# Patient Record
Sex: MALE | Race: WHITE | Employment: OTHER | URBAN - METROPOLITAN AREA
[De-identification: names, ages, dates, MRNs, and addresses within clinical notes are randomized per-mention and may not be internally consistent; named-entity substitution may affect disease eponyms.]

---

## 2017-01-04 ENCOUNTER — OFFICE VISIT (OUTPATIENT)
Dept: FAMILY MEDICINE CLINIC | Age: 82
End: 2017-01-04

## 2017-01-04 VITALS
SYSTOLIC BLOOD PRESSURE: 158 MMHG | WEIGHT: 178 LBS | DIASTOLIC BLOOD PRESSURE: 72 MMHG | BODY MASS INDEX: 23.59 KG/M2 | HEIGHT: 73 IN | OXYGEN SATURATION: 97 % | TEMPERATURE: 97.5 F | RESPIRATION RATE: 16 BRPM | HEART RATE: 82 BPM

## 2017-01-04 DIAGNOSIS — K21.9 GASTROESOPHAGEAL REFLUX DISEASE, ESOPHAGITIS PRESENCE NOT SPECIFIED: ICD-10-CM

## 2017-01-04 DIAGNOSIS — R73.01 IMPAIRED FASTING GLUCOSE: ICD-10-CM

## 2017-01-04 DIAGNOSIS — I10 ESSENTIAL HYPERTENSION: Primary | ICD-10-CM

## 2017-01-04 DIAGNOSIS — R80.9 MICROALBUMINURIA: ICD-10-CM

## 2017-01-04 DIAGNOSIS — E78.1 HYPERTRIGLYCERIDEMIA: ICD-10-CM

## 2017-01-04 DIAGNOSIS — Z00.00 MEDICARE ANNUAL WELLNESS VISIT, SUBSEQUENT: ICD-10-CM

## 2017-01-04 RX ORDER — PHENOL/SODIUM PHENOLATE
20 AEROSOL, SPRAY (ML) MUCOUS MEMBRANE DAILY
Qty: 90 TAB | Refills: 3 | Status: CANCELLED | OUTPATIENT
Start: 2017-01-04

## 2017-01-04 RX ORDER — RANITIDINE 150 MG/1
150 TABLET, FILM COATED ORAL 2 TIMES DAILY
Qty: 60 TAB | Refills: 3 | Status: SHIPPED | OUTPATIENT
Start: 2017-01-04 | End: 2017-01-19 | Stop reason: ALTCHOICE

## 2017-01-04 NOTE — PROGRESS NOTES
Pt presents to office today for a follow up on HTN and fasting labs. Chief Complaint   Patient presents with    Hypertension     Follow up. 1. Have you been to the ER, urgent care clinic since your last visit? Hospitalized since your last visit? No    2. Have you seen or consulted any other health care providers outside of the [address] [place] [address] since your last visit? Include any pap smears or colon screening. Yes to Dr. Thomas Hanks 10/3/2016 , Dr. Luis Clinton 10/27/2016 and again to Dr. Thomas Hanks 11/28/2016.

## 2017-01-04 NOTE — PROGRESS NOTES
HISTORY OF PRESENT ILLNESS  Magdaleno Ochoa is a 80 y.o. male. HPI  Chief Complaint   Patient presents with    Hypertension     Follow up. Magdaleno Ochoa is a 80 y.o. male with a PHMx of Type 2 dm, HTN, chronic a fib, anemia rt b12 def, multiple myeloma, gerd, chronic diarrhea, carotid stenosis and chronic diarrhea. Pt presents to office today for a follow up on HTN and fasting labs. states he is doing well overall. Still seeing dr West Harding regularly for his multiple myeloma cancer tx. States he wsa told to put his statin and asa on hold until told to restart by md. He has a fu appt to discuss this. States this is due to elevated liver functions. He is going next week to have an abd ultrasound as well. Reviewed dr [place] Harding notes at time of visit which states he will be referred to GI for evaluation if pending these results. Also he was told to cut out alcohol. He has been on break from the revlimid x 3 months due to this as well. Still has manageable diarrhea in am. Has a large breakfast with cereal, juice, fruit and eggs. States he will have a large bm in am followed by watery diarrhea for a few hours. Diet fernandez dry helps   He still takes his probiotic daily. Takes colestid bid. After dinner at night he has gas but does not go to bm. Takes the immodium at breakfast after diarrhea. He is doing labs on Monday with dr West Harding. Current Outpatient Prescriptions   Medication Sig Dispense Refill    lisinopril-hydrochlorothiazide (PRINZIDE, ZESTORETIC) 10-12.5 mg per tablet Take 0.5 Tabs by mouth daily. Cancel this rx request, do not fill ** 30 Tab 5    multivitamin (ONE A DAY) tablet Take 1 Tab by mouth daily.  Omeprazole delayed release (PRILOSEC D/R) 20 mg tablet Take 20 mg by mouth daily. Takes 1 tab daily      gabapentin (NEURONTIN) 300 mg capsule Take 1 Cap by mouth three (3) times daily. 323 Cap 3    folic acid-vit I5-JFS W74 (FOLBIC) 2.5-25-2 mg tablet Take 1 Tab by mouth daily.  90 Tab 3    colestipol (COLESTID) 1 gram tablet Take 1 Tab by mouth two (2) times a day. 180 Tab 4    FLUZONE HIGH-DOSE 2015-16, PF, syrg injection   0    ENZYMES,DIGESTIVE, PLANT, (FIBERZYME CONCENTRATE-HP PO) Take  by mouth two (2) times a day.  L. ACIDOPH/L.RHAMN/B.BIF/B. LONG (PROBIOTIC ACIDOPHILUS BIOBEADS PO) Take  by mouth two (2) times a day.  Lancets (ACCU-CHEK FASTCLIX) mis Patient is to test BID 3 Package 11    ACCU-CHEK SMARTVIEW TEST STRIP strip Patient is to test blood sugar BID 3 Package 11    Blood-Glucose Meter (ACCU-CHEK CORA) misc 1 Package by Does Not Apply route two (2) times a day. 1 Each 11    aspirin delayed-release 81 mg tablet Take 81 mg by mouth daily.  FERROUS FUMARATE/ASCORBIC ACID (VITRON-C PO) Take 2 Tabs by mouth two (2) times a day.  simvastatin (ZOCOR) 40 mg tablet Take 1 Tab by mouth nightly. 90 Tab 3    REVLIMID 15 mg cap       miscellaneous medical supply (T.E.D. ANTI-EMBOLISM STOCKING) misc mauricio leg swelling. Please fit patient to 20mmhg compression.    M79.89. G62.9, I83.93 1 Each 11     Allergies   Allergen Reactions    Codeine Nausea and Vomiting     Blurred vision, felt faint    Contrast Agent [Iodine] Hives     Needs premedication w/ Benadryl prn (since 1/2012 reaction)    Fish Oil Nausea Only    Glipizide Other (comments)     DRASTIC DROP IN BLOOD GLUCOSE, fasting    Metformin Diarrhea    Niacin Other (comments)     Flushing    Norvasc [Amlodipine] Other (comments)     Leg edema    Optiray 350 [Ioversol] Hives     He does not know what this is     Past Medical History   Diagnosis Date    AAA (abdominal aortic aneurysm) ([facility] 1/6/2012    Abnormal serum protein electrophoresis 4/25/2012    Anemia 4/6/2010    Anemia 1/5/2012    Anemia due to GI bleed 2007 4/6/2010    Aneurysm ([facility] [facility]      AAA    Arrhythmia      ATRIAL FIB PT STATES HE DOESN'T HAVE    Atrial fibrillation 1/2012 2/22/2012    Bilateral Carotid Bruits, L>R 9/7/2010    Bone cancer ([facility]     Borderline diabetes mellitus 2010    Cancer ([facility]      SKIN MELANOMA ON BACK     Carotid stenosis 2010    Diabetes mellitus, type 2 ([facility] 2010     NO MEDS    Disturbances of sulphur-bearing amino-acid metabolism 2010    ETOH abuse 2010    GERD (gastroesophageal reflux disease) 2010    GI bleed 2010    GI bleed, duodenitis 2007 2010    H/O Heavy Alcohol Use 2010    History of skin cancer 2010    HTN (hypertension) 2010    Hypercalcemia 2012    Hyperhomocysteinemia ([facility] 2010    Hypertriglyceridemia 2010    Left inguinal hernia 2015    Lipids blood increased 2010    Microalbuminuria 12/10/2015    Mild Allergic Rhinitis 2010    Pre-diabetes 2010;  Optho Dr Oseas Horton Right inguinal hernia 2015    S/P AAA repair 2012    Sinus tachycardia 2010    Tachycardia 2010    Vitamin B12 deficiency 2010     Past Surgical History   Procedure Laterality Date    Colonoscopy  2007     Normal; Dr Madeline Mauro  2007     small hiatal hernia, mild duodenitis; Dr Jackie Matta Hx cataract removal       both eyes    Hx heent       LASIK BOTH EYES    Ethan doppler  3/26/2012     ETHAN Echo; + clot    Hx aaa repair  2012     Dr Ben Valdez Hx cholecystectomy  14     [facility]- Dr. Jazzy Archer    Hx gi       COLONOSCOPY    Hx hernia repair Left 9/24/15     left indirect inguinal by Dr. Jonny Mendiola Hx hernia repair Right 11/2/15     inguinal w/ mesh by Dr. Jonny Mendiola Hx hernia repair Bilateral 10/2015 And 2015     Inguinal     Family History   Problem Relation Age of Onset    Heart Disease Mother       age 80    Cancer Father       brain tumor age 80    Heart Disease Father     Substance Abuse Daughter       cocaine OD age 43 in 200    Other Son       PE/DVT age 43   [address] Other Daughter      alive and well    Anesth Problems Neg Hx      Social History   Substance Use Topics    Smoking status: Former Smoker     Packs/day: 1.50     Years: 55.00     Quit date: 1/1/2001    Smokeless tobacco: Never Used      Comment: used to smoke 1 1/2 ppd x ~ 40 yrs    Alcohol use 8.4 oz/week     14 Glasses of wine per week      Comment: 14       Review of Systems   Constitutional: Negative. Negative for chills, diaphoresis, fever, malaise/fatigue and weight loss. HENT: Negative. Negative for congestion, ear discharge, ear pain, hearing loss, nosebleeds, sore throat and tinnitus. Eyes: Negative. Negative for blurred vision, double vision, photophobia, pain, discharge and redness. Respiratory: Negative. Negative for cough, hemoptysis, sputum production, shortness of breath, wheezing and stridor. Cardiovascular: Negative. Negative for chest pain, palpitations, orthopnea, claudication, leg swelling and PND. Gastrointestinal: Negative. Negative for abdominal pain, blood in stool, constipation, diarrhea, heartburn, melena, nausea and vomiting. Genitourinary: Negative. Negative for dysuria, flank pain, frequency, hematuria and urgency. Musculoskeletal: Negative. Negative for back pain, falls, joint pain, myalgias and neck pain. Skin: Negative. Negative for itching and rash. Neurological: Negative. Negative for dizziness, tingling, tremors, sensory change, speech change, focal weakness, seizures, loss of consciousness, weakness and headaches. Endo/Heme/Allergies: Negative. Negative for environmental allergies and polydipsia. Does not bruise/bleed easily. Psychiatric/Behavioral: Negative. Negative for depression, hallucinations, memory loss, substance abuse and suicidal ideas. The patient is not nervous/anxious and does not have insomnia. All other systems reviewed and are negative. Physical Exam   Constitutional: He is oriented to person, place, and time.  He appears well-developed and well-nourished. No distress. HENT:   Head: Normocephalic. Head is without raccoon's eyes. Right Ear: External ear normal.   Left Ear: External ear normal.   Nose: Nose normal. No mucosal edema, rhinorrhea or sinus tenderness. Right sinus exhibits no maxillary sinus tenderness and no frontal sinus tenderness. Left sinus exhibits no maxillary sinus tenderness and no frontal sinus tenderness. Mouth/Throat: Oropharynx is clear and moist. No oropharyngeal exudate. Eyes: Conjunctivae and EOM are normal. Pupils are equal, round, and reactive to light. Right eye exhibits no discharge. Left eye exhibits no discharge. Neck: Normal range of motion. Neck supple. Cardiovascular: Normal rate, regular rhythm, normal heart sounds and intact distal pulses. Pulmonary/Chest: Effort normal and breath sounds normal. No respiratory distress. He has no wheezes. He has no rales. He exhibits no tenderness. Lymphadenopathy:     He has no cervical adenopathy. Neurological: He is alert and oriented to person, place, and time. Skin: Skin is warm and dry. Psychiatric: He has a normal mood and affect. His behavior is normal. Judgment and thought content normal.   Nursing note and vitals reviewed. Results for orders placed or performed in visit on 01/04/17   HEMOGLOBIN A1C WITH EAG   Result Value Ref Range    Hemoglobin A1c 5.5 4.8 - 5.6 %    Estimated average glucose 111 mg/dL   LIPID PANEL   Result Value Ref Range    Cholesterol, total 227 (H) 100 - 199 mg/dL    Triglyceride 329 (H) 0 - 149 mg/dL    HDL Cholesterol 60 >39 mg/dL    VLDL, calculated 66 (H) 5 - 40 mg/dL    LDL, calculated 101 (H) 0 - 99 mg/dL   CVD REPORT   Result Value Ref Range    INTERPRETATION Note        ASSESSMENT and PLAN    ICD-10-CM ICD-9-CM    1. Essential hypertension I10 401.9    2. Gastroesophageal reflux disease, esophagitis presence not specified K21.9 530.81    3. Microalbuminuria R80.9 791.0    4.  Impaired fasting glucose R73.01 790.21 HEMOGLOBIN A1C WITH EAG      LIPID PANEL   5. Hypertriglyceridemia E78.1 272.1 HEMOGLOBIN A1C WITH EAG      LIPID PANEL   6. Medicare annual wellness visit, subsequent Z00.00 V70.0      Delmy Oliver was seen today for hypertension. Diagnoses and all orders for this visit:    Essential hypertension  bp recheck was under 140/90 and is in normal range at home (see bp log in media). He has documented white coat syndrome. He plans to get back on track with diet  Will continue to monitor, recheck in 3 months. Patient verbalizes understanding of plan of care. Gastroesophageal reflux disease, esophagitis presence not specified  - we are going to try to dc the omeprazole and change to (due to risks of chronic ppi use)    raNITIdine (ZANTAC) 150 mg tablet; Take 1 Tab by mouth two (2) times a day. He will keep me poster with how he is doing on this   Microalbuminuria  Impaired fasting glucose  Hypertriglyceridemia  -     HEMOGLOBIN A1C WITH EAG  -     LIPID PANEL  Update labs today. Deviate plan per lab results   He is currently off the statin and asa until dr. Carmen Perez gives him permission to restart these medications. Medicare annual wellness visit, subsequent  Schedule of Personalized Health Plan  (Provide Copy to Patient)  The best way to stay healthy is to live a healthy lifestyle. A healthy lifestyle includes regular exercise, eating a well-balanced diet, keeping a healthy weight and not smoking. Regular physical exams and screening tests are another important way to take care of yourself. Preventive exams provided by health care providers can find health problems early when treatment works best and can keep you from getting certain diseases or illnesses. Preventive services include exams, lab tests, screenings, shots, monitoring and information to help you take care of your own health. All people over 65 should have a pneumonia shot.  Pneumonia shots are usually only needed once in a lifetime unless your doctor decides differently. All people over 65 should have a yearly flu shot. People over 65 are at medium to high risk for Hepatitis B. Three shots are needed for complete protection. In addition to your physical exam, some screening tests are recommended:    Bone mass measurement (dexa scan) is recommended every two years if you have certain risk factors, such as personal history of vertebral fracture or chronic steroid medication use    Diabetes Mellitus screening is recommended every year. Glaucoma is an eye disease caused by high pressure in the eye. An eye exam is recommended every year. Cardiovascular screening tests that check your cholesterol and other blood fat (lipid) levels are recommended every five years. Colorectal Cancer screening tests help to find pre-cancerous polyps (growths in the colon) so they can be removed before they turn into cancer. Tests ordered for screening depend on your personal and family history risk factors. Screening for Prostate Cancer is recommended yearly with a digital rectal exam and/or a PSA test    Here is a list of your current Health Maintenance items with a due date:  Health Maintenance   Topic Date Due    FOOT EXAM Q1  06/10/2017    MICROALBUMIN Q1  06/10/2017    EYE EXAM RETINAL OR DILATED Q1  06/20/2017    HEMOGLOBIN A1C Q6M  07/04/2017    LIPID PANEL Q1  01/04/2018    MEDICARE YEARLY EXAM  01/05/2018    GLAUCOMA SCREENING Q2Y  06/20/2018    DTaP/Tdap/Td series (2 - Td) 11/20/2024    ZOSTER VACCINE AGE 60>  Addressed    Pneumococcal 65+ High/Highest Risk  Completed    INFLUENZA AGE 9 TO ADULT  Addressed           Follow-up Disposition:  Return in about 6 months (around 7/4/2017) for bp recheck in 3-6 month pending numbers at home . **take the immodium before breakfast to see if this helps.

## 2017-01-05 LAB
CHOLEST SERPL-MCNC: 227 MG/DL (ref 100–199)
EST. AVERAGE GLUCOSE BLD GHB EST-MCNC: 111 MG/DL
HBA1C MFR BLD: 5.5 % (ref 4.8–5.6)
HDLC SERPL-MCNC: 60 MG/DL
INTERPRETATION, 910389: NORMAL
LDLC SERPL CALC-MCNC: 101 MG/DL (ref 0–99)
TRIGL SERPL-MCNC: 329 MG/DL (ref 0–149)
VLDLC SERPL CALC-MCNC: 66 MG/DL (ref 5–40)

## 2017-01-10 ENCOUNTER — HOSPITAL ENCOUNTER (OUTPATIENT)
Dept: ULTRASOUND IMAGING | Age: 82
Discharge: HOME | End: 2017-01-10
Attending: INTERNAL MEDICINE
Payer: MEDICARE

## 2017-01-10 DIAGNOSIS — C90.00 MULTIPLE MYELOMA, WITHOUT MENTION OF HAVING ACHIEVED REMISSION: ICD-10-CM

## 2017-01-10 PROCEDURE — 76700 US EXAM ABDOM COMPLETE: CPT

## 2017-01-10 NOTE — PROGRESS NOTES
A copy of pt's lab results have been faxed over to pt's oncologist (Dr. Monika Barriga) and confirmed. Thank you.

## 2017-01-19 RX ORDER — PHENOL/SODIUM PHENOLATE
20 AEROSOL, SPRAY (ML) MUCOUS MEMBRANE DAILY
Qty: 90 TAB | Refills: 1 | Status: SHIPPED | OUTPATIENT
Start: 2017-01-19 | End: 2017-06-28 | Stop reason: ALTCHOICE

## 2017-01-19 NOTE — TELEPHONE ENCOUNTER
Called pt back and he did confirm that he had stopped taking his Zantac last week because it was not helping with his indigestion/heart burn. He reports that he has been taking his Omeprazole 20 mg once daily now, instead of twice a day. He has been trying to wean himself off it. Pt states he has enough for 1 month but will need some refills sent to [facility] please. Medication pended to you please.

## 2017-01-19 NOTE — TELEPHONE ENCOUNTER
----- Message from Rodney Schmidt sent at 1/18/2017  3:13 PM EST -----  Regarding: Telephone  Contact: [phone]  Patient states he stopped taking Ranitidine 150mg on 1/13/17 because it gave him indigestion, so he started taking his old prescription for omeprazole 20mg.

## 2017-01-27 RX ORDER — CHOLECALCIFEROL (VITAMIN D3) 50 MCG
20 CAPSULE ORAL DAILY
Qty: 90 CAP | Refills: 1 | Status: SHIPPED | OUTPATIENT
Start: 2017-01-27 | End: 2017-02-10 | Stop reason: SDUPTHER

## 2017-05-22 RX ORDER — RANITIDINE 150 MG/1
TABLET, FILM COATED ORAL
Qty: 180 TAB | Refills: 3 | Status: SHIPPED | OUTPATIENT
Start: 2017-05-22 | End: 2018-05-14 | Stop reason: SDUPTHER

## 2017-06-01 NOTE — TELEPHONE ENCOUNTER
----- Message from Macario Miranda sent at 5/31/2017  4:54 PM EDT -----  Regarding: Dr. Lyric Cleaning  Pt is requesting a refill on his Rx \"[facility]\" he uses [facility] mail order ph [phone] which is on his file. Pt best contact number is [phone].

## 2017-06-28 ENCOUNTER — OFFICE VISIT (OUTPATIENT)
Dept: FAMILY MEDICINE CLINIC | Age: 82
End: 2017-06-28

## 2017-06-28 VITALS
SYSTOLIC BLOOD PRESSURE: 162 MMHG | RESPIRATION RATE: 16 BRPM | WEIGHT: 177 LBS | TEMPERATURE: 98 F | BODY MASS INDEX: 23.46 KG/M2 | OXYGEN SATURATION: 96 % | HEIGHT: 73 IN | DIASTOLIC BLOOD PRESSURE: 60 MMHG | HEART RATE: 82 BPM

## 2017-06-28 DIAGNOSIS — J30.1 SEASONAL ALLERGIC RHINITIS DUE TO POLLEN: ICD-10-CM

## 2017-06-28 DIAGNOSIS — E78.1 HYPERTRIGLYCERIDEMIA: ICD-10-CM

## 2017-06-28 DIAGNOSIS — Z12.11 SCREEN FOR COLON CANCER: ICD-10-CM

## 2017-06-28 DIAGNOSIS — K52.9 CHRONIC DIARRHEA: ICD-10-CM

## 2017-06-28 DIAGNOSIS — R25.2 CRAMP OF BOTH LOWER EXTREMITIES: ICD-10-CM

## 2017-06-28 DIAGNOSIS — I10 ESSENTIAL HYPERTENSION: Primary | ICD-10-CM

## 2017-06-28 RX ORDER — FLUTICASONE PROPIONATE 50 MCG
SPRAY, SUSPENSION (ML) NASAL
Qty: 1 BOTTLE | Refills: 5 | Status: SHIPPED | OUTPATIENT
Start: 2017-06-28 | End: 2017-06-28 | Stop reason: SDUPTHER

## 2017-06-28 RX ORDER — LOPERAMIDE HCL 2 MG
2 TABLET ORAL
COMMUNITY
End: 2017-08-22 | Stop reason: ALTCHOICE

## 2017-06-28 RX ORDER — AZELASTINE 1 MG/ML
2 SPRAY, METERED NASAL 2 TIMES DAILY
Qty: 1 BOTTLE | Refills: 5 | Status: SHIPPED | OUTPATIENT
Start: 2017-06-28 | End: 2018-03-01

## 2017-06-28 RX ORDER — FLUTICASONE PROPIONATE 50 MCG
SPRAY, SUSPENSION (ML) NASAL
Qty: 3 BOTTLE | Refills: 5 | Status: SHIPPED | OUTPATIENT
Start: 2017-06-28 | End: 2018-03-01

## 2017-06-28 NOTE — PROGRESS NOTES
Pt presents to office today for a BP foll  Chief Complaint   Patient presents with    Blood Pressure Check     Follow up. 1. Have you been to the ER, urgent care clinic since your last visit? Hospitalized since your last visit? No    2. Have you seen or consulted any other health care providers outside of the [address] [place] [address] since your last visit? Include any pap smears or colon screening. No  ow up.

## 2017-06-28 NOTE — PROGRESS NOTES
HISTORY OF PRESENT ILLNESS  Migdalia Ortega is a 80 y.o. male. HPI  Chief Complaint   Patient presents with    Blood Pressure Check     Follow up. Migdalia Ortega is a 80 y.o. male here for routine check up on his chronic conditions. He has pmh significant for multiple myeloma, HTN (white coat in office), chronic diarrhea, h/o carotid stenosis, hx of GI bleed, gerd, pre-diabetes, high triglycerides, hx sinus tachycardia, A fib. A/p AAA repair in 2012. Hx of allergic rhinitis. Remote hx of alcohol abuse. States the past 2 days has had a couple episodes of brief inbalance and dizziness when he is up and about. With head movements. Has had some nasal congestion as well. Using breathe right strips. Doing well with drinking a good fluid intake. Works 2-3 hours outside in the am -takes breaks    States he still has diarrhea in the am, goes 2 times regular first and then followed by diarrhea. Takes 2 immodium and this works for him for the rest of the day. No longer taking the cholestid due to not effective. Current Outpatient Prescriptions   Medication Sig Dispense Refill    loperamide (IMODIUM A-D) 2 mg tablet Take 2 mg by mouth four (4) times daily as needed for Diarrhea.  azelastine (ASTELIN) 137 mcg (0.1 %) nasal spray 2 Sprays by Both Nostrils route two (2) times a day. Use in each nostril as directed 1 Bottle 5    folic acid-vit G6-ATG L00 (FOLBIC) 2.5-25-2 mg tablet Take 1 Tab by mouth daily. 90 Tab 3    raNITIdine (ZANTAC) 150 mg tablet TAKE 1 TABLET BY MOUTH TWICE DAILY 180 Tab 3    lisinopril-hydrochlorothiazide (PRINZIDE, ZESTORETIC) 10-12.5 mg per tablet Take 0.5 Tabs by mouth daily. Cancel this rx request, do not fill ** 30 Tab 5    multivitamin (ONE A DAY) tablet Take 1 Tab by mouth daily.       Lancets (ACCU-CHEK FASTCLIX) [facility] [place] test BID 3 Package 11    ACCU-CHEK SMARTVIEW TEST STRIP strip Patient is to test blood sugar BID 3 Package 11    Blood-Glucose Meter (ACCU-CHEK CORA) misc 1 Package by Does Not Apply route two (2) times a day. 1 Each 11    FERROUS FUMARATE/ASCORBIC ACID (VITRON-C PO) Take 2 Tabs by mouth two (2) times a day.  fluticasone (FLONASE) 50 mcg/actuation nasal spray SHAKE LIQUID AND USE 2 SPRAYS IN EACH NOSTRIL DAILY 3 Bottle 5    ENZYMES,DIGESTIVE, PLANT, (FIBERZYME CONCENTRATE-HP PO) Take  by mouth two (2) times a day.  L. ACIDOPH/L.RHAMN/B.BIF/B. LONG (PROBIOTIC ACIDOPHILUS BIOBEADS PO) Take  by mouth two (2) times a day.        Allergies   Allergen Reactions    Codeine Nausea and Vomiting     Blurred vision, felt faint    Contrast Agent [Iodine] Hives     Needs premedication w/ Benadryl prn (since 1/2012 reaction)    Fish Oil Nausea Only    Glipizide Other (comments)     DRASTIC DROP IN BLOOD GLUCOSE, fasting    Metformin Diarrhea    Niacin Other (comments)     Flushing    Norvasc [Amlodipine] Other (comments)     Leg edema    Optiray 350 [Ioversol] Hives     He does not know what this is     Past Medical History:   Diagnosis Date    AAA (abdominal aortic aneurysm) ([address] 1/6/2012    Abnormal serum protein electrophoresis 4/25/2012    Anemia 4/6/2010    Anemia 1/5/2012    Anemia due to GI bleed 2007 4/6/2010    Aneurysm ([address]     AAA    Arrhythmia     ATRIAL FIB PT STATES HE DOESN'T HAVE    Atrial fibrillation 1/2012 2/22/2012    Bilateral Carotid Bruits, L>R 9/7/2010    Bone cancer ([address]     Borderline diabetes mellitus 4/6/2010    Cancer ([address]     SKIN MELANOMA ON BACK     Carotid stenosis 4/6/2010    Diabetes mellitus, type 2 ([address] 9/7/2010    NO MEDS    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    ETOH abuse 4/6/2010    GERD (gastroesophageal reflux disease) 4/6/2010    GI bleed 4/6/2010    GI bleed, duodenitis 2007 4/6/2010    H/O Heavy Alcohol Use 9/7/2010    History of skin cancer 9/7/2010    HTN (hypertension) 4/6/2010    Hypercalcemia 4/25/2012    Hyperhomocysteinemia ([address] 2010    Hypertriglyceridemia 2010    Left inguinal hernia 2015    Lipids blood increased 2010    Microalbuminuria 12/10/2015    Mild Allergic Rhinitis 2010    Pre-diabetes 2010    2005; Optho Dr Yandel Ly Right inguinal hernia 2015    S/P AAA repair 2012    Sinus tachycardia 2010    Tachycardia 2010    Vitamin B12 deficiency 2010     Past Surgical History:   Procedure Laterality Date    COLONOSCOPY  2007    Normal; Dr Robert Chapman EGD  2007    small hiatal hernia, mild duodenitis; Dr Robert Chapman HX AAA REPAIR  2012    Dr Flori Polanco    HX CATARACT REMOVAL      both eyes    HX CHOLECYSTECTOMY  14    lap sandi- [facility]- Dr. Anderson Ramirez HX HERNIA REPAIR Left 9/24/15    left indirect inguinal by Dr. Roxie Morton Right 11/2/15    inguinal w/ mesh by Dr. Roxie Morton Bilateral 10/2015 And 2015    Inguinal    BLUE DOPPLER  3/26/2012    BLUE Echo; + clot     Family History   Problem Relation Age of Onset    Heart Disease Mother       age 80    Cancer Father       brain tumor age 80    Heart Disease Father     Substance Abuse Daughter       cocaine OD age 43 in 200    Other Son       PE/DVT age 43    Heart Disease Brother    Millicent Other Daughter      alive and well    Anesth Problems Neg Hx      Social History   Substance Use Topics    Smoking status: Former Smoker     Packs/day: 1.50     Years: 55.00     Quit date: 2001    Smokeless tobacco: Never Used      Comment: used to smoke 1 1/2 ppd x ~ 40 yrs    Alcohol use 8.4 oz/week     14 Glasses of wine per week      Comment: 14       Review of Systems   Constitutional: Negative. Negative for chills, diaphoresis, fever, malaise/fatigue and weight loss. HENT: Positive for congestion.  Negative for ear discharge, ear pain, hearing loss, nosebleeds, sore throat and tinnitus. Eyes: Negative. Negative for blurred vision, photophobia, pain, discharge and redness. Respiratory: Negative. Negative for cough, hemoptysis, sputum production, shortness of breath, wheezing and stridor. Cardiovascular: Negative. Negative for chest pain, palpitations, orthopnea, claudication, leg swelling and PND. Gastrointestinal: Positive for diarrhea. Negative for abdominal pain, blood in stool, constipation, heartburn, melena, nausea and vomiting. Musculoskeletal: Negative. Skin: Negative. Neurological: Positive for dizziness. Negative for weakness and headaches. Endo/Heme/Allergies: Negative. Psychiatric/Behavioral: Negative. All other systems reviewed and are negative. Physical Exam   Constitutional: He is oriented to person, place, and time. He appears well-developed and well-nourished. No distress. HENT:   Head: Normocephalic. Head is without raccoon's eyes. Right Ear: External ear normal.   Left Ear: External ear normal.   Nose: Mucosal edema and rhinorrhea present. No sinus tenderness. Right sinus exhibits no maxillary sinus tenderness and no frontal sinus tenderness. Left sinus exhibits no maxillary sinus tenderness and no frontal sinus tenderness. Mouth/Throat: Oropharynx is clear and moist. No oropharyngeal exudate. Left tm bulging with clear effusion   Eyes: Conjunctivae and EOM are normal. Pupils are equal, round, and reactive to light. Right eye exhibits no discharge. Left eye exhibits no discharge. Neck: Normal range of motion. Neck supple. Cardiovascular: Normal rate, regular rhythm, normal heart sounds and intact distal pulses. Pulmonary/Chest: Effort normal and breath sounds normal. No respiratory distress. He has no wheezes. He has no rales. He exhibits no tenderness. Abdominal: Soft. Bowel sounds are normal. He exhibits no distension and no mass. There is no tenderness. There is no rebound and no guarding. Lymphadenopathy:     He has no cervical adenopathy. Neurological: He is alert and oriented to person, place, and time. He has normal reflexes. No cranial nerve deficit. Coordination normal.   Skin: Skin is warm and dry. Psychiatric: He has a normal mood and affect. His behavior is normal. Judgment and thought content normal.   Nursing note and vitals reviewed. Results for orders placed or performed in visit on 06/28/17   LIPID PANEL   Result Value Ref Range    Cholesterol, total 208 (H) 100 - 199 mg/dL    Triglyceride 209 (H) 0 - 149 mg/dL    HDL Cholesterol 63 >39 mg/dL    VLDL, calculated 42 (H) 5 - 40 mg/dL    LDL, calculated 103 (H) 0 - 99 mg/dL   METABOLIC PANEL, COMPREHENSIVE   Result Value Ref Range    Glucose 109 (H) 65 - 99 mg/dL    BUN 25 8 - 27 mg/dL    Creatinine 1.23 0.76 - 1.27 mg/dL    GFR est non-AA 54 (L) >59 mL/min/1.73    GFR est AA 62 >59 mL/min/1.73    BUN/Creatinine ratio 20 10 - 24    Sodium 140 134 - 144 mmol/L    Potassium 4.0 3.5 - 5.2 mmol/L    Chloride 101 96 - 106 mmol/L    CO2 21 18 - 29 mmol/L    Calcium 9.5 8.6 - 10.2 mg/dL    Protein, total 7.2 6.0 - 8.5 g/dL    Albumin 4.3 3.5 - 4.7 g/dL    GLOBULIN, TOTAL 2.9 1.5 - 4.5 g/dL    A-G Ratio 1.5 1.2 - 2.2    Bilirubin, total 0.5 0.0 - 1.2 mg/dL    Alk. phosphatase 63 39 - 117 IU/L    AST (SGOT) 27 0 - 40 IU/L    ALT (SGPT) 23 0 - 44 IU/L   MAGNESIUM   Result Value Ref Range    Magnesium 2.1 1.6 - 2.3 mg/dL   CVD REPORT   Result Value Ref Range    INTERPRETATION NTAP     PDF IMAGE Not applicable    CKD REPORT   Result Value Ref Range    Interpretation Note        ASSESSMENT and PLAN    ICD-10-CM ICD-9-CM    1. Essential hypertension I10 401.9 LIPID PANEL      METABOLIC PANEL, COMPREHENSIVE   2. Hypertriglyceridemia E78.1 272.1 LIPID PANEL      METABOLIC PANEL, COMPREHENSIVE   3. Cramp of both lower extremities R25.2 729.82 MAGNESIUM   4. Chronic diarrhea K52.9 787.91    5.  Screen for colon cancer Z12.11 V76.51 OCCULT BLOOD, IMMUNOASSAY (FIT)   6. Seasonal allergic rhinitis due to pollen J30.1 477.0      Kami Ladd was seen today for blood pressure check. Diagnoses and all orders for this visit:    Essential hypertension  -     LIPID PANEL  -     METABOLIC PANEL, COMPREHENSIVE  bp is always high in our office due to white coat HTN but he monitors bp very consistently at home and these records show good control -see log in media. Hypertriglyceridemia  -     LIPID PANEL  -     METABOLIC PANEL, COMPREHENSIVE  Deviate plan per lab results     Cramp of both lower extremities  -     MAGNESIUM    Chronic diarrhea  Will look into some of the new medication options and see if it might be worth a trial of a new med. Will get back to him on this. (currently he is refusing to fu with Gi but dwp this may be needed in the future pending his progress)  Screen for colon cancer  -     OCCULT BLOOD, IMMUNOASSAY (FIT)    Seasonal allergic rhinitis due to pollen  - add    azelastine (ASTELIN) 137 mcg (0.1 %) nasal spray; 2 Sprays by Both Nostrils route two (2) times a day. Use in each nostril as directed  -     Add fluticasone (FLONASE) 50 mcg/actuation nasal spray; 2 sprays each nostril daily  Reviewed with patient and educated regarding proper use of medication, side effects, potential adverse effects and when to follow up. Fu if not improving over the next 2-4 weeks. Will see if these help with the dizziness as well. Follow-up Disposition:  Return in about 3 months (around 9/28/2017) for recheck on bp and dizziness .

## 2017-06-28 NOTE — MR AVS SNAPSHOT
Visit Information Date & Time Provider Department Dept. Phone Encounter #  
 6/28/2017  8:40 AM Silke Carias, [address] [phone] [id number] Follow-up Instructions Return in about 3 months (around 9/28/2017) for recheck on bp and dizziness . Upcoming Health Maintenance Date Due  
 FOOT EXAM Q1 6/10/2017 MICROALBUMIN Q1 6/10/2017 EYE EXAM RETINAL OR DILATED Q1 6/20/2017 HEMOGLOBIN A1C Q6M 7/4/2017 INFLUENZA AGE 9 TO ADULT 8/1/2017 LIPID PANEL Q1 1/4/2018 MEDICARE YEARLY EXAM 1/5/2018 GLAUCOMA SCREENING Q2Y 6/20/2018 DTaP/Tdap/Td series (2 - Td) 11/20/2024 Allergies as of 6/28/2017  Review Complete On: 6/28/2017 By: Silke Carias, NP Severity Noted Reaction Type Reactions Codeine  04/06/2010    Nausea and Vomiting Blurred vision, felt faint Contrast Agent [Iodine]  01/16/2012    Hives Needs premedication w/ Benadryl prn (since 1/2012 reaction) Fish Oil  09/07/2010    Nausea Only Glipizide  02/03/2012    Other (comments) DRASTIC DROP IN BLOOD GLUCOSE, fasting Metformin  09/10/2015    Diarrhea Niacin  09/07/2010    Other (comments) Flushing Norvasc [Amlodipine]  02/22/2012    Other (comments) Leg edema Optiray 350 [Ioversol]  01/09/2012    Hives He does not know what this is Current Immunizations  Reviewed on 12/29/2014 Name Date Influenza High Dose Vaccine PF 10/11/2016 Influenza Vaccine 10/1/2013 Influenza Vaccine Split 10/9/2011 Influenza Vaccine Whole 12/1/2012 Pneumococcal Conjugate (PCV-13) 11/20/2014 Pneumococcal Vaccine (Unspecified Type) 11/1/2003 Tdap 11/20/2014 Not reviewed this visit You Were Diagnosed With   
  
 Codes Comments Essential hypertension    -  Primary ICD-10-CM: I10 
ICD-9-CM: 401.9 Hypertriglyceridemia     ICD-10-CM: E78.1 ICD-9-CM: 272.1 Cramp of both lower extremities     ICD-10-CM: R25.2 ICD-9-CM: 729.82 Chronic diarrhea     ICD-10-CM: K52.9 ICD-9-CM: 787.91 Screen for colon cancer     ICD-10-CM: Z12.11 ICD-9-CM: V76.51 Seasonal allergic rhinitis due to pollen     ICD-10-CM: J30.1 ICD-9-CM: 477.0 Vitals BP Pulse Temp Resp Height(growth percentile) Weight(growth percentile) 162/60 82 98 °F (36.7 °C) (Oral) 16 6' 1\" (1.854 m) 177 lb (80.3 kg) SpO2 BMI Smoking Status 96% 23.35 kg/m2 Former Smoker Vitals History BMI and BSA Data Body Mass Index Body Surface Area  
 23.35 kg/m 2 2.03 m 2 Preferred Pharmacy Pharmacy Name Phone [facility] [address] [place] [address] [phone] Your Updated Medication List  
  
   
This list is accurate as of: 6/28/17  9:54 AM.  Always use your most recent med list.  
  
  
  
  
 ACCU-CHEK SMARTVIEW TEST STRIP strip Generic drug:  glucose blood VI test strips Patient is to test blood sugar BID  
  
 azelastine 137 mcg (0.1 %) nasal spray Commonly known as:  ASTELIN  
2 Sprays by Both Nostrils route two (2) times a day. Use in each nostril as directed Blood-Glucose Meter Misc Commonly known as:  ACCU-CHEK CORA  
1 Package by Does Not Apply route two (2) times a day. FIBERZYME CONCENTRATE-HP PO Take  by mouth two (2) times a day. fluticasone 50 mcg/actuation nasal spray Commonly known as:  FLONASE  
2 sprays each nostril daily  
  
 folic acid-vit C8-KXV M33 2.5-25-2 mg tablet Commonly known as:  FOLBIC Take 1 Tab by mouth daily. IMODIUM A-D 2 mg tablet Generic drug:  loperamide Take 2 mg by mouth four (4) times daily as needed for Diarrhea. Lancets Misc Commonly known as:  ACCU-CHEK FASTCLIX Patient is to test BID  
  
 lisinopril-hydroCHLOROthiazide 10-12.5 mg per tablet Commonly known as:  Zane Pineda  
 Take 0.5 Tabs by mouth daily. Cancel this rx request, do not fill **  
  
 multivitamin tablet Commonly known as:  ONE A DAY Take 1 Tab by mouth daily. PROBIOTIC ACIDOPHILUS BIOBEADS PO Take  by mouth two (2) times a day. raNITIdine 150 mg tablet Commonly known as:  ZANTAC TAKE 1 TABLET BY MOUTH TWICE DAILY VITRON-C PO Take 2 Tabs by mouth two (2) times a day. Prescriptions Sent to Pharmacy Refills  
 azelastine (ASTELIN) 137 mcg (0.1 %) nasal spray 5 Si Sprays by Both Nostrils route two (2) times a day. Use in each nostril as directed Class: Normal  
 Pharmacy: [facility] [address] [place] [address],  [facility] [place] [address] Ph #: [phone] Route: Both Nostrils  
 fluticasone (FLONASE) 50 mcg/actuation nasal spray 5 Si sprays each nostril daily Class: Normal  
 Pharmacy: [facility],  [facility] Ph #: [phone] We Performed the Following LIPID PANEL [27112 CPT(R)] MAGNESIUM [id number] CPT(R)] METABOLIC PANEL, COMPREHENSIVE [81693 CPT(R)] OCCULT BLOOD, IMMUNOASSAY (FIT) [id number] CPT(R)] Follow-up Instructions Return in about 3 months (around 2017) for recheck on bp and dizziness . Introducing [address]! Dear Kami Ladd: Thank you for requesting a [employer] account. Our records indicate that you already have an active [employer] account. You can access your account anytime at [email or web address] Did you know that you can access your hospital and ER discharge instructions at any time in [employer]? You can also review all of your test results from your hospital stay or ER visit. Additional Information If you have questions, please visit the Frequently Asked Questions section of the [employer] website at [email or web address] Remember, MyChart is NOT to be used for urgent needs. For medical emergencies, dial 911. Now available from your iPhone and Android! Please provide this summary of care documentation to your next provider. Your primary care clinician is listed as Angel Landon. If you have any questions after today's visit, please call [phone].

## 2017-06-28 NOTE — LETTER
7/10/2017 4:25 PM 
 
Mr. Kwaku Trujillo [address] [facility] [address] Dear Kwaku Trujillo: 
 
Please find your most recent results below. Resulted Orders LIPID PANEL Result Value Ref Range Cholesterol, total 208 (H) 100 - 199 mg/dL Triglyceride 209 (H) 0 - 149 mg/dL HDL Cholesterol 63 >39 mg/dL VLDL, calculated 42 (H) 5 - 40 mg/dL LDL, calculated 103 (H) 0 - 99 mg/dL Narrative Performed at:  [address]  [id number] : Estephanie Roche MD, Phone:  [phone] METABOLIC PANEL, COMPREHENSIVE Result Value Ref Range Glucose 109 (H) 65 - 99 mg/dL BUN 25 8 - 27 mg/dL Creatinine 1.23 0.76 - 1.27 mg/dL GFR est non-AA 54 (L) >59 mL/min/1.73 GFR est AA 62 >59 mL/min/1.73  
 BUN/Creatinine ratio 20 10 - 24 Sodium 140 134 - 144 mmol/L Potassium 4.0 3.5 - 5.2 mmol/L Chloride 101 96 - 106 mmol/L  
 CO2 21 18 - 29 mmol/L Calcium 9.5 8.6 - 10.2 mg/dL Protein, total 7.2 6.0 - 8.5 g/dL Albumin 4.3 3.5 - 4.7 g/dL GLOBULIN, TOTAL 2.9 1.5 - 4.5 g/dL A-G Ratio 1.5 1.2 - 2.2 Bilirubin, total 0.5 0.0 - 1.2 mg/dL Alk. phosphatase 63 39 - 117 IU/L  
 AST (SGOT) 27 0 - 40 IU/L  
 ALT (SGPT) 23 0 - 44 IU/L Narrative Performed at:  [address]  [id number] : Estephanie Roche MD, Phone:  [phone] MAGNESIUM Result Value Ref Range Magnesium 2.1 1.6 - 2.3 mg/dL Narrative Performed at:  [address]  [id number] : Estephanie Roche MD, Phone:  [phone] CVD REPORT Result Value Ref Range INTERPRETATION NTAP   
 PDF IMAGE Not applicable Narrative Performed at:  [address] [place] [address]  [id number] : Annamaria Woody PhD, Phone:  [phone] CKD REPORT Result Value Ref Range Interpretation Note Comment:  
   Supplement report is available. Narrative Performed at:  [address] [place] [address]  [id number] : Ashlee Benz PhD, Phone:  [phone] RECOMMENDATIONS: 
 
Your blood work is looking stable. Electrolytes are normal. 
There is a new medication for chronic diarrhea called xifaxin -it is an antibiotic you take for 2 weeks. If it does not improve your symptoms we can refer you to GI for further evaluation. Please call me if you have any questions: [phone] Sincerely, Tara Marshall NP

## 2017-06-29 LAB
ALBUMIN SERPL-MCNC: 4.3 G/DL (ref 3.5–4.7)
ALBUMIN/GLOB SERPL: 1.5 {RATIO} (ref 1.2–2.2)
ALP SERPL-CCNC: 63 IU/L (ref 39–117)
ALT SERPL-CCNC: 23 IU/L (ref 0–44)
AST SERPL-CCNC: 27 IU/L (ref 0–40)
BILIRUB SERPL-MCNC: 0.5 MG/DL (ref 0–1.2)
BUN SERPL-MCNC: 25 MG/DL (ref 8–27)
BUN/CREAT SERPL: 20 (ref 10–24)
CALCIUM SERPL-MCNC: 9.5 MG/DL (ref 8.6–10.2)
CHLORIDE SERPL-SCNC: 101 MMOL/L (ref 96–106)
CHOLEST SERPL-MCNC: 208 MG/DL (ref 100–199)
CO2 SERPL-SCNC: 21 MMOL/L (ref 18–29)
CREAT SERPL-MCNC: 1.23 MG/DL (ref 0.76–1.27)
GLOBULIN SER CALC-MCNC: 2.9 G/DL (ref 1.5–4.5)
GLUCOSE SERPL-MCNC: 109 MG/DL (ref 65–99)
HDLC SERPL-MCNC: 63 MG/DL
INTERPRETATION, 910389: NORMAL
INTERPRETATION: NORMAL
LDLC SERPL CALC-MCNC: 103 MG/DL (ref 0–99)
MAGNESIUM SERPL-MCNC: 2.1 MG/DL (ref 1.6–2.3)
PDF IMAGE, 910387: NORMAL
POTASSIUM SERPL-SCNC: 4 MMOL/L (ref 3.5–5.2)
PROT SERPL-MCNC: 7.2 G/DL (ref 6–8.5)
SODIUM SERPL-SCNC: 140 MMOL/L (ref 134–144)
TRIGL SERPL-MCNC: 209 MG/DL (ref 0–149)
VLDLC SERPL CALC-MCNC: 42 MG/DL (ref 5–40)

## 2017-07-09 NOTE — PROGRESS NOTES
Notify pt his labs are looking stable. Electrolytes are normal -let me know if the leg cramps persist.   There is a new medication for chronic diarrhea called xifaxin -it is an antibiotic you take for 2 weeks. Does he wish to try this? If it does not improve his symptoms I would refer him back to GI for further evaluation.

## 2017-07-10 NOTE — PROGRESS NOTES
Verified -informed patient of lab results, patient states he hasno leg cramping and he would like to try xifaxin. Patient transfer to front to make appointment for 3months from now.  WIll mail results p/patient request.

## 2017-07-11 ENCOUNTER — TELEPHONE (OUTPATIENT)
Dept: FAMILY MEDICINE CLINIC | Age: 82
End: 2017-07-11

## 2017-07-11 NOTE — TELEPHONE ENCOUNTER
Montana Burks   [phone]     -     Patient was seen 6- calling for a F/UP -  Requesting to come in the end of September advised Stephen Hall would be on maternity leave-  Patient stated Stephen Hall told him to come in the end of September and would not listen-  Please advise

## 2017-07-12 NOTE — TELEPHONE ENCOUNTER
Writer returned call to patient, patient scheduled a follow up office visit with KENNETH Bennett for 08/28/2017 at 11:20 AM. Patient understood and agreed to appointment date and time.

## 2017-07-12 NOTE — PROGRESS NOTES
Notify pt the xifaxan rx gets faxed to a specialty pharmacy, [facility]. They should be contacting him to set up payment information. Their number if he needs it is [phone]. It is 3 x day for 14 days.  Thank you

## 2017-07-12 NOTE — TELEPHONE ENCOUNTER
He and I had discussed him coming in at the end of august or 1st week of September at his visit.  thanks

## 2017-07-13 NOTE — PROGRESS NOTES
Pt has been notified his Xifaxin rx has been mailed to the [facility] and confirmed. Also gave pt their phone number and asked that he calls them if he does not her back from them by Monday 7/17/2017. He agrees with plan.

## 2017-08-17 RX ORDER — RANITIDINE 150 MG/1
TABLET, FILM COATED ORAL
Qty: 180 TAB | Refills: 3 | Status: CANCELLED | OUTPATIENT
Start: 2017-08-17

## 2017-08-17 NOTE — TELEPHONE ENCOUNTER
Called pharmacist and spoke to Benito. He checked rx from May 22,17 but did not have refills listed. Added 180 tabs and 2 rf. Patient aware that rx has been taken care of.

## 2017-08-22 ENCOUNTER — OFFICE VISIT (OUTPATIENT)
Dept: FAMILY MEDICINE CLINIC | Age: 82
End: 2017-08-22

## 2017-08-22 VITALS
TEMPERATURE: 97.6 F | SYSTOLIC BLOOD PRESSURE: 194 MMHG | HEART RATE: 103 BPM | DIASTOLIC BLOOD PRESSURE: 87 MMHG | BODY MASS INDEX: 24.18 KG/M2 | RESPIRATION RATE: 18 BRPM | OXYGEN SATURATION: 96 % | WEIGHT: 182.4 LBS | HEIGHT: 73 IN

## 2017-08-22 DIAGNOSIS — K52.9 CHRONIC DIARRHEA: Primary | ICD-10-CM

## 2017-08-22 DIAGNOSIS — I10 ESSENTIAL HYPERTENSION: ICD-10-CM

## 2017-08-22 RX ORDER — DIPHENOXYLATE HYDROCHLORIDE AND ATROPINE SULFATE 2.5; .025 MG/1; MG/1
1 TABLET ORAL 3 TIMES DAILY
Qty: 90 TAB | Refills: 1 | Status: SHIPPED | OUTPATIENT
Start: 2017-08-22 | End: 2018-03-01

## 2017-08-22 RX ORDER — DIPHENOXYLATE HYDROCHLORIDE AND ATROPINE SULFATE 2.5; .025 MG/1; MG/1
1 TABLET ORAL 3 TIMES DAILY
Qty: 90 TAB | Refills: 1 | Status: SHIPPED | OUTPATIENT
Start: 2017-08-22 | End: 2017-08-22 | Stop reason: SDUPTHER

## 2017-08-22 NOTE — MR AVS SNAPSHOT
Visit Information Date & Time Provider Department Dept. Phone Encounter #  
 8/22/2017  2:00 PM Gail Villafuerte  [facility] [address] [phone] [id number] Follow-up Instructions Return in about 4 weeks (around 9/19/2017) for follow up . Upcoming Health Maintenance Date Due INFLUENZA AGE 9 TO ADULT 8/1/2017 MEDICARE YEARLY EXAM 1/5/2018 GLAUCOMA SCREENING Q2Y 6/30/2019 DTaP/Tdap/Td series (2 - Td) 11/20/2024 Allergies as of 8/22/2017  Review Complete On: 8/22/2017 By: Yomaira Davalos Severity Noted Reaction Type Reactions Codeine  04/06/2010    Nausea and Vomiting Blurred vision, felt faint Contrast Agent [Iodine]  01/16/2012    Hives Needs premedication w/ Benadryl prn (since 1/2012 reaction) Fish Oil  09/07/2010    Nausea Only Glipizide  02/03/2012    Other (comments) DRASTIC DROP IN BLOOD GLUCOSE, fasting Metformin  09/10/2015    Diarrhea Niacin  09/07/2010    Other (comments) Flushing Norvasc [Amlodipine]  02/22/2012    Other (comments) Leg edema Optiray 350 [Ioversol]  01/09/2012    Hives He does not know what this is Current Immunizations  Reviewed on 12/29/2014 Name Date Influenza High Dose Vaccine PF 10/11/2016 Influenza Vaccine 10/1/2013 Influenza Vaccine Split 10/9/2011 Influenza Vaccine Whole 12/1/2012 Pneumococcal Conjugate (PCV-13) 11/20/2014 Tdap 11/20/2014 ZZZ-RETIRED (DO NOT USE) Pneumococcal Vaccine (Unspecified Type) 11/1/2003 Not reviewed this visit You Were Diagnosed With   
  
 Codes Comments Chronic diarrhea    -  Primary ICD-10-CM: K52.9 ICD-9-CM: 787.91 Essential hypertension     ICD-10-CM: I10 
ICD-9-CM: 401.9 Vitals BP Pulse Temp Resp Height(growth percentile) Weight(growth percentile)  194/87 (BP 1 Location: Left arm, BP Patient Position: Sitting) (!) 103 97.6 °F (36.4 °C) (Oral) 18 6' 1\" (1.854 m) 182 lb 6.4 oz (82.7 kg) SpO2 BMI Smoking Status 96% 24.06 kg/m2 Former Smoker Vitals History BMI and BSA Data Body Mass Index Body Surface Area 24.06 kg/m 2 2.06 m 2 Preferred Pharmacy Pharmacy Name Phone [facility] [address] [place] [address] [id number] [facility] [address] [phone] Your Updated Medication List  
  
   
This list is accurate as of: 8/22/17  2:51 PM.  Always use your most recent med list.  
  
  
  
  
 ACCU-CHEK SMARTVIEW TEST STRIP strip Generic drug:  glucose blood VI test strips Patient is to test blood sugar BID  
  
 azelastine 137 mcg (0.1 %) nasal spray Commonly known as:  ASTELIN  
2 Sprays by Both Nostrils route two (2) times a day. Use in each nostril as directed Blood-Glucose Meter Misc Commonly known as:  ACCU-CHEK CORA  
1 Package by Does Not Apply route two (2) times a day. diphenoxylate-atropine 2.5-0.025 mg per tablet Commonly known as:  LOMOTIL Take 1 Tab by mouth three (3) times daily. Max Daily Amount: 3 Tabs. Indications: Diarrhea FIBERZYME CONCENTRATE-HP PO Take  by mouth two (2) times a day. fluticasone 50 mcg/actuation nasal spray Commonly known as:  Ervin Napier SHAKE LIQUID AND USE 2 SPRAYS IN EACH NOSTRIL DAILY  
  
 folic acid-vit Q5-HIP H64 2.5-25-2 mg tablet Commonly known as:  FOLBIC Take 1 Tab by mouth daily. Lancets Misc Commonly known as:  ACCU-CHEK FASTCLIX Patient is to test BID  
  
 multivitamin tablet Commonly known as:  ONE A DAY Take 1 Tab by mouth daily. PROBIOTIC ACIDOPHILUS BIOBEADS PO Take  by mouth two (2) times a day. raNITIdine 150 mg tablet Commonly known as:  ZANTAC TAKE 1 TABLET BY MOUTH TWICE DAILY VITRON-C PO Take 2 Tabs by mouth two (2) times a day. Prescriptions Printed Refills  diphenoxylate-atropine (LOMOTIL) 2.5-0.025 mg per tablet 1  
 Sig: Take 1 Tab by mouth three (3) times daily. Max Daily Amount: 3 Tabs. Indications: Diarrhea Class: Print Route: Oral  
  
Follow-up Instructions Return in about 4 weeks (around 9/19/2017) for follow up . Patient Instructions Diarrhea: Care Instructions Your Care Instructions Diarrhea is loose, watery stools (bowel movements). The exact cause is often hard to find. Sometimes diarrhea is your body's way of getting rid of what caused an upset stomach. Viruses, food poisoning, and many medicines can cause diarrhea. Some people get diarrhea in response to emotional stress, anxiety, or certain foods. Almost everyone has diarrhea now and then. It usually isn't serious, and your stools will return to normal soon. The important thing to do is replace the fluids you have lost, so you can prevent dehydration. The doctor has checked you carefully, but problems can develop later. If you notice any problems or new symptoms, get medical treatment right away. Follow-up care is a key part of your treatment and safety. Be sure to make and go to all appointments, and call your doctor if you are having problems. It's also a good idea to know your test results and keep a list of the medicines you take. How can you care for yourself at home? · Watch for signs of dehydration, which means your body has lost too much water. Dehydration is a serious condition and should be treated right away. Signs of dehydration are: 
¨ Increasing thirst and dry eyes and mouth. ¨ Feeling faint or lightheaded. ¨ Darker urine, and a smaller amount of urine than normal. 
· To prevent dehydration, drink plenty of fluids, enough so that your urine is light yellow or clear like water. Choose water and other caffeine-free clear liquids until you feel better. If you have kidney, heart, or liver disease and have to limit fluids, talk with your doctor before you increase the amount of fluids you drink. · Begin eating small amounts of mild foods the next day, if you feel like it. ¨ Try yogurt that has live cultures of Lactobacillus. (Check the label.) ¨ Avoid spicy foods, fruits, alcohol, and caffeine until 48 hours after all symptoms are gone. ¨ Avoid chewing gum that contains sorbitol. ¨ Avoid dairy products (except for yogurt with Lactobacillus) while you have diarrhea and for 3 days after symptoms are gone. · The doctor may recommend that you take over-the-counter medicine, such as loperamide (Imodium), if you still have diarrhea after 6 hours. Read and follow all instructions on the label. Do not use this medicine if you have bloody diarrhea, a high fever, or other signs of serious illness. Call your doctor if you think you are having a problem with your medicine. When should you call for help? Call 911 anytime you think you may need emergency care. For example, call if: 
· You passed out (lost consciousness). · Your stools are maroon or very bloody. Call your doctor now or seek immediate medical care if: 
· You are dizzy or lightheaded, or you feel like you may faint. · Your stools are black and look like tar, or they have streaks of blood. · You have new or worse belly pain. · You have symptoms of dehydration, such as: ¨ Dry eyes and a dry mouth. ¨ Passing only a little dark urine. ¨ Feeling thirstier than usual. 
· You have a new or higher fever. Watch closely for changes in your health, and be sure to contact your doctor if: 
· Your diarrhea is getting worse. · You see pus in the diarrhea. · You are not getting better after 2 days (48 hours). Where can you learn more? Go to http://ric-kevin.info/. Enter S480 in the search box to learn more about \"Diarrhea: Care Instructions. \" Current as of: March 20, 2017 Content Version: 11.3 © 8709-1770 [employer].  Care instructions adapted under license by [address] (which disclaims liability or warranty for this information). If you have questions about a medical condition or this instruction, always ask your healthcare professional. [address] any warranty or liability for your use of this information. Introducing [facility]! Dear Samuel Archuleta: Thank you for requesting a Mole[employer] account. Our records indicate that you already have an active Mole[employer] account. You can access your account anytime at [email or web address] Did you know that you can access your hospital and ER discharge instructions at any time in Moleculin? You can also review all of your test results from your hospital stay or ER visit. Additional Information If you have questions, please visit the Frequently Asked Questions section of the [employer] website at [email or web address] Remember, Mole[employer] is NOT to be used for urgent needs. For medical emergencies, dial 911. Now available from your iPhone and Android! Please provide this summary of care documentation to your next provider. Your primary care clinician is listed as Clarissa Davila. If you have any questions after today's visit, please call [phone].

## 2017-08-22 NOTE — PROGRESS NOTES
Subjective:      Dora Mccormick is a 80 y.o. male who presents for evaluation. Reports chronic diarrhea for several years. Many evaluation by different gastroenterologists without improvement. Brings a list of medications that he has attempted including Xifaxan, Cholestyramine, Immodium all essentially ineffective. Previous colonoscopies were normal.  Previous CDiff infection was cleared. 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 Reports frequent watery stools several times daily without cramping. Does not feel sensation of full evacuation. Taking probiotics without improvement. Does not want to return to GI as \"they all did the same thing and were no help\". Denies melena or tarry stool. Denies associated nausea and vomiting. Previous labs negative for celiac disease. Current Outpatient Prescriptions   Medication Sig Dispense Refill    diphenoxylate-atropine (LOMOTIL) 2.5-0.025 mg per tablet Take 1 Tab by mouth three (3) times daily. Max Daily Amount: 3 Tabs. Indications: Diarrhea 90 Tab 1    azelastine (ASTELIN) 137 mcg (0.1 %) nasal spray 2 Sprays by Both Nostrils route two (2) times a day. Use in each nostril as directed 1 Bottle 5    fluticasone (FLONASE) 50 mcg/actuation nasal spray SHAKE LIQUID AND USE 2 SPRAYS IN EACH NOSTRIL DAILY 3 Bottle 5    folic acid-vit G8-JHK N02 (FOLBIC) 2.5-25-2 mg tablet Take 1 Tab by mouth daily. 90 Tab 3    raNITIdine (ZANTAC) 150 mg tablet TAKE 1 TABLET BY MOUTH TWICE DAILY 180 Tab 3    multivitamin (ONE A DAY) tablet Take 1 Tab by mouth daily.  ENZYMES,DIGESTIVE, PLANT, (FIBERZYME CONCENTRATE-HP PO) Take  by mouth two (2) times a day.  Lancets (ACCU-CHEK FASTCLIX) [facility] Patient is to test BID 3 Package 11    ACCU-CHEK SMARTVIEW TEST STRIP strip Patient is to test blood sugar BID 3 Package 11    Blood-Glucose Meter (ACCU-CHEK CORA) misc 1 Package by Does Not Apply route two (2) times a day.  1 Each 11    FERROUS FUMARATE/ASCORBIC ACID (VITRON-C PO) Take 2 Tabs by mouth two (2) times a day.  L. ACIDOPH/L.RHAMN/B.BIF/B. LONG (PROBIOTIC ACIDOPHILUS BIOBEADS PO) Take  by mouth two (2) times a day. Allergies   Allergen Reactions    Codeine Nausea and Vomiting     Blurred vision, felt faint    Contrast Agent [Iodine] Hives     Needs premedication w/ Benadryl prn (since 1/2012 reaction)    Fish Oil Nausea Only    Glipizide Other (comments)     DRASTIC DROP IN BLOOD GLUCOSE, fasting    Metformin Diarrhea    Niacin Other (comments)     Flushing    Norvasc [Amlodipine] Other (comments)     Leg edema    Optiray 350 [Ioversol] Hives     He does not know what this is       ROS:   Complete review of systems was reviewed with pertinent information listed in HPI. Objective:     Visit Vitals    /87 (BP 1 Location: Left arm, BP Patient Position: Sitting)    Pulse (!) 103    Temp 97.6 °F (36.4 °C) (Oral)    Resp 18    Ht 6' 1\" (1.854 m)    Wt 182 lb 6.4 oz (82.7 kg)    SpO2 96%    BMI 24.06 kg/m2       Vitals and Nurse Documentation reviewed. General:  alert, cooperative, no distress   Neck: supple, symmetrical, trachea midline, no adenopathy, thyroid: not enlarged, symmetric, no tenderness/mass/nodules, no carotid bruit and no JVD. CV S1,S2. No murmur, gallop, or rub. RRR. Lungs: clear to auscultation bilaterally   GI Abdomen soft, non tender to palpation without masses, hernia, or hepatosplenomegaly. No rigidity or rebound tenderness noted. Bowel sounds present in all four quadrants. Assessment/Plan:     Diagnoses and all orders for this visit:    1. Chronic diarrhea  -     diphenoxylate-atropine (LOMOTIL) 2.5-0.025 mg per tablet; Take 1 Tab by mouth three (3) times daily. Max Daily Amount: 3 Tabs. Indications: Diarrhea  Unclear etiology despite significant evaluation. Trial of Lomotil.   Discussed potential for anti-diarrheal medications to lead towards bowel impaction and he states understanding of symptoms and this would require emergent evaluation. Discussed side effects of Lomotil and this is a controlled substance. May cause sedation. Will start with lower dosing and taper up to 2 tablets TID. Treatment is essentially to increase quality of living at this time as his symptoms severely limit his daily living at this time. Consider updating stool studies if no improvement. 2. Essential hypertension  Long history of white coat syndrome. Home pressures are within normal limits. Discussed expected course/resolution/complications of diagnosis in detail with patient.    Medication risks/benefits/costs/interactions/alternatives discussed with patient.    Pt was given an after visit summary which includes diagnoses, current medications & vitals.    Pt expressed understanding with the diagnosis and plan    Follow-up Disposition:  Return in about 4 weeks (around 9/19/2017) for follow up .

## 2017-08-22 NOTE — PROGRESS NOTES
Chief Complaint   Patient presents with    Diarrhea     medication evaluation and follow up     Nasal Congestion     medication follow up      1. Have you been to the ER, urgent care clinic since your last visit? Hospitalized since your last visit? No    2. Have you seen or consulted any other health care providers outside of the [address] [place] [address] since your last visit? Include any pap smears or colon screening.  Yes [facility]- Josefa Zimmerman- oncologist

## 2017-08-22 NOTE — PATIENT INSTRUCTIONS
Diarrhea: Care Instructions  Your Care Instructions    Diarrhea is loose, watery stools (bowel movements). The exact cause is often hard to find. Sometimes diarrhea is your body's way of getting rid of what caused an upset stomach. Viruses, food poisoning, and many medicines can cause diarrhea. Some people get diarrhea in response to emotional stress, anxiety, or certain foods. Almost everyone has diarrhea now and then. It usually isn't serious, and your stools will return to normal soon. The important thing to do is replace the fluids you have lost, so you can prevent dehydration. The doctor has checked you carefully, but problems can develop later. If you notice any problems or new symptoms, get medical treatment right away. Follow-up care is a key part of your treatment and safety. Be sure to make and go to all appointments, and call your doctor if you are having problems. It's also a good idea to know your test results and keep a list of the medicines you take. How can you care for yourself at home? · Watch for signs of dehydration, which means your body has lost too much water. Dehydration is a serious condition and should be treated right away. Signs of dehydration are:  ¨ Increasing thirst and dry eyes and mouth. ¨ Feeling faint or lightheaded. ¨ Darker urine, and a smaller amount of urine than normal.  · To prevent dehydration, drink plenty of fluids, enough so that your urine is light yellow or clear like water. Choose water and other caffeine-free clear liquids until you feel better. If you have kidney, heart, or liver disease and have to limit fluids, talk with your doctor before you increase the amount of fluids you drink. · Begin eating small amounts of mild foods the next day, if you feel like it. ¨ Try yogurt that has live cultures of Lactobacillus. (Check the label.)  ¨ Avoid spicy foods, fruits, alcohol, and caffeine until 48 hours after all symptoms are gone.   ¨ Avoid chewing gum that contains sorbitol. ¨ Avoid dairy products (except for yogurt with Lactobacillus) while you have diarrhea and for 3 days after symptoms are gone. · The doctor may recommend that you take over-the-counter medicine, such as loperamide (Imodium), if you still have diarrhea after 6 hours. Read and follow all instructions on the label. Do not use this medicine if you have bloody diarrhea, a high fever, or other signs of serious illness. Call your doctor if you think you are having a problem with your medicine. When should you call for help? Call 911 anytime you think you may need emergency care. For example, call if:  · You passed out (lost consciousness). · Your stools are maroon or very bloody. Call your doctor now or seek immediate medical care if:  · You are dizzy or lightheaded, or you feel like you may faint. · Your stools are black and look like tar, or they have streaks of blood. · You have new or worse belly pain. · You have symptoms of dehydration, such as:  ¨ Dry eyes and a dry mouth. ¨ Passing only a little dark urine. ¨ Feeling thirstier than usual.  · You have a new or higher fever. Watch closely for changes in your health, and be sure to contact your doctor if:  · Your diarrhea is getting worse. · You see pus in the diarrhea. · You are not getting better after 2 days (48 hours). Where can you learn more? Go to http://ric-kevin.info/. Enter P926 in the search box to learn more about \"Diarrhea: Care Instructions. \"  Current as of: March 20, 2017  Content Version: 11.3  © 2298-1071 [employer]. Care instructions adapted under license by [employer] (which disclaims liability or warranty for this information). If you have questions about a medical condition or this instruction, always ask your healthcare professional. [address] any warranty or liability for your use of this information.

## 2017-09-29 ENCOUNTER — OFFICE VISIT (OUTPATIENT)
Dept: FAMILY MEDICINE CLINIC | Age: 82
End: 2017-09-29

## 2017-09-29 VITALS
OXYGEN SATURATION: 99 % | HEART RATE: 76 BPM | DIASTOLIC BLOOD PRESSURE: 82 MMHG | SYSTOLIC BLOOD PRESSURE: 170 MMHG | RESPIRATION RATE: 18 BRPM | BODY MASS INDEX: 23.46 KG/M2 | WEIGHT: 177 LBS | TEMPERATURE: 97.7 F | HEIGHT: 73 IN

## 2017-09-29 DIAGNOSIS — T78.1XXS ADVERSE REACTION TO FOOD, SEQUELA: ICD-10-CM

## 2017-09-29 DIAGNOSIS — C90.00 MULTIPLE MYELOMA, REMISSION STATUS UNSPECIFIED (HCC): ICD-10-CM

## 2017-09-29 DIAGNOSIS — K52.9 CHRONIC DIARRHEA: Primary | ICD-10-CM

## 2017-09-29 RX ORDER — LOPERAMIDE HCL 2 MG
2 TABLET ORAL
COMMUNITY
End: 2019-03-28 | Stop reason: SDUPTHER

## 2017-09-29 NOTE — PROGRESS NOTES
Chief Complaint   Patient presents with    Diarrhea     follow up     1. Have you been to the ER, urgent care clinic since your last visit? Hospitalized since your last visit? No    2. Have you seen or consulted any other health care providers outside of the [address] [place] [address] since your last visit? Include any pap smears or colon screening. No    Pt states he still has severe diarrhea was taking Lomotil but has not had any relief. He has since stop taking it.

## 2017-09-29 NOTE — MR AVS SNAPSHOT
Visit Information Date & Time Provider Department Dept. Phone Encounter #  
 9/29/2017  8:45 AM Paramjit Yepez  [facility] [phone] [id number] Follow-up Instructions Return if symptoms worsen or fail to improve. Upcoming Health Maintenance Date Due INFLUENZA AGE 9 TO ADULT 8/1/2017 MEDICARE YEARLY EXAM 1/5/2018 GLAUCOMA SCREENING Q2Y 6/30/2019 DTaP/Tdap/Td series (2 - Td) 11/20/2024 Allergies as of 9/29/2017  Review Complete On: 9/29/2017 By: Lester Poon Severity Noted Reaction Type Reactions Codeine  04/06/2010    Nausea and Vomiting Blurred vision, felt faint Contrast Agent [Iodine]  01/16/2012    Hives Needs premedication w/ Benadryl prn (since 1/2012 reaction) Fish Oil  09/07/2010    Nausea Only Glipizide  02/03/2012    Other (comments) DRASTIC DROP IN BLOOD GLUCOSE, fasting Metformin  09/10/2015    Diarrhea Niacin  09/07/2010    Other (comments) Flushing Norvasc [Amlodipine]  02/22/2012    Other (comments) Leg edema Optiray 350 [Ioversol]  01/09/2012    Hives He does not know what this is Current Immunizations  Reviewed on 12/29/2014 Name Date Influenza High Dose Vaccine PF 10/11/2016 Influenza Vaccine 10/1/2013 Influenza Vaccine Split 10/9/2011 Influenza Vaccine Whole 12/1/2012 Pneumococcal Conjugate (PCV-13) 11/20/2014 Tdap 11/20/2014 ZZZ-RETIRED (DO NOT USE) Pneumococcal Vaccine (Unspecified Type) 11/1/2003 Not reviewed this visit You Were Diagnosed With   
  
 Codes Comments Chronic diarrhea    -  Primary ICD-10-CM: K52.9 ICD-9-CM: 787.91   
 Multiple myeloma, remission status unspecified (HCC)     ICD-10-CM: C90.00 ICD-9-CM: 203.00 Adverse reaction to food, sequela     ICD-10-CM: T78. 1XXS 
ICD-9-CM: 068. 9 Vitals BP Pulse Temp Resp Height(growth percentile) Weight(growth percentile) 170/82 (BP 1 Location: Left arm, BP Patient Position: Sitting) 76 97.7 °F (36.5 °C) (Oral) 18 6' 1\" (1.854 m) 177 lb (80.3 kg) SpO2 BMI Smoking Status 99% 23.35 kg/m2 Former Smoker Vitals History BMI and BSA Data Body Mass Index Body Surface Area  
 23.35 kg/m 2 2.03 m 2 Preferred Pharmacy Pharmacy Name Phone [facility] [address] [place] [address] [address] [phone] Your Updated Medication List  
  
   
This list is accurate as of: 9/29/17  9:35 AM.  Always use your most recent med list.  
  
  
  
  
 ACCU-CHEK SMARTVIEW TEST STRIP strip Generic drug:  glucose blood VI test strips Patient is to test blood sugar BID  
  
 azelastine 137 mcg (0.1 %) nasal spray Commonly known as:  ASTELIN  
2 Sprays by Both Nostrils route two (2) times a day. Use in each nostril as directed Blood-Glucose Meter Misc Commonly known as:  ACCU-CHEK CORA  
1 Package by Does Not Apply route two (2) times a day. diphenoxylate-atropine 2.5-0.025 mg per tablet Commonly known as:  LOMOTIL Take 1 Tab by mouth three (3) times daily. Max Daily Amount: 3 Tabs. Indications: Diarrhea FIBERZYME CONCENTRATE-HP PO Take  by mouth two (2) times a day. fluticasone 50 mcg/actuation nasal spray Commonly known as:  [address] SHAKE LIQUID AND USE 2 SPRAYS IN EACH NOSTRIL DAILY  
  
 folic acid-vit G8-ZQJ S94 2.5-25-2 mg tablet Commonly known as:  FOLBIC Take 1 Tab by mouth daily. IMODIUM A-D 2 mg tablet Generic drug:  loperamide Take 2 mg by mouth four (4) times daily as needed for Diarrhea. Lancets Misc Commonly known as:  ACCU-CHEK FASTCLIX Patient is to test BID  
  
 multivitamin tablet Commonly known as:  ONE A DAY Take 1 Tab by mouth daily. PROBIOTIC ACIDOPHILUS BIOBEADS PO Take  by mouth two (2) times a day. raNITIdine 150 mg tablet Commonly known as:  ZANTAC TAKE 1 TABLET BY MOUTH TWICE DAILY VITRON-C PO Take 2 Tabs by mouth two (2) times a day. We Performed the Following FOOD ALLERGY PROFILE [41575 CPT(R)] REFERRAL TO GASTROENTEROLOGY [RIA88 Custom] Comments:  
 Dr. Genie Lane. [facility]. [address] [facility] Phone: [phone]  Fax: [phone] Follow-up Instructions Return if symptoms worsen or fail to improve. Referral Information Referral ID Referred By Referred To  
  
 [id number] Kev Ballard Not Available Visits Status Start Date End Date 1 New Request 9/29/17 9/29/18 If your referral has a status of pending review or denied, additional information will be sent to support the outcome of this decision. Patient Instructions Multiple Myeloma: Care Instructions Your Care Instructions Multiple myeloma is a rare type of cancer that causes uncontrolled production of plasma cells, a type of white blood cell in the bone marrow. Plasma cells make antibodies that help your body fight infection. When there are too many plasma cells, they can crowd out normal blood cells. This causes a reduction in red blood cells (anemia) or platelets (thrombocytopenia). The plasma cells can collect in the bone to form small, painful tumors that can sometimes lead to broken bones. The plasma cells can also cause kidney problems. Multiple myeloma is usually treated with medicines called chemotherapy to reduce the number of abnormal cells, antibiotics to help fight infection, and pain medicine. Radiation therapy may be used to treat bone tumors. When you find out that you have cancer, you may feel many emotions and may need some help coping. Seek out family, friends, and counselors for support. You also can do things at home to make yourself feel better while you go through treatment.  Call the Nels[facility] Mcwilli[facility] ([phone]) or visit its website at [address] [employer] [employer] org for more information. Follow-up care is a key part of your treatment and safety. Be sure to make and go to all appointments, and call your doctor if you are having problems. It's also a good idea to know your test results and keep a list of the medicines you take. How can you care for yourself at home? · Tell your doctor if you are experiencing new pain, or pain that interferes with your daily activities. Do not try to \"tough it out. \" · Take your medicines exactly as prescribed. Call your doctor if you think you are having a problem with your medicine. You may get medicine for nausea and vomiting if you have these side effects. · Eat healthy food. If you do not feel like eating, try to eat food that has protein and extra calories to keep up your strength and prevent weight loss. Drink liquid meal replacements for extra calories and protein. Try to eat your main meal early. · Get some physical activity every day, but do not get too tired. Keep doing the hobbies you enjoy as your energy allows. · Take steps to control your stress and workload. Learn relaxation techniques. ¨ Share your feelings. Stress and tension affect our emotions. By expressing your feelings to others, you may be able to understand and cope with them. ¨ Consider joining a support group. Talking about a problem with your spouse, a good friend, or other people with similar problems is a good way to reduce tension and stress. ¨ Express yourself through art. Try writing, crafts, dance, or art to relieve stress. Some dance, writing, or art groups may be available just for people who have cancer. ¨ Be kind to your body and mind. Getting enough sleep, eating a healthy diet, and taking time to do things you enjoy can contribute to an overall feeling of balance in your life and can help reduce stress. ¨ Get help if you need it. Discuss your concerns with your doctor or counselor. · If you are vomiting or have diarrhea: ¨ Drink plenty of fluids (enough so that your urine is light yellow or clear like water) to prevent dehydration. Choose water and other caffeine-free clear liquids until you feel better. If you have kidney, heart, or liver disease and have to limit fluids, talk with your doctor before you increase the amount of fluids you drink. ¨ When you are able to eat, try clear soups, mild foods, and liquids until all symptoms are gone for 12 to 48 hours. Other good choices include dry toast, crackers, cooked cereal, and gelatin dessert, such as Jell-O. · If you have not already done so, prepare a list of advance directives. Advance directives are instructions to your doctor and family members about what kind of care you want if you become unable to speak or express yourself. When should you call for help? Call 911 anytime you think you may need emergency care. For example, call if: 
· You passed out (lost consciousness). · You have trouble breathing. · You cough up blood. · You vomit blood or what looks like coffee grounds. · You pass maroon or very bloody stools. · You have symptoms of a stroke. These may include: 
¨ Sudden numbness, tingling, weakness, or loss of movement in your face, arm, or leg, especially on only one side of your body. ¨ Sudden vision changes. ¨ Sudden trouble speaking. ¨ Sudden confusion or trouble understanding simple statements. ¨ Sudden problems with walking or balance. ¨ A sudden, severe headache that is different from past headaches. Call your doctor now or seek immediate medical care if: 
· You have new or worse pain. · You are dizzy or lightheaded, or you feel like you may faint. · You feel confused or very sleepy. · You have trouble standing or walking. · You are sick to your stomach or cannot keep fluids down. · You are easily short of breath. · You have any unusual bleeding, such as: ¨ Blood spots under the skin. ¨ A nosebleed that you cannot stop. ¨ Bleeding gums when you brush your teeth. ¨ Blood in your urine. ¨ Vaginal bleeding when you are not having your period, or heavy period bleeding. · Your stools are black and tarlike or have streaks of blood. · You have signs of an infection, such as: 
¨ Increased pain, swelling, warmth, or redness. ¨ Red streaks leading from a bruise. ¨ Pus draining from a wound. ¨ A fever or chills. ¨ Burning when you urinate. Watch closely for changes in your health, and be sure to contact your doctor if: 
· You cough up yellow or green mucus. · You have trouble controlling your pain. Where can you learn more? Go to http://ric-kevin.info/. Enter Y800 in the search box to learn more about \"Multiple Myeloma: Care Instructions. \" Current as of: July 26, 2016 Content Version: 11.3 © 5134-7208 [employer]. Care instructions adapted under license by [employer] (which disclaims liability or warranty for this information). If you have questions about a medical condition or this instruction, always ask your healthcare professional. [place] [address] any warranty or liability for your use of this information. Introducing [facility]! Dear Shala Colbert: Thank you for requesting a [employer] account. Our records indicate that you already have an active [employer] account. You can access your account anytime at [email or web address] Did you know that you can access your hospital and ER discharge instructions at any time in [employer]? You can also review all of your test results from your hospital stay or ER visit. Additional Information If you have questions, please visit the Frequently Asked Questions section of the [employer] website at [email or web address] Remember, [employer] is NOT to be used for urgent needs. For medical emergencies, dial 911. Now available from your iPhone and Android! Please provide this summary of care documentation to your next provider. Your primary care clinician is listed as Gale Childers. If you have any questions after today's visit, please call [phone].

## 2017-09-29 NOTE — PROGRESS NOTES
Subjective:      Rona Gilman is a 80 y.o. male who presents for follow up. Reports a worsening of his symptoms with addition of Lomotil. Stopped after one week and has returned to Immodium 2 in AM and 1 at lunch. Continues to have fecal incontinence at times. Interested in food allergy testing today. Reports a poor experience with several gastroenterologists in [facility] and does not want to return. Reports difficulty in his function of everyday routine secondary to the frequency of his diarrhea. Previous colonoscopy within normal limits. Followed routinely for mutiple myeloma by [facility]. Current Outpatient Prescriptions   Medication Sig Dispense Refill    loperamide (IMODIUM A-D) 2 mg tablet Take 2 mg by mouth four (4) times daily as needed for Diarrhea.  diphenoxylate-atropine (LOMOTIL) 2.5-0.025 mg per tablet Take 1 Tab by mouth three (3) times daily. Max Daily Amount: 3 Tabs. Indications: Diarrhea 90 Tab 1    folic acid-vit U5-OBN L19 (FOLBIC) 2.5-25-2 mg tablet Take 1 Tab by mouth daily. 90 Tab 3    raNITIdine (ZANTAC) 150 mg tablet TAKE 1 TABLET BY MOUTH TWICE DAILY 180 Tab 3    multivitamin (ONE A DAY) tablet Take 1 Tab by mouth daily.  Lancets (ACCU-CHEK FASTCLIX) mis Patient is to test BID 3 Package 11    ACCU-CHEK SMARTVIEW TEST STRIP strip Patient is to test blood sugar BID 3 Package 11    Blood-Glucose Meter (ACCU-CHEK CORA) misc 1 Package by Does Not Apply route two (2) times a day. 1 Each 11    FERROUS FUMARATE/ASCORBIC ACID (VITRON-C PO) Take 2 Tabs by mouth two (2) times a day.  azelastine (ASTELIN) 137 mcg (0.1 %) nasal spray 2 Sprays by Both Nostrils route two (2) times a day. Use in each nostril as directed 1 Bottle 5    fluticasone (FLONASE) 50 mcg/actuation nasal spray SHAKE LIQUID AND USE 2 SPRAYS IN EACH NOSTRIL DAILY 3 Bottle 5    ENZYMES,DIGESTIVE, PLANT, (FIBERZYME CONCENTRATE-HP PO) Take  by mouth two (2) times a day.       L. ACIDOPH/L.RHAMN/B.BIF/B. LONG (PROBIOTIC ACIDOPHILUS BIOBEADS PO) Take  by mouth two (2) times a day. Allergies   Allergen Reactions    Codeine Nausea and Vomiting     Blurred vision, felt faint    Contrast Agent [Iodine] Hives     Needs premedication w/ Benadryl prn (since 1/2012 reaction)    Fish Oil Nausea Only    Glipizide Other (comments)     DRASTIC DROP IN BLOOD GLUCOSE, fasting    Metformin Diarrhea    Niacin Other (comments)     Flushing    Norvasc [Amlodipine] Other (comments)     Leg edema    Optiray 350 [Ioversol] Hives     He does not know what this is       ROS:   Complete review of systems was reviewed with pertinent information listed in HPI. Objective:     Visit Vitals    /82 (BP 1 Location: Left arm, BP Patient Position: Sitting)    Pulse 76    Temp 97.7 °F (36.5 °C) (Oral)    Resp 18    Ht 6' 1\" (1.854 m)    Wt 177 lb (80.3 kg)    SpO2 99%    BMI 23.35 kg/m2       Vitals and Nurse Documentation reviewed. General:  alert, cooperative, no distress       Assessment/Plan:     Diagnoses and all orders for this visit:    1. Chronic diarrhea  -     REFERRAL TO GASTROENTEROLOGY  -     FOOD ALLERGY PROFILE  Discussed an evaluation at [facility] secondary to exhaustive local resources being of no improvement to his quality of life at this time. Continue Immodium for symptom management. 2. Multiple myeloma, remission status unspecified ([facility] [address]    3. Adverse reaction to food, sequela  -     FOOD ALLERGY PROFILE  Exacerbation of symptoms with certain foods, labs today. Discussed expected course/resolution/complications of diagnosis in detail with patient.    Medication risks/benefits/costs/interactions/alternatives discussed with patient.    Pt was given an after visit summary which includes diagnoses, current medications & vitals.    Pt expressed understanding with the diagnosis and plan    Follow-up Disposition:  Return if symptoms worsen or fail to improve.

## 2017-09-29 NOTE — PATIENT INSTRUCTIONS
Multiple Myeloma: Care Instructions  Your Care Instructions  Multiple myeloma is a rare type of cancer that causes uncontrolled production of plasma cells, a type of white blood cell in the bone marrow. Plasma cells make antibodies that help your body fight infection. When there are too many plasma cells, they can crowd out normal blood cells. This causes a reduction in red blood cells (anemia) or platelets (thrombocytopenia). The plasma cells can collect in the bone to form small, painful tumors that can sometimes lead to broken bones. The plasma cells can also cause kidney problems. Multiple myeloma is usually treated with medicines called chemotherapy to reduce the number of abnormal cells, antibiotics to help fight infection, and pain medicine. Radiation therapy may be used to treat bone tumors. When you find out that you have cancer, you may feel many emotions and may need some help coping. Seek out family, friends, and counselors for support. You also can do things at home to make yourself feel better while you go through treatment. Call the [employer] (8-238.851.3608) or visit its website at [employer] [employer] for more information. Follow-up care is a key part of your treatment and safety. Be sure to make and go to all appointments, and call your doctor if you are having problems. It's also a good idea to know your test results and keep a list of the medicines you take. How can you care for yourself at home? · Tell your doctor if you are experiencing new pain, or pain that interferes with your daily activities. Do not try to \"tough it out. \"  · Take your medicines exactly as prescribed. Call your doctor if you think you are having a problem with your medicine. You may get medicine for nausea and vomiting if you have these side effects. · Eat healthy food. If you do not feel like eating, try to eat food that has protein and extra calories to keep up your strength and prevent weight loss. Drink liquid meal replacements for extra calories and protein. Try to eat your main meal early. · Get some physical activity every day, but do not get too tired. Keep doing the hobbies you enjoy as your energy allows. · Take steps to control your stress and workload. Learn relaxation techniques. ¨ Share your feelings. Stress and tension affect our emotions. By expressing your feelings to others, you may be able to understand and cope with them. ¨ Consider joining a support group. Talking about a problem with your spouse, a good friend, or other people with similar problems is a good way to reduce tension and stress. ¨ Express yourself through art. Try writing, crafts, dance, or art to relieve stress. Some dance, writing, or art groups may be available just for people who have cancer. ¨ Be kind to your body and mind. Getting enough sleep, eating a healthy diet, and taking time to do things you enjoy can contribute to an overall feeling of balance in your life and can help reduce stress. ¨ Get help if you need it. Discuss your concerns with your doctor or counselor. · If you are vomiting or have diarrhea:  ¨ Drink plenty of fluids (enough so that your urine is light yellow or clear like water) to prevent dehydration. Choose water and other caffeine-free clear liquids until you feel better. If you have kidney, heart, or liver disease and have to limit fluids, talk with your doctor before you increase the amount of fluids you drink. ¨ When you are able to eat, try clear soups, mild foods, and liquids until all symptoms are gone for 12 to 48 hours. Other good choices include dry toast, crackers, cooked cereal, and gelatin dessert, such as Jell-O.  · If you have not already done so, prepare a list of advance directives. Advance directives are instructions to your doctor and family members about what kind of care you want if you become unable to speak or express yourself. When should you call for help?   Call [phone] anytime you think you may need emergency care. For example, call if:  · You passed out (lost consciousness). · You have trouble breathing. · You cough up blood. · You vomit blood or what looks like coffee grounds. · You pass maroon or very bloody stools. · You have symptoms of a stroke. These may include:  ¨ Sudden numbness, tingling, weakness, or loss of movement in your face, arm, or leg, especially on only one side of your body. ¨ Sudden vision changes. ¨ Sudden trouble speaking. ¨ Sudden confusion or trouble understanding simple statements. ¨ Sudden problems with walking or balance. ¨ A sudden, severe headache that is different from past headaches. Call your doctor now or seek immediate medical care if:  · You have new or worse pain. · You are dizzy or lightheaded, or you feel like you may faint. · You feel confused or very sleepy. · You have trouble standing or walking. · You are sick to your stomach or cannot keep fluids down. · You are easily short of breath. · You have any unusual bleeding, such as:  ¨ Blood spots under the skin. ¨ A nosebleed that you cannot stop. ¨ Bleeding gums when you brush your teeth. ¨ Blood in your urine. ¨ Vaginal bleeding when you are not having your period, or heavy period bleeding. · Your stools are black and tarlike or have streaks of blood. · You have signs of an infection, such as:  ¨ Increased pain, swelling, warmth, or redness. ¨ Red streaks leading from a bruise. ¨ Pus draining from a wound. ¨ A fever or chills. ¨ Burning when you urinate. Watch closely for changes in your health, and be sure to contact your doctor if:  · You cough up yellow or green mucus. · You have trouble controlling your pain. Where can you learn more? Go to http://ric-kevin.info/. Enter Q585 in the search box to learn more about \"Multiple Myeloma: Care Instructions. \"  Current as of: July 26, 2016  Content Version: 11.3  © 3963-3064 [employer]. Care instructions adapted under license by [employer] (which disclaims liability or warranty for this information). If you have questions about a medical condition or this instruction, always ask your healthcare professional. [address] any warranty or liability for your use of this information.

## 2017-10-04 LAB
CLAM IGE QN: <0.1 KU/L
CODFISH IGE QN: <0.1 KU/L
CORN IGE QN: <0.1 KU/L
COW MILK IGE QN: <0.1 KU/L
EGG WHITE IGE QN: <0.1 KU/L
Lab: NORMAL
PEANUT IGE QN: <0.1 KU/L
SCALLOP IGE QN: <0.1 KU/L
SESAME SEED IGE QN: <0.1 KU/L
SHRIMP IGE QN: <0.1 KU/L
SOYBEAN IGE QN: <0.1 KU/L
WALNUT IGE QN: <0.1 KU/L
WHEAT IGE QN: <0.1 KU/L

## 2017-12-19 NOTE — TELEPHONE ENCOUNTER
Pharmacy on file verified. Pharmacy sent a fax  Requested Prescriptions     Pending Prescriptions Disp Refills    ACCU-CHEK SMARTVIEW TEST STRIP strip       Sig: Patient is to test blood sugar BID    Lancets (ACCU-CHEK FASTCLIX) misc 3 Package 11     Sig: Patient is to test BID    Blood-Glucose Meter (ACCU-CHEK CORA) misc 1 Each 11     Si Package by Does Not Apply route two (2) times a day.

## 2017-12-20 RX ORDER — LANCETS
EACH MISCELLANEOUS
Qty: 3 PACKAGE | Refills: 11 | Status: SHIPPED | OUTPATIENT
Start: 2017-12-20 | End: 2022-01-01

## 2017-12-20 RX ORDER — BLOOD SUGAR DIAGNOSTIC
STRIP MISCELLANEOUS
Qty: 3 STRIP | Refills: 11 | Status: SHIPPED | OUTPATIENT
Start: 2017-12-20 | End: 2019-03-28 | Stop reason: SDUPTHER

## 2017-12-20 RX ORDER — BLOOD-GLUCOSE METER
1 EACH MISCELLANEOUS 2 TIMES DAILY
Qty: 1 EACH | Refills: 11 | Status: SHIPPED | OUTPATIENT
Start: 2017-12-20 | End: 2022-01-01

## 2018-02-21 ENCOUNTER — HOSPITAL ENCOUNTER (OUTPATIENT)
Dept: GENERAL RADIOLOGY | Age: 83
Discharge: HOME | End: 2018-02-21
Attending: INTERNAL MEDICINE
Payer: MEDICARE

## 2018-02-21 DIAGNOSIS — N08 MYELOMA KIDNEY (HCC): ICD-10-CM

## 2018-02-21 DIAGNOSIS — C90.00 MYELOMA KIDNEY (HCC): ICD-10-CM

## 2018-02-21 PROCEDURE — 77075 RADEX OSSEOUS SURVEY COMPL: CPT

## 2018-03-01 ENCOUNTER — OFFICE VISIT (OUTPATIENT)
Dept: FAMILY MEDICINE CLINIC | Age: 83
End: 2018-03-01

## 2018-03-01 VITALS
WEIGHT: 179.6 LBS | TEMPERATURE: 97.2 F | HEIGHT: 73 IN | HEART RATE: 63 BPM | BODY MASS INDEX: 23.8 KG/M2 | DIASTOLIC BLOOD PRESSURE: 88 MMHG | OXYGEN SATURATION: 96 % | RESPIRATION RATE: 16 BRPM | SYSTOLIC BLOOD PRESSURE: 186 MMHG

## 2018-03-01 DIAGNOSIS — Z23 ENCOUNTER FOR IMMUNIZATION: ICD-10-CM

## 2018-03-01 DIAGNOSIS — Z00.00 MEDICARE ANNUAL WELLNESS VISIT, SUBSEQUENT: Primary | ICD-10-CM

## 2018-03-01 DIAGNOSIS — I48.20 CHRONIC ATRIAL FIBRILLATION (HCC): ICD-10-CM

## 2018-03-01 DIAGNOSIS — H04.129 DRY EYE: ICD-10-CM

## 2018-03-01 DIAGNOSIS — K52.9 CHRONIC DIARRHEA: ICD-10-CM

## 2018-03-01 DIAGNOSIS — I10 ESSENTIAL HYPERTENSION: ICD-10-CM

## 2018-03-01 DIAGNOSIS — R73.01 IMPAIRED FASTING GLUCOSE: ICD-10-CM

## 2018-03-01 DIAGNOSIS — C90.00 MULTIPLE MYELOMA, REMISSION STATUS UNSPECIFIED (HCC): ICD-10-CM

## 2018-03-01 DIAGNOSIS — R73.03 PRE-DIABETES: ICD-10-CM

## 2018-03-01 RX ORDER — LISINOPRIL AND HYDROCHLOROTHIAZIDE 10; 12.5 MG/1; MG/1
TABLET ORAL
COMMUNITY
Start: 2018-01-16 | End: 2018-09-27

## 2018-03-01 NOTE — MR AVS SNAPSHOT
[address] 
[phone] Patient: Sera Heath MRN: [id number] Visit Information Date & Time Provider Department Dept. Phone Encounter #  
 3/1/2018  8:00 AM Graeme Goldstein, [address] [facility] [phone] [id number] Follow-up Instructions Return in about 6 months (around 9/1/2018) for Follow up. Upcoming Health Maintenance Date Due  
 MEDICARE YEARLY EXAM 1/5/2018 GLAUCOMA SCREENING Q2Y 6/30/2019 DTaP/Tdap/Td series (2 - Td) 11/20/2024 Allergies as of 3/1/2018  Review Complete On: 3/1/2018 By: Graeme Goldstein MD  
  
 Severity Noted Reaction Type Reactions Codeine  04/06/2010    Nausea and Vomiting Blurred vision, felt faint Contrast Agent [Iodine]  01/16/2012    Hives Needs premedication w/ Benadryl prn (since 1/2012 reaction) Fish Oil  09/07/2010    Nausea Only Glipizide  02/03/2012    Other (comments) DRASTIC DROP IN BLOOD GLUCOSE, fasting Metformin  09/10/2015    Diarrhea Niacin  09/07/2010    Other (comments) Flushing Norvasc [Amlodipine]  02/22/2012    Other (comments) Leg edema Optiray 350 [Ioversol]  01/09/2012    Hives He does not know what this is Current Immunizations  Reviewed on 2/27/2018 Name Date Influenza High Dose Vaccine PF 10/11/2016 Influenza Vaccine 10/1/2013 Influenza Vaccine Split 10/9/2011 Influenza Vaccine Whole 12/1/2012 Pneumococcal Conjugate (PCV-13) 11/20/2014 Tdap 11/20/2014 ZZZ-RETIRED (DO NOT USE) Pneumococcal Vaccine (Unspecified Type) 11/1/2003 Not reviewed this visit You Were Diagnosed With   
  
 Codes Comments Medicare annual wellness visit, subsequent    -  Primary ICD-10-CM: Z00.00 ICD-9-CM: V70.0 Essential hypertension     ICD-10-CM: I10 
ICD-9-CM: 401.9  Multiple myeloma, remission status unspecified ([facility]     ICD-10-CM: C90.00 ICD-9-CM: 203.00 Pre-diabetes     ICD-10-CM: R73.03 
ICD-9-CM: 790.29 Chronic diarrhea     ICD-10-CM: K52.9 ICD-9-CM: 787.91 Chronic atrial fibrillation (HCC)     ICD-10-CM: P42.6 ICD-9-CM: 427.31 Impaired fasting glucose     ICD-10-CM: R73.01 
ICD-9-CM: 790.21 Dry eye     ICD-10-CM: Z12.001 ICD-9-CM: 375.15 Vitals BP Pulse Temp Resp Height(growth percentile) Weight(growth percentile) 186/88 (BP 1 Location: Left arm, BP Patient Position: Sitting) 63 97.2 °F (36.2 °C) (Oral) 16 6' 1\" (1.854 m) 179 lb 9.6 oz (81.5 kg) SpO2 BMI Smoking Status 96% 23.7 kg/m2 Former Smoker Vitals History BMI and BSA Data Body Mass Index Body Surface Area  
 23.7 kg/m 2 2.05 m 2 Preferred Pharmacy Pharmacy Name Phone [place] [phone] [facility] [address] [place] [address] [phone] Your Updated Medication List  
  
   
This list is accurate as of 3/1/18  8:41 AM.  Always use your most recent med list.  
  
  
  
  
 ACCU-CHEK SMARTVIEW TEST STRIP strip Generic drug:  glucose blood VI test strips Patient is to test blood sugar BID. Fill 3 packages Blood-Glucose Meter Misc Commonly known as:  ACCU-CHEK CORA  
1 Package by Does Not Apply route two (2) times a day. folic acid-vit K2-UZQ A45 2.5-25-2 mg tablet Commonly known as:  FOLBIC Take 1 Tab by mouth daily. IMODIUM A-D 2 mg tablet Generic drug:  loperamide Take 2 mg by mouth four (4) times daily as needed for Diarrhea. Lancets Misc Commonly known as:  ACCU-CHEK FASTCLIX Patient is to test BID  
  
 lisinopril-hydroCHLOROthiazide 10-12.5 mg per tablet Commonly known as:  PRINZIDE, ZESTORETIC  
  
 multivitamin tablet Commonly known as:  ONE A DAY Take 1 Tab by mouth daily. PROBIOTIC ACIDOPHILUS BIOBEADS PO Take  by mouth two (2) times a day. raNITIdine 150 mg tablet Commonly known as:  ZANTAC TAKE 1 TABLET BY MOUTH TWICE DAILY  
  
 varicella-zoster recombinant (PF) 50 mcg/0.5 mL Susr injection Commonly known as:  SHINGRIX (PF)  
0.5 mL by IntraMUSCular route once for 1 dose. Prescriptions Printed Refills  
 varicella-zoster recombinant, PF, (SHINGRIX, PF,) 50 mcg/0.5 mL susr injection 1 Si.5 mL by IntraMUSCular route once for 1 dose. Class: Print Route: IntraMUSCular We Performed the Following HEMOGLOBIN A1C WITH EAG [13186 CPT(R)] LIPID PANEL [97350 CPT(R)] TSH 3RD GENERATION [66358 CPT(R)] Follow-up Instructions Return in about 6 months (around 2018) for Follow up. Patient Instructions Discussed today Done Previously Not Needed X -23 X -13  Pneumococcal vaccines  
 x  Flu vaccine  
  x Hepatitis B vaccine (if at risk) X encouraged shingrix   Shingles vaccine  
 x  TDAP vaccine  
  x Digital rectal exam  
  x PSA  
 x  Colorectal cancer screening  
  x Low-dose CT for lung cancer screening  
 x  Bone density test  
 x  Glaucoma screening  
 x  Cholesterol test  
 x  Diabetes screening test   
  x Diabetes self-management class  
  x Nutritionist referral for diabetes or renal disease Well Visit, Over 72: Care Instructions Your Care Instructions Physical exams can help you stay healthy. Your doctor has checked your overall health and may have suggested ways to take good care of yourself. He or she also may have recommended tests. At home, you can help prevent illness with healthy eating, regular exercise, and other steps. Follow-up care is a key part of your treatment and safety. Be sure to make and go to all appointments, and call your doctor if you are having problems. It's also a good idea to know your test results and keep a list of the medicines you take. How can you care for yourself at home? · Reach and stay at a healthy weight.  This will lower your risk for many problems, such as obesity, diabetes, heart disease, and high blood pressure. · Get at least 30 minutes of exercise on most days of the week. Walking is a good choice. You also may want to do other activities, such as running, swimming, cycling, or playing tennis or team sports. · Do not smoke. Smoking can make health problems worse. If you need help quitting, talk to your doctor about stop-smoking programs and medicines. These can increase your chances of quitting for good. · Protect your skin from too much sun. When you're outdoors from 10 a.m. to 4 p.m., stay in the shade or cover up with clothing and a hat with a wide brim. Wear sunglasses that block UV rays. Even when it's cloudy, put broad-spectrum sunscreen (SPF 30 or higher) on any exposed skin. · See a dentist one or two times a year for checkups and to have your teeth cleaned. · Wear a seat belt in the car. · Limit alcohol to 2 drinks a day for men and 1 drink a day for women. Too much alcohol can cause health problems. Follow your doctor's advice about when to have certain tests. These tests can spot problems early. For men and women · Cholesterol. Your doctor will tell you how often to have this done based on your overall health and other things that can increase your risk for heart attack and stroke. · Blood pressure. Have your blood pressure checked during a routine doctor visit. Your doctor will tell you how often to check your blood pressure based on your age, your blood pressure results, and other factors. · Diabetes. Ask your doctor whether you should have tests for diabetes. · Vision. Experts recommend that you have yearly exams for glaucoma and other age-related eye problems. · Hearing. Tell your doctor if you notice any change in your hearing. You can have tests to find out how well you hear. · Colon cancer tests. Keep having colon cancer tests as your doctor recommends. You can have one of several types of tests. · Heart attack and stroke risk. At least every 4 to 6 years, you should have your risk for heart attack and stroke assessed. Your doctor uses factors such as your age, blood pressure, cholesterol, and whether you smoke or have diabetes to show what your risk for a heart attack or stroke is over the next 10 years. · Osteoporosis. Talk to your doctor about whether you should have a bone density test to find out whether you have thinning bones. Also ask your doctor about whether you should take calcium and vitamin D supplements. For women · Pap test and pelvic exam. You may no longer need a Pap test. Talk with your doctor about whether to stop or continue to have Pap tests. · Breast exam and mammogram. Ask how often you should have a mammogram, which is an X-ray of your breasts. A mammogram can spot breast cancer before it can be felt and when it is easiest to treat. · Thyroid disease. Talk to your doctor about whether to have your thyroid checked as part of a regular physical exam. Women have an increased chance of a thyroid problem. For men · Prostate exam. Talk to your doctor about whether you should have a blood test (called a PSA test) for prostate cancer. Experts disagree on whether men should have this test. Some experts recommend that you discuss the benefits and risks of the test with your doctor. · Abdominal aortic aneurysm. Ask your doctor whether you should have a test to check for an aneurysm. You may need a test if you ever smoked or if your parent, brother, sister, or child has had an aneurysm. When should you call for help? Watch closely for changes in your health, and be sure to contact your doctor if you have any problems or symptoms that concern you. Where can you learn more? Go to http://ric-kevin.info/. Enter N246 in the search box to learn more about \"Well Visit, Over 65: Care Instructions. \" Current as of: May 12, 2017 Content Version: 11.4 © 9373-4936 [employer]. Care instructions adapted under license by [employer] (which disclaims liability or warranty for this information). If you have questions about a medical condition or this instruction, always ask your healthcare professional. [address] any warranty or liability for your use of this information. Introducing [facility]! Dear Zoraida Swasnon: Thank you for requesting a [employer] account. Our records indicate that you already have an active [employer] account. You can access your account anytime at [email or web address] Did you know that you can access your hospital and ER discharge instructions at any time in [employer]? You can also review all of your test results from your hospital stay or ER visit. Additional Information If you have questions, please visit the Frequently Asked Questions section of the [employer] website at [email or web address] Remember, [employer] is NOT to be used for urgent needs. For medical emergencies, dial 911. Now available from your iPhone and Android! Please provide this summary of care documentation to your next provider. Your primary care clinician is listed as Rajwinder Vasques. If you have any questions after today's visit, please call [phone].

## 2018-03-01 NOTE — PROGRESS NOTES
Tl Calvert [address] [facility] [place]. Mis, [address]  [phone]             Date of visit: 3/1/2018       This is a Subsequent Medicare Annual Wellness Visit (AWV), (Performed more than 12 months after effective date of Medicare Part B enrollment and 12 months after last preventive visit)    I have reviewed the patient's medical history in detail and updated the computerized patient record. Wilfrid Angel is a 80 y.o. male   History obtained from: the patient. Concerns today   (Patient understands that medical problems addressed today may incur additional cost as this is a preventive visit)  -new patient to me, used to see [place]. Just had bone marrow bx to follow up MM on 2/3 with Dr. Wenceslao Harvey and his plasma cells are up to 30-50%. They are going to start shots and possibly the pills again. Doesn't like taking the pills as they make him feel out of it  -for past 2 years has been in good shape, however can tell the MM is coming back because he is out of breath when he goes up stairs, a weak feeling. Denies chest pain.  -has had diarrhea for 5 years, has seen multiple gi doctors and most recently was referred to [facility]. Has been on imodium for several years, it does help but not enough. Just added probiotic for the past 4 days nad seems to be helping some. His 89yr brother is on this probiotic. He feels certain it is not c-diff (which he has had before); different kind of diarrhea. Only has it in the morning. Normally has 2 normal stools in the morning, then like a dam broke and then watery diarrhea 2-3x, then ok for the rest of the day. Has had several colonoscopies without cause found. In evening after dinner may have gsa and feels like he has to go. Sometimes also in middle of the night.  Had 3 GI doctors, had lots of tests but didn't find anything.  -has blood tests tomorrow for Dr. Wenceslao Harvey  -checks sugar daily several times running  mostly in the  range  -blood pressure at home is consistently good /57-72 and HR 70s at home (long history whitecoat htn)  -no longer on simvastatin and baby aspirin since 2016, stopped most of his meds at once when in bad shape and stopped all meds. Used to also be on gabapentin.      -cbc and cmp recently checked by oncology and results look good. Having more labs with them tomorrow but would like to do his other labs here today.     History     Patient Active Problem List   Diagnosis Code    HTN (hypertension) I10    H/O Carotid Stenosis I65.29    GERD (gastroesophageal reflux disease) K21.9    Pre-diabetes R73.03    Hypertriglyceridemia E78.1    Sinus tachycardia R00.0    H/O Heavy Alcohol Use     Vitamin B12 deficiency E53.8    Hyperhomocysteinemia (HCC) E72.11    Mild Allergic Rhinitis J30.9    History of skin cancer Z85.828    AAA (abdominal aortic aneurysm) ([address] I71.4    Atrial fibrillation 1/2012 I48.91    S/P AAA repair 2/9/2012 Z98.890, Z86.79    Hypercalcemia E83.52    Multiple myeloma ([address] C90.00    Left inguinal hernia K40.90    Right inguinal hernia K40.90    Microalbuminuria R80.9    Chronic diarrhea K52.9     Past Medical History:   Diagnosis Date    AAA (abdominal aortic aneurysm) ([address] 1/6/2012    Abnormal serum protein electrophoresis 4/25/2012    Anemia 4/6/2010    Anemia 1/5/2012    Anemia due to GI bleed 2007 4/6/2010    Aneurysm ([address]     AAA    Arrhythmia     ATRIAL FIB PT STATES HE DOESN'T HAVE    Atrial fibrillation 1/2012 2/22/2012    Bilateral Carotid Bruits, L>R 9/7/2010    Bone cancer ([address]     Borderline diabetes mellitus 4/6/2010    Cancer ([address]     SKIN MELANOMA ON BACK     Carotid stenosis 4/6/2010    Diabetes mellitus, type 2 ([address] 9/7/2010    NO MEDS    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    ETOH abuse 4/6/2010    GERD (gastroesophageal reflux disease) 4/6/2010    GI bleed 4/6/2010    GI bleed, duodenitis 2007 4/6/2010    H/O Heavy Alcohol Use 9/7/2010    History of skin cancer 9/7/2010    HTN (hypertension) 4/6/2010    Hypercalcemia 4/25/2012    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/2010    Hypertriglyceridemia 9/7/2010    Left inguinal hernia 9/14/2015    Lipids blood increased 4/6/2010    Microalbuminuria 12/10/2015    Mild Allergic Rhinitis 9/7/2010    Pre-diabetes 9/7/2010    2005; Optho Dr Nathaniel Solis Right inguinal hernia 11/2/2015    S/P AAA repair 2/9/2012 2/22/2012    Sinus tachycardia 9/7/2010    Tachycardia 4/6/2010    Vitamin B12 deficiency 9/7/2010      Past Surgical History:   Procedure Laterality Date    COLONOSCOPY  8/2007    Normal; Dr Mccray Miss EGD  8/2007    small hiatal hernia, mild duodenitis; Dr Mccray Miss HX AAA REPAIR  2/9/2012    Dr Faiza Blunt    HX CATARACT REMOVAL      both eyes    HX CHOLECYSTECTOMY  1-29-14    [facility]- Dr. Kari Gonzalez Left 9/24/15    left indirect inguinal by Dr. Mildred Wilson Right 11/2/15    inguinal w/ mesh by Dr. Mildred Wilson Bilateral 10/2015 And 11/2015    Inguinal    BLUE DOPPLER  3/26/2012    BLUE Echo; + clot     Allergies   Allergen Reactions    Codeine Nausea and Vomiting     Blurred vision, felt faint    Contrast Agent [Iodine] Hives     Needs premedication w/ Benadryl prn (since 1/2012 reaction)    Fish Oil Nausea Only    Glipizide Other (comments)     DRASTIC DROP IN BLOOD GLUCOSE, fasting    Metformin Diarrhea    Niacin Other (comments)     Flushing    Norvasc [Amlodipine] Other (comments)     Leg edema    Optiray 350 [Ioversol] Hives     He does not know what this is     Current Outpatient Prescriptions   Medication Sig Dispense Refill    lisinopril-hydroCHLOROthiazide (PRINZIDE, ZESTORETIC) 10-12.5 mg per tablet       varicella-zoster recombinant, PF, (SHINGRIX, PF,) 50 mcg/0.5 mL susr injection 0.5 mL by IntraMUSCular route once for 1 dose.  0.5 mL 1  ACCU-CHEK SMARTVIEW TEST STRIP strip Patient is to test blood sugar BID. Fill 3 packages 3 Strip 11    Lancets (ACCU-CHEK FASTCLIX) misc Patient is to test BID 3 Package 11    Blood-Glucose Meter (ACCU-CHEK CORA) misc 1 Package by Does Not Apply route two (2) times a day. 1 Each 11    loperamide (IMODIUM A-D) 2 mg tablet Take 2 mg by mouth four (4) times daily as needed for Diarrhea.  folic acid-vit G9-CSH Q85 (FOLBIC) 2.5-25-2 mg tablet Take 1 Tab by mouth daily. 90 Tab 3    raNITIdine (ZANTAC) 150 mg tablet TAKE 1 TABLET BY MOUTH TWICE DAILY 180 Tab 3    multivitamin (ONE A DAY) tablet Take 1 Tab by mouth daily.  L. ACIDOPH/L.RHAMN/B.BIF/B. LONG (PROBIOTIC ACIDOPHILUS BIOBEADS PO) Take  by mouth two (2) times a day.        Family History   Problem Relation Age of Onset    Heart Disease Mother       age 80    Cancer Father       brain tumor age 80    Heart Disease Father     Substance Abuse Daughter       cocaine OD age 43 in 200   Azar Martinis Other Son       PE/DVT age 43    Heart Disease Brother     Cancer Brother      BLADDER    Other Daughter      alive and well    Anesth Problems Neg Hx      Social History   Substance Use Topics    Smoking status: Former Smoker     Packs/day: 1.50     Years: 55.00     Quit date: 2001    Smokeless tobacco: Never Used      Comment: used to smoke 1 1/2 ppd x ~ 40 yrs    Alcohol use 8.4 oz/week     14 Glasses of wine per week      Comment: 14       Specialists/Care Team   Maycol Chung has established care with the following healthcare providers:  Patient Care Team:  Iqra Rodriguez MD as PCP - General (Family Practice)  Beatrice Ness, RN as [address] (Cardiology)  Kana Kaur MD as Physician (Cardiology)  Suhas Moore, RN as Nurse Navigator (Family Practice)  Nessa Ulloa MD (Hematology and Oncology)  Nikia Villa MD (Colon and Rectal Surgery)  Stephania Browne MD (Gastroenterology)  \[facility] [place] [facility]\"" Gil Loi, [address] (Optometry)    Health Risk Assessment     Demographics   male  80 y.o. General Health Questions   -During the past 4 weeks:   -how would you rate your health in general? Good   -how often have you been bothered by feeling dizzy when standing up? never   -how much have you been bothered by bodily pain? Mildly in hands   -Have you noticed any hearing difficulties? no   -has your physical and emotional health limited your social activities with family or friends? no    Emotional Health Questions   -Do you have a history of depression, anxiety, or emotional problems? no  -Over the past 2 weeks, have you felt down, depressed or hopeless? no  -Over the past 2 weeks, have you felt little interest or pleasure in doing things? no    Health Habits   Please describe your diet habits: tries to eat healthy, always, 1/2 bagel, 1 egg, bowl cereal with 1/2 banana, diet cranberry juice and melon or pineapple; for lunch 1/2 sandwich roast beef turkey or ham, cottage cheese, and fruit, usually milk (lactaid), 2 cookies, dinner meat, veggies, 2 glasses wine, milk, 1 ensure per day. On Sundays has a bloody Yvetta Slice you get 5 servings of fruits or vegetables daily? yes  Do you exercise regularly? Used to walk more but less now due to generalized weakness    Activities of Daily Living and Functional Status   -Do you need help with eating, walking, dressing, bathing, toileting, the phone, transportation, shopping, preparing meals, housework, laundry, medications or managing money? no  -In the past four weeks, was someone available to help you if you needed and wanted help with anything? yes  -Are you confident are you that you can control and manage most of your health problems? yes  -Have you been given information to help you keep track of your medications? yes  -How often do you have trouble taking your medications as prescribed? never    Fall Risk and Home Safety   Have you fallen 2 or more times in the past year?  no  Does your home have rugs in the hallway, lack grab bars in the bathroom, lack handrails on the stairs or have poor lighting? No grabbars, but generally safe  Do you have smoke detectors and check them regularly? Yes although having problems with one  Do you have difficulties driving a car? no  Do you always fasten your seat belt when you are in a car? yes    Review of Systems (if indicated for problems addressed today)   Card: denies chest pain  Pulm: admits to exertional shortness of breath he gets with the MM        Physical Examination     Vitals:    03/01/18 0800 03/01/18 0811   BP: 174/87 186/88   Pulse: 63    Resp: 16    Temp: 97.2 °F (36.2 °C)    TempSrc: Oral    SpO2: 96%    Weight: 179 lb 9.6 oz (81.5 kg)    Height: 6' 1\" (1.854 m)      Body mass index is 23.7 kg/(m^2). No exam data present  Was the patient's timed Up & Go test unsteady or longer than 30 seconds? no    Evaluation of Cognitive Function   Mood/affect:  happy  Orientation: Person, Place and Time  Appearance: age appropriate  Family member/caregiver input: none    Additional exam if indicated for problems addressed today:  General: stated age, well-developed, and in NAD  Eyes: PERRL, EOMI, no redness or drainage  Nose: no drainage  Mouth: no lesions  Throat: no erythema, exudate or swelling  Neck: supple, symmetrical, trachea midline, no adenopathy and thyroid: not enlarged, symmetric, no tenderness/mass/nodules  Lungs:  clear to auscultation w/o rales, rhonchi, wheezes w/normal effort and no use of accessory muscles of respiration   Heart: regular rate and rhythm, doesn't sound like he is in afib now, has a 2/6 ISAAC he says he has had a murmur since he was a child  Abdomen: soft, nontender, no masses  Ext:  No edema noted.    Lymph: no cervical adenopathy appreciated  Skin:  Normal. and no rash or abnormalities   Neuro: normal gait, CN 2-12 intact  Psych: alert and oriented to person, place, time and situation and Speech: appropriate quality, quantity and organization of sentences      Advice/Referrals/Counseling (as indicated)   Education and counseling provided for any problems identified above: diet, exercise    Preventive Services     Health Maintenance   Topic Date Due    MEDICARE YEARLY EXAM  01/05/2018    GLAUCOMA SCREENING Q2Y  06/30/2019    DTaP/Tdap/Td series (2 - Td) 11/20/2024    ZOSTER VACCINE AGE 60>  Addressed    Pneumococcal 65+ High/Highest Risk  Completed    Influenza Age 5 to Adult  Addressed     (Preventive care checklist to be included in patient instructions)  Discussed today Done Previously Not Needed    X -23 X -13  Pneumococcal vaccines    x  Flu vaccine     x Hepatitis B vaccine (if at risk)   X encouraged shingrix   Shingles vaccine    x  TDAP vaccine     x Digital rectal exam     x PSA    x  Colorectal cancer screening     x Low-dose CT for lung cancer screening    x  Bone density test    x  Glaucoma screening    x  Cholesterol test    x  Diabetes screening test      x Diabetes self-management class     x Nutritionist referral for diabetes or renal disease     Discussion of Advance Directive   Discussed with Maycol Juliet his ability to prepare and advance directive in the case that an injury or illness causes him to be unable to make health care decisions. Has one on file, he says no changes    Assessment/Plan   Z00.00    ICD-10-CM ICD-9-CM    1. Medicare annual wellness visit, subsequent Z00.00 V70.0    2. Essential hypertension I10 401.9 LIPID PANEL   3. Multiple myeloma, remission status unspecified (HCC) C90.00 203.00    4. Pre-diabetes R73.03 790.29    5. Chronic diarrhea K52.9 787.91    6. Chronic atrial fibrillation (HCC) I48.2 427.31 TSH 3RD GENERATION   7. Impaired fasting glucose R73.01 790.21 HEMOGLOBIN A1C WITH EAG   8.  Dry eye H04.129 375.15        Orders Placed This Encounter    HEMOGLOBIN A1C WITH EAG    LIPID PANEL    TSH 3RD GENERATION    lisinopril-hydroCHLOROthiazide (Edil Turcios) 10-12.5 mg per tablet    varicella-zoster recombinant, PF, (SHINGRIX, PF,) 50 mcg/0.5 mL susr injection     Preventive care as above  Medical problems stable and no med changes made (other than MM which is managed by his oncologist, starting back on treatment soon)  Routine labs today  Good idea to try probiotic for chronic diarrhea; seems to be helping  Reviewed worrisome signs or symptoms for which to call. Follow-up Disposition:  Return in about 6 months (around 9/1/2018) for Follow up.     Jim Colon MD

## 2018-03-01 NOTE — PATIENT INSTRUCTIONS
Discussed today Done Previously Not Needed    X -23 X -13  Pneumococcal vaccines    x  Flu vaccine     x Hepatitis B vaccine (if at risk)   X encouraged shingrix   Shingles vaccine    x  TDAP vaccine     x Digital rectal exam     x PSA    x  Colorectal cancer screening     x Low-dose CT for lung cancer screening    x  Bone density test    x  Glaucoma screening    x  Cholesterol test    x  Diabetes screening test      x Diabetes self-management class     x Nutritionist referral for diabetes or renal disease          Well Visit, Over 65: Care Instructions  Your Care Instructions    Physical exams can help you stay healthy. Your doctor has checked your overall health and may have suggested ways to take good care of yourself. He or she also may have recommended tests. At home, you can help prevent illness with healthy eating, regular exercise, and other steps. Follow-up care is a key part of your treatment and safety. Be sure to make and go to all appointments, and call your doctor if you are having problems. It's also a good idea to know your test results and keep a list of the medicines you take. How can you care for yourself at home? · Reach and stay at a healthy weight. This will lower your risk for many problems, such as obesity, diabetes, heart disease, and high blood pressure. · Get at least 30 minutes of exercise on most days of the week. Walking is a good choice. You also may want to do other activities, such as running, swimming, cycling, or playing tennis or team sports. · Do not smoke. Smoking can make health problems worse. If you need help quitting, talk to your doctor about stop-smoking programs and medicines. These can increase your chances of quitting for good. · Protect your skin from too much sun. When you're outdoors from 10 a.m. to 4 p.m., stay in the shade or cover up with clothing and a hat with a wide brim. Wear sunglasses that block UV rays.  Even when it's cloudy, put broad-spectrum sunscreen (SPF 30 or higher) on any exposed skin. · See a dentist one or two times a year for checkups and to have your teeth cleaned. · Wear a seat belt in the car. · Limit alcohol to 2 drinks a day for men and 1 drink a day for women. Too much alcohol can cause health problems. Follow your doctor's advice about when to have certain tests. These tests can spot problems early. For men and women  · Cholesterol. Your doctor will tell you how often to have this done based on your overall health and other things that can increase your risk for heart attack and stroke. · Blood pressure. Have your blood pressure checked during a routine doctor visit. Your doctor will tell you how often to check your blood pressure based on your age, your blood pressure results, and other factors. · Diabetes. Ask your doctor whether you should have tests for diabetes. · Vision. Experts recommend that you have yearly exams for glaucoma and other age-related eye problems. · Hearing. Tell your doctor if you notice any change in your hearing. You can have tests to find out how well you hear. · Colon cancer tests. Keep having colon cancer tests as your doctor recommends. You can have one of several types of tests. · Heart attack and stroke risk. At least every 4 to 6 years, you should have your risk for heart attack and stroke assessed. Your doctor uses factors such as your age, blood pressure, cholesterol, and whether you smoke or have diabetes to show what your risk for a heart attack or stroke is over the next 10 years. · Osteoporosis. Talk to your doctor about whether you should have a bone density test to find out whether you have thinning bones. Also ask your doctor about whether you should take calcium and vitamin D supplements. For women  · Pap test and pelvic exam. You may no longer need a Pap test. Talk with your doctor about whether to stop or continue to have Pap tests.   · Breast exam and mammogram. Ask how often you should have a mammogram, which is an X-ray of your breasts. A mammogram can spot breast cancer before it can be felt and when it is easiest to treat. · Thyroid disease. Talk to your doctor about whether to have your thyroid checked as part of a regular physical exam. Women have an increased chance of a thyroid problem. For men  · Prostate exam. Talk to your doctor about whether you should have a blood test (called a PSA test) for prostate cancer. Experts disagree on whether men should have this test. Some experts recommend that you discuss the benefits and risks of the test with your doctor. · Abdominal aortic aneurysm. Ask your doctor whether you should have a test to check for an aneurysm. You may need a test if you ever smoked or if your parent, brother, sister, or child has had an aneurysm. When should you call for help? Watch closely for changes in your health, and be sure to contact your doctor if you have any problems or symptoms that concern you. Where can you learn more? Go to http://ric-kevin.info/. Enter X402 in the search box to learn more about \"Well Visit, Over 65: Care Instructions. \"  Current as of: May 12, 2017  Content Version: 11.4  © 1550-3226 [employer], [employer]. Care instructions adapted under license by [employer] (which disclaims liability or warranty for this information). If you have questions about a medical condition or this instruction, always ask your healthcare professional. [place] [address] any warranty or liability for your use of this information.

## 2018-03-01 NOTE — PROGRESS NOTES
Chief Complaint   Patient presents with    Hypertension     to [facility] care with new provider, former Fredy Milner N.P. Reviewed Record in preparation for visit and have obtained necessary documentation. Identified pt with two pt identifiers (Name @ )    Health Maintenance Due   Topic    Influenza Age 5 to Adult     MEDICARE YEARLY EXAM          1. Have you been to the ER, urgent care clinic since your last visit? Hospitalized since your last visit? No    2. Have you seen or consulted any other health care providers outside of the [address] [place] [address] since your last visit? Include any pap smears or colon screening. Saw Dr. Paul Curry  18 for spinal cancer.

## 2018-03-01 NOTE — ACP (ADVANCE CARE PLANNING)
Discussed with Jono Rodriguez his ability to prepare and advance directive in the case that an injury or illness causes him to be unable to make health care decisions.    Has one on file, he says no changes

## 2018-03-02 LAB
CHOLEST SERPL-MCNC: 203 MG/DL (ref 100–199)
EST. AVERAGE GLUCOSE BLD GHB EST-MCNC: 120 MG/DL
HBA1C MFR BLD: 5.8 % (ref 4.8–5.6)
HDLC SERPL-MCNC: 62 MG/DL
INTERPRETATION, 910389: NORMAL
LDLC SERPL CALC-MCNC: 103 MG/DL (ref 0–99)
TRIGL SERPL-MCNC: 188 MG/DL (ref 0–149)
TSH SERPL DL<=0.005 MIU/L-ACNC: 2 UIU/ML (ref 0.45–4.5)
VLDLC SERPL CALC-MCNC: 38 MG/DL (ref 5–40)

## 2018-04-10 PROBLEM — I51.7 MILD CONCENTRIC LEFT VENTRICULAR HYPERTROPHY (LVH): Status: ACTIVE | Noted: 2018-04-10

## 2018-04-10 PROBLEM — I35.0 AORTIC STENOSIS, MODERATE: Status: ACTIVE | Noted: 2018-04-10

## 2018-05-14 NOTE — TELEPHONE ENCOUNTER
Refill request.  Pharm confirmed.     Requested Prescriptions     Pending Prescriptions Disp Refills    raNITIdine (ZANTAC) 150 mg tablet 180 Tab 3

## 2018-05-15 RX ORDER — RANITIDINE 150 MG/1
TABLET, FILM COATED ORAL
Qty: 180 TAB | Refills: 1 | Status: SHIPPED | OUTPATIENT
Start: 2018-05-15 | End: 2019-03-28

## 2018-06-05 RX ORDER — FOLIC ACID-PYRIDOXINE-CYANOCOBALAMIN TAB 2.5-25-2 MG 2.5-25-2 MG
TAB ORAL
Qty: 90 TAB | Refills: 3 | Status: SHIPPED | OUTPATIENT
Start: 2018-06-05 | End: 2019-04-23 | Stop reason: SDUPTHER

## 2018-09-27 ENCOUNTER — OFFICE VISIT (OUTPATIENT)
Dept: FAMILY MEDICINE CLINIC | Age: 83
End: 2018-09-27

## 2018-09-27 VITALS
HEART RATE: 56 BPM | WEIGHT: 177.8 LBS | HEIGHT: 73 IN | SYSTOLIC BLOOD PRESSURE: 118 MMHG | BODY MASS INDEX: 23.56 KG/M2 | DIASTOLIC BLOOD PRESSURE: 68 MMHG | RESPIRATION RATE: 18 BRPM | OXYGEN SATURATION: 96 % | TEMPERATURE: 97.5 F

## 2018-09-27 DIAGNOSIS — I10 ESSENTIAL HYPERTENSION: ICD-10-CM

## 2018-09-27 DIAGNOSIS — R73.03 PRE-DIABETES: ICD-10-CM

## 2018-09-27 DIAGNOSIS — E78.2 HYPERLIPEMIA, MIXED: ICD-10-CM

## 2018-09-27 DIAGNOSIS — K52.9 CHRONIC DIARRHEA: Primary | ICD-10-CM

## 2018-09-27 DIAGNOSIS — C90.00 MULTIPLE MYELOMA, REMISSION STATUS UNSPECIFIED (HCC): ICD-10-CM

## 2018-09-27 RX ORDER — PROCHLORPERAZINE MALEATE 10 MG
TABLET ORAL
Refills: 2 | COMMUNITY
Start: 2018-09-18 | End: 2022-01-01

## 2018-09-27 RX ORDER — ACYCLOVIR 400 MG/1
TABLET ORAL
Refills: 6 | COMMUNITY
Start: 2018-08-06 | End: 2022-01-01

## 2018-09-27 RX ORDER — LENALIDOMIDE 10 MG/1
CAPSULE ORAL
Refills: 0 | COMMUNITY
Start: 2018-09-12 | End: 2022-01-01

## 2018-09-27 NOTE — PROGRESS NOTES
Tl Calvert formerly [facility]  [address] [place], [address]  [phone]             Date of visit: 9/27/2018   Subjective:      History obtained from:  the patient. Saira Whitmore is a 80 y.o. male who presents today for f/u chronic problems    Still has the diarrhea every morning. Often 10x. More recently has been waking up at 2am to have diarrhea  Started 2012 when he had operation  Had gB out in 2014  Did have c-diff in 2014, clearing was verified later per records and he doesn't think this is cdiff  Has had 3 colonoscopies  Multiple stool tests  Has tried rifaximin, cholestyramine, imodium  Taking 2 imodium in AM, somteimtes another 1/2 later  That seems to help. Taking chemo again, now on pill, last Tuesday was his last chem. Has 2 weeks off,t hen 6 more weekly treatments  The treatments are working at least to put him in remission  Has some good days but often very fatigued marti after the chemo treatments    They took him off med for foot swelling but he thinks he is better without it, wearing support hose,    He thinks it had a steroid in it, not sure of the name  That medication was 40mg (lasix)? But made him feel sick even though helped swelling    He had starting shaking in arms and legs, that is when he was on the dexamethasone which they took him off  Since he stopped that he had the shaking 3x. He hasn't had that in the past 3 weeks. Would only last for a few seconds. Would be uncontrollable spasm, would have to hang on to walls  Hasn't happened since he got off the swelling medication    He stopped his lisinoprol over a week ago  bp still running low.  Was 80/50 yesterday  Was weak yesterday but that was to be expected day after chemo  Was only dizzy once but that was before coming off the bp pill  Hasn't drastically changed diet  Trying to eat well    Had labs Tuesday wbc 6.8, hgb 11.1 plate 81    Gluc 497  creast 1.12  Sod 140  Pot 3.9  Jae 8.8  Ast 22  Alt 12    Cholesterol a little high    Didn't get the shingrix yet    Patient Active Problem List    Diagnosis Date Noted    Hyperlipemia, mixed 09/27/2018    Aortic stenosis, moderate 04/10/2018    Mild concentric left ventricular hypertrophy (LVH) 04/10/2018    Chronic diarrhea 09/29/2017    Microalbuminuria 12/10/2015    Right inguinal hernia 11/02/2015    Left inguinal hernia 09/14/2015    Multiple myeloma ([facility] 12/27/2012    Hypercalcemia 04/25/2012    Atrial fibrillation 1/2012 02/22/2012    S/P AAA repair 2/9/2012 02/22/2012    Pre-diabetes 09/07/2010    Sinus tachycardia 09/07/2010    H/O Heavy Alcohol Use 09/07/2010    Vitamin B12 deficiency 09/07/2010    Hyperhomocysteinemia ([facility] 09/07/2010    Mild Allergic Rhinitis 09/07/2010    History of skin cancer 09/07/2010    HTN (hypertension) 04/06/2010    H/O Carotid Stenosis 04/06/2010    GERD (gastroesophageal reflux disease) 04/06/2010     Current Outpatient Prescriptions   Medication Sig Dispense Refill    REVLIMID 10 mg cap TK ONE C PO  D FOR 21 DAYS WITH 7 DAYS OFF  0    prochlorperazine (COMPAZINE) 10 mg tablet TK 1 T PO Q 4 HOURS PRN FOR NAUSEA  2    acyclovir (ZOVIRAX) 400 mg tablet TK 1 T PO QD  6    FOLBIC 2.5-25-2 mg tablet TAKE 1 TABLET EVERY DAY 90 Tab 3    raNITIdine (ZANTAC) 150 mg tablet TAKE 1 TABLET BY MOUTH TWICE DAILY 180 Tab 1    ACCU-CHEK SMARTVIEW TEST STRIP strip Patient is to test blood sugar BID. Fill 3 packages 3 Strip 11    Lancets (ACCU-CHEK FASTCLIX) misc Patient is to test BID 3 Package 11    Blood-Glucose Meter (ACCU-CHEK CORA) misc 1 Package by Does Not Apply route two (2) times a day. 1 Each 11    loperamide (IMODIUM A-D) 2 mg tablet Take 2 mg by mouth four (4) times daily as needed for Diarrhea.  multivitamin (ONE A DAY) tablet Take 1 Tab by mouth daily.  L. ACIDOPH/L.RHAMN/B.BIF/B. LONG (PROBIOTIC ACIDOPHILUS BIOBEADS PO) Take  by mouth two (2) times a day.        Allergies   Allergen Reactions    Codeine Nausea and Vomiting     Blurred vision, felt faint    Contrast Agent [Iodine] Hives     Needs premedication w/ Benadryl prn (since 1/2012 reaction)    Fish Oil Nausea Only    Glipizide Other (comments)     DRASTIC DROP IN BLOOD GLUCOSE, fasting    Metformin Diarrhea    Niacin Other (comments)     Flushing    Norvasc [Amlodipine] Other (comments)     Leg edema    Optiray 350 [Ioversol] Hives     He does not know what this is     Past Medical History:   Diagnosis Date    AAA (abdominal aortic aneurysm) ([address] 1/6/2012    Abnormal serum protein electrophoresis 4/25/2012    Anemia 4/6/2010    Anemia 1/5/2012    Anemia due to GI bleed 2007 4/6/2010    Aneurysm ([address]     AAA    Arrhythmia     ATRIAL FIB PT STATES HE DOESN'T HAVE    Atrial fibrillation 1/2012 2/22/2012    Bilateral Carotid Bruits, L>R 9/7/2010    Bone cancer ([address]     Borderline diabetes mellitus 4/6/2010    Cancer ([address]     SKIN MELANOMA ON BACK     Carotid stenosis 4/6/2010    Diabetes mellitus, type 2 ([address] 9/7/2010    NO MEDS    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    ETOH abuse 4/6/2010    GERD (gastroesophageal reflux disease) 4/6/2010    GI bleed 4/6/2010    GI bleed, duodenitis 2007 4/6/2010    H/O Heavy Alcohol Use 9/7/2010    History of skin cancer 9/7/2010    HTN (hypertension) 4/6/2010    Hypercalcemia 4/25/2012    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/2010    Hypertriglyceridemia 9/7/2010    Left inguinal hernia 9/14/2015    Lipids blood increased 4/6/2010    Microalbuminuria 12/10/2015    Mild Allergic Rhinitis 9/7/2010    Pre-diabetes 9/7/2010    2005;  Optho Dr Shon Monahan Right inguinal hernia 11/2/2015    S/P AAA repair 2/9/2012 2/22/2012    Sinus tachycardia 9/7/2010    Tachycardia 4/6/2010    Vitamin B12 deficiency 9/7/2010     Past Surgical History:   Procedure Laterality Date    COLONOSCOPY  8/2007    Normal; Dr Fatemeh Damon EGD  8/2007    small hiatal hernia, mild duodenitis; Dr Deysi Luther HX AAA REPAIR  2012    Dr Guerra Balloon    HX CATARACT REMOVAL      both eyes    HX CHOLECYSTECTOMY  14    [facility]- Dr. Karen Rosario    HX HERNIA REPAIR Left 9/24/15    left indirect inguinal by Dr. Kerrie Hoffman Right 11/2/15    inguinal w/ mesh by Dr. Kerrie Hoffman Bilateral 10/2015 And 2015    Inguinal    BLUE DOPPLER  3/26/2012    BULE Echo; + clot     Family History   Problem Relation Age of Onset    Heart Disease Mother       age 80    Cancer Father       brain tumor age 80    Heart Disease Father     Substance Abuse Daughter       cocaine OD age 43 in 200    Other Son       PE/DVT age 43    Heart Disease Brother    Millicent Other Daughter      alive and well    Anesth Problems Neg Hx      Social History   Substance Use Topics    Smoking status: Former Smoker     Packs/day: 1.50     Years: 55.00     Quit date: 2001    Smokeless tobacco: Never Used      Comment: used to smoke 1 1/2 ppd x ~ 40 yrs    Alcohol use 8.4 oz/week     14 Glasses of wine per week      Comment: 14      Social History     Social History Narrative        Review of Systems  Card: denies chest pain or palpitations  Pulm: denies shortness of breath      Objective:     Vitals:    18 0753   BP: 118/68   Pulse: (!) 56   Resp: 18   Temp: 97.5 °F (36.4 °C)   TempSrc: Oral   SpO2: 96%   Weight: 177 lb 12.8 oz (80.6 kg)   Height: 6' 1\" (1.854 m)     Body mass index is 23.46 kg/(m^2).      General: stated age, well developed, well nourished and in NAD  Neck: supple, symmetrical, trachea midline, no adenopathy and thyroid: not enlarged, symmetric, no tenderness/mass/nodules  Lungs:  clear to auscultation w/o rales, rhonchi, wheezes w/normal effort and no use of accessory muscles of respiration   Heart: regular rate and rhythm, loud systolic murmur throughout  Abdomen: soft, nontender  Ext:  No edema noted. (he often has edema worse on right but has been good past few days he says)  Lymph: no cervical adenopathy appreciated  Skin:  Normal. and no rash or abnormalities   Psych: alert and oriented to person, place, time and situation and Speech: appropriate quality, quantity and organization of sentences and normal affect    Assessment/Plan:       ICD-10-CM ICD-9-CM    1. Chronic diarrhea K52.9 787.91    2. Essential hypertension I10 401.9    3. Multiple myeloma, remission status unspecified (HCC) C90.00 203.00    4. Pre-diabetes R73.03 790.29    5. Hyperlipemia, mixed E78.2 272.2         Orders Placed This Encounter    REVLIMID 10 mg cap    prochlorperazine (COMPAZINE) 10 mg tablet    acyclovir (ZOVIRAX) 400 mg tablet       Has had such a thorough work up for diarrhea with 3 GI docs that he really doesn't want to do more  Is a little worse in that he is having some early AM BMs now  Will let me knwo if that worsens  Getting by ok with imodium  Electrolytes ok, see scanned labs from oncology    Discussed that cholesterol was a little high but not bad enough to justify starting new med marti at his age  Will hold off on checking that again    Seems to no longer have HTN  Doing well off med    Will need to check a1c next year but just mild predm last check    Encouraged him to keep active,eat well, he is doing everything he can    Discussed the diagnosis and plan and he expressed understanding. Follow-up Disposition:  Return in about 6 months (around 3/27/2019) for Annual Wellness Visit.     Alessandra Chen MD

## 2018-09-27 NOTE — PROGRESS NOTES
Chief Complaint   Patient presents with    Hypertension     follow up , fasting today       Reviewed Record in preparation for visit and have obtained necessary documentation. Identified pt with two pt identifiers (Name @ )    Health Maintenance Due   Topic    Shingrix Vaccine Age 50> (1 of 2)    Influenza Age 5 to Adult          1. Have you been to the ER, urgent care clinic since your last visit? Hospitalized since your last visit? no     2. Have you seen or consulted any other health care providers outside of the [address] [place] [address] since your last visit? Include any pap smears or colon screening. Has seen Dr. Naila Schaefer for heart 18. Dr. Nick Ferrer for eyes 18.

## 2018-09-27 NOTE — PATIENT INSTRUCTIONS
Prediabetes: Care Instructions  Your Care Instructions    Prediabetes is a warning sign that you are at risk for getting type 2 diabetes. It means that your blood sugar is higher than it should be. The food you eat turns into sugar, which your body uses for energy. Normally, an organ called the pancreas makes insulin, which allows the sugar in your blood to get into your body's cells. But when your body can't use insulin the right way, the sugar doesn't move into cells. It stays in your blood instead. This is called insulin resistance. The buildup of sugar in the blood causes prediabetes. The good news is that lifestyle changes may help you get your blood sugar back to normal and help you avoid or delay diabetes. Follow-up care is a key part of your treatment and safety. Be sure to make and go to all appointments, and call your doctor if you are having problems. It's also a good idea to know your test results and keep a list of the medicines you take. How can you care for yourself at home? · Watch your weight. A healthy weight helps your body use insulin properly. · Limit the amount of calories, sweets, and unhealthy fat you eat. Ask your doctor if you should see a dietitian. A registered dietitian can help you create meal plans that fit your lifestyle. · Get at least 30 minutes of exercise on most days of the week. Exercise helps control your blood sugar. It also helps you maintain a healthy weight. Walking is a good choice. You also may want to do other activities, such as running, swimming, cycling, or playing tennis or team sports. · Do not smoke. Smoking can make prediabetes worse. If you need help quitting, talk to your doctor about stop-smoking programs and medicines. These can increase your chances of quitting for good. · If your doctor prescribed medicines, take them exactly as prescribed. Call your doctor if you think you are having a problem with your medicine.  You will get more details on the specific medicines your doctor prescribes. When should you call for help? Watch closely for changes in your health, and be sure to contact your doctor if:    · You have any symptoms of diabetes. These may include:  ¨ Being thirsty more often. ¨ Urinating more. ¨ Being hungrier. ¨ Losing weight. ¨ Being very tired. ¨ Having blurry vision.     · You have a wound that will not heal.     · You have an infection that will not go away.     · You have problems with your blood pressure.     · You want more information about diabetes and how you can keep from getting it. Where can you learn more? Go to http://ric-kevin.info/. Enter I222 in the search box to learn more about \"Prediabetes: Care Instructions. \"  Current as of: December 7, 2017  Content Version: 11.7  © 0829-9421 Healthwise, Incorporated. Care instructions adapted under license by [employer] (which disclaims liability or warranty for this information). If you have questions about a medical condition or this instruction, always ask your healthcare professional. [address] any warranty or liability for your use of this information.     Please do get your flu shot soon    Consider the Shingrix shingles vaccine, it might work better when you are off the chemo so your immune system can respond more

## 2018-09-27 NOTE — MR AVS SNAPSHOT
[address] 
[phone] Patient: Kera Nunes MRN: [id number] [id number] Visit Information Date & Time Provider Department Dept. Phone Encounter #  
 9/27/2018  8:00 AM Radha Arias, [facility] [phone] [id number] Follow-up Instructions Return in about 6 months (around 3/27/2019) for Annual Wellness Visit. Upcoming Health Maintenance Date Due Shingrix Vaccine Age 50> (1 of 2) 6/1/1984 Influenza Age 5 to Adult 8/1/2018 MEDICARE YEARLY EXAM 3/2/2019 GLAUCOMA SCREENING Q2Y 6/30/2019 DTaP/Tdap/Td series (2 - Td) 11/20/2024 Allergies as of 9/27/2018  Review Complete On: 9/27/2018 By: Katie Buckley LPN Severity Noted Reaction Type Reactions Codeine  04/06/2010    Nausea and Vomiting Blurred vision, felt faint Contrast Agent [Iodine]  01/16/2012    Hives Needs premedication w/ Benadryl prn (since 1/2012 reaction) Fish Oil  09/07/2010    Nausea Only Glipizide  02/03/2012    Other (comments) DRASTIC DROP IN BLOOD GLUCOSE, fasting Metformin  09/10/2015    Diarrhea Niacin  09/07/2010    Other (comments) Flushing Norvasc [Amlodipine]  02/22/2012    Other (comments) Leg edema Optiray 350 [Ioversol]  01/09/2012    Hives He does not know what this is Current Immunizations  Reviewed on 2/27/2018 Name Date Influenza High Dose Vaccine PF 10/11/2016 Influenza Vaccine 10/1/2013 Influenza Vaccine Split 10/9/2011 Influenza Vaccine Whole 12/1/2012 Pneumococcal Conjugate (PCV-13) 11/20/2014 Pneumococcal Polysaccharide (PPSV-23) 3/1/2018 Tdap 11/20/2014 ZZZ-RETIRED (DO NOT USE) Pneumococcal Vaccine (Unspecified Type) 11/1/2003 Not reviewed this visit You Were Diagnosed With   
  
 Codes Comments Chronic diarrhea    -  Primary ICD-10-CM: K52.9 ICD-9-CM: 787.91   
 Essential hypertension     ICD-10-CM: I10 
ICD-9-CM: 401.9 Multiple myeloma, remission status unspecified (HCC)     ICD-10-CM: C90.00 ICD-9-CM: 203.00 Pre-diabetes     ICD-10-CM: R73.03 
ICD-9-CM: 790.29 Hyperlipemia, mixed     ICD-10-CM: E78.2 ICD-9-CM: 272.2 Vitals BP Pulse Temp Resp Height(growth percentile) Weight(growth percentile)  
 118/68 (BP 1 Location: Right arm, BP Patient Position: Sitting) (!) 56 97.5 °F (36.4 °C) (Oral) 18 6' 1\" (1.854 m) 177 lb 12.8 oz (80.6 kg) SpO2 BMI Smoking Status 96% 23.46 kg/m2 Former Smoker Vitals History BMI and BSA Data Body Mass Index Body Surface Area  
 23.46 kg/m 2 2.04 m 2 Preferred Pharmacy Pharmacy Name Phone [facility] - [address] [place] [address] - [address] [place] [address] [address] [place] [address] [phone] Your Updated Medication List  
  
   
This list is accurate as of 9/27/18  8:26 AM.  Always use your most recent med list.  
  
  
  
  
 ACCU-CHEK SMARTVIEW TEST STRIP strip Generic drug:  glucose blood VI test strips Patient is to test blood sugar BID. Fill 3 packages  
  
 acyclovir 400 mg tablet Commonly known as:  ZOVIRAX TK 1 T PO QD Blood-Glucose Meter Misc Commonly known as:  ACCU-CHEK CORA  
1 Package by Does Not Apply route two (2) times a day. FOLBIC 2.5-25-2 mg tablet Generic drug:  folic acid-vit J3-INI F30 TAKE 1 TABLET EVERY DAY IMODIUM A-D 2 mg tablet Generic drug:  loperamide Take 2 mg by mouth four (4) times daily as needed for Diarrhea. Lancets Misc Commonly known as:  ACCU-CHEK FASTCLIX LANCING DEV Patient is to test BID  
  
 multivitamin tablet Commonly known as:  ONE A DAY Take 1 Tab by mouth daily. PROBIOTIC ACIDOPHILUS BIOBEADS PO Take  by mouth two (2) times a day. prochlorperazine 10 mg tablet Commonly known as:  COMPAZINE TK 1 T PO Q 4 HOURS PRN FOR NAUSEA  
  
 raNITIdine 150 mg tablet Commonly known as:  ZANTAC TAKE 1 TABLET BY MOUTH TWICE DAILY REVLIMID 10 mg Cap Generic drug:  lenalidomide TK ONE C PO  D FOR 21 DAYS WITH 7 DAYS OFF Follow-up Instructions Return in about 6 months (around 3/27/2019) for Annual Wellness Visit. Patient Instructions Prediabetes: Care Instructions Your Care Instructions Prediabetes is a warning sign that you are at risk for getting type 2 diabetes. It means that your blood sugar is higher than it should be. The food you eat turns into sugar, which your body uses for energy. Normally, an organ called the pancreas makes insulin, which allows the sugar in your blood to get into your body's cells. But when your body can't use insulin the right way, the sugar doesn't move into cells. It stays in your blood instead. This is called insulin resistance. The buildup of sugar in the blood causes prediabetes. The good news is that lifestyle changes may help you get your blood sugar back to normal and help you avoid or delay diabetes. Follow-up care is a key part of your treatment and safety. Be sure to make and go to all appointments, and call your doctor if you are having problems. It's also a good idea to know your test results and keep a list of the medicines you take. How can you care for yourself at home? · Watch your weight. A healthy weight helps your body use insulin properly. · Limit the amount of calories, sweets, and unhealthy fat you eat. Ask your doctor if you should see a dietitian. A registered dietitian can help you create meal plans that fit your lifestyle. · Get at least 30 minutes of exercise on most days of the week. Exercise helps control your blood sugar. It also helps you maintain a healthy weight. Walking is a good choice. You also may want to do other activities, such as running, swimming, cycling, or playing tennis or team sports. · Do not smoke. Smoking can make prediabetes worse. If you need help quitting, talk to your doctor about stop-smoking programs and medicines. These can increase your chances of quitting for good. · If your doctor prescribed medicines, take them exactly as prescribed. Call your doctor if you think you are having a problem with your medicine. You will get more details on the specific medicines your doctor prescribes. When should you call for help? Watch closely for changes in your health, and be sure to contact your doctor if: 
  · You have any symptoms of diabetes. These may include: ¨ Being thirsty more often. ¨ Urinating more. ¨ Being hungrier. ¨ Losing weight. ¨ Being very tired. ¨ Having blurry vision.  
  · You have a wound that will not heal.  
  · You have an infection that will not go away.  
  · You have problems with your blood pressure.  
  · You want more information about diabetes and how you can keep from getting it. Where can you learn more? Go to http://ric-kevin.info/. Enter I222 in the search box to learn more about \"Prediabetes: Care Instructions. \" Current as of: December 7, 2017 Content Version: 11.7 © 3909-6545 [employer]. Care instructions adapted under license by [employer] (which disclaims liability or warranty for this information). If you have questions about a medical condition or this instruction, always ask your healthcare professional. [address] any warranty or liability for your use of this information. Please do get your flu shot soon Consider the Shingrix shingles vaccine, it might work better when you are off the chemo so your immune system can respond more Introducing [facility]! Ara Staley introduces [employer] patient portal. Now you can access parts of your medical record, email your doctor's office, and request medication refills online.    
 
1. In your internet browser, go to [email or web address] 2. Click on the First Time User? Click Here link in the Sign In box. You will see the New Member Sign Up page. 3. Enter your CircuLite Access Code exactly as it appears below. You will not need to use this code after youve completed the sign-up process. If you do not sign up before the expiration date, you must request a new code. · CircuLite Access Code: [email or web address] Expires: 12/26/2018  7:47 AM 
 
4. Enter the last four digits of your Social Security Number (xxxx) and Date of Birth (mm/dd/yyyy) as indicated and click Submit. You will be taken to the next sign-up page. 5. Create a TrendBentt ID. This will be your CircuLite login ID and cannot be changed, so think of one that is secure and easy to remember. 6. Create a CircuLite password. You can change your password at any time. 7. Enter your Password Reset Question and Answer. This can be used at a later time if you forget your password. 8. Enter your e-mail address. You will receive e-mail notification when new information is available in [address] 9. Click Sign Up. You can now view and download portions of your medical record. 10. Click the Download Summary menu link to download a portable copy of your medical information. If you have questions, please visit the Frequently Asked Questions section of the CircuLite website. Remember, CircuLite is NOT to be used for urgent needs. For medical emergencies, dial 911. Now available from your iPhone and Android! Please provide this summary of care documentation to your next provider. Your primary care clinician is listed as Shannan Dove. If you have any questions after today's visit, please call [phone].

## 2019-03-28 ENCOUNTER — OFFICE VISIT (OUTPATIENT)
Dept: FAMILY MEDICINE CLINIC | Age: 84
End: 2019-03-28

## 2019-03-28 VITALS
WEIGHT: 174.2 LBS | BODY MASS INDEX: 23.09 KG/M2 | SYSTOLIC BLOOD PRESSURE: 154 MMHG | HEART RATE: 67 BPM | OXYGEN SATURATION: 94 % | RESPIRATION RATE: 18 BRPM | DIASTOLIC BLOOD PRESSURE: 58 MMHG | HEIGHT: 73 IN

## 2019-03-28 DIAGNOSIS — E72.11 HYPERHOMOCYSTEINEMIA (HCC): ICD-10-CM

## 2019-03-28 DIAGNOSIS — I48.20 CHRONIC ATRIAL FIBRILLATION (HCC): ICD-10-CM

## 2019-03-28 DIAGNOSIS — K52.9 CHRONIC DIARRHEA: ICD-10-CM

## 2019-03-28 DIAGNOSIS — C90.00 MULTIPLE MYELOMA, REMISSION STATUS UNSPECIFIED (HCC): ICD-10-CM

## 2019-03-28 DIAGNOSIS — R73.01 IMPAIRED FASTING GLUCOSE: ICD-10-CM

## 2019-03-28 DIAGNOSIS — C79.51 BONE METASTASIS (HCC): ICD-10-CM

## 2019-03-28 DIAGNOSIS — I10 ESSENTIAL HYPERTENSION: ICD-10-CM

## 2019-03-28 DIAGNOSIS — Z00.00 MEDICARE ANNUAL WELLNESS VISIT, SUBSEQUENT: Primary | ICD-10-CM

## 2019-03-28 DIAGNOSIS — I35.0 AORTIC STENOSIS, MODERATE: ICD-10-CM

## 2019-03-28 DIAGNOSIS — E78.2 HYPERLIPEMIA, MIXED: ICD-10-CM

## 2019-03-28 DIAGNOSIS — D69.6 THROMBOCYTOPENIA (HCC): ICD-10-CM

## 2019-03-28 RX ORDER — LOPERAMIDE HCL 2 MG
4 TABLET ORAL
COMMUNITY
Start: 2019-03-28 | End: 2022-01-01

## 2019-03-28 RX ORDER — BLOOD SUGAR DIAGNOSTIC
STRIP MISCELLANEOUS
Qty: 3 STRIP | Refills: 11 | Status: SHIPPED | OUTPATIENT
Start: 2019-03-28 | End: 2022-01-01

## 2019-03-28 RX ORDER — FUROSEMIDE 20 MG/1
20 TABLET ORAL 2 TIMES DAILY
Status: ON HOLD | COMMUNITY
Start: 2019-03-28 | End: 2022-01-01 | Stop reason: SDUPTHER

## 2019-03-28 NOTE — PROGRESS NOTES
Chief Complaint   Patient presents with   [facility] Visit          1. Have you been to the ER, urgent care clinic since your last visit? Hospitalized since your last visit? No    2. Have you seen or consulted any other health care providers outside of the [facility] since your last visit? Include any pap smears or colon screening.  No

## 2019-03-28 NOTE — PATIENT INSTRUCTIONS
Ask your oncologist if you can get the new shingles vaccine (Shingrix)    Medicare Wellness Visit, Male    The best way to live healthy is to have a lifestyle where you eat a well-balanced diet, exercise regularly, limit alcohol use, and quit all forms of tobacco/nicotine, if applicable. Regular preventive services are another way to keep healthy. Preventive services (vaccines, screening tests, monitoring & exams) can help personalize your care plan, which helps you manage your own care. Screening tests can find health problems at the earliest stages, when they are easiest to treat. 50Pinky Rodrigez follows the current, evidence-based guidelines published by the [facility] [employer] ([facility]) when recommending preventive services for our patients. Because we follow these guidelines, sometimes recommendations change over time as research supports it. (For example, a prostate screening blood test is no longer routinely recommended for men with no symptoms.)  Of course, you and your doctor may decide to screen more often for some diseases, based on your risk and co-morbidities (chronic disease you are already diagnosed with). Preventive services for you include:  - Medicare offers their members a free annual wellness visit, which is time for you and your primary care provider to discuss and plan for your preventive service needs. Take advantage of this benefit every year!  -All adults over age 72 should receive the recommended pneumonia vaccines. Current USPSTF guidelines recommend a series of two vaccines for the best pneumonia protection.   -All adults should have a flu vaccine yearly and an ECG.  All adults age 61 and older should receive a shingles vaccine once in their lifetime.    -All adults age 38-68 who are overweight should have a diabetes screening test once every three years.   -Other screening tests & preventive services for persons with diabetes include: an eye exam to screen for diabetic retinopathy, a kidney function test, a foot exam, and stricter control over your cholesterol.   -Cardiovascular screening for adults with routine risk involves an electrocardiogram (ECG) at intervals determined by the provider.   -Colorectal cancer screening should be done for adults age 54-65 with no increased risk factors for colorectal cancer. There are a number of acceptable methods of screening for this type of cancer. Each test has its own benefits and drawbacks. Discuss with your provider what is most appropriate for you during your annual wellness visit. The different tests include: colonoscopy (considered the best screening method), a fecal occult blood test, a fecal DNA test, and sigmoidoscopy.  -All adults born between [facility] should be screened once for Hepatitis C.  -An Abdominal Aortic Aneurysm (AAA) Screening is recommended for men age 73-68 who has ever smoked in their lifetime.      Here is a list of your current Health Maintenance items (your personalized list of preventive services) with a due date:  Health Maintenance Due   Topic Date Due    Shingles Vaccine (1 of 2) 06[birth year]01/1984     Discussed today Done Previously Not Needed     X  Pneumococcal vaccines    x  Flu vaccine     x Hepatitis B vaccine (if at risk)   X shingrix   Shingles vaccine    x  TDAP vaccine     x Digital rectal exam     x PSA    x  Colorectal cancer screening     x Low-dose CT for lung cancer screening    x  Bone density test    X going soon  Glaucoma screening    x  Cholesterol test    x  Diabetes screening test      x Diabetes self-management class     x Nutritionist referral for diabetes or renal disease

## 2019-03-28 NOTE — PROGRESS NOTES
Tl Calvert [facility]  [place]. [place], [address]  [phone]             Date of visit: 3/28/2019       This is a Subsequent Medicare Annual Wellness Visit (AWV), (Performed more than 12 months after effective date of Medicare Part B enrollment and 12 months after last preventive visit)    I have reviewed the patient's medical history in detail and updated the computerized patient record. Paige Mathias is a 80 y.o. male   History obtained from: the patient. Concerns today   (Patient understands that medical problems addressed today may incur additional cost as this is a preventive visit)  -continues to see oncologist about every 6 weeks  They have just cut back on the pills and shots (chemo treatments) he will need rest of life  -the diarrhea is still a huge problem. Takes away his fluid and feels dehydrated. Wonders if fiber pills would help. Still takes the imodium  -when he has BM first thing in AM is massive but formed stool. Then like a dam opens. Watery diarrhea 3-4x in the morning. Takes 2 imodium then when he first has the diarrhea. If it continues will take 1 more. Usually ok in the afternoon. Lately is going at night after dinner, not as watery but still very loose.    -the diarrhea is what limits his life the most.  -takes lasix 1-2 pills per day,   -saw heart doc yesterday. A-fib doing well. Asymptomatic. No chest pain. The lasix helps his [place]. -to see eye doc soon  -bp at home consistently 100-130/50-60s  -sugars also consistently ok  range  -no longer on zantac.  Uses tums occasionally but not needing often  -platelets still low; oncologist told him to stop the aspirin     History     Patient Active Problem List   Diagnosis Code    HTN (hypertension) I10    H/O Carotid Stenosis I65.29    GERD (gastroesophageal reflux disease) K21.9    Pre-diabetes R73.03    Sinus tachycardia R00.0    H/O Heavy Alcohol Use     Vitamin B12 deficiency E53.8    Hyperhomocysteinemia (HCC) E72.11    Mild Allergic Rhinitis J30.9    History of skin cancer Z85.828    Atrial fibrillation 1/2012 I48.91    S/P AAA repair 2/9/2012 Z98.890, Z86.79    Hypercalcemia E83.52    Multiple myeloma ([facility] C90.00    Left inguinal hernia K40.90    Right inguinal hernia K40.90    Microalbuminuria R80.9    Chronic diarrhea K52.9    Aortic stenosis, moderate I35.0    Mild concentric left ventricular hypertrophy (LVH) I51.7    Hyperlipemia, mixed E78.2     Past Medical History:   Diagnosis Date    AAA (abdominal aortic aneurysm) ([facility] 1/6/2012    Abnormal serum protein electrophoresis 4/25/2012    Anemia 4/6/2010    Anemia 1/5/2012    Anemia due to GI bleed 2007 4/6/2010    Aneurysm ([facility]     AAA    Arrhythmia     ATRIAL FIB PT STATES HE DOESN'T HAVE    Atrial fibrillation 1/2012 2/22/2012    Bilateral Carotid Bruits, L>R 9/7/2010    Bone cancer ([facility]     Borderline diabetes mellitus 4/6/2010    Cancer ([facility]     SKIN MELANOMA ON BACK     Carotid stenosis 4/6/2010    Diabetes mellitus, type 2 ([address] 9/7/2010    NO MEDS    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    ETOH abuse 4/6/2010    GERD (gastroesophageal reflux disease) 4/6/2010    GI bleed 4/6/2010    GI bleed, duodenitis 2007 4/6/2010    H/O Heavy Alcohol Use 9/7/2010    History of skin cancer 9/7/2010    HTN (hypertension) 4/6/2010    Hypercalcemia 4/25/2012    Hyperhomocysteinemia ([facility] 9/7/2010    Hypertriglyceridemia 9/7/2010    Left inguinal hernia 9/14/2015    Lipids blood increased 4/6/2010    Microalbuminuria 12/10/2015    Mild Allergic Rhinitis 9/7/2010    Pre-diabetes 9/7/2010    2005;  Optho Dr Efrain Homans Right inguinal hernia 11/2/2015    S/P AAA repair 2/9/2012 2/22/2012    Sinus tachycardia 9/7/2010    Tachycardia 4/6/2010    Vitamin B12 deficiency 9/7/2010      Past Surgical History:   Procedure Laterality Date    COLONOSCOPY  8/2007    Normal;  Gelparamd    EGD  8/2007    small hiatal hernia, mild duodenitis; Dr Di Allen HX AAA REPAIR  2/9/2012    Dr Corinna Adrian    HX CATARACT REMOVAL      both eyes    HX CHOLECYSTECTOMY  1-29-14    [facility]- Dr. Jeremy Montague Left 9/24/15    left indirect inguinal by Dr. Beronica Nowak Right 11/2/15    inguinal w/ mesh by Dr. Beronica Nowak Bilateral 10/2015 And 11/2015    Inguinal    BLUE DOPPLER  3/26/2012    BLUE Echo; + clot     Allergies   Allergen Reactions    Codeine Nausea and Vomiting     Blurred vision, felt faint    Contrast Agent [Iodine] Hives     Needs premedication w/ Benadryl prn (since 1/2012 reaction)    Fish Oil Nausea Only    Glipizide Other (comments)     DRASTIC DROP IN BLOOD GLUCOSE, fasting    Metformin Diarrhea    Niacin Other (comments)     Flushing    Norvasc [Amlodipine] Other (comments)     Leg edema    Optiray 350 [Ioversol] Hives     He does not know what this is     Current Outpatient Medications   Medication Sig Dispense Refill    hydroxypropyl methylcellulose (GENTEAL MILD) 0.2 % ophthalmic solution Administer 2 Drops to both eyes as needed.  furosemide (LASIX) 20 mg tablet Take 2 Tabs by mouth daily. (usually just 1 pill daily for heart)      loperamide (IMODIUM A-D) 2 mg tablet 2 pills first thing in AM, 1 pill twice daily after that (total 4 per day)      ACCU-CHEK SMARTVIEW TEST STRIP strip Patient is to test blood sugar BID.  Fill 3 packages 3 Strip 11    REVLIMID 10 mg cap TK ONE C PO  D FOR 21 DAYS WITH 7 DAYS OFF  0    prochlorperazine (COMPAZINE) 10 mg tablet TK 1 T PO Q 4 HOURS PRN FOR NAUSEA  2    acyclovir (ZOVIRAX) 400 mg tablet TK 1 T PO QD  6    FOLBIC 2.5-25-2 mg tablet TAKE 1 TABLET EVERY DAY 90 Tab 3    Lancets (ACCU-CHEK FASTCLIX) mis Patient is to test BID 3 Package 11    Blood-Glucose Meter (ACCU-CHEK CORA) misc 1 Package by Does Not Apply route two (2) times a day. 1 Each 11    multivitamin (ONE A DAY) tablet Take 1 Tab by mouth daily. Family History   Problem Relation Age of Onset    Heart Disease Mother          age 80    Cancer Father          brain tumor age 80    Heart Disease Father     Substance Abuse Daughter          cocaine OD age 43 in [address] Other Son          PE/DVT age 43    Heart Disease Brother     Cancer Brother         [address] Other Daughter         alive and well    Anesth Problems Neg Hx      Social History     Tobacco Use    Smoking status: Former Smoker     Packs/day: 1.50     Years: 55.00     Pack years: 82.50     Last attempt to quit: 2001     Years since quittin.2    Smokeless tobacco: Never Used    Tobacco comment: used to smoke 1 1/2 ppd x ~ 40 yrs   Substance Use Topics    Alcohol use: Yes     Alcohol/week: 8.4 oz     Types: 14 Glasses of wine per week     Comment: 14       Specialists/Care Team   Milagros Sewell has established care with the following healthcare providers:  Patient Care Team:  Deven Gomes MD as PCP - General (Family Practice)  Gasper Ordonez, RN as [address] (Cardiology)  Mortimer Sheerer, MD as Physician (Cardiology)  Celine Knapp, MARJORIE as Nurse Navigator (Family Practice)  Eric Deleon MD (Hematology and Oncology)  Shereen Simmonds, MD (Colon and Rectal Surgery)  Araceli Beltrán MD (Gastroenterology)  Ralph Arriaza (Optometry)    Health Risk Assessment     Demographics   male  80 y.o. General Health Questions   -During the past 4 weeks:   -how would you rate your health in general? Good   -how often have you been bothered by feeling dizzy when standing up? Admits to some when he first stands to walk, lately   -how much have you been bothered by bodily pain?  Mildly in hands, comes and goes, sometimes his left thumb gets stuck for a few seconds   -Have you noticed any hearing difficulties? no   -has your physical and emotional health limited your social activities with family or friends? A little, his diarrhea does    Emotional Health Questions   -Do you have a history of depression, anxiety, or emotional problems? no    3 most recent PHQ Screens 3/28/2019   PHQ Not Done -   Little interest or pleasure in doing things Not at all   Feeling down, depressed, irritable, or hopeless Not at all   Total Score PHQ 2 0      Health Habits   Please describe your diet habits: tries to eat healthy, always, a bit more cookies. Do you get 5 servings of fruits or vegetables daily? yes  Do you exercise regularly? Used to walk more but less now due to generalized weakness    Activities of Daily Living and Functional Status   -Do you need help with eating, walking, dressing, bathing, toileting, the phone, transportation, shopping, preparing meals, housework, laundry, medications or managing money? no  -In the past four weeks, was someone available to help you if you needed and wanted help with anything? Yes (wife has dementia but daughter is in the area and is good to them)  -Are you confident are you that you can control and manage most of your health problems? yes  -Have you been given information to help you keep track of your medications? yes  -How often do you have trouble taking your medications as prescribed? never    Fall Risk and Home Safety   Have you fallen 2 or more times in the past year? no  Does your home have rugs in the hallway, lack grab bars in the bathroom, lack handrails on the stairs or have poor lighting? No grabbars, but generally safe. Uses cane to steady him  Do you have smoke detectors and check them regularly?  Yes  Do you have difficulties driving a car? no  Do you always fasten your seat belt when you are in a car? yes    Review of Systems (if indicated for problems addressed today)   Card: denies chest pain  Pulm: admits to exertional shortness of breath unchanged    Physical Examination     Vitals: 03/28/19 0757 03/28/19 0832   BP: 187/72 154/58   Pulse: 67    Resp: 18    SpO2: 94%    Weight: 174 lb 3.2 oz (79 kg)    Height: 6' 1\" (1.854 m)      Body mass index is 22.98 kg/m². No exam data present  Was the patient's timed Up & Go test unsteady or longer than 30 seconds? no    Evaluation of Cognitive Function   Mood/affect:  happy  Orientation: Person, Place and Time  Appearance: age appropriate  Family member/caregiver input: none    Additional exam if indicated for problems addressed today:  General: stated age, well developed, well nourished and in NAD  Neck: supple, symmetrical, trachea midline, no adenopathy and thyroid: not enlarged, symmetric, no tenderness/mass/nodules  Lungs:  clear to auscultation w/o rales, rhonchi, wheezes w/normal effort and no use of accessory muscles of respiration   Heart: regular rate and rhythm, S1, S2 normal, loud systolic murmur throughout  Abdomen: soft, nontender, no masses  Ext:  No edema noted.    Lymph: no cervical adenopathy appreciated  Skin:  Normal. and no rash or abnormalities   Psych: alert and oriented to person, place, time and situation and Speech: appropriate quality, quantity and organization of sentences   Advice/Referrals/Counseling (as indicated)   Education and counseling provided for any problems identified above: diet, exercise    Preventive Services     Health Maintenance   Topic Date Due    Shingrix Vaccine Age 49> (1 of 2) 06/01/1984    GLAUCOMA SCREENING Q2Y  06/30/2019    MEDICARE YEARLY EXAM  03/28/2020    DTaP/Tdap/Td series (2 - Td) 11/20/2024    Influenza Age 5 to Adult  Completed    Pneumococcal 65+ years  Completed     (Preventive care checklist to be included in patient instructions)  Discussed today Done Previously Not Needed     X  Pneumococcal vaccines    x  Flu vaccine     x Hepatitis B vaccine (if at risk)   X shingrix   Shingles vaccine    x  TDAP vaccine     x Digital rectal exam     x PSA    x  Colorectal cancer screening x Low-dose CT for lung cancer screening    x  Bone density test    X going soon  Glaucoma screening    x  Cholesterol test    x  Diabetes screening test      x Diabetes self-management class     x Nutritionist referral for diabetes or renal disease     Discussion of Advance Directive   Discussed with Pamela Song his ability to prepare and advance directive in the case that an injury or illness causes him to be unable to make health care decisions. Has one on file, he says no changes    Assessment/Plan   Z00.00    ICD-10-CM ICD-9-CM    1. Medicare annual wellness visit, subsequent Z00.00 V70.0 DISCONTINUED: varicella-zoster recombinant, PF, (SHINGRIX, PF,) 50 mcg/0.5 mL susr injection   2. Essential hypertension I10 401.9 CANCELED: CBC WITH AUTOMATED DIFF      CANCELED: METABOLIC PANEL, COMPREHENSIVE   3. Hyperlipemia, mixed E78.2 272.2 LIPID PANEL   4. Chronic atrial fibrillation ([facility]) I48.2 427.31    5. Multiple myeloma, remission status unspecified ([facility]) C90.00 203.00    6. 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 Chronic diarrhea K52.9 787.91    8. Aortic stenosis, moderate I35.0 424.1    9. Impaired fasting glucose R73.01 790.21 HEMOGLOBIN A1C WITH EAG   10. Bone metastasis ([facility]) C79.51 198.5    11.  Thrombocytopenia ([facility]) D69.6 287.5        Orders Placed This Encounter    LIPID PANEL    HEMOGLOBIN A1C WITH EAG    DISCONTD: varicella-zoster recombinant, PF, (SHINGRIX, PF,) 50 mcg/0.5 mL susr injection    hydroxypropyl methylcellulose (GENTEAL MILD) 0.2 % ophthalmic solution    furosemide (LASIX) 20 mg tablet    loperamide (IMODIUM A-D) 2 mg tablet    ACCU-CHEK SMARTVIEW TEST STRIP strip     Preventive as above  Encouraged him to get the shingrix shot; he will make sure ok with his oncologist first  Chronic problems stable  The diarrhea is his biggest problem  Advised more proactive/scheduled imodium, 4 per day  Let me know if that doesn't help  The diarrhea is nothing new or changed; has seen 3 GI docs about it in the past and not wanting to go back. No cause was found. No longer hypertensive, only on lasix. Readings from home good  See scanned cbc, cmp from oncology    Follow-up and Dispositions    · Return in about 1 year (around 3/28/2020).          Earnest Wadsworth MD

## 2019-03-29 LAB
CHOLEST SERPL-MCNC: 197 MG/DL (ref 100–199)
EST. AVERAGE GLUCOSE BLD GHB EST-MCNC: 100 MG/DL
HBA1C MFR BLD: 5.1 % (ref 4.8–5.6)
HDLC SERPL-MCNC: 71 MG/DL
INTERPRETATION, 910389: NORMAL
LDLC SERPL CALC-MCNC: 91 MG/DL (ref 0–99)
TRIGL SERPL-MCNC: 177 MG/DL (ref 0–149)
VLDLC SERPL CALC-MCNC: 35 MG/DL (ref 5–40)

## 2019-03-30 NOTE — PROGRESS NOTES
Sent in letter:  Sugar and cholesterol tests were perfect this time! Keep up the good work with healthy eating.

## 2019-04-23 RX ORDER — FOLIC ACID-PYRIDOXINE-CYANOCOBALAMIN TAB 2.5-25-2 MG 2.5-25-2 MG
TAB ORAL
Qty: 90 TAB | Refills: 3 | Status: SHIPPED | OUTPATIENT
Start: 2019-04-23 | End: 2020-05-21

## 2020-03-04 NOTE — PROGRESS NOTES
[facility]  [address] [place], [address]  [phone]             Date of visit: 3/5/2020       This is a Subsequent Medicare Annual Wellness Visit (AWV), (Performed more than 12 months after effective date of Medicare Part B enrollment and 12 months after last preventive visit)    I have reviewed the patient's medical history in detail and updated the computerized patient record. Lisa Franklin is a 80 y.o. male   History obtained from: the patient. Concerns today   (Patient understands that medical problems addressed today may incur additional cost as this is a preventive visit)  -back went out 2 days ago, little better this am  Has happened before  He does have cancer in his bones (has multiple myeloma)  Saw his oncologist on Tuesday and was told his tests showed he was clear  Hasn't been taking chemo since December 10th (sees oncologist every 6 weeks)  Still on the pills but a lower dose. On 14 days, off 14 days (used to be on 21, off 7)    This back pain is on right side as it has been in the past  Not supposed to take advil but did take one today    Monday started coughing, feels to him like a cold although didn't have a sore thoat this time  Took a mucinex and that really helped. Had a lot of mucus. Didn't need one yesterday but thinks he needs one today, feeling a lot of phlegm and congested  Admits to some sob  No fever    -the diarrhea is still a huge problem. Takes away his fluid and feels dehydrated.   Still takes the imodium     -takes lasix 1-2 pills per day, which helps his breathing  Mostly just 1 daily but did take 2 the past couple of days due to breathing  -will see heart doctor end of this this month   A-fib not bothering him    -bp at home running higher, like 140s-150s about 1/2 of the time  Was 190s when he saw oncologist more than once    -GERD used to be on zantac, now just tums and doesn't need it often    Sugars at home mostly 80-90s in AM    Neuropathy in both feet  Oncologist suggested he get a vascular test, he used to get that regularly  He has a toe on right foot (2nd) that has been purple for weeks  No sores, feels like \"walking on eggs,\" more an annoying burning or needles, not constantly and not really painful  Quit smoking over 20 years ago    Flu shot done this fall  shingrix done recently, did not make him sick    History     Patient Active Problem List   Diagnosis Code    HTN (hypertension) I10    H/O Carotid Stenosis I65.29    GERD (gastroesophageal reflux disease) K21.9    Pre-diabetes R73.03    Sinus tachycardia R00.0    H/O Heavy Alcohol Use     Vitamin B12 deficiency E53.8    Hyperhomocysteinemia (HCC) E72.11    Mild Allergic Rhinitis J30.9    History of skin cancer Z85.828    Atrial fibrillation 1/2012 I48.91    S/P AAA repair 2/9/2012 Z98.890, Z86.79    Hypercalcemia E83.52    Multiple myeloma ([address] C90.00    Left inguinal hernia K40.90    Right inguinal hernia K40.90    Microalbuminuria R80.9    Chronic diarrhea K52.9    Aortic stenosis, moderate I35.0    Mild concentric left ventricular hypertrophy (LVH) I51.7    Hyperlipemia, mixed E78.2     Past Medical History:   Diagnosis Date    AAA (abdominal aortic aneurysm) ([address] 1/6/2012    Abnormal serum protein electrophoresis 4/25/2012    Anemia 4/6/2010    Anemia 1/5/2012    Anemia due to GI bleed 2007 4/6/2010    Aneurysm ([address]     AAA    Arrhythmia     ATRIAL FIB PT STATES HE DOESN'T HAVE    Atrial fibrillation 1/2012 2/22/2012    Bilateral Carotid Bruits, L>R 9/7/2010    Bone cancer ([address]     Borderline diabetes mellitus 4/6/2010    Cancer ([address]     SKIN MELANOMA ON BACK     Carotid stenosis 4/6/2010    Diabetes mellitus, type 2 ([address] 9/7/2010    NO MEDS    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    ETOH abuse 4/6/2010    GERD (gastroesophageal reflux disease) 4/6/2010    GI bleed 4/6/2010    GI bleed, duodenitis 2007 4/6/2010    H/O Heavy Alcohol Use 9/7/2010    History of skin cancer 9/7/2010    HTN (hypertension) 4/6/2010    Hypercalcemia 4/25/2012    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/2010    Hypertriglyceridemia 9/7/2010    Left inguinal hernia 9/14/2015    Lipids blood increased 4/6/2010    Microalbuminuria 12/10/2015    Mild Allergic Rhinitis 9/7/2010    Pre-diabetes 9/7/2010    2005; Optho Dr Car Little Right inguinal hernia 11/2/2015    S/P AAA repair 2/9/2012 2/22/2012    Sinus tachycardia 9/7/2010    Tachycardia 4/6/2010    Vitamin B12 deficiency 9/7/2010      Past Surgical History:   Procedure Laterality Date    COLONOSCOPY  8/2007    Normal; Dr Som Valencia EGD  8/2007    small hiatal hernia, mild duodenitis; Dr Som Valencia HX AAA REPAIR  2/9/2012    Dr Len Ren    HX CATARACT REMOVAL      both eyes    HX CHOLECYSTECTOMY  1-29-14    [facility]- Dr. Luz Watkins Left 9/24/15    left indirect inguinal by Dr. Ronnie Choudhury Right 11/2/15    inguinal w/ mesh by Dr. Ronnie Choudhury Bilateral 10/2015 And 11/2015    Inguinal    BLUE DOPPLER  3/26/2012    BLUE Echo; + clot     Allergies   Allergen Reactions    Codeine Nausea and Vomiting     Blurred vision, felt faint    Contrast Agent [Iodine] Hives     Needs premedication w/ Benadryl prn (since 1/2012 reaction)    Fish Oil Nausea Only    Glipizide Other (comments)     DRASTIC DROP IN BLOOD GLUCOSE, fasting    Metformin Diarrhea    Niacin Other (comments)     Flushing    Norvasc [Amlodipine] Other (comments)     Leg edema    Optiray 350 [Ioversol] Hives     He does not know what this is     Current Outpatient Medications   Medication Sig Dispense Refill    FOLBIC 2.5-25-2 mg tablet TAKE 1 TABLET EVERY DAY 90 Tab 3    hydroxypropyl methylcellulose (GENTEAL MILD) 0.2 % ophthalmic solution Administer 2 Drops to both eyes as needed.       furosemide (LASIX) 20 mg tablet Take 2 Tabs by mouth daily. (usually just 1 pill daily for heart)      loperamide (IMODIUM A-D) 2 mg tablet 2 pills first thing in AM, 1 pill twice daily after that (total 4 per day)      ACCU-CHEK SMARTVIEW TEST STRIP strip Patient is to test blood sugar BID. Fill 3 packages 3 Strip 11    REVLIMID 10 mg cap TK ONE C PO  D FOR 21 DAYS WITH 7 DAYS OFF  0    acyclovir (ZOVIRAX) 400 mg tablet TK 1 T PO QD  6    Lancets (ACCU-CHEK FASTCLIX) misc Patient is to test BID 3 Package 11    Blood-Glucose Meter (ACCU-CHEK CORA) mis 1 Package by Does Not Apply route two (2) times a day. 1 Each 11    multivitamin (ONE A DAY) tablet Take 1 Tab by mouth daily.  prochlorperazine (COMPAZINE) 10 mg tablet TK 1 T PO Q 4 HOURS PRN FOR NAUSEA  2     Family History   Problem Relation Age of Onset    Heart Disease Mother          age 80    Cancer Father          brain tumor age 80    Heart Disease Father     Substance Abuse Daughter          cocaine OD age 43 in 200   Tere Raleigh Other Son          PE/DVT age 43    Heart Disease Brother     Cancer Brother         BLADDER    Other Daughter         alive and well    Anesth Problems Neg Hx      Social History     Tobacco Use    Smoking status: Former Smoker     Packs/day: 1.50     Years: 55.00     Pack years: 82.50     Last attempt to quit: 2001     Years since quittin.1    Smokeless tobacco: Never Used    Tobacco comment: used to smoke 1 1/2 ppd x ~ 40 yrs   Substance Use Topics    Alcohol use:  Yes     Alcohol/week: 14.0 standard drinks     Types: 14 Glasses of wine per week     Comment: 14       Specialists/Care Team   Tariq Tkgiovanna has established care with the following healthcare providers:  Patient Care Team:  Mallika To MD as PCP - General (Family Practice)  Mallika To MD as PCP - [facility]  Keesha Locke RN as [facility] (Cardiology)  Vanessa Weber MD as Physician (Cardiology)  Ozzie Coombs, RN as Nurse Navigator (Family Practice)  Zafar Salazar MD (Hematology and Oncology)  Marcelina Eisenmenger, MD (Colon and Rectal Surgery)  Marilee Plata MD (Gastroenterology)  Ralph Quintero (Optometry)    Health Risk Assessment     Demographics   male  80 y.o. General Health Questions   -During the past 4 weeks:   -how would you rate your health in general? Good   -how often have you been bothered by feeling dizzy when standing up? no   -how much have you been bothered by bodily pain? Mildly in hands sometimes and his neuropathy in his feet   -Have you noticed any hearing difficulties? no   -has your physical and emotional health limited your social activities with family or friends? A little, his diarrhea does     Emotional Health Questions   -Do you have a history of depression, anxiety, or emotional problems? no    3 most recent PHQ Screens 3/5/2020   PHQ Not Done -   Little interest or pleasure in doing things Not at all   Feeling down, depressed, irritable, or hopeless Not at all   Total Score PHQ 2 0      Health Habits   Please describe your diet habits: tries to eat healthy lots of chicken, lots of fruit, roast, some veggies, sometimes fish. Used to be a very heavy drinker. Stopped for years, now back to 2 drinks per day (one wine, one bourbon)  Do you get 5 servings of fruits or vegetables daily? Yes, lots of fruits  Do you exercise regularly? Used to walk more but less now due to generalized weakness however stays generally active all morning  Uses the cane    Activities of Daily Living and Functional Status   -Do you need help with eating, walking, dressing, bathing, toileting, the phone, transportation, shopping, preparing meals, housework, laundry, medications or managing money? no  -In the past four weeks, was someone available to help you if you needed and wanted help with anything?  Yes (wife has dementia but daughter is in the area and is good to them)   -Are you confident are you that you can control and manage most of your health problems? yes  -Have you been given information to help you keep track of your medications? yes  -How often do you have trouble taking your medications as prescribed? never    Fall Risk and Home Safety   Have you fallen 2 or more times in the past year? No  Does your home have rugs in the hallway, lack grab bars in the bathroom, lack handrails on the stairs or have poor lighting? No grabbars, but generally safe. Uses cane to steady himself   Do you have smoke detectors and check them regularly? Yes  Do you have difficulties driving a car? no  Do you always fasten your seat belt when you are in a car? yes    Review of Systems (if indicated for problems addressed today)   Card: denies chest pain  GI: denies hematochezia  Pulm: admits to chronic shortness of breath     Physical Examination     Vitals:    03/05/20 0808 03/05/20 0851   BP: 159/60 150/56   Pulse: 66    Resp: 16    Temp: 98.6 °F (37 °C)    TempSrc: Oral    SpO2: 98%    Weight: 173 lb (78.5 kg)    Height: 6' 1\" (1.854 m)      Body mass index is 22.82 kg/m².    No exam data present  Was the patient's timed Up & Go test unsteady or longer than 30 seconds? no    Evaluation of Cognitive Function   Mood/affect:  happy  Orientation: normal  Appearance: age appropriate  Family member/caregiver input: none    Additional exam if indicated for problems addressed today:  General: stated age, well-developed, and in NAD  Eyes: PERRL, EOMI, no redness or drainage  Nose: no drainage, left nostril more congested  Ears: TMs clear  Mouth: no lesions  Throat: no erythema, exudate or swelling  Neck: supple, symmetrical, trachea midline, no adenopathy and thyroid: not enlarged, symmetric, no tenderness/mass/nodules  Lungs:  clear to auscultation w/o rales, rhonchi, wheezes w/normal effort and no use of accessory muscles of respiration   Heart: regular rate and rhythm, S1, S2 normal, 3/6 ISAAC  Abdomen: soft, nontender, no masses  Ext:  Trace BLE edema noted. Right 2nd toe with purple hue and lots of petechiae. Other toes with few petechiae. Right DP pulse faint. Toes very cold. MSK: tender right lumbar muscles more laterally  Lymph: no cervical adenopathy appreciated  Skin:  Normal. and no rash or abnormalities   Neuro: normal gait, CN 2-12 intact  Psych: alert and oriented to person, place, time and situation and Speech: appropriate quality, quantity and organization of sentences and normal affect  Advice/Referrals/Counseling (as indicated)   Education and counseling provided for any problems identified above: diet, exercise, drinking less    Preventive Services     Health Maintenance   Topic Date Due    Shingrix Vaccine Age 49> (1 of 2) 06/01/1984    Influenza Age 5 to Adult  08/01/2019    Medicare Yearly Exam  03/28/2020    GLAUCOMA SCREENING Q2Y  06/01/2021    DTaP/Tdap/Td series (2 - Td) 11/20/2024    Pneumococcal 65+ years  Completed     (Preventive care checklist to be included in patient instructions)  Discussed today Done Previously Not Needed     X  Pneumococcal vaccines    X done  Flu vaccine     x Hepatitis B vaccine (if at risk)    x  Shingles vaccine    x  TDAP vaccine     x Digital rectal exam     x PSA    x  Colorectal cancer screening     x Low-dose CT for lung cancer screening    x  Bone density test    X last June  Glaucoma screening    x  Cholesterol test    x  Diabetes screening test      x Diabetes self-management class     x Nutritionist referral for diabetes or renal disease     Discussion of Advance Directive   Discussed with Mary Mi his ability to prepare and advance directive in the case that an injury or illness causes him to be unable to make health care decisions. Has one on file    Assessment/Plan   Z00.00    ICD-10-CM ICD-9-CM    1. Medicare annual wellness visit, subsequent Z00.00 V70.0    2. Essential hypertension I10 401.9    3.  Multiple myeloma, remission status unspecified ([facility]) C90.00 203.00    4. Impaired fasting glucose R73.01 790.21 HEMOGLOBIN A1C WITH EAG   5. Chronic diarrhea K52.9 787.91 CBC WITH AUTOMATED DIFF      METABOLIC PANEL, COMPREHENSIVE   6. Aortic stenosis, moderate I35.0 424.1    7. Hyperlipemia, mixed E78.2 272.2 LIPID PANEL   8. Chronic atrial fibrillation I48.20 427.31    9. Hyperhomocysteinemia ([facility]) E72.11 270.4    10. Vitamin D deficiency E55.9 268.9 VITAMIN D, 25 HYDROXY   11. Petechiae R23.3 782.7 REFERRAL TO VASCULAR SURGERY   12. Decreased circulation I99.9 459.9 REFERRAL TO VASCULAR SURGERY   13. Acute right-sided low back pain without sciatica M54.5 724.2    14. Polyneuropathy associated with underlying disease ([facility] G63 357.4    15. Viral URI with cough J06.9 465.9     B97.89         Orders Placed This Encounter    CBC WITH AUTOMATED DIFF    METABOLIC PANEL, COMPREHENSIVE    LIPID PANEL    HEMOGLOBIN A1C WITH EAG    VITAMIN D, 25 HYDROXY    REFERRAL TO VASCULAR SURGERY     Preventive as above  Doing well considering multiple myeloma  Acute back pain really seems to be muscular and is some better today  Routine labs chronic problems  bp running higher but not terrible  As he will see his cardiologist in 3 weeks I will let her adjust his meds  Has a cold but symptoms mild, exam very benign  Petechiae/cold feet, will refer to vascular as oncologist recommended; he used to follow with them anyway  Polyneuropathy he can live with, not terribly painful, gabapentin didn't work in the past  Diarrhea the same, has tried everything, says he just has to live with it    Follow-up and Dispositions    · Return in about 1 year (around 3/5/2021).          Julianna Runner, MD

## 2020-03-04 NOTE — PATIENT INSTRUCTIONS
Well Visit, Over 72: Care Instructions Your Care Instructions Physical exams can help you stay healthy. Your doctor has checked your overall health and may have suggested ways to take good care of yourself. He or she also may have recommended tests. At home, you can help prevent illness with healthy eating, regular exercise, and other steps. Follow-up care is a key part of your treatment and safety. Be sure to make and go to all appointments, and call your doctor if you are having problems. It's also a good idea to know your test results and keep a list of the medicines you take. How can you care for yourself at home? · Reach and stay at a healthy weight. This will lower your risk for many problems, such as obesity, diabetes, heart disease, and high blood pressure. · Get at least 30 minutes of exercise on most days of the week. Walking is a good choice. You also may want to do other activities, such as running, swimming, cycling, or playing tennis or team sports. · Do not smoke. Smoking can make health problems worse. If you need help quitting, talk to your doctor about stop-smoking programs and medicines. These can increase your chances of quitting for good. · Protect your skin from too much sun. When you're outdoors from 10 a.m. to 4 p.m., stay in the shade or cover up with clothing and a hat with a wide brim. Wear sunglasses that block UV rays. Even when it's cloudy, put broad-spectrum sunscreen (SPF 30 or higher) on any exposed skin. · See a dentist one or two times a year for checkups and to have your teeth cleaned. · Wear a seat belt in the car. Follow your doctor's advice about when to have certain tests. These tests can spot problems early. For men and women · Cholesterol. Your doctor will tell you how often to have this done based on your overall health and other things that can increase your risk for heart attack and stroke. · Blood pressure. Have your blood pressure checked during a routine doctor visit. Your doctor will tell you how often to check your blood pressure based on your age, your blood pressure results, and other factors. · Diabetes. Ask your doctor whether you should have tests for diabetes. · Vision. Experts recommend that you have yearly exams for glaucoma and other age-related eye problems. · Hearing. Tell your doctor if you notice any change in your hearing. You can have tests to find out how well you hear. · Colon cancer tests. Keep having colon cancer tests as your doctor recommends. You can have one of several types of tests. · Heart attack and stroke risk. At least every 4 to 6 years, you should have your risk for heart attack and stroke assessed. Your doctor uses factors such as your age, blood pressure, cholesterol, and whether you smoke or have diabetes to show what your risk for a heart attack or stroke is over the next 10 years. · Osteoporosis. Talk to your doctor about whether you should have a bone density test to find out whether you have thinning bones. Also ask your doctor about whether you should take calcium and vitamin D supplements. For women · Pap test and pelvic exam. You may no longer need a Pap test. Talk with your doctor about whether to stop or continue to have Pap tests. · Breast exam and mammogram. Ask how often you should have a mammogram, which is an X-ray of your breasts. A mammogram can spot breast cancer before it can be felt and when it is easiest to treat. · Thyroid disease. Talk to your doctor about whether to have your thyroid checked as part of a regular physical exam. Women have an increased chance of a thyroid problem. For men · Prostate exam. Talk to your doctor about whether you should have a blood test (called a PSA test) for prostate cancer.  Experts recommend that you discuss the benefits and risks of the test with your doctor before you decide whether to have this test. Some experts say that men ages 79 and older no longer need testing. · Abdominal aortic aneurysm. Ask your doctor whether you should have a test to check for an aneurysm. You may need a test if you ever smoked or if your parent, brother, sister, or child has had an aneurysm. When should you call for help? Watch closely for changes in your health, and be sure to contact your doctor if you have any problems or symptoms that concern you. Where can you learn more? Go to http://ric-kevin.info/. Enter G131 in the search box to learn more about \"Well Visit, Over 65: Care Instructions. \" Current as of: December 13, 2018 Content Version: 12.2 © 9568-9156 [employer]. Care instructions adapted under license by [employer] (which disclaims liability or warranty for this information). If you have questions about a medical condition or this instruction, always ask your healthcare professional. [place] [address] any warranty or liability for your use of this information.

## 2020-03-05 ENCOUNTER — OFFICE VISIT (OUTPATIENT)
Dept: FAMILY MEDICINE CLINIC | Age: 85
End: 2020-03-05

## 2020-03-05 VITALS
SYSTOLIC BLOOD PRESSURE: 150 MMHG | TEMPERATURE: 98.6 F | BODY MASS INDEX: 22.93 KG/M2 | HEART RATE: 66 BPM | WEIGHT: 173 LBS | DIASTOLIC BLOOD PRESSURE: 56 MMHG | RESPIRATION RATE: 16 BRPM | OXYGEN SATURATION: 98 % | HEIGHT: 73 IN

## 2020-03-05 DIAGNOSIS — I48.20 CHRONIC ATRIAL FIBRILLATION (HCC): ICD-10-CM

## 2020-03-05 DIAGNOSIS — M54.50 ACUTE RIGHT-SIDED LOW BACK PAIN WITHOUT SCIATICA: ICD-10-CM

## 2020-03-05 DIAGNOSIS — J06.9 VIRAL URI WITH COUGH: ICD-10-CM

## 2020-03-05 DIAGNOSIS — I99.9 DECREASED CIRCULATION: ICD-10-CM

## 2020-03-05 DIAGNOSIS — E72.11 HYPERHOMOCYSTEINEMIA (HCC): ICD-10-CM

## 2020-03-05 DIAGNOSIS — I35.0 AORTIC STENOSIS, MODERATE: ICD-10-CM

## 2020-03-05 DIAGNOSIS — I10 ESSENTIAL HYPERTENSION: ICD-10-CM

## 2020-03-05 DIAGNOSIS — R23.3 PETECHIAE: ICD-10-CM

## 2020-03-05 DIAGNOSIS — K52.9 CHRONIC DIARRHEA: ICD-10-CM

## 2020-03-05 DIAGNOSIS — R73.01 IMPAIRED FASTING GLUCOSE: ICD-10-CM

## 2020-03-05 DIAGNOSIS — G63 POLYNEUROPATHY ASSOCIATED WITH UNDERLYING DISEASE (HCC): ICD-10-CM

## 2020-03-05 DIAGNOSIS — E55.9 VITAMIN D DEFICIENCY: ICD-10-CM

## 2020-03-05 DIAGNOSIS — C90.00 MULTIPLE MYELOMA, REMISSION STATUS UNSPECIFIED (HCC): ICD-10-CM

## 2020-03-05 DIAGNOSIS — Z00.00 MEDICARE ANNUAL WELLNESS VISIT, SUBSEQUENT: Primary | ICD-10-CM

## 2020-03-05 DIAGNOSIS — E78.2 HYPERLIPEMIA, MIXED: ICD-10-CM

## 2020-03-05 NOTE — PROGRESS NOTES
Chief Complaint   Patient presents with    Annual Wellness Visit    LOW BACK PAIN     this past wed. went grocery shopping and carried groceries up stairs       Reviewed Record in preparation for visit and have obtained necessary documentation. Identified pt with two pt identifiers (Name @ )    Health Maintenance Due   Topic    Shingrix Vaccine Age 50> (1 of 2)    GLAUCOMA SCREENING Q2Y     Influenza Age 5 to Adult     Medicare Yearly Exam          1. Have you been to the ER, urgent care clinic since your last visit? Hospitalized since your last visit? no    2. Have you seen or consulted any other health care providers outside of the [address] [place] [address] since your last visit? Include any pap smears or colon screening.  no

## 2020-03-06 LAB
25(OH)D3+25(OH)D2 SERPL-MCNC: 25.6 NG/ML (ref 30–100)
ALBUMIN SERPL-MCNC: 3.7 G/DL (ref 3.6–4.6)
ALBUMIN/GLOB SERPL: 1.3 {RATIO} (ref 1.2–2.2)
ALP SERPL-CCNC: 116 IU/L (ref 39–117)
ALT SERPL-CCNC: 29 IU/L (ref 0–44)
AST SERPL-CCNC: 51 IU/L (ref 0–40)
BASOPHILS # BLD AUTO: 0 X10E3/UL (ref 0–0.2)
BASOPHILS NFR BLD AUTO: 0 %
BILIRUB SERPL-MCNC: 1.2 MG/DL (ref 0–1.2)
BUN SERPL-MCNC: 16 MG/DL (ref 8–27)
BUN/CREAT SERPL: 13 (ref 10–24)
CALCIUM SERPL-MCNC: 8.3 MG/DL (ref 8.6–10.2)
CHLORIDE SERPL-SCNC: 101 MMOL/L (ref 96–106)
CHOLEST SERPL-MCNC: 174 MG/DL (ref 100–199)
CO2 SERPL-SCNC: 20 MMOL/L (ref 20–29)
CREAT SERPL-MCNC: 1.19 MG/DL (ref 0.76–1.27)
EOSINOPHIL # BLD AUTO: 0.3 X10E3/UL (ref 0–0.4)
EOSINOPHIL NFR BLD AUTO: 5 %
ERYTHROCYTE [DISTWIDTH] IN BLOOD BY AUTOMATED COUNT: 13.5 % (ref 11.6–15.4)
EST. AVERAGE GLUCOSE BLD GHB EST-MCNC: 111 MG/DL
GLOBULIN SER CALC-MCNC: 2.9 G/DL (ref 1.5–4.5)
GLUCOSE SERPL-MCNC: 130 MG/DL (ref 65–99)
HBA1C MFR BLD: 5.5 % (ref 4.8–5.6)
HCT VFR BLD AUTO: 35.1 % (ref 37.5–51)
HDLC SERPL-MCNC: 84 MG/DL
HGB BLD-MCNC: 12.2 G/DL (ref 13–17.7)
IMM GRANULOCYTES # BLD AUTO: 0 X10E3/UL (ref 0–0.1)
IMM GRANULOCYTES NFR BLD AUTO: 0 %
INTERPRETATION, 910389: NORMAL
INTERPRETATION: NORMAL
LDLC SERPL CALC-MCNC: 64 MG/DL (ref 0–99)
LYMPHOCYTES # BLD AUTO: 0.5 X10E3/UL (ref 0.7–3.1)
LYMPHOCYTES NFR BLD AUTO: 8 %
MCH RBC QN AUTO: 35.9 PG (ref 26.6–33)
MCHC RBC AUTO-ENTMCNC: 34.8 G/DL (ref 31.5–35.7)
MCV RBC AUTO: 103 FL (ref 79–97)
MONOCYTES # BLD AUTO: 1 X10E3/UL (ref 0.1–0.9)
MONOCYTES NFR BLD AUTO: 17 %
MORPHOLOGY BLD-IMP: ABNORMAL
NEUTROPHILS # BLD AUTO: 4 X10E3/UL (ref 1.4–7)
NEUTROPHILS NFR BLD AUTO: 70 %
PDF IMAGE, 910387: NORMAL
PLATELET # BLD AUTO: 69 X10E3/UL (ref 150–450)
POTASSIUM SERPL-SCNC: 3.2 MMOL/L (ref 3.5–5.2)
PROT SERPL-MCNC: 6.6 G/DL (ref 6–8.5)
RBC # BLD AUTO: 3.4 X10E6/UL (ref 4.14–5.8)
SODIUM SERPL-SCNC: 139 MMOL/L (ref 134–144)
TRIGL SERPL-MCNC: 131 MG/DL (ref 0–149)
VLDLC SERPL CALC-MCNC: 26 MG/DL (ref 5–40)
WBC # BLD AUTO: 5.8 X10E3/UL (ref 3.4–10.8)

## 2020-03-06 RX ORDER — POTASSIUM CHLORIDE 750 MG/1
10 TABLET, EXTENDED RELEASE ORAL DAILY
Qty: 90 TAB | Refills: 3 | Status: SHIPPED | OUTPATIENT
Start: 2020-03-06 | End: 2021-03-02 | Stop reason: SDUPTHER

## 2020-03-06 NOTE — PROGRESS NOTES
Sent in letter:  Blood counts are about the same. I will forward results to your oncologist. Kidney test is the same. One liver test is so slightly elevated it is not significant. Cholesterol and sugar were perfect! Vitamin D is just slightly low. Please continue either a multivitamin daily or a vitamin D pill of 1000 units daily. Your potassium was a little low. Many people who take lasix (furosemide) need potassium pills with it. I will send some in to take with your furosemide. Overall, this is good news!

## 2020-05-15 ENCOUNTER — HOSPITAL ENCOUNTER (OUTPATIENT)
Dept: GENERAL RADIOLOGY | Age: 85
Discharge: HOME | End: 2020-05-15
Payer: MEDICARE

## 2020-05-15 ENCOUNTER — HOSPITAL ENCOUNTER (OUTPATIENT)
Dept: PREADMISSION TESTING | Age: 85
Discharge: HOME | End: 2020-05-15
Payer: MEDICARE

## 2020-05-15 VITALS
TEMPERATURE: 98.9 F | HEART RATE: 64 BPM | WEIGHT: 174 LBS | SYSTOLIC BLOOD PRESSURE: 171 MMHG | HEIGHT: 73 IN | BODY MASS INDEX: 23.06 KG/M2 | DIASTOLIC BLOOD PRESSURE: 73 MMHG

## 2020-05-15 LAB
ABO + RH BLD: NORMAL
ALBUMIN SERPL-MCNC: 3.6 G/DL (ref 3.5–5)
ALBUMIN/GLOB SERPL: 1.1 {RATIO} (ref 1.1–2.2)
ALP SERPL-CCNC: 95 U/L (ref 45–117)
ALT SERPL-CCNC: 37 U/L (ref 12–78)
ANION GAP SERPL CALC-SCNC: 4 MMOL/L (ref 5–15)
APTT PPP: 26.2 SEC (ref 22.1–32)
AST SERPL-CCNC: 42 U/L (ref 15–37)
ATRIAL RATE: 256 BPM
BASOPHILS # BLD: 0 K/UL (ref 0–0.1)
BASOPHILS NFR BLD: 1 % (ref 0–1)
BILIRUB SERPL-MCNC: 1 MG/DL (ref 0.2–1)
BLOOD GROUP ANTIBODIES SERPL: NORMAL
BUN SERPL-MCNC: 25 MG/DL (ref 6–20)
BUN/CREAT SERPL: 22 (ref 12–20)
CALCIUM SERPL-MCNC: 9 MG/DL (ref 8.5–10.1)
CALCULATED P AXIS, ECG09: 114 DEGREES
CALCULATED R AXIS, ECG10: 19 DEGREES
CALCULATED T AXIS, ECG11: 76 DEGREES
CHLORIDE SERPL-SCNC: 106 MMOL/L (ref 97–108)
CO2 SERPL-SCNC: 28 MMOL/L (ref 21–32)
CREAT SERPL-MCNC: 1.16 MG/DL (ref 0.7–1.3)
DIAGNOSIS, 93000: NORMAL
DIFFERENTIAL METHOD BLD: ABNORMAL
EOSINOPHIL # BLD: 0.4 K/UL (ref 0–0.4)
EOSINOPHIL NFR BLD: 9 % (ref 0–7)
ERYTHROCYTE [DISTWIDTH] IN BLOOD BY AUTOMATED COUNT: 15.4 % (ref 11.5–14.5)
GLOBULIN SER CALC-MCNC: 3.4 G/DL (ref 2–4)
GLUCOSE SERPL-MCNC: 121 MG/DL (ref 65–100)
HCT VFR BLD AUTO: 39.5 % (ref 36.6–50.3)
HGB BLD-MCNC: 12.7 G/DL (ref 12.1–17)
IMM GRANULOCYTES # BLD AUTO: 0 K/UL (ref 0–0.04)
IMM GRANULOCYTES NFR BLD AUTO: 1 % (ref 0–0.5)
INR PPP: 1.1 (ref 0.9–1.1)
LYMPHOCYTES # BLD: 0.7 K/UL (ref 0.8–3.5)
LYMPHOCYTES NFR BLD: 15 % (ref 12–49)
MCH RBC QN AUTO: 34.7 PG (ref 26–34)
MCHC RBC AUTO-ENTMCNC: 32.2 G/DL (ref 30–36.5)
MCV RBC AUTO: 107.9 FL (ref 80–99)
MONOCYTES # BLD: 0.5 K/UL (ref 0–1)
MONOCYTES NFR BLD: 11 % (ref 5–13)
NEUTS SEG # BLD: 2.8 K/UL (ref 1.8–8)
NEUTS SEG NFR BLD: 63 % (ref 32–75)
NRBC # BLD: 0 K/UL (ref 0–0.01)
NRBC BLD-RTO: 0 PER 100 WBC
PLATELET # BLD AUTO: 100 K/UL (ref 150–400)
PMV BLD AUTO: 10.8 FL (ref 8.9–12.9)
POTASSIUM SERPL-SCNC: 4 MMOL/L (ref 3.5–5.1)
PROT SERPL-MCNC: 7 G/DL (ref 6.4–8.2)
PROTHROMBIN TIME: 11 SEC (ref 9–11.1)
Q-T INTERVAL, ECG07: 430 MS
QRS DURATION, ECG06: 92 MS
QTC CALCULATION (BEZET), ECG08: 443 MS
RBC # BLD AUTO: 3.66 M/UL (ref 4.1–5.7)
RBC MORPH BLD: ABNORMAL
RBC MORPH BLD: ABNORMAL
SODIUM SERPL-SCNC: 138 MMOL/L (ref 136–145)
SPECIMEN EXP DATE BLD: NORMAL
THERAPEUTIC RANGE,PTTT: NORMAL SECS (ref 58–77)
VENTRICULAR RATE, ECG03: 64 BPM
WBC # BLD AUTO: 4.4 K/UL (ref 4.1–11.1)

## 2020-05-15 PROCEDURE — 86900 BLOOD TYPING SEROLOGIC ABO: CPT

## 2020-05-15 PROCEDURE — 80053 COMPREHEN METABOLIC PANEL: CPT

## 2020-05-15 PROCEDURE — 71046 X-RAY EXAM CHEST 2 VIEWS: CPT

## 2020-05-15 PROCEDURE — 85025 COMPLETE CBC W/AUTO DIFF WBC: CPT

## 2020-05-15 PROCEDURE — 93005 ELECTROCARDIOGRAM TRACING: CPT

## 2020-05-15 PROCEDURE — 85610 PROTHROMBIN TIME: CPT

## 2020-05-15 PROCEDURE — 36415 COLL VENOUS BLD VENIPUNCTURE: CPT

## 2020-05-15 PROCEDURE — 85730 THROMBOPLASTIN TIME PARTIAL: CPT

## 2020-05-15 RX ORDER — ASPIRIN 81 MG/1
81 TABLET ORAL
COMMUNITY

## 2020-05-15 NOTE — PERIOP NOTES
PREOPERATIVE INSTRUCTIONS REVIEWED WITH PATIENT. PATIENT GIVEN TWO-- BOTTLES OF CHG SOAPS  INSTRUCTIONS REVIEWED ON USE OF CHG SOAPS   PATIENT GIVEN SSI INFECTIONS SHEET. PATIENT WAS GIVEN THE OPPORTUNITY TO ASK QUESTIONS ON THE INFORMATION PROVIDED. PT  MADE AWARE OF COVID 19 TESTING NEEDED TO BE DONE WITHIN 96 HOURS OF SURGERY. PT INSTRUCTED ON SELF QUARANTINE  BETWEEN TESTING AND ARRIVAL TIME  ON DAY OF SURGERY. INSTRUCTION PROVIDED FOR CELL PHONE WAITING AREA, PT INFORMATION AND INSTRUCTIONS FOR APPOINTMENTS AT [facility] ON DAY OF SURGERY. EKG SHOWS ATRIAL FLUTTER, PT HAS NO C/O .  CALLED [phone]. SPOKE TO DR RUSS`S OFFICE  REGARDING ATRIAL FLUTTER ,SAID ITS OKAY FOR PT TO GO HOME.

## 2020-05-18 NOTE — PERIOP NOTES
FAXED LAB RESULTS AND S/W JENNY AT DR. VALLADARES'S OFFICE TO NOTIFY OF ABNORMAL PLT (100) RESULT. EKG WAS ALSO SENT TO ANESTHESIA FOR REVIEW, PER DR. CUETO [facility] WITH ANESTHESIA, EKG OK. ABNORMAL LABS FORWARDED TO PATIENT'S PCP.

## 2020-05-25 ENCOUNTER — HOSPITAL ENCOUNTER (OUTPATIENT)
Dept: PREADMISSION TESTING | Age: 85
Discharge: HOME | End: 2020-05-25
Attending: PHYSICIAN ASSISTANT
Payer: MEDICARE

## 2020-05-25 DIAGNOSIS — U07.1 COVID-19: ICD-10-CM

## 2020-05-25 PROCEDURE — 87635 SARS-COV-2 COVID-19 AMP PRB: CPT

## 2020-05-26 LAB — SARS-COV-2, COV2NT: NOT DETECTED

## 2020-05-29 ENCOUNTER — ANESTHESIA EVENT (OUTPATIENT)
Dept: SURGERY | Age: 85
DRG: 038 | End: 2020-05-29
Payer: MEDICARE

## 2020-05-29 ENCOUNTER — HOSPITAL ENCOUNTER (INPATIENT)
Age: 85
LOS: 1 days | Discharge: HOME | DRG: 038 | End: 2020-05-30
Payer: MEDICARE

## 2020-05-29 ENCOUNTER — ANESTHESIA (OUTPATIENT)
Dept: SURGERY | Age: 85
DRG: 038 | End: 2020-05-29
Payer: MEDICARE

## 2020-05-29 DIAGNOSIS — I65.21 STENOSIS OF RIGHT CAROTID ARTERY: Primary | ICD-10-CM

## 2020-05-29 LAB
GLUCOSE BLD STRIP.AUTO-MCNC: 102 MG/DL (ref 65–100)
GLUCOSE BLD STRIP.AUTO-MCNC: 105 MG/DL (ref 65–100)
GLUCOSE BLD STRIP.AUTO-MCNC: 155 MG/DL (ref 65–100)
SERVICE CMNT-IMP: ABNORMAL

## 2020-05-29 PROCEDURE — 77030002916 HC SUT ETHLN J&J -A

## 2020-05-29 PROCEDURE — 74011250636 HC RX REV CODE- 250/636

## 2020-05-29 PROCEDURE — 76010000161 HC OR TIME 1 TO 1.5 HR INTENSV-TIER 1

## 2020-05-29 PROCEDURE — 74011250637 HC RX REV CODE- 250/637: Performed by: ANESTHESIOLOGY

## 2020-05-29 PROCEDURE — 77030020256 HC SOL INJ NACL 0.9%  500ML

## 2020-05-29 PROCEDURE — 65610000003 HC RM ICU SURGICAL

## 2020-05-29 PROCEDURE — 77030013079 HC BLNKT BAIR HGGR 3M -A: Performed by: NURSE ANESTHETIST, CERTIFIED REGISTERED

## 2020-05-29 PROCEDURE — C1768 GRAFT, VASCULAR: HCPCS

## 2020-05-29 PROCEDURE — 77030013797 HC KT TRNSDUC PRSSR EDWD -A

## 2020-05-29 PROCEDURE — 82962 GLUCOSE BLOOD TEST: CPT

## 2020-05-29 PROCEDURE — 77030026438 HC STYL ET INTUB CARD -A: Performed by: NURSE ANESTHETIST, CERTIFIED REGISTERED

## 2020-05-29 PROCEDURE — 77030008684 HC TU ET CUF COVD -B: Performed by: NURSE ANESTHETIST, CERTIFIED REGISTERED

## 2020-05-29 PROCEDURE — 76060000034 HC ANESTHESIA 1.5 TO 2 HR

## 2020-05-29 PROCEDURE — 74011000250 HC RX REV CODE- 250

## 2020-05-29 PROCEDURE — 74011250636 HC RX REV CODE- 250/636: Performed by: ANESTHESIOLOGY

## 2020-05-29 PROCEDURE — 77030031139 HC SUT VCRL2 J&J -A

## 2020-05-29 PROCEDURE — 03UK0JZ SUPPLEMENT RIGHT INTERNAL CAROTID ARTERY WITH SYNTHETIC SUBSTITUTE, OPEN APPROACH: ICD-10-PCS

## 2020-05-29 PROCEDURE — 77030040506 HC DRN WND MDII -A

## 2020-05-29 PROCEDURE — 76210000017 HC OR PH I REC 1.5 TO 2 HR

## 2020-05-29 PROCEDURE — 03CK0ZZ EXTIRPATION OF MATTER FROM RIGHT INTERNAL CAROTID ARTERY, OPEN APPROACH: ICD-10-PCS

## 2020-05-29 PROCEDURE — 77030040922 HC BLNKT HYPOTHRM STRY -A

## 2020-05-29 PROCEDURE — 74011000250 HC RX REV CODE- 250: Performed by: NURSE ANESTHETIST, CERTIFIED REGISTERED

## 2020-05-29 PROCEDURE — 77030002987 HC SUT PROL J&J -B

## 2020-05-29 PROCEDURE — 77030011640 HC PAD GRND REM COVD -A

## 2020-05-29 PROCEDURE — 77030018846 HC SOL IRR STRL H20 ICUM -A

## 2020-05-29 PROCEDURE — 74011250636 HC RX REV CODE- 250/636: Performed by: NURSE ANESTHETIST, CERTIFIED REGISTERED

## 2020-05-29 PROCEDURE — 77030013967 HC SHNT CAROTID BARD -B

## 2020-05-29 PROCEDURE — 77030005402 HC CATH RAD ART LN KT TELE -B

## 2020-05-29 DEVICE — HEMASHIELD PLATINUM FINESSE ULTRA-THIN KNITTED CARDIOVASCULAR PATCH
Type: IMPLANTABLE DEVICE | Site: NECK | Status: FUNCTIONAL
Brand: HEMASHIELD

## 2020-05-29 RX ORDER — FUROSEMIDE 40 MG/1
40 TABLET ORAL DAILY
Status: DISCONTINUED | OUTPATIENT
Start: 2020-05-30 | End: 2020-05-30 | Stop reason: HOSPADM

## 2020-05-29 RX ORDER — ONDANSETRON 2 MG/ML
4 INJECTION INTRAMUSCULAR; INTRAVENOUS AS NEEDED
Status: DISCONTINUED | OUTPATIENT
Start: 2020-05-29 | End: 2020-05-29 | Stop reason: HOSPADM

## 2020-05-29 RX ORDER — LOPERAMIDE HYDROCHLORIDE 2 MG/1
1 CAPSULE ORAL
Status: DISCONTINUED | OUTPATIENT
Start: 2020-05-29 | End: 2020-05-30 | Stop reason: HOSPADM

## 2020-05-29 RX ORDER — MIDAZOLAM HYDROCHLORIDE 1 MG/ML
INJECTION, SOLUTION INTRAMUSCULAR; INTRAVENOUS AS NEEDED
Status: DISCONTINUED | OUTPATIENT
Start: 2020-05-29 | End: 2020-05-29 | Stop reason: HOSPADM

## 2020-05-29 RX ORDER — MIDAZOLAM HYDROCHLORIDE 1 MG/ML
0.5 INJECTION, SOLUTION INTRAMUSCULAR; INTRAVENOUS
Status: DISCONTINUED | OUTPATIENT
Start: 2020-05-29 | End: 2020-05-29 | Stop reason: HOSPADM

## 2020-05-29 RX ORDER — FENTANYL CITRATE 50 UG/ML
25 INJECTION, SOLUTION INTRAMUSCULAR; INTRAVENOUS
Status: DISCONTINUED | OUTPATIENT
Start: 2020-05-29 | End: 2020-05-29 | Stop reason: HOSPADM

## 2020-05-29 RX ORDER — ONDANSETRON 2 MG/ML
INJECTION INTRAMUSCULAR; INTRAVENOUS AS NEEDED
Status: DISCONTINUED | OUTPATIENT
Start: 2020-05-29 | End: 2020-05-29 | Stop reason: HOSPADM

## 2020-05-29 RX ORDER — SODIUM CHLORIDE, SODIUM LACTATE, POTASSIUM CHLORIDE, CALCIUM CHLORIDE 600; 310; 30; 20 MG/100ML; MG/100ML; MG/100ML; MG/100ML
125 INJECTION, SOLUTION INTRAVENOUS CONTINUOUS
Status: DISCONTINUED | OUTPATIENT
Start: 2020-05-29 | End: 2020-05-29 | Stop reason: HOSPADM

## 2020-05-29 RX ORDER — ACETAMINOPHEN 650 MG/1
650 SUPPOSITORY RECTAL
Status: DISCONTINUED | OUTPATIENT
Start: 2020-05-29 | End: 2020-05-30 | Stop reason: HOSPADM

## 2020-05-29 RX ORDER — EPHEDRINE SULFATE/0.9% NACL/PF 50 MG/5 ML
SYRINGE (ML) INTRAVENOUS AS NEEDED
Status: DISCONTINUED | OUTPATIENT
Start: 2020-05-29 | End: 2020-05-29 | Stop reason: HOSPADM

## 2020-05-29 RX ORDER — PROPOFOL 10 MG/ML
INJECTION, EMULSION INTRAVENOUS AS NEEDED
Status: DISCONTINUED | OUTPATIENT
Start: 2020-05-29 | End: 2020-05-29 | Stop reason: HOSPADM

## 2020-05-29 RX ORDER — SODIUM CHLORIDE 0.9 % (FLUSH) 0.9 %
5-40 SYRINGE (ML) INJECTION EVERY 8 HOURS
Status: DISCONTINUED | OUTPATIENT
Start: 2020-05-29 | End: 2020-05-30 | Stop reason: HOSPADM

## 2020-05-29 RX ORDER — MIDAZOLAM HYDROCHLORIDE 1 MG/ML
1 INJECTION, SOLUTION INTRAMUSCULAR; INTRAVENOUS AS NEEDED
Status: DISCONTINUED | OUTPATIENT
Start: 2020-05-29 | End: 2020-05-29 | Stop reason: HOSPADM

## 2020-05-29 RX ORDER — MORPHINE SULFATE 2 MG/ML
2 INJECTION, SOLUTION INTRAMUSCULAR; INTRAVENOUS
Status: DISCONTINUED | OUTPATIENT
Start: 2020-05-29 | End: 2020-05-30 | Stop reason: HOSPADM

## 2020-05-29 RX ORDER — PHENYLEPHRINE 10 MG/250 ML(40 MCG/ML)IN 0.9 % SOD.CHLORIDE INTRAVENOUS
10-100
Status: DISCONTINUED | OUTPATIENT
Start: 2020-05-29 | End: 2020-05-29 | Stop reason: SDUPTHER

## 2020-05-29 RX ORDER — HYDROMORPHONE HYDROCHLORIDE 1 MG/ML
0.2 INJECTION, SOLUTION INTRAMUSCULAR; INTRAVENOUS; SUBCUTANEOUS
Status: DISCONTINUED | OUTPATIENT
Start: 2020-05-29 | End: 2020-05-29 | Stop reason: HOSPADM

## 2020-05-29 RX ORDER — MAGNESIUM SULFATE 100 %
4 CRYSTALS MISCELLANEOUS AS NEEDED
Status: DISCONTINUED | OUTPATIENT
Start: 2020-05-29 | End: 2020-05-30 | Stop reason: HOSPADM

## 2020-05-29 RX ORDER — DIPHENHYDRAMINE HYDROCHLORIDE 50 MG/ML
12.5 INJECTION, SOLUTION INTRAMUSCULAR; INTRAVENOUS AS NEEDED
Status: DISCONTINUED | OUTPATIENT
Start: 2020-05-29 | End: 2020-05-29 | Stop reason: HOSPADM

## 2020-05-29 RX ORDER — FENTANYL CITRATE 50 UG/ML
50 INJECTION, SOLUTION INTRAMUSCULAR; INTRAVENOUS AS NEEDED
Status: DISCONTINUED | OUTPATIENT
Start: 2020-05-29 | End: 2020-05-29 | Stop reason: HOSPADM

## 2020-05-29 RX ORDER — MORPHINE SULFATE 2 MG/ML
2 INJECTION, SOLUTION INTRAMUSCULAR; INTRAVENOUS
Status: DISCONTINUED | OUTPATIENT
Start: 2020-05-29 | End: 2020-05-29 | Stop reason: HOSPADM

## 2020-05-29 RX ORDER — SODIUM CHLORIDE 9 MG/ML
75 INJECTION, SOLUTION INTRAVENOUS CONTINUOUS
Status: DISCONTINUED | OUTPATIENT
Start: 2020-05-29 | End: 2020-05-30 | Stop reason: HOSPADM

## 2020-05-29 RX ORDER — CEFAZOLIN SODIUM/WATER 2 G/20 ML
2 SYRINGE (ML) INTRAVENOUS
Status: COMPLETED | OUTPATIENT
Start: 2020-05-29 | End: 2020-05-29

## 2020-05-29 RX ORDER — HYDRALAZINE HYDROCHLORIDE 20 MG/ML
INJECTION INTRAMUSCULAR; INTRAVENOUS AS NEEDED
Status: DISCONTINUED | OUTPATIENT
Start: 2020-05-29 | End: 2020-05-29 | Stop reason: HOSPADM

## 2020-05-29 RX ORDER — DEXTROSE 50 % IN WATER (D50W) INTRAVENOUS SYRINGE
12.5-25 AS NEEDED
Status: DISCONTINUED | OUTPATIENT
Start: 2020-05-29 | End: 2020-05-29 | Stop reason: CLARIF

## 2020-05-29 RX ORDER — SODIUM CHLORIDE, SODIUM LACTATE, POTASSIUM CHLORIDE, CALCIUM CHLORIDE 600; 310; 30; 20 MG/100ML; MG/100ML; MG/100ML; MG/100ML
75 INJECTION, SOLUTION INTRAVENOUS CONTINUOUS
Status: DISCONTINUED | OUTPATIENT
Start: 2020-05-29 | End: 2020-05-29 | Stop reason: HOSPADM

## 2020-05-29 RX ORDER — ONDANSETRON 2 MG/ML
4 INJECTION INTRAMUSCULAR; INTRAVENOUS
Status: DISCONTINUED | OUTPATIENT
Start: 2020-05-29 | End: 2020-05-30 | Stop reason: HOSPADM

## 2020-05-29 RX ORDER — SODIUM CHLORIDE, SODIUM LACTATE, POTASSIUM CHLORIDE, CALCIUM CHLORIDE 600; 310; 30; 20 MG/100ML; MG/100ML; MG/100ML; MG/100ML
INJECTION, SOLUTION INTRAVENOUS
Status: DISCONTINUED | OUTPATIENT
Start: 2020-05-29 | End: 2020-05-29 | Stop reason: HOSPADM

## 2020-05-29 RX ORDER — HEPARIN SODIUM 1000 [USP'U]/ML
INJECTION, SOLUTION INTRAVENOUS; SUBCUTANEOUS AS NEEDED
Status: DISCONTINUED | OUTPATIENT
Start: 2020-05-29 | End: 2020-05-29 | Stop reason: HOSPADM

## 2020-05-29 RX ORDER — KETAMINE HYDROCHLORIDE 10 MG/ML
INJECTION, SOLUTION INTRAMUSCULAR; INTRAVENOUS AS NEEDED
Status: DISCONTINUED | OUTPATIENT
Start: 2020-05-29 | End: 2020-05-29 | Stop reason: HOSPADM

## 2020-05-29 RX ORDER — HYDROCODONE BITARTRATE AND ACETAMINOPHEN 5; 325 MG/1; MG/1
1 TABLET ORAL
Status: DISCONTINUED | OUTPATIENT
Start: 2020-05-29 | End: 2020-05-30 | Stop reason: HOSPADM

## 2020-05-29 RX ORDER — ROCURONIUM BROMIDE 10 MG/ML
INJECTION, SOLUTION INTRAVENOUS AS NEEDED
Status: DISCONTINUED | OUTPATIENT
Start: 2020-05-29 | End: 2020-05-29 | Stop reason: HOSPADM

## 2020-05-29 RX ORDER — ASPIRIN 81 MG/1
81 TABLET ORAL DAILY
Status: DISCONTINUED | OUTPATIENT
Start: 2020-05-30 | End: 2020-05-30 | Stop reason: HOSPADM

## 2020-05-29 RX ORDER — PROTAMINE SULFATE 10 MG/ML
INJECTION, SOLUTION INTRAVENOUS AS NEEDED
Status: DISCONTINUED | OUTPATIENT
Start: 2020-05-29 | End: 2020-05-29 | Stop reason: HOSPADM

## 2020-05-29 RX ORDER — ACETAMINOPHEN 325 MG/1
650 TABLET ORAL
Status: DISCONTINUED | OUTPATIENT
Start: 2020-05-29 | End: 2020-05-30 | Stop reason: HOSPADM

## 2020-05-29 RX ORDER — SODIUM CHLORIDE 0.9 % (FLUSH) 0.9 %
5-40 SYRINGE (ML) INJECTION AS NEEDED
Status: DISCONTINUED | OUTPATIENT
Start: 2020-05-29 | End: 2020-05-29 | Stop reason: HOSPADM

## 2020-05-29 RX ORDER — ENOXAPARIN SODIUM 100 MG/ML
40 INJECTION SUBCUTANEOUS EVERY 24 HOURS
Status: DISCONTINUED | OUTPATIENT
Start: 2020-05-29 | End: 2020-05-30 | Stop reason: HOSPADM

## 2020-05-29 RX ORDER — SODIUM CHLORIDE 9 MG/ML
25 INJECTION, SOLUTION INTRAVENOUS CONTINUOUS
Status: DISCONTINUED | OUTPATIENT
Start: 2020-05-29 | End: 2020-05-29 | Stop reason: HOSPADM

## 2020-05-29 RX ORDER — LIDOCAINE HYDROCHLORIDE 10 MG/ML
0.1 INJECTION, SOLUTION EPIDURAL; INFILTRATION; INTRACAUDAL; PERINEURAL AS NEEDED
Status: DISCONTINUED | OUTPATIENT
Start: 2020-05-29 | End: 2020-05-29 | Stop reason: HOSPADM

## 2020-05-29 RX ORDER — INSULIN LISPRO 100 [IU]/ML
INJECTION, SOLUTION INTRAVENOUS; SUBCUTANEOUS
Status: DISCONTINUED | OUTPATIENT
Start: 2020-05-29 | End: 2020-05-30 | Stop reason: HOSPADM

## 2020-05-29 RX ORDER — DEXAMETHASONE SODIUM PHOSPHATE 4 MG/ML
INJECTION, SOLUTION INTRA-ARTICULAR; INTRALESIONAL; INTRAMUSCULAR; INTRAVENOUS; SOFT TISSUE AS NEEDED
Status: DISCONTINUED | OUTPATIENT
Start: 2020-05-29 | End: 2020-05-29 | Stop reason: HOSPADM

## 2020-05-29 RX ORDER — SODIUM CHLORIDE 0.9 % (FLUSH) 0.9 %
5-40 SYRINGE (ML) INJECTION EVERY 8 HOURS
Status: DISCONTINUED | OUTPATIENT
Start: 2020-05-29 | End: 2020-05-29 | Stop reason: HOSPADM

## 2020-05-29 RX ORDER — ACETAMINOPHEN 325 MG/1
650 TABLET ORAL ONCE
Status: COMPLETED | OUTPATIENT
Start: 2020-05-29 | End: 2020-05-29

## 2020-05-29 RX ORDER — BUPIVACAINE HYDROCHLORIDE AND EPINEPHRINE 5; 5 MG/ML; UG/ML
INJECTION, SOLUTION EPIDURAL; INTRACAUDAL; PERINEURAL AS NEEDED
Status: DISCONTINUED | OUTPATIENT
Start: 2020-05-29 | End: 2020-05-29 | Stop reason: HOSPADM

## 2020-05-29 RX ORDER — FENTANYL CITRATE 50 UG/ML
INJECTION, SOLUTION INTRAMUSCULAR; INTRAVENOUS AS NEEDED
Status: DISCONTINUED | OUTPATIENT
Start: 2020-05-29 | End: 2020-05-29 | Stop reason: HOSPADM

## 2020-05-29 RX ORDER — POTASSIUM CHLORIDE 750 MG/1
10 TABLET, FILM COATED, EXTENDED RELEASE ORAL DAILY
Status: DISCONTINUED | OUTPATIENT
Start: 2020-05-30 | End: 2020-05-30 | Stop reason: HOSPADM

## 2020-05-29 RX ORDER — SODIUM CHLORIDE 0.9 % (FLUSH) 0.9 %
5-40 SYRINGE (ML) INJECTION AS NEEDED
Status: DISCONTINUED | OUTPATIENT
Start: 2020-05-29 | End: 2020-05-30 | Stop reason: HOSPADM

## 2020-05-29 RX ORDER — ROPIVACAINE HYDROCHLORIDE 5 MG/ML
30 INJECTION, SOLUTION EPIDURAL; INFILTRATION; PERINEURAL ONCE
Status: DISCONTINUED | OUTPATIENT
Start: 2020-05-29 | End: 2020-05-29 | Stop reason: HOSPADM

## 2020-05-29 RX ORDER — SUCCINYLCHOLINE CHLORIDE 20 MG/ML
INJECTION INTRAMUSCULAR; INTRAVENOUS AS NEEDED
Status: DISCONTINUED | OUTPATIENT
Start: 2020-05-29 | End: 2020-05-29 | Stop reason: HOSPADM

## 2020-05-29 RX ORDER — LIDOCAINE HYDROCHLORIDE 20 MG/ML
INJECTION, SOLUTION EPIDURAL; INFILTRATION; INTRACAUDAL; PERINEURAL AS NEEDED
Status: DISCONTINUED | OUTPATIENT
Start: 2020-05-29 | End: 2020-05-29 | Stop reason: HOSPADM

## 2020-05-29 RX ADMIN — HEPARIN SODIUM 5000 UNITS: 1000 INJECTION, SOLUTION INTRAVENOUS; SUBCUTANEOUS at 13:26

## 2020-05-29 RX ADMIN — SUCCINYLCHOLINE CHLORIDE 140 MG: 20 INJECTION, SOLUTION INTRAMUSCULAR; INTRAVENOUS at 13:06

## 2020-05-29 RX ADMIN — LIDOCAINE HYDROCHLORIDE 60 MG: 20 INJECTION, SOLUTION EPIDURAL; INFILTRATION; INTRACAUDAL; PERINEURAL at 13:06

## 2020-05-29 RX ADMIN — ACETAMINOPHEN 650 MG: 325 TABLET, FILM COATED ORAL at 12:13

## 2020-05-29 RX ADMIN — FENTANYL CITRATE 50 MCG: 50 INJECTION, SOLUTION INTRAMUSCULAR; INTRAVENOUS at 13:17

## 2020-05-29 RX ADMIN — PROTAMINE SULFATE 50 MG: 10 INJECTION, SOLUTION INTRAVENOUS at 13:55

## 2020-05-29 RX ADMIN — DEXAMETHASONE SODIUM PHOSPHATE 4 MG: 4 INJECTION, SOLUTION INTRAMUSCULAR; INTRAVENOUS at 13:17

## 2020-05-29 RX ADMIN — ROCURONIUM BROMIDE 5 MG: 10 SOLUTION INTRAVENOUS at 13:06

## 2020-05-29 RX ADMIN — Medication 10 MG: at 13:29

## 2020-05-29 RX ADMIN — SODIUM CHLORIDE, POTASSIUM CHLORIDE, SODIUM LACTATE AND CALCIUM CHLORIDE: 600; 310; 30; 20 INJECTION, SOLUTION INTRAVENOUS at 12:58

## 2020-05-29 RX ADMIN — MIDAZOLAM 1 MG: 1 INJECTION INTRAMUSCULAR; INTRAVENOUS at 12:53

## 2020-05-29 RX ADMIN — KETAMINE HYDROCHLORIDE 20 MG: 10 INJECTION, SOLUTION INTRAMUSCULAR; INTRAVENOUS at 13:12

## 2020-05-29 RX ADMIN — SODIUM CHLORIDE 75 ML/HR: 900 INJECTION, SOLUTION INTRAVENOUS at 15:45

## 2020-05-29 RX ADMIN — ONDANSETRON HYDROCHLORIDE 4 MG: 2 INJECTION, SOLUTION INTRAMUSCULAR; INTRAVENOUS at 13:17

## 2020-05-29 RX ADMIN — SODIUM CHLORIDE, SODIUM LACTATE, POTASSIUM CHLORIDE, AND CALCIUM CHLORIDE 125 ML/HR: 600; 310; 30; 20 INJECTION, SOLUTION INTRAVENOUS at 12:13

## 2020-05-29 RX ADMIN — Medication 2 G: at 13:13

## 2020-05-29 RX ADMIN — PROPOFOL 150 MG: 10 INJECTION, EMULSION INTRAVENOUS at 13:06

## 2020-05-29 RX ADMIN — HYDRALAZINE HYDROCHLORIDE 4 MG: 20 INJECTION INTRAMUSCULAR; INTRAVENOUS at 13:51

## 2020-05-29 RX ADMIN — ROCURONIUM BROMIDE 20 MG: 10 SOLUTION INTRAVENOUS at 13:17

## 2020-05-29 RX ADMIN — ENOXAPARIN SODIUM 40 MG: 40 INJECTION SUBCUTANEOUS at 21:36

## 2020-05-29 RX ADMIN — PHENYLEPHRINE HYDROCHLORIDE 30 MCG/MIN: 10 INJECTION INTRAVENOUS at 21:13

## 2020-05-29 RX ADMIN — PHENYLEPHRINE HYDROCHLORIDE 40 MCG/MIN: 10 INJECTION INTRAVENOUS at 13:56

## 2020-05-29 NOTE — ANESTHESIA POSTPROCEDURE EVALUATION
Procedure(s):  RIGHT CAROTID ENDARTERECTOMY. general    Anesthesia Post Evaluation        Patient location during evaluation: PACU  Patient participation: complete - patient participated  Level of consciousness: awake  Pain management: adequate  Airway patency: patent  Anesthetic complications: no  Cardiovascular status: acceptable  Respiratory status: acceptable  Hydration status: acceptable  Post anesthesia nausea and vomiting:  none  Final Post Anesthesia Temperature Assessment:  Normothermia (36.0-37.5 degrees C)      INITIAL Post-op Vital signs:   Vitals Value Taken Time   /33 5/29/2020  4:05 PM   Temp 36.6 °C (97.9 °F) 5/29/2020  3:30 PM   Pulse 50 5/29/2020  4:04 PM   Resp 22 5/29/2020  4:04 PM   SpO2 98 % 5/29/2020  4:04 PM   Vitals shown include unvalidated device data.

## 2020-05-29 NOTE — PERIOP NOTES
Family called and informed of patient's progress spoke to Frantz. 1340: Right carotid plaque removed. Surgeon opted not to send specimen for pathology.

## 2020-05-29 NOTE — PERIOP NOTES
TRANSFER - OUT REPORT:    Verbal report given to Andrea(name) on Wayne Villanueva  being transferred to CV[facility] 31(unit) for routine post - op       Report consisted of patients Situation, Background, Assessment and   Recommendations(SBAR). Time Pre op antibiotic given:1313  Anesthesia Stop time: 1430    Information from the following report(s) SBAR, OR Summary, Intake/Output, MAR, Recent Results and Cardiac Rhythm Afib/flutter was reviewed with the receiving nurse. Opportunity for questions and clarification was provided. Is the patient on 02? NO      Is the patient on a monitor? YES    Is the nurse transporting with the patient? YES    Surgical Waiting Area notified of patient's transfer from PACU? YES      The following personal items collected during your admission accompanied patient upon transfer:   Dental Appliance: Dental Appliances: Partials, Uppers, Lowers, With patient  Vision:    Hearing Aid:    Jewelry: Jewelry: None  Clothing: Clothing: With patient  Other Valuables:  Other Valuables: Eyeglasses, With patient  Valuables sent to safe:

## 2020-05-29 NOTE — PROGRESS NOTES
1620- patient arrived from PACU on monitor and with RN. Patient alert and oriented, able to transfer from stretcher to bed unassisted. 4446- paged Dr. Carol Luna regarding heart rate (38-64 A Flutter). He stated that as long as patient is \"okay\" he is fine with any heart rate. 8085- one assist to [facility], patient did well. 2000- Bedside and Verbal shift change report given to Lou Rodriguez (oncoming nurse) by Олег Roberts RN (offgoing nurse). Report included the following information SBAR, Kardex, Recent Results and Cardiac Rhythm A Flutter.

## 2020-05-29 NOTE — ANESTHESIA PROCEDURE NOTES
Arterial Line Placement    Start time: 5/29/2020 12:26 PM  End time: 5/29/2020 12:31 PM  Performed by: Arcelia Smith MD  Authorized by: Rayne Mcmanus DO     Pre-Procedure  Indications:  Arterial pressure monitoring  Timeout Time: 12:26        Procedure:   Prep:  Chlorhexidine  Seldinger Technique?: Yes    Orientation:  Left  Location:  Radial artery  Catheter size:  20 G  Number of attempts:  1  Cont Cardiac Output Sensor: No      Assessment:   Post-procedure:  Sterile dressing applied  Patient Tolerance:  Patient tolerated the procedure well with no immediate complications

## 2020-05-29 NOTE — PERIOP NOTES
VS stable. Awake and alert. Resting comfortably. Patient denies nausea and pain. No signs of excessive bleeding. Tolerating ice chips without difficulty. Ready to transfer from PACU.

## 2020-05-29 NOTE — ANESTHESIA PREPROCEDURE EVALUATION
Relevant Problems   No relevant active problems       Anesthetic History   No history of anesthetic complications            Review of Systems / Medical History  Patient summary reviewed, nursing notes reviewed and pertinent labs reviewed    Pulmonary  Within defined limits                 Neuro/Psych   Within defined limits           Cardiovascular    Hypertension: well controlled        Dysrhythmias : atrial flutter           GI/Hepatic/Renal     GERD: well controlled           Endo/Other    Diabetes: well controlled, type 2    Arthritis     Other Findings              Physical Exam    Airway  Mallampati: I  TM Distance: > 6 cm  Neck ROM: normal range of motion   Mouth opening: Normal     Cardiovascular  Regular rate and rhythm,  S1 and S2 normal,  no murmur, click, rub, or gallop             Dental  No notable dental hx       Pulmonary  Breath sounds clear to auscultation               Abdominal  GI exam deferred       Other Findings            Anesthetic Plan    ASA: 3  Anesthesia type: general    Monitoring Plan: Arterial line      Induction: Intravenous  Anesthetic plan and risks discussed with: Patient

## 2020-05-29 NOTE — BRIEF OP NOTE
Brief Postoperative Note    Patient: Yadira Males  YOB: 1934  MRN: [id number]    Date of Procedure: 5/29/2020     Pre-Op Diagnosis: RIGHT CAROTID STENOSIS    Post-Op Diagnosis: Same as preoperative diagnosis. Procedure(s):  RIGHT CAROTID ENDARTERECTOMY    Surgeon(s):  Lissy De Jesus MD    Surgical Assistant: None    Anesthesia: General     Estimated Blood Loss (mL): Minimal    Complications: None    Specimens: * No specimens in log *     Implants:   Implant Name Type Inv. Item Serial No.  Lot No. LRB No. Used Action   PATCH VASC PLAT FINESSE 8X76MM --  - [id number]  PATCH VASC PLAT FINESSE 8X76MM --  [id number] [employer]ARDIOVAS[employer]R [id number] Right 1 Implanted       Drains:   Gino-Silveira Drain 05/29/20 Right; [place]ck (Active)       Findings: none    Electronically Signed by Izabella Bennett MD on 5/29/2020 at 2:12 PM

## 2020-05-30 VITALS
HEIGHT: 73 IN | TEMPERATURE: 98.1 F | HEART RATE: 45 BPM | SYSTOLIC BLOOD PRESSURE: 123 MMHG | WEIGHT: 171.96 LBS | RESPIRATION RATE: 15 BRPM | BODY MASS INDEX: 22.79 KG/M2 | DIASTOLIC BLOOD PRESSURE: 48 MMHG | OXYGEN SATURATION: 96 %

## 2020-05-30 LAB
GLUCOSE BLD STRIP.AUTO-MCNC: 92 MG/DL (ref 65–100)
SERVICE CMNT-IMP: NORMAL

## 2020-05-30 PROCEDURE — 82962 GLUCOSE BLOOD TEST: CPT

## 2020-05-30 PROCEDURE — 74011250637 HC RX REV CODE- 250/637

## 2020-05-30 PROCEDURE — 74011250636 HC RX REV CODE- 250/636

## 2020-05-30 RX ORDER — HYDROCODONE BITARTRATE AND ACETAMINOPHEN 5; 325 MG/1; MG/1
1 TABLET ORAL
Qty: 30 TAB | Refills: 0 | Status: SHIPPED | OUTPATIENT
Start: 2020-05-30 | End: 2020-06-02

## 2020-05-30 RX ORDER — BACITRACIN 500 UNIT/G
PACKET (EA) TOPICAL
Status: DISCONTINUED
Start: 2020-05-30 | End: 2020-05-30 | Stop reason: HOSPADM

## 2020-05-30 RX ADMIN — LOPERAMIDE HYDROCHLORIDE 2 MG: 2 CAPSULE ORAL at 09:06

## 2020-05-30 RX ADMIN — LOPERAMIDE HYDROCHLORIDE 2 MG: 2 CAPSULE ORAL at 10:42

## 2020-05-30 RX ADMIN — ASPIRIN 81 MG: 81 TABLET, COATED ORAL at 09:06

## 2020-05-30 RX ADMIN — SODIUM CHLORIDE 75 ML/HR: 900 INJECTION, SOLUTION INTRAVENOUS at 04:37

## 2020-05-30 RX ADMIN — POTASSIUM CHLORIDE 10 MEQ: 750 TABLET, FILM COATED, EXTENDED RELEASE ORAL at 09:06

## 2020-05-30 NOTE — DISCHARGE INSTRUCTIONS
Patient Discharge Instructions    Zafar West / [id number] : 1934    Admitted 2020 Discharged: 2020     Take Home Medications            · It is important that you take the medication exactly as they are prescribed. · Keep your medication in the bottles provided by the pharmacist and keep a list of the medication names, dosages, and times to be taken in your wallet. · Do not take other medications without consulting your doctor. What to do at Home    Recommended diet: Regular Diet,     Recommended activity: Activity as tolerated,      Follow-up with Dr Boucher Kaylah in 2 weeks  Patient may shower        Information obtained by :  I understand that if any problems occur once I am at home I am to contact my physician. I understand and acknowledge receipt of the instructions indicated above.                                                                                                                                            Physician's or R.N.'s Signature                                                                  Date/Time                                                                                                                                              Patient or Representative Signature                                                          Date/Time

## 2020-05-30 NOTE — PROGRESS NOTES
0800- bedside report and drips verified. Patient in chair without complaints. 0802- nima stopped; /68 (85). 56- Dr. Hollie Funez at bedside for update. He removed right JPD and will discharge patient after lunch. 1220- went over discharge instructions with daughter Deisi Tinsley (over the phone) and with patient in detail. Provided education on medications, activity level, diet, and follow-up visit. No questions from Deisi Alvina or patient. She stated she will be here between [phone] to take patient home. She will call when she is five minutes away and meet at the main entrance. [phone]- patient dressed by PCT; awaiting the daughter now. 12- daughter Deisi Tinsley called and stated she is pulling in now. PCT assisted patient into wheelchair. Personal belongings including glassess, blue bag, and discharge instructions (with prescription) with patient. Patient wheeled to main entrance and assisted into car.

## 2020-05-30 NOTE — PROGRESS NOTES
Bedside and Verbal shift change report given to Jersey Nash (oncoming nurse) by Marylen Foy, RN (offgoing nurse). Report included the following information SBAR, Kardex, Recent Results and Cardiac Rhythm A Flutter. Per Marylen Foy RN BP goal is 711-216 systolic. Patient HR has been 31-64 and in atrial flutter. 2113 Yaniv 30 mcg/min started for MAP of 49.    2152 Yaniv decreased to 15 mcg/min for Systolic of 686    6073 Patient stood up to urinate with RN. Pressure dropped to  71/29 on arterial line. Patient not symptomatic. Returned to bed. Increased yaniv to 30 mcg/min. Has taken several min for patient to return to BP to recover to over 686 systolic.    7429 Patient on toilet and found to have bloody left arm. Arterial line pulled out. Held pressure for 5 min and cleaned and dressed line.

## 2020-05-30 NOTE — PROGRESS NOTES
Problem: Falls - Risk of  Goal: *Absence of Falls  Description: Document Conrad Smith Fall Risk and appropriate interventions in the flowsheet.   Outcome: Progressing Towards Goal  Note: Fall Risk Interventions:  Mobility Interventions: Communicate number of staff needed for ambulation/transfer         Medication Interventions: Evaluate medications/consider consulting pharmacy    Elimination Interventions: Call light in reach, Urinal in reach, Patient to call for help with toileting needs    History of Falls Interventions: Consult care management for discharge planning, Investigate reason for fall         Problem: Carotid Endarterectomy Pathway: Post-Op Day 1  Goal: Activity/Safety  Outcome: Progressing Towards Goal  Goal: Nutrition/Diet  Outcome: Progressing Towards Goal  Goal: Respiratory  Outcome: Progressing Towards Goal  Goal: Treatments/Interventions/Procedures  Outcome: Progressing Towards Goal  Goal: Psychosocial  Outcome: Progressing Towards Goal  Goal: *Optimal pain control at patient's stated goal  Outcome: Progressing Towards Goal  Goal: *Adequate urinary output (equal to or greater than 30 milliliters/hour)  Outcome: Progressing Towards Goal     Problem: Carotid Endarterectomy Pathway: Post-Op Day 1  Goal: Activity/Safety  Outcome: Progressing Towards Goal  Goal: Nutrition/Diet  Outcome: Progressing Towards Goal  Goal: Respiratory  Outcome: Progressing Towards Goal  Goal: Treatments/Interventions/Procedures  Outcome: Progressing Towards Goal  Goal: Psychosocial  Outcome: Progressing Towards Goal  Goal: *Optimal pain control at patient's stated goal  Outcome: Progressing Towards Goal  Goal: *Adequate urinary output (equal to or greater than 30 milliliters/hour)  Outcome: Progressing Towards Goal

## 2020-05-30 NOTE — OP NOTES
[facility]  OPERATIVE REPORT    Name:  Migdalia Grover  MR#:  [id number]  :  1934  ACCOUNT #:  [de-identified]  DATE OF SERVICE:  2020      PREOPERATIVE DIAGNOSIS:  Right carotid stenosis. POSTOPERATIVE DIAGNOSIS:  Right carotid stenosis. PROCEDURE PERFORMED:  Right carotid endarterectomy. SURGEON:  Roverto Alfaro MD    ASSISTANTRoberta Ludlow. ANESTHESIA:  General.    COMPLICATIONS:  None. SPECIMENS REMOVED:  None. IMPLANTS:  Dacron patch. ESTIMATED BLOOD LOSS:  Minimal.    INDICATIONS:  The patient is an 80-year-old male with an asymptomatic 80%-90% right carotid stenosis. He will undergo prophylactic endarterectomy. PROCEDURE:  The patient's right neck was prepped and draped. A longitudinal incision was made. The incision was carried through the platysma down to the sternocleidomastoid muscle which was freed up along its medial border and reflected laterally. Crossing medial branches of the jugular vein were ligated and divided. The jugular vein was reflected laterally exposing the carotid artery. The common carotid artery, carotid bifurcation, and proximal and mid internal carotid artery were dissected free. The patient was systemically heparinized. Each of the vessels was controlled with vessel loops. The carotid artery was clamped and opened longitudinally from the common into the internal carotid artery. The plaque was localized to the bifurcation and proximal internal carotid artery. A Jim shunt was inserted and flow was restored to the internal carotid circulation. A standard bifurcation endarterectomy was performed. Remaining fibrinous material was removed. A good endpoint was obtained in the mid internal carotid artery. The arteriotomy was then closed with a Dacron patch, which was sewn in place using running 6-0 Prolene. Just prior to completion of the closure, the shunt was removed. Flow was restored in the usual manner.   There was good flow in each of the vessels by Doppler. A 7-mm Gino-Silveira drain was inserted and brought out through a separate exit site. The incision was then closed with Vicryl subcutaneous suture and Vicryl subcuticular skin closure. Dressings were applied and the patient was returned to the recovery room in stable condition.         Chad Rangel MD      [id number]  D:  05/29/2020 14:20  T:  05/29/2020 22:14  JOB #:  [id number]

## 2020-06-01 ENCOUNTER — PATIENT OUTREACH (OUTPATIENT)
Dept: FAMILY MEDICINE CLINIC | Age: 85
End: 2020-06-01

## 2020-06-01 NOTE — Clinical Note
Patient declines JOSE at this time. Incision to right neck is red swollen, per patient. Notified Amee, at the office of Dr. Chelsea Gregory who states she will reach out to patient today.

## 2020-06-01 NOTE — PROGRESS NOTES
Patient was admitted to [facility] on 5-29-20 and discharged on 5-30-20 for:    Carotid stenosis  Right carotid endarterectomy on 5-29-20    Patient was contacted within one business day of discharge. [facility] physician discharge summary was not available at time of outreach to patient. Discharge Challenges to be reviewed by the provider:   - Patient denies pain to right side neck incision, states incision is red and swollen, complains of a productive cough with clear sputum. Denies fever/chills, nausea/vomiting, and states incision is dry and clean without drainage. This CTN spoke with Alex Puente, at the office of Dr. Bella Krishnamurthy. Kevin/vascular surgery, and reported the above. Amee states she will reach out to patient today. - Patient states he is not taking Norco for pain and did not get this medication filled at the pharmacy. Patient denies pain at this time and states he does not need pain medication.   - Patient states he takes Lasx 20mg daily and only takes 40mg per day if needed for SOB. Patient denies SOB and/or CP at this time. States when he becomes SOB, he takes 20mg of Lasix in the morning and 20mg of Lasix in the evening.      Additional needs identified to be addressed with provider:  no    Method of communication with provider : chart routing       Component      Latest Ref Rng & Units 5/30/2020 5/29/2020 5/29/2020 5/29/2020           7:35 AM 10:37 PM  3:30 PM 11:18 AM   GLUCOSE,FAST - POC      65 - 100 mg/dL 92 155 (H) 102 (H) 105 (H)     Component      Latest Ref Rng & Units 5/15/2020           9:31 AM   RBC      4.10 - 5.70 M/uL 3.66 (L)     Component      Latest Ref Rng & Units 5/15/2020           9:31 AM   MCV      80.0 - 99.0 .9 (H)   MCH      26.0 - 34.0 PG 34.7 (H)     Component      Latest Ref Rng & Units 5/15/2020           9:31 AM   RDW      11.5 - 14.5 % 15.4 (H)   PLATELET      992 - 988 K/uL 100 (L)     Component      Latest Ref Rng & Units 5/15/2020           9:31 AM   EOSINOPHILS 0 - 7 % 9 (H)     Component      Latest Ref Rng & Units 5/15/2020           9:31 AM   IMMATURE GRANULOCYTES      0.0 - 0.5 % 1 (H)     Component      Latest Ref Rng & Units 5/15/2020           9:31 AM   ABS. LYMPHOCYTES      0.8 - 3.5 K/UL 0.7 (L)     Component      Latest Ref Rng & Units 5/15/2020           9:31 AM   Anion gap      5 - 15 mmol/L 4 (L)   Glucose      65 - 100 mg/dL 121 (H)   BUN      6 - 20 MG/DL 25 (H)     Component      Latest Ref Rng & Units 5/15/2020           9:31 AM   BUN/Creatinine ratio      12 - 20   22 (H)     Component      Latest Ref Rng & Units 5/15/2020           9:31 AM   GFR est non-AA      >60 ml/min/1.73m2 60 (L)     Component      Latest Ref Rng & Units 5/15/2020           9:31 AM   AST      15 - 37 U/L 42 (H)     Advance Care Planning:   Does patient have an Advance Directive:  reviewed and current     Inpatient Readmission Risk score: 4%  Was this a readmission? no   Patient stated reason for the admission: n/a  Patients top risk factors for readmission: medical condition and patient reports incision is red and swollen. CTN reported to [facility], nurse for Dr. Boogie Nieto. [facility]/vascular surgery and [facility] states she will reach out to patient today. Interventions to address risk factors: Encouraged to keep incision area dry and clean, report any fever/chills, nausea/vomiting immediately to PCP office and Dr. Domingo Pastor. Care Transition Nurse (CTN) contacted the patient by telephone to perform post hospital discharge assessment. Verified name and  with patient as identifiers. Provided introduction to self, and explanation of the CTN role. CTN reviewed discharge instructions, medical action plan and red flags with patient who verbalized understanding. Patient given an opportunity to ask questions and does not have any further questions or concerns at this time. The patient agrees to contact the PCP office for questions related to their healthcare.      Medication reconciliation was performed with patient, who verbalizes understanding of administration of home medications. Advised obtaining a 90-day supply of all daily and as-needed medications. Referral to Pharm D needed: no     Home Health/Outpatient orders at discharge: [address]: n/a  Date of initial visit: [facility] [place] ordered at discharge: None  [facility]: n/a  Durable Medical Equipment received: n/a    Covid Risk Education    Patient has following risk factors of: immunocompromised right carotid endarterectomy on 5-29-20 with incision red and swollen today per patient, . Education provided regarding infection prevention, and signs and symptoms of COVID-19 and when to seek medical attention with patient who verbalized understanding. Discussed exposure protocols and quarantine From CDC: Are you at higher risk for severe illness?  and given an opportunity for questions and concerns. The patient agrees to contact the COVID-19 hotline 575-845-3101 or PCP office for questions related to COVID-19. For more information on steps you can take to protect yourself, see CDC's How to Protect Yourself     Patient/family/caregiver given information for [facility] and agrees to enroll:  no, patient declines. Patient's preferred e-mail: declines  Patient's preferred phone number: declines    Discussed follow-up appointments. If no appointment was previously scheduled, appointment scheduling offered: yes  [facility] follow up appointment(s): No future appointments. Patient declines JOSE appointment at this time. Non-[facility] follow up appointment(s): Dr. Andreina Loomis/cardio on 7-8-20 and Dr. Jeronimo Brown/vascular surgery on 6-12-20. Plan for follow-up call in 10-14 days based on severity of symptoms and risk factors. CTN provided contact information for future needs. Goals Addressed                 This Visit's Progress     Attends follow-up appointments as directed.         6-1-20: Patient declines JOSE appointment at this time. Patient has cardio follow-up appointment scheduled with Dr. Jeannine Kathleen on 7-8-20 and vascular surgery follow-up appointment with Dr. Diallo Velásquez on 6-12-20. Patient states he continues to drive as needed. Aníbal Sue Understands red flags post discharge. 6-1-20: Red flags of incisional pain and inflammation reviewed with patient. Patient reports incision to his right side of neck is dry and clean with no dressing in place at this time. Patient denies incisional pain; however patient states incision is red and swollen, and patient states he has a productive cough with clear sputum. Care Transitions Nurse notified [address], nurse for Dr. Nithya Salazar. [facility], and [address] states she will reach out to patient today. CTN will review red flags again on next phone conversation with patient.  German Morris

## 2020-09-14 ENCOUNTER — HOSPITAL ENCOUNTER (OUTPATIENT)
Dept: PET IMAGING | Age: 85
Discharge: HOME | End: 2020-09-14
Attending: INTERNAL MEDICINE
Payer: MEDICARE

## 2020-09-14 VITALS — HEIGHT: 73 IN | WEIGHT: 174 LBS | BODY MASS INDEX: 23.06 KG/M2

## 2020-09-14 DIAGNOSIS — C90.00 MYELOMA (HCC): ICD-10-CM

## 2020-09-14 PROCEDURE — A9552 F18 FDG: HCPCS

## 2020-09-25 ENCOUNTER — TELEPHONE (OUTPATIENT)
Dept: FAMILY MEDICINE CLINIC | Age: 85
End: 2020-09-25

## 2020-09-25 NOTE — TELEPHONE ENCOUNTER
TC to pt. ID verified. Advised pt that per Dr. Giovanni Acosta he needs an in person evaluation and since we are closed tomorrow, for him to go to a Urgent care. Pt declined that option and states that he is on a new drug from his oncologist and thinks that might be causing the incont. Scheduled pt to see Dr. Giovanni Acosta on Tuesday at 945 am but if things became worse over weekend to please go to ER/urgent care, pt stated he understood.

## 2020-09-25 NOTE — TELEPHONE ENCOUNTER
----- Message from Enoch Nath sent at 9/24/2020  1:28 PM EDT -----  Regarding: /Telephone  Caller's first and last name:self  Reason for call:message  Callback required yes/no and why:yes  Best contact number(s):[phone]  Details to clarify the request:Pt. wanted to leave a message for  stating that he needs medication. He is having problems urinating and is wearing depends. Pt. stated that  gave him some pills before to help and that he needs some more pills.

## 2020-09-25 NOTE — TELEPHONE ENCOUNTER
I don't think he has been on anything for years  I see Makenzie Lindsey in 2013  I am worried about this symptom being new or worse and think he needs in person evaluation  Since we are closed tomorrow he might do best to go to an urgent care

## 2020-09-28 NOTE — PROGRESS NOTES
[facility]  Aditya Pantoja. [facility], [address]  [phone]             Date of visit: 9/29/2020   Subjective:      History obtained from:  the patient.   Paul Ramirez is a 80 y.o. male who presents today for   Chief Complaint   Patient presents with    Urinary Frequency     x 1 month      Can't hold his urine  Started one month ago  Tends to dribble out gradually  Denies pain or urge  Took vesicare in 2013, helped, only took it temporarily    On a new med for recently worsened multiple myeloma blood tests  Recent PET scan was clear  Did a bone marrow biopsy, waiting on results  Anticipates change in chemo pill  Thankfully feels well overall    Saw cardiologist Dr. Servando Dowd for check up on July 8    Prone to back pain, frequent diarrhea (on prn imodium), neuropathy pains, all that is stable    Blood pressure good when checked    Had put him on potassium with his lasix for low potassium     History of a-fib many years ago and was on Xarelto for a while before having a GI bleed; then was back in sinus rhythm  Not feeling any flutters  Sees cardiologist regularly  Is more fatigued, out of breath with going up steps    Flu shot will get on 10/15    Patient Active Problem List    Diagnosis Date Noted    Carotid stenosis, asymptomatic, right 05/29/2020    Hyperlipemia, mixed 09/27/2018    Aortic stenosis, moderate 04/10/2018    Mild concentric left ventricular hypertrophy (LVH) 04/10/2018    Chronic diarrhea 09/29/2017    Microalbuminuria 12/10/2015    Right inguinal hernia 11/02/2015    Left inguinal hernia 09/14/2015    Multiple myeloma ([address] 12/27/2012    Hypercalcemia 04/25/2012    Atrial fibrillation 1/2012 02/22/2012    S/P AAA repair 2/9/2012 02/22/2012    Pre-diabetes 09/07/2010    Sinus tachycardia 09/07/2010    H/O Heavy Alcohol Use 09/07/2010    Vitamin B12 deficiency 09/07/2010    Hyperhomocysteinemia ([address] 09/07/2010    Mild Allergic Rhinitis 09/07/2010    History of skin cancer 09/07/2010    HTN (hypertension) 04/06/2010    H/O Carotid Stenosis 04/06/2010    GERD (gastroesophageal reflux disease) 04/06/2010     Current Outpatient Medications   Medication Sig Dispense Refill    pregabalin (LYRICA) 25 mg capsule TK 1 C PO BID      Folbic 2.5-25-2 mg tablet TAKE 1 TABLET EVERY DAY 90 Tab 3    aspirin delayed-release 81 mg tablet Take  by mouth daily.  potassium chloride (KLOR-CON) 10 mEq tablet Take 1 Tab by mouth daily. With lasix 90 Tab 3    hydroxypropyl methylcellulose (GENTEAL MILD) 0.2 % ophthalmic solution Administer 2 Drops to both eyes as needed.  furosemide (LASIX) 20 mg tablet Take 2 Tabs by mouth daily. (usually just 1 pill daily for heart)      loperamide (IMODIUM A-D) 2 mg tablet as needed. 2 pills first thing in AM, 1 pill twice daily after that (total 4 per day)      ACCU-CHEK SMARTVIEW TEST STRIP strip Patient is to test blood sugar BID. Fill 3 packages 3 Strip 11    REVLIMID 10 mg cap nightly. Indications: 14 DAYS ON AND 14 DAYS OFF  0    prochlorperazine (COMPAZINE) 10 mg tablet TK 1 T PO Q 4 HOURS PRN FOR NAUSEA  2    acyclovir (ZOVIRAX) 400 mg tablet TK 1 T PO QD  6    Lancets (ACCU-CHEK FASTCLIX) misc Patient is to test BID 3 Package 11    Blood-Glucose Meter (ACCU-CHEK CORA) misc 1 Package by Does Not Apply route two (2) times a day. 1 Each 11    multivitamin (ONE A DAY) tablet Take 1 Tab by mouth daily.        Allergies   Allergen Reactions    Codeine Nausea and Vomiting     Blurred vision, felt faint    Contrast Agent [Iodine] Hives     Needs premedication w/ Benadryl prn (since 1/2012 reaction)    Fish Oil Nausea Only    Glipizide Other (comments)     DRASTIC DROP IN BLOOD GLUCOSE, fasting    Metformin Diarrhea    Niacin Other (comments)     Flushing    Norvasc [Amlodipine] Other (comments)     Leg edema    Optiray 350 [Ioversol] Hives     He does not know what this is     Past Medical History: Diagnosis Date    AAA (abdominal aortic aneurysm) ([facility] 1/6/2012    Abnormal serum protein electrophoresis 4/25/2012    Anemia 4/6/2010    Anemia 1/5/2012    Anemia due to GI bleed 2007 4/6/2010    Aneurysm ([facility]     AAA    Arrhythmia     ATRIAL FIB PT STATES HE DOESN'T HAVE    Arthritis     Atrial fibrillation 1/2012 2/22/2012    Bilateral Carotid Bruits, L>R 9/7/2010    Bone cancer ([facility]     Borderline diabetes mellitus 4/6/2010    Cancer ([facility]     SKIN MELANOMA ON BACK     Carotid stenosis 4/6/2010    Carotid stenosis     Chronic pain     Diabetes mellitus, type 2 ([address] 9/7/2010    NO MEDS    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    ETOH abuse 4/6/2010    GERD (gastroesophageal reflux disease) 4/6/2010    GI bleed 4/6/2010    GI bleed, duodenitis 2007 4/6/2010    H/O Heavy Alcohol Use 9/7/2010    History of skin cancer 9/7/2010    HTN (hypertension) 4/6/2010    Hypercalcemia 4/25/2012    Hyperhomocysteinemia ([facility] 9/7/2010    Hypertriglyceridemia 9/7/2010    Left inguinal hernia 9/14/2015    Lipids blood increased 4/6/2010    Microalbuminuria 12/10/2015    Mild Allergic Rhinitis 9/7/2010    Pre-diabetes 9/7/2010 2005;  Optho Dr Vashti Bernard Right inguinal hernia 11/2/2015    S/P AAA repair 2/9/2012 2/22/2012    Sinus tachycardia 9/7/2010    Tachycardia 4/6/2010    Vitamin B12 deficiency 9/7/2010     Past Surgical History:   Procedure Laterality Date    COLONOSCOPY  8/2007    Normal; Dr Lilo Pennington EGD  8/2007    small hiatal hernia, mild duodenitis; Dr Lilo Pennington HX AAA REPAIR  2/9/2012    Dr Kermit Rueda    HX CATARACT REMOVAL      both eyes    HX CHOLECYSTECTOMY  1-29-14    [facility]- Dr. Khang Flores Left 9/24/15    left indirect inguinal by Dr. Ilya Bueno Right 11/2/15    inguinal w/ mesh by Dr. Ilya Bueno Bilateral 10/2015 And 2015    Inguinal    BLUE DOPPLER  3/26/2012    BLUE Echo; + clot     Family History   Problem Relation Age of Onset    Heart Disease Mother          age 80    Cancer Father          brain tumor age 80    Heart Disease Father     Substance Abuse Daughter          cocaine OD age 43 in 200    Other Son          PE/DVT age 43    Heart Disease Brother    Danis [address] Other Daughter         alive and well    Anesth Problems Neg Hx      Social History     Tobacco Use    Smoking status: Former Smoker     Packs/day: 1.50     Years: 55.00     Pack years: 82.50     Last attempt to quit: 2001     Years since quittin.7    Smokeless tobacco: Never Used    Tobacco comment: used to smoke 1 1/2 ppd x ~ 40 yrs   Substance Use Topics    Alcohol use: Yes     Alcohol/week: 14.0 standard drinks     Types: 14 Glasses of wine per week     Comment: 14      Social History     Social History Narrative    Lives with wife, who is developing dementia    Daughter is in the area        Review of Systems  Gen: denies fever    GI: denies hematochezia or constipation     Objective:     Vitals:    20 1022   BP: 130/64   Pulse: 73   Resp: 18   Temp: 97.4 °F (36.3 °C)   TempSrc: Oral   Weight: 172 lb (78 kg)   Height: 6' 1\" (1.854 m)     Body mass index is 22.69 kg/m². General: stated age, well developed, well nourished and in NAD  Neck: supple, symmetrical, trachea midline, no adenopathy and thyroid: not enlarged, symmetric, no tenderness/mass/nodules  Lungs:  clear to auscultation w/o rales, rhonchi, wheezes w/normal effort and no use of accessory muscles of respiration   Heart: regular rate and rhythm, 4/6 ISAAC throughout  Abdomen: soft, nontender, no masses  : prostate enlarged but symmetric and without hard masses, nontender  Ext:  No edema noted.    Lymph: no cervical adenopathy appreciated  Skin:  Normal. and no rash or abnormalities   Psych: alert and oriented to person, place, time and situation and Speech: appropriate quality, quantity and organization of sentences    Results for orders placed or performed in visit on 09/29/20   AMB POC URINALYSIS DIP STICK AUTO W/O MICRO     Status: None   Result Value Ref Range Status    Color (UA POC) Yellow  Final    Clarity (UA POC) Clear  Final    Glucose (UA POC) Negative Negative Final    Bilirubin (UA POC) Negative Negative Final    Ketones (UA POC) Negative Negative Final    Specific gravity (UA POC) 1.015 1.001 - 1.035 Final    Blood (UA POC) Negative Negative Final    pH (UA POC) 5.5 4.6 - 8.0 Final    Protein (UA POC) Negative Negative Final    Urobilinogen (UA POC) 0.2 mg/dL 0.2 - 1 Final    Nitrites (UA POC) Negative Negative Final    Leukocyte esterase (UA POC) Negative Negative Final       Assessment/Plan:       ICD-10-CM ICD-9-CM    1. Urinary incontinence, unspecified type  R32 788.30 AMB POC URINALYSIS DIP STICK AUTO W/O MICRO      CULTURE, URINE      US PRE/POST VOID BLADDER   2. Enlarged prostate  N40.0 600.00 US PRE/POST VOID BLADDER   3. Fatigue, unspecified type  R53.83 780.79    4. PARKINSON (dyspnea on exertion)  R06.00 786.09    5. Aortic stenosis, moderate  I35.0 424.1    6. Encounter for immunization  Z23 V03.89         Orders Placed This Encounter    CULTURE, URINE    US PRE/POST VOID BLADDER    AMB POC URINALYSIS DIP STICK AUTO W/O MICRO    pregabalin (LYRICA) 25 mg capsule       Suspect overflow incontinence related to enlarged prostate  He does recall vesicare helping 7 years ago. Before trying that, though I recommended doing ultrasound  Also will send urine for culture    Will call his cardiologist about the fatigue and PARKINSON that have been a problem for many months not particularly getting worse  He thinks he had echo when he last saw her 2 months ago but I want to be sure  Does have AS and perhaps now symptomatic? Could also be the fatigue just from the multiple myeloma that has returned.  He is waiting for bone marrow biopsy results and new plan for treatment    Discussed the diagnosis and plan and he expressed understanding. F/u 6 mo AWV  Follow-up and Dispositions    · Return in about 6 months (around 3/29/2021) for Annual Wellness Visit, Follow up.          Kyra Potter MD

## 2020-09-28 NOTE — PATIENT INSTRUCTIONS
When you talk to your cancer doctor please ask them to send me your recent results  I want to make sure your kidney function is normal    To schedule the bladder ultrasound, you can call  [phone] (STAT order scheduling)     I am not sure if your fatigue or shortness of breath may be from the myeloma or from your aortic valve problem. I will ask Dr. Servando Dowd what she thinks     Learning About Adult Protective Underwear for Men  Adult protective underwear may be helpful for a person who has incontinence. A person who has incontinence has trouble controlling urine or stool. This underwear helps absorb urine and catch stool. There are different types of adult underwear. A washable type may be useful when a loved one has trouble using the disposable type. When putting on adult underwear, make sure the tabs are in the back. Make sure the underwear is the right size so that it fits well. This is important for people who are very thin or overweight. The elastic at the legs should fit well and not be too loose. A good fit can help stop leaks. And it can keep the skin from getting sore. What are the types of adult underwear for men? Some types of adult underwear are a pull-up style (made of elastic or cloth), and some use adhesive tabs or an elastic band with buttons. Elastic   An elastic pull-up style is made of a stretchy material. With any type of adult underwear, the penis should be positioned down and centered on the body. Adhesive   Adhesive tab styles have tabs to help adjust and secure the underwear. Cloth   Cloth pull-up styles are made to look and feel like standard briefs. Elastic bands and buttons   Elastic band and button styles use stretchy bands with buttons at the end to secure the pull-up on each side. Where can you learn more?   Go to http://www.gray.com/  Enter W700 in the search box to learn more about \"Learning About Adult Protective Underwear for Men.\"  Current as of: December 9, 2019               Content Version: 12.6  © 4951-4370 [employer]. Care instructions adapted under license by [employer] (which disclaims liability or warranty for this information). If you have questions about a medical condition or this instruction, always ask your healthcare professional. [address] any warranty or liability for your use of this information.

## 2020-09-29 ENCOUNTER — OFFICE VISIT (OUTPATIENT)
Dept: FAMILY MEDICINE CLINIC | Age: 85
End: 2020-09-29
Payer: MEDICARE

## 2020-09-29 VITALS
BODY MASS INDEX: 22.8 KG/M2 | HEART RATE: 73 BPM | RESPIRATION RATE: 18 BRPM | WEIGHT: 172 LBS | TEMPERATURE: 97.4 F | SYSTOLIC BLOOD PRESSURE: 130 MMHG | DIASTOLIC BLOOD PRESSURE: 64 MMHG | HEIGHT: 73 IN

## 2020-09-29 DIAGNOSIS — I35.0 AORTIC STENOSIS, MODERATE: ICD-10-CM

## 2020-09-29 DIAGNOSIS — N40.0 ENLARGED PROSTATE: ICD-10-CM

## 2020-09-29 DIAGNOSIS — R32 URINARY INCONTINENCE, UNSPECIFIED TYPE: Primary | ICD-10-CM

## 2020-09-29 DIAGNOSIS — Z23 ENCOUNTER FOR IMMUNIZATION: ICD-10-CM

## 2020-09-29 DIAGNOSIS — R53.83 FATIGUE, UNSPECIFIED TYPE: ICD-10-CM

## 2020-09-29 DIAGNOSIS — R06.09 DOE (DYSPNEA ON EXERTION): ICD-10-CM

## 2020-09-29 LAB
BILIRUB UR QL STRIP: NEGATIVE
GLUCOSE UR-MCNC: NEGATIVE MG/DL
KETONES P FAST UR STRIP-MCNC: NEGATIVE MG/DL
PH UR STRIP: 5.5 [PH] (ref 4.6–8)
PROT UR QL STRIP: NEGATIVE
SP GR UR STRIP: 1.01 (ref 1–1.03)
UA UROBILINOGEN AMB POC: NORMAL (ref 0.2–1)
URINALYSIS CLARITY POC: CLEAR
URINALYSIS COLOR POC: YELLOW
URINE BLOOD POC: NEGATIVE
URINE LEUKOCYTES POC: NEGATIVE
URINE NITRITES POC: NEGATIVE

## 2020-09-29 PROCEDURE — 1101F PT FALLS ASSESS-DOCD LE1/YR: CPT | Performed by: FAMILY MEDICINE

## 2020-09-29 PROCEDURE — 81003 URINALYSIS AUTO W/O SCOPE: CPT | Performed by: FAMILY MEDICINE

## 2020-09-29 PROCEDURE — G8536 NO DOC ELDER MAL SCRN: HCPCS | Performed by: FAMILY MEDICINE

## 2020-09-29 PROCEDURE — 99214 OFFICE O/P EST MOD 30 MIN: CPT | Performed by: FAMILY MEDICINE

## 2020-09-29 PROCEDURE — G8432 DEP SCR NOT DOC, RNG: HCPCS | Performed by: FAMILY MEDICINE

## 2020-09-29 PROCEDURE — G8427 DOCREV CUR MEDS BY ELIG CLIN: HCPCS | Performed by: FAMILY MEDICINE

## 2020-09-29 PROCEDURE — G8420 CALC BMI NORM PARAMETERS: HCPCS | Performed by: FAMILY MEDICINE

## 2020-09-29 RX ORDER — PREGABALIN 50 MG/1
50 CAPSULE ORAL 2 TIMES DAILY
COMMUNITY
Start: 2020-09-16

## 2020-09-29 NOTE — PROGRESS NOTES
Chief Complaint   Patient presents with    Urinary Frequency     x 1 month      1. Have you been to the ER, urgent care clinic since your last visit? Hospitalized since your last visit? No    2. Have you seen or consulted any other health care providers outside of the [address] [place] [address] since your last visit? Include any pap smears or colon screening.  No

## 2020-09-30 ENCOUNTER — TELEPHONE (OUTPATIENT)
Dept: FAMILY MEDICINE CLINIC | Age: 85
End: 2020-09-30

## 2020-09-30 LAB — BACTERIA UR CULT: NORMAL

## 2020-09-30 NOTE — TELEPHONE ENCOUNTER
texted his cardiologist Dr. Grady Go using [employer] to ask if time for referral to fix his aortic stenosis as I was not sure she was aware of his fatigue/PARKINSON and whether he had recent echo as I don't have her recent records

## 2020-10-05 ENCOUNTER — TELEPHONE (OUTPATIENT)
Dept: FAMILY MEDICINE CLINIC | Age: 85
End: 2020-10-05

## 2020-10-05 DIAGNOSIS — R33.9 URINARY RETENTION: Primary | ICD-10-CM

## 2020-10-05 NOTE — TELEPHONE ENCOUNTER
TC to pt. ID verified. Advised pt I was calling to get some more information on what needs to be fixed on the referral slip that Dr. Enoch Kinsey sent out. Pt states he is not 100% sure but knows that it cannot be scheduled for [facility] due to it being sent for a location in [place].  wrote a note stating that she would send Dr. Enoch Kinsey a message regarding the matter.  The note states that Nick said: this order is wrong does not allow the  to schedule in the [employer] spoke with passion in the office

## 2020-10-05 NOTE — TELEPHONE ENCOUNTER
I did not get a message from the  but will change the order to a similar one. Sorry, it is often hard to tell which orders work in the computer system!  Put order in stat so they should contact him quickly

## 2020-10-05 NOTE — TELEPHONE ENCOUNTER
----- Message from Minnie Palumbo sent at 10/1/2020 12:48 PM EDT -----  Regarding: Dr. Catracho Ovalle first and last name:   Carlitos Franco      Reason for call:  incorrect document received for order. Callback required yes/no and why:   yes to confirm      Best contact number(s):  Contact [phone]     Details to clarify the request:  patient stated that the incorrect form for Bladder ultrasound. Form incorrect address.   Contact [phone]         Marlene Shah

## 2020-10-12 ENCOUNTER — HOSPITAL ENCOUNTER (OUTPATIENT)
Dept: ULTRASOUND IMAGING | Age: 85
Discharge: HOME | End: 2020-10-12
Attending: FAMILY MEDICINE
Payer: MEDICARE

## 2020-10-12 DIAGNOSIS — N40.0 ENLARGED PROSTATE: ICD-10-CM

## 2020-10-12 DIAGNOSIS — R33.9 URINARY RETENTION: ICD-10-CM

## 2020-10-12 DIAGNOSIS — R32 URINARY INCONTINENCE, UNSPECIFIED TYPE: ICD-10-CM

## 2020-10-12 PROCEDURE — 76856 US EXAM PELVIC COMPLETE: CPT

## 2020-10-13 ENCOUNTER — TELEPHONE (OUTPATIENT)
Dept: FAMILY MEDICINE CLINIC | Age: 85
End: 2020-10-13

## 2020-10-13 RX ORDER — TAMSULOSIN HYDROCHLORIDE 0.4 MG/1
0.4 CAPSULE ORAL DAILY
Qty: 90 CAP | Refills: 1 | Status: SHIPPED | OUTPATIENT
Start: 2020-10-13 | End: 2021-04-05 | Stop reason: SDUPTHER

## 2020-10-13 NOTE — TELEPHONE ENCOUNTER
Spoke with him about residual, try flomax again, he remembers it helping in past. If doesn't help in a week let me know I could refer him to urologist. Discussed dizziness possible side effect of flomax. He expressed understanding.

## 2020-10-29 ENCOUNTER — CLINICAL SUPPORT (OUTPATIENT)
Dept: FAMILY MEDICINE CLINIC | Age: 85
End: 2020-10-29
Payer: MEDICARE

## 2020-10-29 DIAGNOSIS — Z23 NEEDS FLU SHOT: Primary | ICD-10-CM

## 2020-10-29 PROCEDURE — G0008 ADMIN INFLUENZA VIRUS VAC: HCPCS

## 2020-10-29 PROCEDURE — 90653 IIV ADJUVANT VACCINE IM: CPT

## 2020-10-29 NOTE — PROGRESS NOTES
Chief Complaint   Patient presents with    Immunization/Injection       Updated viis was given to patient/guardian prior to administering vaccine. Opportunity was presented for questions. No concerns were verbalized prior to administration. Vaccine administered by Carissa Juarez LPN. No adverse reactions noted.

## 2020-11-09 PROBLEM — Z86.79 PERSONAL HISTORY OF ATRIAL FIBRILLATION: Status: ACTIVE | Noted: 2020-11-09

## 2021-01-01 ENCOUNTER — CLINICAL SUPPORT (OUTPATIENT)
Dept: FAMILY MEDICINE CLINIC | Age: 86
End: 2021-01-01
Payer: MEDICARE

## 2021-01-01 ENCOUNTER — TELEPHONE (OUTPATIENT)
Dept: FAMILY MEDICINE CLINIC | Age: 86
End: 2021-01-01

## 2021-01-01 ENCOUNTER — APPOINTMENT (OUTPATIENT)
Dept: GENERAL RADIOLOGY | Age: 86
DRG: 177 | End: 2021-01-01
Attending: EMERGENCY MEDICINE
Payer: MEDICARE

## 2021-01-01 ENCOUNTER — HOSPITAL ENCOUNTER (INPATIENT)
Age: 86
LOS: 1 days | Discharge: HOME | DRG: 177 | End: 2021-12-30
Attending: EMERGENCY MEDICINE | Admitting: STUDENT IN AN ORGANIZED HEALTH CARE EDUCATION/TRAINING PROGRAM
Payer: MEDICARE

## 2021-01-01 VITALS
OXYGEN SATURATION: 92 % | SYSTOLIC BLOOD PRESSURE: 143 MMHG | HEART RATE: 69 BPM | RESPIRATION RATE: 25 BRPM | TEMPERATURE: 97 F | BODY MASS INDEX: 23.05 KG/M2 | DIASTOLIC BLOOD PRESSURE: 58 MMHG | HEIGHT: 73 IN | WEIGHT: 173.94 LBS

## 2021-01-01 DIAGNOSIS — Z23 NEEDS FLU SHOT: Primary | ICD-10-CM

## 2021-01-01 DIAGNOSIS — J96.01 ACUTE RESPIRATORY FAILURE WITH HYPOXIA (HCC): ICD-10-CM

## 2021-01-01 DIAGNOSIS — Z20.822 SUSPECTED COVID-19 VIRUS INFECTION: Primary | ICD-10-CM

## 2021-01-01 LAB
ALBUMIN SERPL-MCNC: 2.7 G/DL (ref 3.5–5)
ALBUMIN/GLOB SERPL: 0.6 {RATIO} (ref 1.1–2.2)
ALP SERPL-CCNC: 121 U/L (ref 45–117)
ALT SERPL-CCNC: 30 U/L (ref 12–78)
ANION GAP SERPL CALC-SCNC: 7 MMOL/L (ref 5–15)
ANION GAP SERPL CALC-SCNC: 9 MMOL/L (ref 5–15)
AST SERPL-CCNC: 27 U/L (ref 15–37)
ATRIAL RATE: 83 BPM
BASOPHILS # BLD: 0 K/UL (ref 0–0.1)
BASOPHILS # BLD: 0 K/UL (ref 0–0.1)
BASOPHILS NFR BLD: 0 % (ref 0–1)
BASOPHILS NFR BLD: 0 % (ref 0–1)
BILIRUB SERPL-MCNC: 0.6 MG/DL (ref 0.2–1)
BNP SERPL-MCNC: 3588 PG/ML
BUN SERPL-MCNC: 23 MG/DL (ref 6–20)
BUN SERPL-MCNC: 24 MG/DL (ref 6–20)
BUN/CREAT SERPL: 19 (ref 12–20)
BUN/CREAT SERPL: 25 (ref 12–20)
CALCIUM SERPL-MCNC: 8.3 MG/DL (ref 8.5–10.1)
CALCIUM SERPL-MCNC: 9.6 MG/DL (ref 8.5–10.1)
CALCULATED P AXIS, ECG09: 97 DEGREES
CALCULATED R AXIS, ECG10: 5 DEGREES
CALCULATED T AXIS, ECG11: 66 DEGREES
CHLORIDE SERPL-SCNC: 102 MMOL/L (ref 97–108)
CHLORIDE SERPL-SCNC: 107 MMOL/L (ref 97–108)
CO2 SERPL-SCNC: 25 MMOL/L (ref 21–32)
CO2 SERPL-SCNC: 26 MMOL/L (ref 21–32)
COMMENT, HOLDF: NORMAL
COVID-19 RAPID TEST, COVR: DETECTED
CREAT SERPL-MCNC: 0.96 MG/DL (ref 0.7–1.3)
CREAT SERPL-MCNC: 1.22 MG/DL (ref 0.7–1.3)
CRP SERPL-MCNC: 5.49 MG/DL (ref 0–0.6)
DIAGNOSIS, 93000: NORMAL
DIFFERENTIAL METHOD BLD: ABNORMAL
DIFFERENTIAL METHOD BLD: ABNORMAL
EOSINOPHIL # BLD: 0 K/UL (ref 0–0.4)
EOSINOPHIL # BLD: 0 K/UL (ref 0–0.4)
EOSINOPHIL NFR BLD: 0 % (ref 0–7)
EOSINOPHIL NFR BLD: 0 % (ref 0–7)
ERYTHROCYTE [DISTWIDTH] IN BLOOD BY AUTOMATED COUNT: 13.7 % (ref 11.5–14.5)
ERYTHROCYTE [DISTWIDTH] IN BLOOD BY AUTOMATED COUNT: 14 % (ref 11.5–14.5)
GLOBULIN SER CALC-MCNC: 4.3 G/DL (ref 2–4)
GLUCOSE BLD STRIP.AUTO-MCNC: 149 MG/DL (ref 65–117)
GLUCOSE BLD STRIP.AUTO-MCNC: 194 MG/DL (ref 65–117)
GLUCOSE BLD STRIP.AUTO-MCNC: 194 MG/DL (ref 65–117)
GLUCOSE SERPL-MCNC: 155 MG/DL (ref 65–100)
GLUCOSE SERPL-MCNC: 189 MG/DL (ref 65–100)
HCT VFR BLD AUTO: 30 % (ref 36.6–50.3)
HCT VFR BLD AUTO: 32.8 % (ref 36.6–50.3)
HGB BLD-MCNC: 10.4 G/DL (ref 12.1–17)
HGB BLD-MCNC: 9.5 G/DL (ref 12.1–17)
IGA SERPL-MCNC: 121 MG/DL (ref 70–400)
IGG SERPL-MCNC: 749 MG/DL (ref 700–1600)
IGM SERPL-MCNC: 46 MG/DL (ref 40–230)
IMM GRANULOCYTES # BLD AUTO: 0 K/UL
IMM GRANULOCYTES # BLD AUTO: 0.2 K/UL (ref 0–0.04)
IMM GRANULOCYTES NFR BLD AUTO: 0 %
IMM GRANULOCYTES NFR BLD AUTO: 2 % (ref 0–0.5)
LYMPHOCYTES # BLD: 0.4 K/UL (ref 0.8–3.5)
LYMPHOCYTES # BLD: 0.5 K/UL (ref 0.8–3.5)
LYMPHOCYTES NFR BLD: 4 % (ref 12–49)
LYMPHOCYTES NFR BLD: 5 % (ref 12–49)
MCH RBC QN AUTO: 32.1 PG (ref 26–34)
MCH RBC QN AUTO: 32.1 PG (ref 26–34)
MCHC RBC AUTO-ENTMCNC: 31.7 G/DL (ref 30–36.5)
MCHC RBC AUTO-ENTMCNC: 31.7 G/DL (ref 30–36.5)
MCV RBC AUTO: 101.2 FL (ref 80–99)
MCV RBC AUTO: 101.4 FL (ref 80–99)
MONOCYTES # BLD: 0.6 K/UL (ref 0–1)
MONOCYTES # BLD: 0.7 K/UL (ref 0–1)
MONOCYTES NFR BLD: 6 % (ref 5–13)
MONOCYTES NFR BLD: 7 % (ref 5–13)
MYELOCYTES NFR BLD MANUAL: 1 %
NEUTS BAND NFR BLD MANUAL: 1 % (ref 0–6)
NEUTS SEG # BLD: 8.3 K/UL (ref 1.8–8)
NEUTS SEG # BLD: 8.6 K/UL (ref 1.8–8)
NEUTS SEG NFR BLD: 86 % (ref 32–75)
NEUTS SEG NFR BLD: 88 % (ref 32–75)
NRBC # BLD: 0 K/UL (ref 0–0.01)
NRBC # BLD: 0 K/UL (ref 0–0.01)
NRBC BLD-RTO: 0 PER 100 WBC
NRBC BLD-RTO: 0 PER 100 WBC
P-R INTERVAL, ECG05: 188 MS
PLATELET # BLD AUTO: 307 K/UL (ref 150–400)
PLATELET # BLD AUTO: 349 K/UL (ref 150–400)
PMV BLD AUTO: 10.8 FL (ref 8.9–12.9)
PMV BLD AUTO: 11.2 FL (ref 8.9–12.9)
POTASSIUM SERPL-SCNC: 3.9 MMOL/L (ref 3.5–5.1)
POTASSIUM SERPL-SCNC: 4.1 MMOL/L (ref 3.5–5.1)
PROCALCITONIN SERPL-MCNC: 0.12 NG/ML
PROT SERPL-MCNC: 7 G/DL (ref 6.4–8.2)
Q-T INTERVAL, ECG07: 392 MS
QRS DURATION, ECG06: 84 MS
QTC CALCULATION (BEZET), ECG08: 460 MS
RBC # BLD AUTO: 2.96 M/UL (ref 4.1–5.7)
RBC # BLD AUTO: 3.24 M/UL (ref 4.1–5.7)
RBC MORPH BLD: ABNORMAL
SAMPLES BEING HELD,HOLD: NORMAL
SERVICE CMNT-IMP: ABNORMAL
SODIUM SERPL-SCNC: 137 MMOL/L (ref 136–145)
SODIUM SERPL-SCNC: 139 MMOL/L (ref 136–145)
SOURCE, COVRS: ABNORMAL
TROPONIN-HIGH SENSITIVITY: 33 NG/L (ref 0–76)
VENTRICULAR RATE, ECG03: 83 BPM
WBC # BLD AUTO: 9.5 K/UL (ref 4.1–11.1)
WBC # BLD AUTO: 9.9 K/UL (ref 4.1–11.1)

## 2021-01-01 PROCEDURE — 82784 ASSAY IGA/IGD/IGG/IGM EACH: CPT

## 2021-01-01 PROCEDURE — 65660000000 HC RM CCU STEPDOWN

## 2021-01-01 PROCEDURE — 85025 COMPLETE CBC W/AUTO DIFF WBC: CPT

## 2021-01-01 PROCEDURE — 80053 COMPREHEN METABOLIC PANEL: CPT

## 2021-01-01 PROCEDURE — 97530 THERAPEUTIC ACTIVITIES: CPT

## 2021-01-01 PROCEDURE — 84484 ASSAY OF TROPONIN QUANT: CPT

## 2021-01-01 PROCEDURE — 36415 COLL VENOUS BLD VENIPUNCTURE: CPT

## 2021-01-01 PROCEDURE — 93005 ELECTROCARDIOGRAM TRACING: CPT

## 2021-01-01 PROCEDURE — 71045 X-RAY EXAM CHEST 1 VIEW: CPT

## 2021-01-01 PROCEDURE — 74011250636 HC RX REV CODE- 250/636: Performed by: STUDENT IN AN ORGANIZED HEALTH CARE EDUCATION/TRAINING PROGRAM

## 2021-01-01 PROCEDURE — 74011250637 HC RX REV CODE- 250/637: Performed by: STUDENT IN AN ORGANIZED HEALTH CARE EDUCATION/TRAINING PROGRAM

## 2021-01-01 PROCEDURE — 82962 GLUCOSE BLOOD TEST: CPT

## 2021-01-01 PROCEDURE — G0008 ADMIN INFLUENZA VIRUS VAC: HCPCS | Performed by: NURSE PRACTITIONER

## 2021-01-01 PROCEDURE — 90694 VACC AIIV4 NO PRSRV 0.5ML IM: CPT | Performed by: NURSE PRACTITIONER

## 2021-01-01 PROCEDURE — 80048 BASIC METABOLIC PNL TOTAL CA: CPT

## 2021-01-01 PROCEDURE — 99285 EMERGENCY DEPT VISIT HI MDM: CPT

## 2021-01-01 PROCEDURE — 87635 SARS-COV-2 COVID-19 AMP PRB: CPT

## 2021-01-01 PROCEDURE — 74011636637 HC RX REV CODE- 636/637: Performed by: STUDENT IN AN ORGANIZED HEALTH CARE EDUCATION/TRAINING PROGRAM

## 2021-01-01 PROCEDURE — 84145 PROCALCITONIN (PCT): CPT

## 2021-01-01 PROCEDURE — 86140 C-REACTIVE PROTEIN: CPT

## 2021-01-01 PROCEDURE — 94762 N-INVAS EAR/PLS OXIMTRY CONT: CPT

## 2021-01-01 PROCEDURE — 83880 ASSAY OF NATRIURETIC PEPTIDE: CPT

## 2021-01-01 PROCEDURE — 97161 PT EVAL LOW COMPLEX 20 MIN: CPT

## 2021-01-01 RX ORDER — ONDANSETRON 2 MG/ML
4 INJECTION INTRAMUSCULAR; INTRAVENOUS
Status: DISCONTINUED | OUTPATIENT
Start: 2021-01-01 | End: 2021-01-01 | Stop reason: HOSPADM

## 2021-01-01 RX ORDER — SODIUM CHLORIDE 0.9 % (FLUSH) 0.9 %
5-40 SYRINGE (ML) INJECTION AS NEEDED
Status: DISCONTINUED | OUTPATIENT
Start: 2021-01-01 | End: 2021-01-01 | Stop reason: HOSPADM

## 2021-01-01 RX ORDER — DEXAMETHASONE 6 MG/1
TABLET ORAL
Qty: 9 TABLET | Refills: 0 | Status: SHIPPED | OUTPATIENT
Start: 2021-01-01 | End: 2022-01-01

## 2021-01-01 RX ORDER — SODIUM CHLORIDE 0.9 % (FLUSH) 0.9 %
5-40 SYRINGE (ML) INJECTION EVERY 8 HOURS
Status: DISCONTINUED | OUTPATIENT
Start: 2021-01-01 | End: 2021-01-01 | Stop reason: HOSPADM

## 2021-01-01 RX ORDER — POTASSIUM CHLORIDE 750 MG/1
10 TABLET, EXTENDED RELEASE ORAL DAILY
Qty: 90 TABLET | Refills: 0 | Status: SHIPPED | OUTPATIENT
Start: 2021-01-01 | End: 2021-01-01

## 2021-01-01 RX ORDER — ACETAMINOPHEN 650 MG/1
650 SUPPOSITORY RECTAL
Status: DISCONTINUED | OUTPATIENT
Start: 2021-01-01 | End: 2021-01-01 | Stop reason: HOSPADM

## 2021-01-01 RX ORDER — DEXAMETHASONE 4 MG/1
6 TABLET ORAL EVERY 24 HOURS
Status: DISCONTINUED | OUTPATIENT
Start: 2021-01-01 | End: 2021-01-01 | Stop reason: HOSPADM

## 2021-01-01 RX ORDER — CALCIUM CARBONATE 200(500)MG
200 TABLET,CHEWABLE ORAL
Status: DISCONTINUED | OUTPATIENT
Start: 2021-01-01 | End: 2021-01-01 | Stop reason: HOSPADM

## 2021-01-01 RX ORDER — ASPIRIN 81 MG/1
81 TABLET ORAL
Status: DISCONTINUED | OUTPATIENT
Start: 2021-01-01 | End: 2021-01-01 | Stop reason: HOSPADM

## 2021-01-01 RX ORDER — CALCIUM CARBONATE 200(500)MG
1 TABLET,CHEWABLE ORAL AS NEEDED
COMMUNITY
End: 2022-01-01 | Stop reason: ALTCHOICE

## 2021-01-01 RX ORDER — POLYETHYLENE GLYCOL 3350 17 G/17G
17 POWDER, FOR SOLUTION ORAL DAILY PRN
Status: DISCONTINUED | OUTPATIENT
Start: 2021-01-01 | End: 2021-01-01 | Stop reason: HOSPADM

## 2021-01-01 RX ORDER — MAGNESIUM SULFATE 100 %
4 CRYSTALS MISCELLANEOUS AS NEEDED
Status: DISCONTINUED | OUTPATIENT
Start: 2021-01-01 | End: 2021-01-01 | Stop reason: HOSPADM

## 2021-01-01 RX ORDER — FUROSEMIDE 40 MG/1
40 TABLET ORAL DAILY
Status: DISCONTINUED | OUTPATIENT
Start: 2021-01-01 | End: 2021-01-01 | Stop reason: HOSPADM

## 2021-01-01 RX ORDER — POTASSIUM CHLORIDE 750 MG/1
10 TABLET, EXTENDED RELEASE ORAL DAILY
Qty: 90 TABLET | Refills: 0 | Status: SHIPPED | OUTPATIENT
Start: 2021-01-01 | End: 2022-01-01

## 2021-01-01 RX ORDER — DEXTROSE 50 % IN WATER (D50W) INTRAVENOUS SYRINGE
12.5-25 AS NEEDED
Status: DISCONTINUED | OUTPATIENT
Start: 2021-01-01 | End: 2021-01-01 | Stop reason: HOSPADM

## 2021-01-01 RX ORDER — ACYCLOVIR 800 MG/1
400 TABLET ORAL DAILY
Status: DISCONTINUED | OUTPATIENT
Start: 2021-01-01 | End: 2021-01-01 | Stop reason: HOSPADM

## 2021-01-01 RX ORDER — ACETAMINOPHEN 325 MG/1
650 TABLET ORAL
Status: DISCONTINUED | OUTPATIENT
Start: 2021-01-01 | End: 2021-01-01 | Stop reason: HOSPADM

## 2021-01-01 RX ORDER — TAMSULOSIN HYDROCHLORIDE 0.4 MG/1
CAPSULE ORAL
Qty: 90 CAPSULE | Refills: 1 | Status: SHIPPED | OUTPATIENT
Start: 2021-01-01 | End: 2022-01-01

## 2021-01-01 RX ORDER — LOPERAMIDE HYDROCHLORIDE 2 MG/1
2 CAPSULE ORAL
Status: DISCONTINUED | OUTPATIENT
Start: 2021-01-01 | End: 2021-01-01 | Stop reason: HOSPADM

## 2021-01-01 RX ORDER — ONDANSETRON 4 MG/1
4 TABLET, ORALLY DISINTEGRATING ORAL
Status: DISCONTINUED | OUTPATIENT
Start: 2021-01-01 | End: 2021-01-01 | Stop reason: HOSPADM

## 2021-01-01 RX ORDER — THERA TABS 400 MCG
1 TAB ORAL DAILY
Status: DISCONTINUED | OUTPATIENT
Start: 2021-01-01 | End: 2021-01-01 | Stop reason: HOSPADM

## 2021-01-01 RX ORDER — ENOXAPARIN SODIUM 100 MG/ML
40 INJECTION SUBCUTANEOUS DAILY
Status: DISCONTINUED | OUTPATIENT
Start: 2021-01-01 | End: 2021-01-01 | Stop reason: HOSPADM

## 2021-01-01 RX ORDER — POTASSIUM CHLORIDE 750 MG/1
10 TABLET, FILM COATED, EXTENDED RELEASE ORAL DAILY
Status: DISCONTINUED | OUTPATIENT
Start: 2021-01-01 | End: 2021-01-01 | Stop reason: HOSPADM

## 2021-01-01 RX ORDER — PREGABALIN 25 MG/1
50 CAPSULE ORAL 2 TIMES DAILY
Status: DISCONTINUED | OUTPATIENT
Start: 2021-01-01 | End: 2021-01-01 | Stop reason: HOSPADM

## 2021-01-01 RX ORDER — INSULIN LISPRO 100 [IU]/ML
INJECTION, SOLUTION INTRAVENOUS; SUBCUTANEOUS
Status: DISCONTINUED | OUTPATIENT
Start: 2021-01-01 | End: 2021-01-01 | Stop reason: HOSPADM

## 2021-01-01 RX ADMIN — Medication 2 UNITS: at 18:49

## 2021-01-01 RX ADMIN — DEXAMETHASONE 6 MG: 4 TABLET ORAL at 12:51

## 2021-01-01 RX ADMIN — DEXAMETHASONE 6 MG: 4 TABLET ORAL at 18:50

## 2021-01-01 RX ADMIN — ACYCLOVIR 400 MG: 800 TABLET ORAL at 12:51

## 2021-01-01 RX ADMIN — POTASSIUM CHLORIDE 10 MEQ: 750 TABLET, FILM COATED, EXTENDED RELEASE ORAL at 12:52

## 2021-01-01 RX ADMIN — ASPIRIN 81 MG: 81 TABLET, COATED ORAL at 23:19

## 2021-01-01 RX ADMIN — FUROSEMIDE 40 MG: 40 TABLET ORAL at 12:53

## 2021-01-01 RX ADMIN — Medication 10 ML: at 17:30

## 2021-01-01 RX ADMIN — PREGABALIN 50 MG: 25 CAPSULE ORAL at 18:50

## 2021-01-01 RX ADMIN — ENOXAPARIN SODIUM 40 MG: 100 INJECTION SUBCUTANEOUS at 12:53

## 2021-01-01 RX ADMIN — THERA TABS 1 TABLET: TAB at 12:53

## 2021-01-01 RX ADMIN — PREGABALIN 50 MG: 25 CAPSULE ORAL at 12:52

## 2021-03-02 RX ORDER — POTASSIUM CHLORIDE 750 MG/1
10 TABLET, EXTENDED RELEASE ORAL DAILY
Qty: 90 TAB | Refills: 0 | Status: SHIPPED | OUTPATIENT
Start: 2021-03-02 | End: 2021-06-02 | Stop reason: SDUPTHER

## 2021-03-02 NOTE — TELEPHONE ENCOUNTER
Last Visit: 9/29/20 MD Cooney  Next Appointment: 3/11/21 MD Menendez  Previous Refill Encounter(s): 3/6/20 90 + 3    Requested Prescriptions     Pending Prescriptions Disp Refills    potassium chloride (KLOR-CON) 10 mEq tablet 90 Tab 3     Sig: Take 1 Tab by mouth daily.  With lasix

## 2021-03-23 ENCOUNTER — OFFICE VISIT (OUTPATIENT)
Dept: FAMILY MEDICINE CLINIC | Age: 86
End: 2021-03-23
Payer: MEDICARE

## 2021-03-23 DIAGNOSIS — E53.8 B12 DEFICIENCY: Primary | ICD-10-CM

## 2021-03-23 DIAGNOSIS — R73.03 PRE-DIABETES: ICD-10-CM

## 2021-03-23 DIAGNOSIS — E78.2 HYPERLIPEMIA, MIXED: ICD-10-CM

## 2021-03-23 DIAGNOSIS — E55.9 VITAMIN D DEFICIENCY: ICD-10-CM

## 2021-03-23 DIAGNOSIS — C90.00 MULTIPLE MYELOMA, REMISSION STATUS UNSPECIFIED (HCC): ICD-10-CM

## 2021-03-23 PROCEDURE — G8420 CALC BMI NORM PARAMETERS: HCPCS | Performed by: NURSE PRACTITIONER

## 2021-03-23 PROCEDURE — 1101F PT FALLS ASSESS-DOCD LE1/YR: CPT | Performed by: NURSE PRACTITIONER

## 2021-03-23 PROCEDURE — G8536 NO DOC ELDER MAL SCRN: HCPCS | Performed by: NURSE PRACTITIONER

## 2021-03-23 PROCEDURE — G8427 DOCREV CUR MEDS BY ELIG CLIN: HCPCS | Performed by: NURSE PRACTITIONER

## 2021-03-23 PROCEDURE — G8432 DEP SCR NOT DOC, RNG: HCPCS | Performed by: NURSE PRACTITIONER

## 2021-03-23 PROCEDURE — 99213 OFFICE O/P EST LOW 20 MIN: CPT | Performed by: NURSE PRACTITIONER

## 2021-03-23 RX ORDER — DEXAMETHASONE 4 MG/1
TABLET ORAL
COMMUNITY
Start: 2021-03-09 | End: 2022-01-01

## 2021-03-23 RX ORDER — POMALIDOMIDE 2 MG/1
CAPSULE ORAL
COMMUNITY
Start: 2021-03-18 | End: 2022-01-01

## 2021-03-23 NOTE — PROGRESS NOTES
Assessment/Plan:     Diagnoses and all orders for this visit:    1. B12 deficiency  -     VITAMIN B12; Future    2. Vitamin D deficiency  -     VITAMIN D, 25 HYDROXY; Future    3. Hyperlipemia, mixed  -     LIPID PANEL; Future    4. Pre-diabetes  -     METABOLIC PANEL, COMPREHENSIVE; Future  -     HEMOGLOBIN A1C WITH EAG; Future    5. Multiple myeloma, remission status unspecified (HCC)  -     CBC WITH AUTOMATED DIFF; Future      His oncologist does manage his Lyrica and will discuss to bring up to improve his neuropathy symptoms. Discussed expected course/resolution/complications of diagnosis in detail with patient. Medication risks/benefits/costs/interactions/alternatives discussed with patient. Pt was given after visit summary which includes diagnoses, current medications & vitals. Pt expressed understanding with the diagnosis and plan          Subjective:      Bk Frey is a 80 y.o. male who presents for had concerns including Establish Care, Other (starting to have numbness in his hands due to neuropathy ), Blood Pressure Check (not getting much blood from finger sticks to check blood sugar ), and Labs (fasting labs ). Reports a history of neuropathy of the hands and feet which are worsening over time. Currently on chemotherapy by Dr. Puma George. Lives with his wife who has dementia. He is the main caretaker.     Patient Active Problem List   Diagnosis Code    HTN (hypertension) I10    H/O Carotid Stenosis I65.29    GERD (gastroesophageal reflux disease) K21.9    Pre-diabetes R73.03    Sinus tachycardia R00.0    H/O Heavy Alcohol Use     Vitamin B12 deficiency E53.8    Hyperhomocysteinemia (HCC) E72.11    Mild Allergic Rhinitis J30.9    History of skin cancer Z85.828    S/P AAA repair 2/9/2012 Z98.890, Z86.79    Hypercalcemia E83.52    Multiple myeloma ([facility] C90.00    Left inguinal hernia K40.90    Right inguinal hernia K40.90    Microalbuminuria R80.9    Chronic diarrhea K52.9    Aortic stenosis, moderate I35.0    Mild concentric left ventricular hypertrophy (LVH) I51.7    Hyperlipemia, mixed E78.2    Carotid stenosis, asymptomatic, right I65.21    Personal history of atrial fibrillation Z86.79       Current Outpatient Medications   Medication Sig Dispense Refill    Pomalyst 2 mg cap TAKE 1 CAPSULE BY MOUTH DAILY FOR 14 DAYS ON THEN 14 DAYS OFF      dexAMETHasone (DECADRON) 4 mg tablet starting 12/15/20 take two tabs (8mg) on treatment days then 5 tabs (20mg) once weekly on weeks when no treatment      ELOTUZUMAB IV by IntraVENous route.  potassium chloride (KLOR-CON) 10 mEq tablet Take 1 Tab by mouth daily. With lasix 90 Tab 0    Folbic 2.5-25-2 mg tablet TAKE 1 TABLET EVERY DAY 90 Tab 3    aspirin delayed-release 81 mg tablet Take  by mouth daily.  hydroxypropyl methylcellulose (GENTEAL MILD) 0.2 % ophthalmic solution Administer 2 Drops to both eyes as needed.  furosemide (LASIX) 20 mg tablet Take 2 Tabs by mouth daily. (usually just 1 pill daily for heart)      loperamide (IMODIUM A-D) 2 mg tablet as needed. 2 pills first thing in AM, 1 pill twice daily after that (total 4 per day)      ACCU-CHEK SMARTVIEW TEST STRIP strip Patient is to test blood sugar BID. Fill 3 packages 3 Strip 11    acyclovir (ZOVIRAX) 400 mg tablet TK 1 T PO QD  6    Lancets (ACCU-CHEK FASTCLIX) misc Patient is to test BID 3 Package 11    Blood-Glucose Meter (ACCU-CHEK CORA) misc 1 Package by Does Not Apply route two (2) times a day. 1 Each 11    multivitamin (ONE A DAY) tablet Take 1 Tab by mouth daily.  tamsulosin (Flomax) 0.4 mg capsule Take 1 Cap by mouth daily. 90 Cap 1    pregabalin (LYRICA) 25 mg capsule TK 1 C PO BID      REVLIMID 10 mg cap nightly.  Indications: 14 DAYS ON AND 14 DAYS OFF  0    prochlorperazine (COMPAZINE) 10 mg tablet TK 1 T PO Q 4 HOURS PRN FOR NAUSEA  2       Allergies   Allergen Reactions    Codeine Nausea and Vomiting     Blurred vision, felt faint    Contrast Agent [Iodine] Hives     Needs premedication w/ Benadryl prn (since 1/2012 reaction)    Fish Oil Nausea Only    Glipizide Other (comments)     DRASTIC DROP IN BLOOD GLUCOSE, fasting    Metformin Diarrhea    Niacin Other (comments)     Flushing    Norvasc [Amlodipine] Other (comments)     Leg edema    Optiray 350 [Ioversol] Hives     He does not know what this is       ROS:   Review of Systems   Constitutional: Negative for malaise/fatigue. Eyes: Negative for blurred vision. Respiratory: Negative for shortness of breath. Cardiovascular: Negative for chest pain. Neurological: Positive for sensory change. Objective:     Visit Vitals  BP (!) 182/69 (BP 1 Location: Right upper arm, BP Patient Position: Sitting, BP Cuff Size: Adult)   Pulse 62   Temp 98.7 °F (37.1 °C) (Temporal)   Resp 16   Ht 6' 1\" (1.854 m)   Wt 174 lb (78.9 kg)   SpO2 (!) 83%   BMI 22.96 kg/m²       Vitals and Nurse Documentation reviewed. Physical Exam  Constitutional:       General: He is not in acute distress. Cardiovascular:      Heart sounds: S1 normal and S2 normal. No murmur. No friction rub. No gallop. Pulmonary:      Effort: No respiratory distress. Breath sounds: Normal breath sounds. Skin:     General: Skin is warm and dry.    Psychiatric:         Mood and Affect: Mood and affect normal.

## 2021-03-23 NOTE — PROGRESS NOTES
Chief Complaint   Patient presents with   [address] Other     starting to have numbness in his hands due to neuropathy     Blood Pressure Check     not getting much blood from finger sticks to check blood sugar     Labs     fasting labs      1. Have you been to the ER, urgent care clinic since your last visit? Hospitalized since your last visit? Yes, for EKG and echo done for being SOB constantly     2. Have you seen or consulted any other health care providers outside of the [address] [place] [address] since your last visit? Include any pap smears or colon screening.  No

## 2021-03-24 VITALS
HEART RATE: 62 BPM | OXYGEN SATURATION: 93 % | DIASTOLIC BLOOD PRESSURE: 78 MMHG | TEMPERATURE: 98.7 F | HEIGHT: 73 IN | RESPIRATION RATE: 16 BRPM | WEIGHT: 174 LBS | BODY MASS INDEX: 23.06 KG/M2 | SYSTOLIC BLOOD PRESSURE: 136 MMHG

## 2021-03-25 LAB
25(OH)D3 SERPL-MCNC: 26.3 NG/ML (ref 30–100)
ALBUMIN SERPL-MCNC: 3.3 G/DL (ref 3.5–5)
ALBUMIN/GLOB SERPL: 1.1 {RATIO} (ref 1.1–2.2)
ALP SERPL-CCNC: 84 U/L (ref 45–117)
ALT SERPL-CCNC: 24 U/L (ref 12–78)
ANION GAP SERPL CALC-SCNC: 3 MMOL/L (ref 5–15)
AST SERPL-CCNC: 20 U/L (ref 15–37)
BASOPHILS # BLD: 0.2 K/UL (ref 0–0.1)
BASOPHILS NFR BLD: 2 % (ref 0–1)
BILIRUB SERPL-MCNC: 0.5 MG/DL (ref 0.2–1)
BUN SERPL-MCNC: 22 MG/DL (ref 6–20)
BUN/CREAT SERPL: 23 (ref 12–20)
CALCIUM SERPL-MCNC: 8.8 MG/DL (ref 8.5–10.1)
CHLORIDE SERPL-SCNC: 109 MMOL/L (ref 97–108)
CHOLEST SERPL-MCNC: 193 MG/DL
CO2 SERPL-SCNC: 30 MMOL/L (ref 21–32)
CREAT SERPL-MCNC: 0.94 MG/DL (ref 0.7–1.3)
DIFFERENTIAL METHOD BLD: ABNORMAL
EOSINOPHIL # BLD: 0.7 K/UL (ref 0–0.4)
EOSINOPHIL NFR BLD: 7 % (ref 0–7)
ERYTHROCYTE [DISTWIDTH] IN BLOOD BY AUTOMATED COUNT: 14.7 % (ref 11.5–14.5)
EST. AVERAGE GLUCOSE BLD GHB EST-MCNC: 126 MG/DL
GLOBULIN SER CALC-MCNC: 2.9 G/DL (ref 2–4)
GLUCOSE SERPL-MCNC: 98 MG/DL (ref 65–100)
HBA1C MFR BLD: 6 % (ref 4–5.6)
HCT VFR BLD AUTO: 35.7 % (ref 36.6–50.3)
HDLC SERPL-MCNC: 83 MG/DL
HDLC SERPL: 2.3 {RATIO} (ref 0–5)
HGB BLD-MCNC: 11.7 G/DL (ref 12.1–17)
IMM GRANULOCYTES # BLD AUTO: 0.2 K/UL (ref 0–0.04)
IMM GRANULOCYTES NFR BLD AUTO: 2 % (ref 0–0.5)
LDLC SERPL CALC-MCNC: 84.2 MG/DL (ref 0–100)
LIPID PROFILE,FLP: NORMAL
LYMPHOCYTES # BLD: 0.9 K/UL (ref 0.8–3.5)
LYMPHOCYTES NFR BLD: 9 % (ref 12–49)
MCH RBC QN AUTO: 34.4 PG (ref 26–34)
MCHC RBC AUTO-ENTMCNC: 32.8 G/DL (ref 30–36.5)
MCV RBC AUTO: 105 FL (ref 80–99)
MONOCYTES # BLD: 1 K/UL (ref 0–1)
MONOCYTES NFR BLD: 10 % (ref 5–13)
NEUTS SEG # BLD: 7.1 K/UL (ref 1.8–8)
NEUTS SEG NFR BLD: 70 % (ref 32–75)
NRBC # BLD: 0 K/UL (ref 0–0.01)
NRBC BLD-RTO: 0 PER 100 WBC
PLATELET # BLD AUTO: 138 K/UL (ref 150–400)
PMV BLD AUTO: 11.2 FL (ref 8.9–12.9)
POTASSIUM SERPL-SCNC: 3.6 MMOL/L (ref 3.5–5.1)
PROT SERPL-MCNC: 6.2 G/DL (ref 6.4–8.2)
RBC # BLD AUTO: 3.4 M/UL (ref 4.1–5.7)
SODIUM SERPL-SCNC: 142 MMOL/L (ref 136–145)
TRIGL SERPL-MCNC: 129 MG/DL (ref ?–150)
VIT B12 SERPL-MCNC: 1797 PG/ML (ref 193–986)
VLDLC SERPL CALC-MCNC: 25.8 MG/DL
WBC # BLD AUTO: 10.1 K/UL (ref 4.1–11.1)

## 2021-04-05 NOTE — TELEPHONE ENCOUNTER
Last Visit: 3/23/21 NP Grey, labs done  Next Appointment: Not scheduled  Previous Refill Encounter(s): 10/13/20 90 + 1    Requested Prescriptions     Pending Prescriptions Disp Refills    tamsulosin (Flomax) 0.4 mg capsule 90 Cap 1     Sig: Take 1 Cap by mouth daily.

## 2021-04-08 RX ORDER — TAMSULOSIN HYDROCHLORIDE 0.4 MG/1
0.4 CAPSULE ORAL DAILY
Qty: 90 CAP | Refills: 1 | Status: SHIPPED | OUTPATIENT
Start: 2021-04-08 | End: 2021-01-01

## 2021-06-01 RX ORDER — FOLIC ACID-PYRIDOXINE-CYANOCOBALAMIN TAB 2.5-25-2 MG 2.5-25-2 MG
1 TAB ORAL DAILY
Qty: 90 TABLET | Refills: 3 | Status: SHIPPED | OUTPATIENT
Start: 2021-06-01

## 2021-06-01 NOTE — TELEPHONE ENCOUNTER
Last Visit: 3/23/21 NP Grey- Labs done  Next Appointment: Not scheduled  Previous Refill Encounter(s): 5/21/20 90 + 3    Requested Prescriptions     Pending Prescriptions Disp Refills    folic acid-vit Y8-WILBER T36 (Folbic) 2.5-25-2 mg tablet 90 Tablet 3     Sig: Take 1 Tablet by mouth daily.

## 2021-06-02 NOTE — TELEPHONE ENCOUNTER
NP Grey,    Patient call requesting refill of potassium and send to [facility]. Thanks, Subha Tapia    Last Visit: 3/23/21 KENNETH Edmonds  Next Appointment: Not scheduled  Previous Refill Encounter(s): 3/2/21 90    Requested Prescriptions     Pending Prescriptions Disp Refills    potassium chloride (KLOR-CON) 10 mEq tablet 90 Tablet 0     Sig: Take 1 Tablet by mouth daily.  With lasix

## 2021-06-04 RX ORDER — POTASSIUM CHLORIDE 750 MG/1
10 TABLET, EXTENDED RELEASE ORAL DAILY
Qty: 90 TABLET | Refills: 0 | Status: SHIPPED | OUTPATIENT
Start: 2021-06-04 | End: 2021-01-01

## 2021-06-22 ENCOUNTER — TELEPHONE (OUTPATIENT)
Dept: FAMILY MEDICINE CLINIC | Age: 86
End: 2021-06-22

## 2021-06-22 NOTE — TELEPHONE ENCOUNTER
Patient called and requested referral  regarding chronic diarrhea. Appt offered and Pt refused.      BCB#  [phone]

## 2021-06-22 NOTE — TELEPHONE ENCOUNTER
Was previously referred to [facility] by Dr. Claudia Hdz. If wants to stay local, would recommend Dr. Deisy Hester and Elham Stevens.

## 2021-06-23 NOTE — TELEPHONE ENCOUNTER
Patient was called, rang a long time, finally beep sound, attempted to leave message call was disconnected. Ketan Floyd LPN  Second call, same sequence of events.   Daniela Davis LPN

## 2021-10-12 NOTE — PROGRESS NOTES
Flu vaccine administered 10/12/2021 by Dominick Maya with patient's consent. Patient tolerated procedure well. No reactions noted.

## 2021-12-07 NOTE — TELEPHONE ENCOUNTER
ELBERT Hagan    Patient called this office for a refill of his pregabalin asking for MD Mo Fowler. I contacted patient to advise to contact that office for his refill and he stated he is having trouble with getting thru. He also stated that he told MD Mo Fowler that he was going off of it as he thought it wasn't helping but now he has changed his mind. I advised that since this was controlled medication, he should try the office again. He stated he will.     Thanks, Christelle Bonds

## 2021-12-28 NOTE — TELEPHONE ENCOUNTER
----- Message from Kenisha Jurado sent at 12/28/2021 11:49 AM EST -----  Subject: Appointment Request    Reason for Call: Routine (Patient Request) No Script    QUESTIONS  Type of Appointment? Established Patient  Reason for appointment request? Available appointments did not meet   patient need  Additional Information for Provider? screen green / pt daughter Adra Eisenmenger   called to book an appointment as soon as possible, she states that he is   very sick and dehydrated. Availability is tomorrow   ---------------------------------------------------------------------------  --------------  Arelis SALAS  What is the best way for the office to contact you? OK to leave message on   voicemail  Preferred Call Back Phone Number? [phone]  ---------------------------------------------------------------------------  --------------  SCRIPT ANSWERS  Relationship to Patient? Sibling  Representative Name? fredis  Additional information verified (besides Name and Date of Birth)? Address  (Is the patient requesting to see the provider for a procedure?)? No  (Is the patient requesting to see the provider urgently  today or   tomorrow. )? No  Have you been diagnosed with, awaiting test results for, or told that you   are suspected of having COVID-19 (Coronavirus)? (If patient has tested   negative or was tested as a requirement for work, school, or travel and   not based on symptoms, answer no)? No  Within the past two weeks have you developed any of the following symptoms   (answer no if symptoms have been present longer than 2 weeks or began   more than 2 weeks ago)? Fever or Chills, Cough, Shortness of breath or   difficulty breathing, Loss of taste or smell, Sore throat, Nasal   congestion, Sneezing or runny nose, Fatigue or generalized body aches   (answer no if pain is specific to a body part e.g. back pain), Diarrhea,   Headache? No  Have you had close contact with someone with COVID-19 in the last 14 days? No  (Service Expert  click yes below to proceed with [employer] As Usual   Scheduling)?  Yes

## 2021-12-28 NOTE — TELEPHONE ENCOUNTER
Chief Complaint   Patient presents with    Cough     Cough with clear mucus production, symptoms began on 12/21/2021.  Shortness of Breath     Spoke with patient 2 identifiers confirmed. Patient informed me that symptoms began on 12/21/2021 after, chemo and infusion. Patient denies fever, headaches, has had some chills. Patient feels maybe dehydrated, has been drinking water and ginger ale sugar free ( not having a good appetite ). Patient also informed that he started taking his cancer pill on 12/26/2021, 5 steroid pills today. Patient was informed that we do not have any availability and that it is recommended that Karel Sun come to his home to evaluate. Mr. Mike Bar was agreeable , request that I call his daughter Rosi Nieves to give contact number. Martha Harrison gave Karel Sun information [phone].   Arnaud Borwn LPN

## 2021-12-29 PROBLEM — U07.1 PNEUMONIA DUE TO COVID-19 VIRUS: Status: ACTIVE | Noted: 2021-01-01

## 2021-12-29 PROBLEM — J12.82 PNEUMONIA DUE TO COVID-19 VIRUS: Status: ACTIVE | Noted: 2021-01-01

## 2021-12-29 NOTE — ED TRIAGE NOTES
Pt c/o cough and sob since last week, denies fever, weakness , fatigue , vaccinated and boosted , pt tachpneic in triage, PARKINSON

## 2021-12-29 NOTE — H&P
Hospitalist Admission Note    NAME: Aline Hill   :  1934   MRN:  [id number]     Date/Time:  2021 11:36 AM    Patient PCP: Gretta Avendano, KENNETH  ______________________________________________________________________  Given the patient's current clinical presentation, I have a high level of concern for decompensation if discharged from the emergency department. Complex decision making was performed, which includes reviewing the patient's available past medical records, laboratory results, and x-ray films. My assessment of this patient's clinical condition and my plan of care is as follows. Assessment / Plan:    COVID pneumonia  -Presents with fatigue and shortness of breath  -Tachypneic on admission but his oxygen saturation was stable. -Rapid Covid test positive. Vaccinated against Covid including booster dose. -CXR shows patchy peripheral heterogeneous opacities throughout the lungs  -Outside window for remdesivir  -Procalcitonin low  -We will start dexamethasone 6 mg PO daily  -No indications for baricitinib or Actemra  -Monitor respiratory status closely  -Check inflammatory markers  -Hematology consult re chemotherapy    Multiple myeloma  -Was on elotuzumab IV. Started on Pomalyst, first dose yesterday.  -Stable anemia. Creatinine and calcium level within normal limit  -Continue acyclovir  -Hematology consult    Chronic macrocytic anemia  -Follow H&H. Type 2 DM  -Not on any medication at home. Monitor blood glucose while on steroid.   Start sliding scale insulin    GERD  -Continue as needed Tums    Peripheral neuropathy  -Continue Lyrica    Chronic diarrhea  -Imodium as needed    AAA s/p repair    Code Status: DNR  Surrogate Decision Maker: His daughter Candace Rubio    DVT Prophylaxis: Lovenox  GI Prophylaxis: not indicated    Baseline: Lives with his wife, uses a cane and walker for ambulation      Subjective:   CHIEF COMPLAINT: Shortness of breath, fatigue    HISTORY OF PRESENT ILLNESS:     Margy Cortez is a 80 y.o.  male with past medical history significant for HTN, HLD, and multiple myeloma on chemotherapy currently who presents with fatigue and shortness of breath that has been going on for the last 1 and half week. Patient also endorses productive cough which seems to have subsided over the last couple of days. Also complains of lack of energy and being sleepy. He does have chronic diarrhea without recent worsening. He denies any chest pain or fever. Patient is vaccinated against Covid and had a booster dose. He does not smoke cigarettes and denies history of COPD or asthma. He was recently transitioned from IV chemotherapy to oral for his multiple myeloma and took a dose of Pomalyst yesterday along with steroid. We were asked to admit for work up and evaluation of the above problems.      Past Medical History:   Diagnosis Date    AAA (abdominal aortic aneurysm) ([address] 1/6/2012    Abnormal serum protein electrophoresis 4/25/2012    Anemia 4/6/2010    Anemia 1/5/2012    Anemia due to GI bleed 2007 4/6/2010    Aneurysm ([address]     AAA    Arrhythmia     ATRIAL FIB PT STATES HE DOESN'T HAVE    Arthritis     Atrial fibrillation 1/2012 2/22/2012    Bilateral Carotid Bruits, L>R 9/7/2010    Bone cancer ([address]     Borderline diabetes mellitus 4/6/2010    Cancer ([address]     SKIN MELANOMA ON BACK     Carotid stenosis 4/6/2010    Carotid stenosis     Chronic pain     Diabetes mellitus, type 2 ([address] 9/7/2010    NO MEDS    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    ETOH abuse 4/6/2010    GERD (gastroesophageal reflux disease) 4/6/2010    GI bleed 4/6/2010    GI bleed, duodenitis 2007 4/6/2010    H/O Heavy Alcohol Use 9/7/2010    History of skin cancer 9/7/2010    HTN (hypertension) 4/6/2010    Hypercalcemia 4/25/2012    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/2010    Hypertriglyceridemia 9/7/2010    Left inguinal hernia 2015    Lipids blood increased 2010    Microalbuminuria 12/10/2015    Mild Allergic Rhinitis 2010    Pre-diabetes 2010    2005; Optho Dr Tee Moon Right inguinal hernia 2015    S/P AAA repair 2012    Sinus tachycardia 2010    Tachycardia 2010    Vitamin B12 deficiency 2010        Past Surgical History:   Procedure Laterality Date    COLONOSCOPY  2007    Normal; Dr Margy Marques EGD  2007    small hiatal hernia, mild duodenitis; Dr Margy Marques HX AAA REPAIR  2012    Dr Ninoska Weaver    HX CATARACT REMOVAL      both eyes    HX CHOLECYSTECTOMY  14    [facility]- Dr. Oxana Segal Left 9/24/15    left indirect inguinal by Dr. Christen Caba Right 11/2/15    inguinal w/ mesh by Dr. Chrsiten Caba Bilateral 10/2015 And 2015    Inguinal    BLUE DOPPLER  3/26/2012    BLUE Echo; + clot       Social History     Tobacco Use    Smoking status: Former Smoker     Packs/day: 1.50     Years: 55.00     Pack years: 82.50     Quit date: 2001     Years since quittin.0    Smokeless tobacco: Never Used    Tobacco comment: used to smoke 1 1/2 ppd x ~ 40 yrs   Substance Use Topics    Alcohol use:  Yes     Alcohol/week: 14.0 standard drinks     Types: 14 Glasses of wine per week     Comment: 15        Family History   Problem Relation Age of Onset    Heart Disease Mother          age 80    Cancer Father          brain tumor age 80    Heart Disease Father     Substance Abuse Daughter          cocaine OD age 43 in 200    Other Son          PE/DVT age 43    Heart Disease Brother     Cancer Brother         BLADDER    Other Daughter         alive and well    Anesth Problems Neg Hx      Allergies   Allergen Reactions    Codeine Nausea and Vomiting     Blurred vision, felt faint    Contrast Agent [Iodine] Hives Needs premedication w/ Benadryl prn (since 1/2012 reaction)    Fish Oil Nausea Only    Glipizide Other (comments)     DRASTIC DROP IN BLOOD GLUCOSE, fasting    Metformin Diarrhea    Niacin Other (comments)     Flushing    Norvasc [Amlodipine] Other (comments)     Leg edema    Optiray 350 [Ioversol] Hives     He does not know what this is        Prior to Admission medications    Medication Sig Start Date End Date Taking? Authorizing Provider   potassium chloride (KLOR-CON M10) 10 mEq tablet TAKE 1 TABLET BY MOUTH DAILY WITH LASIX 11/29/21   Davide Edmonds NP   tamsulosin (FLOMAX) 0.4 mg capsule TAKE 1 CAPSULE BY MOUTH DAILY 9/27/21   Davide Edmonds NP   folic acid-vit Y2-CTV O85 (Folbic) 2.5-25-2 mg tablet Take 1 Tablet by mouth daily. 6/1/21   Davide Edmonds NP   Pomalyst 2 mg cap TAKE 1 CAPSULE BY MOUTH DAILY FOR 14 DAYS ON THEN 14 DAYS OFF 3/18/21   Provider, Historical   dexAMETHasone (DECADRON) 4 mg tablet starting 12/15/20 take two tabs (8mg) on treatment days then 5 tabs (20mg) once weekly on weeks when no treatment 3/9/21   Provider, Historical   ELOTUZUMAB IV by IntraVENous route. Provider, Historical   pregabalin (LYRICA) 25 mg capsule TK 1 C PO BID 9/16/20   Provider, Historical   aspirin delayed-release 81 mg tablet Take  by mouth daily. Provider, Historical   hydroxypropyl methylcellulose (GENTEAL MILD) 0.2 % ophthalmic solution Administer 2 Drops to both eyes as needed. Provider, Historical   furosemide (LASIX) 20 mg tablet Take 2 Tabs by mouth daily. (usually just 1 pill daily for heart) 3/28/19   Marilin Cooney MD   loperamide (IMODIUM A-D) 2 mg tablet as needed. 2 pills first thing in AM, 1 pill twice daily after that (total 4 per day) 3/28/19   Marilin Cooney MD   ACCU-CHEK SMARTVIEW TEST STRIP strip Patient is to test blood sugar BID. Fill 3 packages 3/28/19   Kvng Tavares MD   REVLIMID 10 mg cap nightly.  Indications: 14 DAYS ON AND 14 DAYS OFF 9/12/18 Provider, Historical   prochlorperazine (COMPAZINE) 10 mg tablet TK 1 T PO Q 4 HOURS PRN FOR NAUSEA 9/18/18   Provider, Historical   acyclovir (ZOVIRAX) 400 mg tablet TK 1 T PO QD 8/6/18   Provider, Historical   Lancets (ACCU-CHEK FASTCLIX) [facility] Patient is to test BID 12/20/17   Yanelis Rudy NP   Blood-Glucose Meter (ACCU-CHEK CORA) misc 1 Package by Does Not Apply route two (2) times a day. 12/20/17   Yanelis Dominion, NP   multivitamin (ONE A DAY) tablet Take 1 Tab by mouth daily. Provider, Historical       REVIEW OF SYSTEMS:     I am not able to complete the review of systems because:    The patient is intubated and sedated    The patient has altered mental status due to his acute medical problems    The patient has baseline aphasia from prior stroke(s)    The patient has baseline dementia and is not reliable historian    The patient is in acute medical distress and unable to provide information           Total of 12 systems reviewed as follows:       POSITIVE= underlined text  Negative = text not underlined  General:  fever, chills, sweats, generalized weakness, weight loss/gain,      loss of appetite   Eyes:    blurred vision, eye pain, loss of vision, double vision  ENT:    rhinorrhea, pharyngitis   Respiratory:   cough, sputum production, SOB, PARKINSON, wheezing, pleuritic pain   Cardiology:   chest pain, palpitations, orthopnea, PND, edema, syncope   Gastrointestinal:  abdominal pain , N/V, diarrhea, dysphagia, constipation, bleeding   Genitourinary:  frequency, urgency, dysuria, hematuria, incontinence   Muskuloskeletal :  arthralgia, myalgia, back pain  Hematology:  easy bruising, nose or gum bleeding, lymphadenopathy   Dermatological: rash, ulceration, pruritis, color change / jaundice  Endocrine:   hot flashes or polydipsia   Neurological:  headache, dizziness, confusion, focal weakness, paresthesia,     Speech difficulties, memory loss, gait difficulty  Psychological: Feelings of anxiety, depression, agitation    Objective:   VITALS:    Visit Vitals  /63   Pulse 84   Temp 97 °F (36.1 °C)   Resp (!) 32   SpO2 93%       PHYSICAL EXAM:    General:    Alert, cooperative, no distress, appears stated age. HEENT: Atraumatic, anicteric sclerae, pink conjunctivae     No oral ulcers, mucosa moist, throat clear, dentition fair  Neck:  Supple, symmetrical,  thyroid: non tender  Lungs:   Clear to auscultation bilaterally. No Wheezing or Rhonchi. No rales. Chest wall:  No tenderness  No Accessory muscle use. Heart:   Regular  rhythm,  No  murmur   No edema  Abdomen:   Soft, non-tender. Not distended. Bowel sounds normal  Extremities: No cyanosis. No clubbing,      Skin turgor normal, Capillary refill normal, Radial dial pulse 2+  Skin:     Not pale. Not Jaundiced  No rashes   Psych:  Good insight. Not depressed. Not anxious or agitated. Neurologic: EOMs intact. No facial asymmetry. No aphasia or slurred speech. Symmetrical strength, Sensation grossly intact.  Alert and oriented X 4.     _______________________________________________________________________  Care Plan discussed with:    Comments   Patient x    Family      RN x    Care Manager                    Consultant:      _______________________________________________________________________  Expected  Disposition:   Home with Family x   HH/PT/OT/RN    SNF/LTC    ORI    ________________________________________________________________________  TOTAL TIME:50Minutes    Critical Care Provided     Minutes non procedure based      Comments    x Reviewed previous records   >50% of visit spent in counseling and coordination of care x Discussion with patient and/or family and questions answered       ________________________________________________________________________  Signed: Merlin Lagos, MD    Procedures: see electronic medical records for all procedures/Xrays and details which were not copied into this note but were reviewed prior to creation of Plan. LAB DATA REVIEWED:    Recent Results (from the past 24 hour(s))   EKG, 12 LEAD, INITIAL    Collection Time: 12/29/21  8:57 AM   Result Value Ref Range    Ventricular Rate 83 BPM    Atrial Rate 83 BPM    P-R Interval 188 ms    QRS Duration 84 ms    Q-T Interval 392 ms    QTC Calculation (Bezet) 460 ms    Calculated P Axis 97 degrees    Calculated R Axis 5 degrees    Calculated T Axis 66 degrees    Diagnosis       Normal sinus rhythm  Left ventricular hypertrophy with repolarization abnormality ( Sokolow-Breaux ,   Romhilt-Miller )  Abnormal ECG  When compared with ECG of 15-MAY-2020 09:24,  Sinus rhythm has replaced Atrial flutter  Non-specific change in ST segment in Inferior leads     CBC WITH AUTOMATED DIFF    Collection Time: 12/29/21  9:03 AM   Result Value Ref Range    WBC 9.9 4.1 - 11.1 K/uL    RBC 3.24 (L) 4.10 - 5.70 M/uL    HGB 10.4 (L) 12.1 - 17.0 g/dL    HCT 32.8 (L) 36.6 - 50.3 %    .2 (H) 80.0 - 99.0 FL    MCH 32.1 26.0 - 34.0 PG    MCHC 31.7 30.0 - 36.5 g/dL    RDW 13.7 11.5 - 14.5 %    PLATELET 253 896 - 240 K/uL    MPV 10.8 8.9 - 12.9 FL    NRBC 0.0 0  WBC    ABSOLUTE NRBC 0.00 0.00 - 0.01 K/uL    NEUTROPHILS 86 (H) 32 - 75 %    BAND NEUTROPHILS 1 0 - 6 %    LYMPHOCYTES 5 (L) 12 - 49 %    MONOCYTES 7 5 - 13 %    EOSINOPHILS 0 0 - 7 %    BASOPHILS 0 0 - 1 %    MYELOCYTES 1 (H) 0 %    IMMATURE GRANULOCYTES 0 %    ABS. NEUTROPHILS 8.6 (H) 1.8 - 8.0 K/UL    ABS. LYMPHOCYTES 0.5 (L) 0.8 - 3.5 K/UL    ABS. MONOCYTES 0.7 0.0 - 1.0 K/UL    ABS. EOSINOPHILS 0.0 0.0 - 0.4 K/UL    ABS. BASOPHILS 0.0 0.0 - 0.1 K/UL    ABS. IMM.  GRANS. 0.0 K/UL    DF MANUAL      RBC COMMENTS MACROCYTOSIS  1+        RBC COMMENTS OVALOCYTES  PRESENT       METABOLIC PANEL, COMPREHENSIVE    Collection Time: 12/29/21  9:03 AM   Result Value Ref Range    Sodium 137 136 - 145 mmol/L    Potassium 3.9 3.5 - 5.1 mmol/L    Chloride 102 97 - 108 mmol/L    CO2 26 21 - 32 mmol/L    Anion gap 9 5 - 15 mmol/L Glucose 189 (H) 65 - 100 mg/dL    BUN 23 (H) 6 - 20 MG/DL    Creatinine 1.22 0.70 - 1.30 MG/DL    BUN/Creatinine ratio 19 12 - 20      GFR est AA >60 >60 ml/min/1.73m2    GFR est non-AA 56 (L) >60 ml/min/1.73m2    Calcium 9.6 8.5 - 10.1 MG/DL    Bilirubin, total 0.6 0.2 - 1.0 MG/DL    ALT (SGPT) 30 12 - 78 U/L    AST (SGOT) 27 15 - 37 U/L    Alk. phosphatase 121 (H) 45 - 117 U/L    Protein, total 7.0 6.4 - 8.2 g/dL    Albumin 2.7 (L) 3.5 - 5.0 g/dL    Globulin 4.3 (H) 2.0 - 4.0 g/dL    A-G Ratio 0.6 (L) 1.1 - 2.2     TROPONIN-HIGH SENSITIVITY    Collection Time: 12/29/21  9:03 AM   Result Value Ref Range    Troponin-High Sensitivity 33 0 - 76 ng/L   SAMPLES BEING HELD    Collection Time: 12/29/21  9:03 AM   Result Value Ref Range    SAMPLES BEING HELD 1RED     COMMENT        Add-on orders for these samples will be processed based on acceptable specimen integrity and analyte stability, which may vary by analyte.    COVID-19 RAPID TEST    Collection Time: 12/29/21 10:45 AM   Result Value Ref Range    Specimen source Nasopharyngeal      COVID-19 rapid test Detected (A) NOTD

## 2021-12-29 NOTE — CONSULTS
Patient seen, chart reviewed, note dictated. 79 y/o man with a h/o multiple myeloma followed closely by Dr. Torres Guardado in our clinic, now presenting with Covid pneumonia. His myeloma has been very well controlled with an M-spike of 0.4 g/dL last month while on elotuzumab/pomalidomide. He has questions about whether he should continue his pomalidomide while being treated for COVID. 1. MM in patient with COVID: I have asked him to hold his pomalidomide given his excellent control and current infection. Please note that our myeloma patients have reduced immunoglobulin levels and I have ordered quant Igs. If low, he will benefit from IVIG as an inpatient while infected. Appreciate excellent ER/Hospitalist care!     Justina Meckel, MD  Hem/Onc  [id number]

## 2021-12-29 NOTE — ED PROVIDER NOTES
Mr. Marilee Perez is an 79yo male who presents to the ER with complaints of fatigue and shortness of breath. He states that he has been feeling bad for the last week. He has a history of melanoma and is currently getting chemo infusions. He has been vaccinated for Covid and has received his booster recently. He reports having a cough and shortness of breath when he exerts himself. He also reports generalized weakness and fatigue. No nausea or vomiting. No chest pain. No abdominal pain. He denies any changes with his urine or bowel movements. He reports he has chronic lower extremity edema, which is unchanged from his baseline. He denies any other complaints. Past Medical History:   Diagnosis Date    AAA (abdominal aortic aneurysm) ([address] 1/6/2012    Abnormal serum protein electrophoresis 4/25/2012    Anemia 4/6/2010    Anemia 1/5/2012    Anemia due to GI bleed 2007 4/6/2010    Aneurysm ([address]     AAA    Arrhythmia     ATRIAL FIB PT STATES HE DOESN'T HAVE    Arthritis     Atrial fibrillation 1/2012 2/22/2012    Bilateral Carotid Bruits, L>R 9/7/2010    Bone cancer ([address]     Borderline diabetes mellitus 4/6/2010    Cancer ([address]     SKIN MELANOMA ON BACK     Carotid stenosis 4/6/2010    Carotid stenosis     Chronic pain     Diabetes mellitus, type 2 ([address] 9/7/2010    NO MEDS    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    ETOH abuse 4/6/2010    GERD (gastroesophageal reflux disease) 4/6/2010    GI bleed 4/6/2010    GI bleed, duodenitis 2007 4/6/2010    H/O Heavy Alcohol Use 9/7/2010    History of skin cancer 9/7/2010    HTN (hypertension) 4/6/2010    Hypercalcemia 4/25/2012    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/2010    Hypertriglyceridemia 9/7/2010    Left inguinal hernia 9/14/2015    Lipids blood increased 4/6/2010    Microalbuminuria 12/10/2015    Mild Allergic Rhinitis 9/7/2010    Pre-diabetes 9/7/2010    2005;  Optho Dr Iva Rangel Right inguinal hernia 2015    S/P AAA repair 2012    Sinus tachycardia 2010    Tachycardia 2010    Vitamin B12 deficiency 2010       Past Surgical History:   Procedure Laterality Date    COLONOSCOPY  2007    Normal; Dr Esther Cowan EGD  2007    small hiatal hernia, mild duodenitis; Dr Esther Cowan HX AAA REPAIR  2012    Dr Juan Leyden    HX CATARACT REMOVAL      both eyes    HX CHOLECYSTECTOMY  14    lap [facility]i- [facility]- Dr. Mariah Caraballo    HX HERNIA REPAIR Left 9/24/15    left indirect inguinal by Dr. Christina Schaeffer Right 11/2/15    inguinal w/ mesh by Dr. Christina Schaeffer Bilateral 10/2015 And 2015    Inguinal    BLUE DOPPLER  3/26/2012    BLUE Echo; + clot         Family History:   Problem Relation Age of Onset    Heart Disease Mother          age 80    [de-identified] Father          brain tumor age 80    Heart Disease Father     Substance Abuse Daughter          cocaine OD age 43 in 200    Other Son          PE/DVT age 43   [address] Other Daughter         alive and well    Anesth Problems Neg Hx        Social History     Socioeconomic History    Marital status:      Spouse name: Not on file    Number of children: 3    Years of education: Not on file    Highest education level: Not on file   Occupational History    Occupation: Retired  from [address] [employer]    Smoking status: Former Smoker     Packs/day: 1.50     Years: 55.00     Pack years: 82.50     Quit date: 2001     Years since quittin.0    Smokeless tobacco: Never Used    Tobacco comment: used to smoke 1 1/2 ppd x ~ 40 yrs   Vaping Use    Vaping Use: Never used   Substance and Sexual Activity    Alcohol use:  Yes     Alcohol/week: 14.0 standard drinks     Types: 14 Glasses of wine per week     Comment: 14    Drug use: No    Sexual activity: Not on file   Other Topics Concern     Service Not Asked    Blood Transfusions Not Asked    Caffeine Concern No     Comment: none    Occupational Exposure Not Asked    Hobby Hazards Not Asked    Sleep Concern Not Asked    Stress Concern Not Asked    Weight Concern No     Comment: down some at present; usually pretty stable in the 180's; his personal goal is 175    Special Diet Yes     Comment: very low sugar, low carb, tries to pay attention to sugar grams    Back Care Not Asked    Exercise Yes     Comment: stays active and walks on treadmill 15 minutes 1-2 x a week and is very active in the yard   [employer] Helmet Not Asked   [address] Not Asked    Self-Exams Not Asked   Social History Narrative    Lives with wife, who is developing dementia    Daughter is in the area     Social Determinants of Health     Financial Resource Strain:     Difficulty of Paying Living Expenses: Not on file   Food Insecurity:     Worried About [address] in the Last Year: Not on file    Jenni of Food in the Last Year: Not on file   Transportation Needs:     Lack of Transportation (Medical): Not on file    Lack of Transportation (Non-Medical):  Not on file   Physical Activity:     Days of Exercise per Week: Not on file    Minutes of Exercise per Session: Not on file   Stress:     Feeling of Stress : Not on file   Social Connections:     Frequency of Communication with Friends and Family: Not on file    Frequency of Social Gatherings with Friends and Family: Not on file    Attends Christianity Services: Not on file    Active Member of Clubs or Organizations: Not on file    Attends Club or Organization Meetings: Not on file    Marital Status: Not on file   Intimate Partner Violence:     Fear of Current or Ex-Partner: Not on file    Emotionally Abused: Not on file    Physically Abused: Not on file    Sexually Abused: Not on file   Housing Stability:     Unable to Pay for Housing in the Last Year: Not on file    Number of Places Lived in the Last Year: Not on file    Unstable Housing in the Last Year: Not on file         ALLERGIES: Codeine, Contrast agent [iodine], Fish oil, Glipizide, Metformin, Niacin, Norvasc [amlodipine], and Optiray 350 [ioversol]    Review of Systems   Constitutional: Positive for fatigue. Negative for chills and fever. HENT: Positive for congestion. Respiratory: Positive for shortness of breath. Negative for cough. Cardiovascular: Negative for chest pain. Gastrointestinal: Negative for abdominal pain, diarrhea, nausea and vomiting. Genitourinary: Negative for dysuria and hematuria. Musculoskeletal: Negative for arthralgias and myalgias. Skin: Negative for pallor and rash. Neurological: Negative for dizziness, weakness and light-headedness. All other systems reviewed and are negative. Vitals:    12/29/21 0848   BP: 137/63   Pulse: 84   Resp: (!) 32   Temp: 97 °F (36.1 °C)   SpO2: 93%            Physical Exam     Vital signs reviewed. Nursing notes reviewed.     Const:  No acute distress, well developed, well nourished  Head:  Atraumatic, normocephalic  Eyes:  PERRL, conjunctiva normal, no scleral icterus  Neck:  Supple, trachea midline  Cardiovascular: Regular rate  Resp: Moderate resp distress, increased work of breathing, significant respiratory distress with minimal exertion   abd:  Soft, non-tender, non-distended  MSK:  No pedal edema, normal ROM  Neuro:  Alert and oriented x3, no cranial nerve defect  Skin:  Warm, dry, intact  Psych: normal mood and affect, behavior is normal, judgement and thought content is normal          MDM  Number of Diagnoses or Management Options     Amount and/or Complexity of Data Reviewed  Clinical lab tests: ordered and reviewed  Tests in the radiology section of CPT®: ordered and reviewed  Review and summarize past medical records: yes    Patient Progress  Patient progress: stable          Perfect Serve Consult for Admission  10:45 AM    ED Room Number: HA64/37  Patient Name and age:  Winston Kussmaul 80 y.o.  male  Working Diagnosis:   1. Suspected COVID-19 virus infection    2. Acute respiratory failure with hypoxia ([address]        COVID-19 Suspicion:  yes  Sepsis present:  no  Reassessment needed: no  Code Status:  Do Not Resuscitate  Readmission: no  Isolation Requirements:  yes  Recommended Level of Care:  telemetry  Department:[facility] - 21           Procedures      Total critical care time spent exclusive of procedures:  35 min. Mr. Christiano Weiner is an 81yo male who presents to the ER with complaints of SOB. Pt. Found to have covid. He is hypoxic with oxygen saturations in the 70s with exertion. Xray is consistent with covid pneumonia. Pt.  To be evaluated for admission by the hospitalist.

## 2021-12-30 NOTE — PROGRESS NOTES
12:52 PM  Transition of Care Plan  1. COVID-19  2. RUR- 15%   3. DISPOSITION: Home with Home Health PT/OT, family assistance, and home O2  4. F/U with PCP/Specialist    5. Transport: BLS    Orders received for home health and home 02. Referral placed to [address] for home O2. CM awaiting receipt of referral and updates wether or not agency can accept and accommodate patient for services. Patient' insurance has preferred providers for home health. Referrals placed. Awaiting receipt of referral and updates on who may be able to assist and accommodate patient with home health needs. CM will continue to follow and assist with JOSE needs as they arise. 2:33 PM  CM received updates that [facility] is able to accept and accommodate patient for O2 services. Company currently speaking with patient's spouse arranging home delivery of equipment. [facility] is the only home health agency that is able to accept and accommodate patient for services. However, are unable to follow active Covid patient's and would not be able to see until patient is symptom free. Plans for agency to follow-up next week. F/U placed on AVS.    Patient will require BLS transport home. Referral placed to [address] [place] [address],[address] [phone]. [facility] is able to accept and accommodate patient for transport today at 5:00 pm.  PCS form completed and provided to unit for transport.     JAIMEE Tirado/BETSY  Care Management

## 2021-12-30 NOTE — PROGRESS NOTES
PHYSICAL THERAPY EMERGENCY DEPARTMENT EVALUATION WITH DISCHARGE  Patient: Bro Streeter (25 y.o. male)  Date: 12/30/2021  Primary Diagnosis: Pneumonia due to COVID-19 virus [U07.1, J12.82]       Precautions: fall, droplet+       ASSESSMENT  Based on the objective data described below, the patient presents with SOB and hypoxia limiting functional mobility s/p admission w/ COVID-19 pneumonia. Received pt in the ED sitting EOB with O2, tele leads, and pulse ox off and call light activated. Pt stating he just finished using the [facility] and requesting assistance to teresa leads & O2. Noted plan for discharge from the ED today with home O2. Therapy was consulted for oxygen assessment and home needs. Functionally patient mobilizes independently using his rollator walker. Gait distance is limited by SOB and activity tolerance. Completed oxygen assessment as documented below. There is some concern re: how pt will manage the full flight of stairs to access his bedroom given his O2 needs. Recommended he consider staying on the first floor of his home and discussed options (hosp bed vs sofa vs staying on the second floor). Pt declined offer to assist in obtaining a hosp bed stating he will follow up if he feels he needs one. Patient had questions re: oxygen, how family should protect themselves, and activity. Educated pt as able and in home health services for additional training and education. Recommended he practice O2 switch over himself when O2 is delivered to his home this evening, that he isolate and comply with masks (family and pt) when in the presence of others. Educated in PLB and benefit of upright activity (sitting up, walking as breathing allows), recommend pulse ox for home.    Pt is anxious to return home, appreciative of information and demonstrating understanding of education provided today.         Pulse oximetry assessment:  (attending arrived into the room and made aware)    89% at rest on room air (if 88% or less, skip next steps)  79% while ambulating on room air  92% at rest on 2 LPM  88% while ambulating on 2LPM    92% while ambulating on 4LPM  (walked in place 40 secs)     Functional Outcome Measure: The patient scored 18/28 on the Tinetti outcome measure which is indicative of high fall risk. Other factors to consider for discharge: O2 needs, steps to enter the home and access the bedroom, lives w/ wife, daughter available to assist     Further skilled acute physical therapy is not indicated at this time. PLAN :  Recommendation for discharge: (in order for the patient to meet his/her long term goals)  Physical therapy at least 2 days/week in the home AND ensure assist and/or supervision for safety with O2 management, medical condition, and mobility    This discharge recommendation:  Has been made in collaboration with the attending provider and/or case management    Equipment recommendations for successful discharge: O2, hosp bed     SUBJECTIVE:   Patient stated Thank you.     OBJECTIVE DATA SUMMARY:   HISTORY:    Past Medical History:   Diagnosis Date    AAA (abdominal aortic aneurysm) ([facility] 1/6/2012    Abnormal serum protein electrophoresis 4/25/2012    Anemia 4/6/2010    Anemia 1/5/2012    Anemia due to GI bleed 2007 4/6/2010    Aneurysm ([facility])     AAA    Arrhythmia     ATRIAL FIB PT STATES HE DOESN'T HAVE    Arthritis     Atrial fibrillation 1/2012 2/22/2012    Bilateral Carotid Bruits, L>R 9/7/2010    Bone cancer ([address]     Borderline diabetes mellitus 4/6/2010    Cancer ([facility]     SKIN MELANOMA ON BACK     Carotid stenosis 4/6/2010    Carotid stenosis     Chronic pain     Diabetes mellitus, type 2 ([address] 9/7/2010    NO MEDS    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    ETOH abuse 4/6/2010    GERD (gastroesophageal reflux disease) 4/6/2010    GI bleed 4/6/2010    GI bleed, duodenitis 2007 4/6/2010    H/O Heavy Alcohol Use 9/7/2010    History of skin cancer 9/7/2010    HTN (hypertension) 4/6/2010    Hypercalcemia 4/25/2012    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/2010    Hypertriglyceridemia 9/7/2010    Left inguinal hernia 9/14/2015    Lipids blood increased 4/6/2010    Microalbuminuria 12/10/2015    Mild Allergic Rhinitis 9/7/2010    Pre-diabetes 9/7/2010    2005;  Optho Dr Laurent Snowden Right inguinal hernia 11/2/2015    S/P AAA repair 2/9/2012 2/22/2012    Sinus tachycardia 9/7/2010    Tachycardia 4/6/2010    Vitamin B12 deficiency 9/7/2010     Past Surgical History:   Procedure Laterality Date    COLONOSCOPY  8/2007    Normal; Dr Lupe Borjas EGD  8/2007    small hiatal hernia, mild duodenitis; Dr Lupe Borjas HX AAA REPAIR  2/9/2012    Dr Jamel Oneal    HX CATARACT REMOVAL      both eyes    HX CHOLECYSTECTOMY  1-29-14    [facility]- Dr. Gordon Kamara Left 9/24/15    left indirect inguinal by Dr. Melody Bhagat Right 11/2/15    inguinal w/ mesh by Dr. Melody Bhagat Bilateral 10/2015 And 11/2015    Inguinal    BLUE DOPPLER  3/26/2012    BLUE Echo; + clot     Prior Level of Function/Home Situation: Ambulates with rollator walker, denies recent falls though notes balance impairment  Personal factors and/or comorbidities impacting plan of care:  PMH/ HPI    Home Situation  Home Environment: Private residence  # Steps to Enter: 4  Rails to Enter: Yes  Hand Rails : Bilateral  One/Two Story Residence: Two story (2nd floor bedroom)  Living Alone: No (wife)  Support Systems: Child(manuela),Spouse/Significant Other  Current DME Used/Available at Home: Walker, rollator,Grab bars,Shower chair (walker on each level of the home)  Tub or Shower Type: Tub/Shower combination    EXAMINATION/PRESENTATION/DECISION MAKING:   Critical Behavior:  Neurologic State: Alert  Orientation Level: Oriented X4  Cognition: Appropriate decision making,Appropriate for age attention/concentration,Appropriate safety awareness,Follows commands  Safety/Judgement: Awareness of environment,Good awareness of safety precautions,Insight into deficits  Hearing:     Skin:    Range Of Motion:  AROM: Generally decreased, functional                       Strength:    Strength: Generally decreased, functional                    Tone & Sensation:   Tone: Normal                              Coordination:  Coordination: Within functional limits  Vision:      Functional Mobility:  Bed Mobility:  Received/ remained sitting EOB. Inferred independent sup<->sit based on mobility observed and pt rept        Transfers:  Sit to Stand: Modified independent  Stand to Sit: Modified independent                       Balance:   Sitting: Intact; Without support  Standing: Intact; With support  Ambulation/Gait Training:  Distance (ft): 20 Feet (ft) (limited to room d/t droplet+ precautions). Also walked in place 40 seconds. Assistive Device: Walker, rollator        Gait Description (WDL): Exceptions to WDL  Gait Abnormalities: Decreased step clearance  Gait with the walker is steady. Functional Measure:  Tinetti test:    Sitting Balance: 1  Arises: 1  Attempts to Rise: 2  Immediate Standing Balance: 1  Standing Balance: 1  Nudged: 0 (inferred)  Eyes Closed: 0 (inferred)  Turn 360 Degrees - Continuous/Discontinuous: 1 (w/ walker)  Turn 360 Degrees - Steady/Unsteady: 1 (w/ walker)  Sitting Down: 1  Balance Score: 9 Balance total score  Indication of Gait: 1  R Step Length/Height: 1  L Step Length/Height: 1  R Foot Clearance: 1  L Foot Clearance: 1  Step Symmetry: 1  Step Continuity: 1  Path: 1  Trunk: 0  Walking Time: 1  Gait Score: 9 Gait total score  Total Score: 18/28 Overall total score         Tinetti Tool Score Risk of Falls  <19 = High Fall Risk  19-24 = Moderate Fall Risk  25-28 = Low Fall Risk  Tinetti ME. Performance-Oriented Assessment of Mobility Problems in Elderly Patients. [facility]; [id number]. (Scoring Description: PT Bulletin Feb. 10, 1993)    Older adults: Moe Clarke et al, 2009; n = 1000 [facility] elderly evaluated with ABC, BHUPENDRA, ADL, and IADL)  · Mean BHUPENDRA score for males aged 69-68 years = 26.21(3.40)  · Mean BHUPENDRA score for females age 69-68 years = 25.16(4.30)  · Mean BHUPENDRA score for males over 80 years = 23.29(6.02)  · Mean BHUPENDRA score for females over 80 years = 17.20(8.32)          Physical Therapy Evaluation Charge Determination   History Examination Presentation Decision-Making   MEDIUM  Complexity : 1-2 comorbidities / personal factors will impact the outcome/ POC  MEDIUM Complexity : 3 Standardized tests and measures addressing body structure, function, activity limitation and / or participation in recreation  MEDIUM Complexity : Evolving with changing characteristics  Other outcome measures Tinetti 18/28  HIGH       Based on the above components, the patient evaluation is determined to be of the following complexity level: MEDIUM    Therapeutic Activity/ Education:   Educated: activity progression, PLB, role of HHPT, Oxygen need, pulse oximetry, stair safety, isolation (wife & dtr do not have covid) vs all wearing masks                     Activity Tolerance:   Fair, desaturates with exertion and requires oxygen, requires rest breaks and observed SOB with activity    After treatment patient left in no apparent distress:   Sitting EOB, call bell w/in reach. COMMUNICATION/EDUCATION:   Communication/Collaboration:  Fall prevention education w/ regards to ED lines & home O2 was provided and the patient/caregiver indicated understanding., Patient participated as able in  plan of care and agreeable to plan for HHPT    Findings and recommendations were discussed with: MD physician and RN, Attending.      Thank you for this referral.  Juice Del Valle, PT   Time Calculation: 39 mins

## 2021-12-30 NOTE — DISCHARGE INSTRUCTIONS
Alyse Snell MD   Physician   Internal Medicine   Discharge Summary       Signed   Date of Service:  12/30/21 1459                         []Jenae copied text    []Obed for details       Discharge Summary         PATIENT ID: Rodrick Caldwell  MRN: [id number]   YOB: 1934    DATE OF ADMISSION: 12/29/2021 10:13 AM    DATE OF DISCHARGE: 12/30/21   PRIMARY CARE PROVIDER: Li Franco NP      ATTENDING PHYSICIAN: Cindy Zafar MD  DISCHARGING PROVIDER: Cindy Zafar MD    To contact this individual call [phone] and ask the  to page. If unavailable ask to be transferred the Adult Hospitalist Department.     CONSULTATIONS: IP CONSULT TO HEMATOLOGY     PROCEDURES/SURGERIES: * No surgery found *     ADMITTING [address]:   HPI: \"Jan Howe is a 80 y.o.   male with past medical history significant for HTN, HLD, and multiple myeloma on chemotherapy currently who presents with fatigue and shortness of breath that has been going on for the last 1 and half week.  Patient also endorses productive cough which seems to have subsided over the last couple of days. Federico Reyes complains of lack of energy and being sleepy.  He does have chronic diarrhea without recent worsening.  He denies any chest pain or fever.  Patient is vaccinated against Covid and had a booster dose.  He does not smoke cigarettes and denies history of COPD or asthma. He was recently transitioned from IV chemotherapy to oral for his multiple myeloma and took a dose of Pomalyst yesterday along with steroid. \"     Patient managed for acute effects of respiratory failure secondary to Covid pneumonia. Desatted to 87% on room air was placed on 2 L with saturations to mid 90s. Home O2 order placed. PT OT evaluated patient recommended PT OT for home. Heme-onc evaluated patient as patient was undergoing treatment chemotherapy for multiple myeloma.   Per heme-onc recommendation will have chemotherapy on hold until infection is resolved then can resume. Discharged with home health PT OT and home O2. Instructed follow-up with PCP, heme-onc outpatient. DC home health with home O2 today.                  DISCHARGE DIAGNOSES / PLAN:       AHRF 2/2 COVID pneumonia  -Presents with fatigue and shortness of breath  -Tachypneic on admission > desat to 87% on RA today, stable on 2L   -Rapid Covid test positive.  Vaccinated against Covid including booster dose.   -CXR shows patchy peripheral heterogeneous opacities throughout the lungs  -Outside window for remdesivir  -Procalcitonin low  - start dexamethasone 6 mg PO daily > continue for remaining 9 days to complete 10-day course > then resume home Decadron dosage thereafter  -No indications for baricitinib or Actemra  -Monitor respiratory status closely  -Check inflammatory markers  -Hematology consult re chemotherapy > chemotherapy will be on hold until infection resolved     Multiple myeloma  -Was on elotuzumab IV.  Started on Pomalyst, first dose yesterday.  -Stable anemia.  Creatinine and calcium level within normal limit  -Continue acyclovir  -Hematology consult, appreciate recs can f/u outpatient      Chronic macrocytic anemia  -Follow H&H.     Type 2 DM  -Not on any medication at home.  Monitor blood glucose while on steroid.  Start sliding scale insulin     GERD  -Continue as needed Tums     Peripheral neuropathy  -Continue Lyrica     Chronic diarrhea  -Imodium as needed     AAA s/p repair     Code Status: DNR  Surrogate Decision Maker: His daughter Jami Valdes     DVT Prophylaxis: Lovenox  GI Prophylaxis: not indicated     PT OT evaluated patient recommended home health with PT OT and home O2     Baseline: Lives with his wife, uses a cane and walker for ambulation            FOLLOW UP APPOINTMENTS:             Follow-up Information      Follow up With Specialties Details Why Contact Info     Nydia Edmonds NP Nurse Practitioner     [phone] [facility]  [phone]                ADDITIONAL CARE RECOMMENDATIONS: Advance Care Planning  People with COVID-19 may have no symptoms, mild symptoms, such as fever, cough, and shortness of breath or they may have more severe illness, developing severe and fatal pneumonia.  As a result, Advance Care Planning with attention to naming a health care decision maker (someone you trust to make healthcare decisions for you if you could not speak for yourself) and sharing other health care preferences is important BEFORE a possible health crisis. Please contact your Primary Care Provider to discuss Advance Care Planning.      Preventing the Spread of Coronavirus Disease 2019 in Homes and Residential Communities  For the most recent information go to [employer]     Prevention steps for People with confirmed or suspected COVID-19 (including persons under investigation) who do not need to be hospitalized  and   People with confirmed COVID-19 who were hospitalized and determined to be medically stable to go home     Your healthcare provider and public health staff will evaluate whether you can be cared for at home. If it is determined that you do not need to be hospitalized and can be isolated at home, you will be monitored by staff from your local or state health department. You should follow the prevention steps below until a healthcare provider or local or state health department says you can return to your normal activities. Stay home except to get medical care  People who are mildly ill with COVID-19 are able to isolate at home during their illness. You should restrict activities outside your home, except for getting medical care. Do not go to work, school, or public areas. Avoid using public transportation, ride-sharing, or taxis.   Separate yourself from other people and animals in your home  People: As much as possible, you should stay in a specific room and away from other people in your home. Also, you should use a separate bathroom, if available. Animals: You should restrict contact with pets and other animals while you are sick with COVID-19, just like you would around other people. Although there have not been reports of pets or other animals becoming sick with COVID-19, it is still recommended that people sick with COVID-19 limit contact with animals until more information is known about the virus. When possible, have another member of your household care for your animals while you are sick. If you are sick with COVID-19, avoid contact with your pet, including petting, snuggling, being kissed or licked, and sharing food. If you must care for your pet or be around animals while you are sick, wash your hands before and after you interact with pets and wear a facemask. Call ahead before visiting your doctor  If you have a medical appointment, call the healthcare provider and tell them that you have or may have COVID-19. This will help the healthcare providers office take steps to keep other people from getting infected or exposed. Wear a facemask  You should wear a facemask when you are around other people (e.g., sharing a room or vehicle) or pets and before you enter a healthcare providers office. If you are not able to wear a facemask (for example, because it causes trouble breathing), then people who live with you should not stay in the same room with you, or they should wear a facemask if they enter your room. Cover your coughs and sneezes  Cover your mouth and nose with a tissue when you cough or sneeze. Throw used tissues in a lined trash can. Immediately wash your hands with soap and water for at least 20 seconds or, if soap and water are not available, clean your hands with an alcohol-based hand  that contains at least 60% alcohol.   Clean your hands often  Wash your hands often with soap and water for at least 20 seconds, especially after blowing your nose, coughing, or sneezing; going to the bathroom; and before eating or preparing food. If soap and water are not readily available, use an alcohol-based hand  with at least 60% alcohol, covering all surfaces of your hands and rubbing them together until they feel dry. Soap and water are the best option if hands are visibly dirty. Avoid touching your eyes, nose, and mouth with unwashed hands. Avoid sharing personal household items  You should not share dishes, drinking glasses, cups, eating utensils, towels, or bedding with other people or pets in your home. After using these items, they should be washed thoroughly with soap and water. Clean all high-touch surfaces everyday  High touch surfaces include counters, tabletops, doorknobs, bathroom fixtures, toilets, phones, keyboards, tablets, and bedside tables. Also, clean any surfaces that may have blood, stool, or body fluids on them. Use a household cleaning spray or wipe, according to the label instructions. Labels contain instructions for safe and effective use of the cleaning product including precautions you should take when applying the product, such as wearing gloves and making sure you have good ventilation during use of the product. Monitor your symptoms  Seek prompt medical attention if your illness is worsening (e.g., difficulty breathing). Before seeking care, call your healthcare provider and tell them that you have, or are being evaluated for, COVID-19. Put on a facemask before you enter the facility. These steps will help the healthcare providers office to keep other people in the office or waiting room from getting infected or exposed. Ask your healthcare provider to call the local or [facility] health department.  Persons who are placed under active monitoring or facilitated self-monitoring should follow instructions provided by their local health department or occupational health professionals, as appropriate. When working with your local health department check their available hours. If you have a medical emergency and need to call 911, notify the dispatch personnel that you have, or are being evaluated for COVID-19. If possible, put on a facemask before emergency medical services arrive. Discontinuing home isolation  Patients with confirmed COVID-19 should remain under home isolation precautions until the risk of secondary transmission to others is thought to be low. The decision to discontinue home isolation precautions should be made on a case-by-case basis, in consultation with healthcare providers and [facility] and local health departments.           DIET: Resume previous diet     ACTIVITY: Activity as tolerated              DISCHARGE MEDICATIONS:      Current Discharge Medication List           START taking these medications     Details   !! dexAMETHasone (DECADRON) 6 mg tablet Take 1 tab by mouth daily for 9 days, resume home dose of decadron once completed  Qty: 9 Tablet, Refills: 0  Start date: 12/30/2021        !! - Potential duplicate medications found. Please discuss with provider.           CONTINUE these medications which have NOT CHANGED     Details   calcium carbonate (TUMS) 200 mg calcium (500 mg) chew Take 1 Tablet by mouth as needed.       potassium chloride (KLOR-CON M10) 10 mEq tablet TAKE 1 TABLET BY MOUTH DAILY WITH LASIX  Qty: 90 Tablet, Refills: 0       folic acid-vit K6-CMC O35 (Folbic) 2.5-25-2 mg tablet Take 1 Tablet by mouth daily. Qty: 90 Tablet, Refills: 3       Pomalyst 2 mg cap TAKE 1 CAPSULE BY MOUTH DAILY FOR 14 DAYS ON THEN 14 DAYS OFF       !! dexAMETHasone (DECADRON) 4 mg tablet 2 tabs once weekly on treatment days  5 tabs once weekly when not having infusion  Pt takes on Tuesdats       ELOTUZUMAB IV by IntraVENous route.       pregabalin (LYRICA) 25 mg capsule Take 50 mg by mouth two (2) times a day.       aspirin delayed-release 81 mg tablet Take 81 mg by mouth nightly.     hydroxypropyl methylcellulose (GENTEAL MILD) 0.2 % ophthalmic solution Administer 2 Drops to both eyes as needed.       furosemide (LASIX) 20 mg tablet Take 2 Tabs by mouth daily. (usually just 1 pill daily for heart)       loperamide (IMODIUM A-D) 2 mg tablet as needed. 2 pills first thing in AM, 1 pill twice daily after that (total 4 per day)       acyclovir (ZOVIRAX) 400 mg tablet TK 1 T PO QD  Refills: 6       multivitamin (ONE A DAY) tablet Take 1 Tab by mouth daily.       tamsulosin (FLOMAX) 0.4 mg capsule TAKE 1 CAPSULE BY MOUTH DAILY  Qty: 90 Capsule, Refills: 1       ACCU-CHEK SMARTVIEW TEST STRIP strip Patient is to test blood sugar BID. Fill 3 packages  Qty: 3 Strip, Refills: 11       REVLIMID 10 mg cap nightly. Indications: 14 DAYS ON AND 14 DAYS OFF  Refills: 0       prochlorperazine (COMPAZINE) 10 mg tablet TK 1 T PO Q 4 HOURS PRN FOR NAUSEA  Refills: 2       Lancets (ACCU-CHEK FASTCLIX) misc Patient is to test BID  Qty: 3 Package, Refills: 11       Blood-Glucose Meter (ACCU-CHEK CORA) misc 1 Package by Does Not Apply route two (2) times a day. Qty: 1 Each, Refills: 11        !! - Potential duplicate medications found. Please discuss with provider.                NOTIFY YOUR PHYSICIAN FOR ANY OF THE FOLLOWING:   Fever over 101 degrees for 24 hours. Chest pain, shortness of breath, fever, chills, nausea, vomiting, diarrhea, change in mentation, falling, weakness, bleeding. Severe pain or pain not relieved by medications.   Or, any other signs or symptoms that you may have questions about.     DISPOSITION:    Home With:    OT   PT x HH   RN        Long term SNF/Inpatient Rehab     Independent/assisted living     Hospice     Other:         PATIENT CONDITION AT DISCHARGE:      Functional status     Poor    x Deconditioned      Independent       Cognition     Lucid    x Forgetful      Dementia          Code status     Full code    x DNR       PHYSICAL EXAMINATION AT DISCHARGE:  General: No acute distress, appears stated age  CVS: Regular rate and rhythm no murmurs gallops rubs  Pulmonary: Clear to auscultation bilaterally  Abdomen: Soft nontender nondistended positive bowel sounds  Extremities. No peripheral extremity edema, no rashes or lesions  Neuro: ANO x3, cranial nerves II through X grossly intact        CHRONIC MEDICAL DIAGNOSES:             Problem List as of 12/30/2021 Date Reviewed: 12/29/2021           Codes Class Noted - Resolved     * (Principal) Pneumonia due to COVID-19 virus ICD-10-CM: U07.1, J12.82  ICD-9-CM: 480.8, 079.89   12/29/2021 - Present           Personal history of atrial fibrillation ICD-10-CM: Z86.79  ICD-9-CM: V12.59   11/9/2020 - Present           Carotid stenosis, asymptomatic, right ICD-10-CM: Q02.79  ICD-9-CM: 433.10   5/29/2020 - Present           Hyperlipemia, mixed ICD-10-CM: E78.2  ICD-9-CM: 272.2   9/27/2018 - Present           Aortic stenosis, moderate ICD-10-CM: I35.0  ICD-9-CM: 424. 1   4/10/2018 - Present           Mild concentric left ventricular hypertrophy (LVH) ICD-10-CM: I51.7  ICD-9-CM: 429. 3   4/10/2018 - Present           Chronic diarrhea ICD-10-CM: K52.9  ICD-9-CM: 787.91   9/29/2017 - Present           Microalbuminuria ICD-10-CM: R80.9  ICD-9-CM: 791.0   12/10/2015 - Present           Right inguinal hernia ICD-10-CM: K40.90  ICD-9-CM: 550.90   11/2/2015 - Present           Left inguinal hernia ICD-10-CM: K40.90  ICD-9-CM: 550.90   9/14/2015 - Present           [facility] ICD-10-CM: C90.00  ICD-9-CM: 203.00   12/27/2012 - Present           Hypercalcemia ICD-10-CM: R74.92  ICD-9-CM: 275.42   4/25/2012 - Present     Overview Signed 4/25/2012  5:49 PM by Nano Peres MD       2012, PTH normal, abnormal protein electrophoresis, referred to Hem-Onc                 S/P AAA repair 2/9/2012 ICD-10-CM: U74.037, Z86.79  ICD-9-CM: V45.89   2/22/2012 - Present     Overview Signed 2/22/2012  1:38 PM by Nano Peres MD Dr Kevin                 Pre-diabetes ICD-10-CM: R73.03  ICD-9-CM: 790.29   9/7/2010 - Present     Overview Addendum 11/1/2011  9:29 AM by Penny Kwan MD       2005; Optho Dr Juan Antonio Silva                 Sinus tachycardia ICD-10-CM: R00.0  ICD-9-CM: 427.89   9/7/2010 - Present           H/O Heavy Alcohol Use ICD-9-CM: ICD5829   9/7/2010 - Present           Vitamin B12 deficiency ICD-10-CM: E53.8  ICD-9-CM: 266.2   9/7/2010 - Present           Hyperhomocysteinemia (HCC) ICD-10-CM: E72.11  ICD-9-CM: 270.4   9/7/2010 - Present           Mild Allergic Rhinitis ICD-10-CM: J30.9  ICD-9-CM: 477.9   9/7/2010 - Present           History of skin cancer ICD-10-CM: W72.768  ICD-9-CM: V10.83   9/7/2010 - Present     Overview Signed 9/7/2010  9:03 AM by Penny Kwan MD       Followed by Drrojas Higuera HTN (hypertension) ICD-10-CM: I10  ICD-9-CM: 022. 9   4/6/2010 - Present     Overview Signed 9/7/2010  8:55 AM by Penny Kwan MD       1990                 H/O Carotid Stenosis ICD-10-CM: I65.29  ICD-9-CM: 433.10   4/6/2010 - Present     Overview Addendum 9/7/2010  8:58 AM by Penny Kwan MD       Oct 2003; 50%left subclavian and 50-70% SHEILA                  GERD (gastroesophageal reflux disease) ICD-10-CM: K21.9  ICD-9-CM: 530.81   4/6/2010 - Present           RESOLVED: Myeloma ([address] ICD-10-CM: C90.00  ICD-9-CM: 203.00   1/6/2015 - 6/10/2016           RESOLVED: Gastrointestinal bleeding ICD-10-CM: K92.2  ICD-9-CM: 719. 9   12/26/2014 - 2/28/2018           RESOLVED: Gall stone pancreatitis ICD-10-CM: K85.10  ICD-9-CM: 577.0, 574.20   1/23/2014 - 2/28/2018           RESOLVED: Acute cholecystitis ICD-10-CM: K81.0  ICD-9-CM: 575.0   1/23/2014 - 2/28/2018           RESOLVED: Abnormal serum protein electrophoresis ICD-10-CM: R77.8  ICD-9-CM: 790.99   4/25/2012 - 2/28/2018     Overview Signed 4/25/2012  5:48 PM by Penny Kwan MD       4/2012, referred to Hem-Onc            RESOLVED: Atrial fibrillation 1/2012 ICD-10-CM: I48.91  ICD-9-CM: 427.31   2/22/2012 - 11/9/2020     Overview Signed 2/22/2012  1:33 PM by Tiffany Farris MD       Cardio Dr Amy Frazier                 RESOLVED: AAA (abdominal aortic aneurysm) ([facility] ICD-10-CM: I71.4  ICD-9-CM: 570. 4   1/6/2012 - 9/26/2018     Overview Signed 1/6/2012  2:07 PM by Tiffany Farris MD       Detected on plain films back xray 1/2012; Ct scheduled                 RESOLVED: Anemia ICD-10-CM: D64.9  ICD-9-CM: 756. 9   1/5/2012 - 2/28/2018           RESOLVED: Hypertriglyceridemia ICD-10-CM: E78.1  ICD-9-CM: 272.1   9/7/2010 - 9/27/2018     Overview Signed 9/7/2010  8:55 AM by Tiffany Farris MD       2003                 RESOLVED: Bilateral Carotid Bruits, L>R ICD-10-CM: R09.89  ICD-9-CM: 212. 9   9/7/2010 - 2/28/2018     Overview Signed 9/7/2010  8:57 AM by Tiffany Farris MD       10/03                 RESOLVED: Anemia due to GI bleed 2007 ICD-10-CM: D64.9  ICD-9-CM: 668. 9   4/6/2010 - 2/28/2018           RESOLVED: GI bleed, duodenitis 2007 ICD-10-CM: K92.2  ICD-9-CM: 204. 9   4/6/2010 - 2/28/2018     Overview Addendum 9/7/2010  9:01 AM by Tiffany Farris MD       8/07 Duodenitis                               Greater than 30 minutes were spent with the patient on counseling and coordination of care     Signed:   Bob Farmer MD  12/30/2021  3:00 PM                 Routing History

## 2021-12-30 NOTE — PROGRESS NOTES
Transition of Care Plan  1. COVID-19  2. RUR- 15%  3. DISPOSITION: TBD/subject to change pending recommendations; pending medical progression   -Anticipate home with family assistance. - Hematology Consulted and Following. 4. F/U with PCP/Specialist    5. Transport: Family    CM consult received for Home O2. Informed Respiratory to evaluate patient to identify need for home O2 per RN. Currently awaiting PT to assess for fall risk and 6 min walk. CM will continue to follow and assist with JOSE needs as they arise. Reason for Admission:  Pneumonia due to COVID-19 virus                     RUR Score:    15%                 Plan for utilizing home health:   PT to evaluate. CM awaiting recommendations at this time. PCP: None, Patient last seen at [facility] in March 2021. Spouse unable to provide new PCP information at this time. Current Advanced Directive/Advance Care Plan: DNR      Healthcare Decision Maker:  Spouse: Federico Gale [phone]    Initial assessment completed with patient's spouse, Federico Gale. 80year old male, AOx4. Independent with ADL's and IADL's. DME utilized: walker/cane. Resides with spouse in their own 2 level home with 5 steps to enter. Receives [employer] as source of income with no significant financial stressor or concern. Insurance verified: [employer]. [facility] is utilized for prescriptions. Care Management Interventions  PCP Verified by CM: No  Last Visit to PCP: 03/23/21  Palliative Care Criteria Met (RRAT>21 & CHF Dx)?: No  Mode of Transport at Discharge:  Other (see comment) (Family)  Transition of Care Consult (CM Consult): DME/Supply Assistance  Discharge Durable Medical Equipment: Yes  Physical Therapy Consult: Yes  Occupational Therapy Consult: No  Speech Therapy Consult: No  Support Systems: Spouse/Significant Other  Confirm Follow Up Transport: Family  Discharge Location  Discharge Placement: Home with family assistance (TBD/subject to change pending recommendations and medical progression)    Yue Mcgowan, MSW/CRM  Care Management  10:24 AM                       .

## 2021-12-30 NOTE — CONSULTS
[address]    Name:  Joao Davis  MR#:  [id number]  :  1934  ACCOUNT #:  [de-identified]  DATE OF SERVICE:  2021    HEMATOLOGY-ONCOLOGY CONSULT NOTE    REASON FOR ADMISSION:  Shortness of breath and fatigue. REASON FOR CONSULTATION:  \"Should I take my pomalidomide for my myeloma. \"    HISTORY OF PRESENT ILLNESS:  The patient is an 51-year-old man, followed closely by Dr. Vincent Watts. He was last seen in our clinic on . He has been on elotuzumab and pomalidomide and has an excellent prolonged response, ever since started on or around 10/2020, and his M-spike most recently checked was 0.4 g/dL. In the emergency room, he was noted to have a hemoglobin of 10.4, MCV of 101.2. His creatinine was normal at 1.22. His calcium was normal.  Unfortunately, he was found to have active COVID infection, with a positive COVID test.  His imaging included a CT scan of his chest, which showed patchy peripheral bilateral airspace disease. He is currently saturating 96% on 2 L nasal cannula, and is being admitted to the hospital.  He is concerned about control of his myeloma and asked if he should take his pomalidomide. PAST MEDICAL HISTORY:  1.  Multiple myeloma as above. 2.  Atrial fibrillation. 3.  COPD. 4.  Type 2 diabetes. 5.  Hyperlipidemia. 6.  Hypertension. 7.  Peripheral vascular disease. 8.  Peripheral neuropathy. 9.  Aortic aneurysm, status post repair. 10.  Status post cholecystectomy in . 11.  Bilateral hernia repair in 10/2015. 12.  Right carotid endarterectomy. CURRENT MEDICATIONS:  In the hospital, Zovirax 400 mg p.o. daily, aspirin 81 mg daily, Decadron 6 mg oral q.24, Lovenox 40 mg subcu daily, Lasix 40 mg daily, Humalog insulin sliding scale, Lyrica 50 mg p.o. t.i.d. ALLERGIES:  CODEINE, FISH OIL, IODINE, IV CONTRAST. SOCIAL HISTORY:  He is the primary caregiver for his wife with dementia. He has one child, Ed Pap.   Prior but not current alcohol use. Quit smoking in 2001. FAMILY HISTORY:  Father had a brain tumor. No hematologic dyscrasias in the family. REVIEW OF SYSTEMS:  GENERAL:  No fevers or chills. HEENT:  No auditory or visual change. LYMPHATIC:  No lumps or bumps in neck, underarm, or groin. CARDIOVASCULAR:  No chest pain or palpitations. PULMONARY:  As above. GASTROINTESTINAL:  No abdominal pain, diarrhea, or constipation. GENITOURINARY:  No dysuria or incontinence. SKIN:  No rash or changing mole. MUSCULOSKELETAL:  No red or swollen joints. NEUROLOGIC:  No irritability or syncope. PHYSICAL EXAMINATION:  GENERAL:  Pleasant, in no acute distress. VITAL SIGNS:  /62, pulse 72, sating 96% on 2 L nasal cannula. Exam deferred because the patient has active COVID. ASSESSMENT AND PLAN:  An 80-year-old man with a history of multiple myeloma, followed closely by Dr. Marty Muniz in our clinic, now presenting with COVID pneumonia, on pomalidomide and elotuzumab since 10/2020. Most recent M-spike showed excellent control at 0.4 g/dL last month. The patient asked whether he should continue his pomalidomide while being treated for COVID. 1.  Multiple myeloma in patient with COVID. I have asked him to hold his pomalidomide given his excellent control and current infection. Please note that myeloma patients have reduced immunoglobulin levels, and I have ordered quantitive immunoglobulins. If low, he will benefit from IVIG as an inpatient while infected. Appreciate excellent ER and hospitalist care. Thank you for the consult.       Tono Knott MD      [id number]  D:  12/29/2021 18:00  T:  12/29/2021 23:19  JOB #:  [id number]  CC:  MD Emily Leonard MD

## 2021-12-30 NOTE — PROGRESS NOTES
Orders received, chart reviewed and patient evaluated by physical therapy in the ED for discharge needs. Gait assessment was limited to a short distance in the room d/t droplet+ precautions therefore unable to assess stairs. Recommended pt consider staying on the first floor of his home. Pt endorses having the ability to obtain a hosp bed or sleep on the sofa. Recommend HHPT/OT, and home oxygen. Full evaluation to follow. Pulse oximetry assessment   89% at rest on room air (if 88% or less, skip next steps)  79% while ambulating on room air  92% at rest on 2 LPM  88% while ambulating on 2LPM    92% while ambulating on 4LPM  (walked in place 40 secs)    Full evaluation to follow.

## 2021-12-30 NOTE — DISCHARGE SUMMARY
Discharge Summary       PATIENT ID: Valerio Rosenberg  MRN: [id number]   YOB: 1934    DATE OF ADMISSION: 12/29/2021 10:13 AM    DATE OF DISCHARGE: 12/30/21   PRIMARY CARE PROVIDER: Markel Brito NP     ATTENDING PHYSICIAN: Doug Dowell MD  DISCHARGING PROVIDER: Doug Dowell MD    To contact this individual call [phone] and ask the  to page. If unavailable ask to be transferred the Adult Hospitalist Department. CONSULTATIONS: IP CONSULT TO HEMATOLOGY    PROCEDURES/SURGERIES: * No surgery found *    ADMITTING DIAGNOSES & HOSPITAL COURSE:   HPI: Colin Alex is a 80 y.o.  male with past medical history significant for HTN, HLD, and multiple myeloma on chemotherapy currently who presents with fatigue and shortness of breath that has been going on for the last 1 and half week. Patient also endorses productive cough which seems to have subsided over the last couple of days. Also complains of lack of energy and being sleepy. He does have chronic diarrhea without recent worsening. He denies any chest pain or fever. Patient is vaccinated against Covid and had a booster dose. He does not smoke cigarettes and denies history of COPD or asthma. He was recently transitioned from IV chemotherapy to oral for his multiple myeloma and took a dose of Pomalyst yesterday along with steroid. \"    Patient managed for acute effects of respiratory failure secondary to Covid pneumonia. Desatted to 87% on room air was placed on 2 L with saturations to mid 90s. Home O2 order placed. PT OT evaluated patient recommended PT OT for home. Heme-onc evaluated patient as patient was undergoing treatment chemotherapy for multiple myeloma. Per heme-onc recommendation will have chemotherapy on hold until infection is resolved then can resume. Discharged with home health PT OT and home O2. Instructed follow-up with PCP, heme-onc outpatient. DC home health with home O2 today. DISCHARGE DIAGNOSES / PLAN:      AHRF 2/2 COVID pneumonia  -Presents with fatigue and shortness of breath  -Tachypneic on admission > desat to 87% on RA today, stable on 2L   -Rapid Covid test positive. Vaccinated against Covid including booster dose. -CXR shows patchy peripheral heterogeneous opacities throughout the lungs  -Outside window for remdesivir  -Procalcitonin low  - start dexamethasone 6 mg PO daily > continue for remaining 9 days to complete 10-day course > then resume home Decadron dosage thereafter  -No indications for baricitinib or Actemra  -Monitor respiratory status closely  -Check inflammatory markers  -Hematology consult re chemotherapy > chemotherapy will be on hold until infection resolved     Multiple myeloma  -Was on elotuzumab IV. Started on Pomalyst, first dose yesterday.  -Stable anemia. Creatinine and calcium level within normal limit  -Continue acyclovir  -Hematology consult, appreciate recs can f/u outpatient      Chronic macrocytic anemia  -Follow H&H.     Type 2 DM  -Not on any medication at home. Monitor blood glucose while on steroid.   Start sliding scale insulin     GERD  -Continue as needed Tums     Peripheral neuropathy  -Continue Lyrica     Chronic diarrhea  -Imodium as needed     AAA s/p repair     Code Status: DNR  Surrogate Decision Maker: His daughter Sharon Achilles     DVT Prophylaxis: Lovenox  GI Prophylaxis: not indicated    PT OT evaluated patient recommended home health with PT OT and home O2     Baseline: Lives with his wife, uses a cane and walker for ambulation         FOLLOW UP APPOINTMENTS:    Follow-up Information     Follow up With Specialties Details Why Contact Info    Candace Edmonds NP Nurse Practitioner   [address] [phone]             ADDITIONAL CARE RECOMMENDATIONS: Advance Care Planning  People with COVID-19 may have no symptoms, mild symptoms, such as fever, cough, and shortness of breath or they may have more severe illness, developing severe and fatal pneumonia. As a result, Advance Care Planning with attention to naming a health care decision maker (someone you trust to make healthcare decisions for you if you could not speak for yourself) and sharing other health care preferences is important BEFORE a possible health crisis. Please contact your Primary Care Provider to discuss Advance Care Planning. Preventing the Spread of Coronavirus Disease 2019 in Homes and Residential Communities  For the most recent information go to [email or web address]    Prevention steps for People with confirmed or suspected COVID-19 (including persons under investigation) who do not need to be hospitalized  and   People with confirmed COVID-19 who were hospitalized and determined to be medically stable to go home    Your healthcare provider and public health staff will evaluate whether you can be cared for at home. If it is determined that you do not need to be hospitalized and can be isolated at home, you will be monitored by staff from your local or state health department. You should follow the prevention steps below until a healthcare provider or local or state health department says you can return to your normal activities. Stay home except to get medical care  People who are mildly ill with COVID-19 are able to isolate at home during their illness. You should restrict activities outside your home, except for getting medical care. Do not go to work, school, or public areas. Avoid using public transportation, ride-sharing, or taxis. Separate yourself from other people and animals in your home  People: As much as possible, you should stay in a specific room and away from other people in your home. Also, you should use a separate bathroom, if available. Animals:  You should restrict contact with pets and other animals while you are sick with COVID-19, just like you would around other people. Although there have not been reports of pets or other animals becoming sick with COVID-19, it is still recommended that people sick with COVID-19 limit contact with animals until more information is known about the virus. When possible, have another member of your household care for your animals while you are sick. If you are sick with COVID-19, avoid contact with your pet, including petting, snuggling, being kissed or licked, and sharing food. If you must care for your pet or be around animals while you are sick, wash your hands before and after you interact with pets and wear a facemask. Call ahead before visiting your doctor  If you have a medical appointment, call the healthcare provider and tell them that you have or may have COVID-19. This will help the healthcare providers office take steps to keep other people from getting infected or exposed. Wear a facemask  You should wear a facemask when you are around other people (e.g., sharing a room or vehicle) or pets and before you enter a healthcare providers office. If you are not able to wear a facemask (for example, because it causes trouble breathing), then people who live with you should not stay in the same room with you, or they should wear a facemask if they enter your room. Cover your coughs and sneezes  Cover your mouth and nose with a tissue when you cough or sneeze. Throw used tissues in a lined trash can. Immediately wash your hands with soap and water for at least 20 seconds or, if soap and water are not available, clean your hands with an alcohol-based hand  that contains at least 60% alcohol. Clean your hands often  Wash your hands often with soap and water for at least 20 seconds, especially after blowing your nose, coughing, or sneezing; going to the bathroom; and before eating or preparing food.  If soap and water are not readily available, use an alcohol-based hand  with at least 60% alcohol, covering all surfaces of your hands and rubbing them together until they feel dry. Soap and water are the best option if hands are visibly dirty. Avoid touching your eyes, nose, and mouth with unwashed hands. Avoid sharing personal household items  You should not share dishes, drinking glasses, cups, eating utensils, towels, or bedding with other people or pets in your home. After using these items, they should be washed thoroughly with soap and water. Clean all high-touch surfaces everyday  High touch surfaces include counters, tabletops, doorknobs, bathroom fixtures, toilets, phones, keyboards, tablets, and bedside tables. Also, clean any surfaces that may have blood, stool, or body fluids on them. Use a household cleaning spray or wipe, according to the label instructions. Labels contain instructions for safe and effective use of the cleaning product including precautions you should take when applying the product, such as wearing gloves and making sure you have good ventilation during use of the product. Monitor your symptoms  Seek prompt medical attention if your illness is worsening (e.g., difficulty breathing). Before seeking care, call your healthcare provider and tell them that you have, or are being evaluated for, COVID-19. Put on a facemask before you enter the facility. These steps will help the healthcare providers office to keep other people in the office or waiting room from getting infected or exposed. Ask your healthcare provider to call the local or state health department. Persons who are placed under active monitoring or facilitated self-monitoring should follow instructions provided by their local health department or occupational health professionals, as appropriate. When working with your local health department check their available hours. If you have a medical emergency and need to call 911, notify the dispatch personnel that you have, or are being evaluated for COVID-19.  If possible, put on a facemask before emergency medical services arrive. Discontinuing home isolation  Patients with confirmed COVID-19 should remain under home isolation precautions until the risk of secondary transmission to others is thought to be low. The decision to discontinue home isolation precautions should be made on a case-by-case basis, in consultation with healthcare providers and state and local health departments. DIET: Resume previous diet    ACTIVITY: Activity as tolerated          DISCHARGE MEDICATIONS:  Current Discharge Medication List      START taking these medications    Details   !! dexAMETHasone (DECADRON) 6 mg tablet Take 1 tab by mouth daily for 9 days, resume home dose of decadron once completed  Qty: 9 Tablet, Refills: 0  Start date: 12/30/2021       !! - Potential duplicate medications found. Please discuss with provider. CONTINUE these medications which have NOT CHANGED    Details   calcium carbonate (TUMS) 200 mg calcium (500 mg) chew Take 1 Tablet by mouth as needed. potassium chloride (KLOR-CON M10) 10 mEq tablet TAKE 1 TABLET BY MOUTH DAILY WITH LASIX  Qty: 90 Tablet, Refills: 0      folic acid-vit J3-VUB Z20 (Folbic) 2.5-25-2 mg tablet Take 1 Tablet by mouth daily. Qty: 90 Tablet, Refills: 3      Pomalyst 2 mg cap TAKE 1 CAPSULE BY MOUTH DAILY FOR 14 DAYS ON THEN 14 DAYS OFF      !! dexAMETHasone (DECADRON) 4 mg tablet 2 tabs once weekly on treatment days  5 tabs once weekly when not having infusion  Pt takes on Tuesdats      ELOTUZUMAB IV by IntraVENous route. pregabalin (LYRICA) 25 mg capsule Take 50 mg by mouth two (2) times a day. aspirin delayed-release 81 mg tablet Take 81 mg by mouth nightly. hydroxypropyl methylcellulose (GENTEAL MILD) 0.2 % ophthalmic solution Administer 2 Drops to both eyes as needed. furosemide (LASIX) 20 mg tablet Take 2 Tabs by mouth daily.  (usually just 1 pill daily for heart)      loperamide (IMODIUM A-D) 2 mg tablet as needed. 2 pills first thing in AM, 1 pill twice daily after that (total 4 per day)      acyclovir (ZOVIRAX) 400 mg tablet TK 1 T PO QD  Refills: 6      multivitamin (ONE A DAY) tablet Take 1 Tab by mouth daily. tamsulosin (FLOMAX) 0.4 mg capsule TAKE 1 CAPSULE BY MOUTH DAILY  Qty: 90 Capsule, Refills: 1      ACCU-CHEK SMARTVIEW TEST STRIP strip Patient is to test blood sugar BID. Fill 3 packages  Qty: 3 Strip, Refills: 11      REVLIMID 10 mg cap nightly. Indications: 14 DAYS ON AND 14 DAYS OFF  Refills: 0      prochlorperazine (COMPAZINE) 10 mg tablet TK 1 T PO Q 4 HOURS PRN FOR NAUSEA  Refills: 2      Lancets (ACCU-CHEK FASTCLIX) misc Patient is to test BID  Qty: 3 Package, Refills: 11      Blood-Glucose Meter (ACCU-CHEK CORA) misc 1 Package by Does Not Apply route two (2) times a day. Qty: 1 Each, Refills: 11       !! - Potential duplicate medications found. Please discuss with provider. NOTIFY YOUR PHYSICIAN FOR ANY OF THE FOLLOWING:   Fever over 101 degrees for 24 hours. Chest pain, shortness of breath, fever, chills, nausea, vomiting, diarrhea, change in mentation, falling, weakness, bleeding. Severe pain or pain not relieved by medications. Or, any other signs or symptoms that you may have questions about. DISPOSITION:    Home With:   OT  PT x HH  RN       Long term SNF/Inpatient Rehab    Independent/assisted living    Hospice    Other:       PATIENT CONDITION AT DISCHARGE:     Functional status    Poor    x Deconditioned     Independent      Cognition     Lucid    x Forgetful     Dementia        Code status     Full code    x DNR      PHYSICAL EXAMINATION AT DISCHARGE:  General: No acute distress, appears stated age  CVS: Regular rate and rhythm no murmurs gallops rubs  Pulmonary: Clear to auscultation bilaterally  Abdomen: Soft nontender nondistended positive bowel sounds  Extremities.   No peripheral extremity edema, no rashes or lesions  Neuro: ANO x3, cranial nerves II through X grossly intact      CHRONIC MEDICAL DIAGNOSES:  Problem List as of 12/30/2021 Date Reviewed: 12/29/2021          Codes Class Noted - Resolved    * (Principal) Pneumonia due to COVID-19 virus ICD-10-CM: U07.1, J12.82  ICD-9-CM: 480.8, 079.89  12/29/2021 - Present        Personal history of atrial fibrillation ICD-10-CM: Z86.79  ICD-9-CM: V12.59  11/9/2020 - Present        Carotid stenosis, asymptomatic, right ICD-10-CM: I65.21  ICD-9-CM: 433.10  5/29/2020 - Present        Hyperlipemia, mixed ICD-10-CM: E78.2  ICD-9-CM: 272.2  9/27/2018 - Present        Aortic stenosis, moderate ICD-10-CM: I35.0  ICD-9-CM: 424.1  4/10/2018 - Present        Mild concentric left ventricular hypertrophy (LVH) ICD-10-CM: I51.7  ICD-9-CM: 429.3  4/10/2018 - Present        Chronic diarrhea ICD-10-CM: K52.9  ICD-9-CM: 787.91  9/29/2017 - Present        Microalbuminuria ICD-10-CM: R80.9  ICD-9-CM: 791.0  12/10/2015 - Present        Right inguinal hernia ICD-10-CM: K40.90  ICD-9-CM: 550.90  11/2/2015 - Present        Left inguinal hernia ICD-10-CM: K40.90  ICD-9-CM: 550.90  9/14/2015 - Present        Multiple myeloma ([facility] ICD-10-CM: C90.00  ICD-9-CM: 203.00  12/27/2012 - Present        Hypercalcemia ICD-10-CM: L57.62  ICD-9-CM: 275.42  4/25/2012 - Present    Overview Signed 4/25/2012  5:49 PM by Nano Peres MD     2012, PTH normal, abnormal protein electrophoresis, referred to Hem-Onc             S/P AAA repair 2/9/2012 ICD-10-CM: Q79.477, Z86.79  ICD-9-CM: V45.89  2/22/2012 - Present    Overview Signed 2/22/2012  1:38 PM by MD Dr Shelly He             Pre-diabetes ICD-10-CM: R73.03  ICD-9-CM: 790.29  9/7/2010 - Present    Overview Addendum 11/1/2011  9:29 AM by Nano Peres MD     2005;  Optho Dr Tona Christian             Sinus tachycardia ICD-10-CM: R00.0  ICD-9-CM: 427.89  9/7/2010 - Present        H/O Heavy Alcohol Use ICD-9-CM: Zara Pimentel  9/7/2010 - Present        Vitamin B12 deficiency ICD-10-CM: E53.8  ICD-9-CM: 266.2  9/7/2010 - Present        Hyperhomocysteinemia ([facility] ICD-10-CM: E72.11  ICD-9-CM: 270.4  9/7/2010 - Present        Mild Allergic Rhinitis ICD-10-CM: J30.9  ICD-9-CM: 477.9  9/7/2010 - Present        History of skin cancer ICD-10-CM: Z85.828  ICD-9-CM: V10.83  9/7/2010 - Present    Overview Signed 9/7/2010  9:03 AM by Hannah Mendoza MD     Followed by Drm Dr Merline Cloud             HTN (hypertension) ICD-10-CM: I10  ICD-9-CM: 401.9  4/6/2010 - Present    Overview Signed 9/7/2010  8:55 AM by Hannah Mendoza MD     1990             H/O Carotid Stenosis ICD-10-CM: I65.29  ICD-9-CM: 433.10  4/6/2010 - Present    Overview Addendum 9/7/2010  8:58 AM by Hannah Mendoza MD     Oct 2003; 50%left subclavian and 50-70% SHEILA              GERD (gastroesophageal reflux disease) ICD-10-CM: K21.9  ICD-9-CM: 530.81  4/6/2010 - Present        RESOLVED: Myeloma ([facility] ICD-10-CM: C90.00  ICD-9-CM: 203.00  1/6/2015 - 6/10/2016        RESOLVED: Gastrointestinal bleeding ICD-10-CM: K92.2  ICD-9-CM: 578.9  12/26/2014 - 2/28/2018        RESOLVED: Heloise Teena stone pancreatitis ICD-10-CM: K85.10  ICD-9-CM: 577.0, 574.20  1/23/2014 - 2/28/2018        RESOLVED: Acute cholecystitis ICD-10-CM: K81.0  ICD-9-CM: 575.0  1/23/2014 - 2/28/2018        RESOLVED: Abnormal serum protein electrophoresis ICD-10-CM: R77.8  ICD-9-CM: 790.99  4/25/2012 - 2/28/2018    Overview Signed 4/25/2012  5:48 PM by Hannah Mendoza MD     4/2012, referred to Hem-Onc             RESOLVED: Atrial fibrillation 1/2012 ICD-10-CM: I48.91  ICD-9-CM: 427.31  2/22/2012 - 11/9/2020    Overview Signed 2/22/2012  1:33 PM by Hannah Mendoza MD     Cardio Dr Ross Figueredo: AAA (abdominal aortic aneurysm) ([facility] ICD-10-CM: I71.4  ICD-9-CM: 441.4  1/6/2012 - 9/26/2018    Overview Signed 1/6/2012  2:07 PM by Hannah Mendoza MD     Detected on plain films back xray 1/2012; Ct scheduled             RESOLVED: Anemia ICD-10-CM: D64.9  ICD-9-CM: 285.9  1/5/2012 - 2/28/2018        RESOLVED: Hypertriglyceridemia ICD-10-CM: E78.1  ICD-9-CM: 272.1  9/7/2010 - 9/27/2018    Overview Signed 9/7/2010  8:55 AM by Sonya Hernandez MD     2003             RESOLVED: Bilateral Carotid Bruits, L>R ICD-10-CM: R09.89  ICD-9-CM: 785.9  9/7/2010 - 2/28/2018    Overview Signed 9/7/2010  8:57 AM by Sonya Hernandez MD     10/03             RESOLVED: Anemia due to GI bleed 2007 ICD-10-CM: D64.9  ICD-9-CM: 285.9  4/6/2010 - 2/28/2018        RESOLVED: GI bleed, duodenitis 2007 ICD-10-CM: K92.2  ICD-9-CM: 578.9  4/6/2010 - 2/28/2018    Overview Addendum 9/7/2010  9:01 AM by Sonya Hernandez MD     8/07 Duodenitis                       Greater than 30 minutes were spent with the patient on counseling and coordination of care    Signed:   Raven Pittman MD  12/30/2021  3:00 PM

## 2021-12-30 NOTE — ED NOTES
Discharge plan of care/case management plan validated with provider discharge order. Pt's daughter picked up pt with full oxygen cylinder in hand. Case Management set up home oxygen to be delivered by 5pm.  Pt. Is alert oriented and verbalized no needs at this time.

## 2021-12-30 NOTE — ED NOTES
Bedside shift change report given to Toby Alvarado (oncoming nurse) by Mahesh Godinez (offgoing nurse). Report included the following information SBAR, ED Summary and Recent Results.

## 2021-12-30 NOTE — PROGRESS NOTES
Addendum:     Quant Igs in an acceptable range. No indication for IVIG.  Will be available if needed, can f/u with Dr. Caleb Bey as outpatient after treatment for COVID infection.       Yazmin Franks MD  Hem-Onc  [phone]

## 2022-01-01 ENCOUNTER — APPOINTMENT (OUTPATIENT)
Dept: GENERAL RADIOLOGY | Age: 87
DRG: 682 | End: 2022-01-01
Attending: FAMILY MEDICINE
Payer: MEDICARE

## 2022-01-01 ENCOUNTER — APPOINTMENT (OUTPATIENT)
Dept: VASCULAR SURGERY | Age: 87
DRG: 374 | End: 2022-01-01
Attending: FAMILY MEDICINE
Payer: MEDICARE

## 2022-01-01 ENCOUNTER — ANESTHESIA (OUTPATIENT)
Dept: ENDOSCOPY | Age: 87
DRG: 374 | End: 2022-01-01
Payer: MEDICARE

## 2022-01-01 ENCOUNTER — ANESTHESIA (OUTPATIENT)
Dept: SURGERY | Age: 87
DRG: 329 | End: 2022-01-01
Payer: MEDICARE

## 2022-01-01 ENCOUNTER — OFFICE VISIT (OUTPATIENT)
Dept: EMERGENCY DEPT | Age: 87
DRG: 329 | End: 2022-01-01
Attending: EMERGENCY MEDICINE
Payer: MEDICARE

## 2022-01-01 ENCOUNTER — PATIENT OUTREACH (OUTPATIENT)
Dept: CASE MANAGEMENT | Age: 87
End: 2022-01-01

## 2022-01-01 ENCOUNTER — OFFICE VISIT (OUTPATIENT)
Dept: FAMILY MEDICINE CLINIC | Age: 87
End: 2022-01-01
Payer: MEDICARE

## 2022-01-01 ENCOUNTER — APPOINTMENT (OUTPATIENT)
Dept: GENERAL RADIOLOGY | Age: 87
DRG: 329 | End: 2022-01-01
Attending: INTERNAL MEDICINE
Payer: MEDICARE

## 2022-01-01 ENCOUNTER — TELEPHONE (OUTPATIENT)
Dept: FAMILY MEDICINE CLINIC | Age: 87
End: 2022-01-01

## 2022-01-01 ENCOUNTER — APPOINTMENT (OUTPATIENT)
Dept: NON INVASIVE DIAGNOSTICS | Age: 87
DRG: 329 | End: 2022-01-01
Attending: NURSE PRACTITIONER
Payer: MEDICARE

## 2022-01-01 ENCOUNTER — ANESTHESIA EVENT (OUTPATIENT)
Dept: SURGERY | Age: 87
DRG: 329 | End: 2022-01-01
Payer: MEDICARE

## 2022-01-01 ENCOUNTER — APPOINTMENT (OUTPATIENT)
Dept: VASCULAR SURGERY | Age: 87
End: 2022-01-01
Attending: EMERGENCY MEDICINE
Payer: MEDICARE

## 2022-01-01 ENCOUNTER — APPOINTMENT (OUTPATIENT)
Dept: GENERAL RADIOLOGY | Age: 87
DRG: 374 | End: 2022-01-01
Attending: NURSE PRACTITIONER
Payer: MEDICARE

## 2022-01-01 ENCOUNTER — APPOINTMENT (OUTPATIENT)
Dept: GENERAL RADIOLOGY | Age: 87
DRG: 603 | End: 2022-01-01
Attending: PODIATRIST
Payer: MEDICARE

## 2022-01-01 ENCOUNTER — HOSPITAL ENCOUNTER (INPATIENT)
Age: 87
LOS: 3 days | End: 2022-07-09
Attending: FAMILY MEDICINE | Admitting: FAMILY MEDICINE

## 2022-01-01 ENCOUNTER — APPOINTMENT (OUTPATIENT)
Dept: GENERAL RADIOLOGY | Age: 87
DRG: 374 | End: 2022-01-01
Attending: FAMILY MEDICINE
Payer: MEDICARE

## 2022-01-01 ENCOUNTER — APPOINTMENT (OUTPATIENT)
Dept: VASCULAR SURGERY | Age: 87
DRG: 329 | End: 2022-01-01
Attending: FAMILY MEDICINE
Payer: MEDICARE

## 2022-01-01 ENCOUNTER — APPOINTMENT (OUTPATIENT)
Dept: VASCULAR SURGERY | Age: 87
DRG: 329 | End: 2022-01-01
Payer: MEDICARE

## 2022-01-01 ENCOUNTER — APPOINTMENT (OUTPATIENT)
Dept: CT IMAGING | Age: 87
DRG: 374 | End: 2022-01-01
Attending: STUDENT IN AN ORGANIZED HEALTH CARE EDUCATION/TRAINING PROGRAM
Payer: MEDICARE

## 2022-01-01 ENCOUNTER — HOSPITAL ENCOUNTER (INPATIENT)
Age: 87
LOS: 4 days | Discharge: HOME | DRG: 603 | End: 2022-02-16
Attending: EMERGENCY MEDICINE | Admitting: INTERNAL MEDICINE
Payer: MEDICARE

## 2022-01-01 ENCOUNTER — APPOINTMENT (OUTPATIENT)
Dept: GENERAL RADIOLOGY | Age: 87
DRG: 329 | End: 2022-01-01
Attending: SURGERY
Payer: MEDICARE

## 2022-01-01 ENCOUNTER — APPOINTMENT (OUTPATIENT)
Dept: VASCULAR SURGERY | Age: 87
DRG: 603 | End: 2022-01-01
Attending: EMERGENCY MEDICINE
Payer: MEDICARE

## 2022-01-01 ENCOUNTER — HOSPITAL ENCOUNTER (INPATIENT)
Age: 87
LOS: 5 days | Discharge: HOSPICE | DRG: 374 | End: 2022-07-06
Attending: STUDENT IN AN ORGANIZED HEALTH CARE EDUCATION/TRAINING PROGRAM | Admitting: FAMILY MEDICINE
Payer: MEDICARE

## 2022-01-01 ENCOUNTER — APPOINTMENT (OUTPATIENT)
Dept: GENERAL RADIOLOGY | Age: 87
DRG: 329 | End: 2022-01-01
Payer: MEDICARE

## 2022-01-01 ENCOUNTER — APPOINTMENT (OUTPATIENT)
Dept: MRI IMAGING | Age: 87
DRG: 329 | End: 2022-01-01
Attending: PODIATRIST
Payer: MEDICARE

## 2022-01-01 ENCOUNTER — APPOINTMENT (OUTPATIENT)
Dept: CT IMAGING | Age: 87
DRG: 329 | End: 2022-01-01
Attending: EMERGENCY MEDICINE
Payer: MEDICARE

## 2022-01-01 ENCOUNTER — HOSPICE ADMISSION (OUTPATIENT)
Dept: HOSPICE | Facility: HOSPICE | Age: 87
End: 2022-01-01
Payer: MEDICARE

## 2022-01-01 ENCOUNTER — APPOINTMENT (OUTPATIENT)
Dept: GENERAL RADIOLOGY | Age: 87
DRG: 329 | End: 2022-01-01
Attending: NURSE PRACTITIONER
Payer: MEDICARE

## 2022-01-01 ENCOUNTER — HOSPITAL ENCOUNTER (INPATIENT)
Age: 87
LOS: 25 days | Discharge: NURSING, REHAB OR LONG-TERM CARE | DRG: 329 | End: 2022-03-18
Attending: EMERGENCY MEDICINE | Admitting: SURGERY
Payer: MEDICARE

## 2022-01-01 ENCOUNTER — APPOINTMENT (OUTPATIENT)
Dept: CT IMAGING | Age: 87
DRG: 329 | End: 2022-01-01
Attending: FAMILY MEDICINE
Payer: MEDICARE

## 2022-01-01 ENCOUNTER — ANESTHESIA EVENT (OUTPATIENT)
Dept: ENDOSCOPY | Age: 87
DRG: 374 | End: 2022-01-01
Payer: MEDICARE

## 2022-01-01 ENCOUNTER — HOSPITAL ENCOUNTER (EMERGENCY)
Age: 87
Discharge: HOME | End: 2022-02-17
Attending: EMERGENCY MEDICINE
Payer: MEDICARE

## 2022-01-01 ENCOUNTER — APPOINTMENT (OUTPATIENT)
Dept: GENERAL RADIOLOGY | Age: 87
DRG: 374 | End: 2022-01-01
Attending: STUDENT IN AN ORGANIZED HEALTH CARE EDUCATION/TRAINING PROGRAM
Payer: MEDICARE

## 2022-01-01 ENCOUNTER — HOSPITAL ENCOUNTER (INPATIENT)
Age: 87
LOS: 5 days | Discharge: NURSING, REHAB OR LONG-TERM CARE | DRG: 682 | End: 2022-03-31
Attending: EMERGENCY MEDICINE | Admitting: FAMILY MEDICINE
Payer: MEDICARE

## 2022-01-01 VITALS
WEIGHT: 157.2 LBS | DIASTOLIC BLOOD PRESSURE: 69 MMHG | BODY MASS INDEX: 20.83 KG/M2 | HEIGHT: 73 IN | SYSTOLIC BLOOD PRESSURE: 156 MMHG | HEART RATE: 69 BPM | TEMPERATURE: 98.1 F | RESPIRATION RATE: 16 BRPM | OXYGEN SATURATION: 100 %

## 2022-01-01 VITALS
HEART RATE: 89 BPM | TEMPERATURE: 98 F | OXYGEN SATURATION: 98 % | RESPIRATION RATE: 16 BRPM | DIASTOLIC BLOOD PRESSURE: 66 MMHG | SYSTOLIC BLOOD PRESSURE: 127 MMHG | BODY MASS INDEX: 22.38 KG/M2 | WEIGHT: 168.87 LBS | HEIGHT: 73 IN

## 2022-01-01 VITALS
RESPIRATION RATE: 16 BRPM | TEMPERATURE: 97.3 F | OXYGEN SATURATION: 95 % | HEIGHT: 73 IN | WEIGHT: 164 LBS | DIASTOLIC BLOOD PRESSURE: 53 MMHG | SYSTOLIC BLOOD PRESSURE: 112 MMHG | HEART RATE: 70 BPM | BODY MASS INDEX: 21.74 KG/M2

## 2022-01-01 VITALS
RESPIRATION RATE: 16 BRPM | TEMPERATURE: 97.3 F | SYSTOLIC BLOOD PRESSURE: 145 MMHG | BODY MASS INDEX: 21.66 KG/M2 | HEART RATE: 68 BPM | OXYGEN SATURATION: 98 % | HEIGHT: 73 IN | WEIGHT: 163.4 LBS | DIASTOLIC BLOOD PRESSURE: 58 MMHG

## 2022-01-01 VITALS
DIASTOLIC BLOOD PRESSURE: 72 MMHG | RESPIRATION RATE: 18 BRPM | HEART RATE: 70 BPM | TEMPERATURE: 97.7 F | OXYGEN SATURATION: 96 % | SYSTOLIC BLOOD PRESSURE: 157 MMHG

## 2022-01-01 VITALS
OXYGEN SATURATION: 90 % | WEIGHT: 160 LBS | BODY MASS INDEX: 21.2 KG/M2 | SYSTOLIC BLOOD PRESSURE: 100 MMHG | HEIGHT: 73 IN | RESPIRATION RATE: 18 BRPM | HEART RATE: 52 BPM | DIASTOLIC BLOOD PRESSURE: 62 MMHG | TEMPERATURE: 98.9 F

## 2022-01-01 VITALS
BODY MASS INDEX: 18.06 KG/M2 | WEIGHT: 136.24 LBS | HEIGHT: 73 IN | RESPIRATION RATE: 22 BRPM | TEMPERATURE: 97.8 F | DIASTOLIC BLOOD PRESSURE: 95 MMHG | SYSTOLIC BLOOD PRESSURE: 127 MMHG | HEART RATE: 82 BPM | OXYGEN SATURATION: 96 %

## 2022-01-01 VITALS
TEMPERATURE: 97.7 F | SYSTOLIC BLOOD PRESSURE: 90 MMHG | DIASTOLIC BLOOD PRESSURE: 54 MMHG | OXYGEN SATURATION: 92 % | RESPIRATION RATE: 16 BRPM | HEART RATE: 81 BPM

## 2022-01-01 DIAGNOSIS — L03.115 CELLULITIS OF RIGHT FOOT: Primary | ICD-10-CM

## 2022-01-01 DIAGNOSIS — G89.3 CANCER ASSOCIATED PAIN: ICD-10-CM

## 2022-01-01 DIAGNOSIS — R53.81 DEBILITY: ICD-10-CM

## 2022-01-01 DIAGNOSIS — Z00.00 MEDICARE ANNUAL WELLNESS VISIT, SUBSEQUENT: Primary | ICD-10-CM

## 2022-01-01 DIAGNOSIS — J18.9 MULTIFOCAL PNEUMONIA: ICD-10-CM

## 2022-01-01 DIAGNOSIS — Z71.89 GOALS OF CARE, COUNSELING/DISCUSSION: ICD-10-CM

## 2022-01-01 DIAGNOSIS — K56.609 LARGE BOWEL OBSTRUCTION (HCC): ICD-10-CM

## 2022-01-01 DIAGNOSIS — E87.6 HYPOKALEMIA: ICD-10-CM

## 2022-01-01 DIAGNOSIS — K56.601 COMPLETE INTESTINAL OBSTRUCTION, UNSPECIFIED CAUSE (HCC): ICD-10-CM

## 2022-01-01 DIAGNOSIS — S90.821A BLISTER OF FOOT WITH INFECTION, RIGHT, INITIAL ENCOUNTER: Primary | ICD-10-CM

## 2022-01-01 DIAGNOSIS — I10 ESSENTIAL HYPERTENSION: ICD-10-CM

## 2022-01-01 DIAGNOSIS — E72.11 HYPERHOMOCYSTEINEMIA (HCC): ICD-10-CM

## 2022-01-01 DIAGNOSIS — R19.7 DIARRHEA, UNSPECIFIED TYPE: ICD-10-CM

## 2022-01-01 DIAGNOSIS — L03.115 CELLULITIS OF RIGHT LOWER EXTREMITY: ICD-10-CM

## 2022-01-01 DIAGNOSIS — C18.2 MALIGNANT NEOPLASM OF ASCENDING COLON (HCC): Primary | ICD-10-CM

## 2022-01-01 DIAGNOSIS — R63.4 WEIGHT LOSS: ICD-10-CM

## 2022-01-01 DIAGNOSIS — G47.00 INSOMNIA, UNSPECIFIED TYPE: ICD-10-CM

## 2022-01-01 DIAGNOSIS — Z51.5 HOSPICE CARE: ICD-10-CM

## 2022-01-01 DIAGNOSIS — C18.2 MALIGNANT NEOPLASM OF ASCENDING COLON (HCC): ICD-10-CM

## 2022-01-01 DIAGNOSIS — N28.9 ACUTE RENAL INSUFFICIENCY: Primary | ICD-10-CM

## 2022-01-01 DIAGNOSIS — G63 POLYNEUROPATHY ASSOCIATED WITH UNDERLYING DISEASE (HCC): ICD-10-CM

## 2022-01-01 DIAGNOSIS — H33.21 DETACHED RETINA, RIGHT: ICD-10-CM

## 2022-01-01 DIAGNOSIS — Z09 HOSPITAL DISCHARGE FOLLOW-UP: ICD-10-CM

## 2022-01-01 DIAGNOSIS — F41.8 ANXIETY ABOUT DYING: ICD-10-CM

## 2022-01-01 DIAGNOSIS — L08.9 BLISTER OF FOOT WITH INFECTION, RIGHT, INITIAL ENCOUNTER: Primary | ICD-10-CM

## 2022-01-01 DIAGNOSIS — Z51.5 PALLIATIVE CARE ENCOUNTER: ICD-10-CM

## 2022-01-01 DIAGNOSIS — K52.9 CHRONIC DIARRHEA: Primary | ICD-10-CM

## 2022-01-01 DIAGNOSIS — M79.671 RIGHT FOOT PAIN: ICD-10-CM

## 2022-01-01 DIAGNOSIS — K56.609 SBO (SMALL BOWEL OBSTRUCTION) (HCC): ICD-10-CM

## 2022-01-01 DIAGNOSIS — C78.6 PERITONEAL CARCINOMATOSIS (HCC): Primary | ICD-10-CM

## 2022-01-01 LAB
ABO + RH BLD: NORMAL
ABO + RH BLD: NORMAL
ALBUMIN SERPL ELPH-MCNC: 2 G/DL (ref 2.9–4.4)
ALBUMIN SERPL-MCNC: 2.4 G/DL (ref 3.5–5)
ALBUMIN SERPL-MCNC: 2.4 G/DL (ref 3.5–5)
ALBUMIN SERPL-MCNC: 2.6 G/DL (ref 3.5–5)
ALBUMIN SERPL-MCNC: 2.8 G/DL (ref 3.5–5)
ALBUMIN SERPL-MCNC: 2.9 G/DL (ref 3.5–5)
ALBUMIN SERPL-MCNC: 3.1 G/DL (ref 3.5–5)
ALBUMIN SERPL-MCNC: 3.5 G/DL (ref 3.5–5)
ALBUMIN SERPL-MCNC: 3.5 G/DL (ref 3.5–5)
ALBUMIN/GLOB SERPL: 0.7 {RATIO} (ref 1.1–2.2)
ALBUMIN/GLOB SERPL: 0.8 {RATIO} (ref 1.1–2.2)
ALBUMIN/GLOB SERPL: 0.9 {RATIO} (ref 1.1–2.2)
ALBUMIN/GLOB SERPL: 1.1 {RATIO} (ref 0.7–1.7)
ALBUMIN/GLOB SERPL: 1.1 {RATIO} (ref 1.1–2.2)
ALP SERPL-CCNC: 118 U/L (ref 45–117)
ALP SERPL-CCNC: 241 U/L (ref 45–117)
ALP SERPL-CCNC: 64 U/L (ref 45–117)
ALP SERPL-CCNC: 65 U/L (ref 45–117)
ALP SERPL-CCNC: 69 U/L (ref 45–117)
ALP SERPL-CCNC: 70 U/L (ref 45–117)
ALP SERPL-CCNC: 74 U/L (ref 45–117)
ALP SERPL-CCNC: 75 U/L (ref 45–117)
ALPHA1 GLOB SERPL ELPH-MCNC: 0.3 G/DL (ref 0–0.4)
ALPHA2 GLOB SERPL ELPH-MCNC: 0.7 G/DL (ref 0.4–1)
ALT SERPL-CCNC: 14 U/L (ref 12–78)
ALT SERPL-CCNC: 15 U/L (ref 12–78)
ALT SERPL-CCNC: 15 U/L (ref 12–78)
ALT SERPL-CCNC: 16 U/L (ref 12–78)
ALT SERPL-CCNC: 17 U/L (ref 12–78)
ALT SERPL-CCNC: 20 U/L (ref 12–78)
ALT SERPL-CCNC: 32 U/L (ref 12–78)
ALT SERPL-CCNC: 34 U/L (ref 12–78)
ANION GAP SERPL CALC-SCNC: 1 MMOL/L (ref 5–15)
ANION GAP SERPL CALC-SCNC: 10 MMOL/L (ref 5–15)
ANION GAP SERPL CALC-SCNC: 2 MMOL/L (ref 5–15)
ANION GAP SERPL CALC-SCNC: 2 MMOL/L (ref 5–15)
ANION GAP SERPL CALC-SCNC: 3 MMOL/L (ref 5–15)
ANION GAP SERPL CALC-SCNC: 4 MMOL/L (ref 5–15)
ANION GAP SERPL CALC-SCNC: 5 MMOL/L (ref 5–15)
ANION GAP SERPL CALC-SCNC: 6 MMOL/L (ref 5–15)
ANION GAP SERPL CALC-SCNC: 7 MMOL/L (ref 5–15)
ANION GAP SERPL CALC-SCNC: 8 MMOL/L (ref 5–15)
ANION GAP SERPL CALC-SCNC: 9 MMOL/L (ref 5–15)
ANION GAP SERPL CALC-SCNC: 9 MMOL/L (ref 5–15)
APPEARANCE UR: ABNORMAL
AST SERPL-CCNC: 15 U/L (ref 15–37)
AST SERPL-CCNC: 22 U/L (ref 15–37)
AST SERPL-CCNC: 28 U/L (ref 15–37)
AST SERPL-CCNC: 38 U/L (ref 15–37)
AST SERPL-CCNC: 59 U/L (ref 15–37)
AST SERPL-CCNC: ABNORMAL U/L (ref 15–37)
ATRIAL RATE: 75 BPM
ATRIAL RATE: 85 BPM
ATRIAL RATE: 88 BPM
B-GLOBULIN SERPL ELPH-MCNC: 0.6 G/DL (ref 0.7–1.3)
BACTERIA SPEC CULT: ABNORMAL
BACTERIA SPEC CULT: NORMAL
BACTERIA URNS QL MICRO: ABNORMAL /HPF
BASOPHILS # BLD: 0 K/UL (ref 0–0.1)
BASOPHILS # BLD: 0.1 K/UL (ref 0–0.1)
BASOPHILS NFR BLD: 0 % (ref 0–1)
BASOPHILS NFR BLD: 1 % (ref 0–1)
BILIRUB SERPL-MCNC: 0.3 MG/DL (ref 0.2–1)
BILIRUB SERPL-MCNC: 0.4 MG/DL (ref 0.2–1)
BILIRUB SERPL-MCNC: 0.5 MG/DL (ref 0.2–1)
BILIRUB SERPL-MCNC: 0.6 MG/DL (ref 0.2–1)
BILIRUB SERPL-MCNC: 0.6 MG/DL (ref 0.2–1)
BILIRUB UR QL: NEGATIVE
BLD PROD TYP BPU: NORMAL
BLOOD GROUP ANTIBODIES SERPL: NORMAL
BLOOD GROUP ANTIBODIES SERPL: NORMAL
BPU ID: NORMAL
BUN SERPL-MCNC: 11 MG/DL (ref 6–20)
BUN SERPL-MCNC: 12 MG/DL (ref 6–20)
BUN SERPL-MCNC: 13 MG/DL (ref 6–20)
BUN SERPL-MCNC: 13 MG/DL (ref 6–20)
BUN SERPL-MCNC: 14 MG/DL (ref 6–20)
BUN SERPL-MCNC: 15 MG/DL (ref 6–20)
BUN SERPL-MCNC: 17 MG/DL (ref 6–20)
BUN SERPL-MCNC: 18 MG/DL (ref 6–20)
BUN SERPL-MCNC: 18 MG/DL (ref 6–20)
BUN SERPL-MCNC: 19 MG/DL (ref 6–20)
BUN SERPL-MCNC: 20 MG/DL (ref 6–20)
BUN SERPL-MCNC: 20 MG/DL (ref 6–20)
BUN SERPL-MCNC: 21 MG/DL (ref 6–20)
BUN SERPL-MCNC: 24 MG/DL (ref 6–20)
BUN SERPL-MCNC: 25 MG/DL (ref 6–20)
BUN SERPL-MCNC: 25 MG/DL (ref 6–20)
BUN SERPL-MCNC: 27 MG/DL (ref 6–20)
BUN SERPL-MCNC: 28 MG/DL (ref 6–20)
BUN SERPL-MCNC: 4 MG/DL (ref 6–20)
BUN SERPL-MCNC: 5 MG/DL (ref 6–20)
BUN SERPL-MCNC: 6 MG/DL (ref 6–20)
BUN SERPL-MCNC: 6 MG/DL (ref 6–20)
BUN SERPL-MCNC: 7 MG/DL (ref 6–20)
BUN SERPL-MCNC: 8 MG/DL (ref 6–20)
BUN SERPL-MCNC: 9 MG/DL (ref 6–20)
BUN/CREAT SERPL: 10 (ref 12–20)
BUN/CREAT SERPL: 11 (ref 12–20)
BUN/CREAT SERPL: 11 (ref 12–20)
BUN/CREAT SERPL: 12 (ref 12–20)
BUN/CREAT SERPL: 13 (ref 12–20)
BUN/CREAT SERPL: 14 (ref 12–20)
BUN/CREAT SERPL: 14 (ref 12–20)
BUN/CREAT SERPL: 15 (ref 12–20)
BUN/CREAT SERPL: 16 (ref 12–20)
BUN/CREAT SERPL: 17 (ref 12–20)
BUN/CREAT SERPL: 19 (ref 12–20)
BUN/CREAT SERPL: 21 (ref 12–20)
BUN/CREAT SERPL: 21 (ref 12–20)
BUN/CREAT SERPL: 22 (ref 12–20)
BUN/CREAT SERPL: 23 (ref 12–20)
BUN/CREAT SERPL: 31 (ref 12–20)
BUN/CREAT SERPL: 7 (ref 12–20)
BUN/CREAT SERPL: 8 (ref 12–20)
BUN/CREAT SERPL: 9 (ref 12–20)
C DIFF GDH STL QL: NEGATIVE
C DIFF GDH STL QL: NEGATIVE
C DIFF TOX A+B STL QL IA: NEGATIVE
C DIFF TOX A+B STL QL IA: NEGATIVE
CALCIUM SERPL-MCNC: 10.6 MG/DL (ref 8.5–10.1)
CALCIUM SERPL-MCNC: 10.9 MG/DL (ref 8.5–10.1)
CALCIUM SERPL-MCNC: 11.4 MG/DL (ref 8.5–10.1)
CALCIUM SERPL-MCNC: 6.5 MG/DL (ref 8.5–10.1)
CALCIUM SERPL-MCNC: 6.6 MG/DL (ref 8.5–10.1)
CALCIUM SERPL-MCNC: 6.6 MG/DL (ref 8.5–10.1)
CALCIUM SERPL-MCNC: 6.7 MG/DL (ref 8.5–10.1)
CALCIUM SERPL-MCNC: 6.8 MG/DL (ref 8.5–10.1)
CALCIUM SERPL-MCNC: 6.8 MG/DL (ref 8.5–10.1)
CALCIUM SERPL-MCNC: 6.9 MG/DL (ref 8.5–10.1)
CALCIUM SERPL-MCNC: 7 MG/DL (ref 8.5–10.1)
CALCIUM SERPL-MCNC: 7 MG/DL (ref 8.5–10.1)
CALCIUM SERPL-MCNC: 7.1 MG/DL (ref 8.5–10.1)
CALCIUM SERPL-MCNC: 7.1 MG/DL (ref 8.5–10.1)
CALCIUM SERPL-MCNC: 7.2 MG/DL (ref 8.5–10.1)
CALCIUM SERPL-MCNC: 7.3 MG/DL (ref 8.5–10.1)
CALCIUM SERPL-MCNC: 7.4 MG/DL (ref 8.5–10.1)
CALCIUM SERPL-MCNC: 7.4 MG/DL (ref 8.5–10.1)
CALCIUM SERPL-MCNC: 7.5 MG/DL (ref 8.5–10.1)
CALCIUM SERPL-MCNC: 7.6 MG/DL (ref 8.5–10.1)
CALCIUM SERPL-MCNC: 7.9 MG/DL (ref 8.5–10.1)
CALCIUM SERPL-MCNC: 7.9 MG/DL (ref 8.5–10.1)
CALCIUM SERPL-MCNC: 8.1 MG/DL (ref 8.5–10.1)
CALCIUM SERPL-MCNC: 8.2 MG/DL (ref 8.5–10.1)
CALCIUM SERPL-MCNC: 8.2 MG/DL (ref 8.5–10.1)
CALCIUM SERPL-MCNC: 8.3 MG/DL (ref 8.5–10.1)
CALCIUM SERPL-MCNC: 8.3 MG/DL (ref 8.5–10.1)
CALCIUM SERPL-MCNC: 8.4 MG/DL (ref 8.5–10.1)
CALCIUM SERPL-MCNC: 8.4 MG/DL (ref 8.5–10.1)
CALCIUM SERPL-MCNC: 8.5 MG/DL (ref 8.5–10.1)
CALCIUM SERPL-MCNC: 8.5 MG/DL (ref 8.5–10.1)
CALCIUM SERPL-MCNC: 8.6 MG/DL (ref 8.5–10.1)
CALCIUM SERPL-MCNC: 8.7 MG/DL (ref 8.5–10.1)
CALCIUM SERPL-MCNC: 9.1 MG/DL (ref 8.5–10.1)
CALCIUM SERPL-MCNC: 9.2 MG/DL (ref 8.5–10.1)
CALCIUM SERPL-MCNC: 9.5 MG/DL (ref 8.5–10.1)
CALCIUM SERPL-MCNC: 9.5 MG/DL (ref 8.5–10.1)
CALCIUM SERPL-MCNC: 9.9 MG/DL (ref 8.5–10.1)
CALCULATED P AXIS, ECG09: 76 DEGREES
CALCULATED P AXIS, ECG09: 87 DEGREES
CALCULATED P AXIS, ECG09: 89 DEGREES
CALCULATED R AXIS, ECG10: 20 DEGREES
CALCULATED R AXIS, ECG10: 22 DEGREES
CALCULATED R AXIS, ECG10: 7 DEGREES
CALCULATED T AXIS, ECG11: 49 DEGREES
CALCULATED T AXIS, ECG11: 69 DEGREES
CALCULATED T AXIS, ECG11: 74 DEGREES
CAMPYLOBACTER SPECIES, DNA: NEGATIVE
CAMPYLOBACTER SPECIES, DNA: NEGATIVE
CC UR VC: ABNORMAL
CHLORIDE SERPL-SCNC: 100 MMOL/L (ref 97–108)
CHLORIDE SERPL-SCNC: 101 MMOL/L (ref 97–108)
CHLORIDE SERPL-SCNC: 102 MMOL/L (ref 97–108)
CHLORIDE SERPL-SCNC: 104 MMOL/L (ref 97–108)
CHLORIDE SERPL-SCNC: 105 MMOL/L (ref 97–108)
CHLORIDE SERPL-SCNC: 106 MMOL/L (ref 97–108)
CHLORIDE SERPL-SCNC: 107 MMOL/L (ref 97–108)
CHLORIDE SERPL-SCNC: 109 MMOL/L (ref 97–108)
CHLORIDE SERPL-SCNC: 110 MMOL/L (ref 97–108)
CHLORIDE SERPL-SCNC: 110 MMOL/L (ref 97–108)
CHLORIDE SERPL-SCNC: 111 MMOL/L (ref 97–108)
CHLORIDE SERPL-SCNC: 111 MMOL/L (ref 97–108)
CHLORIDE SERPL-SCNC: 112 MMOL/L (ref 97–108)
CHLORIDE SERPL-SCNC: 113 MMOL/L (ref 97–108)
CHLORIDE SERPL-SCNC: 114 MMOL/L (ref 97–108)
CHLORIDE SERPL-SCNC: 114 MMOL/L (ref 97–108)
CHLORIDE SERPL-SCNC: 116 MMOL/L (ref 97–108)
CHLORIDE SERPL-SCNC: 117 MMOL/L (ref 97–108)
CHLORIDE SERPL-SCNC: 118 MMOL/L (ref 97–108)
CHLORIDE SERPL-SCNC: 118 MMOL/L (ref 97–108)
CHLORIDE SERPL-SCNC: 119 MMOL/L (ref 97–108)
CHLORIDE SERPL-SCNC: 93 MMOL/L (ref 97–108)
CHLORIDE SERPL-SCNC: 95 MMOL/L (ref 97–108)
CHLORIDE SERPL-SCNC: 98 MMOL/L (ref 97–108)
CK SERPL-CCNC: 33 U/L (ref 39–308)
CO2 SERPL-SCNC: 21 MMOL/L (ref 21–32)
CO2 SERPL-SCNC: 21 MMOL/L (ref 21–32)
CO2 SERPL-SCNC: 22 MMOL/L (ref 21–32)
CO2 SERPL-SCNC: 22 MMOL/L (ref 21–32)
CO2 SERPL-SCNC: 23 MMOL/L (ref 21–32)
CO2 SERPL-SCNC: 24 MMOL/L (ref 21–32)
CO2 SERPL-SCNC: 25 MMOL/L (ref 21–32)
CO2 SERPL-SCNC: 26 MMOL/L (ref 21–32)
CO2 SERPL-SCNC: 27 MMOL/L (ref 21–32)
CO2 SERPL-SCNC: 27 MMOL/L (ref 21–32)
CO2 SERPL-SCNC: 28 MMOL/L (ref 21–32)
CO2 SERPL-SCNC: 29 MMOL/L (ref 21–32)
CO2 SERPL-SCNC: 32 MMOL/L (ref 21–32)
CO2 SERPL-SCNC: 32 MMOL/L (ref 21–32)
CO2 SERPL-SCNC: 33 MMOL/L (ref 21–32)
CO2 SERPL-SCNC: 35 MMOL/L (ref 21–32)
COLOR UR: ABNORMAL
COMMENT, HOLDF: NORMAL
COVID-19 RAPID TEST, COVR: DETECTED
COVID-19 RAPID TEST, COVR: NOT DETECTED
CREAT SERPL-MCNC: 0.55 MG/DL (ref 0.7–1.3)
CREAT SERPL-MCNC: 0.6 MG/DL (ref 0.7–1.3)
CREAT SERPL-MCNC: 0.6 MG/DL (ref 0.7–1.3)
CREAT SERPL-MCNC: 0.61 MG/DL (ref 0.7–1.3)
CREAT SERPL-MCNC: 0.61 MG/DL (ref 0.7–1.3)
CREAT SERPL-MCNC: 0.63 MG/DL (ref 0.7–1.3)
CREAT SERPL-MCNC: 0.67 MG/DL (ref 0.7–1.3)
CREAT SERPL-MCNC: 0.71 MG/DL (ref 0.7–1.3)
CREAT SERPL-MCNC: 0.74 MG/DL (ref 0.7–1.3)
CREAT SERPL-MCNC: 0.76 MG/DL (ref 0.7–1.3)
CREAT SERPL-MCNC: 0.84 MG/DL (ref 0.7–1.3)
CREAT SERPL-MCNC: 0.85 MG/DL (ref 0.7–1.3)
CREAT SERPL-MCNC: 0.85 MG/DL (ref 0.7–1.3)
CREAT SERPL-MCNC: 0.86 MG/DL (ref 0.7–1.3)
CREAT SERPL-MCNC: 0.88 MG/DL (ref 0.7–1.3)
CREAT SERPL-MCNC: 0.92 MG/DL (ref 0.7–1.3)
CREAT SERPL-MCNC: 0.93 MG/DL (ref 0.7–1.3)
CREAT SERPL-MCNC: 0.93 MG/DL (ref 0.7–1.3)
CREAT SERPL-MCNC: 0.94 MG/DL (ref 0.7–1.3)
CREAT SERPL-MCNC: 0.98 MG/DL (ref 0.7–1.3)
CREAT SERPL-MCNC: 0.99 MG/DL (ref 0.7–1.3)
CREAT SERPL-MCNC: 0.99 MG/DL (ref 0.7–1.3)
CREAT SERPL-MCNC: 1 MG/DL (ref 0.7–1.3)
CREAT SERPL-MCNC: 1.02 MG/DL (ref 0.7–1.3)
CREAT SERPL-MCNC: 1.05 MG/DL (ref 0.7–1.3)
CREAT SERPL-MCNC: 1.05 MG/DL (ref 0.7–1.3)
CREAT SERPL-MCNC: 1.06 MG/DL (ref 0.7–1.3)
CREAT SERPL-MCNC: 1.09 MG/DL (ref 0.7–1.3)
CREAT SERPL-MCNC: 1.1 MG/DL (ref 0.7–1.3)
CREAT SERPL-MCNC: 1.16 MG/DL (ref 0.7–1.3)
CREAT SERPL-MCNC: 1.17 MG/DL (ref 0.7–1.3)
CREAT SERPL-MCNC: 1.2 MG/DL (ref 0.7–1.3)
CREAT SERPL-MCNC: 1.24 MG/DL (ref 0.7–1.3)
CREAT SERPL-MCNC: 1.28 MG/DL (ref 0.7–1.3)
CREAT SERPL-MCNC: 1.29 MG/DL (ref 0.7–1.3)
CREAT SERPL-MCNC: 1.34 MG/DL (ref 0.7–1.3)
CREAT SERPL-MCNC: 1.35 MG/DL (ref 0.7–1.3)
CREAT SERPL-MCNC: 1.58 MG/DL (ref 0.7–1.3)
CREAT SERPL-MCNC: 1.61 MG/DL (ref 0.7–1.3)
CREAT SERPL-MCNC: 1.64 MG/DL (ref 0.7–1.3)
CREAT SERPL-MCNC: 1.75 MG/DL (ref 0.7–1.3)
CREAT SERPL-MCNC: 1.79 MG/DL (ref 0.7–1.3)
CROSSMATCH RESULT,%XM: NORMAL
CROSSMATCH RESULT,%XM: NORMAL
CRP SERPL-MCNC: 1.45 MG/DL (ref 0–0.6)
CRP SERPL-MCNC: 4.69 MG/DL (ref 0–0.6)
DIAGNOSIS, 93000: NORMAL
DIFFERENTIAL METHOD BLD: ABNORMAL
ECHO AV AREA VTI: 0.7 CM2
ECHO AV AREA/BSA VTI: 0.4 CM2/M2
ECHO AV MEAN GRADIENT: 30 MMHG
ECHO AV PEAK GRADIENT: 50 MMHG
ENTEROTOXIGEN E COLI, DNA: NEGATIVE
ENTEROTOXIGEN E COLI, DNA: NEGATIVE
EOSINOPHIL # BLD: 0 K/UL (ref 0–0.4)
EOSINOPHIL # BLD: 0.1 K/UL (ref 0–0.4)
EOSINOPHIL # BLD: 0.2 K/UL (ref 0–0.4)
EOSINOPHIL # BLD: 0.2 K/UL (ref 0–0.4)
EOSINOPHIL # BLD: 0.3 K/UL (ref 0–0.4)
EOSINOPHIL # BLD: 0.4 K/UL (ref 0–0.4)
EOSINOPHIL # BLD: 0.4 K/UL (ref 0–0.4)
EOSINOPHIL # BLD: 0.5 K/UL (ref 0–0.4)
EOSINOPHIL # BLD: 0.6 K/UL (ref 0–0.4)
EOSINOPHIL NFR BLD: 0 % (ref 0–7)
EOSINOPHIL NFR BLD: 1 % (ref 0–7)
EOSINOPHIL NFR BLD: 1 % (ref 0–7)
EOSINOPHIL NFR BLD: 10 % (ref 0–7)
EOSINOPHIL NFR BLD: 2 % (ref 0–7)
EOSINOPHIL NFR BLD: 3 % (ref 0–7)
EOSINOPHIL NFR BLD: 4 % (ref 0–7)
EOSINOPHIL NFR BLD: 4 % (ref 0–7)
EOSINOPHIL NFR BLD: 5 % (ref 0–7)
EOSINOPHIL NFR BLD: 6 % (ref 0–7)
EOSINOPHIL NFR BLD: 6 % (ref 0–7)
EOSINOPHIL NFR BLD: 8 % (ref 0–7)
EOSINOPHIL NFR BLD: 8 % (ref 0–7)
EOSINOPHIL NFR BLD: 9 % (ref 0–7)
EPITH CASTS URNS QL MICRO: ABNORMAL /LPF
ERYTHROCYTE [DISTWIDTH] IN BLOOD BY AUTOMATED COUNT: 14.4 % (ref 11.5–14.5)
ERYTHROCYTE [DISTWIDTH] IN BLOOD BY AUTOMATED COUNT: 14.5 % (ref 11.5–14.5)
ERYTHROCYTE [DISTWIDTH] IN BLOOD BY AUTOMATED COUNT: 14.6 % (ref 11.5–14.5)
ERYTHROCYTE [DISTWIDTH] IN BLOOD BY AUTOMATED COUNT: 16.3 % (ref 11.5–14.5)
ERYTHROCYTE [DISTWIDTH] IN BLOOD BY AUTOMATED COUNT: 16.4 % (ref 11.5–14.5)
ERYTHROCYTE [DISTWIDTH] IN BLOOD BY AUTOMATED COUNT: 16.5 % (ref 11.5–14.5)
ERYTHROCYTE [DISTWIDTH] IN BLOOD BY AUTOMATED COUNT: 16.6 % (ref 11.5–14.5)
ERYTHROCYTE [DISTWIDTH] IN BLOOD BY AUTOMATED COUNT: 16.7 % (ref 11.5–14.5)
ERYTHROCYTE [DISTWIDTH] IN BLOOD BY AUTOMATED COUNT: 16.7 % (ref 11.5–14.5)
ERYTHROCYTE [DISTWIDTH] IN BLOOD BY AUTOMATED COUNT: 16.8 % (ref 11.5–14.5)
ERYTHROCYTE [DISTWIDTH] IN BLOOD BY AUTOMATED COUNT: 16.9 % (ref 11.5–14.5)
ERYTHROCYTE [DISTWIDTH] IN BLOOD BY AUTOMATED COUNT: 16.9 % (ref 11.5–14.5)
ERYTHROCYTE [DISTWIDTH] IN BLOOD BY AUTOMATED COUNT: 17 % (ref 11.5–14.5)
ERYTHROCYTE [DISTWIDTH] IN BLOOD BY AUTOMATED COUNT: 17.1 % (ref 11.5–14.5)
ERYTHROCYTE [DISTWIDTH] IN BLOOD BY AUTOMATED COUNT: 17.1 % (ref 11.5–14.5)
ERYTHROCYTE [DISTWIDTH] IN BLOOD BY AUTOMATED COUNT: 17.2 % (ref 11.5–14.5)
ERYTHROCYTE [DISTWIDTH] IN BLOOD BY AUTOMATED COUNT: 17.3 % (ref 11.5–14.5)
ERYTHROCYTE [DISTWIDTH] IN BLOOD BY AUTOMATED COUNT: 17.3 % (ref 11.5–14.5)
ERYTHROCYTE [DISTWIDTH] IN BLOOD BY AUTOMATED COUNT: 17.4 % (ref 11.5–14.5)
ERYTHROCYTE [DISTWIDTH] IN BLOOD BY AUTOMATED COUNT: 17.5 % (ref 11.5–14.5)
ERYTHROCYTE [DISTWIDTH] IN BLOOD BY AUTOMATED COUNT: 17.7 % (ref 11.5–14.5)
ERYTHROCYTE [DISTWIDTH] IN BLOOD BY AUTOMATED COUNT: 17.7 % (ref 11.5–14.5)
ERYTHROCYTE [SEDIMENTATION RATE] IN BLOOD: 43 MM/HR (ref 0–20)
EST. AVERAGE GLUCOSE BLD GHB EST-MCNC: 134 MG/DL
FERRITIN SERPL-MCNC: 51 NG/ML (ref 26–388)
FERRITIN SERPL-MCNC: 736 NG/ML (ref 26–388)
FOLATE SERPL-MCNC: 81.7 NG/ML (ref 5–21)
GAMMA GLOB SERPL ELPH-MCNC: 0.4 G/DL (ref 0.4–1.8)
GLOBULIN SER CALC-MCNC: 2.7 G/DL (ref 2–4)
GLOBULIN SER CALC-MCNC: 3.1 G/DL (ref 2–4)
GLOBULIN SER CALC-MCNC: 3.3 G/DL (ref 2–4)
GLOBULIN SER CALC-MCNC: 3.3 G/DL (ref 2–4)
GLOBULIN SER CALC-MCNC: 3.4 G/DL (ref 2–4)
GLOBULIN SER CALC-MCNC: 3.6 G/DL (ref 2–4)
GLOBULIN SER CALC-MCNC: 3.9 G/DL (ref 2–4)
GLOBULIN SER CALC-MCNC: 4.1 G/DL (ref 2–4)
GLOBULIN SER-MCNC: 1.9 G/DL (ref 2.2–3.9)
GLUCOSE BLD STRIP.AUTO-MCNC: 100 MG/DL (ref 65–117)
GLUCOSE BLD STRIP.AUTO-MCNC: 101 MG/DL (ref 65–117)
GLUCOSE BLD STRIP.AUTO-MCNC: 103 MG/DL (ref 65–117)
GLUCOSE BLD STRIP.AUTO-MCNC: 104 MG/DL (ref 65–117)
GLUCOSE BLD STRIP.AUTO-MCNC: 105 MG/DL (ref 65–117)
GLUCOSE BLD STRIP.AUTO-MCNC: 106 MG/DL (ref 65–117)
GLUCOSE BLD STRIP.AUTO-MCNC: 107 MG/DL (ref 65–117)
GLUCOSE BLD STRIP.AUTO-MCNC: 108 MG/DL (ref 65–117)
GLUCOSE BLD STRIP.AUTO-MCNC: 109 MG/DL (ref 65–117)
GLUCOSE BLD STRIP.AUTO-MCNC: 111 MG/DL (ref 65–117)
GLUCOSE BLD STRIP.AUTO-MCNC: 112 MG/DL (ref 65–117)
GLUCOSE BLD STRIP.AUTO-MCNC: 113 MG/DL (ref 65–117)
GLUCOSE BLD STRIP.AUTO-MCNC: 116 MG/DL (ref 65–117)
GLUCOSE BLD STRIP.AUTO-MCNC: 117 MG/DL (ref 65–117)
GLUCOSE BLD STRIP.AUTO-MCNC: 120 MG/DL (ref 65–117)
GLUCOSE BLD STRIP.AUTO-MCNC: 121 MG/DL (ref 65–117)
GLUCOSE BLD STRIP.AUTO-MCNC: 122 MG/DL (ref 65–117)
GLUCOSE BLD STRIP.AUTO-MCNC: 123 MG/DL (ref 65–117)
GLUCOSE BLD STRIP.AUTO-MCNC: 124 MG/DL (ref 65–117)
GLUCOSE BLD STRIP.AUTO-MCNC: 125 MG/DL (ref 65–117)
GLUCOSE BLD STRIP.AUTO-MCNC: 125 MG/DL (ref 65–117)
GLUCOSE BLD STRIP.AUTO-MCNC: 127 MG/DL (ref 65–117)
GLUCOSE BLD STRIP.AUTO-MCNC: 128 MG/DL (ref 65–117)
GLUCOSE BLD STRIP.AUTO-MCNC: 130 MG/DL (ref 65–117)
GLUCOSE BLD STRIP.AUTO-MCNC: 131 MG/DL (ref 65–117)
GLUCOSE BLD STRIP.AUTO-MCNC: 133 MG/DL (ref 65–117)
GLUCOSE BLD STRIP.AUTO-MCNC: 136 MG/DL (ref 65–117)
GLUCOSE BLD STRIP.AUTO-MCNC: 136 MG/DL (ref 65–117)
GLUCOSE BLD STRIP.AUTO-MCNC: 137 MG/DL (ref 65–117)
GLUCOSE BLD STRIP.AUTO-MCNC: 140 MG/DL (ref 65–117)
GLUCOSE BLD STRIP.AUTO-MCNC: 141 MG/DL (ref 65–117)
GLUCOSE BLD STRIP.AUTO-MCNC: 143 MG/DL (ref 65–117)
GLUCOSE BLD STRIP.AUTO-MCNC: 143 MG/DL (ref 65–117)
GLUCOSE BLD STRIP.AUTO-MCNC: 144 MG/DL (ref 65–117)
GLUCOSE BLD STRIP.AUTO-MCNC: 146 MG/DL (ref 65–117)
GLUCOSE BLD STRIP.AUTO-MCNC: 148 MG/DL (ref 65–117)
GLUCOSE BLD STRIP.AUTO-MCNC: 153 MG/DL (ref 65–117)
GLUCOSE BLD STRIP.AUTO-MCNC: 155 MG/DL (ref 65–117)
GLUCOSE BLD STRIP.AUTO-MCNC: 155 MG/DL (ref 65–117)
GLUCOSE BLD STRIP.AUTO-MCNC: 156 MG/DL (ref 65–117)
GLUCOSE BLD STRIP.AUTO-MCNC: 157 MG/DL (ref 65–117)
GLUCOSE BLD STRIP.AUTO-MCNC: 164 MG/DL (ref 65–117)
GLUCOSE BLD STRIP.AUTO-MCNC: 167 MG/DL (ref 65–117)
GLUCOSE BLD STRIP.AUTO-MCNC: 171 MG/DL (ref 65–117)
GLUCOSE BLD STRIP.AUTO-MCNC: 171 MG/DL (ref 65–117)
GLUCOSE BLD STRIP.AUTO-MCNC: 175 MG/DL (ref 65–117)
GLUCOSE BLD STRIP.AUTO-MCNC: 179 MG/DL (ref 65–117)
GLUCOSE BLD STRIP.AUTO-MCNC: 58 MG/DL (ref 65–117)
GLUCOSE BLD STRIP.AUTO-MCNC: 65 MG/DL (ref 65–117)
GLUCOSE BLD STRIP.AUTO-MCNC: 67 MG/DL (ref 65–117)
GLUCOSE BLD STRIP.AUTO-MCNC: 68 MG/DL (ref 65–117)
GLUCOSE BLD STRIP.AUTO-MCNC: 69 MG/DL (ref 65–117)
GLUCOSE BLD STRIP.AUTO-MCNC: 76 MG/DL (ref 65–117)
GLUCOSE BLD STRIP.AUTO-MCNC: 76 MG/DL (ref 65–117)
GLUCOSE BLD STRIP.AUTO-MCNC: 78 MG/DL (ref 65–117)
GLUCOSE BLD STRIP.AUTO-MCNC: 78 MG/DL (ref 65–117)
GLUCOSE BLD STRIP.AUTO-MCNC: 86 MG/DL (ref 65–117)
GLUCOSE BLD STRIP.AUTO-MCNC: 88 MG/DL (ref 65–117)
GLUCOSE BLD STRIP.AUTO-MCNC: 90 MG/DL (ref 65–117)
GLUCOSE BLD STRIP.AUTO-MCNC: 91 MG/DL (ref 65–117)
GLUCOSE BLD STRIP.AUTO-MCNC: 92 MG/DL (ref 65–117)
GLUCOSE BLD STRIP.AUTO-MCNC: 92 MG/DL (ref 65–117)
GLUCOSE BLD STRIP.AUTO-MCNC: 93 MG/DL (ref 65–117)
GLUCOSE BLD STRIP.AUTO-MCNC: 97 MG/DL (ref 65–117)
GLUCOSE BLD STRIP.AUTO-MCNC: 98 MG/DL (ref 65–117)
GLUCOSE BLD STRIP.AUTO-MCNC: 99 MG/DL (ref 65–117)
GLUCOSE SERPL-MCNC: 100 MG/DL (ref 65–100)
GLUCOSE SERPL-MCNC: 101 MG/DL (ref 65–100)
GLUCOSE SERPL-MCNC: 105 MG/DL (ref 65–100)
GLUCOSE SERPL-MCNC: 106 MG/DL (ref 65–100)
GLUCOSE SERPL-MCNC: 109 MG/DL (ref 65–100)
GLUCOSE SERPL-MCNC: 112 MG/DL (ref 65–100)
GLUCOSE SERPL-MCNC: 113 MG/DL (ref 65–100)
GLUCOSE SERPL-MCNC: 115 MG/DL (ref 65–100)
GLUCOSE SERPL-MCNC: 116 MG/DL (ref 65–100)
GLUCOSE SERPL-MCNC: 116 MG/DL (ref 65–100)
GLUCOSE SERPL-MCNC: 119 MG/DL (ref 65–100)
GLUCOSE SERPL-MCNC: 122 MG/DL (ref 65–100)
GLUCOSE SERPL-MCNC: 128 MG/DL (ref 65–100)
GLUCOSE SERPL-MCNC: 130 MG/DL (ref 65–100)
GLUCOSE SERPL-MCNC: 132 MG/DL (ref 65–100)
GLUCOSE SERPL-MCNC: 135 MG/DL (ref 65–100)
GLUCOSE SERPL-MCNC: 135 MG/DL (ref 65–100)
GLUCOSE SERPL-MCNC: 138 MG/DL (ref 65–100)
GLUCOSE SERPL-MCNC: 139 MG/DL (ref 65–100)
GLUCOSE SERPL-MCNC: 141 MG/DL (ref 65–100)
GLUCOSE SERPL-MCNC: 149 MG/DL (ref 65–100)
GLUCOSE SERPL-MCNC: 152 MG/DL (ref 65–100)
GLUCOSE SERPL-MCNC: 162 MG/DL (ref 65–100)
GLUCOSE SERPL-MCNC: 163 MG/DL (ref 65–100)
GLUCOSE SERPL-MCNC: 173 MG/DL (ref 65–100)
GLUCOSE SERPL-MCNC: 184 MG/DL (ref 65–100)
GLUCOSE SERPL-MCNC: 57 MG/DL (ref 65–100)
GLUCOSE SERPL-MCNC: 65 MG/DL (ref 65–100)
GLUCOSE SERPL-MCNC: 81 MG/DL (ref 65–100)
GLUCOSE SERPL-MCNC: 82 MG/DL (ref 65–100)
GLUCOSE SERPL-MCNC: 82 MG/DL (ref 65–100)
GLUCOSE SERPL-MCNC: 85 MG/DL (ref 65–100)
GLUCOSE SERPL-MCNC: 88 MG/DL (ref 65–100)
GLUCOSE SERPL-MCNC: 88 MG/DL (ref 65–100)
GLUCOSE SERPL-MCNC: 90 MG/DL (ref 65–100)
GLUCOSE SERPL-MCNC: 90 MG/DL (ref 65–100)
GLUCOSE SERPL-MCNC: 92 MG/DL (ref 65–100)
GLUCOSE SERPL-MCNC: 92 MG/DL (ref 65–100)
GLUCOSE SERPL-MCNC: 93 MG/DL (ref 65–100)
GLUCOSE SERPL-MCNC: 94 MG/DL (ref 65–100)
GLUCOSE SERPL-MCNC: 99 MG/DL (ref 65–100)
GLUCOSE SERPL-MCNC: 99 MG/DL (ref 65–100)
GLUCOSE UR STRIP.AUTO-MCNC: NEGATIVE MG/DL
GRAM STN SPEC: ABNORMAL
GRAM STN SPEC: ABNORMAL
HBA1C MFR BLD: 6.3 % (ref 4–5.6)
HCT VFR BLD AUTO: 22.3 % (ref 36.6–50.3)
HCT VFR BLD AUTO: 23.4 % (ref 36.6–50.3)
HCT VFR BLD AUTO: 24.7 % (ref 36.6–50.3)
HCT VFR BLD AUTO: 25.4 % (ref 36.6–50.3)
HCT VFR BLD AUTO: 25.6 % (ref 36.6–50.3)
HCT VFR BLD AUTO: 26.1 % (ref 36.6–50.3)
HCT VFR BLD AUTO: 26.5 % (ref 36.6–50.3)
HCT VFR BLD AUTO: 26.7 % (ref 36.6–50.3)
HCT VFR BLD AUTO: 27.6 % (ref 36.6–50.3)
HCT VFR BLD AUTO: 27.6 % (ref 36.6–50.3)
HCT VFR BLD AUTO: 27.7 % (ref 36.6–50.3)
HCT VFR BLD AUTO: 28 % (ref 36.6–50.3)
HCT VFR BLD AUTO: 28.4 % (ref 36.6–50.3)
HCT VFR BLD AUTO: 28.5 % (ref 36.6–50.3)
HCT VFR BLD AUTO: 28.7 % (ref 36.6–50.3)
HCT VFR BLD AUTO: 28.7 % (ref 36.6–50.3)
HCT VFR BLD AUTO: 28.9 % (ref 36.6–50.3)
HCT VFR BLD AUTO: 28.9 % (ref 36.6–50.3)
HCT VFR BLD AUTO: 29.4 % (ref 36.6–50.3)
HCT VFR BLD AUTO: 30 % (ref 36.6–50.3)
HCT VFR BLD AUTO: 30.2 % (ref 36.6–50.3)
HCT VFR BLD AUTO: 30.5 % (ref 36.6–50.3)
HCT VFR BLD AUTO: 30.6 % (ref 36.6–50.3)
HCT VFR BLD AUTO: 30.6 % (ref 36.6–50.3)
HCT VFR BLD AUTO: 30.8 % (ref 36.6–50.3)
HCT VFR BLD AUTO: 31 % (ref 36.6–50.3)
HCT VFR BLD AUTO: 31.3 % (ref 36.6–50.3)
HCT VFR BLD AUTO: 31.9 % (ref 36.6–50.3)
HCT VFR BLD AUTO: 32 % (ref 36.6–50.3)
HCT VFR BLD AUTO: 32.2 % (ref 36.6–50.3)
HCT VFR BLD AUTO: 32.7 % (ref 36.6–50.3)
HCT VFR BLD AUTO: 33.6 % (ref 36.6–50.3)
HCT VFR BLD AUTO: 33.7 % (ref 36.6–50.3)
HCT VFR BLD AUTO: 33.8 % (ref 36.6–50.3)
HCT VFR BLD AUTO: 35.2 % (ref 36.6–50.3)
HCT VFR BLD AUTO: 35.2 % (ref 36.6–50.3)
HCT VFR BLD AUTO: 37 % (ref 36.6–50.3)
HCT VFR BLD AUTO: 37.1 % (ref 36.6–50.3)
HCT VFR BLD AUTO: 38.6 % (ref 36.6–50.3)
HGB BLD-MCNC: 10 G/DL (ref 12.1–17)
HGB BLD-MCNC: 10.4 G/DL (ref 12.1–17)
HGB BLD-MCNC: 10.7 G/DL (ref 12.1–17)
HGB BLD-MCNC: 10.7 G/DL (ref 12.1–17)
HGB BLD-MCNC: 10.9 G/DL (ref 12.1–17)
HGB BLD-MCNC: 11.4 G/DL (ref 12.1–17)
HGB BLD-MCNC: 11.6 G/DL (ref 12.1–17)
HGB BLD-MCNC: 12.5 G/DL (ref 12.1–17)
HGB BLD-MCNC: 6.9 G/DL (ref 12.1–17)
HGB BLD-MCNC: 7.2 G/DL (ref 12.1–17)
HGB BLD-MCNC: 7.8 G/DL (ref 12.1–17)
HGB BLD-MCNC: 7.9 G/DL (ref 12.1–17)
HGB BLD-MCNC: 8 G/DL (ref 12.1–17)
HGB BLD-MCNC: 8 G/DL (ref 12.1–17)
HGB BLD-MCNC: 8.1 G/DL (ref 12.1–17)
HGB BLD-MCNC: 8.4 G/DL (ref 12.1–17)
HGB BLD-MCNC: 8.5 G/DL (ref 12.1–17)
HGB BLD-MCNC: 8.6 G/DL (ref 12.1–17)
HGB BLD-MCNC: 8.6 G/DL (ref 12.1–17)
HGB BLD-MCNC: 8.7 G/DL (ref 12.1–17)
HGB BLD-MCNC: 8.7 G/DL (ref 12.1–17)
HGB BLD-MCNC: 8.8 G/DL (ref 12.1–17)
HGB BLD-MCNC: 8.8 G/DL (ref 12.1–17)
HGB BLD-MCNC: 8.9 G/DL (ref 12.1–17)
HGB BLD-MCNC: 9 G/DL (ref 12.1–17)
HGB BLD-MCNC: 9 G/DL (ref 12.1–17)
HGB BLD-MCNC: 9.1 G/DL (ref 12.1–17)
HGB BLD-MCNC: 9.2 G/DL (ref 12.1–17)
HGB BLD-MCNC: 9.4 G/DL (ref 12.1–17)
HGB BLD-MCNC: 9.5 G/DL (ref 12.1–17)
HGB BLD-MCNC: 9.6 G/DL (ref 12.1–17)
HGB BLD-MCNC: 9.7 G/DL (ref 12.1–17)
HGB BLD-MCNC: 9.7 G/DL (ref 12.1–17)
HGB BLD-MCNC: 9.8 G/DL (ref 12.1–17)
HGB BLD-MCNC: 9.9 G/DL (ref 12.1–17)
HGB BLD-MCNC: 9.9 G/DL (ref 12.1–17)
HGB UR QL STRIP: ABNORMAL
HISTORY CHECKED?,CKHIST: NORMAL
IGA SERPL-MCNC: 72 MG/DL (ref 61–437)
IGG SERPL-MCNC: 406 MG/DL (ref 603–1613)
IGM SERPL-MCNC: 25 MG/DL (ref 15–143)
IMM GRANULOCYTES # BLD AUTO: 0 K/UL (ref 0–0.04)
IMM GRANULOCYTES # BLD AUTO: 0.1 K/UL (ref 0–0.04)
IMM GRANULOCYTES # BLD AUTO: 0.6 K/UL (ref 0–0.04)
IMM GRANULOCYTES NFR BLD AUTO: 0 % (ref 0–0.5)
IMM GRANULOCYTES NFR BLD AUTO: 1 % (ref 0–0.5)
IMM GRANULOCYTES NFR BLD AUTO: 4 % (ref 0–0.5)
INTERPRETATION SERPL IEP-IMP: ABNORMAL
INTERPRETATION: NORMAL
INTERPRETATION: NORMAL
IRON SATN MFR SERPL: 20 % (ref 20–50)
IRON SATN MFR SERPL: 56 % (ref 20–50)
IRON SATN MFR SERPL: 8 % (ref 20–50)
IRON SERPL-MCNC: 106 UG/DL (ref 35–150)
IRON SERPL-MCNC: 20 UG/DL (ref 35–150)
IRON SERPL-MCNC: 21 UG/DL (ref 35–150)
IRON SERPL-MCNC: 32 UG/DL (ref 35–150)
KAPPA LC FREE SER-MCNC: 123.9 MG/L (ref 3.3–19.4)
KAPPA LC FREE/LAMBDA FREE SER: 6.45 {RATIO} (ref 0.26–1.65)
KETONES UR QL STRIP.AUTO: NEGATIVE MG/DL
LACTATE BLD-SCNC: 2.29 MMOL/L (ref 0.4–2)
LACTATE SERPL-SCNC: 0.8 MMOL/L (ref 0.4–2)
LACTATE SERPL-SCNC: 1.2 MMOL/L (ref 0.4–2)
LACTATE SERPL-SCNC: 1.2 MMOL/L (ref 0.4–2)
LACTATE SERPL-SCNC: 1.4 MMOL/L (ref 0.4–2)
LAMBDA LC FREE SERPL-MCNC: 19.2 MG/L (ref 5.7–26.3)
LEFT ABI: 0.97
LEFT ARM BP: 129 MMHG
LEFT POSTERIOR TIBIAL: 132 MMHG
LEUKOCYTE ESTERASE UR QL STRIP.AUTO: ABNORMAL
LIPASE SERPL-CCNC: 61 U/L (ref 73–393)
LIPASE SERPL-CCNC: 61 U/L (ref 73–393)
LYMPHOCYTES # BLD: 0.2 K/UL (ref 0.8–3.5)
LYMPHOCYTES # BLD: 0.5 K/UL (ref 0.8–3.5)
LYMPHOCYTES # BLD: 0.5 K/UL (ref 0.8–3.5)
LYMPHOCYTES # BLD: 0.6 K/UL (ref 0.8–3.5)
LYMPHOCYTES # BLD: 0.6 K/UL (ref 0.8–3.5)
LYMPHOCYTES # BLD: 0.7 K/UL (ref 0.8–3.5)
LYMPHOCYTES # BLD: 0.8 K/UL (ref 0.8–3.5)
LYMPHOCYTES # BLD: 0.9 K/UL (ref 0.8–3.5)
LYMPHOCYTES # BLD: 1 K/UL (ref 0.8–3.5)
LYMPHOCYTES NFR BLD: 1 % (ref 12–49)
LYMPHOCYTES NFR BLD: 10 % (ref 12–49)
LYMPHOCYTES NFR BLD: 11 % (ref 12–49)
LYMPHOCYTES NFR BLD: 12 % (ref 12–49)
LYMPHOCYTES NFR BLD: 12 % (ref 12–49)
LYMPHOCYTES NFR BLD: 14 % (ref 12–49)
LYMPHOCYTES NFR BLD: 16 % (ref 12–49)
LYMPHOCYTES NFR BLD: 3 % (ref 12–49)
LYMPHOCYTES NFR BLD: 4 % (ref 12–49)
LYMPHOCYTES NFR BLD: 5 % (ref 12–49)
LYMPHOCYTES NFR BLD: 6 % (ref 12–49)
LYMPHOCYTES NFR BLD: 7 % (ref 12–49)
LYMPHOCYTES NFR BLD: 9 % (ref 12–49)
M PROTEIN SERPL ELPH-MCNC: 0.1 G/DL
MAGNESIUM SERPL-MCNC: 1.3 MG/DL (ref 1.6–2.4)
MAGNESIUM SERPL-MCNC: 1.4 MG/DL (ref 1.6–2.4)
MAGNESIUM SERPL-MCNC: 1.5 MG/DL (ref 1.6–2.4)
MAGNESIUM SERPL-MCNC: 1.5 MG/DL (ref 1.6–2.4)
MAGNESIUM SERPL-MCNC: 1.6 MG/DL (ref 1.6–2.4)
MAGNESIUM SERPL-MCNC: 1.7 MG/DL (ref 1.6–2.4)
MAGNESIUM SERPL-MCNC: 1.8 MG/DL (ref 1.6–2.4)
MAGNESIUM SERPL-MCNC: 1.9 MG/DL (ref 1.6–2.4)
MAGNESIUM SERPL-MCNC: 2 MG/DL (ref 1.6–2.4)
MAGNESIUM SERPL-MCNC: 2 MG/DL (ref 1.6–2.4)
MAGNESIUM SERPL-MCNC: 2.2 MG/DL (ref 1.6–2.4)
MAGNESIUM SERPL-MCNC: 2.3 MG/DL (ref 1.6–2.4)
MCH RBC QN AUTO: 30.4 PG (ref 26–34)
MCH RBC QN AUTO: 30.5 PG (ref 26–34)
MCH RBC QN AUTO: 30.6 PG (ref 26–34)
MCH RBC QN AUTO: 30.7 PG (ref 26–34)
MCH RBC QN AUTO: 30.8 PG (ref 26–34)
MCH RBC QN AUTO: 30.9 PG (ref 26–34)
MCH RBC QN AUTO: 31 PG (ref 26–34)
MCH RBC QN AUTO: 31.1 PG (ref 26–34)
MCH RBC QN AUTO: 31.2 PG (ref 26–34)
MCH RBC QN AUTO: 31.3 PG (ref 26–34)
MCH RBC QN AUTO: 31.4 PG (ref 26–34)
MCH RBC QN AUTO: 31.4 PG (ref 26–34)
MCH RBC QN AUTO: 31.5 PG (ref 26–34)
MCH RBC QN AUTO: 31.6 PG (ref 26–34)
MCH RBC QN AUTO: 31.6 PG (ref 26–34)
MCH RBC QN AUTO: 31.8 PG (ref 26–34)
MCH RBC QN AUTO: 32.2 PG (ref 26–34)
MCH RBC QN AUTO: 32.2 PG (ref 26–34)
MCH RBC QN AUTO: 32.6 PG (ref 26–34)
MCHC RBC AUTO-ENTMCNC: 29.4 G/DL (ref 30–36.5)
MCHC RBC AUTO-ENTMCNC: 29.5 G/DL (ref 30–36.5)
MCHC RBC AUTO-ENTMCNC: 29.7 G/DL (ref 30–36.5)
MCHC RBC AUTO-ENTMCNC: 29.8 G/DL (ref 30–36.5)
MCHC RBC AUTO-ENTMCNC: 30.1 G/DL (ref 30–36.5)
MCHC RBC AUTO-ENTMCNC: 30.1 G/DL (ref 30–36.5)
MCHC RBC AUTO-ENTMCNC: 30.3 G/DL (ref 30–36.5)
MCHC RBC AUTO-ENTMCNC: 30.3 G/DL (ref 30–36.5)
MCHC RBC AUTO-ENTMCNC: 30.4 G/DL (ref 30–36.5)
MCHC RBC AUTO-ENTMCNC: 30.6 G/DL (ref 30–36.5)
MCHC RBC AUTO-ENTMCNC: 30.7 G/DL (ref 30–36.5)
MCHC RBC AUTO-ENTMCNC: 30.7 G/DL (ref 30–36.5)
MCHC RBC AUTO-ENTMCNC: 30.8 G/DL (ref 30–36.5)
MCHC RBC AUTO-ENTMCNC: 30.9 G/DL (ref 30–36.5)
MCHC RBC AUTO-ENTMCNC: 30.9 G/DL (ref 30–36.5)
MCHC RBC AUTO-ENTMCNC: 31 G/DL (ref 30–36.5)
MCHC RBC AUTO-ENTMCNC: 31 G/DL (ref 30–36.5)
MCHC RBC AUTO-ENTMCNC: 31.2 G/DL (ref 30–36.5)
MCHC RBC AUTO-ENTMCNC: 31.3 G/DL (ref 30–36.5)
MCHC RBC AUTO-ENTMCNC: 31.3 G/DL (ref 30–36.5)
MCHC RBC AUTO-ENTMCNC: 31.5 G/DL (ref 30–36.5)
MCHC RBC AUTO-ENTMCNC: 31.6 G/DL (ref 30–36.5)
MCHC RBC AUTO-ENTMCNC: 31.6 G/DL (ref 30–36.5)
MCHC RBC AUTO-ENTMCNC: 31.7 G/DL (ref 30–36.5)
MCHC RBC AUTO-ENTMCNC: 31.7 G/DL (ref 30–36.5)
MCHC RBC AUTO-ENTMCNC: 32.1 G/DL (ref 30–36.5)
MCHC RBC AUTO-ENTMCNC: 32.3 G/DL (ref 30–36.5)
MCHC RBC AUTO-ENTMCNC: 32.3 G/DL (ref 30–36.5)
MCHC RBC AUTO-ENTMCNC: 32.4 G/DL (ref 30–36.5)
MCV RBC AUTO: 100.8 FL (ref 80–99)
MCV RBC AUTO: 100.8 FL (ref 80–99)
MCV RBC AUTO: 101 FL (ref 80–99)
MCV RBC AUTO: 101.1 FL (ref 80–99)
MCV RBC AUTO: 101.2 FL (ref 80–99)
MCV RBC AUTO: 101.4 FL (ref 80–99)
MCV RBC AUTO: 101.6 FL (ref 80–99)
MCV RBC AUTO: 102.1 FL (ref 80–99)
MCV RBC AUTO: 102.2 FL (ref 80–99)
MCV RBC AUTO: 102.3 FL (ref 80–99)
MCV RBC AUTO: 102.6 FL (ref 80–99)
MCV RBC AUTO: 102.9 FL (ref 80–99)
MCV RBC AUTO: 103.2 FL (ref 80–99)
MCV RBC AUTO: 104 FL (ref 80–99)
MCV RBC AUTO: 104.5 FL (ref 80–99)
MCV RBC AUTO: 105 FL (ref 80–99)
MCV RBC AUTO: 105.2 FL (ref 80–99)
MCV RBC AUTO: 105.9 FL (ref 80–99)
MCV RBC AUTO: 106 FL (ref 80–99)
MCV RBC AUTO: 107 FL (ref 80–99)
MCV RBC AUTO: 96.1 FL (ref 80–99)
MCV RBC AUTO: 96.6 FL (ref 80–99)
MCV RBC AUTO: 96.6 FL (ref 80–99)
MCV RBC AUTO: 96.9 FL (ref 80–99)
MCV RBC AUTO: 97.4 FL (ref 80–99)
MCV RBC AUTO: 97.8 FL (ref 80–99)
MCV RBC AUTO: 98 FL (ref 80–99)
MCV RBC AUTO: 98.1 FL (ref 80–99)
MCV RBC AUTO: 98.1 FL (ref 80–99)
MCV RBC AUTO: 98.6 FL (ref 80–99)
MCV RBC AUTO: 98.8 FL (ref 80–99)
MCV RBC AUTO: 98.9 FL (ref 80–99)
MCV RBC AUTO: 99.1 FL (ref 80–99)
MCV RBC AUTO: 99.7 FL (ref 80–99)
MCV RBC AUTO: 99.7 FL (ref 80–99)
MONOCYTES # BLD: 0.8 K/UL (ref 0–1)
MONOCYTES # BLD: 0.9 K/UL (ref 0–1)
MONOCYTES # BLD: 1 K/UL (ref 0–1)
MONOCYTES # BLD: 1.3 K/UL (ref 0–1)
MONOCYTES NFR BLD: 10 % (ref 5–13)
MONOCYTES NFR BLD: 12 % (ref 5–13)
MONOCYTES NFR BLD: 13 % (ref 5–13)
MONOCYTES NFR BLD: 15 % (ref 5–13)
MONOCYTES NFR BLD: 16 % (ref 5–13)
MONOCYTES NFR BLD: 17 % (ref 5–13)
MONOCYTES NFR BLD: 5 % (ref 5–13)
MONOCYTES NFR BLD: 6 % (ref 5–13)
MONOCYTES NFR BLD: 6 % (ref 5–13)
MONOCYTES NFR BLD: 8 % (ref 5–13)
MONOCYTES NFR BLD: 8 % (ref 5–13)
MONOCYTES NFR BLD: 9 % (ref 5–13)
MONOCYTES NFR BLD: 9 % (ref 5–13)
MUCOUS THREADS URNS QL MICRO: ABNORMAL /LPF
NEUTS SEG # BLD: 13.1 K/UL (ref 1.8–8)
NEUTS SEG # BLD: 13.6 K/UL (ref 1.8–8)
NEUTS SEG # BLD: 14 K/UL (ref 1.8–8)
NEUTS SEG # BLD: 15.4 K/UL (ref 1.8–8)
NEUTS SEG # BLD: 2.9 K/UL (ref 1.8–8)
NEUTS SEG # BLD: 3.4 K/UL (ref 1.8–8)
NEUTS SEG # BLD: 3.5 K/UL (ref 1.8–8)
NEUTS SEG # BLD: 4.4 K/UL (ref 1.8–8)
NEUTS SEG # BLD: 4.5 K/UL (ref 1.8–8)
NEUTS SEG # BLD: 4.6 K/UL (ref 1.8–8)
NEUTS SEG # BLD: 4.7 K/UL (ref 1.8–8)
NEUTS SEG # BLD: 5.1 K/UL (ref 1.8–8)
NEUTS SEG # BLD: 6.2 K/UL (ref 1.8–8)
NEUTS SEG # BLD: 7.6 K/UL (ref 1.8–8)
NEUTS SEG # BLD: 8 K/UL (ref 1.8–8)
NEUTS SEG # BLD: 9.3 K/UL (ref 1.8–8)
NEUTS SEG # BLD: 9.6 K/UL (ref 1.8–8)
NEUTS SEG NFR BLD: 57 % (ref 32–75)
NEUTS SEG NFR BLD: 59 % (ref 32–75)
NEUTS SEG NFR BLD: 61 % (ref 32–75)
NEUTS SEG NFR BLD: 66 % (ref 32–75)
NEUTS SEG NFR BLD: 69 % (ref 32–75)
NEUTS SEG NFR BLD: 69 % (ref 32–75)
NEUTS SEG NFR BLD: 70 % (ref 32–75)
NEUTS SEG NFR BLD: 72 % (ref 32–75)
NEUTS SEG NFR BLD: 74 % (ref 32–75)
NEUTS SEG NFR BLD: 79 % (ref 32–75)
NEUTS SEG NFR BLD: 81 % (ref 32–75)
NEUTS SEG NFR BLD: 83 % (ref 32–75)
NEUTS SEG NFR BLD: 84 % (ref 32–75)
NEUTS SEG NFR BLD: 85 % (ref 32–75)
NEUTS SEG NFR BLD: 90 % (ref 32–75)
NEUTS SEG NFR BLD: 90 % (ref 32–75)
NEUTS SEG NFR BLD: 94 % (ref 32–75)
NITRITE UR QL STRIP.AUTO: NEGATIVE
NRBC # BLD: 0 K/UL (ref 0–0.01)
NRBC BLD-RTO: 0 PER 100 WBC
O+P SPEC MICRO: NORMAL
O+P STL CONC: NORMAL
P SHIGELLOIDES DNA STL QL NAA+PROBE: NEGATIVE
P SHIGELLOIDES DNA STL QL NAA+PROBE: NEGATIVE
P-R INTERVAL, ECG05: 232 MS
P-R INTERVAL, ECG05: 274 MS
PH UR STRIP: 6.5 [PH] (ref 5–8)
PHOSPHATE SERPL-MCNC: 1.4 MG/DL (ref 2.6–4.7)
PHOSPHATE SERPL-MCNC: 1.4 MG/DL (ref 2.6–4.7)
PHOSPHATE SERPL-MCNC: 1.6 MG/DL (ref 2.6–4.7)
PHOSPHATE SERPL-MCNC: 1.7 MG/DL (ref 2.6–4.7)
PHOSPHATE SERPL-MCNC: 1.7 MG/DL (ref 2.6–4.7)
PHOSPHATE SERPL-MCNC: 2 MG/DL (ref 2.6–4.7)
PHOSPHATE SERPL-MCNC: 2.4 MG/DL (ref 2.6–4.7)
PLATELET # BLD AUTO: 110 K/UL (ref 150–400)
PLATELET # BLD AUTO: 112 K/UL (ref 150–400)
PLATELET # BLD AUTO: 142 K/UL (ref 150–400)
PLATELET # BLD AUTO: 151 K/UL (ref 150–400)
PLATELET # BLD AUTO: 156 K/UL (ref 150–400)
PLATELET # BLD AUTO: 157 K/UL (ref 150–400)
PLATELET # BLD AUTO: 158 K/UL (ref 150–400)
PLATELET # BLD AUTO: 159 K/UL (ref 150–400)
PLATELET # BLD AUTO: 161 K/UL (ref 150–400)
PLATELET # BLD AUTO: 161 K/UL (ref 150–400)
PLATELET # BLD AUTO: 163 K/UL (ref 150–400)
PLATELET # BLD AUTO: 165 K/UL (ref 150–400)
PLATELET # BLD AUTO: 165 K/UL (ref 150–400)
PLATELET # BLD AUTO: 168 K/UL (ref 150–400)
PLATELET # BLD AUTO: 172 K/UL (ref 150–400)
PLATELET # BLD AUTO: 177 K/UL (ref 150–400)
PLATELET # BLD AUTO: 190 K/UL (ref 150–400)
PLATELET # BLD AUTO: 192 K/UL (ref 150–400)
PLATELET # BLD AUTO: 192 K/UL (ref 150–400)
PLATELET # BLD AUTO: 194 K/UL (ref 150–400)
PLATELET # BLD AUTO: 196 K/UL (ref 150–400)
PLATELET # BLD AUTO: 197 K/UL (ref 150–400)
PLATELET # BLD AUTO: 199 K/UL (ref 150–400)
PLATELET # BLD AUTO: 200 K/UL (ref 150–400)
PLATELET # BLD AUTO: 210 K/UL (ref 150–400)
PLATELET # BLD AUTO: 212 K/UL (ref 150–400)
PLATELET # BLD AUTO: 216 K/UL (ref 150–400)
PLATELET # BLD AUTO: 219 K/UL (ref 150–400)
PLATELET # BLD AUTO: 220 K/UL (ref 150–400)
PLATELET # BLD AUTO: 229 K/UL (ref 150–400)
PLATELET # BLD AUTO: 230 K/UL (ref 150–400)
PLATELET # BLD AUTO: 247 K/UL (ref 150–400)
PLATELET # BLD AUTO: 249 K/UL (ref 150–400)
PLATELET # BLD AUTO: 249 K/UL (ref 150–400)
PLATELET # BLD AUTO: 252 K/UL (ref 150–400)
PLATELET # BLD AUTO: 258 K/UL (ref 150–400)
PLATELET # BLD AUTO: 265 K/UL (ref 150–400)
PLATELET COMMENTS,PCOM: ABNORMAL
PMV BLD AUTO: 10.1 FL (ref 8.9–12.9)
PMV BLD AUTO: 10.3 FL (ref 8.9–12.9)
PMV BLD AUTO: 10.4 FL (ref 8.9–12.9)
PMV BLD AUTO: 10.4 FL (ref 8.9–12.9)
PMV BLD AUTO: 10.5 FL (ref 8.9–12.9)
PMV BLD AUTO: 10.6 FL (ref 8.9–12.9)
PMV BLD AUTO: 10.7 FL (ref 8.9–12.9)
PMV BLD AUTO: 10.8 FL (ref 8.9–12.9)
PMV BLD AUTO: 10.9 FL (ref 8.9–12.9)
PMV BLD AUTO: 11 FL (ref 8.9–12.9)
PMV BLD AUTO: 11.2 FL (ref 8.9–12.9)
PMV BLD AUTO: 11.4 FL (ref 8.9–12.9)
PMV BLD AUTO: 11.4 FL (ref 8.9–12.9)
PMV BLD AUTO: 11.5 FL (ref 8.9–12.9)
PMV BLD AUTO: 11.6 FL (ref 8.9–12.9)
PMV BLD AUTO: 11.7 FL (ref 8.9–12.9)
PMV BLD AUTO: 12 FL (ref 8.9–12.9)
PMV BLD AUTO: 12.1 FL (ref 8.9–12.9)
PMV BLD AUTO: 9.8 FL (ref 8.9–12.9)
PMV BLD AUTO: 9.9 FL (ref 8.9–12.9)
POTASSIUM SERPL-SCNC: 2.5 MMOL/L (ref 3.5–5.1)
POTASSIUM SERPL-SCNC: 2.5 MMOL/L (ref 3.5–5.1)
POTASSIUM SERPL-SCNC: 2.6 MMOL/L (ref 3.5–5.1)
POTASSIUM SERPL-SCNC: 2.6 MMOL/L (ref 3.5–5.1)
POTASSIUM SERPL-SCNC: 2.7 MMOL/L (ref 3.5–5.1)
POTASSIUM SERPL-SCNC: 2.9 MMOL/L (ref 3.5–5.1)
POTASSIUM SERPL-SCNC: 3 MMOL/L (ref 3.5–5.1)
POTASSIUM SERPL-SCNC: 3 MMOL/L (ref 3.5–5.1)
POTASSIUM SERPL-SCNC: 3.1 MMOL/L (ref 3.5–5.1)
POTASSIUM SERPL-SCNC: 3.2 MMOL/L (ref 3.5–5.1)
POTASSIUM SERPL-SCNC: 3.3 MMOL/L (ref 3.5–5.1)
POTASSIUM SERPL-SCNC: 3.4 MMOL/L (ref 3.5–5.1)
POTASSIUM SERPL-SCNC: 3.5 MMOL/L (ref 3.5–5.1)
POTASSIUM SERPL-SCNC: 3.6 MMOL/L (ref 3.5–5.1)
POTASSIUM SERPL-SCNC: 3.7 MMOL/L (ref 3.5–5.1)
POTASSIUM SERPL-SCNC: 3.9 MMOL/L (ref 3.5–5.1)
POTASSIUM SERPL-SCNC: 4.1 MMOL/L (ref 3.5–5.1)
POTASSIUM SERPL-SCNC: 4.1 MMOL/L (ref 3.5–5.1)
POTASSIUM SERPL-SCNC: 4.2 MMOL/L (ref 3.5–5.1)
POTASSIUM SERPL-SCNC: 4.2 MMOL/L (ref 3.5–5.1)
POTASSIUM SERPL-SCNC: 4.3 MMOL/L (ref 3.5–5.1)
POTASSIUM SERPL-SCNC: 4.4 MMOL/L (ref 3.5–5.1)
POTASSIUM SERPL-SCNC: 4.6 MMOL/L (ref 3.5–5.1)
POTASSIUM SERPL-SCNC: ABNORMAL MMOL/L (ref 3.5–5.1)
PROCALCITONIN SERPL-MCNC: 0.17 NG/ML
PROCALCITONIN SERPL-MCNC: 5.74 NG/ML
PROT SERPL-MCNC: 3.9 G/DL (ref 6–8.5)
PROT SERPL-MCNC: 5.1 G/DL (ref 6.4–8.2)
PROT SERPL-MCNC: 5.8 G/DL (ref 6.4–8.2)
PROT SERPL-MCNC: 5.9 G/DL (ref 6.4–8.2)
PROT SERPL-MCNC: 5.9 G/DL (ref 6.4–8.2)
PROT SERPL-MCNC: 6.5 G/DL (ref 6.4–8.2)
PROT SERPL-MCNC: 6.8 G/DL (ref 6.4–8.2)
PROT SERPL-MCNC: 7 G/DL (ref 6.4–8.2)
PROT SERPL-MCNC: 7.6 G/DL (ref 6.4–8.2)
PROT UR STRIP-MCNC: 100 MG/DL
Q-T INTERVAL, ECG07: 402 MS
Q-T INTERVAL, ECG07: 420 MS
Q-T INTERVAL, ECG07: 428 MS
QRS DURATION, ECG06: 84 MS
QRS DURATION, ECG06: 90 MS
QRS DURATION, ECG06: 92 MS
QTC CALCULATION (BEZET), ECG08: 440 MS
QTC CALCULATION (BEZET), ECG08: 446 MS
QTC CALCULATION (BEZET), ECG08: 517 MS
RBC # BLD AUTO: 2.5 M/UL (ref 4.1–5.7)
RBC # BLD AUTO: 2.54 M/UL (ref 4.1–5.7)
RBC # BLD AUTO: 2.57 M/UL (ref 4.1–5.7)
RBC # BLD AUTO: 2.59 M/UL (ref 4.1–5.7)
RBC # BLD AUTO: 2.59 M/UL (ref 4.1–5.7)
RBC # BLD AUTO: 2.69 M/UL (ref 4.1–5.7)
RBC # BLD AUTO: 2.74 M/UL (ref 4.1–5.7)
RBC # BLD AUTO: 2.78 M/UL (ref 4.1–5.7)
RBC # BLD AUTO: 2.79 M/UL (ref 4.1–5.7)
RBC # BLD AUTO: 2.81 M/UL (ref 4.1–5.7)
RBC # BLD AUTO: 2.83 M/UL (ref 4.1–5.7)
RBC # BLD AUTO: 2.83 M/UL (ref 4.1–5.7)
RBC # BLD AUTO: 2.87 M/UL (ref 4.1–5.7)
RBC # BLD AUTO: 2.89 M/UL (ref 4.1–5.7)
RBC # BLD AUTO: 2.89 M/UL (ref 4.1–5.7)
RBC # BLD AUTO: 2.9 M/UL (ref 4.1–5.7)
RBC # BLD AUTO: 2.91 M/UL (ref 4.1–5.7)
RBC # BLD AUTO: 2.94 M/UL (ref 4.1–5.7)
RBC # BLD AUTO: 2.99 M/UL (ref 4.1–5.7)
RBC # BLD AUTO: 3.06 M/UL (ref 4.1–5.7)
RBC # BLD AUTO: 3.06 M/UL (ref 4.1–5.7)
RBC # BLD AUTO: 3.07 M/UL (ref 4.1–5.7)
RBC # BLD AUTO: 3.07 M/UL (ref 4.1–5.7)
RBC # BLD AUTO: 3.11 M/UL (ref 4.1–5.7)
RBC # BLD AUTO: 3.13 M/UL (ref 4.1–5.7)
RBC # BLD AUTO: 3.13 M/UL (ref 4.1–5.7)
RBC # BLD AUTO: 3.18 M/UL (ref 4.1–5.7)
RBC # BLD AUTO: 3.2 M/UL (ref 4.1–5.7)
RBC # BLD AUTO: 3.2 M/UL (ref 4.1–5.7)
RBC # BLD AUTO: 3.22 M/UL (ref 4.1–5.7)
RBC # BLD AUTO: 3.32 M/UL (ref 4.1–5.7)
RBC # BLD AUTO: 3.34 M/UL (ref 4.1–5.7)
RBC # BLD AUTO: 3.38 M/UL (ref 4.1–5.7)
RBC # BLD AUTO: 3.41 M/UL (ref 4.1–5.7)
RBC # BLD AUTO: 3.62 M/UL (ref 4.1–5.7)
RBC # BLD AUTO: 3.67 M/UL (ref 4.1–5.7)
RBC # BLD AUTO: 3.83 M/UL (ref 4.1–5.7)
RBC #/AREA URNS HPF: ABNORMAL /HPF (ref 0–5)
RBC MORPH BLD: ABNORMAL
RIGHT ABI: 0.39
RIGHT ARM BP: 136 MMHG
RIGHT GSV ANKLE DIAM: 0.19 CM
RIGHT GSV AT KNEE DIAM: 0.32 CM
RIGHT GSV BK PROX DIAM: 0.28 CM
RIGHT GSV THIGH DIST DIAM: 0.36 CM
RIGHT GSV THIGH MID DIAM: 0.35 CM
RIGHT GSV THIGH PROX DIAM: 0.4 CM
RIGHT POSTERIOR TIBIAL: 53 MMHG
RIGHT SSV DIST DIAM: 0.24 CM
RIGHT SSV PROX DIAM: 0.24 CM
SALMONELLA SPECIES, DNA: NEGATIVE
SALMONELLA SPECIES, DNA: NEGATIVE
SAMPLES BEING HELD,HOLD: NORMAL
SARS-COV-2, XPLCVT: NOT DETECTED
SERVICE CMNT-IMP: ABNORMAL
SERVICE CMNT-IMP: NORMAL
SHIGA TOXIN PRODUCING, DNA: NEGATIVE
SHIGA TOXIN PRODUCING, DNA: NEGATIVE
SHIGELLA SP+EIEC IPAH STL QL NAA+PROBE: NEGATIVE
SHIGELLA SP+EIEC IPAH STL QL NAA+PROBE: NEGATIVE
SODIUM SERPL-SCNC: 135 MMOL/L (ref 136–145)
SODIUM SERPL-SCNC: 135 MMOL/L (ref 136–145)
SODIUM SERPL-SCNC: 136 MMOL/L (ref 136–145)
SODIUM SERPL-SCNC: 137 MMOL/L (ref 136–145)
SODIUM SERPL-SCNC: 138 MMOL/L (ref 136–145)
SODIUM SERPL-SCNC: 139 MMOL/L (ref 136–145)
SODIUM SERPL-SCNC: 139 MMOL/L (ref 136–145)
SODIUM SERPL-SCNC: 140 MMOL/L (ref 136–145)
SODIUM SERPL-SCNC: 141 MMOL/L (ref 136–145)
SODIUM SERPL-SCNC: 142 MMOL/L (ref 136–145)
SODIUM SERPL-SCNC: 143 MMOL/L (ref 136–145)
SODIUM SERPL-SCNC: 144 MMOL/L (ref 136–145)
SODIUM SERPL-SCNC: 145 MMOL/L (ref 136–145)
SODIUM SERPL-SCNC: 146 MMOL/L (ref 136–145)
SODIUM SERPL-SCNC: 146 MMOL/L (ref 136–145)
SOURCE, COVRS: ABNORMAL
SOURCE, COVRS: NORMAL
SP GR UR REFRACTOMETRY: 1.01 (ref 1–1.03)
SPECIMEN EXP DATE BLD: NORMAL
SPECIMEN EXP DATE BLD: NORMAL
SPECIMEN SOURCE: NORMAL
STATUS OF UNIT,%ST: NORMAL
TIBC SERPL-MCNC: 161 UG/DL (ref 250–450)
TIBC SERPL-MCNC: 188 UG/DL (ref 250–450)
TIBC SERPL-MCNC: 254 UG/DL (ref 250–450)
TSH SERPL DL<=0.05 MIU/L-ACNC: 1.51 UIU/ML (ref 0.36–3.74)
UNIT DIVISION, %UDIV: 0
UR CULT HOLD, URHOLD: NORMAL
UROBILINOGEN UR QL STRIP.AUTO: 0.2 EU/DL (ref 0.2–1)
VANCOMYCIN SERPL-MCNC: 12.1 UG/ML
VANCOMYCIN SERPL-MCNC: 12.9 UG/ML
VANCOMYCIN SERPL-MCNC: 14.9 UG/ML
VANCOMYCIN SERPL-MCNC: 15.6 UG/ML
VANCOMYCIN SERPL-MCNC: 16.7 UG/ML
VAS LEFT DORSALIS PEDIS BP: 125 MMHG
VAS RIGHT DORSALIS PEDIS BP: 38 MMHG
VENTRICULAR RATE, ECG03: 68 BPM
VENTRICULAR RATE, ECG03: 72 BPM
VENTRICULAR RATE, ECG03: 88 BPM
VIBRIO SPECIES, DNA: NEGATIVE
VIBRIO SPECIES, DNA: NEGATIVE
VIT B12 SERPL-MCNC: >2000 PG/ML (ref 193–986)
WBC # BLD AUTO: 10.1 K/UL (ref 4.1–11.1)
WBC # BLD AUTO: 10.8 K/UL (ref 4.1–11.1)
WBC # BLD AUTO: 11.1 K/UL (ref 4.1–11.1)
WBC # BLD AUTO: 11.3 K/UL (ref 4.1–11.1)
WBC # BLD AUTO: 12.4 K/UL (ref 4.1–11.1)
WBC # BLD AUTO: 12.8 K/UL (ref 4.1–11.1)
WBC # BLD AUTO: 13 K/UL (ref 4.1–11.1)
WBC # BLD AUTO: 13.5 K/UL (ref 4.1–11.1)
WBC # BLD AUTO: 13.9 K/UL (ref 4.1–11.1)
WBC # BLD AUTO: 14.1 K/UL (ref 4.1–11.1)
WBC # BLD AUTO: 15 K/UL (ref 4.1–11.1)
WBC # BLD AUTO: 15.2 K/UL (ref 4.1–11.1)
WBC # BLD AUTO: 16.2 K/UL (ref 4.1–11.1)
WBC # BLD AUTO: 16.6 K/UL (ref 4.1–11.1)
WBC # BLD AUTO: 16.7 K/UL (ref 4.1–11.1)
WBC # BLD AUTO: 16.9 K/UL (ref 4.1–11.1)
WBC # BLD AUTO: 17.1 K/UL (ref 4.1–11.1)
WBC # BLD AUTO: 20.2 K/UL (ref 4.1–11.1)
WBC # BLD AUTO: 5 K/UL (ref 4.1–11.1)
WBC # BLD AUTO: 5.6 K/UL (ref 4.1–11.1)
WBC # BLD AUTO: 6.1 K/UL (ref 4.1–11.1)
WBC # BLD AUTO: 6.5 K/UL (ref 4.1–11.1)
WBC # BLD AUTO: 6.6 K/UL (ref 4.1–11.1)
WBC # BLD AUTO: 6.7 K/UL (ref 4.1–11.1)
WBC # BLD AUTO: 6.9 K/UL (ref 4.1–11.1)
WBC # BLD AUTO: 7.3 K/UL (ref 4.1–11.1)
WBC # BLD AUTO: 7.8 K/UL (ref 4.1–11.1)
WBC # BLD AUTO: 8.1 K/UL (ref 4.1–11.1)
WBC # BLD AUTO: 8.4 K/UL (ref 4.1–11.1)
WBC # BLD AUTO: 8.7 K/UL (ref 4.1–11.1)
WBC # BLD AUTO: 8.7 K/UL (ref 4.1–11.1)
WBC # BLD AUTO: 9.1 K/UL (ref 4.1–11.1)
WBC # BLD AUTO: 9.1 K/UL (ref 4.1–11.1)
WBC # BLD AUTO: 9.5 K/UL (ref 4.1–11.1)
WBC # BLD AUTO: 9.6 K/UL (ref 4.1–11.1)
WBC URNS QL MICRO: ABNORMAL /HPF (ref 0–4)
Y. ENTEROCOLITICA, DNA: NEGATIVE
Y. ENTEROCOLITICA, DNA: NEGATIVE

## 2022-01-01 PROCEDURE — 82962 GLUCOSE BLOOD TEST: CPT

## 2022-01-01 PROCEDURE — 74011250637 HC RX REV CODE- 250/637: Performed by: FAMILY MEDICINE

## 2022-01-01 PROCEDURE — 74011250637 HC RX REV CODE- 250/637: Performed by: NURSE PRACTITIONER

## 2022-01-01 PROCEDURE — 76210000006 HC OR PH I REC 0.5 TO 1 HR

## 2022-01-01 PROCEDURE — 74011250637 HC RX REV CODE- 250/637

## 2022-01-01 PROCEDURE — 99024 POSTOP FOLLOW-UP VISIT: CPT

## 2022-01-01 PROCEDURE — 2709999900 HC NON-CHARGEABLE SUPPLY: Performed by: INTERNAL MEDICINE

## 2022-01-01 PROCEDURE — 36415 COLL VENOUS BLD VENIPUNCTURE: CPT

## 2022-01-01 PROCEDURE — 65270000032 HC RM SEMIPRIVATE

## 2022-01-01 PROCEDURE — 74011250637 HC RX REV CODE- 250/637: Performed by: ANESTHESIOLOGY

## 2022-01-01 PROCEDURE — G0299 HHS/HOSPICE OF RN EA 15 MIN: HCPCS

## 2022-01-01 PROCEDURE — 74011000250 HC RX REV CODE- 250: Performed by: INTERNAL MEDICINE

## 2022-01-01 PROCEDURE — 85025 COMPLETE CBC W/AUTO DIFF WBC: CPT

## 2022-01-01 PROCEDURE — 77030042556 HC PNCL CAUT -B

## 2022-01-01 PROCEDURE — 83735 ASSAY OF MAGNESIUM: CPT

## 2022-01-01 PROCEDURE — 71045 X-RAY EXAM CHEST 1 VIEW: CPT

## 2022-01-01 PROCEDURE — 2709999900 HC NON-CHARGEABLE SUPPLY

## 2022-01-01 PROCEDURE — 99223 1ST HOSP IP/OBS HIGH 75: CPT | Performed by: PHYSICAL MEDICINE & REHABILITATION

## 2022-01-01 PROCEDURE — 99223 1ST HOSP IP/OBS HIGH 75: CPT | Performed by: FAMILY MEDICINE

## 2022-01-01 PROCEDURE — 73630 X-RAY EXAM OF FOOT: CPT

## 2022-01-01 PROCEDURE — 74011250637 HC RX REV CODE- 250/637: Performed by: HOSPITALIST

## 2022-01-01 PROCEDURE — 74011000258 HC RX REV CODE- 258: Performed by: HOSPITALIST

## 2022-01-01 PROCEDURE — 74011250636 HC RX REV CODE- 250/636

## 2022-01-01 PROCEDURE — 88311 DECALCIFY TISSUE: CPT

## 2022-01-01 PROCEDURE — 74011250636 HC RX REV CODE- 250/636: Performed by: NURSE PRACTITIONER

## 2022-01-01 PROCEDURE — 74011250636 HC RX REV CODE- 250/636: Performed by: EMERGENCY MEDICINE

## 2022-01-01 PROCEDURE — 74011250637 HC RX REV CODE- 250/637: Performed by: INTERNAL MEDICINE

## 2022-01-01 PROCEDURE — 74011000258 HC RX REV CODE- 258: Performed by: FAMILY MEDICINE

## 2022-01-01 PROCEDURE — 77030020061 HC IV BLD WRMR ADMIN SET 3M -B: Performed by: ANESTHESIOLOGY

## 2022-01-01 PROCEDURE — 80048 BASIC METABOLIC PNL TOTAL CA: CPT

## 2022-01-01 PROCEDURE — 74011250636 HC RX REV CODE- 250/636: Performed by: FAMILY MEDICINE

## 2022-01-01 PROCEDURE — 74011000250 HC RX REV CODE- 250: Performed by: NURSE PRACTITIONER

## 2022-01-01 PROCEDURE — 93922 UPR/L XTREMITY ART 2 LEVELS: CPT

## 2022-01-01 PROCEDURE — 99283 EMERGENCY DEPT VISIT LOW MDM: CPT

## 2022-01-01 PROCEDURE — 76937 US GUIDE VASCULAR ACCESS: CPT

## 2022-01-01 PROCEDURE — 94760 N-INVAS EAR/PLS OXIMETRY 1: CPT

## 2022-01-01 PROCEDURE — 85027 COMPLETE CBC AUTOMATED: CPT

## 2022-01-01 PROCEDURE — 97116 GAIT TRAINING THERAPY: CPT

## 2022-01-01 PROCEDURE — 77030026438 HC STYL ET INTUB CARD -A: Performed by: ANESTHESIOLOGY

## 2022-01-01 PROCEDURE — C9113 INJ PANTOPRAZOLE SODIUM, VIA: HCPCS | Performed by: NURSE PRACTITIONER

## 2022-01-01 PROCEDURE — 74011250636 HC RX REV CODE- 250/636: Performed by: INTERNAL MEDICINE

## 2022-01-01 PROCEDURE — 74011250636 HC RX REV CODE- 250/636: Performed by: SURGERY

## 2022-01-01 PROCEDURE — 74011250636 HC RX REV CODE- 250/636: Performed by: HOSPITALIST

## 2022-01-01 PROCEDURE — 74018 RADEX ABDOMEN 1 VIEW: CPT

## 2022-01-01 PROCEDURE — 74011250636 HC RX REV CODE- 250/636: Performed by: ANESTHESIOLOGY

## 2022-01-01 PROCEDURE — 97535 SELF CARE MNGMENT TRAINING: CPT

## 2022-01-01 PROCEDURE — 74011000250 HC RX REV CODE- 250: Performed by: NURSE ANESTHETIST, CERTIFIED REGISTERED

## 2022-01-01 PROCEDURE — 83605 ASSAY OF LACTIC ACID: CPT

## 2022-01-01 PROCEDURE — 77030040831 HC BAG URINE DRNG MDII -A

## 2022-01-01 PROCEDURE — 99221 1ST HOSP IP/OBS SF/LOW 40: CPT | Performed by: SURGERY

## 2022-01-01 PROCEDURE — C9290 INJ, BUPIVACAINE LIPOSOME: HCPCS | Performed by: SURGERY

## 2022-01-01 PROCEDURE — 36430 TRANSFUSION BLD/BLD COMPNT: CPT

## 2022-01-01 PROCEDURE — 74011000250 HC RX REV CODE- 250: Performed by: FAMILY MEDICINE

## 2022-01-01 PROCEDURE — 87324 CLOSTRIDIUM AG IA: CPT

## 2022-01-01 PROCEDURE — 93971 EXTREMITY STUDY: CPT

## 2022-01-01 PROCEDURE — 77030008771 HC TU NG SALEM SUMP -A: Performed by: ANESTHESIOLOGY

## 2022-01-01 PROCEDURE — 77030040361 HC SLV COMPR DVT MDII -B: Performed by: SURGERY

## 2022-01-01 PROCEDURE — 74011000250 HC RX REV CODE- 250

## 2022-01-01 PROCEDURE — 99232 SBSQ HOSP IP/OBS MODERATE 35: CPT | Performed by: SURGERY

## 2022-01-01 PROCEDURE — 77030031753 HC SHR ENDO COAG HARM J&J -E: Performed by: SURGERY

## 2022-01-01 PROCEDURE — 77010033678 HC OXYGEN DAILY

## 2022-01-01 PROCEDURE — 88305 TISSUE EXAM BY PATHOLOGIST: CPT

## 2022-01-01 PROCEDURE — 74011000258 HC RX REV CODE- 258: Performed by: INTERNAL MEDICINE

## 2022-01-01 PROCEDURE — 99285 EMERGENCY DEPT VISIT HI MDM: CPT

## 2022-01-01 PROCEDURE — 97165 OT EVAL LOW COMPLEX 30 MIN: CPT

## 2022-01-01 PROCEDURE — 77030041279 HC DRSG PRMSL AG MDII -B: Performed by: SURGERY

## 2022-01-01 PROCEDURE — 77030010938 HC CLP LIG TELE -A

## 2022-01-01 PROCEDURE — 87040 BLOOD CULTURE FOR BACTERIA: CPT

## 2022-01-01 PROCEDURE — 93005 ELECTROCARDIOGRAM TRACING: CPT

## 2022-01-01 PROCEDURE — 44160 REMOVAL OF COLON: CPT | Performed by: SURGERY

## 2022-01-01 PROCEDURE — 74011250637 HC RX REV CODE- 250/637: Performed by: SURGERY

## 2022-01-01 PROCEDURE — 77030003704 HC NDL VASC ACC ARMD -A

## 2022-01-01 PROCEDURE — 51798 US URINE CAPACITY MEASURE: CPT

## 2022-01-01 PROCEDURE — 80202 ASSAY OF VANCOMYCIN: CPT

## 2022-01-01 PROCEDURE — 86923 COMPATIBILITY TEST ELECTRIC: CPT

## 2022-01-01 PROCEDURE — 94761 N-INVAS EAR/PLS OXIMETRY MLT: CPT

## 2022-01-01 PROCEDURE — 82746 ASSAY OF FOLIC ACID SERUM: CPT

## 2022-01-01 PROCEDURE — 99024 POSTOP FOLLOW-UP VISIT: CPT | Performed by: SURGERY

## 2022-01-01 PROCEDURE — 041M09M BYPASS RIGHT POPLITEAL ARTERY TO PERONEAL ARTERY WITH AUTOLOGOUS VENOUS TISSUE, OPEN APPROACH: ICD-10-PCS

## 2022-01-01 PROCEDURE — 76060000035 HC ANESTHESIA 2 TO 2.5 HR: Performed by: SURGERY

## 2022-01-01 PROCEDURE — U0005 INFEC AGEN DETEC AMPLI PROBE: HCPCS

## 2022-01-01 PROCEDURE — 84132 ASSAY OF SERUM POTASSIUM: CPT

## 2022-01-01 PROCEDURE — 81001 URINALYSIS AUTO W/SCOPE: CPT

## 2022-01-01 PROCEDURE — 65660000000 HC RM CCU STEPDOWN

## 2022-01-01 PROCEDURE — 74011250636 HC RX REV CODE- 250/636: Performed by: STUDENT IN AN ORGANIZED HEALTH CARE EDUCATION/TRAINING PROGRAM

## 2022-01-01 PROCEDURE — 85018 HEMOGLOBIN: CPT

## 2022-01-01 PROCEDURE — 74011636637 HC RX REV CODE- 636/637: Performed by: INTERNAL MEDICINE

## 2022-01-01 PROCEDURE — 74011000250 HC RX REV CODE- 250: Performed by: EMERGENCY MEDICINE

## 2022-01-01 PROCEDURE — 82550 ASSAY OF CK (CPK): CPT

## 2022-01-01 PROCEDURE — 97530 THERAPEUTIC ACTIVITIES: CPT

## 2022-01-01 PROCEDURE — 77030031139 HC SUT VCRL2 J&J -A

## 2022-01-01 PROCEDURE — 74176 CT ABD & PELVIS W/O CONTRAST: CPT

## 2022-01-01 PROCEDURE — 77030004561 HC CATH ANGI DX COBRA ANGI -B

## 2022-01-01 PROCEDURE — B4101ZZ FLUOROSCOPY OF ABDOMINAL AORTA USING LOW OSMOLAR CONTRAST: ICD-10-PCS

## 2022-01-01 PROCEDURE — 99024 POSTOP FOLLOW-UP VISIT: CPT | Performed by: NURSE PRACTITIONER

## 2022-01-01 PROCEDURE — 77030034696 HC CATH URETH FOL 2W BARD -A

## 2022-01-01 PROCEDURE — 87186 SC STD MICRODIL/AGAR DIL: CPT

## 2022-01-01 PROCEDURE — 93306 TTE W/DOPPLER COMPLETE: CPT

## 2022-01-01 PROCEDURE — C9113 INJ PANTOPRAZOLE SODIUM, VIA: HCPCS | Performed by: HOSPITALIST

## 2022-01-01 PROCEDURE — 36573 INSJ PICC RS&I 5 YR+: CPT | Performed by: INTERNAL MEDICINE

## 2022-01-01 PROCEDURE — 0656 HSPC GENERAL INPATIENT

## 2022-01-01 PROCEDURE — 1111F DSCHRG MED/CURRENT MED MERGE: CPT | Performed by: NURSE PRACTITIONER

## 2022-01-01 PROCEDURE — 76060000032 HC ANESTHESIA 0.5 TO 1 HR: Performed by: INTERNAL MEDICINE

## 2022-01-01 PROCEDURE — 96374 THER/PROPH/DIAG INJ IV PUSH: CPT

## 2022-01-01 PROCEDURE — P9016 RBC LEUKOCYTES REDUCED: HCPCS

## 2022-01-01 PROCEDURE — 82728 ASSAY OF FERRITIN: CPT

## 2022-01-01 PROCEDURE — 87635 SARS-COV-2 COVID-19 AMP PRB: CPT

## 2022-01-01 PROCEDURE — 77030008684 HC TU ET CUF COVD -B: Performed by: ANESTHESIOLOGY

## 2022-01-01 PROCEDURE — 80053 COMPREHEN METABOLIC PANEL: CPT

## 2022-01-01 PROCEDURE — 97161 PT EVAL LOW COMPLEX 20 MIN: CPT

## 2022-01-01 PROCEDURE — 77030040830 HC CATH URETH FOL MDII -A

## 2022-01-01 PROCEDURE — 86140 C-REACTIVE PROTEIN: CPT

## 2022-01-01 PROCEDURE — 84145 PROCALCITONIN (PCT): CPT

## 2022-01-01 PROCEDURE — 97110 THERAPEUTIC EXERCISES: CPT

## 2022-01-01 PROCEDURE — 74011000250 HC RX REV CODE- 250: Performed by: HOSPITALIST

## 2022-01-01 PROCEDURE — G8427 DOCREV CUR MEDS BY ELIG CLIN: HCPCS | Performed by: NURSE PRACTITIONER

## 2022-01-01 PROCEDURE — G8420 CALC BMI NORM PARAMETERS: HCPCS | Performed by: NURSE PRACTITIONER

## 2022-01-01 PROCEDURE — 99223 1ST HOSP IP/OBS HIGH 75: CPT | Performed by: SURGERY

## 2022-01-01 PROCEDURE — 99232 SBSQ HOSP IP/OBS MODERATE 35: CPT | Performed by: NURSE PRACTITIONER

## 2022-01-01 PROCEDURE — 77030019905 HC CATH URETH INTMIT MDII -A

## 2022-01-01 PROCEDURE — 0DH63UZ INSERTION OF FEEDING DEVICE INTO STOMACH, PERCUTANEOUS APPROACH: ICD-10-PCS | Performed by: INTERNAL MEDICINE

## 2022-01-01 PROCEDURE — 77030008462 HC STPLR SKN PROX J&J -A: Performed by: SURGERY

## 2022-01-01 PROCEDURE — 84100 ASSAY OF PHOSPHORUS: CPT

## 2022-01-01 PROCEDURE — 77030008462 HC STPLR SKN PROX J&J -A

## 2022-01-01 PROCEDURE — 87506 IADNA-DNA/RNA PROBE TQ 6-11: CPT

## 2022-01-01 PROCEDURE — 74011250636 HC RX REV CODE- 250/636: Performed by: NURSE ANESTHETIST, CERTIFIED REGISTERED

## 2022-01-01 PROCEDURE — 0D9670Z DRAINAGE OF STOMACH WITH DRAINAGE DEVICE, VIA NATURAL OR ARTIFICIAL OPENING: ICD-10-PCS | Performed by: STUDENT IN AN ORGANIZED HEALTH CARE EDUCATION/TRAINING PROGRAM

## 2022-01-01 PROCEDURE — 77030002966 HC SUT PDS J&J -A: Performed by: SURGERY

## 2022-01-01 PROCEDURE — 99284 EMERGENCY DEPT VISIT MOD MDM: CPT

## 2022-01-01 PROCEDURE — 2709999900 HC NON-CHARGEABLE SUPPLY: Performed by: SURGERY

## 2022-01-01 PROCEDURE — G8432 DEP SCR NOT DOC, RNG: HCPCS | Performed by: NURSE PRACTITIONER

## 2022-01-01 PROCEDURE — 77030042556 HC PNCL CAUT -B: Performed by: SURGERY

## 2022-01-01 PROCEDURE — 74011000258 HC RX REV CODE- 258: Performed by: SURGERY

## 2022-01-01 PROCEDURE — 05HY33Z INSERTION OF INFUSION DEVICE INTO UPPER VEIN, PERCUTANEOUS APPROACH: ICD-10-PCS | Performed by: HOSPITALIST

## 2022-01-01 PROCEDURE — 83540 ASSAY OF IRON: CPT

## 2022-01-01 PROCEDURE — 74011250637 HC RX REV CODE- 250/637: Performed by: PODIATRIST

## 2022-01-01 PROCEDURE — 74011250637 HC RX REV CODE- 250/637: Performed by: EMERGENCY MEDICINE

## 2022-01-01 PROCEDURE — 76010000171 HC OR TIME 2 TO 2.5 HR INTENSV-TIER 1

## 2022-01-01 PROCEDURE — 77030005513 HC CATH URETH FOL11 MDII -B: Performed by: SURGERY

## 2022-01-01 PROCEDURE — 99231 SBSQ HOSP IP/OBS SF/LOW 25: CPT

## 2022-01-01 PROCEDURE — 3336500001 HSPC ELECTION

## 2022-01-01 PROCEDURE — G0439 PPPS, SUBSEQ VISIT: HCPCS | Performed by: NURSE PRACTITIONER

## 2022-01-01 PROCEDURE — 0DTF0ZZ RESECTION OF RIGHT LARGE INTESTINE, OPEN APPROACH: ICD-10-PCS | Performed by: SURGERY

## 2022-01-01 PROCEDURE — 83690 ASSAY OF LIPASE: CPT

## 2022-01-01 PROCEDURE — 77030012058: Performed by: INTERNAL MEDICINE

## 2022-01-01 PROCEDURE — 85652 RBC SED RATE AUTOMATED: CPT

## 2022-01-01 PROCEDURE — 74250 X-RAY XM SM INT 1CNTRST STD: CPT

## 2022-01-01 PROCEDURE — 99231 SBSQ HOSP IP/OBS SF/LOW 25: CPT | Performed by: SURGERY

## 2022-01-01 PROCEDURE — 86900 BLOOD TYPING SEROLOGIC ABO: CPT

## 2022-01-01 PROCEDURE — C1769 GUIDE WIRE: HCPCS

## 2022-01-01 PROCEDURE — 93306 TTE W/DOPPLER COMPLETE: CPT | Performed by: SPECIALIST

## 2022-01-01 PROCEDURE — 0Y6R0Z0 DETACHMENT AT RIGHT 2ND TOE, COMPLETE, OPEN APPROACH: ICD-10-PCS

## 2022-01-01 PROCEDURE — 99233 SBSQ HOSP IP/OBS HIGH 50: CPT | Performed by: FAMILY MEDICINE

## 2022-01-01 PROCEDURE — 77030003029 HC SUT VCRL J&J -B: Performed by: SURGERY

## 2022-01-01 PROCEDURE — 77030013079 HC BLNKT BAIR HGGR 3M -A: Performed by: ANESTHESIOLOGY

## 2022-01-01 PROCEDURE — 84443 ASSAY THYROID STIM HORMONE: CPT

## 2022-01-01 PROCEDURE — 82607 VITAMIN B-12: CPT

## 2022-01-01 PROCEDURE — 97168 OT RE-EVAL EST PLAN CARE: CPT

## 2022-01-01 PROCEDURE — 77030002916 HC SUT ETHLN J&J -A

## 2022-01-01 PROCEDURE — 97166 OT EVAL MOD COMPLEX 45 MIN: CPT

## 2022-01-01 PROCEDURE — 99496 TRANSJ CARE MGMT HIGH F2F 7D: CPT | Performed by: NURSE PRACTITIONER

## 2022-01-01 PROCEDURE — 96375 TX/PRO/DX INJ NEW DRUG ADDON: CPT

## 2022-01-01 PROCEDURE — 83036 HEMOGLOBIN GLYCOSYLATED A1C: CPT

## 2022-01-01 PROCEDURE — P9045 ALBUMIN (HUMAN), 5%, 250 ML: HCPCS | Performed by: ANESTHESIOLOGY

## 2022-01-01 PROCEDURE — 73718 MRI LOWER EXTREMITY W/O DYE: CPT

## 2022-01-01 PROCEDURE — 76210000016 HC OR PH I REC 1 TO 1.5 HR: Performed by: SURGERY

## 2022-01-01 PROCEDURE — 77030002986 HC SUT PROL J&J -A

## 2022-01-01 PROCEDURE — 97162 PT EVAL MOD COMPLEX 30 MIN: CPT

## 2022-01-01 PROCEDURE — 96361 HYDRATE IV INFUSION ADD-ON: CPT

## 2022-01-01 PROCEDURE — 74011000250 HC RX REV CODE- 250: Performed by: ANESTHESIOLOGY

## 2022-01-01 PROCEDURE — 77030020365 HC SOL INJ SOD CL 0.9% 50ML

## 2022-01-01 PROCEDURE — 74011000250 HC RX REV CODE- 250: Performed by: SURGERY

## 2022-01-01 PROCEDURE — 76040000007: Performed by: INTERNAL MEDICINE

## 2022-01-01 PROCEDURE — G8536 NO DOC ELDER MAL SCRN: HCPCS | Performed by: NURSE PRACTITIONER

## 2022-01-01 PROCEDURE — 87086 URINE CULTURE/COLONY COUNT: CPT

## 2022-01-01 PROCEDURE — C1887 CATHETER, GUIDING: HCPCS

## 2022-01-01 PROCEDURE — 77030008771 HC TU NG SALEM SUMP -A

## 2022-01-01 PROCEDURE — 87106 FUNGI IDENTIFICATION YEAST: CPT

## 2022-01-01 PROCEDURE — 65270000029 HC RM PRIVATE

## 2022-01-01 PROCEDURE — 77030040162

## 2022-01-01 PROCEDURE — 76010000138 HC OR TIME 0.5 TO 1 HR

## 2022-01-01 PROCEDURE — C1751 CATH, INF, PER/CENT/MIDLINE: HCPCS

## 2022-01-01 PROCEDURE — 1101F PT FALLS ASSESS-DOCD LE1/YR: CPT | Performed by: NURSE PRACTITIONER

## 2022-01-01 PROCEDURE — 74011000636 HC RX REV CODE- 636

## 2022-01-01 PROCEDURE — 3E0G76Z INTRODUCTION OF NUTRITIONAL SUBSTANCE INTO UPPER GI, VIA NATURAL OR ARTIFICIAL OPENING: ICD-10-PCS | Performed by: INTERNAL MEDICINE

## 2022-01-01 PROCEDURE — 1123F ACP DISCUSS/DSCN MKR DOCD: CPT | Performed by: NURSE PRACTITIONER

## 2022-01-01 PROCEDURE — 97164 PT RE-EVAL EST PLAN CARE: CPT

## 2022-01-01 PROCEDURE — 87077 CULTURE AEROBIC IDENTIFY: CPT

## 2022-01-01 PROCEDURE — 77030005123 HC CATH GASTMY PEG BSC -C: Performed by: INTERNAL MEDICINE

## 2022-01-01 PROCEDURE — 77030011808 HC STPLR ENDOSCOPIC J&J -D: Performed by: SURGERY

## 2022-01-01 PROCEDURE — 88360 TUMOR IMMUNOHISTOCHEM/MANUAL: CPT

## 2022-01-01 PROCEDURE — 76010000131 HC OR TIME 2 TO 2.5 HR: Performed by: SURGERY

## 2022-01-01 PROCEDURE — 77030002987 HC SUT PROL J&J -B

## 2022-01-01 PROCEDURE — B41F1ZZ FLUOROSCOPY OF RIGHT LOWER EXTREMITY ARTERIES USING LOW OSMOLAR CONTRAST: ICD-10-PCS

## 2022-01-01 PROCEDURE — 87177 OVA AND PARASITES SMEARS: CPT

## 2022-01-01 PROCEDURE — 77030002996 HC SUT SLK J&J -A: Performed by: SURGERY

## 2022-01-01 PROCEDURE — G0156 HHCP-SVS OF AIDE,EA 15 MIN: HCPCS

## 2022-01-01 PROCEDURE — 76060000035 HC ANESTHESIA 2 TO 2.5 HR

## 2022-01-01 PROCEDURE — 83521 IG LIGHT CHAINS FREE EACH: CPT

## 2022-01-01 PROCEDURE — 87205 SMEAR GRAM STAIN: CPT

## 2022-01-01 PROCEDURE — 76060000033 HC ANESTHESIA 1 TO 1.5 HR

## 2022-01-01 PROCEDURE — C1894 INTRO/SHEATH, NON-LASER: HCPCS

## 2022-01-01 PROCEDURE — 77030027876 HC STPLR ENDOSC FLX PWR J&J -G1: Performed by: SURGERY

## 2022-01-01 PROCEDURE — C1760 CLOSURE DEV, VASC: HCPCS

## 2022-01-01 PROCEDURE — 88309 TISSUE EXAM BY PATHOLOGIST: CPT

## 2022-01-01 RX ORDER — LIDOCAINE HYDROCHLORIDE 10 MG/ML
5 INJECTION INFILTRATION; PERINEURAL ONCE
Status: DISCONTINUED | OUTPATIENT
Start: 2022-01-01 | End: 2022-01-01

## 2022-01-01 RX ORDER — MONTELUKAST SODIUM 4 MG/1
2 TABLET, CHEWABLE ORAL 2 TIMES DAILY
Qty: 60 TABLET | Refills: 0 | Status: ON HOLD | OUTPATIENT
Start: 2022-01-01 | End: 2022-01-01 | Stop reason: SDUPTHER

## 2022-01-01 RX ORDER — TAMSULOSIN HYDROCHLORIDE 0.4 MG/1
0.4 CAPSULE ORAL
Status: DISCONTINUED | OUTPATIENT
Start: 2022-01-01 | End: 2022-01-01 | Stop reason: HOSPADM

## 2022-01-01 RX ORDER — HYDROMORPHONE HYDROCHLORIDE 2 MG/ML
2 INJECTION, SOLUTION INTRAMUSCULAR; INTRAVENOUS; SUBCUTANEOUS EVERY 4 HOURS
Status: DISCONTINUED | OUTPATIENT
Start: 2022-01-01 | End: 2022-01-01 | Stop reason: HOSPADM

## 2022-01-01 RX ORDER — DEXTROSE, SODIUM CHLORIDE, SODIUM LACTATE, POTASSIUM CHLORIDE, AND CALCIUM CHLORIDE 5; .6; .31; .03; .02 G/100ML; G/100ML; G/100ML; G/100ML; G/100ML
125 INJECTION, SOLUTION INTRAVENOUS CONTINUOUS
Status: DISCONTINUED | OUTPATIENT
Start: 2022-01-01 | End: 2022-01-01 | Stop reason: HOSPADM

## 2022-01-01 RX ORDER — SODIUM CHLORIDE 9 MG/ML
150 INJECTION, SOLUTION INTRAVENOUS CONTINUOUS
Status: DISPENSED | OUTPATIENT
Start: 2022-01-01 | End: 2022-01-01

## 2022-01-01 RX ORDER — SODIUM CHLORIDE, SODIUM LACTATE, POTASSIUM CHLORIDE, CALCIUM CHLORIDE 600; 310; 30; 20 MG/100ML; MG/100ML; MG/100ML; MG/100ML
125 INJECTION, SOLUTION INTRAVENOUS CONTINUOUS
Status: DISCONTINUED | OUTPATIENT
Start: 2022-01-01 | End: 2022-01-01 | Stop reason: HOSPADM

## 2022-01-01 RX ORDER — ROPIVACAINE HYDROCHLORIDE 5 MG/ML
30 INJECTION, SOLUTION EPIDURAL; INFILTRATION; PERINEURAL AS NEEDED
Status: DISCONTINUED | OUTPATIENT
Start: 2022-01-01 | End: 2022-01-01 | Stop reason: HOSPADM

## 2022-01-01 RX ORDER — MORPHINE SULFATE 2 MG/ML
2 INJECTION, SOLUTION INTRAMUSCULAR; INTRAVENOUS
Status: DISCONTINUED | OUTPATIENT
Start: 2022-01-01 | End: 2022-01-01 | Stop reason: HOSPADM

## 2022-01-01 RX ORDER — POTASSIUM CHLORIDE 29.8 MG/ML
20 INJECTION INTRAVENOUS
Status: COMPLETED | OUTPATIENT
Start: 2022-01-01 | End: 2022-01-01

## 2022-01-01 RX ORDER — OXYCODONE HYDROCHLORIDE 5 MG/1
2.5-5 TABLET ORAL
Qty: 10 TABLET | Refills: 0 | Status: SHIPPED | OUTPATIENT
Start: 2022-01-01 | End: 2022-01-01

## 2022-01-01 RX ORDER — FUROSEMIDE 20 MG/1
20 TABLET ORAL 2 TIMES DAILY
Qty: 30 TABLET | Refills: 0 | Status: ON HOLD
Start: 2022-01-01 | End: 2022-01-01 | Stop reason: SDUPTHER

## 2022-01-01 RX ORDER — SODIUM CHLORIDE 0.9 % (FLUSH) 0.9 %
5-40 SYRINGE (ML) INJECTION EVERY 8 HOURS
Status: DISCONTINUED | OUTPATIENT
Start: 2022-01-01 | End: 2022-01-01 | Stop reason: HOSPADM

## 2022-01-01 RX ORDER — ONDANSETRON 4 MG/1
4 TABLET, ORALLY DISINTEGRATING ORAL
Status: DISCONTINUED | OUTPATIENT
Start: 2022-01-01 | End: 2022-01-01 | Stop reason: HOSPADM

## 2022-01-01 RX ORDER — MAG HYDROX/ALUMINUM HYD/SIMETH 200-200-20
30 SUSPENSION, ORAL (FINAL DOSE FORM) ORAL
Status: DISCONTINUED | OUTPATIENT
Start: 2022-01-01 | End: 2022-01-01 | Stop reason: HOSPADM

## 2022-01-01 RX ORDER — MAGNESIUM SULFATE 100 %
4 CRYSTALS MISCELLANEOUS AS NEEDED
Status: DISCONTINUED | OUTPATIENT
Start: 2022-01-01 | End: 2022-01-01 | Stop reason: HOSPADM

## 2022-01-01 RX ORDER — FINASTERIDE 5 MG/1
5 TABLET, FILM COATED ORAL DAILY
Status: DISCONTINUED | OUTPATIENT
Start: 2022-01-01 | End: 2022-01-01 | Stop reason: HOSPADM

## 2022-01-01 RX ORDER — PHENOBARBITAL SODIUM 65 MG/ML
65 INJECTION INTRAMUSCULAR 4 TIMES DAILY
Status: DISCONTINUED | OUTPATIENT
Start: 2022-01-01 | End: 2022-01-01 | Stop reason: HOSPADM

## 2022-01-01 RX ORDER — TAMSULOSIN HYDROCHLORIDE 0.4 MG/1
0.4 CAPSULE ORAL DAILY
Status: DISCONTINUED | OUTPATIENT
Start: 2022-01-01 | End: 2022-01-01 | Stop reason: HOSPADM

## 2022-01-01 RX ORDER — SODIUM CHLORIDE 9 MG/ML
125 INJECTION, SOLUTION INTRAVENOUS CONTINUOUS
Status: DISCONTINUED | OUTPATIENT
Start: 2022-01-01 | End: 2022-01-01

## 2022-01-01 RX ORDER — SODIUM CHLORIDE 0.9 % (FLUSH) 0.9 %
5-40 SYRINGE (ML) INJECTION EVERY 8 HOURS
Status: CANCELLED | OUTPATIENT
Start: 2022-01-01

## 2022-01-01 RX ORDER — HYDROMORPHONE HYDROCHLORIDE 2 MG/ML
2 INJECTION, SOLUTION INTRAMUSCULAR; INTRAVENOUS; SUBCUTANEOUS
Status: DISCONTINUED | OUTPATIENT
Start: 2022-01-01 | End: 2022-01-01 | Stop reason: HOSPADM

## 2022-01-01 RX ORDER — OXYCODONE AND ACETAMINOPHEN 5; 325 MG/1; MG/1
1 TABLET ORAL
Status: DISCONTINUED | OUTPATIENT
Start: 2022-01-01 | End: 2022-01-01 | Stop reason: HOSPADM

## 2022-01-01 RX ORDER — FUROSEMIDE 20 MG/1
20 TABLET ORAL 2 TIMES DAILY
Status: DISCONTINUED | OUTPATIENT
Start: 2022-01-01 | End: 2022-01-01

## 2022-01-01 RX ORDER — TRAZODONE HYDROCHLORIDE 50 MG/1
50 TABLET ORAL
Status: DISCONTINUED | OUTPATIENT
Start: 2022-01-01 | End: 2022-01-01 | Stop reason: HOSPADM

## 2022-01-01 RX ORDER — HYDRALAZINE HYDROCHLORIDE 20 MG/ML
10 INJECTION INTRAMUSCULAR; INTRAVENOUS
Status: DISCONTINUED | OUTPATIENT
Start: 2022-01-01 | End: 2022-01-01 | Stop reason: HOSPADM

## 2022-01-01 RX ORDER — ALBUTEROL SULFATE 90 UG/1
1 AEROSOL, METERED RESPIRATORY (INHALATION)
Status: DISCONTINUED | OUTPATIENT
Start: 2022-01-01 | End: 2022-01-01 | Stop reason: HOSPADM

## 2022-01-01 RX ORDER — MONTELUKAST SODIUM 4 MG/1
2 TABLET, CHEWABLE ORAL 2 TIMES DAILY
Status: DISCONTINUED | OUTPATIENT
Start: 2022-01-01 | End: 2022-01-01 | Stop reason: HOSPADM

## 2022-01-01 RX ORDER — SODIUM,POTASSIUM PHOSPHATES 280-250MG
1 POWDER IN PACKET (EA) ORAL 2 TIMES DAILY
Status: DISCONTINUED | OUTPATIENT
Start: 2022-01-01 | End: 2022-01-01 | Stop reason: HOSPADM

## 2022-01-01 RX ORDER — ACETAMINOPHEN 325 MG/1
650 TABLET ORAL
Status: DISCONTINUED | OUTPATIENT
Start: 2022-01-01 | End: 2022-01-01 | Stop reason: HOSPADM

## 2022-01-01 RX ORDER — DOCUSATE SODIUM 100 MG/1
100 CAPSULE, LIQUID FILLED ORAL DAILY
Qty: 20 CAPSULE | Refills: 0 | Status: SHIPPED | OUTPATIENT
Start: 2022-01-01 | End: 2022-01-01

## 2022-01-01 RX ORDER — FLUCONAZOLE 150 MG/1
150 TABLET ORAL DAILY
Qty: 8 TABLET | Refills: 0 | Status: SHIPPED | OUTPATIENT
Start: 2022-01-01 | End: 2022-01-01

## 2022-01-01 RX ORDER — ONDANSETRON 2 MG/ML
4 INJECTION INTRAMUSCULAR; INTRAVENOUS
Status: DISCONTINUED | OUTPATIENT
Start: 2022-01-01 | End: 2022-01-01 | Stop reason: HOSPADM

## 2022-01-01 RX ORDER — SODIUM CHLORIDE 0.9 % (FLUSH) 0.9 %
5-40 SYRINGE (ML) INJECTION AS NEEDED
Status: DISCONTINUED | OUTPATIENT
Start: 2022-01-01 | End: 2022-01-01 | Stop reason: HOSPADM

## 2022-01-01 RX ORDER — ONDANSETRON 2 MG/ML
INJECTION INTRAMUSCULAR; INTRAVENOUS AS NEEDED
Status: DISCONTINUED | OUTPATIENT
Start: 2022-01-01 | End: 2022-01-01 | Stop reason: HOSPADM

## 2022-01-01 RX ORDER — MIDAZOLAM HYDROCHLORIDE 1 MG/ML
4 INJECTION, SOLUTION INTRAMUSCULAR; INTRAVENOUS
Status: DISCONTINUED | OUTPATIENT
Start: 2022-01-01 | End: 2022-01-01 | Stop reason: HOSPADM

## 2022-01-01 RX ORDER — POTASSIUM CHLORIDE 7.45 MG/ML
10 INJECTION INTRAVENOUS
Status: COMPLETED | OUTPATIENT
Start: 2022-01-01 | End: 2022-01-01

## 2022-01-01 RX ORDER — MAGNESIUM SULFATE HEPTAHYDRATE 40 MG/ML
2 INJECTION, SOLUTION INTRAVENOUS ONCE
Status: COMPLETED | OUTPATIENT
Start: 2022-01-01 | End: 2022-01-01

## 2022-01-01 RX ORDER — FENTANYL CITRATE 50 UG/ML
200 INJECTION, SOLUTION INTRAMUSCULAR; INTRAVENOUS
Status: DISCONTINUED | OUTPATIENT
Start: 2022-01-01 | End: 2022-01-01 | Stop reason: HOSPADM

## 2022-01-01 RX ORDER — FUROSEMIDE 10 MG/ML
20 INJECTION INTRAMUSCULAR; INTRAVENOUS ONCE
Status: DISCONTINUED | OUTPATIENT
Start: 2022-01-01 | End: 2022-01-01

## 2022-01-01 RX ORDER — CEPHALEXIN 500 MG/1
500 CAPSULE ORAL
Status: COMPLETED | OUTPATIENT
Start: 2022-01-01 | End: 2022-01-01

## 2022-01-01 RX ORDER — ROCURONIUM BROMIDE 10 MG/ML
INJECTION, SOLUTION INTRAVENOUS AS NEEDED
Status: DISCONTINUED | OUTPATIENT
Start: 2022-01-01 | End: 2022-01-01 | Stop reason: HOSPADM

## 2022-01-01 RX ORDER — SODIUM CHLORIDE, SODIUM LACTATE, POTASSIUM CHLORIDE, CALCIUM CHLORIDE 600; 310; 30; 20 MG/100ML; MG/100ML; MG/100ML; MG/100ML
INJECTION, SOLUTION INTRAVENOUS
Status: DISCONTINUED | OUTPATIENT
Start: 2022-01-01 | End: 2022-01-01 | Stop reason: HOSPADM

## 2022-01-01 RX ORDER — POTASSIUM CHLORIDE 750 MG/1
40 TABLET, FILM COATED, EXTENDED RELEASE ORAL
Status: COMPLETED | OUTPATIENT
Start: 2022-01-01 | End: 2022-01-01

## 2022-01-01 RX ORDER — PROPOFOL 10 MG/ML
INJECTION, EMULSION INTRAVENOUS
Status: DISCONTINUED | OUTPATIENT
Start: 2022-01-01 | End: 2022-01-01 | Stop reason: HOSPADM

## 2022-01-01 RX ORDER — MIDAZOLAM HYDROCHLORIDE 1 MG/ML
1 INJECTION, SOLUTION INTRAMUSCULAR; INTRAVENOUS AS NEEDED
Status: DISCONTINUED | OUTPATIENT
Start: 2022-01-01 | End: 2022-01-01 | Stop reason: HOSPADM

## 2022-01-01 RX ORDER — TRAMADOL HYDROCHLORIDE 50 MG/1
50 TABLET ORAL
Qty: 10 TABLET | Refills: 0 | Status: SHIPPED | OUTPATIENT
Start: 2022-01-01 | End: 2022-01-01

## 2022-01-01 RX ORDER — SODIUM,POTASSIUM PHOSPHATES 280-250MG
1 POWDER IN PACKET (EA) ORAL 4 TIMES DAILY
Status: COMPLETED | OUTPATIENT
Start: 2022-01-01 | End: 2022-01-01

## 2022-01-01 RX ORDER — MONTELUKAST SODIUM 4 MG/1
2 TABLET, CHEWABLE ORAL 2 TIMES DAILY
Qty: 120 TABLET | Refills: 3 | Status: SHIPPED | OUTPATIENT
Start: 2022-01-01 | End: 2022-01-01

## 2022-01-01 RX ORDER — FENTANYL CITRATE 50 UG/ML
50 INJECTION, SOLUTION INTRAMUSCULAR; INTRAVENOUS AS NEEDED
Status: DISCONTINUED | OUTPATIENT
Start: 2022-01-01 | End: 2022-01-01 | Stop reason: HOSPADM

## 2022-01-01 RX ORDER — INSULIN LISPRO 100 [IU]/ML
INJECTION, SOLUTION INTRAVENOUS; SUBCUTANEOUS
Status: DISCONTINUED | OUTPATIENT
Start: 2022-01-01 | End: 2022-01-01

## 2022-01-01 RX ORDER — FUROSEMIDE 40 MG/1
40 TABLET ORAL DAILY
Status: DISCONTINUED | OUTPATIENT
Start: 2022-01-01 | End: 2022-01-01 | Stop reason: HOSPADM

## 2022-01-01 RX ORDER — VANCOMYCIN 1.75 GRAM/500 ML IN 0.9 % SODIUM CHLORIDE INTRAVENOUS
1750 EVERY 12 HOURS
Status: DISCONTINUED | OUTPATIENT
Start: 2022-01-01 | End: 2022-01-01

## 2022-01-01 RX ORDER — CEPHALEXIN 500 MG/1
500 CAPSULE ORAL 4 TIMES DAILY
Qty: 24 CAPSULE | Refills: 0 | Status: SHIPPED | OUTPATIENT
Start: 2022-01-01 | End: 2022-01-01

## 2022-01-01 RX ORDER — HYDROCODONE BITARTRATE AND ACETAMINOPHEN 5; 325 MG/1; MG/1
1 TABLET ORAL
Status: DISCONTINUED | OUTPATIENT
Start: 2022-01-01 | End: 2022-01-01

## 2022-01-01 RX ORDER — ENOXAPARIN SODIUM 100 MG/ML
40 INJECTION SUBCUTANEOUS EVERY 24 HOURS
Status: DISCONTINUED | OUTPATIENT
Start: 2022-01-01 | End: 2022-01-01

## 2022-01-01 RX ORDER — CALCIUM CARBONATE 200(500)MG
200 TABLET,CHEWABLE ORAL AS NEEDED
Status: DISCONTINUED | OUTPATIENT
Start: 2022-01-01 | End: 2022-01-01 | Stop reason: HOSPADM

## 2022-01-01 RX ORDER — OXYCODONE HYDROCHLORIDE 5 MG/1
2.5-5 TABLET ORAL
Status: DISCONTINUED | OUTPATIENT
Start: 2022-01-01 | End: 2022-01-01 | Stop reason: HOSPADM

## 2022-01-01 RX ORDER — CEFAZOLIN SODIUM 1 G/3ML
INJECTION, POWDER, FOR SOLUTION INTRAMUSCULAR; INTRAVENOUS AS NEEDED
Status: DISCONTINUED | OUTPATIENT
Start: 2022-01-01 | End: 2022-01-01 | Stop reason: HOSPADM

## 2022-01-01 RX ORDER — TAMSULOSIN HYDROCHLORIDE 0.4 MG/1
0.4 CAPSULE ORAL DAILY
Qty: 30 CAPSULE | Refills: 1 | Status: SHIPPED | OUTPATIENT
Start: 2022-01-01 | End: 2022-01-01

## 2022-01-01 RX ORDER — ACETAMINOPHEN 650 MG/1
650 SUPPOSITORY RECTAL
Status: DISCONTINUED | OUTPATIENT
Start: 2022-01-01 | End: 2022-01-01 | Stop reason: HOSPADM

## 2022-01-01 RX ORDER — ACYCLOVIR 800 MG/1
400 TABLET ORAL DAILY
Status: DISCONTINUED | OUTPATIENT
Start: 2022-01-01 | End: 2022-01-01 | Stop reason: HOSPADM

## 2022-01-01 RX ORDER — GLYCOPYRROLATE 0.2 MG/ML
INJECTION INTRAMUSCULAR; INTRAVENOUS AS NEEDED
Status: DISCONTINUED | OUTPATIENT
Start: 2022-01-01 | End: 2022-01-01 | Stop reason: HOSPADM

## 2022-01-01 RX ORDER — SODIUM,POTASSIUM PHOSPHATES 280-250MG
1 POWDER IN PACKET (EA) ORAL 4 TIMES DAILY
Status: DISCONTINUED | OUTPATIENT
Start: 2022-01-01 | End: 2022-01-01

## 2022-01-01 RX ORDER — ALBUTEROL SULFATE 0.83 MG/ML
2.5 SOLUTION RESPIRATORY (INHALATION) AS NEEDED
Status: DISCONTINUED | OUTPATIENT
Start: 2022-01-01 | End: 2022-01-01 | Stop reason: HOSPADM

## 2022-01-01 RX ORDER — SODIUM CHLORIDE 9 MG/ML
250 INJECTION, SOLUTION INTRAVENOUS AS NEEDED
Status: DISCONTINUED | OUTPATIENT
Start: 2022-01-01 | End: 2022-01-01 | Stop reason: HOSPADM

## 2022-01-01 RX ORDER — SODIUM CHLORIDE 9 MG/ML
25 INJECTION, SOLUTION INTRAVENOUS CONTINUOUS
Status: DISCONTINUED | OUTPATIENT
Start: 2022-01-01 | End: 2022-01-01 | Stop reason: HOSPADM

## 2022-01-01 RX ORDER — FUROSEMIDE 20 MG/1
40 TABLET ORAL DAILY
Qty: 30 TABLET | Refills: 0 | Status: ON HOLD | OUTPATIENT
Start: 2022-01-01 | End: 2022-01-01 | Stop reason: SDUPTHER

## 2022-01-01 RX ORDER — FINASTERIDE 5 MG/1
5 TABLET, FILM COATED ORAL DAILY
Qty: 30 TABLET | Refills: 0 | Status: SHIPPED | OUTPATIENT
Start: 2022-01-01 | End: 2022-01-01 | Stop reason: ALTCHOICE

## 2022-01-01 RX ORDER — POTASSIUM CHLORIDE 750 MG/1
40 TABLET, FILM COATED, EXTENDED RELEASE ORAL EVERY 4 HOURS
Status: COMPLETED | OUTPATIENT
Start: 2022-01-01 | End: 2022-01-01

## 2022-01-01 RX ORDER — HYDRALAZINE HYDROCHLORIDE 20 MG/ML
20 INJECTION INTRAMUSCULAR; INTRAVENOUS
Status: DISCONTINUED | OUTPATIENT
Start: 2022-01-01 | End: 2022-01-01 | Stop reason: HOSPADM

## 2022-01-01 RX ORDER — PREGABALIN 50 MG/1
50 CAPSULE ORAL EVERY 12 HOURS
Status: DISCONTINUED | OUTPATIENT
Start: 2022-01-01 | End: 2022-01-01 | Stop reason: HOSPADM

## 2022-01-01 RX ORDER — HYDROCODONE BITARTRATE AND ACETAMINOPHEN 10; 325 MG/1; MG/1
1 TABLET ORAL
Status: DISCONTINUED | OUTPATIENT
Start: 2022-01-01 | End: 2022-01-01

## 2022-01-01 RX ORDER — POTASSIUM CHLORIDE AND SODIUM CHLORIDE 900; 300 MG/100ML; MG/100ML
INJECTION, SOLUTION INTRAVENOUS CONTINUOUS
Status: DISCONTINUED | OUTPATIENT
Start: 2022-01-01 | End: 2022-01-01

## 2022-01-01 RX ORDER — KETOROLAC TROMETHAMINE 30 MG/ML
INJECTION, SOLUTION INTRAMUSCULAR; INTRAVENOUS AS NEEDED
Status: DISCONTINUED | OUTPATIENT
Start: 2022-01-01 | End: 2022-01-01 | Stop reason: HOSPADM

## 2022-01-01 RX ORDER — CALCIUM CARBONATE 200(500)MG
200 TABLET,CHEWABLE ORAL
Status: DISCONTINUED | OUTPATIENT
Start: 2022-01-01 | End: 2022-01-01

## 2022-01-01 RX ORDER — PANTOPRAZOLE SODIUM 40 MG/1
40 TABLET, DELAYED RELEASE ORAL
Qty: 30 TABLET | Refills: 0 | Status: SHIPPED | OUTPATIENT
Start: 2022-01-01 | End: 2022-01-01 | Stop reason: ALTCHOICE

## 2022-01-01 RX ORDER — PHENYLEPHRINE HCL IN 0.9% NACL 0.4MG/10ML
SYRINGE (ML) INTRAVENOUS AS NEEDED
Status: DISCONTINUED | OUTPATIENT
Start: 2022-01-01 | End: 2022-01-01 | Stop reason: HOSPADM

## 2022-01-01 RX ORDER — ACETAMINOPHEN 325 MG/1
650 TABLET ORAL ONCE
Status: DISCONTINUED | OUTPATIENT
Start: 2022-01-01 | End: 2022-01-01 | Stop reason: HOSPADM

## 2022-01-01 RX ORDER — FENTANYL CITRATE 50 UG/ML
25 INJECTION, SOLUTION INTRAMUSCULAR; INTRAVENOUS
Status: DISCONTINUED | OUTPATIENT
Start: 2022-01-01 | End: 2022-01-01 | Stop reason: HOSPADM

## 2022-01-01 RX ORDER — ASPIRIN 81 MG/1
81 TABLET ORAL
Status: DISCONTINUED | OUTPATIENT
Start: 2022-01-01 | End: 2022-01-01 | Stop reason: HOSPADM

## 2022-01-01 RX ORDER — PHENOBARBITAL SODIUM 65 MG/ML
65 INJECTION INTRAMUSCULAR 3 TIMES DAILY
Status: DISCONTINUED | OUTPATIENT
Start: 2022-01-01 | End: 2022-01-01

## 2022-01-01 RX ORDER — POTASSIUM CHLORIDE 750 MG/1
40 TABLET, FILM COATED, EXTENDED RELEASE ORAL 2 TIMES DAILY
Status: DISCONTINUED | OUTPATIENT
Start: 2022-01-01 | End: 2022-01-01 | Stop reason: HOSPADM

## 2022-01-01 RX ORDER — FENTANYL CITRATE 50 UG/ML
INJECTION, SOLUTION INTRAMUSCULAR; INTRAVENOUS AS NEEDED
Status: DISCONTINUED | OUTPATIENT
Start: 2022-01-01 | End: 2022-01-01 | Stop reason: HOSPADM

## 2022-01-01 RX ORDER — PREGABALIN 25 MG/1
50 CAPSULE ORAL 2 TIMES DAILY
Status: DISCONTINUED | OUTPATIENT
Start: 2022-01-01 | End: 2022-01-01 | Stop reason: HOSPADM

## 2022-01-01 RX ORDER — GLYCOPYRROLATE 0.2 MG/ML
0.2 INJECTION INTRAMUSCULAR; INTRAVENOUS ONCE
Status: DISCONTINUED | OUTPATIENT
Start: 2022-01-01 | End: 2022-01-01

## 2022-01-01 RX ORDER — LEVOFLOXACIN 5 MG/ML
750 INJECTION, SOLUTION INTRAVENOUS EVERY OTHER DAY
Status: DISCONTINUED | OUTPATIENT
Start: 2022-01-01 | End: 2022-01-01

## 2022-01-01 RX ORDER — IPRATROPIUM BROMIDE AND ALBUTEROL SULFATE 2.5; .5 MG/3ML; MG/3ML
3 SOLUTION RESPIRATORY (INHALATION)
Status: DISCONTINUED | OUTPATIENT
Start: 2022-01-01 | End: 2022-01-01

## 2022-01-01 RX ORDER — INSULIN LISPRO 100 [IU]/ML
INJECTION, SOLUTION INTRAVENOUS; SUBCUTANEOUS
Status: DISCONTINUED | OUTPATIENT
Start: 2022-01-01 | End: 2022-01-01 | Stop reason: HOSPADM

## 2022-01-01 RX ORDER — POTASSIUM CHLORIDE 750 MG/1
20 TABLET, FILM COATED, EXTENDED RELEASE ORAL
Status: COMPLETED | OUTPATIENT
Start: 2022-01-01 | End: 2022-01-01

## 2022-01-01 RX ORDER — HYDROMORPHONE HYDROCHLORIDE 1 MG/ML
1 INJECTION, SOLUTION INTRAMUSCULAR; INTRAVENOUS; SUBCUTANEOUS EVERY 4 HOURS
Status: DISCONTINUED | OUTPATIENT
Start: 2022-01-01 | End: 2022-01-01

## 2022-01-01 RX ORDER — FENTANYL CITRATE 50 UG/ML
50 INJECTION, SOLUTION INTRAMUSCULAR; INTRAVENOUS
Status: DISCONTINUED | OUTPATIENT
Start: 2022-01-01 | End: 2022-01-01 | Stop reason: HOSPADM

## 2022-01-01 RX ORDER — SODIUM CHLORIDE 9 MG/ML
75 INJECTION, SOLUTION INTRAVENOUS CONTINUOUS
Status: DISPENSED | OUTPATIENT
Start: 2022-01-01 | End: 2022-01-01

## 2022-01-01 RX ORDER — HYDROMORPHONE HYDROCHLORIDE 1 MG/ML
.5-1 INJECTION, SOLUTION INTRAMUSCULAR; INTRAVENOUS; SUBCUTANEOUS
Status: DISCONTINUED | OUTPATIENT
Start: 2022-01-01 | End: 2022-01-01 | Stop reason: HOSPADM

## 2022-01-01 RX ORDER — MUPIROCIN 20 MG/G
OINTMENT TOPICAL
Qty: 22 G | Refills: 2 | Status: SHIPPED | OUTPATIENT
Start: 2022-01-01

## 2022-01-01 RX ORDER — HYDROMORPHONE HYDROCHLORIDE 1 MG/ML
0.2 INJECTION, SOLUTION INTRAMUSCULAR; INTRAVENOUS; SUBCUTANEOUS
Status: DISCONTINUED | OUTPATIENT
Start: 2022-01-01 | End: 2022-01-01 | Stop reason: HOSPADM

## 2022-01-01 RX ORDER — HYDROMORPHONE HYDROCHLORIDE 2 MG/ML
INJECTION, SOLUTION INTRAMUSCULAR; INTRAVENOUS; SUBCUTANEOUS AS NEEDED
Status: DISCONTINUED | OUTPATIENT
Start: 2022-01-01 | End: 2022-01-01 | Stop reason: HOSPADM

## 2022-01-01 RX ORDER — OXYCODONE HYDROCHLORIDE 5 MG/1
5 TABLET ORAL AS NEEDED
Status: DISCONTINUED | OUTPATIENT
Start: 2022-01-01 | End: 2022-01-01 | Stop reason: HOSPADM

## 2022-01-01 RX ORDER — ONDANSETRON 4 MG/1
4 TABLET, ORALLY DISINTEGRATING ORAL
Status: DISCONTINUED | OUTPATIENT
Start: 2022-01-01 | End: 2022-01-01

## 2022-01-01 RX ORDER — HYDROCODONE BITARTRATE AND ACETAMINOPHEN 5; 325 MG/1; MG/1
1 TABLET ORAL AS NEEDED
Status: DISCONTINUED | OUTPATIENT
Start: 2022-01-01 | End: 2022-01-01 | Stop reason: HOSPADM

## 2022-01-01 RX ORDER — HYDROMORPHONE HYDROCHLORIDE 1 MG/ML
0.5 INJECTION, SOLUTION INTRAMUSCULAR; INTRAVENOUS; SUBCUTANEOUS
Status: DISCONTINUED | OUTPATIENT
Start: 2022-01-01 | End: 2022-01-01

## 2022-01-01 RX ORDER — PROPOFOL 10 MG/ML
INJECTION, EMULSION INTRAVENOUS AS NEEDED
Status: DISCONTINUED | OUTPATIENT
Start: 2022-01-01 | End: 2022-01-01 | Stop reason: HOSPADM

## 2022-01-01 RX ORDER — LOPERAMIDE HYDROCHLORIDE 2 MG/1
4 CAPSULE ORAL
Status: DISCONTINUED | OUTPATIENT
Start: 2022-01-01 | End: 2022-01-01 | Stop reason: HOSPADM

## 2022-01-01 RX ORDER — SUCCINYLCHOLINE CHLORIDE 20 MG/ML
INJECTION INTRAMUSCULAR; INTRAVENOUS AS NEEDED
Status: DISCONTINUED | OUTPATIENT
Start: 2022-01-01 | End: 2022-01-01 | Stop reason: HOSPADM

## 2022-01-01 RX ORDER — BACITRACIN 500 UNIT/G
PACKET (EA) TOPICAL
Status: COMPLETED
Start: 2022-01-01 | End: 2022-01-01

## 2022-01-01 RX ORDER — MIDAZOLAM HYDROCHLORIDE 1 MG/ML
1 INJECTION, SOLUTION INTRAMUSCULAR; INTRAVENOUS
Status: DISCONTINUED | OUTPATIENT
Start: 2022-01-01 | End: 2022-01-01 | Stop reason: HOSPADM

## 2022-01-01 RX ORDER — ONDANSETRON 2 MG/ML
4 INJECTION INTRAMUSCULAR; INTRAVENOUS AS NEEDED
Status: DISCONTINUED | OUTPATIENT
Start: 2022-01-01 | End: 2022-01-01 | Stop reason: HOSPADM

## 2022-01-01 RX ORDER — SODIUM CHLORIDE, SODIUM LACTATE, POTASSIUM CHLORIDE, CALCIUM CHLORIDE 600; 310; 30; 20 MG/100ML; MG/100ML; MG/100ML; MG/100ML
50 INJECTION, SOLUTION INTRAVENOUS CONTINUOUS
Status: DISCONTINUED | OUTPATIENT
Start: 2022-01-01 | End: 2022-01-01

## 2022-01-01 RX ORDER — MIDAZOLAM HYDROCHLORIDE 1 MG/ML
2 INJECTION, SOLUTION INTRAMUSCULAR; INTRAVENOUS EVERY 4 HOURS
Status: DISCONTINUED | OUTPATIENT
Start: 2022-01-01 | End: 2022-01-01

## 2022-01-01 RX ORDER — POTASSIUM CHLORIDE 7.45 MG/ML
10 INJECTION INTRAVENOUS
Status: DISCONTINUED | OUTPATIENT
Start: 2022-01-01 | End: 2022-01-01

## 2022-01-01 RX ORDER — MAGNESIUM SULFATE HEPTAHYDRATE 40 MG/ML
2 INJECTION, SOLUTION INTRAVENOUS ONCE
Status: DISCONTINUED | OUTPATIENT
Start: 2022-01-01 | End: 2022-01-01

## 2022-01-01 RX ORDER — MIDAZOLAM HYDROCHLORIDE 1 MG/ML
2 INJECTION, SOLUTION INTRAMUSCULAR; INTRAVENOUS
Status: DISCONTINUED | OUTPATIENT
Start: 2022-01-01 | End: 2022-01-01

## 2022-01-01 RX ORDER — MIDAZOLAM HYDROCHLORIDE 1 MG/ML
0.5 INJECTION, SOLUTION INTRAMUSCULAR; INTRAVENOUS
Status: DISCONTINUED | OUTPATIENT
Start: 2022-01-01 | End: 2022-01-01 | Stop reason: HOSPADM

## 2022-01-01 RX ORDER — PROCHLORPERAZINE EDISYLATE 5 MG/ML
10 INJECTION INTRAMUSCULAR; INTRAVENOUS
Status: CANCELLED | OUTPATIENT
Start: 2022-01-01

## 2022-01-01 RX ORDER — LIDOCAINE HYDROCHLORIDE 20 MG/ML
INJECTION, SOLUTION INFILTRATION; PERINEURAL AS NEEDED
Status: DISCONTINUED | OUTPATIENT
Start: 2022-01-01 | End: 2022-01-01 | Stop reason: HOSPADM

## 2022-01-01 RX ORDER — LANOLIN ALCOHOL/MO/W.PET/CERES
400 CREAM (GRAM) TOPICAL DAILY
Qty: 30 TABLET | Refills: 3 | Status: SHIPPED | OUTPATIENT
Start: 2022-01-01

## 2022-01-01 RX ORDER — ACETAMINOPHEN 325 MG/1
650 TABLET ORAL ONCE
Status: COMPLETED | OUTPATIENT
Start: 2022-01-01 | End: 2022-01-01

## 2022-01-01 RX ORDER — MIDAZOLAM HYDROCHLORIDE 1 MG/ML
INJECTION, SOLUTION INTRAMUSCULAR; INTRAVENOUS AS NEEDED
Status: DISCONTINUED | OUTPATIENT
Start: 2022-01-01 | End: 2022-01-01 | Stop reason: HOSPADM

## 2022-01-01 RX ORDER — EPHEDRINE SULFATE/0.9% NACL/PF 50 MG/5 ML
SYRINGE (ML) INTRAVENOUS AS NEEDED
Status: DISCONTINUED | OUTPATIENT
Start: 2022-01-01 | End: 2022-01-01 | Stop reason: HOSPADM

## 2022-01-01 RX ORDER — POLYETHYLENE GLYCOL 3350 17 G/17G
17 POWDER, FOR SOLUTION ORAL DAILY PRN
Status: DISCONTINUED | OUTPATIENT
Start: 2022-01-01 | End: 2022-01-01 | Stop reason: HOSPADM

## 2022-01-01 RX ORDER — SODIUM,POTASSIUM PHOSPHATES 280-250MG
1 POWDER IN PACKET (EA) ORAL DAILY
Qty: 30 PACKET | Refills: 1 | Status: SHIPPED | OUTPATIENT
Start: 2022-01-01 | End: 2022-01-01 | Stop reason: SDUPTHER

## 2022-01-01 RX ORDER — SODIUM CHLORIDE 0.9 % (FLUSH) 0.9 %
5-40 SYRINGE (ML) INJECTION AS NEEDED
Status: CANCELLED | OUTPATIENT
Start: 2022-01-01

## 2022-01-01 RX ORDER — ONDANSETRON 2 MG/ML
4 INJECTION INTRAMUSCULAR; INTRAVENOUS
Status: COMPLETED | OUTPATIENT
Start: 2022-01-01 | End: 2022-01-01

## 2022-01-01 RX ORDER — HYDROCODONE BITARTRATE AND ACETAMINOPHEN 5; 325 MG/1; MG/1
1 TABLET ORAL
Qty: 11 TABLET | Refills: 0 | Status: SHIPPED | OUTPATIENT
Start: 2022-01-01 | End: 2022-01-01

## 2022-01-01 RX ORDER — EPINEPHRINE 0.1 MG/ML
1 INJECTION INTRACARDIAC; INTRAVENOUS
Status: DISCONTINUED | OUTPATIENT
Start: 2022-01-01 | End: 2022-01-01 | Stop reason: HOSPADM

## 2022-01-01 RX ORDER — FUROSEMIDE 20 MG/1
20 TABLET ORAL DAILY
Qty: 30 TABLET | Refills: 2 | Status: SHIPPED | OUTPATIENT
Start: 2022-01-01

## 2022-01-01 RX ORDER — DEXTROSE MONOHYDRATE AND SODIUM CHLORIDE 5; .45 G/100ML; G/100ML
25 INJECTION, SOLUTION INTRAVENOUS CONTINUOUS
Status: DISCONTINUED | OUTPATIENT
Start: 2022-01-01 | End: 2022-01-01

## 2022-01-01 RX ORDER — HYDROMORPHONE HYDROCHLORIDE 5 MG/5ML
1 SOLUTION ORAL
Status: DISCONTINUED | OUTPATIENT
Start: 2022-01-01 | End: 2022-01-01 | Stop reason: HOSPADM

## 2022-01-01 RX ORDER — GLYCOPYRROLATE 0.2 MG/ML
0.2 INJECTION INTRAMUSCULAR; INTRAVENOUS
Status: DISCONTINUED | OUTPATIENT
Start: 2022-01-01 | End: 2022-01-01 | Stop reason: HOSPADM

## 2022-01-01 RX ORDER — LANOLIN ALCOHOL/MO/W.PET/CERES
325 CREAM (GRAM) TOPICAL
Qty: 30 TABLET | Refills: 3 | Status: SHIPPED | OUTPATIENT
Start: 2022-01-01 | End: 2022-01-01 | Stop reason: ALTCHOICE

## 2022-01-01 RX ORDER — LIDOCAINE HYDROCHLORIDE 10 MG/ML
0.1 INJECTION, SOLUTION EPIDURAL; INFILTRATION; INTRACAUDAL; PERINEURAL AS NEEDED
Status: DISCONTINUED | OUTPATIENT
Start: 2022-01-01 | End: 2022-01-01 | Stop reason: HOSPADM

## 2022-01-01 RX ORDER — PANTOPRAZOLE SODIUM 40 MG/1
40 TABLET, DELAYED RELEASE ORAL
Status: DISCONTINUED | OUTPATIENT
Start: 2022-01-01 | End: 2022-01-01 | Stop reason: HOSPADM

## 2022-01-01 RX ORDER — MUPIROCIN 20 MG/G
OINTMENT TOPICAL DAILY
Status: DISCONTINUED | OUTPATIENT
Start: 2022-01-01 | End: 2022-01-01 | Stop reason: HOSPADM

## 2022-01-01 RX ORDER — MIDAZOLAM HYDROCHLORIDE 1 MG/ML
.25-5 INJECTION, SOLUTION INTRAMUSCULAR; INTRAVENOUS
Status: DISCONTINUED | OUTPATIENT
Start: 2022-01-01 | End: 2022-01-01 | Stop reason: HOSPADM

## 2022-01-01 RX ORDER — HEPARIN SODIUM 1000 [USP'U]/ML
INJECTION, SOLUTION INTRAVENOUS; SUBCUTANEOUS AS NEEDED
Status: DISCONTINUED | OUTPATIENT
Start: 2022-01-01 | End: 2022-01-01 | Stop reason: HOSPADM

## 2022-01-01 RX ORDER — ONDANSETRON 2 MG/ML
4 INJECTION INTRAMUSCULAR; INTRAVENOUS
Status: DISCONTINUED | OUTPATIENT
Start: 2022-01-01 | End: 2022-01-01

## 2022-01-01 RX ORDER — ZOLPIDEM TARTRATE 5 MG/1
5 TABLET ORAL
Status: DISCONTINUED | OUTPATIENT
Start: 2022-01-01 | End: 2022-01-01

## 2022-01-01 RX ORDER — DIPHENHYDRAMINE HYDROCHLORIDE 50 MG/ML
12.5 INJECTION, SOLUTION INTRAMUSCULAR; INTRAVENOUS
Status: ACTIVE | OUTPATIENT
Start: 2022-01-01 | End: 2022-01-01

## 2022-01-01 RX ORDER — HEPARIN SODIUM 5000 [USP'U]/ML
5000 INJECTION, SOLUTION INTRAVENOUS; SUBCUTANEOUS EVERY 8 HOURS
Status: DISCONTINUED | OUTPATIENT
Start: 2022-01-01 | End: 2022-01-01 | Stop reason: HOSPADM

## 2022-01-01 RX ORDER — POTASSIUM CHLORIDE 750 MG/1
10 TABLET, FILM COATED, EXTENDED RELEASE ORAL DAILY
Status: DISCONTINUED | OUTPATIENT
Start: 2022-01-01 | End: 2022-01-01

## 2022-01-01 RX ORDER — SODIUM CHLORIDE, SODIUM LACTATE, POTASSIUM CHLORIDE, CALCIUM CHLORIDE 600; 310; 30; 20 MG/100ML; MG/100ML; MG/100ML; MG/100ML
1000 INJECTION, SOLUTION INTRAVENOUS CONTINUOUS
Status: DISCONTINUED | OUTPATIENT
Start: 2022-01-01 | End: 2022-01-01 | Stop reason: HOSPADM

## 2022-01-01 RX ORDER — PANTOPRAZOLE SODIUM 40 MG/1
40 TABLET, DELAYED RELEASE ORAL DAILY
Status: DISCONTINUED | OUTPATIENT
Start: 2022-01-01 | End: 2022-01-01

## 2022-01-01 RX ORDER — HEPARIN SODIUM 5000 [USP'U]/ML
5000 INJECTION, SOLUTION INTRAVENOUS; SUBCUTANEOUS EVERY 8 HOURS
Status: DISCONTINUED | OUTPATIENT
Start: 2022-01-01 | End: 2022-01-01

## 2022-01-01 RX ORDER — TRAMADOL HYDROCHLORIDE 50 MG/1
50 TABLET ORAL
Status: DISCONTINUED | OUTPATIENT
Start: 2022-01-01 | End: 2022-01-01 | Stop reason: HOSPADM

## 2022-01-01 RX ORDER — DEXTROMETHORPHAN/PSEUDOEPHED 2.5-7.5/.8
1.2 DROPS ORAL
Status: DISCONTINUED | OUTPATIENT
Start: 2022-01-01 | End: 2022-01-01 | Stop reason: HOSPADM

## 2022-01-01 RX ORDER — GUAIFENESIN 100 MG/5ML
81 LIQUID (ML) ORAL DAILY
Status: DISCONTINUED | OUTPATIENT
Start: 2022-01-01 | End: 2022-01-01 | Stop reason: HOSPADM

## 2022-01-01 RX ORDER — PROCHLORPERAZINE EDISYLATE 5 MG/ML
10 INJECTION INTRAMUSCULAR; INTRAVENOUS
Status: DISCONTINUED | OUTPATIENT
Start: 2022-01-01 | End: 2022-01-01 | Stop reason: HOSPADM

## 2022-01-01 RX ORDER — SODIUM,POTASSIUM PHOSPHATES 280-250MG
1 POWDER IN PACKET (EA) ORAL DAILY
Qty: 30 PACKET | Refills: 2 | Status: SHIPPED | OUTPATIENT
Start: 2022-01-01 | End: 2022-01-01

## 2022-01-01 RX ORDER — VANCOMYCIN 1.75 GRAM/500 ML IN 0.9 % SODIUM CHLORIDE INTRAVENOUS
1750 ONCE
Status: COMPLETED | OUTPATIENT
Start: 2022-01-01 | End: 2022-01-01

## 2022-01-01 RX ORDER — LIDOCAINE HYDROCHLORIDE 20 MG/ML
INJECTION, SOLUTION EPIDURAL; INFILTRATION; INTRACAUDAL; PERINEURAL AS NEEDED
Status: DISCONTINUED | OUTPATIENT
Start: 2022-01-01 | End: 2022-01-01 | Stop reason: HOSPADM

## 2022-01-01 RX ORDER — KETOROLAC TROMETHAMINE 30 MG/ML
30 INJECTION, SOLUTION INTRAMUSCULAR; INTRAVENOUS
Status: DISCONTINUED | OUTPATIENT
Start: 2022-01-01 | End: 2022-01-01 | Stop reason: HOSPADM

## 2022-01-01 RX ORDER — HYDROMORPHONE HYDROCHLORIDE 1 MG/ML
1 INJECTION, SOLUTION INTRAMUSCULAR; INTRAVENOUS; SUBCUTANEOUS
Status: DISCONTINUED | OUTPATIENT
Start: 2022-01-01 | End: 2022-01-01

## 2022-01-01 RX ORDER — TRAZODONE HYDROCHLORIDE 50 MG/1
50 TABLET ORAL
Qty: 90 TABLET | Refills: 1 | Status: SHIPPED | OUTPATIENT
Start: 2022-01-01

## 2022-01-01 RX ORDER — FENTANYL CITRATE 50 UG/ML
25 INJECTION, SOLUTION INTRAMUSCULAR; INTRAVENOUS
Status: COMPLETED | OUTPATIENT
Start: 2022-01-01 | End: 2022-01-01

## 2022-01-01 RX ORDER — TRAMADOL HYDROCHLORIDE 50 MG/1
50 TABLET ORAL
Status: DISCONTINUED | OUTPATIENT
Start: 2022-01-01 | End: 2022-01-01

## 2022-01-01 RX ORDER — FACIAL-BODY WIPES
10 EACH TOPICAL DAILY PRN
Status: DISCONTINUED | OUTPATIENT
Start: 2022-01-01 | End: 2022-01-01 | Stop reason: HOSPADM

## 2022-01-01 RX ORDER — NEOSTIGMINE METHYLSULFATE 1 MG/ML
INJECTION, SOLUTION INTRAVENOUS AS NEEDED
Status: DISCONTINUED | OUTPATIENT
Start: 2022-01-01 | End: 2022-01-01 | Stop reason: HOSPADM

## 2022-01-01 RX ORDER — HYDROMORPHONE HYDROCHLORIDE 1 MG/ML
0.5 INJECTION, SOLUTION INTRAMUSCULAR; INTRAVENOUS; SUBCUTANEOUS
Status: CANCELLED | OUTPATIENT
Start: 2022-01-01

## 2022-01-01 RX ORDER — ATROPINE SULFATE 0.1 MG/ML
0.5 INJECTION INTRAVENOUS
Status: DISCONTINUED | OUTPATIENT
Start: 2022-01-01 | End: 2022-01-01 | Stop reason: HOSPADM

## 2022-01-01 RX ORDER — TRAZODONE HYDROCHLORIDE 50 MG/1
50 TABLET ORAL
Qty: 30 TABLET | Refills: 0 | Status: SHIPPED | OUTPATIENT
Start: 2022-01-01 | End: 2022-01-01 | Stop reason: SDUPTHER

## 2022-01-01 RX ORDER — DIPHENHYDRAMINE HCL 25 MG
25 CAPSULE ORAL
Status: DISCONTINUED | OUTPATIENT
Start: 2022-01-01 | End: 2022-01-01 | Stop reason: HOSPADM

## 2022-01-01 RX ORDER — LORAZEPAM 2 MG/ML
1 CONCENTRATE ORAL
Status: DISCONTINUED | OUTPATIENT
Start: 2022-01-01 | End: 2022-01-01 | Stop reason: HOSPADM

## 2022-01-01 RX ORDER — LIDOCAINE HYDROCHLORIDE 10 MG/ML
0.5 INJECTION, SOLUTION EPIDURAL; INFILTRATION; INTRACAUDAL; PERINEURAL AS NEEDED
Status: DISCONTINUED | OUTPATIENT
Start: 2022-01-01 | End: 2022-01-01 | Stop reason: HOSPADM

## 2022-01-01 RX ORDER — POTASSIUM CHLORIDE 7.45 MG/ML
10 INJECTION INTRAVENOUS
Status: DISPENSED | OUTPATIENT
Start: 2022-01-01 | End: 2022-01-01

## 2022-01-01 RX ORDER — LANOLIN ALCOHOL/MO/W.PET/CERES
400 CREAM (GRAM) TOPICAL DAILY
Status: DISCONTINUED | OUTPATIENT
Start: 2022-01-01 | End: 2022-01-01 | Stop reason: HOSPADM

## 2022-01-01 RX ORDER — POTASSIUM CHLORIDE 750 MG/1
40 TABLET, FILM COATED, EXTENDED RELEASE ORAL DAILY
Status: COMPLETED | OUTPATIENT
Start: 2022-01-01 | End: 2022-01-01

## 2022-01-01 RX ORDER — LINEZOLID 600 MG/1
600 TABLET, FILM COATED ORAL 2 TIMES DAILY
Qty: 16 TABLET | Refills: 0 | Status: SHIPPED | OUTPATIENT
Start: 2022-01-01 | End: 2022-01-01

## 2022-01-01 RX ORDER — MIDAZOLAM HYDROCHLORIDE 1 MG/ML
2 INJECTION, SOLUTION INTRAMUSCULAR; INTRAVENOUS
Status: CANCELLED | OUTPATIENT
Start: 2022-01-01

## 2022-01-01 RX ORDER — FUROSEMIDE 20 MG/1
20 TABLET ORAL DAILY
Status: DISCONTINUED | OUTPATIENT
Start: 2022-01-01 | End: 2022-01-01 | Stop reason: HOSPADM

## 2022-01-01 RX ORDER — ACETAMINOPHEN 325 MG/1
650 TABLET ORAL 3 TIMES DAILY
Status: DISCONTINUED | OUTPATIENT
Start: 2022-01-01 | End: 2022-01-01 | Stop reason: HOSPADM

## 2022-01-01 RX ORDER — FINASTERIDE 5 MG/1
5 TABLET, FILM COATED ORAL DAILY
Status: DISCONTINUED | OUTPATIENT
Start: 2022-01-01 | End: 2022-01-01

## 2022-01-01 RX ORDER — DIPHENHYDRAMINE HCL 25 MG
25 CAPSULE ORAL
Status: DISCONTINUED | OUTPATIENT
Start: 2022-01-01 | End: 2022-01-01

## 2022-01-01 RX ORDER — DIPHENHYDRAMINE HYDROCHLORIDE 50 MG/ML
12.5 INJECTION, SOLUTION INTRAMUSCULAR; INTRAVENOUS AS NEEDED
Status: DISCONTINUED | OUTPATIENT
Start: 2022-01-01 | End: 2022-01-01 | Stop reason: HOSPADM

## 2022-01-01 RX ORDER — DIPHENHYDRAMINE HYDROCHLORIDE 50 MG/ML
12.5 INJECTION, SOLUTION INTRAMUSCULAR; INTRAVENOUS AS NEEDED
Status: ACTIVE | OUTPATIENT
Start: 2022-01-01 | End: 2022-01-01

## 2022-01-01 RX ORDER — SODIUM CHLORIDE 9 MG/ML
INJECTION, SOLUTION INTRAVENOUS
Status: DISCONTINUED | OUTPATIENT
Start: 2022-01-01 | End: 2022-01-01 | Stop reason: HOSPADM

## 2022-01-01 RX ORDER — ALBUMIN HUMAN 50 G/1000ML
SOLUTION INTRAVENOUS AS NEEDED
Status: DISCONTINUED | OUTPATIENT
Start: 2022-01-01 | End: 2022-01-01 | Stop reason: HOSPADM

## 2022-01-01 RX ORDER — CHOLESTYRAMINE LIGHT 4 G/5.7G
POWDER, FOR SUSPENSION ORAL
COMMUNITY
Start: 2022-01-01

## 2022-01-01 RX ADMIN — MAGNESIUM SULFATE IN WATER 2 G: 40 INJECTION, SOLUTION INTRAVENOUS at 12:29

## 2022-01-01 RX ADMIN — SODIUM CHLORIDE 1000 ML: 9 INJECTION, SOLUTION INTRAVENOUS at 03:45

## 2022-01-01 RX ADMIN — ACETAMINOPHEN 650 MG: 325 TABLET ORAL at 09:13

## 2022-01-01 RX ADMIN — OXYCODONE 5 MG: 5 TABLET ORAL at 04:56

## 2022-01-01 RX ADMIN — VANCOMYCIN HYDROCHLORIDE 1000 MG: 1 INJECTION, POWDER, LYOPHILIZED, FOR SOLUTION INTRAVENOUS at 17:32

## 2022-01-01 RX ADMIN — PIPERACILLIN AND TAZOBACTAM 3.38 G: 3; .375 INJECTION, POWDER, LYOPHILIZED, FOR SOLUTION INTRAVENOUS at 22:00

## 2022-01-01 RX ADMIN — SODIUM CHLORIDE 150 ML/HR: 9 INJECTION, SOLUTION INTRAVENOUS at 15:41

## 2022-01-01 RX ADMIN — FINASTERIDE 5 MG: 5 TABLET, FILM COATED ORAL at 08:57

## 2022-01-01 RX ADMIN — PANTOPRAZOLE SODIUM 40 MG: 40 TABLET, DELAYED RELEASE ORAL at 06:14

## 2022-01-01 RX ADMIN — SODIUM CHLORIDE, PRESERVATIVE FREE 10 ML: 5 INJECTION INTRAVENOUS at 15:31

## 2022-01-01 RX ADMIN — PIPERACILLIN AND TAZOBACTAM 3.38 G: 3; .375 INJECTION, POWDER, LYOPHILIZED, FOR SOLUTION INTRAVENOUS at 15:23

## 2022-01-01 RX ADMIN — PROPOFOL 110 MG: 10 INJECTION, EMULSION INTRAVENOUS at 13:28

## 2022-01-01 RX ADMIN — POTASSIUM & SODIUM PHOSPHATES POWDER PACK 280-160-250 MG 1 PACKET: 280-160-250 PACK at 18:27

## 2022-01-01 RX ADMIN — CEFEPIME 2 G: 2 INJECTION, POWDER, FOR SOLUTION INTRAVENOUS at 21:12

## 2022-01-01 RX ADMIN — PANTOPRAZOLE SODIUM 40 MG: 40 TABLET, DELAYED RELEASE ORAL at 06:40

## 2022-01-01 RX ADMIN — MIDAZOLAM 1 MG: 1 INJECTION INTRAMUSCULAR; INTRAVENOUS at 13:53

## 2022-01-01 RX ADMIN — PREGABALIN 50 MG: 25 CAPSULE ORAL at 18:05

## 2022-01-01 RX ADMIN — ACETAMINOPHEN 650 MG: 325 TABLET ORAL at 02:10

## 2022-01-01 RX ADMIN — PREGABALIN 50 MG: 50 CAPSULE ORAL at 10:23

## 2022-01-01 RX ADMIN — ACYCLOVIR 400 MG: 800 TABLET ORAL at 10:19

## 2022-01-01 RX ADMIN — FENTANYL CITRATE 25 MCG: 50 INJECTION, SOLUTION INTRAMUSCULAR; INTRAVENOUS at 14:00

## 2022-01-01 RX ADMIN — Medication 1 MG: at 21:50

## 2022-01-01 RX ADMIN — MIDAZOLAM 2 MG: 1 INJECTION INTRAMUSCULAR; INTRAVENOUS at 04:29

## 2022-01-01 RX ADMIN — HYDROMORPHONE HYDROCHLORIDE 2 MG: 2 INJECTION, SOLUTION INTRAMUSCULAR; INTRAVENOUS; SUBCUTANEOUS at 07:56

## 2022-01-01 RX ADMIN — COLESTIPOL HYDROCHLORIDE 2 G: 1 TABLET, FILM COATED ORAL at 17:52

## 2022-01-01 RX ADMIN — PREGABALIN 50 MG: 25 CAPSULE ORAL at 10:35

## 2022-01-01 RX ADMIN — SODIUM CHLORIDE, POTASSIUM CHLORIDE, SODIUM LACTATE AND CALCIUM CHLORIDE: 600; 310; 30; 20 INJECTION, SOLUTION INTRAVENOUS at 07:43

## 2022-01-01 RX ADMIN — CEFEPIME 2 G: 2 INJECTION, POWDER, FOR SOLUTION INTRAVENOUS at 20:39

## 2022-01-01 RX ADMIN — HYDROMORPHONE HYDROCHLORIDE 1 MG: 1 INJECTION, SOLUTION INTRAMUSCULAR; INTRAVENOUS; SUBCUTANEOUS at 21:29

## 2022-01-01 RX ADMIN — HYDROMORPHONE HYDROCHLORIDE 1 MG: 1 INJECTION, SOLUTION INTRAMUSCULAR; INTRAVENOUS; SUBCUTANEOUS at 23:33

## 2022-01-01 RX ADMIN — ONDANSETRON HYDROCHLORIDE 4 MG: 2 INJECTION, SOLUTION INTRAMUSCULAR; INTRAVENOUS at 09:32

## 2022-01-01 RX ADMIN — ONDANSETRON HYDROCHLORIDE 4 MG: 2 SOLUTION INTRAMUSCULAR; INTRAVENOUS at 19:18

## 2022-01-01 RX ADMIN — PIPERACILLIN AND TAZOBACTAM 3.38 G: 3; .375 INJECTION, POWDER, LYOPHILIZED, FOR SOLUTION INTRAVENOUS at 06:57

## 2022-01-01 RX ADMIN — Medication 1 CAPSULE: at 10:18

## 2022-01-01 RX ADMIN — ACETAMINOPHEN 650 MG: 325 TABLET ORAL at 09:09

## 2022-01-01 RX ADMIN — HYDROMORPHONE HYDROCHLORIDE 1 MG: 1 INJECTION, SOLUTION INTRAMUSCULAR; INTRAVENOUS; SUBCUTANEOUS at 02:36

## 2022-01-01 RX ADMIN — HEPARIN SODIUM 5000 UNITS: 5000 INJECTION INTRAVENOUS; SUBCUTANEOUS at 21:35

## 2022-01-01 RX ADMIN — ASPIRIN 81 MG: 81 TABLET, COATED ORAL at 21:21

## 2022-01-01 RX ADMIN — ACETAMINOPHEN 650 MG: 325 TABLET ORAL at 09:58

## 2022-01-01 RX ADMIN — TRAMADOL HYDROCHLORIDE 50 MG: 50 TABLET ORAL at 15:42

## 2022-01-01 RX ADMIN — PREGABALIN 50 MG: 25 CAPSULE ORAL at 18:17

## 2022-01-01 RX ADMIN — OXYCODONE 5 MG: 5 TABLET ORAL at 04:11

## 2022-01-01 RX ADMIN — POTASSIUM CHLORIDE 10 MEQ: 7.46 INJECTION, SOLUTION INTRAVENOUS at 09:16

## 2022-01-01 RX ADMIN — FINASTERIDE 5 MG: 5 TABLET, FILM COATED ORAL at 08:36

## 2022-01-01 RX ADMIN — PREGABALIN 50 MG: 25 CAPSULE ORAL at 20:49

## 2022-01-01 RX ADMIN — ACETAMINOPHEN 650 MG: 325 TABLET ORAL at 01:00

## 2022-01-01 RX ADMIN — WATER 2 G: 1 INJECTION INTRAMUSCULAR; INTRAVENOUS; SUBCUTANEOUS at 14:42

## 2022-01-01 RX ADMIN — TAMSULOSIN HYDROCHLORIDE 0.4 MG: 0.4 CAPSULE ORAL at 10:24

## 2022-01-01 RX ADMIN — CEFTRIAXONE SODIUM 1 G: 1 INJECTION, POWDER, FOR SOLUTION INTRAMUSCULAR; INTRAVENOUS at 11:14

## 2022-01-01 RX ADMIN — TRAMADOL HYDROCHLORIDE 50 MG: 50 TABLET ORAL at 00:14

## 2022-01-01 RX ADMIN — FINASTERIDE 5 MG: 5 TABLET, FILM COATED ORAL at 09:11

## 2022-01-01 RX ADMIN — HEPARIN SODIUM 5000 UNITS: 5000 INJECTION INTRAVENOUS; SUBCUTANEOUS at 19:04

## 2022-01-01 RX ADMIN — HYDROCODONE BITARTRATE AND ACETAMINOPHEN 1 TABLET: 10; 325 TABLET ORAL at 08:55

## 2022-01-01 RX ADMIN — ACETAMINOPHEN 650 MG: 325 TABLET ORAL at 17:06

## 2022-01-01 RX ADMIN — ONDANSETRON HYDROCHLORIDE 4 MG: 2 INJECTION, SOLUTION INTRAMUSCULAR; INTRAVENOUS at 15:32

## 2022-01-01 RX ADMIN — HYDROMORPHONE HYDROCHLORIDE 1 MG: 1 INJECTION, SOLUTION INTRAMUSCULAR; INTRAVENOUS; SUBCUTANEOUS at 08:27

## 2022-01-01 RX ADMIN — ALBUMIN (HUMAN) 250 ML: 12.5 INJECTION, SOLUTION INTRAVENOUS at 13:38

## 2022-01-01 RX ADMIN — MIDAZOLAM 4 MG: 1 INJECTION INTRAMUSCULAR; INTRAVENOUS at 03:21

## 2022-01-01 RX ADMIN — PREGABALIN 50 MG: 25 CAPSULE ORAL at 08:51

## 2022-01-01 RX ADMIN — POTASSIUM CHLORIDE 10 MEQ: 10 INJECTION, SOLUTION INTRAVENOUS at 10:16

## 2022-01-01 RX ADMIN — POTASSIUM CHLORIDE 20 MEQ: 750 TABLET, EXTENDED RELEASE ORAL at 11:19

## 2022-01-01 RX ADMIN — SODIUM CHLORIDE, PRESERVATIVE FREE 10 ML: 5 INJECTION INTRAVENOUS at 17:53

## 2022-01-01 RX ADMIN — Medication 80 MCG: at 14:11

## 2022-01-01 RX ADMIN — COLESTIPOL HYDROCHLORIDE 2 G: 1 TABLET, FILM COATED ORAL at 12:40

## 2022-01-01 RX ADMIN — POTASSIUM & SODIUM PHOSPHATES POWDER PACK 280-160-250 MG 1 PACKET: 280-160-250 PACK at 08:58

## 2022-01-01 RX ADMIN — PANTOPRAZOLE SODIUM 40 MG: 40 TABLET, DELAYED RELEASE ORAL at 07:55

## 2022-01-01 RX ADMIN — IRON SUCROSE 100 MG: 20 INJECTION, SOLUTION INTRAVENOUS at 12:34

## 2022-01-01 RX ADMIN — VANCOMYCIN HYDROCHLORIDE 1000 MG: 1 INJECTION, POWDER, LYOPHILIZED, FOR SOLUTION INTRAVENOUS at 13:20

## 2022-01-01 RX ADMIN — HEPARIN SODIUM 5000 UNITS: 5000 INJECTION INTRAVENOUS; SUBCUTANEOUS at 18:08

## 2022-01-01 RX ADMIN — HYDROMORPHONE HYDROCHLORIDE 1 MG: 1 INJECTION, SOLUTION INTRAMUSCULAR; INTRAVENOUS; SUBCUTANEOUS at 01:19

## 2022-01-01 RX ADMIN — HYDROMORPHONE HYDROCHLORIDE 1 MG: 1 INJECTION, SOLUTION INTRAMUSCULAR; INTRAVENOUS; SUBCUTANEOUS at 20:21

## 2022-01-01 RX ADMIN — MIDAZOLAM 2 MG: 1 INJECTION INTRAMUSCULAR; INTRAVENOUS at 14:58

## 2022-01-01 RX ADMIN — Medication 1 CAPSULE: at 08:40

## 2022-01-01 RX ADMIN — CEFEPIME 2 G: 2 INJECTION, POWDER, FOR SOLUTION INTRAVENOUS at 22:34

## 2022-01-01 RX ADMIN — PREGABALIN 50 MG: 25 CAPSULE ORAL at 08:58

## 2022-01-01 RX ADMIN — ONDANSETRON 4 MG: 2 INJECTION INTRAMUSCULAR; INTRAVENOUS at 21:48

## 2022-01-01 RX ADMIN — OXYCODONE 5 MG: 5 TABLET ORAL at 04:46

## 2022-01-01 RX ADMIN — Medication 10 MG: at 14:43

## 2022-01-01 RX ADMIN — Medication 1 MG: at 08:10

## 2022-01-01 RX ADMIN — DAPTOMYCIN 300 MG: 500 INJECTION, POWDER, LYOPHILIZED, FOR SOLUTION INTRAVENOUS at 22:24

## 2022-01-01 RX ADMIN — LIDOCAINE HYDROCHLORIDE 60 MG: 20 INJECTION, SOLUTION EPIDURAL; INFILTRATION; INTRACAUDAL; PERINEURAL at 13:54

## 2022-01-01 RX ADMIN — MUPIROCIN: 20 OINTMENT TOPICAL at 08:25

## 2022-01-01 RX ADMIN — SODIUM CHLORIDE 75 ML/HR: 9 INJECTION, SOLUTION INTRAVENOUS at 20:59

## 2022-01-01 RX ADMIN — PREGABALIN 50 MG: 50 CAPSULE ORAL at 22:36

## 2022-01-01 RX ADMIN — PREGABALIN 50 MG: 25 CAPSULE ORAL at 09:47

## 2022-01-01 RX ADMIN — MIDAZOLAM 2 MG: 1 INJECTION INTRAMUSCULAR; INTRAVENOUS at 23:44

## 2022-01-01 RX ADMIN — FUROSEMIDE 20 MG: 20 TABLET ORAL at 08:59

## 2022-01-01 RX ADMIN — VANCOMYCIN HYDROCHLORIDE 1000 MG: 1 INJECTION, POWDER, LYOPHILIZED, FOR SOLUTION INTRAVENOUS at 15:04

## 2022-01-01 RX ADMIN — PIPERACILLIN AND TAZOBACTAM 3.38 G: 3; .375 INJECTION, POWDER, LYOPHILIZED, FOR SOLUTION INTRAVENOUS at 06:41

## 2022-01-01 RX ADMIN — CEFEPIME 2 G: 2 INJECTION, POWDER, FOR SOLUTION INTRAVENOUS at 10:49

## 2022-01-01 RX ADMIN — SODIUM CHLORIDE, PRESERVATIVE FREE 40 MG: 5 INJECTION INTRAVENOUS at 21:12

## 2022-01-01 RX ADMIN — CEFEPIME 2 G: 2 INJECTION, POWDER, FOR SOLUTION INTRAVENOUS at 09:39

## 2022-01-01 RX ADMIN — MUPIROCIN: 20 OINTMENT TOPICAL at 10:08

## 2022-01-01 RX ADMIN — ACETAMINOPHEN 650 MG: 325 TABLET ORAL at 02:40

## 2022-01-01 RX ADMIN — Medication 80 MCG: at 08:03

## 2022-01-01 RX ADMIN — MIDAZOLAM 2 MG: 1 INJECTION INTRAMUSCULAR; INTRAVENOUS at 12:15

## 2022-01-01 RX ADMIN — PIPERACILLIN AND TAZOBACTAM 3.38 G: 3; .375 INJECTION, POWDER, LYOPHILIZED, FOR SOLUTION INTRAVENOUS at 15:10

## 2022-01-01 RX ADMIN — POTASSIUM & SODIUM PHOSPHATES POWDER PACK 280-160-250 MG 1 PACKET: 280-160-250 PACK at 09:11

## 2022-01-01 RX ADMIN — SODIUM CHLORIDE, PRESERVATIVE FREE 40 MG: 5 INJECTION INTRAVENOUS at 20:56

## 2022-01-01 RX ADMIN — Medication 1 CAPSULE: at 18:55

## 2022-01-01 RX ADMIN — PIPERACILLIN AND TAZOBACTAM 3.38 G: 3; .375 INJECTION, POWDER, LYOPHILIZED, FOR SOLUTION INTRAVENOUS at 06:39

## 2022-01-01 RX ADMIN — FENTANYL CITRATE 50 MCG: 50 INJECTION, SOLUTION INTRAMUSCULAR; INTRAVENOUS at 15:21

## 2022-01-01 RX ADMIN — HYDROMORPHONE HYDROCHLORIDE 1 MG: 1 INJECTION, SOLUTION INTRAMUSCULAR; INTRAVENOUS; SUBCUTANEOUS at 09:45

## 2022-01-01 RX ADMIN — SODIUM CHLORIDE, POTASSIUM CHLORIDE, SODIUM LACTATE AND CALCIUM CHLORIDE 50 ML/HR: 600; 310; 30; 20 INJECTION, SOLUTION INTRAVENOUS at 11:53

## 2022-01-01 RX ADMIN — FENTANYL CITRATE 50 MCG: 50 INJECTION, SOLUTION INTRAMUSCULAR; INTRAVENOUS at 08:35

## 2022-01-01 RX ADMIN — KETOROLAC TROMETHAMINE 15 MG: 30 INJECTION, SOLUTION INTRAMUSCULAR; INTRAVENOUS at 15:24

## 2022-01-01 RX ADMIN — HYDROMORPHONE HYDROCHLORIDE 2 MG: 2 INJECTION, SOLUTION INTRAMUSCULAR; INTRAVENOUS; SUBCUTANEOUS at 04:31

## 2022-01-01 RX ADMIN — OXYCODONE 5 MG: 5 TABLET ORAL at 07:12

## 2022-01-01 RX ADMIN — SODIUM CHLORIDE, PRESERVATIVE FREE 40 MG: 5 INJECTION INTRAVENOUS at 09:06

## 2022-01-01 RX ADMIN — OXYCODONE 5 MG: 5 TABLET ORAL at 13:35

## 2022-01-01 RX ADMIN — MUPIROCIN: 20 OINTMENT TOPICAL at 08:22

## 2022-01-01 RX ADMIN — TRAMADOL HYDROCHLORIDE 50 MG: 50 TABLET ORAL at 09:13

## 2022-01-01 RX ADMIN — Medication 10 ML: at 16:55

## 2022-01-01 RX ADMIN — HYDROMORPHONE HYDROCHLORIDE 1 MG: 1 INJECTION, SOLUTION INTRAMUSCULAR; INTRAVENOUS; SUBCUTANEOUS at 10:20

## 2022-01-01 RX ADMIN — ACYCLOVIR 400 MG: 800 TABLET ORAL at 09:47

## 2022-01-01 RX ADMIN — PANTOPRAZOLE SODIUM 40 MG: 40 TABLET, DELAYED RELEASE ORAL at 07:20

## 2022-01-01 RX ADMIN — POTASSIUM CHLORIDE 10 MEQ: 10 INJECTION, SOLUTION INTRAVENOUS at 06:04

## 2022-01-01 RX ADMIN — DAPTOMYCIN 300 MG: 500 INJECTION, POWDER, LYOPHILIZED, FOR SOLUTION INTRAVENOUS at 22:07

## 2022-01-01 RX ADMIN — ASPIRIN 81 MG: 81 TABLET, COATED ORAL at 21:00

## 2022-01-01 RX ADMIN — MUPIROCIN: 20 OINTMENT TOPICAL at 08:37

## 2022-01-01 RX ADMIN — HYDROCODONE BITARTRATE AND ACETAMINOPHEN 1 TABLET: 10; 325 TABLET ORAL at 22:16

## 2022-01-01 RX ADMIN — MIDAZOLAM 2 MG: 1 INJECTION INTRAMUSCULAR; INTRAVENOUS at 07:31

## 2022-01-01 RX ADMIN — TRAMADOL HYDROCHLORIDE 50 MG: 50 TABLET ORAL at 18:55

## 2022-01-01 RX ADMIN — FENTANYL CITRATE 25 MCG: 50 INJECTION, SOLUTION INTRAMUSCULAR; INTRAVENOUS at 07:56

## 2022-01-01 RX ADMIN — VANCOMYCIN HYDROCHLORIDE 1000 MG: 1 INJECTION, POWDER, LYOPHILIZED, FOR SOLUTION INTRAVENOUS at 12:07

## 2022-01-01 RX ADMIN — SODIUM CHLORIDE AND POTASSIUM CHLORIDE: 9; 2.98 INJECTION, SOLUTION INTRAVENOUS at 06:29

## 2022-01-01 RX ADMIN — MUPIROCIN: 20 OINTMENT TOPICAL at 09:10

## 2022-01-01 RX ADMIN — SODIUM CHLORIDE 100 MG: 900 INJECTION, SOLUTION INTRAVENOUS at 14:47

## 2022-01-01 RX ADMIN — SODIUM CHLORIDE, PRESERVATIVE FREE 40 MG: 5 INJECTION INTRAVENOUS at 21:44

## 2022-01-01 RX ADMIN — FUROSEMIDE 40 MG: 40 TABLET ORAL at 08:36

## 2022-01-01 RX ADMIN — HYDROMORPHONE HYDROCHLORIDE 2 MG: 2 INJECTION, SOLUTION INTRAMUSCULAR; INTRAVENOUS; SUBCUTANEOUS at 09:37

## 2022-01-01 RX ADMIN — POTASSIUM CHLORIDE 10 MEQ: 10 INJECTION, SOLUTION INTRAVENOUS at 11:59

## 2022-01-01 RX ADMIN — ASPIRIN 81 MG CHEWABLE TABLET 81 MG: 81 TABLET CHEWABLE at 10:23

## 2022-01-01 RX ADMIN — SODIUM CHLORIDE, PRESERVATIVE FREE 10 ML: 5 INJECTION INTRAVENOUS at 21:50

## 2022-01-01 RX ADMIN — ASPIRIN 81 MG: 81 TABLET, COATED ORAL at 21:53

## 2022-01-01 RX ADMIN — HYDROMORPHONE HYDROCHLORIDE 1 MG: 1 INJECTION, SOLUTION INTRAMUSCULAR; INTRAVENOUS; SUBCUTANEOUS at 10:21

## 2022-01-01 RX ADMIN — SODIUM CHLORIDE 100 MG: 900 INJECTION, SOLUTION INTRAVENOUS at 15:18

## 2022-01-01 RX ADMIN — PIPERACILLIN AND TAZOBACTAM 3.38 G: 3; .375 INJECTION, POWDER, LYOPHILIZED, FOR SOLUTION INTRAVENOUS at 22:17

## 2022-01-01 RX ADMIN — MUPIROCIN: 20 OINTMENT TOPICAL at 09:22

## 2022-01-01 RX ADMIN — POTASSIUM CHLORIDE 40 MEQ: 750 TABLET, EXTENDED RELEASE ORAL at 03:45

## 2022-01-01 RX ADMIN — ACETAMINOPHEN 650 MG: 325 TABLET ORAL at 17:32

## 2022-01-01 RX ADMIN — SODIUM CHLORIDE, PRESERVATIVE FREE 40 MG: 5 INJECTION INTRAVENOUS at 08:17

## 2022-01-01 RX ADMIN — PANTOPRAZOLE SODIUM 40 MG: 40 TABLET, DELAYED RELEASE ORAL at 07:12

## 2022-01-01 RX ADMIN — ACETAMINOPHEN 650 MG: 325 TABLET ORAL at 11:19

## 2022-01-01 RX ADMIN — FINASTERIDE 5 MG: 5 TABLET, FILM COATED ORAL at 08:26

## 2022-01-01 RX ADMIN — SODIUM PHOSPHATE, MONOBASIC, MONOHYDRATE: 276; 142 INJECTION, SOLUTION INTRAVENOUS at 12:13

## 2022-01-01 RX ADMIN — MUPIROCIN: 20 OINTMENT TOPICAL at 10:24

## 2022-01-01 RX ADMIN — HYDROMORPHONE HYDROCHLORIDE 2 MG: 2 INJECTION, SOLUTION INTRAMUSCULAR; INTRAVENOUS; SUBCUTANEOUS at 13:43

## 2022-01-01 RX ADMIN — SODIUM CHLORIDE, PRESERVATIVE FREE 40 MG: 5 INJECTION INTRAVENOUS at 08:55

## 2022-01-01 RX ADMIN — HYDROCODONE BITARTRATE AND ACETAMINOPHEN 1 TABLET: 5; 325 TABLET ORAL at 10:50

## 2022-01-01 RX ADMIN — ONDANSETRON HYDROCHLORIDE 4 MG: 2 SOLUTION INTRAMUSCULAR; INTRAVENOUS at 20:56

## 2022-01-01 RX ADMIN — Medication 80 MCG: at 14:27

## 2022-01-01 RX ADMIN — ACETAMINOPHEN 650 MG: 325 TABLET ORAL at 17:14

## 2022-01-01 RX ADMIN — TRAMADOL HYDROCHLORIDE 50 MG: 50 TABLET ORAL at 16:05

## 2022-01-01 RX ADMIN — CEFEPIME 2 G: 2 INJECTION, POWDER, FOR SOLUTION INTRAVENOUS at 09:13

## 2022-01-01 RX ADMIN — POTASSIUM & SODIUM PHOSPHATES POWDER PACK 280-160-250 MG 1 PACKET: 280-160-250 PACK at 17:42

## 2022-01-01 RX ADMIN — EPOETIN ALFA-EPBX 20000 UNITS: 20000 INJECTION, SOLUTION INTRAVENOUS; SUBCUTANEOUS at 21:57

## 2022-01-01 RX ADMIN — Medication 1 SPRAY: at 17:34

## 2022-01-01 RX ADMIN — OXYCODONE 5 MG: 5 TABLET ORAL at 02:58

## 2022-01-01 RX ADMIN — OXYCODONE 5 MG: 5 TABLET ORAL at 05:50

## 2022-01-01 RX ADMIN — COLESTIPOL HYDROCHLORIDE 2 G: 1 TABLET, FILM COATED ORAL at 10:23

## 2022-01-01 RX ADMIN — TRAMADOL HYDROCHLORIDE 50 MG: 50 TABLET ORAL at 15:30

## 2022-01-01 RX ADMIN — SODIUM CHLORIDE, PRESERVATIVE FREE 40 MG: 5 INJECTION INTRAVENOUS at 08:28

## 2022-01-01 RX ADMIN — ROCURONIUM BROMIDE 30 MG: 10 SOLUTION INTRAVENOUS at 14:28

## 2022-01-01 RX ADMIN — PREGABALIN 50 MG: 50 CAPSULE ORAL at 09:11

## 2022-01-01 RX ADMIN — HYDROCODONE BITARTRATE AND ACETAMINOPHEN 1 TABLET: 10; 325 TABLET ORAL at 05:07

## 2022-01-01 RX ADMIN — FUROSEMIDE 40 MG: 40 TABLET ORAL at 08:57

## 2022-01-01 RX ADMIN — SODIUM CHLORIDE, PRESERVATIVE FREE 10 ML: 5 INJECTION INTRAVENOUS at 22:50

## 2022-01-01 RX ADMIN — HYDROMORPHONE HYDROCHLORIDE 2 MG: 2 INJECTION, SOLUTION INTRAMUSCULAR; INTRAVENOUS; SUBCUTANEOUS at 23:55

## 2022-01-01 RX ADMIN — PIPERACILLIN AND TAZOBACTAM 3.38 G: 3; .375 INJECTION, POWDER, LYOPHILIZED, FOR SOLUTION INTRAVENOUS at 15:57

## 2022-01-01 RX ADMIN — SODIUM CHLORIDE 125 ML/HR: 9 INJECTION, SOLUTION INTRAVENOUS at 18:59

## 2022-01-01 RX ADMIN — ASPIRIN 81 MG: 81 TABLET, COATED ORAL at 00:40

## 2022-01-01 RX ADMIN — HEPARIN SODIUM 5000 UNITS: 5000 INJECTION INTRAVENOUS; SUBCUTANEOUS at 02:57

## 2022-01-01 RX ADMIN — ACETAMINOPHEN 650 MG: 325 TABLET ORAL at 19:03

## 2022-01-01 RX ADMIN — Medication 2 UNITS: at 07:30

## 2022-01-01 RX ADMIN — TAMSULOSIN HYDROCHLORIDE 0.4 MG: 0.4 CAPSULE ORAL at 21:30

## 2022-01-01 RX ADMIN — HYDROMORPHONE HYDROCHLORIDE 1 MG: 1 INJECTION, SOLUTION INTRAMUSCULAR; INTRAVENOUS; SUBCUTANEOUS at 23:39

## 2022-01-01 RX ADMIN — HYDROMORPHONE HYDROCHLORIDE 0.4 MG: 2 INJECTION, SOLUTION INTRAMUSCULAR; INTRAVENOUS; SUBCUTANEOUS at 09:42

## 2022-01-01 RX ADMIN — ROCURONIUM BROMIDE 5 MG: 10 SOLUTION INTRAVENOUS at 13:54

## 2022-01-01 RX ADMIN — HEPARIN SODIUM 5000 UNITS: 5000 INJECTION INTRAVENOUS; SUBCUTANEOUS at 17:07

## 2022-01-01 RX ADMIN — OXYCODONE 5 MG: 5 TABLET ORAL at 19:15

## 2022-01-01 RX ADMIN — ACETAMINOPHEN 650 MG: 325 TABLET ORAL at 10:48

## 2022-01-01 RX ADMIN — DEXTROSE AND SODIUM CHLORIDE 75 ML/HR: 5; 450 INJECTION, SOLUTION INTRAVENOUS at 07:19

## 2022-01-01 RX ADMIN — FENTANYL CITRATE 50 MCG: 50 INJECTION, SOLUTION INTRAMUSCULAR; INTRAVENOUS at 19:25

## 2022-01-01 RX ADMIN — POTASSIUM CHLORIDE 10 MEQ: 10 INJECTION, SOLUTION INTRAVENOUS at 08:20

## 2022-01-01 RX ADMIN — DEXTROSE AND SODIUM CHLORIDE 125 ML/HR: 5; 450 INJECTION, SOLUTION INTRAVENOUS at 09:19

## 2022-01-01 RX ADMIN — ACETAMINOPHEN 650 MG: 325 TABLET ORAL at 08:57

## 2022-01-01 RX ADMIN — OXYCODONE 5 MG: 5 TABLET ORAL at 18:01

## 2022-01-01 RX ADMIN — PHENOBARBITAL SODIUM 65 MG: 65 INJECTION INTRAMUSCULAR; INTRAVENOUS at 09:38

## 2022-01-01 RX ADMIN — Medication 80 MCG: at 13:54

## 2022-01-01 RX ADMIN — ACETAMINOPHEN 650 MG: 325 TABLET ORAL at 11:40

## 2022-01-01 RX ADMIN — MIDAZOLAM 4 MG: 1 INJECTION INTRAMUSCULAR; INTRAVENOUS at 13:43

## 2022-01-01 RX ADMIN — IRON SUCROSE 200 MG: 20 INJECTION, SOLUTION INTRAVENOUS at 15:05

## 2022-01-01 RX ADMIN — TAMSULOSIN HYDROCHLORIDE 0.4 MG: 0.4 CAPSULE ORAL at 22:37

## 2022-01-01 RX ADMIN — ACETAMINOPHEN 650 MG: 325 TABLET ORAL at 18:05

## 2022-01-01 RX ADMIN — POTASSIUM CHLORIDE 40 MEQ: 750 TABLET, EXTENDED RELEASE ORAL at 08:26

## 2022-01-01 RX ADMIN — Medication 40 MCG: at 14:19

## 2022-01-01 RX ADMIN — IRON SUCROSE 100 MG: 20 INJECTION, SOLUTION INTRAVENOUS at 14:36

## 2022-01-01 RX ADMIN — FENTANYL CITRATE 25 MCG: 50 INJECTION INTRAMUSCULAR; INTRAVENOUS at 10:52

## 2022-01-01 RX ADMIN — IRON SUCROSE 200 MG: 20 INJECTION, SOLUTION INTRAVENOUS at 11:13

## 2022-01-01 RX ADMIN — ONDANSETRON HYDROCHLORIDE 4 MG: 2 SOLUTION INTRAMUSCULAR; INTRAVENOUS at 12:24

## 2022-01-01 RX ADMIN — SODIUM CHLORIDE, PRESERVATIVE FREE 10 ML: 5 INJECTION INTRAVENOUS at 06:39

## 2022-01-01 RX ADMIN — OXYCODONE 5 MG: 5 TABLET ORAL at 08:08

## 2022-01-01 RX ADMIN — HYDROMORPHONE HYDROCHLORIDE 1 MG: 1 INJECTION, SOLUTION INTRAMUSCULAR; INTRAVENOUS; SUBCUTANEOUS at 19:54

## 2022-01-01 RX ADMIN — POTASSIUM CHLORIDE 40 MEQ: 750 TABLET, FILM COATED, EXTENDED RELEASE ORAL at 12:34

## 2022-01-01 RX ADMIN — HEPARIN SODIUM 5000 UNITS: 5000 INJECTION INTRAVENOUS; SUBCUTANEOUS at 15:03

## 2022-01-01 RX ADMIN — POTASSIUM CHLORIDE 20 MEQ: 400 INJECTION, SOLUTION INTRAVENOUS at 14:10

## 2022-01-01 RX ADMIN — POTASSIUM CHLORIDE 10 MEQ: 10 INJECTION, SOLUTION INTRAVENOUS at 03:45

## 2022-01-01 RX ADMIN — PANTOPRAZOLE SODIUM 40 MG: 40 TABLET, DELAYED RELEASE ORAL at 07:17

## 2022-01-01 RX ADMIN — MUPIROCIN: 20 OINTMENT TOPICAL at 09:11

## 2022-01-01 RX ADMIN — FENTANYL CITRATE 25 MCG: 50 INJECTION INTRAMUSCULAR; INTRAVENOUS at 10:20

## 2022-01-01 RX ADMIN — ROCURONIUM BROMIDE 50 MG: 10 SOLUTION INTRAVENOUS at 13:33

## 2022-01-01 RX ADMIN — PREGABALIN 50 MG: 25 CAPSULE ORAL at 17:44

## 2022-01-01 RX ADMIN — ACETAMINOPHEN 650 MG: 325 TABLET ORAL at 10:29

## 2022-01-01 RX ADMIN — SODIUM CHLORIDE, PRESERVATIVE FREE 40 MG: 5 INJECTION INTRAVENOUS at 09:16

## 2022-01-01 RX ADMIN — OXYCODONE HYDROCHLORIDE AND ACETAMINOPHEN 1 TABLET: 5; 325 TABLET ORAL at 15:57

## 2022-01-01 RX ADMIN — TRAMADOL HYDROCHLORIDE 50 MG: 50 TABLET ORAL at 00:47

## 2022-01-01 RX ADMIN — TRAZODONE HYDROCHLORIDE 50 MG: 50 TABLET ORAL at 01:24

## 2022-01-01 RX ADMIN — MUPIROCIN: 20 OINTMENT TOPICAL at 10:32

## 2022-01-01 RX ADMIN — PIPERACILLIN SODIUM AND TAZOBACTAM SODIUM 3.38 G: 3; 375 INJECTION, POWDER, FOR SOLUTION INTRAVENOUS at 19:15

## 2022-01-01 RX ADMIN — IRON SUCROSE 200 MG: 20 INJECTION, SOLUTION INTRAVENOUS at 17:53

## 2022-01-01 RX ADMIN — Medication 1 CAPSULE: at 10:24

## 2022-01-01 RX ADMIN — ACETAMINOPHEN 650 MG: 325 TABLET ORAL at 10:31

## 2022-01-01 RX ADMIN — PREGABALIN 50 MG: 25 CAPSULE ORAL at 08:25

## 2022-01-01 RX ADMIN — PREGABALIN 50 MG: 25 CAPSULE ORAL at 08:20

## 2022-01-01 RX ADMIN — Medication 80 MCG: at 14:52

## 2022-01-01 RX ADMIN — HYDROMORPHONE HYDROCHLORIDE 1 MG: 1 INJECTION, SOLUTION INTRAMUSCULAR; INTRAVENOUS; SUBCUTANEOUS at 05:25

## 2022-01-01 RX ADMIN — PIPERACILLIN AND TAZOBACTAM 3.38 G: 3; .375 INJECTION, POWDER, LYOPHILIZED, FOR SOLUTION INTRAVENOUS at 05:05

## 2022-01-01 RX ADMIN — ACETAMINOPHEN 650 MG: 325 TABLET ORAL at 02:34

## 2022-01-01 RX ADMIN — PIPERACILLIN AND TAZOBACTAM 3.38 G: 3; .375 INJECTION, POWDER, LYOPHILIZED, FOR SOLUTION INTRAVENOUS at 07:07

## 2022-01-01 RX ADMIN — TRAMADOL HYDROCHLORIDE 50 MG: 50 TABLET ORAL at 00:39

## 2022-01-01 RX ADMIN — OXYCODONE 5 MG: 5 TABLET ORAL at 21:48

## 2022-01-01 RX ADMIN — POTASSIUM CHLORIDE 20 MEQ: 29.8 INJECTION, SOLUTION INTRAVENOUS at 09:00

## 2022-01-01 RX ADMIN — VANCOMYCIN HYDROCHLORIDE 1000 MG: 1 INJECTION, POWDER, LYOPHILIZED, FOR SOLUTION INTRAVENOUS at 14:56

## 2022-01-01 RX ADMIN — SUCCINYLCHOLINE CHLORIDE 100 MG: 20 INJECTION, SOLUTION INTRAMUSCULAR; INTRAVENOUS at 13:28

## 2022-01-01 RX ADMIN — FINASTERIDE 5 MG: 5 TABLET, FILM COATED ORAL at 08:54

## 2022-01-01 RX ADMIN — FINASTERIDE 5 MG: 5 TABLET, FILM COATED ORAL at 09:52

## 2022-01-01 RX ADMIN — POTASSIUM CHLORIDE 10 MEQ: 10 INJECTION, SOLUTION INTRAVENOUS at 07:05

## 2022-01-01 RX ADMIN — PIPERACILLIN SODIUM AND TAZOBACTAM SODIUM 3.38 G: 3; 375 INJECTION, POWDER, FOR SOLUTION INTRAVENOUS at 12:26

## 2022-01-01 RX ADMIN — ONDANSETRON HYDROCHLORIDE 4 MG: 2 SOLUTION INTRAMUSCULAR; INTRAVENOUS at 15:18

## 2022-01-01 RX ADMIN — HEPARIN SODIUM 5000 UNITS: 5000 INJECTION INTRAVENOUS; SUBCUTANEOUS at 23:58

## 2022-01-01 RX ADMIN — PANTOPRAZOLE SODIUM 40 MG: 40 TABLET, DELAYED RELEASE ORAL at 06:54

## 2022-01-01 RX ADMIN — HYDROCODONE BITARTRATE AND ACETAMINOPHEN 1 TABLET: 10; 325 TABLET ORAL at 00:50

## 2022-01-01 RX ADMIN — HEPARIN SODIUM 5000 UNITS: 5000 INJECTION INTRAVENOUS; SUBCUTANEOUS at 09:10

## 2022-01-01 RX ADMIN — FENTANYL CITRATE 25 MCG: 50 INJECTION INTRAMUSCULAR; INTRAVENOUS at 10:32

## 2022-01-01 RX ADMIN — SODIUM CHLORIDE, PRESERVATIVE FREE 40 MG: 5 INJECTION INTRAVENOUS at 21:47

## 2022-01-01 RX ADMIN — CEPHALEXIN 500 MG: 500 CAPSULE ORAL at 12:11

## 2022-01-01 RX ADMIN — OXYCODONE 5 MG: 5 TABLET ORAL at 15:18

## 2022-01-01 RX ADMIN — ACETAMINOPHEN 650 MG: 325 TABLET ORAL at 10:08

## 2022-01-01 RX ADMIN — POTASSIUM CHLORIDE 40 MEQ: 750 TABLET, FILM COATED, EXTENDED RELEASE ORAL at 09:01

## 2022-01-01 RX ADMIN — MAGNESIUM SULFATE IN WATER 2 G: 40 INJECTION, SOLUTION INTRAVENOUS at 16:25

## 2022-01-01 RX ADMIN — SODIUM CHLORIDE, PRESERVATIVE FREE 40 MG: 5 INJECTION INTRAVENOUS at 09:13

## 2022-01-01 RX ADMIN — PREGABALIN 50 MG: 25 CAPSULE ORAL at 08:36

## 2022-01-01 RX ADMIN — CEFEPIME 2 G: 2 INJECTION, POWDER, FOR SOLUTION INTRAVENOUS at 21:58

## 2022-01-01 RX ADMIN — ZOLPIDEM TARTRATE 5 MG: 5 TABLET ORAL at 20:34

## 2022-01-01 RX ADMIN — Medication 1 MG: at 22:16

## 2022-01-01 RX ADMIN — NEOSTIGMINE METHYLSULFATE 3 MG: 1 INJECTION, SOLUTION INTRAVENOUS at 15:29

## 2022-01-01 RX ADMIN — PIPERACILLIN AND TAZOBACTAM 3.38 G: 3; .375 INJECTION, POWDER, LYOPHILIZED, FOR SOLUTION INTRAVENOUS at 00:18

## 2022-01-01 RX ADMIN — PREGABALIN 50 MG: 50 CAPSULE ORAL at 22:49

## 2022-01-01 RX ADMIN — COLESTIPOL HYDROCHLORIDE 2 G: 1 TABLET, FILM COATED ORAL at 08:53

## 2022-01-01 RX ADMIN — SODIUM CHLORIDE, PRESERVATIVE FREE 10 ML: 5 INJECTION INTRAVENOUS at 06:40

## 2022-01-01 RX ADMIN — PIPERACILLIN AND TAZOBACTAM 3.38 G: 3; .375 INJECTION, POWDER, LYOPHILIZED, FOR SOLUTION INTRAVENOUS at 15:39

## 2022-01-01 RX ADMIN — SODIUM CHLORIDE, PRESERVATIVE FREE 10 ML: 5 INJECTION INTRAVENOUS at 20:59

## 2022-01-01 RX ADMIN — ACETAMINOPHEN 650 MG: 325 TABLET ORAL at 17:08

## 2022-01-01 RX ADMIN — PREGABALIN 50 MG: 25 CAPSULE ORAL at 08:26

## 2022-01-01 RX ADMIN — PROPOFOL 80 MG: 10 INJECTION, EMULSION INTRAVENOUS at 13:54

## 2022-01-01 RX ADMIN — COLESTIPOL HYDROCHLORIDE 2 G: 1 TABLET, FILM COATED ORAL at 09:10

## 2022-01-01 RX ADMIN — PHENYLEPHRINE HYDROCHLORIDE 20 MCG/MIN: 10 INJECTION INTRAVENOUS at 08:09

## 2022-01-01 RX ADMIN — PHENOBARBITAL SODIUM 65 MG: 65 INJECTION INTRAMUSCULAR; INTRAVENOUS at 11:37

## 2022-01-01 RX ADMIN — PANTOPRAZOLE SODIUM 40 MG: 40 TABLET, DELAYED RELEASE ORAL at 06:41

## 2022-01-01 RX ADMIN — COLESTIPOL HYDROCHLORIDE 2 G: 1 TABLET, FILM COATED ORAL at 17:10

## 2022-01-01 RX ADMIN — TRAZODONE HYDROCHLORIDE 50 MG: 50 TABLET ORAL at 00:47

## 2022-01-01 RX ADMIN — DEXTROSE AND SODIUM CHLORIDE 125 ML/HR: 5; 450 INJECTION, SOLUTION INTRAVENOUS at 01:45

## 2022-01-01 RX ADMIN — SODIUM CHLORIDE, POTASSIUM CHLORIDE, SODIUM LACTATE AND CALCIUM CHLORIDE 1000 ML: 600; 310; 30; 20 INJECTION, SOLUTION INTRAVENOUS at 15:22

## 2022-01-01 RX ADMIN — HEPARIN SODIUM 5000 UNITS: 5000 INJECTION INTRAVENOUS; SUBCUTANEOUS at 02:33

## 2022-01-01 RX ADMIN — OXYCODONE 5 MG: 5 TABLET ORAL at 17:14

## 2022-01-01 RX ADMIN — MAGNESIUM SULFATE IN WATER 2 G: 40 INJECTION, SOLUTION INTRAVENOUS at 08:19

## 2022-01-01 RX ADMIN — HYDROCODONE BITARTRATE AND ACETAMINOPHEN 1 TABLET: 5; 325 TABLET ORAL at 19:23

## 2022-01-01 RX ADMIN — POTASSIUM CHLORIDE 10 MEQ: 10 INJECTION, SOLUTION INTRAVENOUS at 08:54

## 2022-01-01 RX ADMIN — VANCOMYCIN HYDROCHLORIDE 1000 MG: 1 INJECTION, POWDER, LYOPHILIZED, FOR SOLUTION INTRAVENOUS at 11:32

## 2022-01-01 RX ADMIN — ASPIRIN 81 MG: 81 TABLET, COATED ORAL at 21:54

## 2022-01-01 RX ADMIN — PREGABALIN 50 MG: 25 CAPSULE ORAL at 08:07

## 2022-01-01 RX ADMIN — HEPARIN SODIUM 5000 UNITS: 5000 INJECTION INTRAVENOUS; SUBCUTANEOUS at 10:29

## 2022-01-01 RX ADMIN — SODIUM PHOSPHATE, MONOBASIC, MONOHYDRATE AND SODIUM PHOSPHATE, DIBASIC ANHYDROUS: 276; 142 INJECTION, SOLUTION INTRAVENOUS at 10:04

## 2022-01-01 RX ADMIN — SODIUM CHLORIDE, PRESERVATIVE FREE 10 ML: 5 INJECTION INTRAVENOUS at 22:23

## 2022-01-01 RX ADMIN — SODIUM CHLORIDE, PRESERVATIVE FREE 10 ML: 5 INJECTION INTRAVENOUS at 07:22

## 2022-01-01 RX ADMIN — HYDROMORPHONE HYDROCHLORIDE 2 MG: 2 INJECTION, SOLUTION INTRAMUSCULAR; INTRAVENOUS; SUBCUTANEOUS at 19:47

## 2022-01-01 RX ADMIN — Medication 1 CAPSULE: at 08:19

## 2022-01-01 RX ADMIN — FENTANYL CITRATE 25 MCG: 50 INJECTION, SOLUTION INTRAMUSCULAR; INTRAVENOUS at 08:43

## 2022-01-01 RX ADMIN — OXYCODONE 5 MG: 5 TABLET ORAL at 06:54

## 2022-01-01 RX ADMIN — TRAZODONE HYDROCHLORIDE 50 MG: 50 TABLET ORAL at 20:57

## 2022-01-01 RX ADMIN — OXYCODONE 5 MG: 5 TABLET ORAL at 20:56

## 2022-01-01 RX ADMIN — HYDROMORPHONE HYDROCHLORIDE 1 MG: 1 INJECTION, SOLUTION INTRAMUSCULAR; INTRAVENOUS; SUBCUTANEOUS at 00:36

## 2022-01-01 RX ADMIN — ASPIRIN 81 MG: 81 TABLET, COATED ORAL at 22:19

## 2022-01-01 RX ADMIN — PREGABALIN 50 MG: 25 CAPSULE ORAL at 08:41

## 2022-01-01 RX ADMIN — PIPERACILLIN AND TAZOBACTAM 3.38 G: 3; .375 INJECTION, POWDER, LYOPHILIZED, FOR SOLUTION INTRAVENOUS at 12:34

## 2022-01-01 RX ADMIN — SODIUM CHLORIDE 125 ML/HR: 9 INJECTION, SOLUTION INTRAVENOUS at 12:54

## 2022-01-01 RX ADMIN — PREGABALIN 50 MG: 25 CAPSULE ORAL at 19:07

## 2022-01-01 RX ADMIN — OXYCODONE 5 MG: 5 TABLET ORAL at 00:17

## 2022-01-01 RX ADMIN — LIDOCAINE HYDROCHLORIDE 40 MG: 20 INJECTION, SOLUTION EPIDURAL; INFILTRATION; INTRACAUDAL; PERINEURAL at 13:28

## 2022-01-01 RX ADMIN — PIPERACILLIN SODIUM AND TAZOBACTAM SODIUM 3.38 G: 3; 375 INJECTION, POWDER, FOR SOLUTION INTRAVENOUS at 12:33

## 2022-01-01 RX ADMIN — SODIUM CHLORIDE 500 ML: 900 INJECTION, SOLUTION INTRAVENOUS at 15:00

## 2022-01-01 RX ADMIN — HYDROMORPHONE HYDROCHLORIDE 0.5 MG: 1 INJECTION, SOLUTION INTRAMUSCULAR; INTRAVENOUS; SUBCUTANEOUS at 11:01

## 2022-01-01 RX ADMIN — HEPARIN SODIUM 5000 UNITS: 5000 INJECTION INTRAVENOUS; SUBCUTANEOUS at 17:16

## 2022-01-01 RX ADMIN — VANCOMYCIN HYDROCHLORIDE 1750 MG: 10 INJECTION, POWDER, LYOPHILIZED, FOR SOLUTION INTRAVENOUS at 23:46

## 2022-01-01 RX ADMIN — ACETAMINOPHEN 650 MG: 325 TABLET ORAL at 22:34

## 2022-01-01 RX ADMIN — ACYCLOVIR 400 MG: 800 TABLET ORAL at 10:35

## 2022-01-01 RX ADMIN — PANTOPRAZOLE SODIUM 40 MG: 40 TABLET, DELAYED RELEASE ORAL at 06:39

## 2022-01-01 RX ADMIN — IRON SUCROSE 200 MG: 20 INJECTION, SOLUTION INTRAVENOUS at 09:11

## 2022-01-01 RX ADMIN — SODIUM CHLORIDE, PRESERVATIVE FREE 10 ML: 5 INJECTION INTRAVENOUS at 21:30

## 2022-01-01 RX ADMIN — MUPIROCIN: 20 OINTMENT TOPICAL at 08:53

## 2022-01-01 RX ADMIN — PREGABALIN 50 MG: 25 CAPSULE ORAL at 10:24

## 2022-01-01 RX ADMIN — MAGNESIUM SULFATE IN WATER 2 G: 40 INJECTION, SOLUTION INTRAVENOUS at 09:13

## 2022-01-01 RX ADMIN — SODIUM CHLORIDE, PRESERVATIVE FREE 10 ML: 5 INJECTION INTRAVENOUS at 22:38

## 2022-01-01 RX ADMIN — ACETAMINOPHEN 650 MG: 325 TABLET ORAL at 02:57

## 2022-01-01 RX ADMIN — CEFEPIME 2 G: 2 INJECTION, POWDER, FOR SOLUTION INTRAVENOUS at 09:00

## 2022-01-01 RX ADMIN — POTASSIUM CHLORIDE 40 MEQ: 750 TABLET, EXTENDED RELEASE ORAL at 17:08

## 2022-01-01 RX ADMIN — DEXTROSE MONOHYDRATE 125 ML: 10 INJECTION, SOLUTION INTRAVENOUS at 22:36

## 2022-01-01 RX ADMIN — SODIUM CHLORIDE, PRESERVATIVE FREE 40 MG: 5 INJECTION INTRAVENOUS at 08:51

## 2022-01-01 RX ADMIN — PREGABALIN 50 MG: 25 CAPSULE ORAL at 09:01

## 2022-01-01 RX ADMIN — PANTOPRAZOLE SODIUM 40 MG: 40 TABLET, DELAYED RELEASE ORAL at 06:32

## 2022-01-01 RX ADMIN — ACETAMINOPHEN 650 MG: 325 TABLET ORAL at 19:06

## 2022-01-01 RX ADMIN — ACETAMINOPHEN 650 MG: 325 TABLET ORAL at 16:43

## 2022-01-01 RX ADMIN — ASPIRIN 81 MG: 81 TABLET, COATED ORAL at 00:18

## 2022-01-01 RX ADMIN — PIPERACILLIN SODIUM AND TAZOBACTAM SODIUM 3.38 G: 3; 375 INJECTION, POWDER, FOR SOLUTION INTRAVENOUS at 21:48

## 2022-01-01 RX ADMIN — Medication 10 MG: at 08:58

## 2022-01-01 RX ADMIN — MIDAZOLAM 2 MG: 1 INJECTION INTRAMUSCULAR; INTRAVENOUS at 10:34

## 2022-01-01 RX ADMIN — DAPTOMYCIN 300 MG: 500 INJECTION, POWDER, LYOPHILIZED, FOR SOLUTION INTRAVENOUS at 20:39

## 2022-01-01 RX ADMIN — OXYCODONE 5 MG: 5 TABLET ORAL at 12:27

## 2022-01-01 RX ADMIN — BACITRACIN 1 PACKET: 500 OINTMENT TOPICAL at 12:14

## 2022-01-01 RX ADMIN — GLYCOPYRROLATE 0.6 MG: 0.2 INJECTION, SOLUTION INTRAMUSCULAR; INTRAVENOUS at 15:29

## 2022-01-01 RX ADMIN — HYDROMORPHONE HYDROCHLORIDE 1 MG: 1 INJECTION, SOLUTION INTRAMUSCULAR; INTRAVENOUS; SUBCUTANEOUS at 03:21

## 2022-01-01 RX ADMIN — PANTOPRAZOLE SODIUM 40 MG: 40 TABLET, DELAYED RELEASE ORAL at 06:57

## 2022-01-01 RX ADMIN — SODIUM CHLORIDE, PRESERVATIVE FREE 40 MG: 5 INJECTION INTRAVENOUS at 03:15

## 2022-01-01 RX ADMIN — COLESTIPOL HYDROCHLORIDE 2 G: 1 TABLET, FILM COATED ORAL at 17:42

## 2022-01-01 RX ADMIN — PREGABALIN 50 MG: 50 CAPSULE ORAL at 20:43

## 2022-01-01 RX ADMIN — POTASSIUM BICARBONATE 20 MEQ: 782 TABLET, EFFERVESCENT ORAL at 12:39

## 2022-01-01 RX ADMIN — ASPIRIN 81 MG: 81 TABLET, COATED ORAL at 01:30

## 2022-01-01 RX ADMIN — PHENOBARBITAL SODIUM 65 MG: 65 INJECTION INTRAMUSCULAR; INTRAVENOUS at 21:47

## 2022-01-01 RX ADMIN — PIPERACILLIN AND TAZOBACTAM 3.38 G: 3; .375 INJECTION, POWDER, LYOPHILIZED, FOR SOLUTION INTRAVENOUS at 04:53

## 2022-01-01 RX ADMIN — POTASSIUM BICARBONATE 20 MEQ: 782 TABLET, EFFERVESCENT ORAL at 17:10

## 2022-01-01 RX ADMIN — MAGNESIUM SULFATE IN WATER 2 G: 40 INJECTION, SOLUTION INTRAVENOUS at 09:22

## 2022-01-01 RX ADMIN — TAMSULOSIN HYDROCHLORIDE 0.4 MG: 0.4 CAPSULE ORAL at 08:19

## 2022-01-01 RX ADMIN — CEFTRIAXONE SODIUM 1 G: 1 INJECTION, POWDER, FOR SOLUTION INTRAMUSCULAR; INTRAVENOUS at 11:47

## 2022-01-01 RX ADMIN — POTASSIUM CHLORIDE 40 MEQ: 750 TABLET, EXTENDED RELEASE ORAL at 09:42

## 2022-01-01 RX ADMIN — HYDROMORPHONE HYDROCHLORIDE 1 MG: 1 INJECTION, SOLUTION INTRAMUSCULAR; INTRAVENOUS; SUBCUTANEOUS at 02:46

## 2022-01-01 RX ADMIN — SODIUM CHLORIDE 1000 ML: 9 INJECTION, SOLUTION INTRAVENOUS at 20:57

## 2022-01-01 RX ADMIN — SUGAMMADEX 200 MG: 100 INJECTION, SOLUTION INTRAVENOUS at 08:32

## 2022-01-01 RX ADMIN — POTASSIUM & SODIUM PHOSPHATES POWDER PACK 280-160-250 MG 1 PACKET: 280-160-250 PACK at 22:23

## 2022-01-01 RX ADMIN — PROPOFOL 75 MCG/KG/MIN: 10 INJECTION, EMULSION INTRAVENOUS at 14:01

## 2022-01-01 RX ADMIN — SODIUM CHLORIDE, POTASSIUM CHLORIDE, SODIUM LACTATE AND CALCIUM CHLORIDE: 600; 310; 30; 20 INJECTION, SOLUTION INTRAVENOUS at 13:17

## 2022-01-01 RX ADMIN — TRAMADOL HYDROCHLORIDE 50 MG: 50 TABLET ORAL at 08:35

## 2022-01-01 RX ADMIN — HYDROMORPHONE HYDROCHLORIDE 1 MG: 1 INJECTION, SOLUTION INTRAMUSCULAR; INTRAVENOUS; SUBCUTANEOUS at 21:18

## 2022-01-01 RX ADMIN — FUROSEMIDE 40 MG: 40 TABLET ORAL at 08:40

## 2022-01-01 RX ADMIN — OXYCODONE 5 MG: 5 TABLET ORAL at 21:18

## 2022-01-01 RX ADMIN — ACETAMINOPHEN 650 MG: 325 TABLET ORAL at 17:43

## 2022-01-01 RX ADMIN — OXYCODONE 5 MG: 5 TABLET ORAL at 19:19

## 2022-01-01 RX ADMIN — HYDROMORPHONE HYDROCHLORIDE 1 MG: 1 INJECTION, SOLUTION INTRAMUSCULAR; INTRAVENOUS; SUBCUTANEOUS at 21:24

## 2022-01-01 RX ADMIN — TRAMADOL HYDROCHLORIDE 50 MG: 50 TABLET ORAL at 08:57

## 2022-01-01 RX ADMIN — Medication 10 ML: at 07:31

## 2022-01-01 RX ADMIN — FINASTERIDE 5 MG: 5 TABLET, FILM COATED ORAL at 10:24

## 2022-01-01 RX ADMIN — SODIUM CHLORIDE 500 ML: 9 INJECTION, SOLUTION INTRAVENOUS at 11:35

## 2022-01-01 RX ADMIN — POTASSIUM & SODIUM PHOSPHATES POWDER PACK 280-160-250 MG 1 PACKET: 280-160-250 PACK at 13:45

## 2022-01-01 RX ADMIN — ROCURONIUM BROMIDE 30 MG: 10 SOLUTION INTRAVENOUS at 07:56

## 2022-01-01 RX ADMIN — SODIUM CHLORIDE, PRESERVATIVE FREE 10 ML: 5 INJECTION INTRAVENOUS at 15:11

## 2022-01-01 RX ADMIN — MIDAZOLAM 2 MG: 1 INJECTION INTRAMUSCULAR; INTRAVENOUS at 19:55

## 2022-01-01 RX ADMIN — DEXTROSE MONOHYDRATE 125 ML: 10 INJECTION, SOLUTION INTRAVENOUS at 16:56

## 2022-01-01 RX ADMIN — MIDAZOLAM 2 MG: 1 INJECTION INTRAMUSCULAR; INTRAVENOUS at 16:04

## 2022-01-01 RX ADMIN — CEFEPIME 2 G: 2 INJECTION, POWDER, FOR SOLUTION INTRAVENOUS at 21:52

## 2022-01-01 RX ADMIN — PANTOPRAZOLE SODIUM 40 MG: 40 TABLET, DELAYED RELEASE ORAL at 09:22

## 2022-01-01 RX ADMIN — ACETAMINOPHEN 650 MG: 325 TABLET ORAL at 21:30

## 2022-01-01 RX ADMIN — IRON SUCROSE 200 MG: 20 INJECTION, SOLUTION INTRAVENOUS at 10:25

## 2022-01-01 RX ADMIN — TRAMADOL HYDROCHLORIDE 50 MG: 50 TABLET ORAL at 02:33

## 2022-01-01 RX ADMIN — OXYCODONE 5 MG: 5 TABLET ORAL at 00:16

## 2022-01-01 RX ADMIN — OXYCODONE 5 MG: 5 TABLET ORAL at 09:01

## 2022-01-01 RX ADMIN — SODIUM CHLORIDE, PRESERVATIVE FREE 10 ML: 5 INJECTION INTRAVENOUS at 03:12

## 2022-01-01 RX ADMIN — HYDROMORPHONE HYDROCHLORIDE 1 MG: 1 INJECTION, SOLUTION INTRAMUSCULAR; INTRAVENOUS; SUBCUTANEOUS at 14:30

## 2022-01-01 RX ADMIN — DEXTROSE AND SODIUM CHLORIDE 25 ML/HR: 5; 450 INJECTION, SOLUTION INTRAVENOUS at 08:58

## 2022-01-01 RX ADMIN — ASPIRIN 81 MG: 81 TABLET, COATED ORAL at 02:22

## 2022-01-01 RX ADMIN — OXYCODONE 5 MG: 5 TABLET ORAL at 09:09

## 2022-01-01 RX ADMIN — POTASSIUM CHLORIDE 10 MEQ: 7.46 INJECTION, SOLUTION INTRAVENOUS at 10:32

## 2022-01-01 RX ADMIN — MUPIROCIN: 20 OINTMENT TOPICAL at 10:35

## 2022-01-01 RX ADMIN — SODIUM CHLORIDE 100 MG: 900 INJECTION, SOLUTION INTRAVENOUS at 17:07

## 2022-01-01 RX ADMIN — COLESTIPOL HYDROCHLORIDE 2 G: 1 TABLET, FILM COATED ORAL at 18:27

## 2022-01-01 RX ADMIN — PANTOPRAZOLE SODIUM 40 MG: 40 TABLET, DELAYED RELEASE ORAL at 07:21

## 2022-01-01 RX ADMIN — OXYCODONE 5 MG: 5 TABLET ORAL at 20:31

## 2022-01-01 RX ADMIN — POTASSIUM & SODIUM PHOSPHATES POWDER PACK 280-160-250 MG 1 PACKET: 280-160-250 PACK at 09:01

## 2022-01-01 RX ADMIN — PREGABALIN 50 MG: 50 CAPSULE ORAL at 08:58

## 2022-01-01 RX ADMIN — PIPERACILLIN AND TAZOBACTAM 3.38 G: 3; .375 INJECTION, POWDER, LYOPHILIZED, FOR SOLUTION INTRAVENOUS at 20:52

## 2022-01-01 RX ADMIN — HYDROMORPHONE HYDROCHLORIDE 1 MG: 1 INJECTION, SOLUTION INTRAMUSCULAR; INTRAVENOUS; SUBCUTANEOUS at 16:04

## 2022-01-01 RX ADMIN — Medication 10 ML: at 15:19

## 2022-01-01 RX ADMIN — SODIUM CHLORIDE, PRESERVATIVE FREE 10 ML: 5 INJECTION INTRAVENOUS at 06:33

## 2022-01-01 RX ADMIN — PIPERACILLIN SODIUM AND TAZOBACTAM SODIUM 3.38 G: 3; 375 INJECTION, POWDER, FOR SOLUTION INTRAVENOUS at 04:47

## 2022-01-01 RX ADMIN — POTASSIUM CHLORIDE 40 MEQ: 750 TABLET, FILM COATED, EXTENDED RELEASE ORAL at 08:20

## 2022-01-01 RX ADMIN — MIDAZOLAM 2 MG: 1 INJECTION INTRAMUSCULAR; INTRAVENOUS at 23:55

## 2022-01-01 RX ADMIN — HYDROMORPHONE HYDROCHLORIDE 1 MG: 1 INJECTION, SOLUTION INTRAMUSCULAR; INTRAVENOUS; SUBCUTANEOUS at 23:06

## 2022-01-01 RX ADMIN — HEPARIN SODIUM 5000 UNITS: 5000 INJECTION INTRAVENOUS; SUBCUTANEOUS at 11:20

## 2022-01-01 RX ADMIN — Medication 1 CAPSULE: at 08:57

## 2022-01-01 RX ADMIN — ACETAMINOPHEN 650 MG: 325 TABLET ORAL at 11:23

## 2022-01-01 RX ADMIN — ACETAMINOPHEN 650 MG: 325 TABLET ORAL at 17:50

## 2022-01-01 RX ADMIN — SODIUM CHLORIDE, PRESERVATIVE FREE 10 ML: 5 INJECTION INTRAVENOUS at 14:45

## 2022-01-01 RX ADMIN — DEXTROSE MONOHYDRATE 125 ML: 10 INJECTION, SOLUTION INTRAVENOUS at 06:07

## 2022-01-01 RX ADMIN — Medication 10 ML: at 22:00

## 2022-01-01 RX ADMIN — IRON SUCROSE 200 MG: 20 INJECTION, SOLUTION INTRAVENOUS at 16:02

## 2022-01-01 RX ADMIN — VANCOMYCIN HYDROCHLORIDE 1000 MG: 1 INJECTION, POWDER, LYOPHILIZED, FOR SOLUTION INTRAVENOUS at 12:26

## 2022-01-01 RX ADMIN — MUPIROCIN: 20 OINTMENT TOPICAL at 09:29

## 2022-01-01 RX ADMIN — PROPOFOL 50 MG: 10 INJECTION, EMULSION INTRAVENOUS at 08:35

## 2022-01-01 RX ADMIN — PROCHLORPERAZINE EDISYLATE 10 MG: 5 INJECTION INTRAMUSCULAR; INTRAVENOUS at 11:01

## 2022-01-01 RX ADMIN — TRAMADOL HYDROCHLORIDE 50 MG: 50 TABLET ORAL at 12:26

## 2022-01-01 RX ADMIN — PANTOPRAZOLE SODIUM 40 MG: 40 TABLET, DELAYED RELEASE ORAL at 06:49

## 2022-01-01 RX ADMIN — LIDOCAINE HYDROCHLORIDE 80 MG: 20 INJECTION, SOLUTION EPIDURAL; INFILTRATION; INTRACAUDAL; PERINEURAL at 07:56

## 2022-01-01 RX ADMIN — DEXTROSE AND SODIUM CHLORIDE 125 ML/HR: 5; 450 INJECTION, SOLUTION INTRAVENOUS at 03:41

## 2022-01-01 RX ADMIN — POTASSIUM CHLORIDE 40 MEQ: 750 TABLET, EXTENDED RELEASE ORAL at 21:19

## 2022-01-01 RX ADMIN — PHENOBARBITAL SODIUM 65 MG: 65 INJECTION INTRAMUSCULAR; INTRAVENOUS at 18:16

## 2022-01-01 RX ADMIN — VANCOMYCIN HYDROCHLORIDE 1000 MG: 1 INJECTION, POWDER, LYOPHILIZED, FOR SOLUTION INTRAVENOUS at 12:14

## 2022-01-01 RX ADMIN — PREGABALIN 50 MG: 25 CAPSULE ORAL at 17:08

## 2022-01-01 RX ADMIN — FINASTERIDE 5 MG: 5 TABLET, FILM COATED ORAL at 08:20

## 2022-01-01 RX ADMIN — TRAMADOL HYDROCHLORIDE 50 MG: 50 TABLET ORAL at 17:44

## 2022-01-01 RX ADMIN — HEPARIN SODIUM 5000 UNITS: 5000 INJECTION INTRAVENOUS; SUBCUTANEOUS at 17:11

## 2022-01-01 RX ADMIN — ASPIRIN 81 MG: 81 TABLET, COATED ORAL at 22:37

## 2022-01-01 RX ADMIN — PREGABALIN 50 MG: 50 CAPSULE ORAL at 22:23

## 2022-01-01 RX ADMIN — ASPIRIN 81 MG: 81 TABLET, COATED ORAL at 21:01

## 2022-01-01 RX ADMIN — HYDROMORPHONE HYDROCHLORIDE 1 MG: 2 INJECTION, SOLUTION INTRAMUSCULAR; INTRAVENOUS; SUBCUTANEOUS at 14:15

## 2022-01-01 RX ADMIN — SUCCINYLCHOLINE CHLORIDE 140 MG: 20 INJECTION, SOLUTION INTRAMUSCULAR; INTRAVENOUS at 13:54

## 2022-01-01 RX ADMIN — SODIUM CHLORIDE AND POTASSIUM CHLORIDE: 9; 2.98 INJECTION, SOLUTION INTRAVENOUS at 12:00

## 2022-01-01 RX ADMIN — POTASSIUM & SODIUM PHOSPHATES POWDER PACK 280-160-250 MG 1 PACKET: 280-160-250 PACK at 20:43

## 2022-01-01 RX ADMIN — OXYCODONE 5 MG: 5 TABLET ORAL at 00:40

## 2022-01-01 RX ADMIN — HYDROMORPHONE HYDROCHLORIDE 1 MG: 1 INJECTION, SOLUTION INTRAMUSCULAR; INTRAVENOUS; SUBCUTANEOUS at 01:48

## 2022-01-01 RX ADMIN — ACETAMINOPHEN 650 MG: 325 TABLET ORAL at 02:20

## 2022-01-01 RX ADMIN — HEPARIN SODIUM 5000 UNITS: 5000 INJECTION INTRAVENOUS; SUBCUTANEOUS at 09:59

## 2022-01-01 RX ADMIN — ASPIRIN 81 MG: 81 TABLET, COATED ORAL at 02:21

## 2022-01-01 RX ADMIN — TRAMADOL HYDROCHLORIDE 50 MG: 50 TABLET, COATED ORAL at 15:10

## 2022-01-01 RX ADMIN — IRON SUCROSE 200 MG: 20 INJECTION, SOLUTION INTRAVENOUS at 18:09

## 2022-01-01 RX ADMIN — PANTOPRAZOLE SODIUM 40 MG: 40 TABLET, DELAYED RELEASE ORAL at 07:30

## 2022-01-01 RX ADMIN — HYDROMORPHONE HYDROCHLORIDE 1 MG: 1 INJECTION, SOLUTION INTRAMUSCULAR; INTRAVENOUS; SUBCUTANEOUS at 22:41

## 2022-01-01 RX ADMIN — POTASSIUM CHLORIDE 20 MEQ: 29.8 INJECTION, SOLUTION INTRAVENOUS at 08:58

## 2022-01-01 RX ADMIN — PREGABALIN 50 MG: 25 CAPSULE ORAL at 17:16

## 2022-01-01 RX ADMIN — SODIUM CHLORIDE AND POTASSIUM CHLORIDE: 9; 2.98 INJECTION, SOLUTION INTRAVENOUS at 19:23

## 2022-01-01 RX ADMIN — Medication 80 MCG: at 13:36

## 2022-01-01 RX ADMIN — PIPERACILLIN AND TAZOBACTAM 3.38 G: 3; .375 INJECTION, POWDER, LYOPHILIZED, FOR SOLUTION INTRAVENOUS at 23:24

## 2022-01-01 RX ADMIN — SODIUM CHLORIDE 75 ML/HR: 9 INJECTION, SOLUTION INTRAVENOUS at 06:05

## 2022-01-01 RX ADMIN — Medication 1 MG: at 15:33

## 2022-01-01 RX ADMIN — Medication 400 MG: at 08:58

## 2022-01-01 RX ADMIN — FINASTERIDE 5 MG: 5 TABLET, FILM COATED ORAL at 10:08

## 2022-01-01 RX ADMIN — HEPARIN SODIUM 5000 UNITS: 1000 INJECTION, SOLUTION INTRAVENOUS; SUBCUTANEOUS at 08:54

## 2022-01-01 RX ADMIN — VANCOMYCIN HYDROCHLORIDE 1000 MG: 1 INJECTION, POWDER, LYOPHILIZED, FOR SOLUTION INTRAVENOUS at 18:10

## 2022-01-01 RX ADMIN — POTASSIUM CHLORIDE 10 MEQ: 10 INJECTION, SOLUTION INTRAVENOUS at 09:43

## 2022-01-01 RX ADMIN — Medication 80 MCG: at 07:59

## 2022-01-01 RX ADMIN — ONDANSETRON HYDROCHLORIDE 4 MG: 2 INJECTION, SOLUTION INTRAMUSCULAR; INTRAVENOUS at 13:58

## 2022-01-01 RX ADMIN — ACETAMINOPHEN 650 MG: 325 TABLET ORAL at 10:30

## 2022-01-01 RX ADMIN — PIPERACILLIN AND TAZOBACTAM 3.38 G: 3; .375 INJECTION, POWDER, LYOPHILIZED, FOR SOLUTION INTRAVENOUS at 19:25

## 2022-01-01 RX ADMIN — Medication 1 MG: at 04:15

## 2022-01-01 RX ADMIN — PIPERACILLIN AND TAZOBACTAM 3.38 G: 3; .375 INJECTION, POWDER, LYOPHILIZED, FOR SOLUTION INTRAVENOUS at 07:09

## 2022-01-01 RX ADMIN — OXYCODONE 5 MG: 5 TABLET ORAL at 06:28

## 2022-01-01 RX ADMIN — TAMSULOSIN HYDROCHLORIDE 0.4 MG: 0.4 CAPSULE ORAL at 22:23

## 2022-01-01 RX ADMIN — PIPERACILLIN SODIUM AND TAZOBACTAM SODIUM 3.38 G: 3; 375 INJECTION, POWDER, FOR SOLUTION INTRAVENOUS at 11:27

## 2022-01-01 RX ADMIN — ACETAMINOPHEN 650 MG: 325 TABLET ORAL at 09:44

## 2022-01-01 RX ADMIN — PREGABALIN 50 MG: 25 CAPSULE ORAL at 08:19

## 2022-01-01 RX ADMIN — POTASSIUM CHLORIDE 40 MEQ: 750 TABLET, EXTENDED RELEASE ORAL at 08:58

## 2022-01-01 RX ADMIN — POTASSIUM CHLORIDE 10 MEQ: 750 TABLET, EXTENDED RELEASE ORAL at 09:47

## 2022-01-01 RX ADMIN — ACETAMINOPHEN 650 MG: 325 TABLET ORAL at 11:26

## 2022-01-01 RX ADMIN — POTASSIUM CHLORIDE 10 MEQ: 10 INJECTION, SOLUTION INTRAVENOUS at 11:36

## 2022-01-01 RX ADMIN — SODIUM CHLORIDE 75 ML/HR: 900 INJECTION, SOLUTION INTRAVENOUS at 21:55

## 2022-01-01 RX ADMIN — PANTOPRAZOLE SODIUM 40 MG: 40 TABLET, DELAYED RELEASE ORAL at 06:05

## 2022-01-01 RX ADMIN — FENTANYL CITRATE 50 MCG: 50 INJECTION, SOLUTION INTRAMUSCULAR; INTRAVENOUS at 14:15

## 2022-01-01 RX ADMIN — PREGABALIN 50 MG: 25 CAPSULE ORAL at 18:02

## 2022-01-01 RX ADMIN — SODIUM CHLORIDE, PRESERVATIVE FREE 10 ML: 5 INJECTION INTRAVENOUS at 09:14

## 2022-01-01 RX ADMIN — ASPIRIN 81 MG: 81 TABLET, COATED ORAL at 02:57

## 2022-01-01 RX ADMIN — HYDROMORPHONE HYDROCHLORIDE 1 MG: 1 INJECTION, SOLUTION INTRAMUSCULAR; INTRAVENOUS; SUBCUTANEOUS at 14:58

## 2022-01-01 RX ADMIN — PREGABALIN 50 MG: 25 CAPSULE ORAL at 19:03

## 2022-01-01 RX ADMIN — CEFEPIME 2 G: 2 INJECTION, POWDER, FOR SOLUTION INTRAVENOUS at 08:55

## 2022-01-01 RX ADMIN — POTASSIUM CHLORIDE 10 MEQ: 10 INJECTION, SOLUTION INTRAVENOUS at 10:31

## 2022-01-01 RX ADMIN — PANTOPRAZOLE SODIUM 40 MG: 40 TABLET, DELAYED RELEASE ORAL at 06:56

## 2022-01-01 RX ADMIN — PROPOFOL 100 MG: 10 INJECTION, EMULSION INTRAVENOUS at 07:56

## 2022-01-01 RX ADMIN — FUROSEMIDE 40 MG: 40 TABLET ORAL at 15:06

## 2022-01-01 RX ADMIN — POTASSIUM CHLORIDE 40 MEQ: 750 TABLET, EXTENDED RELEASE ORAL at 11:21

## 2022-01-01 RX ADMIN — PANTOPRAZOLE SODIUM 40 MG: 40 TABLET, DELAYED RELEASE ORAL at 06:28

## 2022-01-01 RX ADMIN — ONDANSETRON HYDROCHLORIDE 4 MG: 2 SOLUTION INTRAMUSCULAR; INTRAVENOUS at 13:27

## 2022-01-01 RX ADMIN — SODIUM CHLORIDE, PRESERVATIVE FREE 40 MG: 5 INJECTION INTRAVENOUS at 09:34

## 2022-01-01 RX ADMIN — HEPARIN SODIUM 5000 UNITS: 5000 INJECTION INTRAVENOUS; SUBCUTANEOUS at 17:14

## 2022-01-01 RX ADMIN — ASPIRIN 81 MG: 81 TABLET, COATED ORAL at 21:35

## 2022-01-01 RX ADMIN — ACETAMINOPHEN 650 MG: 325 TABLET ORAL at 17:16

## 2022-01-01 RX ADMIN — Medication 80 MCG: at 14:29

## 2022-01-01 RX ADMIN — Medication 40 MCG: at 14:15

## 2022-01-01 RX ADMIN — DAPTOMYCIN 300 MG: 500 INJECTION, POWDER, LYOPHILIZED, FOR SOLUTION INTRAVENOUS at 21:47

## 2022-01-01 RX ADMIN — CEFEPIME 2 G: 2 INJECTION, POWDER, FOR SOLUTION INTRAVENOUS at 07:20

## 2022-01-01 RX ADMIN — PANTOPRAZOLE SODIUM 40 MG: 40 TABLET, DELAYED RELEASE ORAL at 06:51

## 2022-01-01 RX ADMIN — SODIUM CHLORIDE, SODIUM LACTATE, POTASSIUM CHLORIDE, AND CALCIUM CHLORIDE: 600; 310; 30; 20 INJECTION, SOLUTION INTRAVENOUS at 13:50

## 2022-01-01 RX ADMIN — POTASSIUM CHLORIDE 40 MEQ: 750 TABLET, FILM COATED, EXTENDED RELEASE ORAL at 10:29

## 2022-01-01 RX ADMIN — HEPARIN SODIUM 5000 UNITS: 5000 INJECTION INTRAVENOUS; SUBCUTANEOUS at 02:23

## 2022-01-01 RX ADMIN — TAMSULOSIN HYDROCHLORIDE 0.4 MG: 0.4 CAPSULE ORAL at 22:49

## 2022-01-01 RX ADMIN — PREGABALIN 50 MG: 25 CAPSULE ORAL at 10:08

## 2022-01-01 RX ADMIN — HYDROMORPHONE HYDROCHLORIDE 1 MG: 1 INJECTION, SOLUTION INTRAMUSCULAR; INTRAVENOUS; SUBCUTANEOUS at 12:02

## 2022-01-01 RX ADMIN — SODIUM CHLORIDE 125 ML/HR: 9 INJECTION, SOLUTION INTRAVENOUS at 10:31

## 2022-01-01 RX ADMIN — HYDROMORPHONE HYDROCHLORIDE 1 MG: 2 INJECTION, SOLUTION INTRAMUSCULAR; INTRAVENOUS; SUBCUTANEOUS at 15:14

## 2022-01-01 RX ADMIN — HEPARIN SODIUM 5000 UNITS: 5000 INJECTION INTRAVENOUS; SUBCUTANEOUS at 00:40

## 2022-01-01 RX ADMIN — PIPERACILLIN AND TAZOBACTAM 3.38 G: 3; .375 INJECTION, POWDER, LYOPHILIZED, FOR SOLUTION INTRAVENOUS at 12:18

## 2022-01-01 RX ADMIN — ONDANSETRON HYDROCHLORIDE 4 MG: 2 SOLUTION INTRAMUSCULAR; INTRAVENOUS at 03:15

## 2022-01-01 RX ADMIN — HEPARIN SODIUM 5000 UNITS: 5000 INJECTION INTRAVENOUS; SUBCUTANEOUS at 02:09

## 2022-01-01 RX ADMIN — Medication 10 ML: at 14:04

## 2022-01-01 RX ADMIN — CEFEPIME 2 G: 2 INJECTION, POWDER, FOR SOLUTION INTRAVENOUS at 21:53

## 2022-01-01 RX ADMIN — HYDROMORPHONE HYDROCHLORIDE 1 MG: 1 INJECTION, SOLUTION INTRAMUSCULAR; INTRAVENOUS; SUBCUTANEOUS at 04:00

## 2022-01-01 RX ADMIN — FINASTERIDE 5 MG: 5 TABLET, FILM COATED ORAL at 09:01

## 2022-01-01 RX ADMIN — PIPERACILLIN SODIUM AND TAZOBACTAM SODIUM 3.38 G: 3; 375 INJECTION, POWDER, FOR SOLUTION INTRAVENOUS at 19:55

## 2022-01-01 RX ADMIN — HYDROMORPHONE HYDROCHLORIDE 1 MG: 1 INJECTION, SOLUTION INTRAMUSCULAR; INTRAVENOUS; SUBCUTANEOUS at 11:36

## 2022-01-01 RX ADMIN — ACETAMINOPHEN 650 MG: 325 TABLET ORAL at 02:58

## 2022-01-01 RX ADMIN — CEFEPIME 2 G: 2 INJECTION, POWDER, FOR SOLUTION INTRAVENOUS at 09:06

## 2022-01-01 RX ADMIN — HEPARIN SODIUM 5000 UNITS: 5000 INJECTION INTRAVENOUS; SUBCUTANEOUS at 10:32

## 2022-01-01 RX ADMIN — POTASSIUM CHLORIDE 10 MEQ: 10 INJECTION, SOLUTION INTRAVENOUS at 04:51

## 2022-01-01 RX ADMIN — ACETAMINOPHEN 650 MG: 325 TABLET ORAL at 02:00

## 2022-01-01 RX ADMIN — CEFEPIME 2 G: 2 INJECTION, POWDER, FOR SOLUTION INTRAVENOUS at 07:23

## 2022-01-01 RX ADMIN — HYDROMORPHONE HYDROCHLORIDE 0.2 MG: 2 INJECTION, SOLUTION INTRAMUSCULAR; INTRAVENOUS; SUBCUTANEOUS at 08:39

## 2022-01-01 RX ADMIN — CEFEPIME 2 G: 2 INJECTION, POWDER, FOR SOLUTION INTRAVENOUS at 08:28

## 2022-01-01 RX ADMIN — COLESTIPOL HYDROCHLORIDE 2 G: 1 TABLET, FILM COATED ORAL at 08:58

## 2022-01-01 RX ADMIN — HEPARIN SODIUM 5000 UNITS: 5000 INJECTION INTRAVENOUS; SUBCUTANEOUS at 21:00

## 2022-01-01 RX ADMIN — ONDANSETRON 4 MG: 4 TABLET, ORALLY DISINTEGRATING ORAL at 03:16

## 2022-01-01 RX ADMIN — HEPARIN SODIUM 5000 UNITS: 5000 INJECTION INTRAVENOUS; SUBCUTANEOUS at 16:35

## 2022-01-01 RX ADMIN — Medication 1 MG: at 06:45

## 2022-01-01 RX ADMIN — PREGABALIN 50 MG: 25 CAPSULE ORAL at 10:29

## 2022-01-01 RX ADMIN — SODIUM CHLORIDE, PRESERVATIVE FREE 40 MG: 5 INJECTION INTRAVENOUS at 08:58

## 2022-01-01 RX ADMIN — FINASTERIDE 5 MG: 5 TABLET, FILM COATED ORAL at 08:19

## 2022-01-01 RX ADMIN — ACETAMINOPHEN 650 MG: 325 TABLET ORAL at 17:12

## 2022-01-01 RX ADMIN — POTASSIUM BICARBONATE 20 MEQ: 782 TABLET, EFFERVESCENT ORAL at 09:22

## 2022-01-01 RX ADMIN — ACETAMINOPHEN 650 MG: 325 TABLET ORAL at 09:01

## 2022-01-01 RX ADMIN — ASPIRIN 81 MG CHEWABLE TABLET 81 MG: 81 TABLET CHEWABLE at 09:52

## 2022-01-01 RX ADMIN — FENTANYL CITRATE 50 MCG: 50 INJECTION, SOLUTION INTRAMUSCULAR; INTRAVENOUS at 13:32

## 2022-01-01 RX ADMIN — TRAMADOL HYDROCHLORIDE 50 MG: 50 TABLET ORAL at 06:54

## 2022-01-01 RX ADMIN — HYDRALAZINE HYDROCHLORIDE 20 MG: 20 INJECTION INTRAMUSCULAR; INTRAVENOUS at 06:55

## 2022-01-01 RX ADMIN — FINASTERIDE 5 MG: 5 TABLET, FILM COATED ORAL at 08:40

## 2022-01-01 RX ADMIN — PREGABALIN 50 MG: 25 CAPSULE ORAL at 18:08

## 2022-01-01 RX ADMIN — VANCOMYCIN HYDROCHLORIDE 1000 MG: 1 INJECTION, POWDER, LYOPHILIZED, FOR SOLUTION INTRAVENOUS at 16:02

## 2022-01-01 RX ADMIN — SODIUM CHLORIDE 1000 ML: 900 INJECTION, SOLUTION INTRAVENOUS at 16:34

## 2022-01-01 RX ADMIN — POTASSIUM & SODIUM PHOSPHATES POWDER PACK 280-160-250 MG 1 PACKET: 280-160-250 PACK at 12:29

## 2022-01-01 RX ADMIN — ACETAMINOPHEN 650 MG: 325 TABLET ORAL at 11:14

## 2022-01-01 RX ADMIN — Medication 10 ML: at 06:58

## 2022-01-01 RX ADMIN — ACETAMINOPHEN 650 MG: 325 TABLET ORAL at 18:09

## 2022-01-01 RX ADMIN — SODIUM CHLORIDE 75 ML/HR: 900 INJECTION, SOLUTION INTRAVENOUS at 19:00

## 2022-01-01 RX ADMIN — HYDROMORPHONE HYDROCHLORIDE 1 MG: 1 INJECTION, SOLUTION INTRAMUSCULAR; INTRAVENOUS; SUBCUTANEOUS at 04:29

## 2022-01-01 RX ADMIN — MIDAZOLAM 4 MG: 1 INJECTION INTRAMUSCULAR; INTRAVENOUS at 07:57

## 2022-01-01 RX ADMIN — MUPIROCIN: 20 OINTMENT TOPICAL at 11:19

## 2022-01-01 RX ADMIN — PIPERACILLIN AND TAZOBACTAM 3.38 G: 3; .375 INJECTION, POWDER, LYOPHILIZED, FOR SOLUTION INTRAVENOUS at 14:04

## 2022-01-01 RX ADMIN — POTASSIUM BICARBONATE 40 MEQ: 782 TABLET, EFFERVESCENT ORAL at 10:22

## 2022-01-01 RX ADMIN — PREGABALIN 50 MG: 25 CAPSULE ORAL at 09:28

## 2022-01-01 RX ADMIN — HYDROMORPHONE HYDROCHLORIDE 1 MG: 1 INJECTION, SOLUTION INTRAMUSCULAR; INTRAVENOUS; SUBCUTANEOUS at 12:15

## 2022-01-01 RX ADMIN — ACETAMINOPHEN 650 MG: 325 TABLET ORAL at 22:37

## 2022-01-01 RX ADMIN — OXYCODONE 5 MG: 5 TABLET ORAL at 06:15

## 2022-01-01 RX ADMIN — SODIUM CHLORIDE, PRESERVATIVE FREE 40 MG: 5 INJECTION INTRAVENOUS at 09:24

## 2022-01-01 RX ADMIN — CEFEPIME 2 G: 2 INJECTION, POWDER, FOR SOLUTION INTRAVENOUS at 19:23

## 2022-01-01 RX ADMIN — FUROSEMIDE 40 MG: 40 TABLET ORAL at 08:19

## 2022-01-01 RX ADMIN — ROCURONIUM BROMIDE 10 MG: 10 SOLUTION INTRAVENOUS at 14:45

## 2022-01-01 RX ADMIN — IRON SUCROSE 200 MG: 20 INJECTION, SOLUTION INTRAVENOUS at 16:55

## 2022-01-01 RX ADMIN — DEXTROSE AND SODIUM CHLORIDE 125 ML/HR: 5; 450 INJECTION, SOLUTION INTRAVENOUS at 08:54

## 2022-01-01 RX ADMIN — CEFAZOLIN 2 G: 330 INJECTION, POWDER, FOR SOLUTION INTRAMUSCULAR; INTRAVENOUS at 08:13

## 2022-01-01 RX ADMIN — SODIUM CHLORIDE 100 MG: 900 INJECTION, SOLUTION INTRAVENOUS at 16:24

## 2022-01-01 RX ADMIN — SODIUM CHLORIDE AND POTASSIUM CHLORIDE: 9; 2.98 INJECTION, SOLUTION INTRAVENOUS at 03:22

## 2022-01-01 RX ADMIN — SODIUM CHLORIDE, SODIUM LACTATE, POTASSIUM CHLORIDE, AND CALCIUM CHLORIDE: 600; 310; 30; 20 INJECTION, SOLUTION INTRAVENOUS at 13:46

## 2022-01-01 RX ADMIN — OXYCODONE 5 MG: 5 TABLET ORAL at 19:22

## 2022-01-01 RX ADMIN — TAMSULOSIN HYDROCHLORIDE 0.4 MG: 0.4 CAPSULE ORAL at 20:43

## 2022-01-01 RX ADMIN — HEPARIN SODIUM 5000 UNITS: 5000 INJECTION INTRAVENOUS; SUBCUTANEOUS at 17:44

## 2022-01-01 RX ADMIN — PREGABALIN 50 MG: 50 CAPSULE ORAL at 08:57

## 2022-01-01 RX ADMIN — PREGABALIN 50 MG: 25 CAPSULE ORAL at 17:12

## 2022-01-01 RX ADMIN — ACETAMINOPHEN 650 MG: 325 TABLET ORAL at 01:30

## 2022-01-01 RX ADMIN — PREGABALIN 50 MG: 25 CAPSULE ORAL at 18:25

## 2022-01-01 RX ADMIN — OXYCODONE 5 MG: 5 TABLET ORAL at 16:42

## 2022-01-01 RX ADMIN — ACETAMINOPHEN 650 MG: 325 TABLET ORAL at 00:40

## 2022-01-01 RX ADMIN — MUPIROCIN: 20 OINTMENT TOPICAL at 10:00

## 2022-01-01 RX ADMIN — HYDROMORPHONE HYDROCHLORIDE 1 MG: 1 INJECTION, SOLUTION INTRAMUSCULAR; INTRAVENOUS; SUBCUTANEOUS at 19:18

## 2022-01-01 RX ADMIN — HYDROMORPHONE HYDROCHLORIDE 2 MG: 2 INJECTION, SOLUTION INTRAMUSCULAR; INTRAVENOUS; SUBCUTANEOUS at 16:59

## 2022-01-01 RX ADMIN — TRAZODONE HYDROCHLORIDE 50 MG: 50 TABLET ORAL at 19:58

## 2022-01-01 RX ADMIN — SODIUM CHLORIDE, POTASSIUM CHLORIDE, SODIUM LACTATE AND CALCIUM CHLORIDE 50 ML/HR: 600; 310; 30; 20 INJECTION, SOLUTION INTRAVENOUS at 06:40

## 2022-01-01 RX ADMIN — DAPTOMYCIN 300 MG: 500 INJECTION, POWDER, LYOPHILIZED, FOR SOLUTION INTRAVENOUS at 21:05

## 2022-01-01 RX ADMIN — Medication 1 MG: at 22:19

## 2022-01-01 RX ADMIN — PREGABALIN 50 MG: 25 CAPSULE ORAL at 17:07

## 2022-01-01 RX ADMIN — PREGABALIN 50 MG: 25 CAPSULE ORAL at 10:17

## 2022-01-01 RX ADMIN — FENTANYL CITRATE 50 MCG: 50 INJECTION, SOLUTION INTRAMUSCULAR; INTRAVENOUS at 14:12

## 2022-01-01 RX ADMIN — HYDROCODONE BITARTRATE AND ACETAMINOPHEN 1 TABLET: 5; 325 TABLET ORAL at 18:05

## 2022-01-01 RX ADMIN — POTASSIUM CHLORIDE 10 MEQ: 7.46 INJECTION, SOLUTION INTRAVENOUS at 08:05

## 2022-01-01 RX ADMIN — PHENYLEPHRINE HYDROCHLORIDE 25 MCG/MIN: 10 INJECTION INTRAVENOUS at 13:36

## 2022-01-01 RX ADMIN — TRAMADOL HYDROCHLORIDE 50 MG: 50 TABLET ORAL at 20:27

## 2022-01-01 RX ADMIN — POTASSIUM BICARBONATE 40 MEQ: 782 TABLET, EFFERVESCENT ORAL at 12:02

## 2022-01-01 RX ADMIN — POTASSIUM CHLORIDE 20 MEQ: 400 INJECTION, SOLUTION INTRAVENOUS at 12:09

## 2022-01-01 RX ADMIN — PREGABALIN 50 MG: 25 CAPSULE ORAL at 16:43

## 2022-01-01 RX ADMIN — OXYCODONE 5 MG: 5 TABLET ORAL at 06:17

## 2022-01-01 RX ADMIN — HYDROMORPHONE HYDROCHLORIDE 1 MG: 1 INJECTION, SOLUTION INTRAMUSCULAR; INTRAVENOUS; SUBCUTANEOUS at 20:22

## 2022-01-01 RX ADMIN — MAGNESIUM SULFATE IN WATER 2 G: 40 INJECTION, SOLUTION INTRAVENOUS at 19:29

## 2022-01-01 RX ADMIN — MIDAZOLAM 2 MG: 1 INJECTION INTRAMUSCULAR; INTRAVENOUS at 04:00

## 2022-01-01 RX ADMIN — PIPERACILLIN AND TAZOBACTAM 3.38 G: 3; .375 INJECTION, POWDER, LYOPHILIZED, FOR SOLUTION INTRAVENOUS at 05:40

## 2022-01-01 RX ADMIN — HYDROMORPHONE HYDROCHLORIDE 1 MG: 1 INJECTION, SOLUTION INTRAMUSCULAR; INTRAVENOUS; SUBCUTANEOUS at 18:17

## 2022-01-01 RX ADMIN — POTASSIUM CHLORIDE 10 MEQ: 10 INJECTION, SOLUTION INTRAVENOUS at 10:00

## 2022-01-01 RX ADMIN — PANTOPRAZOLE SODIUM 40 MG: 40 TABLET, DELAYED RELEASE ORAL at 08:05

## 2022-01-01 RX ADMIN — FENTANYL CITRATE 50 MCG: 50 INJECTION, SOLUTION INTRAMUSCULAR; INTRAVENOUS at 13:54

## 2022-01-01 RX ADMIN — MIDAZOLAM 2 MG: 1 INJECTION INTRAMUSCULAR; INTRAVENOUS at 19:44

## 2022-01-01 RX ADMIN — TRAMADOL HYDROCHLORIDE 50 MG: 50 TABLET ORAL at 08:26

## 2022-01-01 RX ADMIN — OXYCODONE 5 MG: 5 TABLET ORAL at 14:39

## 2022-01-01 RX ADMIN — ASPIRIN 81 MG: 81 TABLET, COATED ORAL at 21:57

## 2022-01-01 RX ADMIN — SODIUM CHLORIDE, PRESERVATIVE FREE 10 ML: 5 INJECTION INTRAVENOUS at 13:45

## 2022-01-01 RX ADMIN — ACETAMINOPHEN 650 MG: 325 TABLET ORAL at 01:18

## 2022-01-01 RX ADMIN — SODIUM CHLORIDE, PRESERVATIVE FREE 10 ML: 5 INJECTION INTRAVENOUS at 06:41

## 2022-01-01 RX ADMIN — FINASTERIDE 5 MG: 5 TABLET, FILM COATED ORAL at 09:10

## 2022-01-01 RX ADMIN — POTASSIUM CHLORIDE 20 MEQ: 400 INJECTION, SOLUTION INTRAVENOUS at 10:03

## 2022-01-01 RX ADMIN — MUPIROCIN: 20 OINTMENT TOPICAL at 08:20

## 2022-01-01 RX ADMIN — PREGABALIN 50 MG: 25 CAPSULE ORAL at 17:14

## 2022-01-01 RX ADMIN — PIPERACILLIN AND TAZOBACTAM 3.38 G: 3; .375 INJECTION, POWDER, LYOPHILIZED, FOR SOLUTION INTRAVENOUS at 12:24

## 2022-01-01 RX ADMIN — MUPIROCIN: 20 OINTMENT TOPICAL at 09:00

## 2022-01-01 RX ADMIN — MIDAZOLAM 2 MG: 1 INJECTION INTRAMUSCULAR; INTRAVENOUS at 08:54

## 2022-01-01 RX ADMIN — OXYCODONE 5 MG: 5 TABLET ORAL at 20:45

## 2022-01-01 RX ADMIN — ACETAMINOPHEN 650 MG: 325 TABLET ORAL at 21:53

## 2022-01-01 RX ADMIN — PIPERACILLIN SODIUM AND TAZOBACTAM SODIUM 3.38 G: 3; 375 INJECTION, POWDER, FOR SOLUTION INTRAVENOUS at 03:49

## 2022-01-01 RX ADMIN — OXYCODONE 5 MG: 5 TABLET ORAL at 17:06

## 2022-01-01 RX ADMIN — HEPARIN SODIUM 5000 UNITS: 5000 INJECTION INTRAVENOUS; SUBCUTANEOUS at 01:19

## 2022-01-01 RX ADMIN — ACETAMINOPHEN 650 MG: 325 TABLET ORAL at 18:17

## 2022-01-01 RX ADMIN — ASPIRIN 81 MG: 81 TABLET, COATED ORAL at 21:15

## 2022-01-01 RX ADMIN — FUROSEMIDE 40 MG: 40 TABLET ORAL at 09:47

## 2022-01-01 RX ADMIN — MUPIROCIN: 20 OINTMENT TOPICAL at 13:31

## 2022-01-01 RX ADMIN — PREGABALIN 50 MG: 25 CAPSULE ORAL at 18:09

## 2022-01-01 RX ADMIN — HYDROMORPHONE HYDROCHLORIDE 1 MG: 1 INJECTION, SOLUTION INTRAMUSCULAR; INTRAVENOUS; SUBCUTANEOUS at 02:55

## 2022-01-01 RX ADMIN — MIDAZOLAM 2 MG: 1 INJECTION INTRAMUSCULAR; INTRAVENOUS at 02:46

## 2022-01-01 RX ADMIN — HYDROMORPHONE HYDROCHLORIDE 1 MG: 1 INJECTION, SOLUTION INTRAMUSCULAR; INTRAVENOUS; SUBCUTANEOUS at 08:53

## 2022-01-01 RX ADMIN — HYDROMORPHONE HYDROCHLORIDE 1 MG: 1 INJECTION, SOLUTION INTRAMUSCULAR; INTRAVENOUS; SUBCUTANEOUS at 23:55

## 2022-01-01 RX ADMIN — HEPARIN SODIUM 5000 UNITS: 5000 INJECTION INTRAVENOUS; SUBCUTANEOUS at 01:30

## 2022-01-01 RX ADMIN — SODIUM CHLORIDE, PRESERVATIVE FREE 10 ML: 5 INJECTION INTRAVENOUS at 22:37

## 2022-01-01 RX ADMIN — HYDROMORPHONE HYDROCHLORIDE 1 MG: 1 INJECTION, SOLUTION INTRAMUSCULAR; INTRAVENOUS; SUBCUTANEOUS at 04:16

## 2022-01-01 RX ADMIN — ACYCLOVIR 400 MG: 800 TABLET ORAL at 09:28

## 2022-01-01 RX ADMIN — TRAMADOL HYDROCHLORIDE 50 MG: 50 TABLET ORAL at 08:33

## 2022-01-01 RX ADMIN — MUPIROCIN: 20 OINTMENT TOPICAL at 10:49

## 2022-01-01 RX ADMIN — PANTOPRAZOLE SODIUM 40 MG: 40 TABLET, DELAYED RELEASE ORAL at 06:55

## 2022-01-01 RX ADMIN — ONDANSETRON HYDROCHLORIDE 4 MG: 2 SOLUTION INTRAMUSCULAR; INTRAVENOUS at 07:12

## 2022-01-01 RX ADMIN — Medication 80 MCG: at 14:24

## 2022-01-01 RX ADMIN — IRON SUCROSE 200 MG: 20 INJECTION, SOLUTION INTRAVENOUS at 09:18

## 2022-01-01 RX ADMIN — POTASSIUM CHLORIDE 10 MEQ: 10 INJECTION, SOLUTION INTRAVENOUS at 13:00

## 2022-01-01 RX ADMIN — POTASSIUM PHOSPHATE, MONOBASIC AND POTASSIUM PHOSPHATE, DIBASIC: 224; 236 INJECTION, SOLUTION, CONCENTRATE INTRAVENOUS at 16:30

## 2022-01-01 RX ADMIN — ASPIRIN 81 MG: 81 TABLET, COATED ORAL at 21:11

## 2022-01-01 RX ADMIN — ACETAMINOPHEN 650 MG: 325 TABLET ORAL at 11:07

## 2022-01-01 RX ADMIN — PREGABALIN 50 MG: 25 CAPSULE ORAL at 18:55

## 2022-01-01 RX ADMIN — OXYCODONE 5 MG: 5 TABLET ORAL at 19:04

## 2022-01-01 RX ADMIN — POTASSIUM CHLORIDE 40 MEQ: 750 TABLET, EXTENDED RELEASE ORAL at 12:34

## 2022-01-01 RX ADMIN — HYDROMORPHONE HYDROCHLORIDE 0.5 MG: 1 INJECTION, SOLUTION INTRAMUSCULAR; INTRAVENOUS; SUBCUTANEOUS at 02:12

## 2022-01-01 RX ADMIN — PREGABALIN 50 MG: 50 CAPSULE ORAL at 21:30

## 2022-01-01 RX ADMIN — SODIUM CHLORIDE 200 MG: 9 INJECTION, SOLUTION INTRAVENOUS at 17:20

## 2022-01-01 RX ADMIN — TRAMADOL HYDROCHLORIDE 50 MG: 50 TABLET, COATED ORAL at 11:24

## 2022-01-01 RX ADMIN — COLESTIPOL HYDROCHLORIDE 2 G: 1 TABLET, FILM COATED ORAL at 09:11

## 2022-01-01 RX ADMIN — MIDAZOLAM 2 MG: 1 INJECTION INTRAMUSCULAR; INTRAVENOUS at 11:01

## 2022-01-01 RX ADMIN — MIDAZOLAM 1 MG: 1 INJECTION INTRAMUSCULAR; INTRAVENOUS at 14:04

## 2022-01-01 RX ADMIN — HYDROMORPHONE HYDROCHLORIDE 1 MG: 1 INJECTION, SOLUTION INTRAMUSCULAR; INTRAVENOUS; SUBCUTANEOUS at 23:43

## 2022-01-01 RX ADMIN — HYDROMORPHONE HYDROCHLORIDE 1 MG: 1 INJECTION, SOLUTION INTRAMUSCULAR; INTRAVENOUS; SUBCUTANEOUS at 02:00

## 2022-01-01 RX ADMIN — SODIUM CHLORIDE: 900 INJECTION, SOLUTION INTRAVENOUS at 13:40

## 2022-01-01 RX ADMIN — HEPARIN SODIUM 5000 UNITS: 5000 INJECTION INTRAVENOUS; SUBCUTANEOUS at 06:57

## 2022-01-01 RX ADMIN — PANTOPRAZOLE SODIUM 40 MG: 40 TABLET, DELAYED RELEASE ORAL at 07:22

## 2022-01-01 RX ADMIN — HYDROMORPHONE HYDROCHLORIDE 0.5 MG: 1 INJECTION, SOLUTION INTRAMUSCULAR; INTRAVENOUS; SUBCUTANEOUS at 14:56

## 2022-01-01 RX ADMIN — ASPIRIN 81 MG CHEWABLE TABLET 81 MG: 81 TABLET CHEWABLE at 08:52

## 2022-01-01 RX ADMIN — HEPARIN SODIUM 5000 UNITS: 5000 INJECTION INTRAVENOUS; SUBCUTANEOUS at 07:30

## 2022-01-01 RX ADMIN — CEFEPIME 2 G: 2 INJECTION, POWDER, FOR SOLUTION INTRAVENOUS at 21:22

## 2022-01-01 RX ADMIN — TRAMADOL HYDROCHLORIDE 50 MG: 50 TABLET ORAL at 01:18

## 2022-01-01 RX ADMIN — SODIUM CHLORIDE, PRESERVATIVE FREE 10 ML: 5 INJECTION INTRAVENOUS at 15:24

## 2022-01-01 RX ADMIN — FUROSEMIDE 40 MG: 40 TABLET ORAL at 10:24

## 2022-01-01 RX ADMIN — MUPIROCIN: 20 OINTMENT TOPICAL at 10:31

## 2022-01-01 RX ADMIN — SODIUM CHLORIDE, PRESERVATIVE FREE 40 MG: 5 INJECTION INTRAVENOUS at 08:56

## 2022-01-01 RX ADMIN — ASPIRIN 81 MG: 81 TABLET, COATED ORAL at 22:34

## 2022-01-01 RX ADMIN — HEPARIN SODIUM 5000 UNITS: 5000 INJECTION INTRAVENOUS; SUBCUTANEOUS at 09:01

## 2022-01-01 RX ADMIN — ASPIRIN 81 MG: 81 TABLET, COATED ORAL at 01:18

## 2022-01-01 RX ADMIN — PIPERACILLIN AND TAZOBACTAM 4.5 G: 4; .5 INJECTION, POWDER, LYOPHILIZED, FOR SOLUTION INTRAVENOUS at 09:34

## 2022-01-01 RX ADMIN — PREGABALIN 50 MG: 50 CAPSULE ORAL at 09:22

## 2022-01-01 RX ADMIN — PIPERACILLIN SODIUM AND TAZOBACTAM SODIUM 3.38 G: 3; 375 INJECTION, POWDER, FOR SOLUTION INTRAVENOUS at 02:21

## 2022-01-01 RX ADMIN — Medication 1 MG: at 09:03

## 2022-01-01 RX ADMIN — POTASSIUM CHLORIDE 40 MEQ: 750 TABLET, FILM COATED, EXTENDED RELEASE ORAL at 20:50

## 2022-01-01 RX ADMIN — VANCOMYCIN HYDROCHLORIDE 750 MG: 750 INJECTION, POWDER, LYOPHILIZED, FOR SOLUTION INTRAVENOUS at 13:48

## 2022-01-01 RX ADMIN — FENTANYL CITRATE 25 MCG: 50 INJECTION INTRAMUSCULAR; INTRAVENOUS at 10:43

## 2022-01-01 RX ADMIN — TRAMADOL HYDROCHLORIDE 50 MG: 50 TABLET ORAL at 19:17

## 2022-01-01 RX ADMIN — OXYCODONE 5 MG: 5 TABLET ORAL at 02:21

## 2022-01-01 RX ADMIN — TRAMADOL HYDROCHLORIDE 50 MG: 50 TABLET ORAL at 02:36

## 2022-01-01 RX ADMIN — ASPIRIN 81 MG: 81 TABLET, COATED ORAL at 02:33

## 2022-01-01 RX ADMIN — POTASSIUM CHLORIDE 10 MEQ: 10 INJECTION, SOLUTION INTRAVENOUS at 14:39

## 2022-01-01 RX ADMIN — HEPARIN SODIUM 5000 UNITS: 5000 INJECTION INTRAVENOUS; SUBCUTANEOUS at 07:23

## 2022-01-01 RX ADMIN — ONDANSETRON HYDROCHLORIDE 4 MG: 2 SOLUTION INTRAMUSCULAR; INTRAVENOUS at 22:14

## 2022-01-01 RX ADMIN — HEPARIN SODIUM 5000 UNITS: 5000 INJECTION INTRAVENOUS; SUBCUTANEOUS at 14:04

## 2022-01-01 RX ADMIN — ACETAMINOPHEN 650 MG: 325 TABLET ORAL at 10:24

## 2022-01-01 RX ADMIN — PIPERACILLIN AND TAZOBACTAM 3.38 G: 3; .375 INJECTION, POWDER, LYOPHILIZED, FOR SOLUTION INTRAVENOUS at 21:36

## 2022-01-01 RX ADMIN — POTASSIUM CHLORIDE 40 MEQ: 750 TABLET, EXTENDED RELEASE ORAL at 04:50

## 2022-01-01 RX ADMIN — ASPIRIN 81 MG CHEWABLE TABLET 81 MG: 81 TABLET CHEWABLE at 09:10

## 2022-01-01 RX ADMIN — HYDROMORPHONE HYDROCHLORIDE 0.2 MG: 2 INJECTION, SOLUTION INTRAMUSCULAR; INTRAVENOUS; SUBCUTANEOUS at 09:10

## 2022-01-01 RX ADMIN — ACETAMINOPHEN 650 MG: 325 TABLET ORAL at 10:00

## 2022-01-01 RX ADMIN — HYDROMORPHONE HYDROCHLORIDE 0.5 MG: 1 INJECTION, SOLUTION INTRAMUSCULAR; INTRAVENOUS; SUBCUTANEOUS at 12:07

## 2022-01-01 RX ADMIN — ASPIRIN 81 MG: 81 TABLET, COATED ORAL at 02:10

## 2022-01-01 RX ADMIN — HYDROMORPHONE HYDROCHLORIDE 1 MG: 1 INJECTION, SOLUTION INTRAMUSCULAR; INTRAVENOUS; SUBCUTANEOUS at 18:59

## 2022-01-01 RX ADMIN — HYDROMORPHONE HYDROCHLORIDE 1 MG: 1 INJECTION, SOLUTION INTRAMUSCULAR; INTRAVENOUS; SUBCUTANEOUS at 09:06

## 2022-01-01 RX ADMIN — ACETAMINOPHEN 650 MG: 325 TABLET ORAL at 18:02

## 2022-01-01 RX ADMIN — HYDROMORPHONE HYDROCHLORIDE 1 MG: 1 INJECTION, SOLUTION INTRAMUSCULAR; INTRAVENOUS; SUBCUTANEOUS at 07:31

## 2022-01-01 RX ADMIN — ACETAMINOPHEN 650 MG: 325 TABLET ORAL at 17:44

## 2022-01-01 RX ADMIN — SODIUM CHLORIDE 75 ML/HR: 9 INJECTION, SOLUTION INTRAVENOUS at 04:31

## 2022-01-01 RX ADMIN — TAMSULOSIN HYDROCHLORIDE 0.4 MG: 0.4 CAPSULE ORAL at 08:58

## 2022-01-01 RX ADMIN — HEPARIN SODIUM 5000 UNITS: 5000 INJECTION INTRAVENOUS; SUBCUTANEOUS at 09:44

## 2022-01-01 RX ADMIN — Medication 10 MG: at 14:32

## 2022-01-01 RX ADMIN — CEFEPIME 2 G: 2 INJECTION, POWDER, FOR SOLUTION INTRAVENOUS at 22:13

## 2022-01-01 RX ADMIN — CEFTRIAXONE SODIUM 1 G: 1 INJECTION, POWDER, FOR SOLUTION INTRAMUSCULAR; INTRAVENOUS at 12:03

## 2022-01-01 RX ADMIN — HYDROMORPHONE HYDROCHLORIDE 1 MG: 1 INJECTION, SOLUTION INTRAMUSCULAR; INTRAVENOUS; SUBCUTANEOUS at 03:50

## 2022-01-01 RX ADMIN — ASPIRIN 81 MG CHEWABLE TABLET 81 MG: 81 TABLET CHEWABLE at 09:11

## 2022-01-01 RX ADMIN — MAGNESIUM SULFATE IN WATER 2 G: 40 INJECTION, SOLUTION INTRAVENOUS at 11:35

## 2022-01-01 RX ADMIN — MIDAZOLAM 2 MG: 1 INJECTION INTRAMUSCULAR; INTRAVENOUS at 08:26

## 2022-01-01 RX ADMIN — HYDROMORPHONE HYDROCHLORIDE 0.5 MG: 1 INJECTION, SOLUTION INTRAMUSCULAR; INTRAVENOUS; SUBCUTANEOUS at 20:34

## 2022-01-01 RX ADMIN — OXYCODONE 5 MG: 5 TABLET ORAL at 00:53

## 2022-01-01 RX ADMIN — HYDROMORPHONE HYDROCHLORIDE 1 MG: 1 INJECTION, SOLUTION INTRAMUSCULAR; INTRAVENOUS; SUBCUTANEOUS at 19:44

## 2022-01-01 RX ADMIN — Medication 1 MG: at 13:25

## 2022-01-01 RX ADMIN — COLESTIPOL HYDROCHLORIDE 2 G: 1 TABLET, FILM COATED ORAL at 18:23

## 2022-01-01 RX ADMIN — COLESTIPOL HYDROCHLORIDE 2 G: 1 TABLET, FILM COATED ORAL at 18:14

## 2022-01-01 RX ADMIN — SODIUM CHLORIDE, PRESERVATIVE FREE 40 MG: 5 INJECTION INTRAVENOUS at 10:49

## 2022-01-03 NOTE — ACP (ADVANCE CARE PLANNING)
Patient has signed acp documents on file.     Yeyo Rodriguez RN, CPN - Care Transition Nurse- [phone]

## 2022-01-03 NOTE — PROGRESS NOTES
Care Transitions Initial Call    Call within 2 business days of discharge: Yes     Patient: Alpesh Ku Patient : 1934 MRN: [id number]    Last Discharge [address]       Complaint Diagnosis Description Type Department Provider    21 Concern For COVID-19 (Coronavirus) Suspected COVID-19 virus infection . .. ED (ADMIT) PRAVEENA Lyn MD; Ирина Beulah. .. Was this an external facility discharge? No     Challenges to be reviewed by the provider   Additional needs identified to be addressed with provider: yes  home oxygen for covid pneumonia         Method of communication with provider : chart routing    Discussed COVID-19 related testing which was available at this time. Test results were positive. Patient informed of results, if available? yes     Advance Care Planning:   Does patient have an Advance Directive:  yes; reviewed and current     Inpatient Readmission Risk score: Unplanned Readmit Risk Score: 14.8 ( )    Was this a readmission? no   Patient stated reason for the admission: pneumonia    Patients top risk factors for readmission: lack of knowledge about disease   Interventions to address risk factors: Assessment and support for treatment adherence and medication management-wear oxygen at all times until instructed to wean by pcp, make sure nasal canula is in nose with prongs curved downward, drink 3 to 4 bottles of water daily to ensure adequate hydration, call [employer] to ensure o2 tanks are properly set up, if chest pain or sob turn o2 up to 4 or 5 liters and call 911. Care Transition Nurse (CTN) contacted the patient by telephone to perform post hospital discharge assessment. Verified name and  with patient as identifiers. Provided introduction to self, and explanation of the CTN role. CTN reviewed discharge instructions, medical action plan and red flags with patient who verbalized understanding. Were discharge instructions available to patient? yes. Reviewed appropriate site of care based on symptoms and resources available to patient including: PCP and When to call 911. Patient given an opportunity to ask questions and does not have any further questions or concerns at this time. The patient agrees to contact the PCP office for questions related to their healthcare. Medication reconciliation was performed with patient, who verbalizes understanding of administration of home medications. Advised obtaining a 90-day supply of all daily and as-needed medications. Referral to Pharm D needed: no     Home Health/Outpatient orders at discharge: PT and [facility]: [facility]. Date of initial visit: in one week when patient quarantine is over. Durable Medical Equipment ordered at discharge: CPAP/BIPAP/APAP Mela [address]: [facility] received: o2 canulas, portable tanks and concentrator. Covid Risk Education    Educated patient about risk for severe COVID-19 due to risk factors according to CDC guidelines. CTN reviewed discharge instructions, medical action plan and red flag symptoms with the patient who verbalized understanding. Discussed COVID vaccination status: yes. Education provided on COVID-19 vaccination as appropriate. Discussed exposure protocols and quarantine with CDC Guidelines. Patient was given an opportunity to verbalize any questions and concerns and agrees to contact CTN or health care provider for questions related to their healthcare. Was patient discharged with a pulse oximeter? no. Discussed and confirmed pulse oximeter discharge instructions and when to notify provider or seek emergency care. Discussed follow-up appointments. If no appointment was previously scheduled, appointment scheduling offered: no. Is follow up appointment scheduled within 7 days of discharge? no.   [facility] follow up appointment(s): No future appointments.   Non-[facility] follow up appointment(s): none at this time. CTN offered to call [facility] to check on operating status of portable tanks and concentrator but patient refused. Plan for follow-up call in 3-5 days based on severity of symptoms and risk factors. Plan for next call: symptom management-check on oxygen status and breathing. purchase of pulse ox. CTN provided contact information for future needs. Goals Addressed                 This Visit's Progress     Prevent complications post hospitalization. 1/3 22   Patient to keep oxygen on at all times.  Patient to call [facility] for more 02 tanks.  Patient to finish steroids.  Patient to drink four bottles of water a day.  Patient to purchase pulse oximeter.  Ctn to follow up in 3 days.    [address] [facility] MARJORIE

## 2022-01-05 NOTE — TELEPHONE ENCOUNTER
----- Message from Joslyn Coppola sent at 1/5/2022  1:05 PM EST -----  Subject: Message to Provider    QUESTIONS  Information for Provider? has covid on 12/29 and pneumonia and was in the   hospital for one nigh, i son steroids for 10 days, 6mg and is quarantined   and is on oxygen and is doing okay  ---------------------------------------------------------------------------  --------------  CALL BACK INFO  What is the best way for the office to contact you? Do not leave any   message, patient will call back for answer  Preferred Call Back Phone Number? [phone]  ---------------------------------------------------------------------------  --------------  SCRIPT ANSWERS  Relationship to Patient?  Self

## 2022-01-05 NOTE — TELEPHONE ENCOUNTER
Chief Complaint   Patient presents with    Other     Covid positive and pneumonia 12/29/2021 in hospital for one night , steroids for 10 days , 6 mg and quarantined on oxygen

## 2022-01-19 NOTE — PROGRESS NOTES
Follow Up Call    Challenges to be reviewed by the provider   Additional needs identified to be addressed with provider: no  none           Encounter was not routed to provider for escalation. Method of communication with provider: none. Contacted the patient by telephone to follow up after hospital visit. Status: improved  Interventions to address identified needs: Scheduled appointment with Specialist-patient saw oncologist and will wait to restart multiple myeloma treatment in one month while building strength from hospitalization and Education of patient/family/caregiver/guardian to support self-management-push po fluids including gatorade and take pills for diarrhea that doctor prescribed. call dispatch health for new onset cough. continue to take steroids as prescribed    [address] follow up appointment(s):   Future Appointments   Date Time Provider Rishi Meka   3/1/2022 10:30 AM Britany Edmonds NP PAFP BS [facility]     Non-[facility] follow up appointment(s): none at this time. Follow up appointment completed? yes. Provided contact information for future needs. Plan for follow-up call in 5-7 days based on severity of symptoms and risk factors. Plan for next call: symptom management-Scheduled appointment with Specialist-patient saw oncologist and will wait to restart multiple myeloma treatment in one month while building strength from hospitalization and Education of patient/family/caregiver/guardian to support self-management-push po fluids including gatorade and take pills for diarrhea that doctor prescribed. call dispatch health for new onset cough.  continue to take steroids as prescribed     Mary Hdz RN

## 2022-02-01 NOTE — PROGRESS NOTES
Patient has graduated from the Transitions of Care Coordination  program on 2/1/22. Patient/family has the ability to self-manage at this time Care management goals have been completed. Patient was not referred to the [facility] team for further management. Goals Addressed                 This Visit's Progress     COMPLETED: Prevent complications post hospitalization. On track     1/3 22   Patient to keep oxygen on at all times.  Patient to call [facility] for more 02 tanks.  Patient to finish steroids.  Patient to drink four bottles of water a day.  Patient to purchase pulse oximeter.  Ctn to follow up in 3 days.  Keyanna Hamilton RN    1/19/22   Patient to continue to take steroid as prescribed.  Patient is on room air   Patient to drink four bottles of water a day.  Patient to call [employer] for new onset cough. Ctn provided number.  Ctn to call in one week  1310 Vanesa Siddiqui RN    2/1/22   Patient to see oncologist 2/15/22   Patient to see pcp in February    Patient to pace self and only do one or two thing on his to do list to help conserve energy.  Patient used [employer] got a bag of fluid, vss and now cough has disipated and gone.  Patient has been out of the hospital for 30 days with no readmissions. 1310 Vanesa Siddiqui RN               Patient has Care Transition Nurse's contact information for any further questions, concerns, or needs.   Patients upcoming visits:    Future Appointments   Date Time Provider Rishi Ackerman   3/1/2022 10:30 AM Anita Edmonds NP PAFP BS [facility]

## 2022-02-12 PROBLEM — L03.115 CELLULITIS OF RIGHT LEG: Status: ACTIVE | Noted: 2022-01-01

## 2022-02-12 NOTE — ED TRIAGE NOTES
Patient arrives ambulatory with a rollator to the ED with chief complaint of R foot pain. Patient was evaluated at [facility] yesterday where he got diagnosed with cellulitis on his R foot. Patient states he has an ulcer on his R big toe and the \"redness\" is spreading up his legs. Hx bone cancer, neuropathy, and DM. Patient's R foot is swollen, red, and hot to the touch.

## 2022-02-12 NOTE — ED PROVIDER NOTES
Saleem Alvarado is an 79 yo M with h/o  HTN, DM, chronic pain,  Peripheral neuropathy, bone cancer and melanoma who presents to the ED with worsening infection on his right 2nd toe. He states that he initially had a blister that ruputed he went to [facility] yesterday and was prescribed keflex which he has been taking but the redness and swelling is spreading. He denies fever or chills. Past Medical History:   Diagnosis Date    AAA (abdominal aortic aneurysm) ([address] 1/6/2012    Abnormal serum protein electrophoresis 4/25/2012    Anemia 4/6/2010    Anemia 1/5/2012    Anemia due to GI bleed 2007 4/6/2010    Aneurysm ([address]     AAA    Arrhythmia     ATRIAL FIB PT STATES HE DOESN'T HAVE    Arthritis     Atrial fibrillation 1/2012 2/22/2012    Bilateral Carotid Bruits, L>R 9/7/2010    Bone cancer ([address]     Borderline diabetes mellitus 4/6/2010    Cancer ([address]     SKIN MELANOMA ON BACK     Carotid stenosis 4/6/2010    Carotid stenosis     Chronic pain     Diabetes mellitus, type 2 ([address] 9/7/2010    NO MEDS    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    ETOH abuse 4/6/2010    GERD (gastroesophageal reflux disease) 4/6/2010    GI bleed 4/6/2010    GI bleed, duodenitis 2007 4/6/2010    H/O Heavy Alcohol Use 9/7/2010    History of skin cancer 9/7/2010    HTN (hypertension) 4/6/2010    Hypercalcemia 4/25/2012    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/2010    Hypertriglyceridemia 9/7/2010    Left inguinal hernia 9/14/2015    Lipids blood increased 4/6/2010    Microalbuminuria 12/10/2015    Mild Allergic Rhinitis 9/7/2010    Pre-diabetes 9/7/2010    2005;  Optho Dr Franchesca Duque Right inguinal hernia 11/2/2015    S/P AAA repair 2/9/2012 2/22/2012    Sinus tachycardia 9/7/2010    Tachycardia 4/6/2010    Vitamin B12 deficiency 9/7/2010       Past Surgical History:   Procedure Laterality Date    COLONOSCOPY  8/2007    Normal; Dr Issa Kimble EGD  8/2007    small hiatal hernia, mild duodenitis; Dr Stalin Romero HX AAA REPAIR  2012    Dr Zara Woodruff    HX CATARACT REMOVAL      both eyes    HX CHOLECYSTECTOMY  14    [facility]- Dr. Tish Ferguson    HX HERNIA REPAIR Left 9/24/15    left indirect inguinal by Dr. Emiliano Jones Right 11/2/15    inguinal w/ mesh by Dr. Emiliano Jones Bilateral 10/2015 And 2015    Inguinal    BLUE DOPPLER  3/26/2012    BLUE Echo; + clot         Family History:   Problem Relation Age of Onset    Heart Disease Mother          age 80    [de-identified] Father          brain tumor age 80    Heart Disease Father     Substance Abuse Daughter          cocaine OD age 43 in 200    Other Son          PE/DVT age 43   [address] Other Daughter         alive and well    Anesth Problems Neg Hx        Social History     Socioeconomic History    Marital status:      Spouse name: Not on file    Number of children: 3    Years of education: Not on file    Highest education level: Not on file   Occupational History    Occupation: Retired  from [address] Use    Smoking status: Former Smoker     Packs/day: 1.50     Years: 55.00     Pack years: 82.50     Quit date: 2001     Years since quittin.1    Smokeless tobacco: Never Used    Tobacco comment: used to smoke 1 1/2 ppd x ~ 40 yrs   Vaping Use    Vaping Use: Never used   Substance and Sexual Activity    Alcohol use:  Yes     Alcohol/week: 14.0 standard drinks     Types: 14 Glasses of wine per week     Comment: 14    Drug use: No    Sexual activity: Not on file   Other Topics Concern     Service Not Asked    Blood Transfusions Not Asked    Caffeine Concern No     Comment: none    Occupational Exposure Not Asked    Hobby Hazards Not Asked    Sleep Concern Not Asked    Stress Concern Not Asked    Weight Concern No     Comment: down some at present; usually pretty stable in the 180's; his personal goal is 175    Special Diet Yes     Comment: very low sugar, low carb, tries to pay attention to sugar grams    Back Care Not Asked    Exercise Yes     Comment: stays active and walks on treadmill 15 minutes 1-2 x a week and is very active in the yard   [employer] Helmet Not Asked   [address] Not Asked    Self-Exams Not Asked   Social History Narrative    Lives with wife, who is developing dementia    Daughter is in the area     Social Determinants of Health     Financial Resource Strain:     Difficulty of Paying Living Expenses: Not on file   Food Insecurity:     Worried About [address] in the Last Year: Not on file    Jenni of Food in the Last Year: Not on file   Transportation Needs:     Lack of Transportation (Medical): Not on file    Lack of Transportation (Non-Medical):  Not on file   Physical Activity:     Days of Exercise per Week: Not on file    Minutes of Exercise per Session: Not on file   Stress:     Feeling of Stress : Not on file   Social Connections:     Frequency of Communication with Friends and Family: Not on file    Frequency of Social Gatherings with Friends and Family: Not on file    Attends Sabianism Services: Not on file    Active Member of [address] [place] [address] or Organizations: Not on file    Attends Club or Organization Meetings: Not on file    Marital Status: Not on file   Intimate Partner Violence:     Fear of Current or Ex-Partner: Not on file    Emotionally Abused: Not on file    Physically Abused: Not on file    Sexually Abused: Not on file   Housing Stability:     Unable to Pay for Housing in the Last Year: Not on file    Number of [place] in the Last Year: Not on file    Unstable Housing in the Last Year: Not on file         ALLERGIES: Codeine, Contrast agent [iodine], Fish oil, Glipizide, Metformin, Niacin, Norvasc [amlodipine], and Optiray 350 [ioversol]    Review of Systems   Constitutional: Negative for fever. HENT: Negative for sore throat. Eyes: Negative for visual disturbance. Respiratory: Negative for cough. Cardiovascular: Positive for leg swelling. Negative for chest pain. Gastrointestinal: Negative for abdominal pain. Genitourinary: Negative for dysuria. Musculoskeletal: Negative for back pain. Skin: Positive for color change and wound. Negative for rash. Neurological: Negative for headaches. Vitals:    22 1330   BP: (!) 162/74   Pulse: 85   Resp: 16   Temp: 97.9 °F (36.6 °C)   SpO2: 92%   Weight: 72.6 kg (160 lb)   Height: 6' 1\" (1.854 m)            Physical Exam  Vitals and nursing note reviewed. Constitutional:       General: He is not in acute distress. Appearance: He is well-developed. HENT:      Head: Normocephalic and atraumatic. Eyes:      Conjunctiva/sclera: Conjunctivae normal.   Neck:      Trachea: Phonation normal.   Cardiovascular:      Rate and Rhythm: Normal rate. Pulmonary:      Effort: Pulmonary effort is normal. No respiratory distress. Abdominal:      General: There is no distension. Musculoskeletal:         General: No tenderness. Normal range of motion. Cervical back: Normal range of motion. Right lower le+ Edema present. Feet:      Right foot:      Skin integrity: Blister, erythema and warmth present. Skin:     General: Skin is warm and dry. Findings: Erythema present. Neurological:      Mental Status: He is alert. He is not disoriented. Motor: No abnormal muscle tone. [facility]       Perfect Serve Consult for Admission  3:04 PM    ED Room Number: ER12/12  Patient Name and age:  Néstor Avina 80 y.o.  male  Working Diagnosis:   1. Blister of foot with infection, right, initial encounter    2.  Cellulitis of right lower extremity        COVID-19 Suspicion:  no  Sepsis present:  no  Reassessment needed: N/A  Code Status:  Full Code  Readmission: no  Isolation Requirements:  no  Recommended Level of Care:  med/surg  Department:[facility] Adult ED - [phone]  Other:  Patient with h/o peripheral neuropathy. Had ruptured blister on 2nd digit and developed redness of right foot. Was prescribed keflex which he has been taking but the swelling and erythema are increasing. As received cefepime and vancomycin.     Procedures

## 2022-02-12 NOTE — H&P
[address]  HISTORY AND PHYSICAL    Name:  Shawn Wrad  MR#:  [id number]  :  1934  ACCOUNT #:  [de-identified]  ADMIT DATE:  2022      The patient was seen, evaluated, and admitted by me on 2022. PRIMARY CARE PROVIDER:  Pieter Hewitt NP    SOURCE OF INFORMATION:  Patient and review of ED and old electronic medical records. CHIEF COMPLAINT:  Right foot pain and swelling. HISTORY OF PRESENT ILLNESS:  This is an 80-year-old man with a past medical history significant for type 2 diabetes, multiple myeloma, hypertension, dyslipidemia, peripheral neuropathy, status post abdominal aortic aneurysm repair, who was in his usual state of health until several days ago when the patient developed pain and swelling involving the right foot. The patient initially developed an ulcer involving the right big toe. The swelling started from the big toe to the rest of the right foot. The swelling is associated with redness and pain. The pain is constant, sharp pain, 6/10 in severity, worse with movement involving the right foot. No known relieving factors. The patient was seen at [facility] yesterday. The patient was diagnosed with cellulitis of the right foot. The patient came to the emergency room today because of worsening symptoms. The patient denies fever, rigors, or chills. He was last admitted to this hospital from 2021 to 2021. The patient was admitted and treated for acute respiratory failure secondary to COVID-19 pneumonia. PAST MEDICAL HISTORY:  Type 2 diabetes, multiple myeloma, hypertension, dyslipidemia, peripheral neuropathy, status post abdominal aortic aneurysm repair. ALLERGIES:  THE PATIENT IS ALLERGIC TO CODEINE, GLYCOSIDE, METFORMIN, NIACIN, AND NORVASC. MEDICATIONS:  1. Acyclovir 400 mg daily. 2.  Aspirin 81 mg daily. 3.  Calcium carbonate 200 mg 1 tablet daily as needed. 4.  Decadron dosage as directed.   5.  Lasix 40 mg daily.  6.  Imodium dosage as directed. 7.  Multivitamin 1 tablet daily. 8.  Potassium chloride 10 mEq daily. 9.  Lyrica 50 mg twice daily. 10.  Compazine 10 mg every 4 hours as needed for nausea and vomiting. 11.  Revlimid dosage as directed. 12.  Flomax 0.4 mg daily. FAMILY HISTORY:  This was reviewed. His father  of brain tumor at the age of 80, he also had heart disease. His mother had heart disease as well. PAST SURGICAL HISTORY:  This is significant for abdominal aortic aneurysm repair, bilateral cataract extraction, cholecystectomy, and left inguinal hernia repair. SOCIAL HISTORY:  The patient is a former smoker, quit tobacco abuse in 2001. No history of alcohol abuse. REVIEW OF SYSTEMS:  HEAD, EYES, EARS, NOSE, AND THROAT:  No headache, no dizziness, no blurring of vision, no photophobia. RESPIRATORY:  No cough, no shortness of breath, no hemoptysis. CARDIOVASCULAR:  No chest pain, no orthopnea, no palpitation. GASTROINTESTINAL:  This is positive for diarrhea. No nausea or vomiting. No constipation. GENITOURINARY:  No dysuria, no urgency, and no frequency. All other systems are reviewed and they are negative. PHYSICAL EXAMINATION:  GENERAL APPEARANCE:  The patient appeared ill, in moderate distress. VITAL SIGNS:  On arrival at the emergency room, temperature 97.9, pulse 85, respiratory rate 16, blood pressure 162/74, and oxygen saturation 92% on room air. HEAD:  Normocephalic, atraumatic. EYES:  Normal eye movement. No redness, no drainage, no discharge. EARS:  Normal external ears with no obvious drainage. NOSE:  No deformity and no drainage. MOUTH AND THROAT:  No visible oral lesion. NECK:  Neck is supple. No JVD, no thyromegaly. CHEST:  Clear breath sounds. No wheezing, no crackles. HEART:  Normal S1 and S2, regular. No clinically appreciable murmur. ABDOMEN:  Soft, nontender. Normal bowel sounds. CENTRAL NERVOUS SYSTEM:  Alert and oriented x3.   No gross focal neurological deficit. EXTREMITIES:  Swelling and redness of the right foot noted. Pulses 2+ bilaterally. MUSCULOSKELETAL:  No obvious joint deformity or swelling. SKIN:  Redness and swelling of the right foot noted, warm to touch, present on admission. PSYCHIATRIC:  Normal mood and affect. LYMPHATIC:  No cervical lymphadenopathy. DIAGNOSTIC DATA:  None. LABORATORY DATA:  Hematology, WBC 8.4, hemoglobin 10.4, hematocrit 33.8, platelets 903. Chemistry, sodium 138, potassium 3.3, chloride 107, CO2 of 26, glucose 128, BUN 14, creatinine 1.05, calcium 8.5. Total bilirubin 0.4, ALT 16, AST 15, alkaline phosphatase 74, total protein 5.9, albumin level 2.8, globulin 3.1. ASSESSMENT:  1. Cellulitis, right leg. 2.  Type 2 diabetes. 3.  Multiple myeloma. 4.  Hypertension. 5.  Dyslipidemia. 6.  Peripheral neuropathy. 7.  Anemia. 8.  Hypokalemia. PLAN:  1. Cellulitis, right leg. We will start the patient on vancomycin and Zosyn. Wound Care consult will be requested for local wound care. We will check ultrasound of the lower extremity for DVT. We will monitor the patient closely. 2.  Type 2 diabetes. The patient is not on any medication at home. The patient will be placed on sliding scale with insulin coverage. We will check hemoglobin A1c level if one has not been checked recently. 3.  Multiple myeloma. We will check C-reactive protein level. The patient is still receiving active treatment for his multiple myeloma. The multiple myeloma is not in remission. Consider consultation with the patient's hematologist if indicated. 4.  Hypertension. We will start the patient on hydralazine as needed for better blood pressure control. We will check TSH level. 5.  Dyslipidemia. We will continue dietary therapy. 6.  Peripheral neuropathy. We will continue with Lyrica. 7.  Anemia. This is most likely due to the patient's multiple myeloma.   We will carry out anemia workup including checking stool guaiac to rule out occult GI bleed. 8.  Hypokalemia. We will replace potassium and repeat potassium level. We will also check magnesium level. 9.  Other issues:  Code status, the patient is a full code. We will place the patient on heparin for DVT prophylaxis. FUNCTIONAL STATUS PRIOR TO ADMISSION:  The patient came from home. The patient is ambulatory with assistive device. COVID PRECAUTION:  The patient was wearing a face mask. I was wearing a face mask and gloves for this patient's encounter. Trini Ibarar MD      [id number]  D:  02/12/2022 16:31  T:  02/12/2022 17:30  JOB #:  [id number]  CC:   Arturo Mcgowan NP

## 2022-02-13 NOTE — PROGRESS NOTES
Admission Medication Reconciliation:    Information obtained from:  Patient  RxQuery data available¹:  NO    Comments/Recommendations: Updated PTA meds/reviewed patient's allergies. 1)  Spoke with patient, who is a good historian. Patient reports recent reaction to dexamethasone that he used to receive with his chemotherapy, and reports that he no longer takes this anymore. 2)  Medication changes (since last review): Adjusted  - Furosemide 20mg 2tabs po daily --> now taking 1tab po twice daily  - Loperamide 2mg 1tab po as needed --> Now taking 2tabs po three times daily as needed for diarrhea. Removed  - Dexamethasone 4mg tablet  - Elotuzumab IV  - Pomalyst 2mg capsule  - Prochlorperazine 10mg 1tab po every 4 hours  - Revlimid 10mg 1cap po nightly  - Tamsulosin 0.4mg 1cap po daily     ¹RxQuery pharmacy benefit data reflects medications filled and processed through the patient's insurance, however   this data does NOT capture whether the medication was picked up or is currently being taken by the patient.     Allergies:  Codeine, Contrast agent [iodine], Fish oil, Glipizide, Metformin, Niacin, Norvasc [amlodipine], and Optiray 350 [ioversol]    Significant PMH/Disease States:   Past Medical History:   Diagnosis Date    AAA (abdominal aortic aneurysm) ([facility] [address] 1/6/2012    Abnormal serum protein electrophoresis 4/25/2012    Anemia 4/6/2010    Anemia 1/5/2012    Anemia due to GI bleed 2007 4/6/2010    Aneurysm ([address]     AAA    Arrhythmia     ATRIAL FIB PT STATES HE DOESN'T HAVE    Arthritis     Atrial fibrillation 1/2012 2/22/2012    Bilateral Carotid Bruits, L>R 9/7/2010    Bone cancer ([address]     Borderline diabetes mellitus 4/6/2010    Cancer ([address]     SKIN MELANOMA ON BACK     Carotid stenosis 4/6/2010    Carotid stenosis     Chronic pain     Diabetes mellitus, type 2 ([address] 9/7/2010    NO MEDS    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    ETOH abuse 4/6/2010    GERD (gastroesophageal reflux disease) 4/6/2010    GI bleed 4/6/2010    GI bleed, duodenitis 2007 4/6/2010    H/O Heavy Alcohol Use 9/7/2010    History of skin cancer 9/7/2010    HTN (hypertension) 4/6/2010    Hypercalcemia 4/25/2012    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/2010    Hypertriglyceridemia 9/7/2010    Left inguinal hernia 9/14/2015    Lipids blood increased 4/6/2010    Microalbuminuria 12/10/2015    Mild Allergic Rhinitis 9/7/2010    Pre-diabetes 9/7/2010    2005; Optho Dr Karyle Holt     Right inguinal hernia 11/2/2015    S/P AAA repair 2/9/2012 2/22/2012    Sinus tachycardia 9/7/2010    Tachycardia 4/6/2010    Vitamin B12 deficiency 9/7/2010     Chief Complaint for this Admission:    Chief Complaint   Patient presents with    Foot Pain    Skin Infection     Prior to Admission Medications:   Prior to Admission Medications   Prescriptions Last Dose Informant Taking?   acyclovir (ZOVIRAX) 400 mg tablet   Yes   Sig: TK 1 T PO QD   aspirin delayed-release 81 mg tablet   Yes   Sig: Take 81 mg by mouth nightly. calcium carbonate (TUMS) 200 mg calcium (500 mg) chew   Yes   Sig: Take 1 Tablet by mouth as needed. folic acid-vit R0-SFO X10 (Folbic) 2.5-25-2 mg tablet   Yes   Sig: Take 1 Tablet by mouth daily. furosemide (LASIX) 20 mg tablet   Yes   Sig: Take 20 mg by mouth two (2) times a day. hydroxypropyl methylcellulose (GENTEAL MILD) 0.2 % ophthalmic solution   Yes   Sig: Administer 2 Drops to both eyes as needed. loperamide (IMODIUM A-D) 2 mg tablet   Yes   Sig: Take 4 mg by mouth three (3) times daily as needed. multivitamin (ONE A DAY) tablet   Yes   Sig: Take 1 Tab by mouth daily. potassium chloride (KLOR-CON M10) 10 mEq tablet   Yes   Sig: TAKE 1 TABLET BY MOUTH DAILY WITH LASIX   pregabalin (LYRICA) 50 mg capsule   Yes   Sig: Take 50 mg by mouth two (2) times a day.       Facility-Administered Medications: None     Please contact the main inpatient pharmacy with any questions or concerns at [phone] and we will direct you to the clinical pharmacist covering this patient's care while in-house.    Valarie Sultana, DISHAD

## 2022-02-13 NOTE — ROUTINE PROCESS
Emergency Room Outgoing Transfer Nursing Note      Verbal and/or Written report given to MARJORIE Montalvo by Rubina Heart RN on Vianca oMntoya a 80 y.o. male who was admitted on 2/12/2022  1:36 PM and being transferred for routine progression of care. Report consisted of the following information SBAR, Kardex, MAR and Recent Results and the information was reviewed with the receiving nurse.             Code Status: Full Code      Chief Complaint: Foot Pain and Skin Infection      Admit Diagnosis: Cellulitis of right leg [J63.059]      Admitting Provider: Terri Blake MD      Surgery: * No surgery found *       Infections: No current active infections      Allergies: Codeine, Contrast agent [iodine], Dexamethasone, Fish oil, Glipizide, Metformin, Niacin, Norvasc [amlodipine], and Optiray 350 [ioversol]      Current diet: ADULT DIET Regular; 3 carb choices (45 gm/meal)      Lines:   Peripheral IV 02/12/22 Left Antecubital (Active)   Site Assessment Clean, dry, & intact 02/12/22 1354   Phlebitis Assessment 0 02/12/22 1354   Infiltration Assessment 0 02/12/22 1354   Dressing Status Clean, dry, & intact 02/12/22 1354   Dressing Type Topical skin adhesive;Transparent 02/12/22 1354   Hub Color/Line Status Pink;Capped;Flushed;Patent 02/12/22 1354   Action Taken Blood drawn 02/12/22 1354   Alcohol Cap Used Yes 02/12/22 1354                Vital Signs:     Patient Vitals for the past 12 hrs:   Pulse Resp BP SpO2   02/13/22 0330 -- -- 131/65 92 %   02/13/22 0300 -- -- (!) 137/52 95 %   02/13/22 0230 -- -- (!) 108/58 93 %   02/13/22 0200 -- -- (!) 121/55 97 %   02/13/22 0130 -- -- (!) 132/58 93 %   02/13/22 0100 -- -- 122/63 93 %   02/13/22 0045 -- -- (!) 118/98 --   02/12/22 2300 -- -- (!) 141/79 94 %   02/12/22 2230 -- -- (!) 151/59 97 %   02/12/22 2200 -- -- 138/68 95 %   02/12/22 2130 -- -- (!) 148/58 96 %   02/12/22 2100 -- -- (!) 154/62 95 %   02/12/22 2030 -- -- (!) 145/64 92 %   02/12/22 2000 -- -- (!) 162/68 93 %   02/12/22 1750 80 18 (!) 179/82 99 %           Intake & Output:   No intake or output data in the 24 hours ending 02/13/22 0419       Laboratory Results:     Recent Results (from the past 12 hour(s))   GLUCOSE, POC    Collection Time: 02/12/22  8:45 PM   Result Value Ref Range    Glucose (POC) 171 (H) 65 - 117 mg/dL    Performed by [address] [place] [address], [facility]    Collection Time: 02/13/22  2:37 AM   Result Value Ref Range    Sodium 142 136 - 145 mmol/L    Potassium 3.3 (L) 3.5 - 5.1 mmol/L    Chloride 113 (H) 97 - 108 mmol/L    CO2 28 21 - 32 mmol/L    Anion gap 1 (L) 5 - 15 mmol/L    Glucose 130 (H) 65 - 100 mg/dL    BUN 13 6 - 20 MG/DL    Creatinine 0.93 0.70 - 1.30 MG/DL    BUN/Creatinine ratio 14 12 - 20      GFR est AA >60 >60 ml/min/1.73m2    GFR est non-AA >60 >60 ml/min/1.73m2    Calcium 8.3 (L) 8.5 - 10.1 MG/DL    Bilirubin, total 0.4 0.2 - 1.0 MG/DL    ALT (SGPT) 14 12 - 78 U/L    AST (SGOT) 15 15 - 37 U/L    Alk. phosphatase 64 45 - 117 U/L    Protein, total 5.1 (L) 6.4 - 8.2 g/dL    Albumin 2.4 (L) 3.5 - 5.0 g/dL    Globulin 2.7 2.0 - 4.0 g/dL    A-G Ratio 0.9 (L) 1.1 - 2.2     CBC WITH AUTOMATED DIFF    Collection Time: 02/13/22  2:37 AM   Result Value Ref Range    WBC 7.3 4.1 - 11.1 K/uL    RBC 3.07 (L) 4.10 - 5.70 M/uL    HGB 9.4 (L) 12.1 - 17.0 g/dL    HCT 30.6 (L) 36.6 - 50.3 %    MCV 99.7 (H) 80.0 - 99.0 FL    MCH 30.6 26.0 - 34.0 PG    MCHC 30.7 30.0 - 36.5 g/dL    RDW 16.3 (H) 11.5 - 14.5 %    PLATELET 026 455 - 907 K/uL    MPV 11.5 8.9 - 12.9 FL    NRBC 0.0 0  WBC    ABSOLUTE NRBC 0.00 0.00 - 0.01 K/uL    NEUTROPHILS 70 32 - 75 %    LYMPHOCYTES 11 (L) 12 - 49 %    MONOCYTES 12 5 - 13 %    EOSINOPHILS 6 0 - 7 %    BASOPHILS 1 0 - 1 %    IMMATURE GRANULOCYTES 0 0.0 - 0.5 %    ABS. NEUTROPHILS 5.1 1.8 - 8.0 K/UL    ABS. LYMPHOCYTES 0.8 0.8 - 3.5 K/UL    ABS. MONOCYTES 0.9 0.0 - 1.0 K/UL    ABS. EOSINOPHILS 0.4 0.0 - 0.4 K/UL    ABS.  BASOPHILS 0.1 0.0 - 0.1 K/UL    ABS. IMM. GRANS. 0.0 0.00 - 0.04 K/UL    DF SMEAR SCANNED      PLATELET COMMENTS Large Platelets      RBC COMMENTS ANISOCYTOSIS  1+        RBC COMMENTS OVALOCYTES  1+        RBC COMMENTS SCHISTOCYTES  PRESENT       PHOSPHORUS    Collection Time: 02/13/22  2:37 AM   Result Value Ref Range    Phosphorus 2.4 (L) 2.6 - 4.7 MG/DL   IRON PROFILE    Collection Time: 02/13/22  2:37 AM   Result Value Ref Range    Iron 20 (L) 35 - 150 ug/dL    TIBC 254 250 - 450 ug/dL    Iron % saturation 8 (L) 20 - 50 %   FERRITIN    Collection Time: 02/13/22  2:37 AM   Result Value Ref Range    Ferritin 51 26 - 388 NG/ML   C REACTIVE PROTEIN, QT    Collection Time: 02/13/22  2:37 AM   Result Value Ref Range    C-Reactive protein 1.45 (H) 0.00 - 0.60 mg/dL   IRON    Collection Time: 02/13/22  2:38 AM   Result Value Ref Range    Iron 21 (L) 35 - 150 ug/dL   SAMPLES BEING HELD    Collection Time: 02/13/22  2:39 AM   Result Value Ref Range    SAMPLES BEING HELD 1blu     COMMENT        Add-on orders for these samples will be processed based on acceptable specimen integrity and analyte stability, which may vary by analyte. Patient transported with : Registered Nurse     Opportunity for questions and clarifications were provided.          Bk Carcamo RN, CORRIE, BSN, [facility]     2/13/2022, 4:19 AM

## 2022-02-13 NOTE — PROGRESS NOTES
Day #1 of Zosyn  Indication:  Cellulitis  Current regimen:  3.375g IV q6h  Abx regimen: Zosyn, vancomycin  Recent Labs     22  1353   WBC 8.4   CREA 1.05   BUN 14     Est CrCl: 50.9 ml/min; UO: -- ml/kg/hr  Temp (24hrs), Av.7 °F (36.5 °C), Min:97.4 °F (36.3 °C), Max:97.9 °F (36.6 °C)    Cultures: pBlood pending    Plan: Change to 3.375g IV q8h per extended infusion protocol.

## 2022-02-13 NOTE — PROGRESS NOTES
[facility]  Hospitalist Group                                                                                          Hospitalist Progress Note  Yamil Good MD  Answering service: [phone] OR 36 from in house phone        Date of Service:  2022  NAME:  Ирина Acuña  :  1934  MRN:  [id number]      Admission Summary: This is an 80-year-old man with a past medical history significant for type 2 diabetes, multiple myeloma, hypertension, dyslipidemia, peripheral neuropathy, status post abdominal aortic aneurysm repair, who was in his usual state of health until several days ago when the patient developed pain and swelling involving the right foot. The patient initially developed an ulcer involving the right big toe. The swelling started from the big toe to the rest of the right foot. The swelling is associated with redness and pain. The pain is constant, sharp pain, severe in severity, worse with movement involving the right foot. No known relieving factors. The patient was seen at [facility] yesterday. The patient was diagnosed with cellulitis of the right foot. The patient came to the emergency room today because of worsening symptoms. The patient denies fever, rigors, or chills. He was last admitted to this hospital from 2021 to 2021. The patient was admitted and treated for acute respiratory failure secondary to COVID-19 pneumonia. Interval history / Subjective:   Patient report continued pain in foot but improved     Assessment & Plan:      1. Cellulitis, right root and ulcer on right 2nd toe-   Cont IV abx, wound care and podiatry consult in the AM tomorrow  2. Type 2 diabetes- accuchecks and ss inaulin. 3.  Multiple myeloma- cell counts low/stable. 4.  Hypertension- monitor and adjust home meds. 5.  Dyslipidemia- cont home statin meds  6. Peripheral neuropathy- lyrica.   7.  Anemia of iron def and chronic dz- IV iron ordered, monitor Hb.  8.  Hypokalemia- replete- likely related to chronic diarrhea  9. Chronic diarrhea- monitor for now     Code status: Full  Prophylaxis: Heparin SQ  Care Plan discussed with: patient  Anticipated Disposition: home w home health [facility]  Date Reviewed: 2/12/2022          Codes Class Noted POA    * (Principal) Cellulitis of right leg ICD-10-CM: L03.115  ICD-9-CM: 682.6  2/12/2022 Yes                Review of Systems:   A comprehensive review of systems was negative except for that written in the HPI. Vital Signs:    Last 24hrs VS reviewed since prior progress note.  Most recent are:  Visit Vitals  BP (!) 126/59   Pulse 81   Temp 98.1 °F (36.7 °C)   Resp 16   Ht 6' 1\" (1.854 m)   Wt 72.6 kg (160 lb)   SpO2 97%   BMI 21.11 kg/m²     Patient Vitals for the past 24 hrs:   Temp Pulse Resp BP SpO2   02/13/22 1502 98.1 °F (36.7 °C) 81 16 (!) 126/59 97 %   02/13/22 1105 97.7 °F (36.5 °C) 79 16 127/65 95 %   02/13/22 0742 97.9 °F (36.6 °C) 66 16 124/66 95 %   02/13/22 0454 97.8 °F (36.6 °C) 73 18 (!) 144/73 93 %   02/13/22 0400 97.6 °F (36.4 °C) 75 18 (!) 136/58 93 %   02/13/22 0330 -- -- -- 131/65 92 %   02/13/22 0300 -- -- -- (!) 137/52 95 %   02/13/22 0230 -- -- -- (!) 108/58 93 %   02/13/22 0200 -- -- -- (!) 121/55 97 %   02/13/22 0130 -- -- -- (!) 132/58 93 %   02/13/22 0100 -- -- -- 122/63 93 %   02/13/22 0045 -- -- -- (!) 118/98 --   02/12/22 2300 -- -- -- (!) 141/79 94 %   02/12/22 2230 -- -- -- (!) 151/59 97 %   02/12/22 2200 -- -- -- 138/68 95 %   02/12/22 2130 -- -- -- (!) 148/58 96 %   02/12/22 2100 -- -- -- (!) 154/62 95 %   02/12/22 2030 -- -- -- (!) 145/64 92 %   02/12/22 2000 -- -- -- (!) 162/68 93 %       No intake or output data in the 24 hours ending 02/13/22 1812     Physical Examination:     I had a face to face encounter with this patient and independently examined them on 2/13/2022 as outlined below:        Constitutional:  No acute distress, cooperative, pleasant    ENT: Oral mucosa moist, oropharynx benign. Resp:  CTA bilaterally. No wheezing/rhonchi/rales. No accessory muscle use. CV:  Regular rhythm, normal rate, no murmurs, gallops, rubs    GI:  Soft, non distended, non tender. n     Musculoskeletal:  mild R LE and foot edema, warm,     Neurologic:  Moves all extremities. AAOx3, CN II-XII reviewed   Skin: infection of Right foot              Data Review:    Review and/or order of clinical lab test  Review and/or order of tests in the radiology section of CPT  Review and/or order of tests in the medicine section of CPT      Labs:     Recent Labs     02/13/22 0237 02/12/22  1353   WBC 7.3 8.4   HGB 9.4* 10.4*   HCT 30.6* 33.8*    177     Recent Labs     02/13/22 0237 02/12/22  1353    138   K 3.3* 3.3*   * 107   CO2 28 26   BUN 13 14   CREA 0.93 1.05   * 128*   CA 8.3* 8.5   MG 1.7  --    PHOS 2.4*  --      Recent Labs     02/13/22 0237 02/12/22  1353   ALT 14 16   AP 64 74   TBILI 0.4 0.4   TP 5.1* 5.9*   ALB 2.4* 2.8*   GLOB 2.7 3.1     No results for input(s): INR, PTP, APTT, INREXT in the last 72 hours. Recent Labs     02/13/22 0237   TIBC 254   PSAT 8*   FERR 51      Lab Results   Component Value Date/Time    Folate 81.7 (H) 02/13/2022 02:38 AM      No results for input(s): PH, PCO2, PO2 in the last 72 hours. No results for input(s): CPK, CKNDX, TROIQ in the last 72 hours.     No lab exists for component: CPKMB  Lab Results   Component Value Date/Time    Cholesterol, total 193 03/23/2021 11:18 AM    HDL Cholesterol 83 03/23/2021 11:18 AM    LDL, calculated 84.2 03/23/2021 11:18 AM    Triglyceride 129 03/23/2021 11:18 AM    CHOL/HDL Ratio 2.3 03/23/2021 11:18 AM     Lab Results   Component Value Date/Time    Glucose (POC) 100 02/13/2022 03:23 PM    Glucose (POC) 90 02/13/2022 11:11 AM    Glucose (POC) 156 (H) 02/13/2022 07:23 AM    Glucose (POC) 171 (H) 02/12/2022 08:45 PM    Glucose (POC) 149 (H) 12/30/2021 12:14 PM     Lab Results Component Value Date/Time    Color DARK YELLOW 01/23/2014 06:54 PM    Appearance CLOUDY (A) 01/23/2014 06:54 PM    Specific gravity 1.025 01/23/2014 06:54 PM    pH (UA) 5.5 01/23/2014 06:54 PM    Protein 100 (A) 01/23/2014 06:54 PM    Glucose 250 (A) 01/23/2014 06:54 PM    Ketone TRACE (A) 01/23/2014 06:54 PM    Bilirubin LARGE (A) 01/23/2014 06:54 PM    Urobilinogen 1.0 01/23/2014 06:54 PM    Nitrites NEGATIVE  01/23/2014 06:54 PM    Leukocyte Esterase NEGATIVE  01/23/2014 06:54 PM    Epithelial cells FEW 01/23/2014 06:54 PM    Bacteria 3+ (A) 01/23/2014 06:54 PM    WBC 0-4 01/23/2014 06:54 PM    RBC 5-10 01/23/2014 06:54 PM         Medications Reviewed:     Current Facility-Administered Medications   Medication Dose Route Frequency    [START ON 2/14/2022] Vancomycin random level - due 2/14 with am labs. Thanks!    Other ONCE    iron sucrose (VENOFER) injection 200 mg  200 mg IntraVENous Q24H    [START ON 2/14/2022] mupirocin (BACTROBAN) 2 % ointment   Topical DAILY    alum-mag hydroxide-simeth (MYLANTA) oral suspension 30 mL  30 mL Oral Q4H PRN    acyclovir (ZOVIRAX) tablet 400 mg  400 mg Oral DAILY    aspirin delayed-release tablet 81 mg  81 mg Oral QHS    calcium carbonate (TUMS) chewable tablet 200 mg [elemental]  200 mg Oral PRN    furosemide (LASIX) tablet 40 mg  40 mg Oral DAILY    loperamide (IMODIUM) capsule 4 mg  4 mg Oral TID PRN    potassium chloride SR (KLOR-CON 10) tablet 10 mEq  10 mEq Oral DAILY    pregabalin (LYRICA) capsule 50 mg  50 mg Oral BID    sodium chloride (NS) flush 5-40 mL  5-40 mL IntraVENous Q8H    sodium chloride (NS) flush 5-40 mL  5-40 mL IntraVENous PRN    acetaminophen (TYLENOL) tablet 650 mg  650 mg Oral Q6H PRN    Or    acetaminophen (TYLENOL) suppository 650 mg  650 mg Rectal Q6H PRN    polyethylene glycol (MIRALAX) packet 17 g  17 g Oral DAILY PRN    ondansetron (ZOFRAN ODT) tablet 4 mg  4 mg Oral Q8H PRN    Or    ondansetron (ZOFRAN) injection 4 mg  4 mg IntraVENous Q6H PRN    heparin (porcine) injection 5,000 Units  5,000 Units SubCUTAneous Q8H    piperacillin-tazobactam (ZOSYN) 3.375 g in 0.9% sodium chloride (MBP/ADV) 100 mL MBP  3.375 g IntraVENous Q8H    insulin lispro (HUMALOG) injection   SubCUTAneous AC&HS    glucose chewable tablet 16 g  4 Tablet Oral PRN    glucagon (GLUCAGEN) injection 1 mg  1 mg IntraMUSCular PRN    dextrose 10 % infusion 0-250 mL  0-250 mL IntraVENous PRN    hydrALAZINE (APRESOLINE) 20 mg/mL injection 10 mg  10 mg IntraVENous Q6H PRN    oxyCODONE-acetaminophen (PERCOCET) 5-325 mg per tablet 1 Tablet  1 Tablet Oral Q6H PRN    Vancomycin- pharmacy to dose   Other Rx Dosing/Monitoring    vancomycin (VANCOCIN) 1,000 mg in 0.9% sodium chloride 250 mL (VIAL-MATE)  1,000 mg IntraVENous Q24H     ______________________________________________________________________  EXPECTED LENGTH OF STAY: - - -  ACTUAL LENGTH OF STAY:          1                 Christen Matt MD

## 2022-02-13 NOTE — PROGRESS NOTES
Pharmacist Note - Vancomycin Dosing    Consult provided for this 80 y.o. male for indication of R leg cellulitis. Antibiotic regimen(s): Vanc + Zosyn    Recent Labs     22  1353   WBC 8.4   CREA 1.05   BUN 14     Frequency of BMP: daily x 3  Height: 185 cm  Weight: 72.6 kg  Est CrCl: 50 ml/min  Temp (24hrs), Av.7 °F (36.5 °C), Min:97.4 °F (36.3 °C), Max:97.9 °F (36.6 °C)    The plan below is expected to result in a target range of AUC/ERICA 400-500    A 1000 mg dose was given in the ED today @3pm; to be followed by a maintenance dose of 1000 mg IV every 24 hours. Pharmacy to follow patient daily and order levels / make dose adjustments as appropriate. *Vancomycin has been dosed used Bayesian kinetics software to target an AUC/ERICA of 400-600, which provides adequate exposure for an assumed infection due to MRSA with an ERICA of 1 or less while reducing the risk of nephrotoxicity as seen with traditional trough based dosing goals.

## 2022-02-14 NOTE — CONSULTS
Podiatry Consult    Subjective:         Date of Consultation: February 14, 2022     Patient is a 80 y.o.  male who is being seen for a right toe ulcer and associated right foot cellulitis. He notes that the blister started last week and became infected. He was put on keflex, but it became worse. He wanted to pop the blister, but his daughter told him not to due to his current history of multiple myeloma. She made him an appointment with an appointment with a podiatrist in [facility], but he was hospitalized before he could make the appointment. He does have a history of neuropathy and is on Lyrica. Does relate some tingling and numbness to the right foot. He notes that he has noted tremendous improvement in the swelling and redness of the right foot since being admitted to the hospital. Has not had any difficult ambulating on the right foot. Notes that he has been able to get up and walk across his hospital room without any issues. Denies any f/c/n/v/d.     Patient Active Problem List    Diagnosis Date Noted    Cellulitis of right leg 02/12/2022    Pneumonia due to COVID-19 virus 12/29/2021    Personal history of atrial fibrillation 11/09/2020    Carotid stenosis, asymptomatic, right 05/29/2020    Hyperlipemia, mixed 09/27/2018    Aortic stenosis, moderate 04/10/2018    Mild concentric left ventricular hypertrophy (LVH) 04/10/2018    Chronic diarrhea 09/29/2017    Microalbuminuria 12/10/2015    Right inguinal hernia 11/02/2015    Left inguinal hernia 09/14/2015    Multiple myeloma ([facility] [address] 12/27/2012    Hypercalcemia 04/25/2012    S/P AAA repair 2/9/2012 02/22/2012    Pre-diabetes 09/07/2010    Sinus tachycardia 09/07/2010    H/O Heavy Alcohol Use 09/07/2010    Vitamin B12 deficiency 09/07/2010    Hyperhomocysteinemia ([address] 09/07/2010    Mild Allergic Rhinitis 09/07/2010    History of skin cancer 09/07/2010    HTN (hypertension) 04/06/2010    H/O Carotid Stenosis 04/06/2010    GERD (gastroesophageal reflux disease) 04/06/2010     Past Medical History:   Diagnosis Date    AAA (abdominal aortic aneurysm) ([facility] [address] 1/6/2012    Abnormal serum protein electrophoresis 4/25/2012    Anemia 4/6/2010    Anemia 1/5/2012    Anemia due to GI bleed 2007 4/6/2010    Aneurysm ([address]     AAA    Arrhythmia     ATRIAL FIB PT STATES HE DOESN'T HAVE    Arthritis     Atrial fibrillation 1/2012 2/22/2012    Bilateral Carotid Bruits, L>R 9/7/2010    Bone cancer ([address]     Borderline diabetes mellitus 4/6/2010    Cancer ([address]     SKIN MELANOMA ON BACK     Carotid stenosis 4/6/2010    Carotid stenosis     Chronic pain     Diabetes mellitus, type 2 ([address] 9/7/2010    NO MEDS    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    ETOH abuse 4/6/2010    GERD (gastroesophageal reflux disease) 4/6/2010    GI bleed 4/6/2010    GI bleed, duodenitis 2007 4/6/2010    H/O Heavy Alcohol Use 9/7/2010    History of skin cancer 9/7/2010    HTN (hypertension) 4/6/2010    Hypercalcemia 4/25/2012    Hyperhomocysteinemia ([facility] [address] 9/7/2010    Hypertriglyceridemia 9/7/2010    Left inguinal hernia 9/14/2015    Lipids blood increased 4/6/2010    Microalbuminuria 12/10/2015    Mild Allergic Rhinitis 9/7/2010    Pre-diabetes 9/7/2010    2005;  Optho Dr Stephon Leblanc Right inguinal hernia 11/2/2015    S/P AAA repair 2/9/2012 2/22/2012    Sinus tachycardia 9/7/2010    Tachycardia 4/6/2010    Vitamin B12 deficiency 9/7/2010      Past Surgical History:   Procedure Laterality Date    COLONOSCOPY  8/2007    Normal; Dr Danae Charles EGD  8/2007    small hiatal hernia, mild duodenitis; Dr Danae Charles HX AAA REPAIR  2/9/2012    Dr Rubén Arteaga    HX CATARACT REMOVAL      both eyes    HX CHOLECYSTECTOMY  1-29-14    [facility]- Dr. Rolando Phelan Left 9/24/15    left indirect inguinal by Dr. Timmy Ackerman Right 11/2/15 inguinal w/ mesh by Dr. Tisha Iverson Bilateral 10/2015 And 2015    Inguinal    BLUE DOPPLER  3/26/2012    BLUE Echo; + clot      Family History   Problem Relation Age of Onset    Heart Disease Mother          age 80    Cancer Father          brain tumor age 80    Heart Disease Father     Substance Abuse Daughter          cocaine OD age 43 in 200    Other Son          PE/DVT age 43    Heart Disease Brother    Danis [address] Other Daughter         alive and well    Anesth Problems Neg Hx       Social History     Tobacco Use    Smoking status: Former Smoker     Packs/day: 1.50     Years: 55.00     Pack years: 82.50     Quit date: 2001     Years since quittin.1    Smokeless tobacco: Never Used    Tobacco comment: used to smoke 1 / ppd x ~ 40 yrs   Substance Use Topics    Alcohol use:  Yes     Alcohol/week: 14.0 standard drinks     Types: 14 Glasses of wine per week     Comment: 14     Current Facility-Administered Medications   Medication Dose Route Frequency Provider Last Rate Last Admin    iron sucrose (VENOFER) injection 200 mg  200 mg IntraVENous Q24H Guillermina Wells MD   200 mg at 22 1809    mupirocin (BACTROBAN) 2 % ointment   Topical DAILY Guillermina Wells MD   Given at 22 0900    alum-mag hydroxide-simeth (MYLANTA) oral suspension 30 mL  30 mL Oral Q4H PRN Guillermina Wells MD        acyclovir (ZOVIRAX) tablet 400 mg  400 mg Oral DAILY Vickie Bay MD   400 mg at 22 9542    aspirin delayed-release tablet 81 mg  81 mg Oral QHS Vickie Bay MD   81 mg at 22 2135    calcium carbonate (TUMS) chewable tablet 200 mg [elemental]  200 mg Oral PRN Rishi Wall MD        [Held by provider] furosemide (LASIX) tablet 40 mg  40 mg Oral DAILY Vickie Bay MD   40 mg at 22 0947    loperamide (IMODIUM) capsule 4 mg  4 mg Oral TID PRN Rishi Wall MD        pregabalin (LYRICA) capsule 50 mg 50 mg Oral BID Vickie Crespo MD   50 mg at 02/14/22 0928    sodium chloride (NS) flush 5-40 mL  5-40 mL IntraVENous Q8H Vickie Bay MD   10 mL at 02/14/22 0658    sodium chloride (NS) flush 5-40 mL  5-40 mL IntraVENous PRN Vickie Bay MD        acetaminophen (TYLENOL) tablet 650 mg  650 mg Oral Q6H PRN Vickie Bay MD        Or    acetaminophen (TYLENOL) suppository 650 mg  650 mg Rectal Q6H PRN Vickie Bay MD        polyethylene glycol (MIRALAX) packet 17 g  17 g Oral DAILY PRN Vickie Bay MD        ondansetron (ZOFRAN ODT) tablet 4 mg  4 mg Oral Q8H PRN Vickie Bay MD        Or    ondansetron (ZOFRAN) injection 4 mg  4 mg IntraVENous Q6H PRN Vickie Bay MD        heparin (porcine) injection 5,000 Units  5,000 Units SubCUTAneous Q8H Vickie Bay MD   5,000 Units at 02/14/22 0657    piperacillin-tazobactam (ZOSYN) 3.375 g in 0.9% sodium chloride (MBP/ADV) 100 mL MBP  3.375 g IntraVENous Q8H Vickie Bay MD 25 mL/hr at 02/14/22 1218 3.375 g at 02/14/22 1218    insulin lispro (HUMALOG) injection   SubCUTAneous AC&HS Vickie Bay MD   2 Units at 02/13/22 0730    glucose chewable tablet 16 g  4 Tablet Oral PRN Florencio Choudhury MD        glucagon (GLUCAGEN) injection 1 mg  1 mg IntraMUSCular PRN Vickie Bay MD        dextrose 10 % infusion 0-250 mL  0-250 mL IntraVENous PRN Vickie Bay MD        hydrALAZINE (APRESOLINE) 20 mg/mL injection 10 mg  10 mg IntraVENous Q6H PRN Vickie Bay MD        oxyCODONE-acetaminophen (PERCOCET) 5-325 mg per tablet 1 Tablet  1 Tablet Oral Q6H PRN Vickie Bay MD        Vancomycin- pharmacy to dose   Other Rx Dosing/Monitoring Vickie Bay MD        vancomycin (VANCOCIN) 1,000 mg in 0.9% sodium chloride 250 mL (VIAL-MATE)  1,000 mg IntraVENous Q24H Vickie Bay  mL/hr at 02/13/22 1810 1,000 mg at 02/13/22 1810      Allergies   Allergen Reactions    Codeine Nausea and Vomiting     Blurred vision, felt faint    Contrast Agent [Iodine] Hives     Needs premedication w/ Benadryl prn (since 2012 reaction)    Dexamethasone Other (comments)     \"Deathly ill\"    Fish Oil Nausea Only    Glipizide Other (comments)     DRASTIC DROP IN BLOOD GLUCOSE, fasting    Metformin Diarrhea    Niacin Other (comments)     Flushing    Norvasc [Amlodipine] Other (comments)     Leg edema    Optiray 350 [Ioversol] Hives     He does not know what this is        Review of Systems:  A comprehensive review of systems was negative except for that written in the History of Present Illness. Objective:     Patient Vitals for the past 8 hrs:   BP Temp Pulse Resp SpO2   22 0735 (!) 83/43 98.3 °F (36.8 °C) 67 14 92 %     Temp (24hrs), Av.4 °F (36.9 °C), Min:98.1 °F (36.7 °C), Max:98.6 °F (37 °C)      Physical Exam:    Right foot exam: Nonpalpable pulses secondary to edema. Significant pitting edema to the right foot and ankle. Petechiae noted to dorsum of the foot, as well as a superficial wound to the dorsum of the right second toe with breakdown or the epidermis and intact dermis underneath. There is no significant drainage or malodor. Capillary refill time is less than 3 seconds to all digits. No prasad erythema and foot is not warm to touch at this time. No palpable areas of fluctuance to the dorsum of the foot at this time. Mild tenderness to the dorsum of the foot upon palpation.             Lab Review:   Recent Results (from the past 24 hour(s))   GLUCOSE, POC    Collection Time: 22  3:23 PM   Result Value Ref Range    Glucose (POC) 100 65 - 117 mg/dL    Performed by Derek Gamez (CON)    GLUCOSE, POC    Collection Time: 22  9:28 PM   Result Value Ref Range    Glucose (POC) 131 (H) 65 - 117 mg/dL    Performed by Cyril Hamlin    METABOLIC PANEL, BASIC    Collection Time: 22  4:55 AM   Result Value Ref Range    Sodium 140 136 - 145 mmol/L    Potassium 3.3 (L) 3.5 - 5.1 mmol/L    Chloride 109 (H) 97 - 108 mmol/L    CO2 28 21 - 32 mmol/L    Anion gap 3 (L) 5 - 15 mmol/L    Glucose 88 65 - 100 mg/dL    BUN 12 6 - 20 MG/DL    Creatinine 1.20 0.70 - 1.30 MG/DL    BUN/Creatinine ratio 10 (L) 12 - 20      GFR est AA >60 >60 ml/min/1.73m2    GFR est non-AA 57 (L) >60 ml/min/1.73m2    Calcium 8.5 8.5 - 10.1 MG/DL   VANCOMYCIN, RANDOM    Collection Time: 02/14/22  4:55 AM   Result Value Ref Range    Vancomycin, random 12.9 UG/ML   GLUCOSE, POC    Collection Time: 02/14/22  6:22 AM   Result Value Ref Range    Glucose (POC) 91 65 - 117 mg/dL    Performed by Ammon Bence    GLUCOSE, POC    Collection Time: 02/14/22 12:05 PM   Result Value Ref Range    Glucose (POC) 109 65 - 117 mg/dL    Performed by Gopi Whiteside        Impression:   Right 2nd toe infection with associated cellulitis    Recommendation:     -X-ray of right foot pending as patient having some tenderness to the distal forefoot  -Patient appears to be improving with IV antibiotics with his history of multiple myeloma. Based on his physical exam, there does not appear to be any suspicion for any underlying deeper infection at this time, especially . Continue local wound care and antibiotics. -Redressed foot with mupirocin, xeroform, dsd.  -Will continue to follow, and reevaluate progress of appearance of foot tomorrow.

## 2022-02-14 NOTE — PROGRESS NOTES
Transition of Care: TBD; possibly home with HH/SN (wound care likely-no order yet) and f/u with specialist/pcp vs Home with f/u with specialist/pcp    Transport Plan: TBD; likely in car with family    RUR: 15%    DX: cellulitis of right leg    [place]- [phone]    Discharge pending:  -pending podiatry consult and recommendations  -patient continues on IV antibiotics  -patient continues with wound care  -pending medical progress    Medicare pt has received, reviewed, and signed 1st IM letter informing them of their right to appeal the discharge. Signed copy has been placed on pt bedside chart. 1515:this CM met with pleasant patient at bedside; he is alert and oriented x 4; patient lives at stated address with his wife whom he cares for; she has dementia; their supportive daughter lives nearby; it is a 2 level home; patient has a walker, cane, tub chair; he was using home O2 with [employer] temporarily after his last hospitalization in Dec. 2021; he is not using the home O2 anymore; he states he can still drive but has not in a while; his daughter drives him around; his pcp is Eva Woody NP; patient inquired about private duty caregiver list; this list was provided to him; he is looking for extra help in the home for his wife; patient has a history of HH with [facility]; patient confirms his insurance is [facility]    Reason for Admission:   Cellulitis of right leg                    RUR Score:                15%  PCP: First and Last name:   Tirso Edmonds NP     Name of Practice:    Are you a current patient: Yes/No: yes   Approximate date of last visit: fall 2021   Can you participate in a virtual visit if needed: unknown    Do you (patient/family) have any concerns for transition/discharge?                Patient asking for provate duty help for his wife since he is the primary caregiver for her; resource list given to him    Plan for utilizing home health:   Likely will need for wound care; no order yet    Current Advanced Directive/Advance Care Plan:  Full Code      Healthcare Decision Maker: daughter  Click here to complete [id number] including selection of the Healthcare Decision Maker Relationship (ie \"Primary\")              Transition of Care Plan:      TBD; possibly home with HH/SN vs home with f/u with specialist/pcp; transport in car with family    Care Management Interventions  PCP Verified by CM:  Yes Carson Butt NP)  Physical Therapy Consult: No  Occupational Therapy Consult: No  Speech Therapy Consult: No  Support Systems: Child(manuela),Spouse/Significant Other  Discharge Location  Patient Expects to be Discharged to[de-identified] Other: (TBD; likely home with possible HH and f/u with specialist/pcp; transport in car with family)     CM following  Selam Layton RN, CRM

## 2022-02-14 NOTE — PROGRESS NOTES
Pharmacist Note - Vancomycin Dosing  Therapy day 3  Indication: R leg cellulitis  Current regimen: 1000 mg IV every 24 hours    Recent Labs     02/14/22  0455 02/13/22  0237 02/12/22  1353   WBC  --  7.3 8.4   CREA 1.20 0.93 1.05   BUN 12 13 14       A random vancomycin level of 12.9 mcg/mL was obtained and from this level, the patient's AUC24 is calculated to be 499 with the current regimen. Goal target range AUC/ERICA 400-500      Plan: Continue current regimen. Of note, SCr trending up. Will continue to monitor closely. Pharmacy will continue to monitor this patient daily for changes in clinical status and renal function. *Random vancomycin levels are used to calculate AUC/ERICA, this level should not be interpreted as a trough. Vancomycin has been dosed using Bayesian kinetics software to target an AUC24:ERICA of 400-600, which provides adequate exposure for as assumed infection due to MRSA with an ERICA of 1 or less while reducing the risk of nephrotoxicity as seen with traditional trough based dosing goals.

## 2022-02-15 NOTE — PROGRESS NOTES
Progress Note    Patient: Elvi Ortega MRN: [id number]  SSN: [id number]    YOB: 1934  Age: 80 y.o. Sex: male      Admit Date: 2/12/2022  * No surgery found *     Procedure:       Subjective:     Patient seen resting quiet and comfortably and no apparent distress. Notes that the right foot feels much better than yesterday    Objective:     Visit Vitals  /68   Pulse 93   Temp 97.7 °F (36.5 °C)   Resp 18   Ht 6' 1\" (1.854 m)   Wt 72.6 kg (160 lb)   SpO2 90%   BMI 21.11 kg/m²        Physical Exam:  Right foot exam: Neurovascular status at baseline. Markedly decrease in swelling to the right foot and ankle. Decrease in the intensity of the petechiae. Superficial ulcer of the right 2nd toe remains stable. Decreased tenderness to the right forefoot. Labs/Radiology Review: images and reports reviewed    Assessment:     Hospital Problems  Date Reviewed: 2/12/2022          Codes Class Noted POA    * (Principal) Cellulitis of right leg ICD-10-CM: L03.115  ICD-9-CM: 682.6  2/12/2022 Yes              Plan/Recommendations/Medical Decision Making:     -X-ray negative for any significant findings.  -Digit redressed with mupirocin, dsd. ACE wrapped applied with light compression to aid with resolution with swelling.  -Patient continues to respond well to IV antibiotics. No plan for surgical intervention at this time. Will plan to treat patient with local wound care and eventual discharge once swelling of RLE is resolved.

## 2022-02-15 NOTE — PROGRESS NOTES
[facility]  Hospitalist Group                                                                                          Hospitalist Progress Note  Cheli Cortes MD  Answering service: [phone] from in house phone        Date of Service:  2022  NAME:  Melva Severe  :  1934  MRN:  [id number]      Admission Summary: This is an 70-year-old man with a past medical history significant for type 2 diabetes, multiple myeloma, hypertension, dyslipidemia, peripheral neuropathy, status post abdominal aortic aneurysm repair, who was in his usual state of health until several days ago when the patient developed pain and swelling involving the right foot. The patient initially developed an ulcer involving the right big toe. The swelling started from the big toe to the rest of the right foot. The swelling is associated with redness and pain. The pain is constant, sharp pain, severe in severity, worse with movement involving the right foot. No known relieving factors. The patient was seen at [facility] yesterday. The patient was diagnosed with cellulitis of the right foot. The patient came to the emergency room today because of worsening symptoms. The patient denies fever, rigors, or chills. He was last admitted to this hospital from 2021 to 2021. The patient was admitted and treated for acute respiratory failure secondary to COVID-19 pneumonia. Interval history / Subjective:   Patient report continued pain in foot but improved     Assessment & Plan:      1. Cellulitis, right root and ulcer on right 2nd toe-   Cont IV abx, wound care and podiatry consult today for eval and tx recs  2. Type 2 diabetes- accuchecks and ss inaulin. 3.  Multiple myeloma- cell counts low/stable. 4.  Hypertension- monitor- low today-  holding home BP  meds. 5.  Dyslipidemia- cont home statin meds  6. Peripheral neuropathy- lyrica.   7.  Anemia of iron def and chronic dz- IV iron ordered, monitor Hb.  8.  Hypokalemia- replete- likely related to chronic diarrhea  9. Chronic diarrhea- monitor for now     Code status: Full  Prophylaxis: Heparin SQ  Care Plan discussed with: patient  Anticipated Disposition: home w home health [facility] Problems  Date Reviewed: 2/12/2022          Codes Class Noted POA    * (Principal) Cellulitis of right leg ICD-10-CM: L03.115  ICD-9-CM: 682.6  2/12/2022 Yes                Review of Systems:   A comprehensive review of systems was negative except for that written in the HPI. Vital Signs:    Last 24hrs VS reviewed since prior progress note. Most recent are:  Visit Vitals  BP (!) 109/48   Pulse 74   Temp 98.6 °F (37 °C)   Resp 18   Ht 6' 1\" (1.854 m)   Wt 72.6 kg (160 lb)   SpO2 92%   BMI 21.11 kg/m²     Patient Vitals for the past 24 hrs:   Temp Pulse Resp BP SpO2   02/14/22 1655 98.6 °F (37 °C) 74 18 (!) 109/48 --   02/14/22 0735 98.3 °F (36.8 °C) 67 14 (!) 83/43 92 %   02/14/22 0009 98.4 °F (36.9 °C) 75 16 126/70 92 %       No intake or output data in the 24 hours ending 02/14/22 2028     Physical Examination:     I had a face to face encounter with this patient and independently examined them on 2/14/2022 as outlined below:        Constitutional:  No acute distress, cooperative, pleasant    ENT:  Oral mucosa moist, oropharynx benign. Resp:  CTA bilaterally. No wheezing/rhonchi/rales. No accessory muscle use. CV:  Regular rhythm, normal rate, no murmurs, gallops, rubs    GI:  Soft, non distended, non tender. n     Musculoskeletal:  mild R LE and foot edema, warm,     Neurologic:  Moves all extremities.   AAOx3, CN II-XII reviewed   Skin: infection of Right foot              Data Review:    Review and/or order of clinical lab test  Review and/or order of tests in the radiology section of CPT  Review and/or order of tests in the medicine section of CPT      Labs:     Recent Labs     02/13/22  0237 02/12/22  1353   WBC 7.3 8.4   HGB 9.4* 10.4*   HCT 30.6* 33.8*    177     Recent Labs     02/14/22  0455 02/13/22  0237 02/12/22  1353    142 138   K 3.3* 3.3* 3.3*   * 113* 107   CO2 28 28 26   BUN 12 13 14   CREA 1.20 0.93 1.05   GLU 88 130* 128*   CA 8.5 8.3* 8.5   MG  --  1.7  --    PHOS  --  2.4*  --      Recent Labs     02/13/22  0237 02/12/22  1353   ALT 14 16   AP 64 74   TBILI 0.4 0.4   TP 5.1* 5.9*   ALB 2.4* 2.8*   GLOB 2.7 3.1     No results for input(s): INR, PTP, APTT, INREXT, INREXT in the last 72 hours. Recent Labs     02/13/22 0237   TIBC 254   PSAT 8*   FERR 51      Lab Results   Component Value Date/Time    Folate 81.7 (H) 02/13/2022 02:38 AM      No results for input(s): PH, PCO2, PO2 in the last 72 hours. No results for input(s): CPK, CKNDX, TROIQ in the last 72 hours.     No lab exists for component: CPKMB  Lab Results   Component Value Date/Time    Cholesterol, total 193 03/23/2021 11:18 AM    HDL Cholesterol 83 03/23/2021 11:18 AM    LDL, calculated 84.2 03/23/2021 11:18 AM    Triglyceride 129 03/23/2021 11:18 AM    CHOL/HDL Ratio 2.3 03/23/2021 11:18 AM     Lab Results   Component Value Date/Time    Glucose (POC) 111 02/14/2022 04:54 PM    Glucose (POC) 109 02/14/2022 12:05 PM    Glucose (POC) 91 02/14/2022 06:22 AM    Glucose (POC) 131 (H) 02/13/2022 09:28 PM    Glucose (POC) 100 02/13/2022 03:23 PM     Lab Results   Component Value Date/Time    Color DARK YELLOW 01/23/2014 06:54 PM    Appearance CLOUDY (A) 01/23/2014 06:54 PM    Specific gravity 1.025 01/23/2014 06:54 PM    pH (UA) 5.5 01/23/2014 06:54 PM    Protein 100 (A) 01/23/2014 06:54 PM    Glucose 250 (A) 01/23/2014 06:54 PM    Ketone TRACE (A) 01/23/2014 06:54 PM    Bilirubin LARGE (A) 01/23/2014 06:54 PM    Urobilinogen 1.0 01/23/2014 06:54 PM    Nitrites NEGATIVE  01/23/2014 06:54 PM    Leukocyte Esterase NEGATIVE  01/23/2014 06:54 PM    Epithelial cells FEW 01/23/2014 06:54 PM    Bacteria 3+ (A) 01/23/2014 06:54 PM    WBC 0-4 01/23/2014 06:54 PM RBC 5-10 01/23/2014 06:54 PM         Medications Reviewed:     Current Facility-Administered Medications   Medication Dose Route Frequency    iron sucrose (VENOFER) injection 200 mg  200 mg IntraVENous Q24H    mupirocin (BACTROBAN) 2 % ointment   Topical DAILY    alum-mag hydroxide-simeth (MYLANTA) oral suspension 30 mL  30 mL Oral Q4H PRN    acyclovir (ZOVIRAX) tablet 400 mg  400 mg Oral DAILY    aspirin delayed-release tablet 81 mg  81 mg Oral QHS    calcium carbonate (TUMS) chewable tablet 200 mg [elemental]  200 mg Oral PRN    [Held by provider] furosemide (LASIX) tablet 40 mg  40 mg Oral DAILY    loperamide (IMODIUM) capsule 4 mg  4 mg Oral TID PRN    pregabalin (LYRICA) capsule 50 mg  50 mg Oral BID    sodium chloride (NS) flush 5-40 mL  5-40 mL IntraVENous Q8H    sodium chloride (NS) flush 5-40 mL  5-40 mL IntraVENous PRN    acetaminophen (TYLENOL) tablet 650 mg  650 mg Oral Q6H PRN    Or    acetaminophen (TYLENOL) suppository 650 mg  650 mg Rectal Q6H PRN    polyethylene glycol (MIRALAX) packet 17 g  17 g Oral DAILY PRN    ondansetron (ZOFRAN ODT) tablet 4 mg  4 mg Oral Q8H PRN    Or    ondansetron (ZOFRAN) injection 4 mg  4 mg IntraVENous Q6H PRN    heparin (porcine) injection 5,000 Units  5,000 Units SubCUTAneous Q8H    piperacillin-tazobactam (ZOSYN) 3.375 g in 0.9% sodium chloride (MBP/ADV) 100 mL MBP  3.375 g IntraVENous Q8H    insulin lispro (HUMALOG) injection   SubCUTAneous AC&HS    glucose chewable tablet 16 g  4 Tablet Oral PRN    glucagon (GLUCAGEN) injection 1 mg  1 mg IntraMUSCular PRN    dextrose 10 % infusion 0-250 mL  0-250 mL IntraVENous PRN    hydrALAZINE (APRESOLINE) 20 mg/mL injection 10 mg  10 mg IntraVENous Q6H PRN    oxyCODONE-acetaminophen (PERCOCET) 5-325 mg per tablet 1 Tablet  1 Tablet Oral Q6H PRN    Vancomycin- pharmacy to dose   Other Rx Dosing/Monitoring    vancomycin (VANCOCIN) 1,000 mg in 0.9% sodium chloride 250 mL (VIAL-MATE)  1,000 mg IntraVENous Q24H     ______________________________________________________________________  EXPECTED LENGTH OF STAY: 3d 4h  ACTUAL LENGTH OF STAY:          2                 Santiagoma Six, MD

## 2022-02-16 NOTE — DISCHARGE SUMMARY
Discharge Summary       PATIENT ID: Bret Gupta  MRN: [id number]   YOB: 1934    DATE OF ADMISSION: 2/12/2022  1:36 PM    DATE OF DISCHARGE: 2/16/22   PRIMARY CARE PROVIDER: Manuel Holman NP     ATTENDING PHYSICIAN: Ashley Jackson  DISCHARGING PROVIDER: Mikel Richardson MD    To contact this individual call [phone] and ask the  to page. If unavailable ask to be transferred the Adult Hospitalist Department. CONSULTATIONS: IP CONSULT TO PODIATRY    PROCEDURES/SURGERIES: * No surgery found *    DISCHARGE DIAGNOSES:   SEE BELOW  ADMISSION SUMMARY :   This is an 29-year-old man with a past medical history significant for type 2 diabetes, multiple myeloma, hypertension, dyslipidemia, peripheral neuropathy, status post abdominal aortic aneurysm repair, who was in his usual state of health until several days ago when the patient developed pain and swelling involving the right foot.  The patient initially developed an ulcer involving the right big toe.  The swelling started from the big toe to the rest of the right foot.  The swelling is associated with redness and pain.  The pain is constant, sharp pain, severe in severity, worse with movement involving the right foot.  No known relieving factors.  The patient was seen at [facility] yesterday.  The patient was diagnosed with cellulitis of the right foot.  The patient came to the emergency room today because of worsening symptoms.  The patient denies fever, rigors, or chills.  He was last admitted to this hospital from 12/29/2021 to 12/30/2021.  The patient was admitted and treated for acute respiratory failure secondary to COVID-19 pneumonia. HOSPITAL COURSE: DISCHARGE DIAGNOSES / PLAN:      1.  Cellulitis, right root and ulcer on right 2nd toe-   Cont IV abx, wound care   podiatry consulted - and recommendations noted  2.  Type 2 diabetes- accuchecks and ss insulin. Stable glucose levels  3.  Multiple myeloma- cell counts low/stable.   4.  Hypertension- monitor- low today-    olding home BP  Med/diuretic. 5.  Dyslipidemia- cont home statin meds  6.  Peripheral neuropathy- stable on lyrica. 7.  Anemia of iron def and chronic dz- IV iron ordered, monitor Hb. 8.  Hypokalemia- replete- likely related to chronic diarrhea  9. Chronic diarrhea- monitor for now      PENDING TEST RESULTS:   At the time of discharge the following test results are still pending:none     ADDITIONAL CARE RECOMMENDATIONS:   Due to low blood pressure and mildly elevated kidney function labs - hold furosemide and monitor blood pressure before restarting    Due to anemia and low iron levels you were given IV iron this admission and will be discharged on oral iron supplements    Wound care to right foot and 2nd toe- keep clean and dry  Cover wound daily with bactroban antibiotic ointment and either leave open to air or cover with sterile gauze  Keep right leg elevated to reduce swelling\  Continue taking antibiotics as directed  Follow up with Dr Radha Rodriguez in 7-14 days  [address]  [address] [place] [address]  [phone]    Follow up with your hematologist / oncologist regarding blood clots found in the superficial veins in your right leg regarding risk vs benefit of taking anticoagulation (blood thinners) for this issue  ( 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 The thrombus suggests a more acute than chronic process.)      NOTIFY YOUR PHYSICIAN FOR ANY OF THE FOLLOWING:   Fever over 101 degrees for 24 hours. Chest pain, shortness of breath, fever, chills, nausea, vomiting, diarrhea, change in mentation, falling, weakness, bleeding. Severe pain or pain not relieved by medications, as well as any other signs or symptoms that you may have questions about.     FOLLOW UP APPOINTMENTS:    Follow-up Information     Follow up With Specialties Details Why Contact Info    Michael Berman DPM Foot and Ankle Surgery Schedule an appointment as soon as possible for a visit 7-10 days [address]  [address]  [place] 7 [phone]      Chana Edmonds NP Nurse Practitioner   [address] [phone]           DIET: Diabetic Diet    ACTIVITY: Activity as tolerated    EQUIPMENT needed: NONE    DISCHARGE MEDICATIONS:  Current Discharge Medication List      START taking these medications    Details   mupirocin (BACTROBAN) 2 % ointment Apply to open wound on toe daily as instructed  Qty: 22 g, Refills: 2  Start date: 2/16/2022      cephALEXin (Keflex) 500 mg capsule Take 1 Capsule by mouth four (4) times daily for 6 days. Qty: 24 Capsule, Refills: 0  Start date: 2/16/2022, End date: 2/22/2022      ferrous sulfate 325 mg (65 mg iron) tablet Take 1 Tablet by mouth Daily (before breakfast). Indications: anemia from inadequate iron  Qty: 30 Tablet, Refills: 3  Start date: 2/16/2022         CONTINUE these medications which have CHANGED    Details   furosemide (LASIX) 20 mg tablet Take 1 Tablet by mouth two (2) times a day. Hold for low blood pressure  Qty: 30 Tablet, Refills: 0  Start date: 2/16/2022         CONTINUE these medications which have NOT CHANGED    Details   calcium carbonate (TUMS) 200 mg calcium (500 mg) chew Take 1 Tablet by mouth as needed. potassium chloride (KLOR-CON M10) 10 mEq tablet TAKE 1 TABLET BY MOUTH DAILY WITH LASIX  Qty: 90 Tablet, Refills: 0      folic acid-vit F1-GHP T32 (Folbic) 2.5-25-2 mg tablet Take 1 Tablet by mouth daily. Qty: 90 Tablet, Refills: 3      pregabalin (LYRICA) 50 mg capsule Take 50 mg by mouth two (2) times a day. aspirin delayed-release 81 mg tablet Take 81 mg by mouth nightly. hydroxypropyl methylcellulose (GENTEAL MILD) 0.2 % ophthalmic solution Administer 2 Drops to both eyes as needed. loperamide (IMODIUM A-D) 2 mg tablet Take 4 mg by mouth three (3) times daily as needed.       acyclovir (ZOVIRAX) 400 mg tablet TK 1 T PO QD  Refills: 6 multivitamin (ONE A DAY) tablet Take 1 Tab by mouth daily. DISPOSITION:    Home With:   OT  PT  HH  RN       Long term SNF/Inpatient Rehab   x Independent  living    Hospice    Other:       PATIENT CONDITION AT DISCHARGE:     Functional status    Poor     Deconditioned    X Independent      Cognition   X  Lucid     Forgetful     Dementia      Catheters/lines (plus indication)    Draper     PICC     PEG    X None      Code status   X  Full code     DNR      PHYSICAL EXAMINATION AT DISCHARGE:  Patient Vitals for the past 24 hrs:   Temp Pulse Resp BP SpO2   02/16/22 1248 97.5 °F (36.4 °C) 72 18 (!) 105/48 94 %   02/16/22 0805 98.1 °F (36.7 °C) 65 18 (!) 102/52 92 %   02/15/22 2035 98.4 °F (36.9 °C) 66 18 117/65 90 %        Constitutional:  No acute distress, cooperative, pleasant    ENT:  Oral mucosa moist, oropharynx benign. Resp:  CTA bilaterally. No wheezing/rhonchi/rales. No accessory muscle use. CV:  Regular rhythm, normal rate, no murmurs, gallops, rubs    GI:  Soft, non distended, non tender. n     Musculoskeletal:  mild R LE and foot edema, warm,     Neurologic:  Moves all extremities. AAOx3, CN II-XII reviewed   Skin: infection of Right foot           Data Review:    Review and/or order of clinical lab test  Review and/or order of tests in the radiology section of CPT  Review and/or order of tests in the medicine section of CPT     Results     Procedure Component Value Units Date/Time    CULTURE, BLOOD, PAIRED [[id number]] Collected: 02/12/22 1432    Order Status: Completed Specimen: Blood Updated: 02/16/22 0642     Special Requests: NO SPECIAL REQUESTS        Culture result: NO GROWTH 4 DAYS           cellulitis right foot.     COMPARISON: None.     FINDINGS: Three views of the right foot. The bones are diffusely osteopenic. No  focal osteopenia or cortical erosion to suggest osteomyelitis. The site of  cellulitis is not provided.     IMPRESSION  Osteopenia. Shahzad So     vENOUS dOPPLERS Interpretation Summary    · Consistent with 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 The thrombus suggests a more acute than chronic process. · No further thrombus seen in the remaining segments. Lower Extremity Venous Findings      Right Lower Venous    Acute occlusive thrombus present in the right gastrocnemius vein. The common femoral, great saphenous, profunda femoral, femoral, popliteal, posterior tibial, peroneal and saphenous femoral junction vein(s) were imaged in the transverse and longitudinal planes. The vessels showed normal color filling and compressibility. Doppler interrogation of the veins showed phasic and spontaneous flow. Left Lower Venous    For comparison purposes, the left common femoral vein was briefly interrogated. These vein demonstrates normal color filing and compressibility. Doppler flow was phasic and spontaneous.            Labs:          Recent Labs     02/13/22 0237   WBC 7.3   HGB 9.4*   HCT 30.6*               Recent Labs     02/15/22  0445 02/14/22  0455 02/13/22 0237    140 142   K 3.5 3.3* 3.3*    109* 113*   CO2 32 28 28   BUN 11 12 13   CREA 1.10 1.20 0.93   * 88 130*   CA 9.9 8.5 8.3*   MG  --   --  1.7   PHOS  --   --  2.4*          Recent Labs     02/13/22 0237   ALT 14   AP 64   TBILI 0.4   TP 5.1*   ALB 2.4*   GLOB 2.7      No results for input(s): INR, PTP, APTT, INREXT, INREXT in the last 72 hours. Recent Labs     02/13/22 0237   TIBC 254   PSAT 8*   FERR 51            Lab Results   Component Value Date/Time     Folate 81.7 (H) 02/13/2022 02:38 AM      No results for input(s): PH, PCO2, PO2 in the last 72 hours.   No results for input(s): CPK, CKNDX, TROIQ in the last 72 hours.     No lab exists for component: CPKMB        Lab Results   Component Value Date/Time     Cholesterol, total 193 03/23/2021 11:18 AM     HDL Cholesterol 83 03/23/2021 11:18 AM     LDL, calculated 84.2 03/23/2021 11:18 AM     Triglyceride 129 03/23/2021 11:18 AM     CHOL/HDL Ratio 2.3 03/23/2021 11:18 AM            Lab Results   Component Value Date/Time     Glucose (POC) 128 (H) 02/15/2022 09:32 PM     Glucose (POC) 127 (H) 02/15/2022 05:10 PM     Glucose (POC) 120 (H) 02/15/2022 11:01 AM     Glucose (POC) 130 (H) 02/15/2022 07:02 AM     Glucose (POC) 112 02/15/2022 12:22 AM            Lab Results   Component Value Date/Time     Color DARK YELLOW 01/23/2014 06:54 PM     Appearance CLOUDY (A) 01/23/2014 06:54 PM     Specific gravity 1.025 01/23/2014 06:54 PM     pH (UA) 5.5 01/23/2014 06:54 PM     Protein 100 (A) 01/23/2014 06:54 PM     Glucose 250 (A) 01/23/2014 06:54 PM     Ketone TRACE (A) 01/23/2014 06:54 PM     Bilirubin LARGE (A) 01/23/2014 06:54 PM     Urobilinogen 1.0 01/23/2014 06:54 PM     Nitrites NEGATIVE  01/23/2014 06:54 PM     Leukocyte Esterase NEGATIVE  01/23/2014 06:54 PM     Epithelial cells FEW 01/23/2014 06:54 PM     Bacteria 3+ (A) 01/23/2014 06:54 PM     WBC 0-4 01/23/2014 06:54 PM     RBC 5-10 01/23/2014 06:54 PM           CHRONIC MEDICAL DIAGNOSES:  Problem List as of 2/16/2022 Date Reviewed: 2/12/2022          Codes Class Noted - Resolved    * (Principal) Cellulitis of right leg ICD-10-CM: L03.115  ICD-9-CM: 682.6  2/12/2022 - Present        Pneumonia due to COVID-19 virus ICD-10-CM: U07.1, J12.82  ICD-9-CM: 480.8, 079.89  12/29/2021 - Present        Personal history of atrial fibrillation ICD-10-CM: Z86.79  ICD-9-CM: V12.59  11/9/2020 - Present        Carotid stenosis, asymptomatic, right ICD-10-CM: I65.21  ICD-9-CM: 433.10  5/29/2020 - Present        Hyperlipemia, mixed ICD-10-CM: E78.2  ICD-9-CM: 272.2  9/27/2018 - Present        Aortic stenosis, moderate ICD-10-CM: I35.0  ICD-9-CM: 424.1  4/10/2018 - Present        Mild concentric left ventricular hypertrophy (LVH) ICD-10-CM: I51.7  ICD-9-CM: 429.3  4/10/2018 - Present        Chronic diarrhea ICD-10-CM: K52.9  ICD-9-CM: 787.91  9/29/2017 - Present        Microalbuminuria ICD-10-CM: R80.9  ICD-9-CM: 791.0  12/10/2015 - Present        Right inguinal hernia ICD-10-CM: K40.90  ICD-9-CM: 550.90  11/2/2015 - Present        Left inguinal hernia ICD-10-CM: K40.90  ICD-9-CM: 550.90  9/14/2015 - Present        Multiple myeloma ([facility] ICD-10-CM: C90.00  ICD-9-CM: 203.00  12/27/2012 - Present        Hypercalcemia ICD-10-CM: R34.77  ICD-9-CM: 275.42  4/25/2012 - Present    Overview Signed 4/25/2012  5:49 PM by Luciana Knight MD     2012, PTH normal, abnormal protein electrophoresis, referred to Hem-Onc             S/P AAA repair 2/9/2012 ICD-10-CM: F75.201, Z86.79  ICD-9-CM: V45.89  2/22/2012 - Present    Overview Signed 2/22/2012  1:38 PM by MD Dr Ayesha Sloan             Pre-diabetes ICD-10-CM: R73.03  ICD-9-CM: 790.29  9/7/2010 - Present    Overview Addendum 11/1/2011  9:29 AM by Luciana Knight MD     2005;  Optho Dr Baylee Benitez             Sinus tachycardia ICD-10-CM: R00.0  ICD-9-CM: 427.89  9/7/2010 - Present        H/O Heavy Alcohol Use ICD-9-CM: Miguel Labella  9/7/2010 - Present        Vitamin B12 deficiency ICD-10-CM: E53.8  ICD-9-CM: 266.2  9/7/2010 - Present        Hyperhomocysteinemia ([facility] ICD-10-CM: E72.11  ICD-9-CM: 270.4  9/7/2010 - Present        Mild Allergic Rhinitis ICD-10-CM: J30.9  ICD-9-CM: 477.9  9/7/2010 - Present        History of skin cancer ICD-10-CM: O92.807  ICD-9-CM: V10.83  9/7/2010 - Present    Overview Signed 9/7/2010  9:03 AM by Luciana Knight MD     Followed by Valerio Pastor             HTN (hypertension) ICD-10-CM: I10  ICD-9-CM: 401.9  4/6/2010 - Present    Overview Signed 9/7/2010  8:55 AM by Luciana Knight MD     1990             H/O Carotid Stenosis ICD-10-CM: I65.29  ICD-9-CM: 433.10  4/6/2010 - Present    Overview Addendum 9/7/2010  8:58 AM by Luciana Knight MD     Oct 2003; 50%left subclavian and 50-70% SHEILA GERD (gastroesophageal reflux disease) ICD-10-CM: K21.9  ICD-9-CM: 530.81  4/6/2010 - Present        RESOLVED: Myeloma ([facility] ICD-10-CM: C90.00  ICD-9-CM: 203.00  1/6/2015 - 6/10/2016        RESOLVED: Gastrointestinal bleeding ICD-10-CM: K92.2  ICD-9-CM: 578.9  12/26/2014 - 2/28/2018        RESOLVED: Ancelmo Cradle stone pancreatitis ICD-10-CM: K85.10  ICD-9-CM: 577.0, 574.20  1/23/2014 - 2/28/2018        RESOLVED: Acute cholecystitis ICD-10-CM: K81.0  ICD-9-CM: 575.0  1/23/2014 - 2/28/2018        RESOLVED: Abnormal serum protein electrophoresis ICD-10-CM: R77.8  ICD-9-CM: 790.99  4/25/2012 - 2/28/2018    Overview Signed 4/25/2012  5:48 PM by Wei Cuellar MD     4/2012, referred to Hem-Onc             RESOLVED: Atrial fibrillation 1/2012 ICD-10-CM: I48.91  ICD-9-CM: 427.31  2/22/2012 - 11/9/2020    Overview Signed 2/22/2012  1:33 PM by Wei Cuellar MD     Cardio Dr Annamarie Iqbal: AAA (abdominal aortic aneurysm) ([facility] ICD-10-CM: I71.4  ICD-9-CM: 441.4  1/6/2012 - 9/26/2018    Overview Signed 1/6/2012  2:07 PM by Wei Cuellar MD     Detected on plain films back xray 1/2012; Ct scheduled             RESOLVED: Anemia ICD-10-CM: D64.9  ICD-9-CM: 285.9  1/5/2012 - 2/28/2018        RESOLVED: Hypertriglyceridemia ICD-10-CM: E78.1  ICD-9-CM: 272.1  9/7/2010 - 9/27/2018    Overview Signed 9/7/2010  8:55 AM by Wei Cuellar MD     2003             RESOLVED: Bilateral Carotid Bruits, L>R ICD-10-CM: R09.89  ICD-9-CM: 785.9  9/7/2010 - 2/28/2018    Overview Signed 9/7/2010  8:57 AM by Wei Cuellar MD     10/03             RESOLVED: Anemia due to GI bleed 2007 ICD-10-CM: D64.9  ICD-9-CM: 285.9  4/6/2010 - 2/28/2018        RESOLVED: GI bleed, duodenitis 2007 ICD-10-CM: K92.2  ICD-9-CM: 578.9  4/6/2010 - 2/28/2018    Overview Addendum 9/7/2010  9:01 AM by Wei Cuellar MD     8/07 Duodenitis                       Greater than 30 minutes were spent with the patient on counseling and coordination of care    Signed:   Charity Dean MD  2/16/2022  1:14 PM   .

## 2022-02-16 NOTE — PROGRESS NOTES
Day #5 of Vancomycin  Indication:  R leg cellulitis  - Type 2 DM, Multiple myeloma (currently undergoing treatment)  Current regimen:  1000 mg IV Q 24 hr  Abx regimen:  Zosyn + Vancomycin + acyclovir  ID Following ?: NO  Concomitant nephrotoxic drugs (requires more frequent monitoring): Loop diuretics (now held)  Frequency of BMP?: Daily through 2/15    Recent Labs     22  0602 02/15/22  0445 22  0455   CREA 1.28 1.10 1.20   BUN 9 11 12   Est CrCl: 41.8 ml/min; UO: -- ml/kg/hr  Temp (24hrs), Av.3 °F (36.8 °C), Min:98.1 °F (36.7 °C), Max:98.4 °F (36.9 °C)    Cultures:    Blood: NGTD x 4 days - pending    Goal target range AUC/ERICA 400-500    Assessment/Plan: Bump in SCr overnight. Will empirically adjust regimen. Change to 750 mg IV Q 24hrs for a predicted AUC of 412 mg/L*hr . Pharmacy will continue to monitor this patient daily for changes in clinical status and renal function.     Louisa Buckley, PharmD  Clinical Pharmacist, Orthopedics and Med/Surg  [id number] [employer] [facility] ()

## 2022-02-16 NOTE — PROGRESS NOTES
[facility]  Hospitalist Group                                                                                          Hospitalist Progress Note  Cortez Fink MD  Answering service: [phone] from in house phone        Date of Service:  2/15/2022  NAME:  Kari Bender  :  1934  MRN:  [id number]      Admission Summary: This is an 28-year-old man with a past medical history significant for type 2 diabetes, multiple myeloma, hypertension, dyslipidemia, peripheral neuropathy, status post abdominal aortic aneurysm repair, who was in his usual state of health until several days ago when the patient developed pain and swelling involving the right foot. The patient initially developed an ulcer involving the right big toe. The swelling started from the big toe to the rest of the right foot. The swelling is associated with redness and pain. The pain is constant, sharp pain, severe in severity, worse with movement involving the right foot. No known relieving factors. The patient was seen at [facility] yesterday. The patient was diagnosed with cellulitis of the right foot. The patient came to the emergency room today because of worsening symptoms. The patient denies fever, rigors, or chills. He was last admitted to this hospital from 2021 to 2021. The patient was admitted and treated for acute respiratory failure secondary to COVID-19 pneumonia. Interval history / Subjective:   Patient report continued pain in foot but improved  Appreciate podiatry eval and treatment recs  Low BP noted but patient reports this is his baseline due to cancer     Assessment & Plan:      1. Cellulitis, right root and ulcer on right 2nd toe-   Cont IV abx, wound care   podiatry consulted - and recommendations noted  2. Type 2 diabetes- accuchecks and ss insulin. Stable glucose levels  3. Multiple myeloma- cell counts low/stable.   4.  Hypertension- monitor- low today- olding home BP  Med/diuretic. 5.  Dyslipidemia- cont home statin meds  6. Peripheral neuropathy- stable on lyrica. 7.  Anemia of iron def and chronic dz- IV iron ordered, monitor Hb.  8.  Hypokalemia- replete- likely related to chronic diarrhea  9. Chronic diarrhea- monitor for now     Code status: Full  Prophylaxis: Heparin SQ  Care Plan discussed with: patient  Anticipated Disposition: home w home health [facility] Problems  Date Reviewed: 2/12/2022          Codes Class Noted POA    * (Principal) Cellulitis of right leg ICD-10-CM: L03.115  ICD-9-CM: 682.6  2/12/2022 Yes                Review of Systems:   A comprehensive review of systems was negative except for that written in the HPI. Vital Signs:    Last 24hrs VS reviewed since prior progress note. Most recent are:  Visit Vitals  /65   Pulse 66   Temp 98.4 °F (36.9 °C)   Resp 18   Ht 6' 1\" (1.854 m)   Wt 72.6 kg (160 lb)   SpO2 90%   BMI 21.11 kg/m²     Patient Vitals for the past 24 hrs:   Temp Pulse Resp BP SpO2   02/15/22 2035 98.4 °F (36.9 °C) 66 18 117/65 90 %   02/15/22 0850 98.4 °F (36.9 °C) 79 18 (!) 94/51 91 %   02/15/22 0836 97.7 °F (36.5 °C) 93 18 114/68 90 %       No intake or output data in the 24 hours ending 02/15/22 9106     Physical Examination:     I had a face to face encounter with this patient and independently examined them on 2/15/2022 as outlined below:        Constitutional:  No acute distress, cooperative, pleasant    ENT:  Oral mucosa moist, oropharynx benign. Resp:  CTA bilaterally. No wheezing/rhonchi/rales. No accessory muscle use. CV:  Regular rhythm, normal rate, no murmurs, gallops, rubs    GI:  Soft, non distended, non tender. n     Musculoskeletal:  mild R LE and foot edema, warm,     Neurologic:  Moves all extremities.   AAOx3, CN II-XII reviewed   Skin: infection of Right foot              Data Review:    Review and/or order of clinical lab test  Review and/or order of tests in the radiology section of CPT  Review and/or order of tests in the medicine section of CPT      Labs:     Recent Labs     02/13/22 0237   WBC 7.3   HGB 9.4*   HCT 30.6*        Recent Labs     02/15/22  0445 02/14/22  0455 02/13/22 0237    140 142   K 3.5 3.3* 3.3*    109* 113*   CO2 32 28 28   BUN 11 12 13   CREA 1.10 1.20 0.93   * 88 130*   CA 9.9 8.5 8.3*   MG  --   --  1.7   PHOS  --   --  2.4*     Recent Labs     02/13/22 0237   ALT 14   AP 64   TBILI 0.4   TP 5.1*   ALB 2.4*   GLOB 2.7     No results for input(s): INR, PTP, APTT, INREXT, INREXT in the last 72 hours. Recent Labs     02/13/22 0237   TIBC 254   PSAT 8*   FERR 51      Lab Results   Component Value Date/Time    Folate 81.7 (H) 02/13/2022 02:38 AM      No results for input(s): PH, PCO2, PO2 in the last 72 hours. No results for input(s): CPK, CKNDX, TROIQ in the last 72 hours.     No lab exists for component: CPKMB  Lab Results   Component Value Date/Time    Cholesterol, total 193 03/23/2021 11:18 AM    HDL Cholesterol 83 03/23/2021 11:18 AM    LDL, calculated 84.2 03/23/2021 11:18 AM    Triglyceride 129 03/23/2021 11:18 AM    CHOL/HDL Ratio 2.3 03/23/2021 11:18 AM     Lab Results   Component Value Date/Time    Glucose (POC) 128 (H) 02/15/2022 09:32 PM    Glucose (POC) 127 (H) 02/15/2022 05:10 PM    Glucose (POC) 120 (H) 02/15/2022 11:01 AM    Glucose (POC) 130 (H) 02/15/2022 07:02 AM    Glucose (POC) 112 02/15/2022 12:22 AM     Lab Results   Component Value Date/Time    Color DARK YELLOW 01/23/2014 06:54 PM    Appearance CLOUDY (A) 01/23/2014 06:54 PM    Specific gravity 1.025 01/23/2014 06:54 PM    pH (UA) 5.5 01/23/2014 06:54 PM    Protein 100 (A) 01/23/2014 06:54 PM    Glucose 250 (A) 01/23/2014 06:54 PM    Ketone TRACE (A) 01/23/2014 06:54 PM    Bilirubin LARGE (A) 01/23/2014 06:54 PM    Urobilinogen 1.0 01/23/2014 06:54 PM    Nitrites NEGATIVE  01/23/2014 06:54 PM    Leukocyte Esterase NEGATIVE  01/23/2014 06:54 PM    Epithelial cells FEW 01/23/2014 06:54 PM    Bacteria 3+ (A) 01/23/2014 06:54 PM    WBC 0-4 01/23/2014 06:54 PM    RBC 5-10 01/23/2014 06:54 PM         Medications Reviewed:     Current Facility-Administered Medications   Medication Dose Route Frequency    ondansetron (ZOFRAN) injection 4 mg  4 mg IntraVENous Q4H PRN    Or    ondansetron (ZOFRAN ODT) tablet 4 mg  4 mg Oral Q8H PRN    L.acidophilus-paracasei-S.thermophil-bifidobacter (RISAQUAD) 8 billion cell capsule  1 Capsule Oral DAILY    iron sucrose (VENOFER) injection 200 mg  200 mg IntraVENous Q24H    mupirocin (BACTROBAN) 2 % ointment   Topical DAILY    alum-mag hydroxide-simeth (MYLANTA) oral suspension 30 mL  30 mL Oral Q4H PRN    acyclovir (ZOVIRAX) tablet 400 mg  400 mg Oral DAILY    aspirin delayed-release tablet 81 mg  81 mg Oral QHS    calcium carbonate (TUMS) chewable tablet 200 mg [elemental]  200 mg Oral PRN    [Held by provider] furosemide (LASIX) tablet 40 mg  40 mg Oral DAILY    loperamide (IMODIUM) capsule 4 mg  4 mg Oral TID PRN    pregabalin (LYRICA) capsule 50 mg  50 mg Oral BID    sodium chloride (NS) flush 5-40 mL  5-40 mL IntraVENous Q8H    sodium chloride (NS) flush 5-40 mL  5-40 mL IntraVENous PRN    acetaminophen (TYLENOL) tablet 650 mg  650 mg Oral Q6H PRN    Or    acetaminophen (TYLENOL) suppository 650 mg  650 mg Rectal Q6H PRN    polyethylene glycol (MIRALAX) packet 17 g  17 g Oral DAILY PRN    piperacillin-tazobactam (ZOSYN) 3.375 g in 0.9% sodium chloride (MBP/ADV) 100 mL MBP  3.375 g IntraVENous Q8H    insulin lispro (HUMALOG) injection   SubCUTAneous AC&HS    glucose chewable tablet 16 g  4 Tablet Oral PRN    glucagon (GLUCAGEN) injection 1 mg  1 mg IntraMUSCular PRN    dextrose 10 % infusion 0-250 mL  0-250 mL IntraVENous PRN    hydrALAZINE (APRESOLINE) 20 mg/mL injection 10 mg  10 mg IntraVENous Q6H PRN    oxyCODONE-acetaminophen (PERCOCET) 5-325 mg per tablet 1 Tablet  1 Tablet Oral Q6H PRN    Vancomycin- pharmacy to dose   Other Rx Dosing/Monitoring    vancomycin (VANCOCIN) 1,000 mg in 0.9% sodium chloride 250 mL (VIAL-MATE)  1,000 mg IntraVENous Q24H     ______________________________________________________________________  EXPECTED LENGTH OF STAY: 3d 4h  ACTUAL LENGTH OF STAY:          3                 Yu Arellano MD

## 2022-02-16 NOTE — PROGRESS NOTES
Spiritual Care Partner Volunteer visited patient in [place] on 2.16.22    Documented by Claudio Graham, Staff , on 2.16.22  Please call ELOISA ([phone]) to page  if needed

## 2022-02-16 NOTE — PROGRESS NOTES
Transition of Care:  home with  f/u with specialist/pcp      Transport Plan:  in car with family     RUR: 9%     DX: cellulitis of right leg     Main contact is Mandie Blancas- [phone]     65: CM noted dc order; met with patient at bedside; no other CM needs noted    Medicare pt has received, reviewed, and signed 2nd IM letter informing them of their right to appeal the discharge. Signed copy has been placed on pt bedside chart.     CM following  Trever Peng RN, CRM          NOTE: patient is alert and oriented x 4; patient lives at stated address with his wife whom he cares for; she has dementia; their supportive daughter lives nearby; it is a 2 level home; patient has a walker, cane, tub chair; he was using home O2 with [address] temporarily after his last hospitalization in Dec. 2021; he is not using the home O2 anymore; he states he can still drive but has not in a while; his daughter drives him around; his pcp is Gamaliel Lerma NP; patient inquired about private duty caregiver list; this list was provided to him; he is looking for extra help in the home for his wife; patient has a history of HH with [email or web address]; patient confirms his insurance is [address]

## 2022-02-16 NOTE — PROGRESS NOTES
Progress Note    Patient: Kaylee Flowers MRN: [id number]  SSN: [id number]    YOB: 1934  Age: 80 y.o. Sex: male      Admit Date: 2/12/2022  * No surgery found *     Procedure:       Subjective:     Patient seen resting quiet and comfortably and no apparent distress. Notes that ACE wrap was uncomfortable and causing burning pain overnight, so he took it off and his pain resolved. Objective:     Visit Vitals  BP (!) 102/52 (BP 1 Location: Right upper arm, BP Patient Position: Sitting)   Pulse 65   Temp 98.1 °F (36.7 °C)   Resp 18   Ht 6' 1\" (1.854 m)   Wt 72.6 kg (160 lb)   SpO2 92%   BMI 21.11 kg/m²        Physical Exam:  Right foot exam: Neurovascular status at baseline. Decrease in swelling to the right foot and ankle. Decrease in the intensity of the petechiae. Superficial ulcer of the right 2nd toe remains stable. Decreased tenderness to the right forefoot. Labs/Radiology Review: images and reports reviewed    Assessment:     Hospital Problems  Date Reviewed: 2/12/2022          Codes Class Noted POA    * (Principal) Cellulitis of right leg ICD-10-CM: L03.115  ICD-9-CM: 682.6  2/12/2022 Yes              Plan/Recommendations/Medical Decision Making:     -X-ray negative for any significant findings.  -Digit redressed with mupirocin, dsd. Swelling continues to improve. No warmth to touch.  -Patient did have dependent swelling of the right foot when he was sitting in chair, which did quickly resolve when he was sitting in bed with foot elevated. -Patient stable for discharge from a podiatric standpoint. Follow-up as needed in office.

## 2022-02-16 NOTE — DISCHARGE INSTRUCTIONS
Due to low blood pressure and mildly elevated kidney function labs - hold furosemide and monitor blood pressure before restarting    Due to anemia and low iron levels you were given IV iron this admission and will be discharged on oral iron supplements    Wound care to right foot and 2nd toe- keep clean and dry  Cover wound daily with bactroban antibiotic ointment and either leave open to air or cover with sterile gauze  Keep right leg elevated to reduce swelling\  Continue taking antibiotics as directed  Follow up with Dr Darby Michelle in 7-14 days  [address] [address]  [address]  [address] [place] [address]  [phone]    Follow up with your hematologist / oncologist regarding blood clots found in the superficial veins in your right leg regarding risk vs benefit of taking anticoagulation (blood thinners) for this issue  · ( 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  The thrombus suggests a more acute than chronic process.)

## 2022-02-17 NOTE — PROGRESS NOTES
Date of previous inpatient admission/ ED visit? Patient discharged on yesterday from hospital stay 2/12/22 -2/16/22 (cellulitis)    What brought the patient back to ED? Patient d/cd in evening from hospital and attempted to fill RX at his pharmacy and provider unable to fill prior to closing. Did patient decline recommended services during last admission/ ED visit (if yes, what)? No    Has patient seen a provider since their last inpatient admission/ED visit (if yes, when)? No. Next PCP appointment is 3/3/22    PCP: First and Last name: Guilherme Longoria NP   Name of Practice:  [facility]   Are you a current patient: Yes/No: Yes   Approximate date of last visit:    CM Interventions:  From previous inpatient admission/ED visit: Assessment/ Interventions  From current inpatient admission/ED visit: SSED/CM consult received and appreciated. Met w/patient and introduced to role of CM. Discussed recent d/c and inability to obtain Henery Lei acknowledges time of discharge from hospital and drop off at pharmacy was too late for filling. Patient uncertain if daughter picked up RX this a.m. Permission obtained to contact daughter Karmen Hamilton [phone]. This writer asked about spouse states history of short term memory however is safe home alone (received private duty list on yesterday). Case discussed w/ Dr. Aniya Fernandez regarding possible OPIC [place] informed CM patient needs to try OP ABX first.     Call placed to Ms. Bartholomew introduced to self. Informed RX was to be ready at 0830 and was not then patient needed ED visit secondary to pain. RX later ready at 0900. This writer informed of pending test and if discharged this afternoon will need RX picked up en route to hospital or w/ patient after d/c. Babar Dutta in agreement - asked for pain medication RX secondary to \" unbearable pain\" CM provided updates to Елена Varma RN. [employer] is insurance provider.

## 2022-02-17 NOTE — ED TRIAGE NOTES
Pt just discharged from here for cellulitis of his leg yesterday, pt did not get his antibiotics filled, pt c/o increased pain to right with increased redness and swelling, denies fever

## 2022-02-17 NOTE — ED NOTES
MD and RN have reviewed and gave discharge instructions to the patient. The patient verbalized understanding. The pt left via walker with daughter.

## 2022-02-17 NOTE — ED PROVIDER NOTES
59-year-old male with history of AAA, anemia, arthritis, melanoma, carotid stenosis, EtOH abuse, GERD, GI bleed, prediabetes with recent admission to the hospital for right foot cellulitis presents to the emergency department concerned about worsening swelling in his right lower extremity, worsening pain, worsening redness. He was discharged about 5 PM last night after being treated in the hospital vancomycin. He was discharged on Keflex which she has not filled or taken yet. No fevers. Complains of worsening pain in his foot. Also noted to have had a distal DVT during his hospital stay with deferral of anticoagulation discussion to his primary care doctor. The patient tells me that last time he was on anticoagulation indirect havoc with his blood sugars. Is unclear what he was taking at that time. The history is provided by the patient and medical records. Skin Problem   This is a chronic problem. The current episode started more than 1 week ago. The problem has been rapidly worsening. There has been no fever. The rash is present on the right foot. The pain is severe. The pain has been constant since onset. Associated symptoms include pain. Treatments tried: Antibiotics.         Past Medical History:   Diagnosis Date    AAA (abdominal aortic aneurysm) ([address] 1/6/2012    Abnormal serum protein electrophoresis 4/25/2012    Anemia 4/6/2010    Anemia 1/5/2012    Anemia due to GI bleed 2007 4/6/2010    Aneurysm (HCC)     AAA    Arrhythmia     ATRIAL FIB PT STATES HE DOESN'T HAVE    Arthritis     Atrial fibrillation 1/2012 2/22/2012    Bilateral Carotid Bruits, L>R 9/7/2010    Bone cancer ([address]     Borderline diabetes mellitus 4/6/2010    Cancer ([address]     SKIN MELANOMA ON BACK     Carotid stenosis 4/6/2010    Carotid stenosis     Chronic pain     Diabetes mellitus, type 2 ([address] 9/7/2010    NO MEDS    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    ETOH abuse 4/6/2010    GERD (gastroesophageal reflux disease) 2010    GI bleed 2010    GI bleed, duodenitis 2007 2010    H/O Heavy Alcohol Use 2010    History of skin cancer 2010    HTN (hypertension) 2010    Hypercalcemia 2012    Hyperhomocysteinemia ([address] 2010    Hypertriglyceridemia 2010    Left inguinal hernia 2015    Lipids blood increased 2010    Microalbuminuria 12/10/2015    Mild Allergic Rhinitis 2010    Pre-diabetes 2010;  Optho Dr Fina Ruiz Right inguinal hernia 2015    S/P AAA repair 2012    Sinus tachycardia 2010    Tachycardia 2010    Vitamin B12 deficiency 2010       Past Surgical History:   Procedure Laterality Date    COLONOSCOPY  2007    Normal; Dr Aquiles Chao EGD  2007    small hiatal hernia, mild duodenitis; Dr Aquiles Chao HX AAA REPAIR  2012    Dr Luis Alberto Mitchell    HX CATARACT REMOVAL      both eyes    HX CHOLECYSTECTOMY  14    [facility]- Dr. Fanta Sotelo HX HERNIA REPAIR Left 9/24/15    left indirect inguinal by Dr. Padma Vick Right 11/2/15    inguinal w/ mesh by Dr. Padma Vick Bilateral 10/2015 And 2015    Inguinal    BLUE DOPPLER  3/26/2012    BLUE Echo; + clot         Family History:   Problem Relation Age of Onset    Heart Disease Mother          age 80    Cancer Father          brain tumor age 80    Heart Disease Father     Substance Abuse Daughter          cocaine OD age 43 in 200    Other Son          PE/DVT age 43    Heart Disease Brother    Danis [address]leg[address] Other Daughter         alive and well    Anesth Problems Neg Hx        Social History     Socioeconomic History    Marital status:      Spouse name: Not on file    Number of children: 3    Years of education: Not on file    Highest education level: Not on file   Occupational History  Occupation: Retired  from [employer]  Smoking status: Former Smoker     Packs/day: 1.50     Years: 55.00     Pack years: 82.50     Quit date: 2001     Years since quittin.1    Smokeless tobacco: Never Used    Tobacco comment: used to smoke 1 1/2 ppd x ~ 40 yrs   Vaping Use    Vaping Use: Never used   Substance and Sexual Activity    Alcohol use: Yes     Alcohol/week: 14.0 standard drinks     Types: 14 Glasses of wine per week     Comment: 14    Drug use: No    Sexual activity: Not on file   Other Topics Concern     Service Not Asked    Blood Transfusions Not Asked    Caffeine Concern No     Comment: none    Occupational Exposure Not Asked    Hobby Hazards Not Asked    Sleep Concern Not Asked    Stress Concern Not Asked    Weight Concern No     Comment: down some at present; usually pretty stable in the 180's; his personal goal is 175    Special Diet Yes     Comment: very low sugar, low carb, tries to pay attention to sugar grams    Back Care Not Asked    Exercise Yes     Comment: stays active and walks on treadmill 15 minutes 1-2 x a week and is very active in the yard   [employer] Helmet Not Asked    Seat Belt Not Asked    Self-Exams Not Asked   Social History Narrative    Lives with wife, who is developing dementia    Daughter is in the area     Social Determinants of Health     Financial Resource Strain:     Difficulty of Paying Living Expenses: Not on file   Food Insecurity:     Worried About Running Out of Food in the Last Year: Not on file    Jenni of Food in the Last Year: Not on file   Transportation Needs:     Lack of Transportation (Medical): Not on file    Lack of Transportation (Non-Medical):  Not on file   Physical Activity:     Days of Exercise per Week: Not on file    Minutes of Exercise per Session: Not on file   Stress:     Feeling of Stress : Not on file   Social Connections:     Frequency of Communication with Friends and Family: Not on file    Frequency of Social Gatherings with Friends and Family: Not on file    Attends [employer] Services: Not on file    Active Member of Clubs or Organizations: Not on file    Attends Club or Organization Meetings: Not on file    Marital Status: Not on file   Intimate Partner Violence:     Fear of Current or Ex-Partner: Not on file    Emotionally Abused: Not on file    Physically Abused: Not on file    Sexually Abused: Not on file   Housing Stability:     Unable to Pay for Housing in the Last Year: Not on file    Number of [place] in the Last Year: Not on file    Unstable Housing in the Last Year: Not on file         ALLERGIES: Codeine, Contrast agent [iodine], Dexamethasone, Fish oil, Glipizide, Metformin, Niacin, Norvasc [amlodipine], and Optiray 350 [ioversol]    Review of Systems   Constitutional: Negative for fatigue and fever. HENT: Negative for sneezing and sore throat. Respiratory: Negative for cough and shortness of breath. Cardiovascular: Positive for leg swelling. Negative for chest pain. Gastrointestinal: Negative for abdominal pain, diarrhea, nausea and vomiting. Genitourinary: Negative for difficulty urinating and dysuria. Musculoskeletal: Negative for arthralgias and myalgias. Skin: Positive for rash. Negative for color change. Neurological: Negative for weakness and headaches. Psychiatric/Behavioral: Negative for agitation and behavioral problems. Vitals:    02/17/22 0946   BP: (!) 160/74   Pulse: 63   Resp: 16   Temp: 98.4 °F (36.9 °C)   SpO2: 96%            Physical Exam  Vitals and nursing note reviewed. Constitutional:       General: He is not in acute distress. Appearance: Normal appearance. He is well-developed. He is not ill-appearing, toxic-appearing or diaphoretic. HENT:      Head: Normocephalic and atraumatic.       Nose: Nose normal.      Mouth/Throat:      Mouth: Mucous membranes are moist.      Pharynx: Oropharynx is clear.   Eyes:      Extraocular Movements: Extraocular movements intact. Conjunctiva/sclera: Conjunctivae normal.      Pupils: Pupils are equal, round, and reactive to light. Cardiovascular:      Rate and Rhythm: Normal rate and regular rhythm. Pulses: Normal pulses. Heart sounds: No murmur heard. Pulmonary:      Effort: Pulmonary effort is normal. No respiratory distress. Breath sounds: Normal breath sounds. No wheezing. Chest:      Chest wall: No mass or tenderness. Abdominal:      General: There is no distension. Palpations: Abdomen is soft. Tenderness: There is no abdominal tenderness. There is no guarding or rebound. Musculoskeletal:         General: No swelling, tenderness, deformity or signs of injury. Normal range of motion. Cervical back: Normal range of motion and neck supple. No rigidity. No muscular tenderness. Right lower leg: No tenderness. Edema present. Left lower leg: No tenderness. No edema. Feet:      Comments: Generalized swelling about his right foot and ankle with erythema and tenderness. Open wound on his second toe with mild drainage. Skin:     General: Skin is warm and dry. Capillary Refill: Capillary refill takes less than 2 seconds. Neurological:      General: No focal deficit present. Mental Status: He is alert and oriented to person, place, and time. Psychiatric:         Mood and Affect: Mood normal.         Behavior: Behavior normal.          MDM  Number of Diagnoses or Management Options  Cellulitis of right foot  Diagnosis management comments: 80-year-old male presents as above to the emergency department after discharge yesterday with concern about right lower extremity swelling in the setting of cellulitis. He has not yet started his p.o. antibiotics. No new lab changes which would indicate a need for admission to the hospital.  Plan to trial outpatient antibiotics.   If he fails then he might benefit from [facility] in the future versus admission. Defer decision on anticoagulation to his primary care doctor as he does have multiple distal DVTs. Short course of pain medication.        Amount and/or Complexity of Data Reviewed  Clinical lab tests: reviewed  Tests in the radiology section of CPT®: reviewed  Decide to obtain previous medical records or to obtain history from someone other than the patient: yes           Procedures

## 2022-02-21 PROBLEM — N17.9 ACUTE RENAL INJURY (HCC): Status: ACTIVE | Noted: 2022-01-01

## 2022-02-21 PROBLEM — K56.609 INTESTINAL OBSTRUCTION (HCC): Status: ACTIVE | Noted: 2022-01-01

## 2022-02-21 PROBLEM — E44.0 MODERATE PROTEIN-CALORIE MALNUTRITION (HCC): Status: ACTIVE | Noted: 2022-01-01

## 2022-02-21 PROBLEM — K56.52 INTESTINAL ADHESIONS WITH COMPLETE OBSTRUCTION (HCC): Status: ACTIVE | Noted: 2022-01-01

## 2022-02-21 PROBLEM — R29.6 FALLS FREQUENTLY: Status: ACTIVE | Noted: 2022-01-01

## 2022-02-21 PROBLEM — R62.51 FAILURE TO THRIVE (CHILD): Status: ACTIVE | Noted: 2022-01-01

## 2022-02-21 NOTE — ED NOTES
16Fr NG tube placed. 300cc drained immediately. Placed to constant suction. Xray ordered to verify placement.

## 2022-02-21 NOTE — PROGRESS NOTES
DO NOT RESUSCITATE note:    I spoke with patient and son-in-law and we addressed the resuscitation status and patient does not want cardiac compressions or resuscitation or endotracheal intubation so we have made him DO NOT RESUSCITATE.   He seems to understand and son-in-law understands and both concur

## 2022-02-21 NOTE — TELEPHONE ENCOUNTER
Spoke to Jersey Del Castillo   Two pt identifiers confirmed. Writer along with Triage Nurse to go to ER for follow eval due to his symptoms , which was confirmed abdomen  distention and no BM in 4 days with Nausea. . She verbalized understanding of information discussed w/ no further questions at this time.

## 2022-02-21 NOTE — TELEPHONE ENCOUNTER
Patient his have extended abdomen have not had a bowel movement 4 days and he is vomiting. Piotr Jeter Spoke with nurse AJ transfer call.     Best call back #[phone]

## 2022-02-21 NOTE — H&P
Surgery History and Physical    Subjective:      Piper Rhodes is a 80 y.o. male who presents for evaluation of abdominal pain. Apparently patient has had progressive decline in overall health and debilitation in the last several months, being treated for Covid in December 2021, has had multiple falls, recently admitted for cellulitis and discharged from the hospitalist service 4 days prior to this admission, began developing abdominal pain distention and decreased appetite according to the patient prior to discharge, had been back in the emergency room several days ago treated for diarrhea with Imodium, and now with progressive abdominal pain nausea vomiting and CT scan suggest intestinal obstruction distal ileum near the ileocecal valve. Patient has a history of open abdominal aortic aneurysm repair 10 years ago, inguinal hernia repairs in the past by Dr. Rosa Fernandez, smoker but quit smoking 20 years ago, continues to have pulmonary symptoms of coughing productive, failure to thrive progressive debilitation and difficulty caring for himself at home but apparently also is primary caregiver for his elderly wife as well. Patient is accompanied by his son-in-law who has been very helpful in understanding the patient's condition and understands the current morbidity and care issues outlined below. Patient does not remember when his last colonoscopy was performed. White blood cell count 11,000, he was given iron for the anemia recently, hemoglobin of 11 g, creatinine up to 1.7 baseline 1.2 consistent with acute renal injury likely from dehydration. Patient was recently hospitalized for right leg foot cellulitis no evidence of DVT significantly, was being treated with antibiotics which have now stopped.   Patient also has multiple myeloma, has not been able to have these treatments for several months,    Some medical issues include failure to thrive, falls, cellulitis, chronic and acute renal insufficiency, generalized debilitation, hypertension, mild protein calorie malnutrition    LAB REVIEW:    Recent Results (from the past 48 hour(s))   METABOLIC PANEL, COMPREHENSIVE    Collection Time: 02/21/22  3:04 PM   Result Value Ref Range    Sodium 135 (L) 136 - 145 mmol/L    Potassium HEMOLYZED,RECOLLECT REQUESTED 3.5 - 5.1 mmol/L    Chloride 93 (L) 97 - 108 mmol/L    CO2 33 (H) 21 - 32 mmol/L    Anion gap 9 5 - 15 mmol/L    Glucose 138 (H) 65 - 100 mg/dL    BUN 15 6 - 20 MG/DL    Creatinine 1.75 (H) 0.70 - 1.30 MG/DL    BUN/Creatinine ratio 9 (L) 12 - 20      GFR est AA 45 (L) >60 ml/min/1.73m2    GFR est non-AA 37 (L) >60 ml/min/1.73m2    Calcium 11.4 (H) 8.5 - 10.1 MG/DL    Bilirubin, total 0.6 0.2 - 1.0 MG/DL    ALT (SGPT) 20 12 - 78 U/L    AST (SGOT) HEMOLYZED,RECOLLECT REQUESTED 15 - 37 U/L    Alk. phosphatase 70 45 - 117 U/L    Protein, total 7.0 6.4 - 8.2 g/dL    Albumin 3.1 (L) 3.5 - 5.0 g/dL    Globulin 3.9 2.0 - 4.0 g/dL    A-G Ratio 0.8 (L) 1.1 - 2.2     LIPASE    Collection Time: 02/21/22  3:04 PM   Result Value Ref Range    Lipase 61 (L) 73 - 393 U/L   POC LACTIC ACID    Collection Time: 02/21/22  3:09 PM   Result Value Ref Range    Lactic Acid (POC) 2.29 (HH) 0.40 - 2.00 mmol/L   SAMPLES BEING HELD    Collection Time: 02/21/22  3:30 PM   Result Value Ref Range    SAMPLES BEING HELD 1RED,1BLU,1LAV,1PST,1GREY     COMMENT        Add-on orders for these samples will be processed based on acceptable specimen integrity and analyte stability, which may vary by analyte.    CBC WITH AUTOMATED DIFF    Collection Time: 02/21/22  3:30 PM   Result Value Ref Range    WBC 11.1 4.1 - 11.1 K/uL    RBC 3.62 (L) 4.10 - 5.70 M/uL    HGB 11.4 (L) 12.1 - 17.0 g/dL    HCT 37.0 36.6 - 50.3 %    .2 (H) 80.0 - 99.0 FL    MCH 31.5 26.0 - 34.0 PG    MCHC 30.8 30.0 - 36.5 g/dL    RDW 17.1 (H) 11.5 - 14.5 %    PLATELET 853 077 - 836 K/uL    MPV 11.7 8.9 - 12.9 FL    NRBC 0.0 0  WBC    ABSOLUTE NRBC 0.00 0.00 - 0.01 K/uL NEUTROPHILS 84 (H) 32 - 75 %    LYMPHOCYTES 6 (L) 12 - 49 %    MONOCYTES 8 5 - 13 %    EOSINOPHILS 0 0 - 7 %    BASOPHILS 1 0 - 1 %    IMMATURE GRANULOCYTES 1 (H) 0.0 - 0.5 %    ABS. NEUTROPHILS 9.3 (H) 1.8 - 8.0 K/UL    ABS. LYMPHOCYTES 0.7 (L) 0.8 - 3.5 K/UL    ABS. MONOCYTES 0.9 0.0 - 1.0 K/UL    ABS. EOSINOPHILS 0.0 0.0 - 0.4 K/UL    ABS. BASOPHILS 0.1 0.0 - 0.1 K/UL    ABS. IMM. GRANS. 0.1 (H) 0.00 - 0.04 K/UL    DF SMEAR SCANNED      RBC COMMENTS ANISOCYTOSIS  1+           XR Results (maximum last 3): Results from [place] encounter on 02/12/22    XR FOOT RT MIN 3 V    Impression  Osteopenia. .      Results from [place] encounter on 12/29/21    XR CHEST PORT    Impression  1. Patchy peripheral bilateral airspace disease, favored to represent COVID  pneumonia. Results from [place] encounter on 05/15/20    XR CHEST PA LAT    Impression  IMPRESSION: No acute intrathoracic disease. CT Results (maximum last 3): Results from [place] encounter on 02/21/22    CT ABD PELV WO CONT    Impression  1. High-grade distal small bowel obstruction. Small volume ascites, likely  reactive. 2.  Several indeterminate hypodensities in the liver and right kidney. Nonemergent contrast enhanced MRI can be obtained as an outpatient for further  evaluation if clinically indicated. 3.  Stable 3.8 cm infrarenal abdominal aortic aneurysm. 4.  Wall thickening in the lower esophagus, correlate for esophagitis. Results from [place] encounter on 01/09/12    CTA ABDOMEN      MRI Results (maximum last 3): Results from [place] encounter on 01/23/14    MRI ABD WO CONT    Impression  :  Cholecystolithiasis and choledocholithiasis with biliary and pancreatic duct  dilatation.           Signing/Reading Doctor: Danyell Mari ([id number])  Approved: Danyell Mari ([id number])  Jan 26 2014  9:44AM      Results from [place] encounter on 06/25/12    MRI [facility] SPINE W WO CONT      Results from Hospital Encounter encounter on 06/22/12    [facility] [address] CONT      Nuclear Medicine Results (maximum last 3): No results found for this or any previous visit. US Results (maximum last 3): Results from [place] encounter on 02/21/22    POC [address]      Results from Hospital Encounter encounter on 10/12/20    US PELV NON OBS    Impression  IMPRESSION:  1. Large bladder volume with post void residual as described. 2. No focal bladder abnormality or calcification. 3. Prominence of the prostate gland. 4. No hydronephrosis. Margurette Dang Results from [place] encounter on 01/10/17    US ABD COMP    Impression  IMPRESSION: Interval cholecystectomy. Margurette Dang Residual although improved dilatation extrahepatic biliary duct and residual  dilatation pancreatic duct. Previous aortic aneurysm graft repair.       Past Medical History:   Diagnosis Date    AAA (abdominal aortic aneurysm) ([address] 1/6/2012    Abnormal serum protein electrophoresis 4/25/2012    Anemia 4/6/2010    Anemia 1/5/2012    Anemia due to GI bleed 2007 4/6/2010    Aneurysm ([address]     AAA    Arrhythmia     ATRIAL FIB PT STATES HE DOESN'T HAVE    Arthritis     Atrial fibrillation 1/2012 2/22/2012    Bilateral Carotid Bruits, L>R 9/7/2010    Bone cancer ([address]     Borderline diabetes mellitus 4/6/2010    Cancer ([address]     SKIN MELANOMA ON BACK     Carotid stenosis 4/6/2010    Carotid stenosis     Chronic pain     Diabetes mellitus, type 2 ([address] 9/7/2010    NO MEDS    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    ETOH abuse 4/6/2010    GERD (gastroesophageal reflux disease) 4/6/2010    GI bleed 4/6/2010    GI bleed, duodenitis 2007 4/6/2010    H/O Heavy Alcohol Use 9/7/2010    History of skin cancer 9/7/2010    HTN (hypertension) 4/6/2010    Hypercalcemia 4/25/2012    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/2010    Hypertriglyceridemia 9/7/2010    Left inguinal hernia 2015    Lipids blood increased 2010    Microalbuminuria 12/10/2015    Mild Allergic Rhinitis 2010    Pre-diabetes 2010    2005; Optho Dr Lanette Sanderson Right inguinal hernia 2015    S/P AAA repair 2012    Sinus tachycardia 2010    Tachycardia 2010    Vitamin B12 deficiency 2010     Past Surgical History:   Procedure Laterality Date    COLONOSCOPY  2007    Normal; Dr Linh Caruso EGD  2007    small hiatal hernia, mild duodenitis; Dr Linh Caruso HX AAA REPAIR  2012    Dr Luh Mcpherson    HX CATARACT REMOVAL      both eyes    HX CHOLECYSTECTOMY  14    [facility]- Dr. Britton Sanchez HX HERNIA REPAIR Left 9/24/15    left indirect inguinal by Dr. Mariajose Deras Right 11/2/15    inguinal w/ mesh by Dr. Mariajose Deras Bilateral 10/2015 And 2015    Inguinal    BLUE DOPPLER  3/26/2012    BLUE Echo; + clot      Family History   Problem Relation Age of Onset    Heart Disease Mother          age 80    Cancer Father          brain tumor age 80    Heart Disease Father     Substance Abuse Daughter          cocaine OD age 43 in 200    Other Son          PE/DVT age 43    Heart Disease Brother    Danis [address] Other Daughter         alive and well    Anesth Problems Neg Hx      Social History     Tobacco Use    Smoking status: Former Smoker     Packs/day: 1.50     Years: 55.00     Pack years: 82.50     Quit date: 2001     Years since quittin.1    Smokeless tobacco: Never Used    Tobacco comment: used to smoke 1 1/2 ppd x ~ 40 yrs   Substance Use Topics    Alcohol use: Yes     Alcohol/week: 14.0 standard drinks     Types: 14 Glasses of wine per week     Comment: 14      Prior to Admission medications    Medication Sig Start Date End Date Taking?  Authorizing Provider   HYDROcodone-acetaminophen (Norco) 5-325 mg per tablet Take 1 Tablet by mouth every six (6) hours as needed for Pain for up to 3 days. Max Daily Amount: 4 Tablets. 2/17/22 2/20/22  Ismael Hauser MD   docusate sodium (Colace) 100 mg capsule Take 1 Capsule by mouth daily for 20 days. Take daily while taking Norco. 2/17/22 3/9/22  Ismael Hauser MD   furosemide (LASIX) 20 mg tablet Take 1 Tablet by mouth two (2) times a day. Hold for low blood pressure 2/16/22   Melissa Sapp MD   mupirocin [facility]) 2 % ointment Apply to open wound on toe daily as instructed 2/16/22   Melissa Sapp MD   cephALEXin (Keflex) 500 mg capsule Take 1 Capsule by mouth four (4) times daily for 6 days. 2/16/22 2/22/22  Melissa Sapp MD   ferrous sulfate 325 mg (65 mg iron) tablet Take 1 Tablet by mouth Daily (before breakfast). Indications: anemia from inadequate iron 2/16/22   Melissa Sapp MD   calcium carbonate (TUMS) 200 mg calcium (500 mg) chew Take 1 Tablet by mouth as needed. Other, MD Makayla   potassium chloride (KLOR-CON M10) 10 mEq tablet TAKE 1 TABLET BY MOUTH DAILY WITH LASIX 11/29/21   Lis Edmonds NP   folic acid-vit O6-YAG A29 (Folbic) 2.5-25-2 mg tablet Take 1 Tablet by mouth daily. 6/1/21   Lis Edmonds NP   pregabalin (LYRICA) 50 mg capsule Take 50 mg by mouth two (2) times a day. 9/16/20   Provider, Historical   aspirin delayed-release 81 mg tablet Take 81 mg by mouth nightly. Provider, Historical   hydroxypropyl methylcellulose (GENTEAL MILD) 0.2 % ophthalmic solution Administer 2 Drops to both eyes as needed. Provider, Historical   loperamide (IMODIUM A-D) 2 mg tablet Take 4 mg by mouth three (3) times daily as needed. 3/28/19   Robert Cooney MD   acyclovir (ZOVIRAX) 400 mg tablet TK 1 T PO QD 8/6/18   Provider, Historical   multivitamin (ONE A DAY) tablet Take 1 Tab by mouth daily.     Provider, Historical      Allergies   Allergen Reactions    Codeine Nausea and Vomiting     Blurred vision, felt faint    Contrast Agent [Iodine] Hives     Needs premedication w/ Benadryl prn (since 2012 reaction)    Dexamethasone Other (comments)     \"Deathly ill\"    Fish Oil Nausea Only    Glipizide Other (comments)     DRASTIC DROP IN BLOOD GLUCOSE, fasting    Metformin Diarrhea    Niacin Other (comments)     Flushing    Norvasc [Amlodipine] Other (comments)     Leg edema    Optiray 350 [Ioversol] Hives     He does not know what this is       Review of Systems   Constitutional: Positive for fatigue. Right leg cellulitis recent therapy no significant DVT   Respiratory: Positive for shortness of breath. Coughing productive   Gastrointestinal: Positive for nausea and vomiting. Musculoskeletal: Positive for arthralgias and gait problem. Multiple falls   Psychiatric/Behavioral: Negative. All other systems reviewed and are negative. Objective:     Patient Vitals for the past 8 hrs:   BP Temp Pulse Resp SpO2   22 1128 117/64 98.2 °F (36.8 °C) 97 16 98 %       Temp (24hrs), Av.2 °F (36.8 °C), Min:98.2 °F (36.8 °C), Max:98.2 °F (36.8 °C)      Physical Exam  Vitals and nursing note reviewed. Exam conducted with a chaperone present (Patient son-in-law present). Constitutional:       Comments: Patient alert but very frail appearing very weak appearing chronic productive cough   Cardiovascular:      Rate and Rhythm: Regular rhythm. Pulmonary:      Comments: Decreased inspiratory effort with audible rattles and rhonchi and productive cough  Abdominal:      Comments: Markedly distended minimally tender no peritoneal signs or guarding well-healed midline scar with no obvious hernias midline or inguinal, has had history of inguinal hernia repairs, tympany  and absence of bowel   Musculoskeletal:      Comments: Right lower extremity edema and mild erythema calf   Skin:     General: Skin is warm and dry. Neurological:      General: No focal deficit present.       Mental Status: He is alert and oriented to person, place, and time. Psychiatric:         Mood and Affect: Mood normal.         Behavior: Behavior normal.         Thought Content: Thought content normal.         Judgment: Judgment normal.         Assessment:     Active Problems:    HTN (hypertension) (4/6/2010)      Overview: 1990      H/O Carotid Stenosis (4/6/2010)      Overview: Oct 2003; 50%left subclavian and 50-70% SHEILA       Pre-diabetes (9/7/2010)      Overview: 2005; Optho Dr Didier Huerta      Multiple myeloma [facility]) (12/27/2012)      Aortic stenosis, moderate (4/10/2018)      Intestinal adhesions with complete obstruction ([address] (2/21/2022)      Failure to thrive (child) (2/21/2022)      Falls frequently (2/21/2022)      Moderate protein-calorie malnutrition ([address] (2/21/2022)      Acute renal injury ([address] (2/21/2022)      Intestinal obstruction ([address] (2/21/2022)        Plan:     Some medical issues include failure to thrive, falls, cellulitis, chronic and acute renal insufficiency, generalized debilitation, hypertension, mild protein calorie malnutrition    Patient likely has small bowel obstruction or dysmotility related to Imodium from recent diarrhea, differential diagnosis includes adhesive disease, colorectal or other malignancy causing a malignant obstruction, but the bigger pictures patient is having failure to thrive due to multiple medical issues age frailty and other issues that frankly cannot be treated any more than they already are. Patient is having progressive decline in his overall functionality and I think he is a very poor operative candidate for any surgery as his morbidity mortality would be very high for this and likely would never return home regardless.   I have explained this to the patient and his son-in-law in great detail and they seem to understand, and if he does not improve his intestinal obstruction with NG tube decompression is ordered, I think he would not do well with surgery and may be best served with palliative care but final decisions up to patient and family and tube my partner covering Dr. Samantha donis. Hospitalist preferred to be consultant rather than admitting physician so will have hospitalist see patient regarding medical issues, pulmonary issues and whether patient needs known be on antibiotics. We will recheck Covid since he was Covid treated 6 weeks ago to see if this could still be an active problem. I think the patient's overall prognosis for meaningful recovery is grim given his decline and he has not even had any treatments for his myeloma in several months.     FACE TO FACE TIME: (Review of imaging, hx, records, EMR, discussion with pt and providers, and  pt exam)                                    75 minutes    Signed By: Vern Mack MD   [facility]    February 21, 2022

## 2022-02-21 NOTE — CONSULTS
Consult    Patient: Kay Sam MRN: [id number]  SSN: [id number]    YOB: 1934  Age: 80 y.o. Sex: male      Subjective:      Kay Sam is a 80 y.o. male 70-year-old man with a past medical history significant for type 2 diabetes, multiple myeloma, hypertension, dyslipidemia, peripheral neuropathy, status post abdominal aortic aneurysm repair in 2012 and COVID-pneumonia. Patient was recently admitted on 2/15 for management of cellulitis. Presented to the emergency room several days ago treated for diarrhea with Imodium, and now with progressive abdominal pain nausea vomiting. Patient seen in the ED today  for abdominal pain started 12 to 24 hours ago associated with vomiting and constipation. Patient endorses abdominal pain that is constant and sharp in quality. The hospitalist team has been consulted to assist general surgery in medical management. Patient examined at bedside. Patient is ill-appearing, should not reports health has been declining since Covid diagnosis in December 2021. Patient denies chest pain, dizziness, shortness of breath during examination. Patient endorses abdominal pain nausea and vomiting. Productive cough noted on evaluation, patient reports he has had a cough since Covid diagnosis and it is somewhat improved. NG tube is in place draining dark brown bilious fluid. Patient reports abdominal distention has improved some since placement of NG. Patient's son-in-law is at bedside.     Past Medical History:   Diagnosis Date    AAA (abdominal aortic aneurysm) ([facility] 1/6/2012    Abnormal serum protein electrophoresis 4/25/2012    Anemia 4/6/2010    Anemia 1/5/2012    Anemia due to GI bleed 2007 4/6/2010    Aneurysm (HCC)     AAA    Arrhythmia     ATRIAL FIB PT STATES HE DOESN'T HAVE    Arthritis     Atrial fibrillation 1/2012 2/22/2012    Bilateral Carotid Bruits, L>R 9/7/2010    Bone cancer ([address]     Borderline diabetes mellitus 4/6/2010    Cancer ([facility]     SKIN MELANOMA ON BACK     Carotid stenosis 2010    Carotid stenosis     Chronic pain     Diabetes mellitus, type 2 ([facility] 2010    NO MEDS    Disturbances of sulphur-bearing amino-acid metabolism 2010    ETOH abuse 2010    GERD (gastroesophageal reflux disease) 2010    GI bleed 2010    GI bleed, duodenitis 2007 2010    H/O Heavy Alcohol Use 2010    History of skin cancer 2010    HTN (hypertension) 2010    Hypercalcemia 2012    Hyperhomocysteinemia ([facility] 2010    Hypertriglyceridemia 2010    Left inguinal hernia 2015    Lipids blood increased 2010    Microalbuminuria 12/10/2015    Mild Allergic Rhinitis 2010    Pre-diabetes 2010    2005;  Optho Dr Jelani Sewell Right inguinal hernia 2015    S/P AAA repair 2012    Sinus tachycardia 2010    Tachycardia 2010    Vitamin B12 deficiency 2010     Past Surgical History:   Procedure Laterality Date    COLONOSCOPY  2007    Normal; Dr Argenis Grijalva EGD  2007    small hiatal hernia, mild duodenitis; Dr Argenis Grijalva HX AAA REPAIR  2012    Dr Akhil Miranda    HX CATARACT REMOVAL      both eyes    HX CHOLECYSTECTOMY  14    [facility]- [facility]- Dr. Alex Ware HX HERNIA REPAIR Left 9/24/15    left indirect inguinal by Dr. Rufina Galindo Right 11/2/15    inguinal w/ mesh by Dr. Rufina Galindo Bilateral 10/2015 And 2015    Inguinal    BLUE DOPPLER  3/26/2012    BLUE Echo; + clot      Family History   Problem Relation Age of Onset    Heart Disease Mother          age 80    Cancer Father          brain tumor age 80    Heart Disease Father     Substance Abuse Daughter          cocaine OD age 43 in 200    Other Son          PE/DVT age 43   [address] Other Daughter         alive and well    Anesth Problems Neg Hx      Social History     Tobacco Use    Smoking status: Former Smoker     Packs/day: 1.50     Years: 55.00     Pack years: 82.50     Quit date: 2001     Years since quittin.1    Smokeless tobacco: Never Used    Tobacco comment: used to smoke 1 1/2 ppd x ~ 40 yrs   Substance Use Topics    Alcohol use:  Yes     Alcohol/week: 14.0 standard drinks     Types: 14 Glasses of wine per week     Comment: 14      Current Facility-Administered Medications   Medication Dose Route Frequency Provider Last Rate Last Admin    ondansetron (ZOFRAN) injection 4 mg  4 mg IntraVENous Q1H PRN Kev Wharton MD   4 mg at 22 1518    fentaNYL citrate (PF) injection 50 mcg  50 mcg IntraVENous Q1H PRN Kev Wharton MD   50 mcg at 22 1521    sodium chloride 0.9 % bolus infusion 1,000 mL  1,000 mL IntraVENous ONCE Ozzie Burt MD        phenol throat spray (CHLORASEPTIC) 1 Spray  1 [place] Oral PRN Larissa Riggs MD   1 [place] at 22 1734    0.9% sodium chloride infusion  125 mL/hr IntraVENous CONTINUOUS Larissa Riggs MD        HYDROcodone-acetaminophen [facility]) 5-325 mg per tablet 1 Tablet  1 Tablet Oral Q4H PRN Larissa Riggs MD        HYDROmorphone (DILAUDID) injection 0.5-1 mg  0.5-1 mg IntraVENous Q2H PRN Larissa Riggs MD        ondansetron [facility]) injection 4 mg  4 mg IntraVENous Q4H PRN Larissa Riggs MD        acetaminophen (TYLENOL) tablet 650 mg  650 mg Oral Q6H PRN Larissa Riggs MD        HYDROcodone-acetaminophen [facility])  mg tablet 1 Tablet  1 Tablet Oral Q4H PRN Larissa Riggs MD        diphenhydrAMINE (BENADRYL) injection 12.5 mg  12.5 mg IntraVENous ONCE PRN Larissa Riggs MD        hydrALAZINE (APRESOLINE) 20 mg/mL injection 20 mg  20 mg IntraVENous Q6H PRN Larissa Riggs MD        enoxaparin (LOVENOX) injection 40 mg  40 mg SubCUTAneous Q24H Larissa Riggs MD         Current Outpatient Medications   Medication Sig Dispense Refill  docusate sodium (Colace) 100 mg capsule Take 1 Capsule by mouth daily for 20 days. Take daily while taking Norco. 20 Capsule 0    furosemide (LASIX) 20 mg tablet Take 1 Tablet by mouth two (2) times a day. Hold for low blood pressure 30 Tablet 0    mupirocin (BACTROBAN) 2 % ointment Apply to open wound on toe daily as instructed 22 g 2    cephALEXin (Keflex) 500 mg capsule Take 1 Capsule by mouth four (4) times daily for 6 days. 24 Capsule 0    ferrous sulfate 325 mg (65 mg iron) tablet Take 1 Tablet by mouth Daily (before breakfast). Indications: anemia from inadequate iron 30 Tablet 3    calcium carbonate (TUMS) 200 mg calcium (500 mg) chew Take 1 Tablet by mouth as needed.  potassium chloride (KLOR-CON M10) 10 mEq tablet TAKE 1 TABLET BY MOUTH DAILY WITH LASIX 90 Tablet 0    folic acid-vit M9-FFO K70 (Folbic) 2.5-25-2 mg tablet Take 1 Tablet by mouth daily. 90 Tablet 3    pregabalin (LYRICA) 50 mg capsule Take 50 mg by mouth two (2) times a day.  aspirin delayed-release 81 mg tablet Take 81 mg by mouth nightly.  hydroxypropyl methylcellulose (GENTEAL MILD) 0.2 % ophthalmic solution Administer 2 Drops to both eyes as needed.  loperamide (IMODIUM A-D) 2 mg tablet Take 4 mg by mouth three (3) times daily as needed.  acyclovir (ZOVIRAX) 400 mg tablet TK 1 T PO QD  6    multivitamin (ONE A DAY) tablet Take 1 Tab by mouth daily.           Allergies   Allergen Reactions    Codeine Nausea and Vomiting     Blurred vision, felt faint    Contrast Agent [Iodine] Hives     Needs premedication w/ Benadryl prn (since 1/2012 reaction)    Dexamethasone Other (comments)     \"Deathly ill\"    Fish Oil Nausea Only    Glipizide Other (comments)     DRASTIC DROP IN BLOOD GLUCOSE, fasting    Metformin Diarrhea    Niacin Other (comments)     Flushing    Norvasc [Amlodipine] Other (comments)     Leg edema    Optiray 350 [Ioversol] Hives     He does not know what this is       Review of Systems:  Pertinent items are noted in the History of Present Illness. Objective:     Vitals:    02/21/22 1128   BP: 117/64   Pulse: 97   Resp: 16   Temp: 98.2 °F (36.8 °C)   SpO2: 98%        Physical Exam:  General:  A/A/O X 3. Ill-appearing  HEENT:  Normocephalic. Sclera anicteric. EOMI. Mucous membranes moist.    Chest: Coarse, rhonchi throughout  CV:  RRR. Normal S1/S2. No M/C/R appreciated. No JVD. No peripheral edema. Cap refill < 3 sec. Peripheral pulses 2+. GI:  Abdomen distended, tender. :  Voiding. Neurologic:  Nonfocal.  CN II-XII grossly intact. Face symmetrical.  Speech normal.     Psych:  Cooperative. No anxiety or agitation. No suicidal/homicidal ideation. Skin:  Warm and color appropriate. No rashes or jaundice. Assessment:     Hospital Problems  Date Reviewed: 2/12/2022          Codes Class Noted POA    Intestinal adhesions with complete obstruction [facility]) ICD-10-CM: K56.52  ICD-9-CM: 560.81  2/21/2022 Unknown        Failure to thrive (child) ICD-10-CM: R62.51  ICD-9-CM: 783.41  2/21/2022 Unknown        Falls frequently ICD-10-CM: R29.6  ICD-9-CM: V15.88  2/21/2022 Unknown        Moderate protein-calorie malnutrition ([facility] ICD-10-CM: E44.0  ICD-9-CM: 263.0  2/21/2022 Unknown        Acute renal injury ([facility] ICD-10-CM: N17.9  ICD-9-CM: 584.9  2/21/2022 Unknown        Intestinal obstruction ([facility] ICD-10-CM: A32.270  ICD-9-CM: 560.9  2/21/2022 Unknown        Aortic stenosis, moderate ICD-10-CM: I35.0  ICD-9-CM: 424.1  4/10/2018 Yes        Multiple myeloma ([facility] ICD-10-CM: C90.00  ICD-9-CM: 203.00  12/27/2012 Yes        Pre-diabetes ICD-10-CM: R73.03  ICD-9-CM: 790.29  9/7/2010 Yes    Overview Addendum 11/1/2011  9:29 AM by Telma Rasmussen MD     2005;  Optho Dr Zuleyma Clarke             HTN (hypertension) ICD-10-CM: I10  ICD-9-CM: 401.9  4/6/2010 Yes    Overview Signed 9/7/2010  8:55 AM by Telma Rasmussen MD     1990             H/O Carotid Stenosis ICD-10-CM: I65.29  ICD-9-CM: 433.10  4/6/2010 Yes    Overview Addendum 9/7/2010  8:58 AM by Zachary Ibanez MD     Oct 2003; 50%left subclavian and 50-70% SHEILA                    Plan:     Small bowel obstruction  -Management per primary team  HX of Covid  CAP  -Chest x-ray: Scattered patchy groundglass airspace opicities, concerning for multifocal pneumonia.   -Vancomycin and cefipime in place for now  -Supplemental O2 as needed  -prn duo nebs  Hypertension  -BP currently stable, will hold home BP meds for now  Dyslipidemia  -Continue aspirin  Anemia of iron deficiency and chronic disease  -Monitor CBC, transfuse for Hgb less than 7  Peripheral neuropathy  -Currently stable continue Lyrica when on diet   Diabetes mellitus  -SSI, AC at bedtime Accu-Cheks  -Glycemic protocol in place  Cellulitis  -Vancomycin and cefipime to complete ABX course, and cover for cellulitis and pneumonia   Failure to thrive   Protein calorie malnutrition  -Nutrition consult  Recurrent falls   -PT/OT when able      Signed By: EDMUND Becker     February 21, 2022

## 2022-02-21 NOTE — ED PROVIDER NOTES
The history is provided by the patient. Abdominal Pain   This is a new problem. The current episode started 12 to 24 hours ago. The problem occurs constantly. The problem has been gradually worsening. The pain is associated with vomiting (constipation, last bowel movement was 5 or more days ago except for smal amounts ). The pain is located in the generalized abdominal region. The quality of the pain is sharp. The pain is moderate. Associated symptoms include nausea, vomiting and constipation. Pertinent negatives include no fever, no hematochezia, no melena, no dysuria, no frequency and no hematuria. The pain is worsened by eating. The pain is relieved by nothing. The patient's surgical history includes cholecystectomy. hernia repairs, AAA repair       Past Medical History:   Diagnosis Date    AAA (abdominal aortic aneurysm) ([address] 1/6/2012    Abnormal serum protein electrophoresis 4/25/2012    Anemia 4/6/2010    Anemia 1/5/2012    Anemia due to GI bleed 2007 4/6/2010    Aneurysm ([address]     AAA    Arrhythmia     ATRIAL FIB PT STATES HE DOESN'T HAVE    Arthritis     Atrial fibrillation 1/2012 2/22/2012    Bilateral Carotid Bruits, L>R 9/7/2010    Bone cancer ([address]     Borderline diabetes mellitus 4/6/2010    Cancer ([address]     SKIN MELANOMA ON BACK     Carotid stenosis 4/6/2010    Carotid stenosis     Chronic pain     Diabetes mellitus, type 2 ([address] 9/7/2010    NO MEDS    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    ETOH abuse 4/6/2010    GERD (gastroesophageal reflux disease) 4/6/2010    GI bleed 4/6/2010    GI bleed, duodenitis 2007 4/6/2010    H/O Heavy Alcohol Use 9/7/2010    History of skin cancer 9/7/2010    HTN (hypertension) 4/6/2010    Hypercalcemia 4/25/2012    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/2010    Hypertriglyceridemia 9/7/2010    Left inguinal hernia 9/14/2015    Lipids blood increased 4/6/2010    Microalbuminuria 12/10/2015    Mild Allergic Rhinitis 2010    Pre-diabetes 2010    2005; Optho Dr Lesly Culver Right inguinal hernia 2015    S/P AAA repair 2012    Sinus tachycardia 2010    Tachycardia 2010    Vitamin B12 deficiency 2010       Past Surgical History:   Procedure Laterality Date    COLONOSCOPY  2007    Normal; Dr Casey Butts EGD  2007    small hiatal hernia, mild duodenitis; Dr Casey Butts HX AAA REPAIR  2012    Dr Hong Vieira    HX CATARACT REMOVAL      both eyes    HX CHOLECYSTECTOMY  14    [facility]- [facility]- Dr. Geralynn Harada HX HERNIA REPAIR Left 9/24/15    left indirect inguinal by Dr. Carol Mcpherson Right 11/2/15    inguinal w/ mesh by Dr. Carol Mcpherson Bilateral 10/2015 And 2015    Inguinal    BLUE DOPPLER  3/26/2012    BLUE Echo; + clot         Family History:   Problem Relation Age of Onset    Heart Disease Mother          age 80    [de-identified] Father          brain tumor age 80    Heart Disease Father     Substance Abuse Daughter          cocaine OD age 43 in 200    Other Son          PE/DVT age 43   [address] Other Daughter         alive and well    Anesth Problems Neg Hx        Social History     Socioeconomic History    Marital status:      Spouse name: Not on file    Number of children: 3    Years of education: Not on file    Highest education level: Not on file   Occupational History    Occupation: Retired  from [employer] [address] Use    Smoking status: Former Smoker     Packs/day: 1.50     Years: 55.00     Pack years: 82.50     Quit date: 2001     Years since quittin.1    Smokeless tobacco: Never Used    Tobacco comment: used to smoke 1 1/2 ppd x ~ 40 yrs   Vaping Use    Vaping Use: Never used   Substance and Sexual Activity    Alcohol use:  Yes     Alcohol/week: 14.0 standard drinks Types: 14 Glasses of wine per week     Comment: 14    Drug use: No    Sexual activity: Not on file   Other Topics Concern     Service Not Asked    Blood Transfusions Not Asked    Caffeine Concern No     Comment: none    Occupational Exposure Not Asked    Hobby Hazards Not Asked    Sleep Concern Not Asked    Stress Concern Not Asked    Weight Concern No     Comment: down some at present; usually pretty stable in the 180's; his personal goal is 175    Special Diet Yes     Comment: very low sugar, low carb, tries to pay attention to sugar grams    Back Care Not Asked    Exercise Yes     Comment: stays active and walks on treadmill 15 minutes 1-2 x a week and is very active in the yard   [employer] Helmet Not Asked   [address] Not Asked    Self-Exams Not Asked   Social History Narrative    Lives with wife, who is developing dementia    Daughter is in the area     Social Determinants of Health     Financial Resource Strain:     Difficulty of Paying Living Expenses: Not on file   Food Insecurity:     Worried About Running Out of Food in the Last Year: Not on file    Jenni of Food in the Last Year: Not on file   Transportation Needs:     Lack of Transportation (Medical): Not on file    Lack of Transportation (Non-Medical):  Not on file   Physical Activity:     Days of Exercise per Week: Not on file    Minutes of Exercise per Session: Not on file   Stress:     Feeling of Stress : Not on file   Social Connections:     Frequency of Communication with Friends and Family: Not on file    Frequency of Social Gatherings with Friends and Family: Not on file    Attends Zoroastrian Services: Not on file    Active Member of Clubs or Organizations: Not on file    Attends Club or Organization Meetings: Not on file    Marital Status: Not on file   Intimate Partner Violence:     Fear of Current or Ex-Partner: Not on file    Emotionally Abused: Not on file    Physically Abused: Not on file   [place] Sexually Abused: Not on file   Housing Stability:     Unable to Pay for Housing in the Last Year: Not on file    Number of [place] in the Last Year: Not on file    Unstable Housing in the Last Year: Not on file         ALLERGIES: Codeine, Contrast agent [iodine], Dexamethasone, Fish oil, Glipizide, Metformin, Niacin, Norvasc [amlodipine], and Optiray 350 [ioversol]    Review of Systems   Constitutional: Negative for fever. Gastrointestinal: Positive for abdominal pain, constipation, nausea and vomiting. Negative for hematochezia and melena. Genitourinary: Negative for dysuria, frequency and hematuria. All other systems reviewed and are negative. Vitals:    02/21/22 1128   BP: 117/64   Pulse: 97   Resp: 16   Temp: 98.2 °F (36.8 °C)   SpO2: 98%            Physical Exam  Vitals and nursing note reviewed. Constitutional:       General: He is not in acute distress. Appearance: He is well-developed. HENT:      Head: Normocephalic and atraumatic. Eyes:      Conjunctiva/sclera: Conjunctivae normal.   Neck:      Trachea: No tracheal deviation. Cardiovascular:      Rate and Rhythm: Normal rate and regular rhythm. Pulmonary:      Effort: Pulmonary effort is normal. No respiratory distress. Abdominal:      General: Abdomen is protuberant. There is distension. Tenderness: There is abdominal tenderness in the periumbilical area and left upper quadrant. There is guarding. There is no rebound. Musculoskeletal:         General: No deformity. Normal range of motion. Cervical back: Neck supple. Skin:     General: Skin is warm and dry. Neurological:      Mental Status: He is alert. Cranial Nerves: No cranial nerve deficit. Psychiatric:         Behavior: Behavior normal.          MDM     26-year-old male presents with progressive abdominal distention and pain overnight. He has not had a bowel movement for 4 or 5 days. History of multiple abdominal surgeries.   Had a recent admission here for an unrelated issue. His exam is concerning for possible evolving small bowel obstruction or other mechanical process. Appears dehydrated with modestly elevated lactic acid so fluid resuscitation started. Plan for pain and nausea control with imaging of abdomen and pelvis. He has an allergy to contrast so we will do the scan without. Procedures    Care transferred to Dr. Danya Corrales at 3 PM pending results of testing. Significant delay due to difficult IV access.

## 2022-02-21 NOTE — PROGRESS NOTES
Noted recent hospitalization <30 days. Attempted to visit patient currently JOY. CM will follow for transitions of care needs.

## 2022-02-21 NOTE — PROGRESS NOTES
Reason for Readmission:  Intestinal Obstruction          RUR Score/Risk Level:     NA / RRAT = 13%    PCP: First and Last name: [facility]   Name of Practice:    Are you a current patient: Yes/No:  Yes   Approximate date of last visit: 3/3/22   Can you participate in a virtual visit with your PCP:     Is a Care Conference indicated: No Care Conference indicated at this time. Did you attend your follow up appointment (s): If not, why not:  Previous schedule appointment 3/3/22  Resources/supports as identified by patient/family:  Support from daughter/ son-n-law. 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  -     Transportation    -    Support system or lack thereof? Patient has family support and is caregiver for his spouse      Living arrangements? Lives w/ spouse        Self-care/ADLs/Cognition? Had been independent prior to hospitalization          Current Advanced Directive/Advance Care Plan:  AMD on file           Plan for utilizing home health:                Transition of Care Plan:    Based on readmission, the patient's previous Plan of Care   has been evaluated and/or modified. The current Transition of Care Plan is: Previous hospitalization 2/12/22-2/16/22 (cellulitis). This writer met patient during ED visit 2/17/22. Noted patient presented to the ED today w/ constipation and increased pain. Noted patient w/ NG tube and covid rule out. Patient w/ history of cellulitis, abdominal aortic aneurysm repair, inguinal hernia repairs, HTN, and multiple myeloma. Call placed to daughter Eve Baron [phone] introduced to role of CM (spoke w/ daughter previous ED encounter) is en route to hospital and able to talk by phone. Informed patient w/ history of chronic diarrhea since 2011 however no BM during hospitalization or at home.  Patient had not complained of abdominal pain until after ED visit. [facility] RN had placed f/u call today and received updates on patient referred  to PCP however no availability RN requested patient to be evaluated in the ED.      became tearful during conversation when asked about her elderly mother. Daughter and son-n-law had considered adding on to their home states things are progressing quickly. Maria Del Rosario Hyman states \" we don't know if he will make it out of there. \" Emotional support provided and informed CM team is available in collaboration w/ additional medical teams to support during transitions of care. This writer noted DNR status and prognosis. [facility] is insurance provider. Readmission Assessment  Number of days since last admission?: 1-7 days  Previous disposition: Home with Family  Who is being interviewed?: Caregiver  What was the patient's/caregiver's perception as to why they think they needed to return back to the hospital?: Other (Comment) (patient experiencing constipation and abdominal pain)  Did you visit your Primary Care Physician after you left the hospital, before you returned this time?: No  Why weren't you able to visit your PCP?: Other (Comment) (previous appointment for March)  Did you see a specialist, such as Cardiac, Pulmonary, Orthopedic Physician, etc. after you left the hospital?: No  Who advised the patient to return to the hospital?: Other Nurse (Navigator, CTN) [facility] RN)  Does the patient report anything that got in the way of taking their medications?: No  In our efforts to provide the best possible care to you and others like you, can you think of anything that we could have done to help you after you left the hospital the first time, so that you might not have needed to return so soon?: Other (Comment) (Did not feel anything could have been done differently. Per daughter Amandeep Seaman body is just giving out. \")

## 2022-02-21 NOTE — ED TRIAGE NOTES
Triage: Pt arrives ambulatory with his walker from home with CC of constipation x4 days. Pt reports he has been taking hydrocodone for foot pain. He has also been taking docusate but has been unable to have a BM. Pt denies having taken any additional over the counter remedies for constipation.

## 2022-02-22 NOTE — PROGRESS NOTES
0008: Pt attempting to sit up in bed due to increased amount of phlegm build up. Pt accidentally pulled ngt out to about 40 cm. Ngt was able to be re advanced by RN to about 57-60 cm. STAT KUB ordered to verify placement. 0018: KUB being done. 0027: Pt's pulled ngt out to about 25 cm right when KUB was being performed. Ngt re advanced again by this RN to about 60 cm. KUB being done again. 0108: Per NP Leopold Fritz, pt's ngt to be placed on low intermittent suction. Ngt okay to use based on KUB. Protonix ordered for pt's acid reflux. 0248: Pt has completely pulled out ngt. Pt wishing for ngt to be left out. Will continue to monitor. 0957: KENNETH Leon notified about pt's status.

## 2022-02-22 NOTE — PROGRESS NOTES
Day #1 of Cefepime  Indication:  CAP, SSTI  Current regimen:  2G IV q24h  Abx regimen: Cefepime + Vanc  Recent Labs     22  1800 22  1530 22  1504   WBC  --  11.1  --    CREA 1.58*  --  1.75*   BUN 15  --  15     Est CrCl: 34.2 ml/min; UO: -- ml/kg/hr  Temp (24hrs), Av.2 °F (36.8 °C), Min:98.2 °F (36.8 °C), Max:98.2 °F (36.8 °C)    Cultures: pending    Plan: Change to 2G IV q12h for CrCl 30-60 ml/min

## 2022-02-22 NOTE — ED NOTES
Attempted to call report. Floor states they are needing to change rooms, that they are going to call the nursing supervisor to get fixed.

## 2022-02-22 NOTE — PROGRESS NOTES
General Surgery Daily Progress Note    Admit Date: 2022  Post-Operative Day: * No surgery found * from * No surgery found *     Subjective:     Last 24 hrs: pt pulled ngt out multiple times last night. Vomited x1; He denies nausea or abd pain; no flatus. Refuses iv potassium d/t pain. Objective:     Blood pressure 123/60, pulse 87, temperature 98.8 °F (37.1 °C), resp. rate 18, height 6' 1\" (1.854 m), weight 161 lb (73 kg), SpO2 96 %. Temp (24hrs), Av.4 °F (36.9 °C), Min:97.8 °F (36.6 °C), Max:98.8 °F (37.1 °C)      _____________________  Physical Exam:     Alert and Oriented, x3, in no acute distress. Cardiovascular: RRR, no peripheral edema  Abdomen: some distention, few distant BS heard, NT      Assessment:   Active Problems:    HTN (hypertension) (2010)      Overview:       H/O Carotid Stenosis (2010)      Overview: Oct 2003; 50%left subclavian and 50-70% SHEILA       Pre-diabetes (2010)      Overview: ;  Optho Dr Zuleyma Clarke      Multiple myeloma [facility]) (2012)      Aortic stenosis, moderate (4/10/2018)      Intestinal adhesions with complete obstruction ([address] (2022)      Failure to thrive (child) (2022)      Falls frequently (2022)      Moderate protein-calorie malnutrition ([address] (2022)      Acute renal injury ([address] (2022)      Intestinal obstruction ([address] (2022)            Plan:     Would cont npo w/ ice chips  Needs ng if cont to vomit - he understands this  Cont abx  Gi/dvt proph  Chg IVF to NS w/ 40meq KCL  Would consider palliative consult - would wait for a family member to be present to discuss this  Am labs    Data Review:    Recent Labs     22  03422  1530   WBC 15.2* 11.1   HGB 11.6* 11.4*   HCT 37.1 37.0    252     Recent Labs     22  0342 22  1800 22  1504    138 135*   K 3.4* 3.5 HEMOLYZED,RECOLLECT REQUESTED   CL 98 95* 93*   CO2 32 35* 33*   * 152* 138*   BUN 20 15 15   CREA 1.64* 1.58* 1.75*   CA 10.9* 10.6* 11.4*   ALB  --   --  3.1*   ALT  --   --  20     Recent Labs     02/21/22  1504   LPSE 61*           ______________________  Medications:    Current Facility-Administered Medications   Medication Dose Route Frequency    pantoprazole (PROTONIX) 40 mg in 0.9% sodium chloride 10 mL injection  40 mg IntraVENous DAILY    0.9% sodium chloride with KCl 40 mEq/L infusion   IntraVENous CONTINUOUS    [START ON 2/23/2022] Vancomycin, Random - Please draw on Wednesday, 2/23 @ 0400. Thanks!    Other ONCE    phenol throat spray (CHLORASEPTIC) 1 Spray  1 Spray Oral PRN    HYDROcodone-acetaminophen (NORCO) 5-325 mg per tablet 1 Tablet  1 Tablet Oral Q4H PRN    HYDROmorphone (DILAUDID) injection 0.5-1 mg  0.5-1 mg IntraVENous Q2H PRN    ondansetron (ZOFRAN) injection 4 mg  4 mg IntraVENous Q4H PRN    acetaminophen (TYLENOL) tablet 650 mg  650 mg Oral Q6H PRN    HYDROcodone-acetaminophen (NORCO)  mg tablet 1 Tablet  1 Tablet Oral Q4H PRN    diphenhydrAMINE (BENADRYL) injection 12.5 mg  12.5 mg IntraVENous ONCE PRN    hydrALAZINE (APRESOLINE) 20 mg/mL injection 20 mg  20 mg IntraVENous Q6H PRN    enoxaparin (LOVENOX) injection 40 mg  40 mg SubCUTAneous Q24H    glucose chewable tablet 16 g  4 Tablet Oral PRN    dextrose 10 % infusion 0-250 mL  0-250 mL IntraVENous PRN    glucagon (GLUCAGEN) injection 1 mg  1 mg IntraMUSCular PRN    insulin lispro (HUMALOG) injection   SubCUTAneous AC&HS    aspirin delayed-release tablet 81 mg  81 mg Oral QHS    [Held by provider] furosemide (LASIX) tablet 20 mg  20 mg Oral BID    [Held by provider] pregabalin (LYRICA) capsule 50 mg  50 mg Oral BID    Vancomycin - pharmacy to dose   Other Rx Dosing/Monitoring    albuterol (PROVENTIL HFA, VENTOLIN HFA, PROAIR HFA) inhaler 1 Puff  1 Puff Inhalation Q6H PRN    cefepime (MAXIPIME) 2 g in sterile water (preservative free) 10 mL IV syringe  2 g IntraVENous Q12H       [place] Cross, MD  2/22/2022      Attending addendum:  Patient seen and examined independently, agree with above note. Given high risk of sepsis will order lactate and blood cultures just to be safe. See below for remainder of plan  Intestinal adhesions with complete obstruction (HCC)   he is feeling significantly better this afternoon. Belly is much softer, still not passing flatus, no bowel movement. Daughter was at his bedside, and discussed care with him and his daughter. He has been walking up and down the hallway today. Given his interval improvement, will hold off on replacing NG tube at this time, however should he have any further emesis or nausea we will place the NG tube. Discussed the fact that majority of bowel obstructions to improve with conservative measures. Will likely get small bowel follow-through in the morning if he is not completely resolved. Daughter and he although they understand the significant risks of surgery, at this point would like to revisit surgical options if he does not improve. They do understand the risks outlined by Dr. Julian donis and they do understand that even with surgical intervention this could be a terminal event, and that with surgical intervention he has a very high likelihood of prolonged ventilator reliance as well as death. In the interim, he will continue to ambulate. Will monitor.

## 2022-02-22 NOTE — PROGRESS NOTES
Hospitalist Progress Note          Sebastian Walsh MD  Please call  and page for questions. Call physician on-call through the  7pm-7am    Daily Progress Note: 2/22/2022    Primary care provider:Higinio Edmonds NP    Date of admission: 2/21/2022  1:18 PM    Admission summery and hospital course:  HPI\"80year-old man with a past medical history significant for type 2 diabetes, multiple myeloma, hypertension, dyslipidemia, peripheral neuropathy, status post abdominal aortic aneurysm repair in 2012 and COVID-pneumonia. Patient was recently admitted on 2/15 for management of cellulitis. Presented to the emergency room several days ago treated for diarrhea with Imodium, and now with progressive abdominal pain nausea vomiting. Patient seen in the ED today  for abdominal pain started 12 to 24 hours ago associated with vomiting and constipation. The hospitalist team has been consulted to assist general surgery in medical management.   Patient examined at bedside. Patient is ill-appearing, should not reports health has been declining since Covid diagnosis in December 2021. Patient denies chest pain, dizziness, shortness of breath during examination. Patient endorses abdominal pain nausea and vomiting. Productive cough noted on evaluation, patient reports he has had a cough since Covid diagnosis and it is somewhat improved. NG tube is in place draining dark brown bilious fluid. Patient reports abdominal distention has improved some since placement of NG. Patient's son-in-law is at bedside. \"  02/22/2022: Patient NG tube accidentally removed. Subjective:   Patient said he is doing fine. He denies any nausea, vomiting, abdominal pain. He has not passed any gas or bowel movement. His last bowel movement was 6 days ago. Assessment/Plan:   Small bowel obstruction  Patient is being managed nonoperatively including bowel rest, IV fluid, NG tube suction.   Further management plan as per primary team.  Replacing potassium for hypokalemia might be helpful. Patient does not want to take IV potassium, will give p.o. tablet. Weigh patient daily. Hypertension  Blood pressure is reasonable now. Continue to hold Lasix. History of Covid infection  X-ray with scattered patchy ground glass airspace opacities, concerning for pneumonia. Being treated with cefepime and vancomycin for now. Afebrile. Leukocytosis this morning. Blood culture from severe 2/17/2022 are negative. Follow CBC and clinically. Albuterol inhaler might help. Oxygen supplementation as necessary. Type 2 diabetes mellitus  Blood sugar is reasonable for his age. Last A1c was 6.3% on 02/13/2022. Continue to monitor on sliding scale insulin and hypoglycemic protocol. Anemia of iron deficiency and chronic disease  Monitor CBC, transfuse for Hgb less than 7    Peripheral neuropathy  Hold Lyrica for now due to low blood pressure and ongoing bowel obstruction. Cellulitis  Right foot and second toe. Patient said it started with blister at his second right toe. Monitor on vancomycin and cefipime. Elevation of the right foot will be helpful. Failure to thrive, Protein calorie malnutrition  Nutrition consult    Recurrent falls   PT/OT. See orders for other plans. VTE prophylaxis: Enoxaparin. Code status: DNR. Discussed plan of care with Patient/Family and Nurse. Patient daughter and patient son-in-law are at the bedside. Discharge planning: As per primary team.  Follow-up PT/OT.          Review of Systems:     Review of Systems:  Symptom  Y/N  Comments   Symptom  Y/N  Comments    Fever/Chills  n    Chest Pain  n    Poor Appetite  y    Edema  y   right foot and leg   Cough  n   Abdominal Pain  n     Sputum  n   Joint Pain  n    SOB/PARKINSON  n   Pruritis/Rash      Nausea/vomit  n   Tolerating PT/OT      Diarrhea  n   Tolerating Diet      Constipation  y   Other      Could not obtain due to: Objective:   Physical Exam:     Visit Vitals  BP (!) 111/55   Pulse 87   Temp 97.8 °F (36.6 °C)   Resp 16   Wt 73.4 kg (161 lb 13.1 oz)   SpO2 96% Comment: O2 2/L   1hr pulse ox check   BMI 21.35 kg/m²    O2 Flow Rate (L/min): 2 l/min O2 Device: Nasal cannula    Temp (24hrs), Av.2 °F (36.8 °C), Min:97.8 °F (36.6 °C), Max:98.7 °F (37.1 °C)    No intake/output data recorded.  1901 -  0700  In: -   Out: 400       General:  Alert, cooperative, no distress, appears stated age. Lungs:   Clear to auscultation bilaterally. Chest wall:  No tenderness or deformity. Heart:  Regular rate and rhythm, S1, S2 normal. Patient has systolic murmur. Abdomen:   Soft, non-tender. Bowel sounds are absent. No organomegaly. Extremities: Extremities normal, atraumatic, no cyanosis or edema. Pulses: 2+ and symmetric all extremities. Skin: Skin color, texture, turgor normal. No rashes or lesions   Neurologic:  Patient has clear voice. He speaks appropriately. Data Review:       Recent Days:  Recent Labs     22  0342 22  1530   WBC 15.2* 11.1   HGB 11.6* 11.4*   HCT 37.1 37.0    252     Recent Labs     22  0342 22  1800 22  1504    138 135*   K 3.4* 3.5 HEMOLYZED,RECOLLECT REQUESTED   CL 98 95* 93*   CO2 32 35* 33*   * 152* 138*   BUN 20 15 15   CREA 1.64* 1.58* 1.75*   CA 10.9* 10.6* 11.4*   ALB  --   --  3.1*   ALT  --   --  20     No results for input(s): PH, PCO2, PO2, HCO3, FIO2 in the last 72 hours.     24 Hour Results:  Recent Results (from the past 24 hour(s))   METABOLIC PANEL, COMPREHENSIVE    Collection Time: 22  3:04 PM   Result Value Ref Range    Sodium 135 (L) 136 - 145 mmol/L    Potassium HEMOLYZED,RECOLLECT REQUESTED 3.5 - 5.1 mmol/L    Chloride 93 (L) 97 - 108 mmol/L    CO2 33 (H) 21 - 32 mmol/L    Anion gap 9 5 - 15 mmol/L    Glucose 138 (H) 65 - 100 mg/dL    BUN 15 6 - 20 MG/DL    Creatinine 1.75 (H) 0.70 - 1.30 MG/DL BUN/Creatinine ratio 9 (L) 12 - 20      GFR est AA 45 (L) >60 ml/min/1.73m2    GFR est non-AA 37 (L) >60 ml/min/1.73m2    Calcium 11.4 (H) 8.5 - 10.1 MG/DL    Bilirubin, total 0.6 0.2 - 1.0 MG/DL    ALT (SGPT) 20 12 - 78 U/L    AST (SGOT) HEMOLYZED,RECOLLECT REQUESTED 15 - 37 U/L    Alk. phosphatase 70 45 - 117 U/L    Protein, total 7.0 6.4 - 8.2 g/dL    Albumin 3.1 (L) 3.5 - 5.0 g/dL    Globulin 3.9 2.0 - 4.0 g/dL    A-G Ratio 0.8 (L) 1.1 - 2.2     LIPASE    Collection Time: 02/21/22  3:04 PM   Result Value Ref Range    Lipase 61 (L) 73 - 393 U/L   POC LACTIC ACID    Collection Time: 02/21/22  3:09 PM   Result Value Ref Range    Lactic Acid (POC) 2.29 (HH) 0.40 - 2.00 mmol/L   SAMPLES BEING HELD    Collection Time: 02/21/22  3:30 PM   Result Value Ref Range    SAMPLES BEING HELD 1RED,1BLU,1LAV,1PST,1GREY     COMMENT        Add-on orders for these samples will be processed based on acceptable specimen integrity and analyte stability, which may vary by analyte. CBC WITH AUTOMATED DIFF    Collection Time: 02/21/22  3:30 PM   Result Value Ref Range    WBC 11.1 4.1 - 11.1 K/uL    RBC 3.62 (L) 4.10 - 5.70 M/uL    HGB 11.4 (L) 12.1 - 17.0 g/dL    HCT 37.0 36.6 - 50.3 %    .2 (H) 80.0 - 99.0 FL    MCH 31.5 26.0 - 34.0 PG    MCHC 30.8 30.0 - 36.5 g/dL    RDW 17.1 (H) 11.5 - 14.5 %    PLATELET 983 131 - 428 K/uL    MPV 11.7 8.9 - 12.9 FL    NRBC 0.0 0  WBC    ABSOLUTE NRBC 0.00 0.00 - 0.01 K/uL    NEUTROPHILS 84 (H) 32 - 75 %    LYMPHOCYTES 6 (L) 12 - 49 %    MONOCYTES 8 5 - 13 %    EOSINOPHILS 0 0 - 7 %    BASOPHILS 1 0 - 1 %    IMMATURE GRANULOCYTES 1 (H) 0.0 - 0.5 %    ABS. NEUTROPHILS 9.3 (H) 1.8 - 8.0 K/UL    ABS. LYMPHOCYTES 0.7 (L) 0.8 - 3.5 K/UL    ABS. MONOCYTES 0.9 0.0 - 1.0 K/UL    ABS. EOSINOPHILS 0.0 0.0 - 0.4 K/UL    ABS. BASOPHILS 0.1 0.0 - 0.1 K/UL    ABS. IMM.  GRANS. 0.1 (H) 0.00 - 0.04 K/UL    DF SMEAR SCANNED      RBC COMMENTS ANISOCYTOSIS  1+       COVID-19 RAPID TEST    Collection Time: 02/21/22  5:30 PM   Result Value Ref Range    Specimen source Nasopharyngeal      COVID-19 rapid test Not detected NOTD     METABOLIC PANEL, BASIC    Collection Time: 02/21/22  6:00 PM   Result Value Ref Range    Sodium 138 136 - 145 mmol/L    Potassium 3.5 3.5 - 5.1 mmol/L    Chloride 95 (L) 97 - 108 mmol/L    CO2 35 (H) 21 - 32 mmol/L    Anion gap 8 5 - 15 mmol/L    Glucose 152 (H) 65 - 100 mg/dL    BUN 15 6 - 20 MG/DL    Creatinine 1.58 (H) 0.70 - 1.30 MG/DL    BUN/Creatinine ratio 9 (L) 12 - 20      GFR est AA 51 (L) >60 ml/min/1.73m2    GFR est non-AA 42 (L) >60 ml/min/1.73m2    Calcium 10.6 (H) 8.5 - 10.1 MG/DL   GLUCOSE, POC    Collection Time: 02/21/22  9:56 PM   Result Value Ref Range    Glucose (POC) 171 (H) 65 - 117 mg/dL    Performed by Abigail Joshi    CBC WITH AUTOMATED DIFF    Collection Time: 02/22/22  3:42 AM   Result Value Ref Range    WBC 15.2 (H) 4.1 - 11.1 K/uL    RBC 3.67 (L) 4.10 - 5.70 M/uL    HGB 11.6 (L) 12.1 - 17.0 g/dL    HCT 37.1 36.6 - 50.3 %    .1 (H) 80.0 - 99.0 FL    MCH 31.6 26.0 - 34.0 PG    MCHC 31.3 30.0 - 36.5 g/dL    RDW 17.2 (H) 11.5 - 14.5 %    PLATELET 031 925 - 262 K/uL    MPV 11.2 8.9 - 12.9 FL    NRBC 0.0 0  WBC    ABSOLUTE NRBC 0.00 0.00 - 0.01 K/uL    NEUTROPHILS 90 (H) 32 - 75 %    LYMPHOCYTES 3 (L) 12 - 49 %    MONOCYTES 6 5 - 13 %    EOSINOPHILS 0 0 - 7 %    BASOPHILS 0 0 - 1 %    IMMATURE GRANULOCYTES 1 (H) 0.0 - 0.5 %    ABS. NEUTROPHILS 13.6 (H) 1.8 - 8.0 K/UL    ABS. LYMPHOCYTES 0.5 (L) 0.8 - 3.5 K/UL    ABS. MONOCYTES 1.0 0.0 - 1.0 K/UL    ABS. EOSINOPHILS 0.0 0.0 - 0.4 K/UL    ABS. BASOPHILS 0.0 0.0 - 0.1 K/UL    ABS. IMM.  GRANS. 0.1 (H) 0.00 - 0.04 K/UL    DF AUTOMATED     METABOLIC PANEL, BASIC    Collection Time: 02/22/22  3:42 AM   Result Value Ref Range    Sodium 140 136 - 145 mmol/L    Potassium 3.4 (L) 3.5 - 5.1 mmol/L    Chloride 98 97 - 108 mmol/L    CO2 32 21 - 32 mmol/L    Anion gap 10 5 - 15 mmol/L    Glucose 184 (H) 65 - 100 mg/dL BUN 20 6 - 20 MG/DL    Creatinine 1.64 (H) 0.70 - 1.30 MG/DL    BUN/Creatinine ratio 12 12 - 20      GFR est AA 48 (L) >60 ml/min/1.73m2    GFR est non-AA 40 (L) >60 ml/min/1.73m2    Calcium 10.9 (H) 8.5 - 10.1 MG/DL   GLUCOSE, POC    Collection Time: 02/22/22  6:08 AM   Result Value Ref Range    Glucose (POC) 175 (H) 65 - 117 mg/dL    Performed by Paresh Morton        Problem List:  Problem List as of 2/22/2022 Date Reviewed: 2/12/2022          Codes Class Noted - Resolved    Intestinal adhesions with complete obstruction ([facility] ICD-10-CM: K56.52  ICD-9-CM: 560.81  2/21/2022 - Present        Failure to thrive (child) ICD-10-CM: R62.51  ICD-9-CM: 783.41  2/21/2022 - Present        Falls frequently ICD-10-CM: R29.6  ICD-9-CM: V15.88  2/21/2022 - Present        Moderate protein-calorie malnutrition ([facility] ICD-10-CM: E44.0  ICD-9-CM: 263.0  2/21/2022 - Present        Acute renal injury ([facility] ICD-10-CM: N17.9  ICD-9-CM: 584.9  2/21/2022 - Present        Intestinal obstruction ([facility] ICD-10-CM: C51.873  ICD-9-CM: 560.9  2/21/2022 - Present        Cellulitis of right leg ICD-10-CM: L03.115  ICD-9-CM: 682.6  2/12/2022 - Present        Pneumonia due to COVID-19 virus ICD-10-CM: U07.1, J12.82  ICD-9-CM: 480.8, 079.89  12/29/2021 - Present        Personal history of atrial fibrillation ICD-10-CM: Z86.79  ICD-9-CM: V12.59  11/9/2020 - Present        Carotid stenosis, asymptomatic, right ICD-10-CM: I65.21  ICD-9-CM: 433.10  5/29/2020 - Present        Hyperlipemia, mixed ICD-10-CM: E78.2  ICD-9-CM: 272.2  9/27/2018 - Present        Aortic stenosis, moderate ICD-10-CM: I35.0  ICD-9-CM: 424.1  4/10/2018 - Present        Mild concentric left ventricular hypertrophy (LVH) ICD-10-CM: I51.7  ICD-9-CM: 429.3  4/10/2018 - Present        Chronic diarrhea ICD-10-CM: K52.9  ICD-9-CM: 787.91  9/29/2017 - Present        Microalbuminuria ICD-10-CM: R80.9  ICD-9-CM: 791.0  12/10/2015 - Present        Right inguinal hernia ICD-10-CM: K40.90  ICD-9-CM: 550.90  11/2/2015 - Present        Left inguinal hernia ICD-10-CM: K40.90  ICD-9-CM: 550.90  9/14/2015 - Present        Multiple myeloma ([facility] ICD-10-CM: C90.00  ICD-9-CM: 203.00  12/27/2012 - Present        Hypercalcemia ICD-10-CM: R51.08  ICD-9-CM: 275.42  4/25/2012 - Present    Overview Signed 4/25/2012  5:49 PM by Joanna Armijo MD     2012, PTH normal, abnormal protein electrophoresis, referred to Hem-Onc             S/P AAA repair 2/9/2012 ICD-10-CM: O59.971, Z86.79  ICD-9-CM: V45.89  2/22/2012 - Present    Overview Signed 2/22/2012  1:38 PM by MD Dr Gustabo Patterson             Pre-diabetes ICD-10-CM: R73.03  ICD-9-CM: 790.29  9/7/2010 - Present    Overview Addendum 11/1/2011  9:29 AM by Joanna Armijo MD     2005;  Optho Dr Sudhakar Godwin             Sinus tachycardia ICD-10-CM: R00.0  ICD-9-CM: 427.89  9/7/2010 - Present        H/O Heavy Alcohol Use ICD-9-CM: Aricyarae Thomas  9/7/2010 - Present        Vitamin B12 deficiency ICD-10-CM: E53.8  ICD-9-CM: 266.2  9/7/2010 - Present        Hyperhomocysteinemia ([facility] ICD-10-CM: E72.11  ICD-9-CM: 270.4  9/7/2010 - Present        Mild Allergic Rhinitis ICD-10-CM: J30.9  ICD-9-CM: 477.9  9/7/2010 - Present        History of skin cancer ICD-10-CM: Z85.828  ICD-9-CM: V10.83  9/7/2010 - Present    Overview Signed 9/7/2010  9:03 AM by Joanna Armijo MD     Followed by Valerio Del Toro             HTN (hypertension) ICD-10-CM: I10  ICD-9-CM: 401.9  4/6/2010 - Present    Overview Signed 9/7/2010  8:55 AM by Joanna Armijo MD     1990             H/O Carotid Stenosis ICD-10-CM: I65.29  ICD-9-CM: 433.10  4/6/2010 - Present    Overview Addendum 9/7/2010  8:58 AM by Joanna Armijo MD     Oct 2003; 50%left subclavian and 50-70% SHEILA              GERD (gastroesophageal reflux disease) ICD-10-CM: K21.9  ICD-9-CM: 530.81  4/6/2010 - Present        RESOLVED: Myeloma ([facility] ICD-10-CM: C90.00  ICD-9-CM: 203.00 1/6/2015 - 6/10/2016        RESOLVED: Gastrointestinal bleeding ICD-10-CM: K92.2  ICD-9-CM: 578.9  12/26/2014 - 2/28/2018        RESOLVED: Dulce Mendoza stone pancreatitis ICD-10-CM: K85.10  ICD-9-CM: 577.0, 574.20  1/23/2014 - 2/28/2018        RESOLVED: Acute cholecystitis ICD-10-CM: K81.0  ICD-9-CM: 575.0  1/23/2014 - 2/28/2018        RESOLVED: Abnormal serum protein electrophoresis ICD-10-CM: R77.8  ICD-9-CM: 790.99  4/25/2012 - 2/28/2018    Overview Signed 4/25/2012  5:48 PM by Yohana Nguyen MD     4/2012, referred to Hem-Onc             RESOLVED: Atrial fibrillation 1/2012 ICD-10-CM: I48.91  ICD-9-CM: 427.31  2/22/2012 - 11/9/2020    Overview Signed 2/22/2012  1:33 PM by Yohana Nguyen MD     Cardio Dr Ayaka Bhakta: AAA (abdominal aortic aneurysm) ([facility] ICD-10-CM: I71.4  ICD-9-CM: 441.4  1/6/2012 - 9/26/2018    Overview Signed 1/6/2012  2:07 PM by Yohana Nguyen MD     Detected on plain films back xray 1/2012; Ct scheduled             RESOLVED: Anemia ICD-10-CM: D64.9  ICD-9-CM: 285.9  1/5/2012 - 2/28/2018        RESOLVED: Hypertriglyceridemia ICD-10-CM: E78.1  ICD-9-CM: 272.1  9/7/2010 - 9/27/2018    Overview Signed 9/7/2010  8:55 AM by Yohana Nguyen MD     2003             RESOLVED: Bilateral Carotid Bruits, L>R ICD-10-CM: R09.89  ICD-9-CM: 785.9  9/7/2010 - 2/28/2018    Overview Signed 9/7/2010  8:57 AM by Yohana Nguyen MD     10/03             RESOLVED: Anemia due to GI bleed 2007 ICD-10-CM: D64.9  ICD-9-CM: 285.9  4/6/2010 - 2/28/2018        RESOLVED: GI bleed, duodenitis 2007 ICD-10-CM: K92.2  ICD-9-CM: 578.9  4/6/2010 - 2/28/2018    Overview Addendum 9/7/2010  9:01 AM by Yohana Nguyen MD     8/07 Duodenitis                       Medications reviewed  Current Facility-Administered Medications   Medication Dose Route Frequency    pantoprazole (PROTONIX) 40 mg in 0.9% sodium chloride 10 mL injection  40 mg IntraVENous DAILY    fentaNYL citrate (PF) injection 50 mcg  50 mcg IntraVENous Q1H PRN    phenol throat spray (CHLORASEPTIC) 1 Spray  1 Spray Oral PRN    0.9% sodium chloride infusion  125 mL/hr IntraVENous CONTINUOUS    HYDROcodone-acetaminophen (NORCO) 5-325 mg per tablet 1 Tablet  1 Tablet Oral Q4H PRN    HYDROmorphone (DILAUDID) injection 0.5-1 mg  0.5-1 mg IntraVENous Q2H PRN    ondansetron (ZOFRAN) injection 4 mg  4 mg IntraVENous Q4H PRN    acetaminophen (TYLENOL) tablet 650 mg  650 mg Oral Q6H PRN    HYDROcodone-acetaminophen (NORCO)  mg tablet 1 Tablet  1 Tablet Oral Q4H PRN    diphenhydrAMINE (BENADRYL) injection 12.5 mg  12.5 mg IntraVENous ONCE PRN    hydrALAZINE (APRESOLINE) 20 mg/mL injection 20 mg  20 mg IntraVENous Q6H PRN    enoxaparin (LOVENOX) injection 40 mg  40 mg SubCUTAneous Q24H    glucose chewable tablet 16 g  4 Tablet Oral PRN    dextrose 10 % infusion 0-250 mL  0-250 mL IntraVENous PRN    glucagon (GLUCAGEN) injection 1 mg  1 mg IntraMUSCular PRN    insulin lispro (HUMALOG) injection   SubCUTAneous AC&HS    aspirin delayed-release tablet 81 mg  81 mg Oral QHS    [Held by provider] furosemide (LASIX) tablet 20 mg  20 mg Oral BID    [Held by provider] pregabalin (LYRICA) capsule 50 mg  50 mg Oral BID    Vancomycin - pharmacy to dose   Other Rx Dosing/Monitoring    albuterol (PROVENTIL HFA, VENTOLIN HFA, PROAIR HFA) inhaler 1 Puff  1 Puff Inhalation Q6H PRN    cefepime (MAXIPIME) 2 g in sterile water (preservative free) 10 mL IV syringe  2 g IntraVENous Q12H       Care Plan discussed with: Patient/Family and Nurse    Total time spent with patient: 30 minutes.     Kori Walalce MD

## 2022-02-22 NOTE — ACP (ADVANCE CARE PLANNING)
Advance Care Planning     Advance Care Planning Activator (Inpatient)  Conversation Note      Date of ACP Conversation: 02/21/22     Conversation Conducted with:   Patient with Decision Making Capacity    ACP Activator: Yessenia Chen, [address] [facility] Decision Maker:    Current Designated Health Care Decision Maker:     Primary Decision Maker: fredis hudson - [phone]  Click here to complete [address] including selection of the Healthcare Decision Maker Relationship (ie \"Primary\")  Lily Ceron (Primary) [phone]    Care Preferences    Ventilation: \"If you were in your present state of health and suddenly became very ill and were unable to breathe on your own, what would your preference be about the use of a ventilator (breathing machine) if it were available to you? \"      If patient would desire the use of a ventilator (breathing machine), answer \"yes\", if not \"no\"    \"If your health worsens and it becomes clear that your chance of recovery is unlikely, what would your preference be about the use of a ventilator (breathing machine) if it were available to you? \" No    Would the patient desire the use of a ventilator (breathing machine)? No      Resuscitation  \"CPR works best to restart the heart when there is a sudden event, like a heart attack, in someone who is otherwise healthy. Unfortunately, CPR does not typically restart the heart for people who have serious health conditions or who are very sick. \"    \"In the event your heart stopped as a result of an underlying serious health condition, would you want attempts to be made to restart your heart (answer \"yes\" for attempt to resuscitate) or would you prefer a natural death (answer \"no\" for do not attempt to resuscitate)? \" No      [] Yes  [x] No   Educated Patient / Valetta Cons regarding differences between Advance Directives and portable DNR orders.     Length of ACP Conversation in minutes:  Surgeon addressed ACP Conversation    Conversation Outcomes:  [x] ACP discussion completed  [] Existing advance directive reviewed with patient; no changes to patient's previously recorded wishes     [] New Advance Directive completed   [] Portable Do Not Resuscitate prepared for Provider review and signature  [] POLST/POST/MOLST/MOST prepared for Provider review and signature      Follow-up plan:    [x] Schedule follow-up conversation to continue planning  [] Referred individual to Provider for additional questions/concerns   [] Advised patient/agent/surrogate to review completed ACP document and update if needed with changes in condition, patient preferences or care setting     [] This note routed to one or more involved healthcare providers

## 2022-02-22 NOTE — PROGRESS NOTES
Transition of Care: TBD; possibly home with [place] vs rehab (no recommendations or orders yet)     Transport Plan: TBD; possibly in car with family vs BLS (not set up yet)    RUR: 20%    DX: intestinal obstruction    Main contact is Thomas Alexis- [phone]    Discharge pending:  -patient has SBO  -patient has NG tube  -pending diet advancement  -pending progress with PT/OT and recs  -pending medical progress and care recs from general surgery  -pending palliative care consult    NOTE: Noted patient w/ NG tube and covid rule out. Patient w/ history of cellulitis, abdominal aortic aneurysm repair, inguinal hernia repairs, HTN, and multiple myeloma. ; patient had covid back in fall of 2021    daughter Santos Alexis [phone] ; Informed patient w/ history of chronic diarrhea since 2011 however no BM during hospitalization or at home. Patient had not complained of abdominal pain until after ED visit. [facility] RN had placed f/u call today and received updates on patient referred  to PCP however no availability RN requested patient to be evaluated in the ED. 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      CM following  Odilia Zaragoza RN, CRM

## 2022-02-22 NOTE — CONSULTS
Comprehensive Nutrition Assessment    Type and Reason for Visit: Initial,Consult    Nutrition Recommendations/Plan:      Recommend advancing diet order to 4 Carb Choice Diet when medically appropriate. Nutrition Assessment:    81 yo male admitted for intestinal obstruction. PMhx: open AAA repair, hernia repairs, DM, bone CA, GERD, ETOH abuse (>10 years ago), HTN. Underweight per BMI per age. Weight hx in EMR indicates weight loss of 5.5kg over last 2 months (7% loss). Pt states he has had decreased appetite associated with weight loss since having COVID in December. C/o worsening ABD pain with n/v on admission. CT suggested intestinal obstruction distal ileum near ileocecal valve. KUB today shows persistent SBO. NPO since admission with NGT to suction. Pt pulled out NGT last night and had 1 episode of vomiting. MD notes plan to replace NGT if vomiting continues. MD recommending palliative consult vs surgery. Labs:       Malnutrition Assessment:  Malnutrition Status:  Severe malnutrition    Context:  Acute illness     Findings of the 6 clinical characteristics of malnutrition:   Energy Intake:  7 - 50% or less of est energy requirements for 5 or more days  Weight Loss:  No significant weight loss (7% loss x 2 months)     Body Fat Loss:  7 - Moderate body fat loss, Orbital   Muscle Mass Loss:  7 - Moderate muscle mass loss, Temples (temporalis),Clavicles (pectoralis & deltoids)  Fluid Accumulation:  No significant fluid accumulation,     Strength:  Not performed       Nutritionally Significant Medications: Kcl, zofran; NaCl at 125 ml/hr    Estimated Daily Nutrient Needs:  Energy (kcal): 9162-6101 (30-33 kcals/kg); Weight Used for Energy Requirements: Current  Protein (g): 110 (1.5 gm/kg);  Weight Used for Protein Requirements: Current  Fluid (ml/day): 2200; Method Used for Fluid Requirements: 1 ml/kcal    Nutrition Related Findings:       BM: PTA  Edema: none  Wounds:  None       Current Nutrition Therapies:   Diet: NPO  Supplements: none  Additional Caloric Sources: none    Meal intake: No data found. Supplement Intake: No data found. Anthropometric Measures:  · Height:  6' 1\" (185.4 cm)  · Current Body Wt:  73.4 kg (161 lb 13.1 oz)   · Admission Body Wt:       · Usual Body Wt:        · Ideal Body Wt:  184:  87.9 %   · Adjusted Body Weight:   ; Weight Adjustment for: No adjustment   · Adjusted BMI:       · BMI Categories:  Underweight (BMI less than 22) age over 72     Wt Readings from Last 10 Encounters:   02/21/22 73.4 kg (161 lb 13.1 oz)   02/12/22 72.6 kg (160 lb)   12/29/21 78.9 kg (173 lb 15.1 oz)   03/23/21 78.9 kg (174 lb)   09/29/20 78 kg (172 lb)   09/14/20 78.9 kg (174 lb)   05/30/20 78 kg (171 lb 15.3 oz)   05/15/20 78.9 kg (174 lb)   03/05/20 78.5 kg (173 lb)   03/28/19 79 kg (174 lb 3.2 oz)       Nutrition Diagnosis:   · Inadequate oral intake related to inadequate protein-energy intake,altered GI function as evidenced by NPO or clear liquid status due to medical condition,GI abnormality      Nutrition Interventions:   Food and/or Nutrient Delivery: Start oral diet  Nutrition Education and Counseling: No recommendations at this time  Coordination of Nutrition Care: Continue to monitor while inpatient    Goals:  Advance PO diet with intakes > 50% within next 5-7 days       Nutrition Monitoring and Evaluation:   Behavioral-Environmental Outcomes: None identified  Food/Nutrient Intake Outcomes: Diet advancement/tolerance,Food and nutrient intake  Physical Signs/Symptoms Outcomes: Biochemical data,GI status,Weight    Discharge Planning:     Too soon to determine     Lina Antonio RD  Contact via [employer]

## 2022-02-22 NOTE — PROGRESS NOTES
Problem: Mobility Impaired (Adult and Pediatric)  Goal: *Acute Goals and Plan of Care (Insert Text)  Description: FUNCTIONAL STATUS PRIOR TO ADMISSION: Patient was modified independent using a rolling walker for functional mobility. HOME SUPPORT PRIOR TO ADMISSION: The patient lived with spouse but did not require assist. Pt is spouse's primary caregiver (does not have to provide physical assistance). Pt's daughter and son in law are involved in family's care and checks on them daily and assists as needed    Physical Therapy Goals  Initiated 2/22/2022  1. Patient will move from supine to sit and sit to supine  in bed with modified independence within 7 day(s). 2.  Patient will transfer from bed to chair and chair to bed with modified independence using the least restrictive device within 7 day(s). 3.  Patient will perform sit to stand with modified independence within 7 day(s). 4.  Patient will ambulate with modified independence for 200 feet with the least restrictive device within 7 day(s). 5.  Patient will ascend/descend 12 stairs with single handrail(s) with supervision/set-up within 7 day(s).'  2/22/2022 0939 by Magno Aldana PT  Outcome: Not Progressing Towards Goal   PHYSICAL THERAPY EVALUATION  Patient: Kay Sma (89 y.o. male)  Date: 2/22/2022  Primary Diagnosis: Intestinal obstruction [facility]) [K56.609]        Precautions:   Fall,DNR      ASSESSMENT  Based on the objective data described below, the patient presents with impaired balance and gait s/p admission on 2/21 with abdominal pain, vomiting, and constipation. NG tube was placed and fell out overnight. Pt received supine in bed on 2L of oxygen. Pt tolerated evaluation well. Pt completed bed mobility with standby assist. Pt completed sit<>stand with RW with CGA and tolerated gait training x 100 ft. Pt with generalized instability, but no overt LOB noted. Pt completed gait training on 2L of oxygen and spO2: 94% post gait training.  Pt reported he was not on oxygen just prior to this admission, but was on it for 1 week in December after covid infection. Pt will continue to benefit from PT to progress mobility, improve tolerance to activity and reach highest level of independence. Recommend home with HHPT upon discharge. Current Level of Function Impacting Discharge (mobility/balance): CGA transfers and gait training x 100 ft with RW    Functional Outcome Measure: The patient scored Total Score: 21/28 on the Tinetti outcome measure which is indicative of moderate fall risk. Other factors to consider for discharge: ambulatory with RW, not on home O2     Patient will benefit from skilled therapy intervention to address the above noted impairments. PLAN :  Recommendations and Planned Interventions: bed mobility training, transfer training, gait training, therapeutic exercises, neuromuscular re-education, patient and family training/education, and therapeutic activities      Frequency/Duration: Patient will be followed by physical therapy:  3 times a week to address goals. Recommendation for discharge: (in order for the patient to meet his/her long term goals)  Physical therapy at least 2 days/week in the home     This discharge recommendation:  Has been made in collaboration with the attending provider and/or case management    IF patient discharges home will need the following DME: patient owns DME required for discharge         SUBJECTIVE:   Patient stated I'm feeling much much better.     OBJECTIVE DATA SUMMARY:   HISTORY:    Past Medical History:   Diagnosis Date    AAA (abdominal aortic aneurysm) ([address] 1/6/2012    Abnormal serum protein electrophoresis 4/25/2012    Anemia 4/6/2010    Anemia 1/5/2012    Anemia due to GI bleed 2007 4/6/2010    Aneurysm ([address]     AAA    Arrhythmia     ATRIAL FIB PT STATES HE DOESN'T HAVE    Arthritis     Atrial fibrillation 1/2012 2/22/2012    Bilateral Carotid Bruits, L>R 9/7/2010    Bone cancer [address]     Borderline diabetes mellitus 4/6/2010    Cancer ([facility]     SKIN MELANOMA ON BACK     Carotid stenosis 4/6/2010    Carotid stenosis     Chronic pain     Diabetes mellitus, type 2 ([facility] 9/7/2010    NO MEDS    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    ETOH abuse 4/6/2010    GERD (gastroesophageal reflux disease) 4/6/2010    GI bleed 4/6/2010    GI bleed, duodenitis 2007 4/6/2010    H/O Heavy Alcohol Use 9/7/2010    History of skin cancer 9/7/2010    HTN (hypertension) 4/6/2010    Hypercalcemia 4/25/2012    Hyperhomocysteinemia ([facility] 9/7/2010    Hypertriglyceridemia 9/7/2010    Left inguinal hernia 9/14/2015    Lipids blood increased 4/6/2010    Microalbuminuria 12/10/2015    Mild Allergic Rhinitis 9/7/2010    Pre-diabetes 9/7/2010 2005;  Optho Dr Mckeon Phi     Right inguinal hernia 11/2/2015    S/P AAA repair 2/9/2012 2/22/2012    Sinus tachycardia 9/7/2010    Tachycardia 4/6/2010    Vitamin B12 deficiency 9/7/2010     Past Surgical History:   Procedure Laterality Date    COLONOSCOPY  8/2007    Normal; Dr Gabe Pink    EGD  8/2007    small hiatal hernia, mild duodenitis; Dr Tony Gautam AAA REPAIR  2/9/2012    Dr Gaetano Bain CATARACT REMOVAL      both eyes    HX CHOLECYSTECTOMY  1-29-14    [facility]- Dr. Jamshid Serrano    HX GI      COLONOSCOPY    HX HEENT      LASIK BOTH EYES    HX HERNIA REPAIR Left 9/24/15    left indirect inguinal by Dr. Niyah Pickard Right 11/2/15    inguinal w/ mesh by Dr. Niyah Pickard Bilateral 10/2015 And 11/2015    Inguinal    BLUE DOPPLER  3/26/2012    BLUE Echo; + clot       Personal factors and/or comorbidities impacting plan of care:     Home Situation  Home Environment: Private residence  # Steps to Enter: 0  One/Two Story Residence: Two story  Living Alone: No  Support Systems: Spouse/Significant Other,Child(manuela)  Patient Expects to be Discharged to[de-identified] Home with home health  Current DME Used/Available at Home: [employer], [address], rolling,Shower chair  Tub or Shower Type: Tub/Shower combination    EXAMINATION/PRESENTATION/DECISION MAKING:   Critical Behavior:              Hearing: Auditory  Auditory Impairment: None  Skin:    Edema:   Range Of Motion:  AROM: Within functional limits                       Strength:    Strength: Within functional limits                    Tone & Sensation:                  Sensation: Intact               Coordination:     Vision:      Functional Mobility:  Bed Mobility:     Supine to Sit: Supervision  Sit to Supine: Supervision  Scooting: Supervision  Transfers:  Sit to Stand: Contact guard assistance;Stand-by assistance  Stand to Sit: Stand-by assistance                       Balance:   Sitting: Intact  Standing: Impaired; With support  Standing - Static: Good  Standing - Dynamic : Good;Fair  Ambulation/Gait Training:  Distance (ft): 100 Feet (ft)  Assistive Device: Gait belt;Walker, rolling  Ambulation - Level of Assistance: Contact guard assistance        Gait Abnormalities: Decreased step clearance        Base of Support: Narrowed     Speed/Monica: Pace decreased (<100 feet/min)              Functional Measure:  Tinetti test:    Sitting Balance: 1  Arises: 1  Attempts to Rise: 2  Immediate Standing Balance: 1  Standing Balance: 1  Nudged: 1  Eyes Closed: 1  Turn 360 Degrees - Continuous/Discontinuous: 1  Turn 360 Degrees - Steady/Unsteady: 1  Sitting Down: 1  Balance Score: 11 Balance total score  Indication of Gait: 1  R Step Length/Height: 1  L Step Length/Height: 1  R Foot Clearance: 1  L Foot Clearance: 1  Step Symmetry: 1  Step Continuity: 1  Path: 1  Trunk: 1  Walking Time: 1  Gait Score: 10 Gait total score  Total Score: 21/28 Overall total score         Tinetti Tool Score Risk of Falls  <19 = High Fall Risk  19-24 = Moderate Fall Risk  25-28 = Low Fall Risk  Tinetti ME. Performance-Oriented Assessment of Mobility Problems in Elderly Patients. Bruce [address]; [id number].  (Scoring Description: PT Bulletin Feb. 10, 1993)    Older adults: Candice Terrell et al, 2009; n = 1000 [facility] elderly evaluated with ABC, BHUPENDRA, ADL, and IADL)  · Mean BHUPENDRA score for males aged 69-68 years = 26.21(3.40)  · Mean BHUPENDRA score for females age 69-68 years = 25.16(4.30)  · Mean BHUPENDRA score for males over 80 years = 23.29(6.02)  · Mean BHUPENDRA score for females over 80 years = 17.20(8.32)           Based on the above components, the patient evaluation is determined to be of the following complexity level: MEDIUM    Pain Rating:  Pt denied pain    Activity Tolerance:   Good--94% on 2L of oxygen during activity      After treatment patient left in no apparent distress:   Supine in bed, Call bell within reach, Caregiver / family present, and Side rails x 3    COMMUNICATION/EDUCATION:   The patients plan of care was discussed with: Registered nurse. Fall prevention education was provided and the patient/caregiver indicated understanding., Patient/family have participated as able in goal setting and plan of care. , and Patient/family agree to work toward stated goals and plan of care.     Thank you for this referral.  Danay Montoya, PT, DPT   Time Calculation: 20 mins

## 2022-02-22 NOTE — PROGRESS NOTES
Pharmacist Note - Vancomycin Dosing    Consult provided for this 80 y.o. male for indication of cellulitis. - admitted - was on vanc during inpatient stay, discharged on keflex  - presented to the ER on  for cellulitis, was given keflex and discharged  Antibiotic regimen(s): vanc + levaquin  Patient on vancomycin PTA? NO     Recent Labs     22  1530 22  1504   WBC 11.1  --    CREA  --  1.75*   BUN  --  15     Frequency of BMP: daily through   Height: 185.4 cm  Weight: 73.4 kg  Est CrCl: 30.9 ml/min; UO: -- ml/kg/hr  Temp (24hrs), Av.2 °F (36.8 °C), Min:98.2 °F (36.8 °C), Max:98.2 °F (36.8 °C)    Cultures:  Previous admission:   blood- NG - final   blood - NGTD- prelim    MRSA Swab ordered (if applicable)? YES (due to also being a COVID PUI, not related to cellulitis that [facility] is treating)    The plan below is expected to result in a target range of AUC/ERICA 400-500    Therapy will be initiated with a loading dose of 1750 mg IV x 1. SCr elevated; patient's baseline SCr is ~1-1.1 mg/dl. Recommend dosing by levels at this time. Could consider ordering a maintenance dose tomorrow if renal function improves with hydration. Pharmacy to follow patient daily and order levels / make dose adjustments as appropriate. *Vancomycin has been dosed used Bayesian kinetics software to target an AUC/ERICA of 400-600, which provides adequate exposure for an assumed infection due to MRSA with an ERICA of 1 or less while reducing the risk of nephrotoxicity as seen with traditional trough based dosing goals.

## 2022-02-22 NOTE — PROGRESS NOTES
Pt refusing IV potassium stating \"its burning too much I would like it turned off. \" Attempted to titrate with saline so pt could tolerate. Pt still unable to tolerate.  Paged hospitalist.

## 2022-02-22 NOTE — PROGRESS NOTES
Pt refusing NG tube insertion. Pt educated that if he begins vomiting he will need NG tube. Good understanding verbalized.

## 2022-02-22 NOTE — PROGRESS NOTES
Day #1 of levaquin  Indication:  CAP  Current regimen:  750 mg daily    Recent Labs     22  1530 22  1504   WBC 11.1  --    CREA  --  1.75*   BUN  --  15     Temp (24hrs), Av.2 °F (36.8 °C), Min:98.2 °F (36.8 °C), Max:98.2 °F (36.8 °C)    Est CrCl: 30.9 ml/min     Plan: Change to 750 mg q48h

## 2022-02-23 NOTE — PROGRESS NOTES
General Surgery Daily Progress Note    Admit Date: 2022  Post-Operative Day: * No surgery found * from * No surgery found *     Subjective:     Last 24 hrs: pt denies n/v; occasional hiccup and belching; no flatus or BM, no abd rumbling   hgb down to 8.9    Objective:     Blood pressure (!) 119/56, pulse 66, temperature 97.5 °F (36.4 °C), resp. rate 16, height 6' 1\" (1.854 m), weight 167 lb 1.7 oz (75.8 kg), SpO2 90 %. Temp (24hrs), Av °F (36.7 °C), Min:97.5 °F (36.4 °C), Max:98.8 °F (37.1 °C)      _____________________  Physical Exam:     Alert and Oriented, x3, in no acute distress. Cardiovascular: RRR, no peripheral edema  Abdomen: distended, few BS      Assessment:   Active Problems:    HTN (hypertension) (2010)      Overview:       H/O Carotid Stenosis (2010)      Overview: Oct 2003; 50%left subclavian and 50-70% SHEILA       Pre-diabetes (2010)      Overview: ;  Optho Dr Silvia Ware      Multiple myeloma [facility]) (2012)      Aortic stenosis, moderate (4/10/2018)      Intestinal adhesions with complete obstruction ([address] (2022)      Failure to thrive (child) (2022)      Falls frequently (2022)      Moderate protein-calorie malnutrition ([address] (2022)      Acute renal injury ([address] (2022)      Intestinal obstruction ([address] (2022)            Plan:     SBS today  Repeat CBC since big drop in H/H  Cont npo  PPI  Cont abx  Am labs    Data Review:    Recent Labs     22  0403 22  0342 22  1530   WBC 6.9 15.2* 11.1   HGB 8.9* 11.6* 11.4*   HCT 30.2* 37.1 37.0    265 252     Recent Labs     22  0403 22  0342 22  1800 22  1504 22  1504    140 138   < > 135*   K 4.6 3.4* 3.5   < > HEMOLYZED,RECOLLECT REQUESTED   * 98 95*   < > 93*   CO2 28 32 35*   < > 33*   GLU 57* 184* 152*   < > 138*   BUN 21* 20 15   < > 15   CREA 1.34* 1.64* 1.58*   < > 1.75*   CA 9.5 10.9* 10.6*   < > 11.4*   MG 2.2  --   --   -- --    ALB  --   --   --   --  3.1*   ALT  --   --   --   --  20    < > = values in this interval not displayed. Recent Labs     02/21/22  1504   LPSE 61*           ______________________  Medications:    Current Facility-Administered Medications   Medication Dose Route Frequency    pantoprazole (PROTONIX) 40 mg in 0.9% sodium chloride 10 mL injection  40 mg IntraVENous DAILY    0.9% sodium chloride with KCl 40 mEq/L infusion   IntraVENous CONTINUOUS    phenol throat spray (CHLORASEPTIC) 1 Spray  1 Spray Oral PRN    HYDROcodone-acetaminophen (NORCO) 5-325 mg per tablet 1 Tablet  1 Tablet Oral Q4H PRN    HYDROmorphone (DILAUDID) injection 0.5-1 mg  0.5-1 mg IntraVENous Q2H PRN    ondansetron (ZOFRAN) injection 4 mg  4 mg IntraVENous Q4H PRN    acetaminophen (TYLENOL) tablet 650 mg  650 mg Oral Q6H PRN    HYDROcodone-acetaminophen (NORCO)  mg tablet 1 Tablet  1 Tablet Oral Q4H PRN    hydrALAZINE (APRESOLINE) 20 mg/mL injection 20 mg  20 mg IntraVENous Q6H PRN    enoxaparin (LOVENOX) injection 40 mg  40 mg SubCUTAneous Q24H    glucose chewable tablet 16 g  4 Tablet Oral PRN    dextrose 10 % infusion 0-250 mL  0-250 mL IntraVENous PRN    glucagon (GLUCAGEN) injection 1 mg  1 mg IntraMUSCular PRN    insulin lispro (HUMALOG) injection   SubCUTAneous AC&HS    aspirin delayed-release tablet 81 mg  81 mg Oral QHS    [Held by provider] furosemide (LASIX) tablet 20 mg  20 mg Oral BID    [Held by provider] pregabalin (LYRICA) capsule 50 mg  50 mg Oral BID    Vancomycin - pharmacy to dose   Other Rx Dosing/Monitoring    albuterol (PROVENTIL HFA, VENTOLIN HFA, PROAIR HFA) inhaler 1 Puff  1 Puff Inhalation Q6H PRN    cefepime (MAXIPIME) 2 g in sterile water (preservative free) 10 mL IV syringe  2 g IntraVENous Q12H       Byron Jean-Baptiste NP  2/23/2022    Attending addendum:  Patient seen and examined independently, agree with above note.   Small bowel follow-through reviewed, appears to be obstruction in the mid to distal jejunum, he is still feeling better than previously, however at this point he is unlikely to resolve. He has family would like to continue nonoperative management at this time, and we will give him a little bit more time however should he not improve significantly tomorrow the decision will have to be made whether to take him to the operating room or to continue nonoperative management. He has family are okay with this and they are going to think about this overnight.

## 2022-02-23 NOTE — WOUND CARE
WOCN Note:     New consult for toe. Followed by Dr. Dennis Cheung, podiatry    Chart shows:  Admitted for obstruction. History of Covid-19 in 2021, falls, cellulitis  WBC = 6.6  Admitted from home    Assessment:   Appropriately conversational and daughter at bedside. Reports tenderness in right 5th toe with cleaning. Continent. Surface: total care foam mattress    Bilateral heels; Heels offloaded with pillows. Generalized edema to RLL but NOT toes; blanching erythema to right lower leg/ankle. 1. POA dry eschar to right second toe  ~3 x 3 x 0.1 cm; eschar is central with partial thickness wounds on either side of toe  No exudate  Toenail is black       2. POA erosion to right 4th toe on lateral side  0.4 x 0.4 x 0.1 cm  100% yellow releasing at margins  no exudate    Both toes cleaned with saline, mupirocin applied and dry gauze threaded through toes    Wound Recommendations:    Right toes: clean with saline, apply mupirocin and cover with dry gauze. Change daily.     Transition of Care: Plan to follow weekly and as needed while admitted to hospital.     JOSHUA EstevesN, RN, [facility]  Certified Wound, Ostomy, Continence Nurse  office [phone]  Available via [facility]

## 2022-02-23 NOTE — PROGRESS NOTES
Bedside shift change report given to [address] (oncoming nurse) by Favian Echevarria RN (offgoing nurse). Report included the following information SBAR, Kardex, Intake/Output, MAR and Recent Results.

## 2022-02-23 NOTE — PROGRESS NOTES
Bedside and Verbal shift change report given to Cathryn Osgood, RN (oncoming nurse) by Elizabeth Freeman RN (offgoing nurse). Report included the following information SBAR, Kardex, Intake/Output and MAR.

## 2022-02-23 NOTE — PROGRESS NOTES
Bedside shift change report given to [address] (oncoming nurse) by Patrice Parrish RN (offgoing nurse). Report included the following information SBAR, Kardex, Intake/Output, MAR and Recent Results.

## 2022-02-23 NOTE — PROGRESS NOTES
Patient's blood sugar was 67. Patient alert and asympomatic. Giving 125mL IV dextrose 10% over 10 minutes per protocol. Will recheck blood sugar in 15 minutes. Addendum 5393: blood sugar now 112. Patient is stable.

## 2022-02-23 NOTE — PROGRESS NOTES
Physician Progress Note      PATIENT:               Stalin Paz  CSN #:                  [id number]  :                       1934  ADMIT DATE:       2022 1:18 PM  DISCH DATE:  Jersey Gutierres  PROVIDER #:        Evita MOSQUERA MD          QUERY TEXT:    Pt  has Moderate Protein Calorie malnutrition documented. RD has documented that pt has had a weight loss of 7% of total body weight over the past 2 months, with body fat and muscle mass loss on exam. Please further specify type of malnutrition with documentation in the medical record. The medical record reflects the following:  Risk Factors:  recent diarrhea, decreased po intake, with failure to thrive  Clinical Indicators: admitted with SBO, with pt report of recent diarrhea and abd pain, with 7% weight loss over the past 2 months. Moderate body fat and muscle mass loss noted on exam. Per RD documentation, pt meets criteria for Severe Malnutrition in the setting of inadequate protein energy intake due to altered GI function. Treatment: currently NPO due to medical condition, RD has recommended 4 Carb Choice diet once diet is advanced, I/O, anti-emetics prn, RD to follow. ASPEN Criteria:  https://aspenjournals. onlinelibrary. orozco. com/doi/full/10.1177/3877004465080936    Thank you,  Piotr Rae RN  Clinical Documentation  [phone]  Options provided:  -- Severe Protein calorie malnutrition  -- Moderate Protein calorie malnutrition  -- Other - I will add my own diagnosis  -- Disagree - Not applicable / Not valid  -- Disagree - Clinically unable to determine / Unknown  -- Refer to Clinical Documentation Reviewer    PROVIDER RESPONSE TEXT:    This patient has moderate protein calorie malnutrition as documented.     Query created by: Dev Cisse on 2022 11:42 AM      Electronically signed by:  Nathaniel Armstrong MD 2022 12:26 PM

## 2022-02-23 NOTE — ADT AUTH CERT NOTES
Comments  Comment            Patient Demographics    Patient Name   Pilar Christian   [id number] Legal Sex   Male    1934 [place]   [facility] [address] Phone   [phone] (Home) *Preferred*   [phone] (Mobile)     Patient Demographics    Patient Name   Pilar Christian   [id number] Legal Sex   Male    1934 [place]   [facility] [address] Phone   [phone] (Home) *Preferred*   [phone] (Mobile)   CSN:   [id number]     Admit Date: Admit Time Room Bed   2022  1:18  [phone] [[id number]]       Attending Providers    Provider Pager From To   Tila Lee MD  22   Anahi Kwong MD  22   Lisa Witt MD  22      Emergency Contact(s)    Name Relation Home Work Mobile   fredis hudson Daughter [phone]     Utilization Reviews         DAY 2 GEN SURG NOTE by Cecy Montoya       Review Entered Review Status   2022 13:36 In Primary      Criteria Review   DAY 2 MD NOTE     GEN SURG -   Subjective:      Last 24 hrs: pt pulled ngt out multiple times last night.  Vomited x1; He denies nausea or abd pain; no flatus.     Refuses iv potassium d/t pain  Physical Exam:      Alert and Oriented, x3, in no acute distress. Cardiovascular: RRR, no peripheral edema  Abdomen: some distention, few distant BS heard, NT            Plan:      Would cont npo w/ ice chips  Needs ng if cont to vomit - he understands this  Cont abx  Gi/dvt proph  Chg IVF to NS w/ 40meq KCL  Would consider palliative consult - would wait for a family member to be present to discuss this  Am labs           Attending addendum:  Patient seen and examined independently, agree with above note.  Given high risk of sepsis will order lactate and blood cultures just to be safe.  See below for remainder of plan  Intestinal adhesions with complete obstruction (HCC)   he is feeling significantly better this afternoon.  Belly is much softer, still not passing flatus, no bowel movement.  Daughter was at his bedside, and discussed care with him and his daughter. [facility] has been walking up and down the hallway today. Jon Mall his interval improvement, will hold off on replacing NG tube at this time, however should he have any further emesis or nausea we will place the NG tube.  Discussed the fact that majority of bowel obstructions to improve with conservative measures.  Will likely get small bowel follow-through in the morning if he is not completely resolved.  Daughter and he although they understand the significant risks of surgery, at this point would like to revisit surgical options if he does not improve.  They do understand the risks outlined by Dr. Porfirio donis and they do understand that even with surgical intervention this could be a terminal event, and that with surgical intervention he has a very high likelihood of prolonged ventilator reliance as well as death.  In the interim, he will continue to ambulate.  Will monitor.           Intestinal Obstruction - Care Day 3 (2/23/2022) by Maverick Sotelo       Review Entered Review Status   2/23/2022 12:42 Completed      Criteria Review      Care Day: 3 Care Date: 2/23/2022 Level of Care: Inpatient Floor    Guideline Day 2    Level Of Care    (X) Floor    2/23/2022 12:41:55 EST by Maverick Sotelo      surgical floor    Clinical Status    (X) * Hypotension absent    2/23/2022 12:41:55 EST by Maverick Sotelo      119/56    (X) * Nausea and vomiting absent or improved    2/23/2022 12:41:55 EST by Robert Freeman pt denies n/v; occasional hiccup and belching; no flatus or Bm    (X) * Pain absent or managed    2/23/2022 12:41:55 EST by Robert Freeman not noted    ( ) * Abdominal exam stable or improved    2/23/2022 12:41:55 EST by Maverick Sotelo      abdomen distended, few bs    Activity    (X) Activity as tolerated    2/23/2022 12:41:55 EST by Robert Freeman ambulate with assist    Routes (X) IV fluids    2/23/2022 12:41:55 EST by Koby Hoffman      0.9% sodium chloride with KCl 40 mEq/L infusion  Rate: 125 mL/hr Freq: CONTINUOUS Route: IV    (X) IV medications    2/23/2022 12:41:55 EST by Koby Hoffman      cefepime 2 g iv q12h, d 10% infusion, protonix 40 mg iv daily    ( ) Diet as tolerated    2/23/2022 12:41:55 EST by Fernando Garza - sips of water with meds    Interventions    (X) * NG tube absent    * Milestone   Additional Notes   02/23   97.5 °F (36.4 °C) 66 119/56 Abnormal 16 90 %         WBC 6.9   HGB 8.9*   HCT 30.2*      02/23/22   0403      K 4.6   *   CO2 28   GLU 57*   BUN 21*   CREA 1.34*   CA 9.5      0.9% sodium chloride with KCl 40 mEq/L infusion   Rate: 125 mL/hr Freq: CONTINUOUS Route: IV      aspirin 81 mg po hs, cefepime 2 g iv q12h, d 10% infusion, protonix 40 mg iv daily      Risk Factors:  recent diarrhea, decreased po intake, with failure to thrive   Clinical Indicators: admitted with SBO, with pt report of recent diarrhea and abd pain, with 7% weight loss over the past 2 months. Moderate body fat and muscle mass loss noted on exam. Per RD documentation, pt meets criteria for Severe Malnutrition in the setting of inadequate protein energy intake due to altered GI function. Treatment: currently NPO due to medical condition, RD has recommended 4 Carb Choice diet once diet is advanced, I/O, anti-emetics prn, RD to follow. general surgery -    Subjective:       Last 24 hrs: pt denies n/v; occasional hiccup and belching; no flatus or BM, no abd rumbling    hgb down to 8.9      Physical Exam:    Alert and Oriented, x3, in no acute distress.    Cardiovascular: RRR, no peripheral edema   Abdomen: distended, few BS       Plan:       SBS today   Repeat CBC since big drop in H/H   Cont npo   PPI   Cont abx   Am labs

## 2022-02-23 NOTE — PROGRESS NOTES
Hospitalist Progress Note          Maria Luisa Marion MD  Please call  and page for questions. Call physician on-call through the  7pm-7am    Daily Progress Note: 2/23/2022    Primary care provider:Valorie Edmonds NP    Date of admission: 2/21/2022  1:18 PM    Admission summery and hospital course:  HPI\"80year-old man with a past medical history significant for type 2 diabetes, multiple myeloma, hypertension, dyslipidemia, peripheral neuropathy, status post abdominal aortic aneurysm repair in 2012 and COVID-pneumonia.  Patient was recently admitted on 2/15 for management of cellulitis. Presented to the emergency room several days ago treated for diarrhea with Imodium, and now with progressive abdominal pain nausea vomiting. Patient seen in the ED today  for abdominal pain started 12 to 24 hours ago associated with vomiting and constipation.  The hospitalist team has been consulted to assist general surgery in medical management.   Patient examined at bedside.  Patient is ill-appearing, should not reports health has been declining since Covid diagnosis in December 2021.  Patient denies chest pain, dizziness, shortness of breath during examination.  Patient endorses abdominal pain nausea and vomiting.  Productive cough noted on evaluation, patient reports he has had a cough since Covid diagnosis and it is somewhat improved.  NG tube is in place draining dark brown bilious fluid.  Patient reports abdominal distention has improved some since placement of NG.  Patient's son-in-law is at bedside. \"  02/22/2022: Patient NG tube accidentally removed. Subjective:   Patient said his right second toe is hurting more than yesterday. But his swelling and redness has been decreasing as per patient. Assessment/Plan:   Small bowel obstruction  Patient is being managed nonoperatively including bowel rest, IV fluid and nothing by mouth.   Further management plan as per primary team.  Weigh patient daily.      History of Covid infection  X-ray with scattered patchy ground glass airspace opacities, concerning for pneumonia. Being treated with cefepime and vancomycin for now. Afebrile. Leukocytosis this morning. Blood culture from severe 2/17/2022 are negative. Follow CBC and clinically. Albuterol inhaler might help. Oxygen supplementation as necessary.     Type 2 diabetes mellitus  Blood sugar is reasonable. Last A1c was 6.3% on 02/13/2022. Continue to monitor on sliding scale insulin and hypoglycemic protocol.     Anemia of iron deficiency and chronic disease  Monitor CBC, transfuse for Hgb less than 7.      Cellulitis  Right foot and second toe, started with blister at his second right toe. Monitor on vancomycin and cefipime. Elevation of the right foot will be helpful. Peripheral neuropathy: Hold Lyrica for  low blood pressure and ongoing bowel obstruction.   Hypertension: Blood pressure is reasonable now. Continue to hold Lasix.   Failure to thrive, Protein calorie malnutrition: Nutrition consult.    Recurrent falls: PT/OT. Hypokalemia: Replace and monitor.     See orders for other plans. VTE prophylaxis: Enoxaparin. Code status: DNR. Discussed plan of care with Patient/Family and Nurse. Patient daughter is at the bedside.   Discharge planning: As per primary team.  Follow-up PT/OT       Review of Systems:     Review of Systems:  Symptom  Y/N  Comments   Symptom  Y/N  Comments    Fever/Chills   n   Chest Pain  n    Poor Appetite   n   Edema  n     Cough  n   Abdominal Pain  n     Sputum  n   Joint Pain      SOB/PARKINSON  n   Pruritis/Rash      Nausea/vomit  n   Tolerating PT/OT      Diarrhea  n   Tolerating Diet      Constipation  y   Other      Could not obtain due to:         Objective:   Physical Exam:     Visit Vitals  BP (!) 119/56   Pulse 66   Temp 97.5 °F (36.4 °C)   Resp 16   Ht 6' 1\" (1.854 m)   Wt 75.8 kg (167 lb 1.7 oz)   SpO2 90%   BMI 22.05 kg/m²    O2 Flow Rate (L/min): 2 l/min O2 Device: None (Room air)    Temp (24hrs), Av.7 °F (36.5 °C), Min:97.5 °F (36.4 °C), Max:98.2 °F (36.8 °C)    No intake/output data recorded.  1901 -  0700  In: -   Out: 400       General:   Patient is comfortably laying in the bed. Alert, cooperative, no distress, appears stated age. Lungs:   Clear to auscultation bilaterally. Chest wall:  No tenderness or deformity. Heart:  Regular rate and rhythm, S1, S2 normal, no murmur. Abdomen:   Soft, non-tender. Bowel sounds absent. Extremities:  Right second to swelling including distal part of the right foot. No discharge or no fluctuation. The swelling has improved since yesterday. No cyanosis. Neurologic:  Patient has clear voice. He follows commands well. Data Review:       Recent Days:  Recent Labs     22  0941 22  0403 22  0342   WBC 6.6 6.9 15.2*   HGB 9.2* 8.9* 11.6*   HCT 30.6* 30.2* 37.1    163 265     Recent Labs     22  0403 22  0342 22  1800 22  1504 22  1504    140 138   < > 135*   K 4.6 3.4* 3.5   < > HEMOLYZED,RECOLLECT REQUESTED   * 98 95*   < > 93*   CO2 28 32 35*   < > 33*   GLU 57* 184* 152*   < > 138*   BUN 21* 20 15   < > 15   CREA 1.34* 1.64* 1.58*   < > 1.75*   CA 9.5 10.9* 10.6*   < > 11.4*   MG 2.2  --   --   --   --    ALB  --   --   --   --  3.1*   ALT  --   --   --   --  20    < > = values in this interval not displayed. No results for input(s): PH, PCO2, PO2, HCO3, FIO2 in the last 72 hours.     24 Hour Results:  Recent Results (from the past 24 hour(s))   GLUCOSE, POC    Collection Time: 22  4:58 PM   Result Value Ref Range    Glucose (POC) 103 65 - 117 mg/dL    Performed by Gricelda Juárez    LACTIC ACID    Collection Time: 22  7:00 PM   Result Value Ref Range    Lactic acid 0.8 0.4 - 2.0 MMOL/L   CULTURE, BLOOD    Collection Time: 22  7:00 PM    Specimen: Blood   Result Value Ref Range Special Requests: NO SPECIAL REQUESTS      Culture result: NO GROWTH AFTER 10 HOURS     GLUCOSE, POC    Collection Time: 02/22/22 10:00 PM   Result Value Ref Range    Glucose (POC) 93 65 - 117 mg/dL    Performed by [employer] [facility], BASIC    Collection Time: 02/23/22  4:03 AM   Result Value Ref Range    Sodium 142 136 - 145 mmol/L    Potassium 4.6 3.5 - 5.1 mmol/L    Chloride 113 (H) 97 - 108 mmol/L    CO2 28 21 - 32 mmol/L    Anion gap 1 (L) 5 - 15 mmol/L    Glucose 57 (L) 65 - 100 mg/dL    BUN 21 (H) 6 - 20 MG/DL    Creatinine 1.34 (H) 0.70 - 1.30 MG/DL    BUN/Creatinine ratio 16 12 - 20      GFR est AA >60 >60 ml/min/1.73m2    GFR est non-AA 50 (L) >60 ml/min/1.73m2    Calcium 9.5 8.5 - 10.1 MG/DL   CBC WITH AUTOMATED DIFF    Collection Time: 02/23/22  4:03 AM   Result Value Ref Range    WBC 6.9 4.1 - 11.1 K/uL    RBC 2.87 (L) 4.10 - 5.70 M/uL    HGB 8.9 (L) 12.1 - 17.0 g/dL    HCT 30.2 (L) 36.6 - 50.3 %    .2 (H) 80.0 - 99.0 FL    MCH 31.0 26.0 - 34.0 PG    MCHC 29.5 (L) 30.0 - 36.5 g/dL    RDW 17.2 (H) 11.5 - 14.5 %    PLATELET 315 799 - 320 K/uL    MPV 11.2 8.9 - 12.9 FL    NRBC 0.0 0  WBC    ABSOLUTE NRBC 0.00 0.00 - 0.01 K/uL    NEUTROPHILS 69 32 - 75 %    LYMPHOCYTES 14 12 - 49 %    MONOCYTES 12 5 - 13 %    EOSINOPHILS 4 0 - 7 %    BASOPHILS 1 0 - 1 %    IMMATURE GRANULOCYTES 1 (H) 0.0 - 0.5 %    ABS. NEUTROPHILS 4.7 1.8 - 8.0 K/UL    ABS. LYMPHOCYTES 1.0 0.8 - 3.5 K/UL    ABS. MONOCYTES 0.8 0.0 - 1.0 K/UL    ABS. EOSINOPHILS 0.3 0.0 - 0.4 K/UL    ABS. BASOPHILS 0.1 0.0 - 0.1 K/UL    ABS. IMM.  GRANS. 0.0 0.00 - 0.04 K/UL    DF AUTOMATED     VANCOMYCIN, RANDOM    Collection Time: 02/23/22  4:03 AM   Result Value Ref Range    Vancomycin, random 12.1 UG/ML   MAGNESIUM    Collection Time: 02/23/22  4:03 AM   Result Value Ref Range    Magnesium 2.2 1.6 - 2.4 mg/dL   GLUCOSE, POC    Collection Time: 02/23/22  6:01 AM   Result Value Ref Range    Glucose (POC) 67 65 - 117 mg/dL Performed by [id number], POC    Collection Time: 02/23/22  6:42 AM   Result Value Ref Range    Glucose (POC) 125 (H) 65 - 117 mg/dL    Performed by [place]    CBC WITH AUTOMATED DIFF    Collection Time: 02/23/22  9:41 AM   Result Value Ref Range    WBC 6.6 4.1 - 11.1 K/uL    RBC 2.89 (L) 4.10 - 5.70 M/uL    HGB 9.2 (L) 12.1 - 17.0 g/dL    HCT 30.6 (L) 36.6 - 50.3 %    .9 (H) 80.0 - 99.0 FL    MCH 31.8 26.0 - 34.0 PG    MCHC 30.1 30.0 - 36.5 g/dL    RDW 17.2 (H) 11.5 - 14.5 %    PLATELET 158 020 - 179 K/uL    MPV 10.8 8.9 - 12.9 FL    NRBC 0.0 0  WBC    ABSOLUTE NRBC 0.00 0.00 - 0.01 K/uL    NEUTROPHILS PENDING %    LYMPHOCYTES PENDING %    MONOCYTES PENDING %    EOSINOPHILS PENDING %    BASOPHILS PENDING %    IMMATURE GRANULOCYTES PENDING %    ABS. NEUTROPHILS PENDING K/UL    ABS. LYMPHOCYTES PENDING K/UL    ABS. MONOCYTES PENDING K/UL    ABS. EOSINOPHILS PENDING K/UL    ABS. BASOPHILS PENDING K/UL    ABS. IMM. GRANS.  PENDING K/UL    DF PENDING        Problem List:  Problem List as of 2/23/2022 Date Reviewed: 2/12/2022          Codes Class Noted - Resolved    Intestinal adhesions with complete obstruction ([facility] ICD-10-CM: K56.52  ICD-9-CM: 560.81  2/21/2022 - Present        Failure to thrive (child) ICD-10-CM: R62.51  ICD-9-CM: 783.41  2/21/2022 - Present        Falls frequently ICD-10-CM: R29.6  ICD-9-CM: V15.88  2/21/2022 - Present        Moderate protein-calorie malnutrition ([facility] ICD-10-CM: E44.0  ICD-9-CM: 263.0  2/21/2022 - Present        Acute renal injury ([facility] ICD-10-CM: N17.9  ICD-9-CM: 584.9  2/21/2022 - Present        Intestinal obstruction ([address] ICD-10-CM: C06.220  ICD-9-CM: 560.9  2/21/2022 - Present        Cellulitis of right leg ICD-10-CM: L03.115  ICD-9-CM: 682.6  2/12/2022 - Present        Pneumonia due to COVID-19 virus ICD-10-CM: U07.1, J12.82  ICD-9-CM: 480.8, 079.89  12/29/2021 - Present        Personal history of atrial fibrillation ICD-10-CM: Z86.79  ICD-9-CM: V12.59  11/9/2020 - Present        Carotid stenosis, asymptomatic, right ICD-10-CM: I65.21  ICD-9-CM: 433.10  5/29/2020 - Present        Hyperlipemia, mixed ICD-10-CM: E78.2  ICD-9-CM: 272.2  9/27/2018 - Present        Aortic stenosis, moderate ICD-10-CM: I35.0  ICD-9-CM: 424.1  4/10/2018 - Present        Mild concentric left ventricular hypertrophy (LVH) ICD-10-CM: I51.7  ICD-9-CM: 429.3  4/10/2018 - Present        Chronic diarrhea ICD-10-CM: K52.9  ICD-9-CM: 787.91  9/29/2017 - Present        Microalbuminuria ICD-10-CM: R80.9  ICD-9-CM: 791.0  12/10/2015 - Present        Right inguinal hernia ICD-10-CM: K40.90  ICD-9-CM: 550.90  11/2/2015 - Present        Left inguinal hernia ICD-10-CM: K40.90  ICD-9-CM: 550.90  9/14/2015 - Present        Multiple myeloma ([facility] ICD-10-CM: C90.00  ICD-9-CM: 203.00  12/27/2012 - Present        Hypercalcemia ICD-10-CM: M76.32  ICD-9-CM: 275.42  4/25/2012 - Present    Overview Signed 4/25/2012  5:49 PM by Evelin Snowden MD     2012, PTH normal, abnormal protein electrophoresis, referred to Hem-Onc             S/P AAA repair 2/9/2012 ICD-10-CM: C79.240, Z86.79  ICD-9-CM: V45.89  2/22/2012 - Present    Overview Signed 2/22/2012  1:38 PM by MD Dr Vero Jarvis             Pre-diabetes ICD-10-CM: R73.03  ICD-9-CM: 790.29  9/7/2010 - Present    Overview Addendum 11/1/2011  9:29 AM by Evelin Snowden MD     2005;  Optho Dr Isabel Wisdom             Sinus tachycardia ICD-10-CM: R00.0  ICD-9-CM: 427.89  9/7/2010 - Present        H/O Heavy Alcohol Use ICD-9-CM: Dani Husseinald  9/7/2010 - Present        Vitamin B12 deficiency ICD-10-CM: E53.8  ICD-9-CM: 266.2  9/7/2010 - Present        Hyperhomocysteinemia ([facility] ICD-10-CM: E72.11  ICD-9-CM: 270.4  9/7/2010 - Present        Mild Allergic Rhinitis ICD-10-CM: J30.9  ICD-9-CM: 477.9  9/7/2010 - Present        History of skin cancer ICD-10-CM: Z85.828  ICD-9-CM: V10.83  9/7/2010 - Present    Overview Signed 9/7/2010  9:03 AM by Xiomara Hernandez MD     Followed by Valerio Valentin             HTN (hypertension) ICD-10-CM: I10  ICD-9-CM: 401.9  4/6/2010 - Present    Overview Signed 9/7/2010  8:55 AM by Xiomara Hernandez MD     1990             H/O Carotid Stenosis ICD-10-CM: I65.29  ICD-9-CM: 433.10  4/6/2010 - Present    Overview Addendum 9/7/2010  8:58 AM by Xiomara Hernandez MD     Oct 2003; 50%left subclavian and 50-70% SHEILA              GERD (gastroesophageal reflux disease) ICD-10-CM: K21.9  ICD-9-CM: 530.81  4/6/2010 - Present        RESOLVED: Myeloma ([facility] ICD-10-CM: C90.00  ICD-9-CM: 203.00  1/6/2015 - 6/10/2016        RESOLVED: Gastrointestinal bleeding ICD-10-CM: K92.2  ICD-9-CM: 578.9  12/26/2014 - 2/28/2018        RESOLVED: Regla Manju stone pancreatitis ICD-10-CM: K85.10  ICD-9-CM: 577.0, 574.20  1/23/2014 - 2/28/2018        RESOLVED: Acute cholecystitis ICD-10-CM: K81.0  ICD-9-CM: 575.0  1/23/2014 - 2/28/2018        RESOLVED: Abnormal serum protein electrophoresis ICD-10-CM: R77.8  ICD-9-CM: 790.99  4/25/2012 - 2/28/2018    Overview Signed 4/25/2012  5:48 PM by Xiomara Hernandez MD     4/2012, referred to Hem-Onc             RESOLVED: Atrial fibrillation 1/2012 ICD-10-CM: I48.91  ICD-9-CM: 427.31  2/22/2012 - 11/9/2020    Overview Signed 2/22/2012  1:33 PM by Xiomara Hernandez MD     Cardio Dr Artem Bernal: AAA (abdominal aortic aneurysm) ([facility] ICD-10-CM: I71.4  ICD-9-CM: 441.4  1/6/2012 - 9/26/2018    Overview Signed 1/6/2012  2:07 PM by Xiomara Hernandez MD     Detected on plain films back xray 1/2012; Ct scheduled             RESOLVED: Anemia ICD-10-CM: D64.9  ICD-9-CM: 285.9  1/5/2012 - 2/28/2018        RESOLVED: Hypertriglyceridemia ICD-10-CM: E78.1  ICD-9-CM: 272.1  9/7/2010 - 9/27/2018    Overview Signed 9/7/2010  8:55 AM by Xiomara Hernandez MD     2003             RESOLVED: Bilateral Carotid Bruits, L>R ICD-10-CM: L83.55  ICD-9-CM: 785.9  9/7/2010 - 2/28/2018 Overview Signed 9/7/2010  8:57 AM by Ai Bowser MD     10/03             RESOLVED: Anemia due to GI bleed 2007 ICD-10-CM: D64.9  ICD-9-CM: 285.9  4/6/2010 - 2/28/2018        RESOLVED: GI bleed, duodenitis 2007 ICD-10-CM: K92.2  ICD-9-CM: 578.9  4/6/2010 - 2/28/2018    Overview Addendum 9/7/2010  9:01 AM by Ai Bowser MD     8/07 Duodenitis                       Medications reviewed  Current Facility-Administered Medications   Medication Dose Route Frequency    vancomycin (VANCOCIN) 750 mg in 0.9% sodium chloride 250 mL (VIAL-MATE)  750 mg IntraVENous ONCE    [START ON 2/24/2022] Vancomycin, Random - Please draw on Thursday, 2/24 @ 0400. Thanks!    Other ONCE    pantoprazole (PROTONIX) 40 mg in 0.9% sodium chloride 10 mL injection  40 mg IntraVENous DAILY    0.9% sodium chloride with KCl 40 mEq/L infusion   IntraVENous CONTINUOUS    phenol throat spray (CHLORASEPTIC) 1 Spray  1 Spray Oral PRN    HYDROcodone-acetaminophen (NORCO) 5-325 mg per tablet 1 Tablet  1 Tablet Oral Q4H PRN    HYDROmorphone (DILAUDID) injection 0.5-1 mg  0.5-1 mg IntraVENous Q2H PRN    ondansetron (ZOFRAN) injection 4 mg  4 mg IntraVENous Q4H PRN    acetaminophen (TYLENOL) tablet 650 mg  650 mg Oral Q6H PRN    HYDROcodone-acetaminophen (NORCO)  mg tablet 1 Tablet  1 Tablet Oral Q4H PRN    hydrALAZINE (APRESOLINE) 20 mg/mL injection 20 mg  20 mg IntraVENous Q6H PRN    enoxaparin (LOVENOX) injection 40 mg  40 mg SubCUTAneous Q24H    glucose chewable tablet 16 g  4 Tablet Oral PRN    dextrose 10 % infusion 0-250 mL  0-250 mL IntraVENous PRN    glucagon (GLUCAGEN) injection 1 mg  1 mg IntraMUSCular PRN    insulin lispro (HUMALOG) injection   SubCUTAneous AC&HS    aspirin delayed-release tablet 81 mg  81 mg Oral QHS    [Held by provider] furosemide (LASIX) tablet 20 mg  20 mg Oral BID    [Held by provider] pregabalin (LYRICA) capsule 50 mg  50 mg Oral BID    Vancomycin - pharmacy to dose   Other Rx Dosing/Monitoring    albuterol (PROVENTIL HFA, VENTOLIN HFA, PROAIR HFA) inhaler 1 Puff  1 Puff Inhalation Q6H PRN    cefepime (MAXIPIME) 2 g in sterile water (preservative free) 10 mL IV syringe  2 g IntraVENous Q12H       Care Plan discussed with: Patient/Family and Nurse    Total time spent with patient: 30 minutes.     Apolinar Severino MD

## 2022-02-24 PROBLEM — K56.609 INTESTINAL OBSTRUCTION (HCC): Status: RESOLVED | Noted: 2022-01-01 | Resolved: 2022-01-01

## 2022-02-24 NOTE — PROGRESS NOTES
General Surgery Progress Note           HPI:  Piper Rhodes is a 80 y. o.male admitted with small bowel obstruction. He was feeling subjectively better last night and objectively less distended, however his small bowel series did not show progression of contrast to the colon. This morning he was confused and slightly agitated complaining of more foot pain. He also is complaining of worsening distention and has started to have abdominal pain again. Repeat x-ray this morning continues to show lack of contrast progression into the colon. Objective     Physical Exam  Vitals and nursing note reviewed. Constitutional:       General: He is not in acute distress. Appearance: Normal appearance. He is not ill-appearing or toxic-appearing. HENT:      Head: Normocephalic and atraumatic. Right Ear: External ear normal.      Left Ear: External ear normal.      Mouth/Throat:      Mouth: Mucous membranes are moist.      Pharynx: Oropharynx is clear. No posterior oropharyngeal erythema. Eyes:      Extraocular Movements: Extraocular movements intact. Cardiovascular:      Rate and Rhythm: Normal rate and regular rhythm. Pulses: Normal pulses. Pulmonary:      Effort: Pulmonary effort is normal. No respiratory distress. Abdominal:      General: Abdomen is flat. There is distension. Palpations: Abdomen is soft. Tenderness: There is abdominal tenderness. There is no rebound. Comments: He is more distended this morning and tender to palpation there are no peritoneal signs. Musculoskeletal:      Cervical back: Neck supple. Skin:     General: Skin is warm and dry. Comments: Right foot is erythematous, there is an area of gangrene on the first toe, this is unchanged, however the erythema is slightly worsened today compared with yesterday.   This foot is also tender to palpation today where it was not yesterday there are dorsalis pedis and posterior tibial pulses present Neurological:      General: No focal deficit present. Mental Status: He is alert and oriented to person, place, and time.    Psychiatric:         Mood and Affect: Mood normal.         Behavior: Behavior normal.         Labs:  Recent Results (from the past 24 hour(s))   GLUCOSE, POC    Collection Time: 02/23/22  4:39 PM   Result Value Ref Range    Glucose (POC) 67 65 - 117 mg/dL    Performed by Manan LOPEZ    GLUCOSE, POC    Collection Time: 02/23/22  5:18 PM   Result Value Ref Range    Glucose (POC) 112 65 - 117 mg/dL    Performed by Alfredo Caicedo    GLUCOSE, POC    Collection Time: 02/23/22 10:30 PM   Result Value Ref Range    Glucose (POC) 65 65 - 117 mg/dL    Performed by [id number], POC    Collection Time: 02/23/22 11:03 PM   Result Value Ref Range    Glucose (POC) 86 65 - 117 mg/dL    Performed by [id number], RANDOM    Collection Time: 02/24/22  1:35 AM   Result Value Ref Range    Vancomycin, random 88.8 UG/ML   METABOLIC PANEL, BASIC    Collection Time: 02/24/22  1:35 AM   Result Value Ref Range    Sodium 144 136 - 145 mmol/L    Potassium 4.4 3.5 - 5.1 mmol/L    Chloride 113 (H) 97 - 108 mmol/L    CO2 28 21 - 32 mmol/L    Anion gap 3 (L) 5 - 15 mmol/L    Glucose 65 65 - 100 mg/dL    BUN 17 6 - 20 MG/DL    Creatinine 1.17 0.70 - 1.30 MG/DL    BUN/Creatinine ratio 15 12 - 20      GFR est AA >60 >60 ml/min/1.73m2    GFR est non-AA 59 (L) >60 ml/min/1.73m2    Calcium 9.2 8.5 - 10.1 MG/DL   CBC WITH AUTOMATED DIFF    Collection Time: 02/24/22  1:35 AM   Result Value Ref Range    WBC 6.7 4.1 - 11.1 K/uL    RBC 2.99 (L) 4.10 - 5.70 M/uL    HGB 9.4 (L) 12.1 - 17.0 g/dL    HCT 32.0 (L) 36.6 - 50.3 %    .0 (H) 80.0 - 99.0 FL    MCH 31.4 26.0 - 34.0 PG    MCHC 29.4 (L) 30.0 - 36.5 g/dL    RDW 17.0 (H) 11.5 - 14.5 %    PLATELET 784 469 - 708 K/uL    MPV 11.2 8.9 - 12.9 FL    NRBC 0.0 0  WBC    ABSOLUTE NRBC 0.00 0.00 - 0.01 K/uL    NEUTROPHILS 66 32 - 75 %    LYMPHOCYTES 12 12 - 49 %    MONOCYTES 13 5 - 13 %    EOSINOPHILS 8 (H) 0 - 7 %    BASOPHILS 1 0 - 1 %    IMMATURE GRANULOCYTES 0 0.0 - 0.5 %    ABS. NEUTROPHILS 4.4 1.8 - 8.0 K/UL    ABS. LYMPHOCYTES 0.8 0.8 - 3.5 K/UL    ABS. MONOCYTES 0.9 0.0 - 1.0 K/UL    ABS. EOSINOPHILS 0.5 (H) 0.0 - 0.4 K/UL    ABS. BASOPHILS 0.1 0.0 - 0.1 K/UL    ABS. IMM. GRANS. 0.0 0.00 - 0.04 K/UL    DF SMEAR SCANNED      RBC COMMENTS ANISOCYTOSIS  1+        RBC COMMENTS MACROCYTOSIS  1+        RBC COMMENTS OVALOCYTES  PRESENT       GLUCOSE, POC    Collection Time: 02/24/22  6:50 AM   Result Value Ref Range    Glucose (POC) 58 (L) 65 - 117 mg/dL    Performed by [id number], POC    Collection Time: 02/24/22  7:31 AM   Result Value Ref Range    Glucose (POC) 69 65 - 117 mg/dL    Performed by [employer], POC    Collection Time: 02/24/22  7:42 AM   Result Value Ref Range    Glucose (POC) 76 65 - 117 mg/dL    Performed by [employer] [place], POC    Collection Time: 02/24/22 11:26 AM   Result Value Ref Range    Glucose (POC) 90 65 - 117 mg/dL    Performed by Elvin Dubose         Lab results have been reviewed by myself as of February 24, 2022     Vital Signs:  Patient Vitals for the past 24 hrs:   BP Temp Pulse Resp SpO2   02/24/22 0809 132/68 97.5 °F (36.4 °C) 94 19 92 %   02/24/22 0746 (!) 148/62       02/24/22 0718 (!) 180/83  82  93 %   02/24/22 0708 (!) 179/82  84 18 96 %   02/24/22 0701 (!) 187/84  85 18 97 %   02/24/22 0655 (!) 206/92  84 18 95 %   02/23/22 2320 133/63 97.6 °F (36.4 °C) 92 18 92 %   02/23/22 2017 (!) 155/70 97.8 °F (36.6 °C) 71 18 92 %   02/23/22 1659 (!) 109/48 97.7 °F (36.5 °C) 69 16 90 %        Intake/Output this shift:    Intake/Output Summary (Last 24 hours) at 2/24/2022 1210  Last data filed at 2/24/2022 1144  Gross per 24 hour   Intake 0 ml   Output    Net 0 ml        Intestinal adhesions with complete obstruction (HCC)  He has decompensated since yesterday. His small bowel series does not show contrast progression to the colon. Given this plus his change in exam, and I do feel like we are at a point where he either needs to proceed with operative intervention or be made comfort care. I did discuss this with him and his daughter at length. He and she would really prefer operative intervention although they understand that he is at high surgical risk. I did discuss the treatment options including doing nothing and the risks of surgery including but not limited to bleeding infection damage to surrounding bowel or solid tissues or organs, need for prolonged ventilator dependence, clots in the lungs or legs, stroke heart attack and death. I did also discuss the fact that he is at higher pulmonary risk given his Covid diagnosis about a month ago and his overall physical condition. They are all in agreement, and wish to proceed with surgical intervention. They would like to maintain his DNR with exceptions those exceptions being intubation for the surgery. Cellulitis of right leg  This has increased in severity overnight, will get Podiatry to evaluate as well as get PVR's      Pneumonia due to COVID-19 virus  He does have residual groundglass opacities on his chest x-ray along with mildly elevated white count on admission, which could indicate ongoing recovery from his Covid pneumonia from a month ago. This does increase his operative risk significantly. He has been using his walker to ambulate up and down the hallway, and he does feel that his strength has been returning over the last few weeks. This documentation was facilitated by voice recognition software and may contain inadvertent typographical errors. If there are substantial concerns about the content of this note that may affect patient care, please contact me for clarification.

## 2022-02-24 NOTE — PROGRESS NOTES
Socorro Davila MD about patient's blood glucose of 68. Notified MD of current fluids, D5/ 0.45%NS running at 125mL per hour. No new orders at this time.

## 2022-02-24 NOTE — PROGRESS NOTES
Pharmacist Note - Vancomycin Dosing  Therapy day 4  Indication: Cellulitis w/rt toe ulcer, failed outpt cephalexin. Current regimen: dose by level    Recent Labs     02/24/22  0135 02/23/22  0941 02/23/22  0403 02/22/22  0342 02/22/22  0342   WBC 6.7 6.6 6.9   < > 15.2*   CREA 1.17  --  1.34*  --  1.64*   BUN 17  --  21*  --  20    < > = values in this interval not displayed. A random vancomycin level of 14.9 mcg/mL was obtained this am approximately 11.75 hrs post dose. Goal target range Trough 10-15 mcg/mL      Plan: 1000 mg IV x1 now, random level in am with am labs. Pharmacy will continue to monitor this patient daily for changes in clinical status and renal function.

## 2022-02-24 NOTE — OP NOTES
Operative Report    Patient: Giovanna Escalante MRN: [id number]  SSN: [id number]    YOB: 1934  Age: 80 y.o. Sex: male       Date of Surgery: 2/24/2022     Indications: Presented with small bowel obstruction which did not reserve with conservative measures. I discussed the intended procedure as well as alternative treatments including doing nothing. I discussed the risks of surgery at length including but not limited to bleeding, infection, damage to bowel, bladder, vessels or solid organs, scarring, need for repeat or larger surgery as well as general risks of surgery including pneumonia, clots in the lungs or legs, heart attack, and death. I answered all questions related to the surgery. Understanding was verbalized and we will proceed as planned. Preoperative Diagnosis: Complete small bowel obstruction    Postoperative Diagnosis: OBSTRUCTIVE RIGHT COLON CANCER     Surgeon(s) and Role:     Juan Bradley MD - Primary    Anesthesia: General     Procedure: Procedure(s):  LAPAROTOMY EXPLORATORY & RIGHT HEMICOLECTOMY     Procedure in Detail: Pt prepped and draped in usual sterile fashion after time out was performed. Midline laparotomy incision was made. Dissected down to fascia and opened fascia, grasped fascial edges with kocher times 2. Opened peritoneum with mets. Began my exploration, upper abdomen was clear without abnormality. Small bowel was run from ligament of Treitz to cecum. Small bowel was decompressed proximally and markedly dilated distally. In the region of the cecum the cecum was markedly distended greater than 10 cm. There was an area of narrowing and then decompressed colon. The area of narrowing was consistent with a colon cancer. Given this I elected to mobilize the right colon taking down Toltz ligament up to the hepatic flexure I did mobilize the hepatic flexure and medialized the right colon.  Once this was medialized I made a window in the mesentery in the terminal ileum about 10 cm proximal to the cecum and stapled across the terminal ileum with a VANNESSA stapler. I then chose an area of colon about 10 cm distal to the obstructive lesion and came across this with a VANNESSA stapler as well. I then used harmonic scalpel to come across the mesentery and remove the specimen from the field. I then ran the small bowel again to ensure there were no other abnormalities and felt along the rest of the colon to make sure there were no further abnormalities here. I then created a side-to-side stapled anastomosis tacking small bowel to colon with three 3-0 silk tacking sutures I then cut the corner off of each of my previous resection sites and used an Endo VANNESSA stapler in order to create my cytocide anastomosis. Once this was performed I used a TA stapler to close the defect. I did place several Lemberted silk sutures along the staple line. I did closed the mesenteric window with 3-0 Vicryl. I then irrigated out the abdominal cavity with several liters of sterile water and suctioned out my effluent. I took 1 last look around and the bowel did appear to be healthy and there were no signs of any other abnormalities. I then closed the fascial defect with 0 strata fix irrigated out the subcutaneous tissues and stapled the skin closed. Procedure was well tolerated and pt was extubated in OR and transferred to pacu in stable condition. Estimated Blood Loss:  20mL    Tourniquet Time: * No tourniquets in log *      Implants: * No implants in log *            Specimens:   ID Type Source Tests Collected by Time Destination   1 : Right colon & tumor Fresh Colon  Jim Murphy MD 2/24/2022 1509 Pathology           Drains: None                Complications: None    Counts: Sponge and needle counts were correct times two. Signed By:  Ryan Prasad MD     February 24, 2022       This documentation was facilitated by voice recognition software and may contain inadvertent typographical errors.   If there are substantial concerns about the content of this note that may affect patient care, please contact me for clarification.

## 2022-02-24 NOTE — PROGRESS NOTES
3943 Patient found sitting on toilet but disoriented and was totally different person with 10 mins ago when RN was fixing IV pump. Left AC IV self pulled. Reinforced RN and location patient can recall but still yelling don't know what's going on. Guide patient back to bed. Check V/S /92 HR 84 BS 57. RN gives dextrose 10% 250 ml 20 min and hydralazine 20 mg through Rt AC IV. Check neuro function. Symmetrical facial expression. Equal  power. 0700 RN starts new IV on Left AC.    0718 /83 Patient is still confusion and agitated. 5463 BS 76 Patient is still confusion and agitated. 0746 /62 Patient is still confusion and agitated. 0800 Bedside and Verbal shift change report given to Indu Pastor RN (oncoming nurse) by Timo Naranjo RN (offgoing nurse). Report included the following information SBAR, Kardex, Intake/Output and MAR.

## 2022-02-24 NOTE — PROGRESS NOTES
Hospitalist Progress Note          Ulyess Goldberg, MD  Please call  and page for questions. Call physician on-call through the  7pm-7am    Daily Progress Note: 2/24/2022    Primary care provider:Holly Edmonds NP    Date of admission: 2/21/2022  1:18 PM    Admission summery and hospital course:  HPI\"80year-old man with a past medical history significant for type 2 diabetes, multiple myeloma, hypertension, dyslipidemia, peripheral neuropathy, status post abdominal aortic aneurysm repair in 2012 and COVID-pneumonia.  Patient was recently admitted on 2/15 for management of cellulitis. Presented to the emergency room several days ago treated for diarrhea with Imodium, and now with progressive abdominal pain nausea vomiting. Patient seen in the ED today  for abdominal pain started 12 to 24 hours ago associated with vomiting and constipation.  The hospitalist team has been consulted to assist general surgery in medical management.   Patient examined at bedside.  Patient is ill-appearing, should not reports health has been declining since Covid diagnosis in December 2021.  Patient denies chest pain, dizziness, shortness of breath during examination.  Patient endorses abdominal pain nausea and vomiting.  Productive cough noted on evaluation, patient reports he has had a cough since Covid diagnosis and it is somewhat improved.  NG tube is in place draining dark brown bilious fluid.  Patient reports abdominal distention has improved some since placement of NG.  Patient's son-in-law is at bedside. \"  02/22/2022: Patient NG tube accidentally removed. Subjective: Follow up SBO. Patient seen and examined. This AM, patient found to be confused, BG 50s. Given D10. Patient alert and oriented during rounds. Complains of foot pain. Reviewed prior podiatry notes/photos and right foot cellulitis has progressed despite antibiotics.      Assessment/Plan:   Small bowel obstruction  -general surgery following  -KUB this AM  -s/p NG tube removed  -Diet per surgery, currently on NPO with ice chips    Acute metabolic encephalopathy:   -suspect due to hypoglycemia  -monitor closely  -limit narcotics as able    History of Type 2 diabetes mellitus, Ha1c 6.3 with hypoglycemia:   -does need SSI, will discontinue  -continue accuchecks q4 hours  -monitor closely with D5    Cellulitis with right toe ulcer  -review of photos from prior hospitalization demonstrates worsening  -failed outpateint keflex, now on vanc and cefepime  -podiatry consult  -wound care consulted     History of Covid infection  X-ray with scattered patchy ground glass airspace opacities, concerning for pneumonia. Being treated with cefepime and vancomycin for now. Afebrile. Leukocytosis this morning. Blood culture from severe 2/17/2022 are negative. Follow CBC and clinically. Albuterol inhaler might help. Oxygen supplementation as necessary.     Anemia of iron deficiency and chronic disease  Monitor CBC, transfuse for Hgb less than 7. Peripheral neuropathy: Hold Lyrica for low blood pressure and ongoing bowel obstruction.   Hypertension: Blood pressure is reasonable now. Continue to hold Lasix.   Failure to thrive, Protein calorie malnutrition: Nutrition consult.    Recurrent falls: PT/OT. Hypokalemia: Replace and monitor.     See orders for other plans. VTE prophylaxis: Enoxaparin- patient refused   Code status: DNR.   Discussed plan of care with Patient/Family and Nurse  Discharge planning: As per primary team.  Follow-up PT/OT       Review of Systems:     Review of Systems:  Symptom  Y/N  Comments   Symptom  Y/N  Comments    Fever/Chills   n   Chest Pain  n    Poor Appetite   n   Edema  n     Cough  n   Abdominal Pain  n     Sputum  n   Joint Pain      SOB/PARKINSON  n   Pruritis/Rash      Nausea/vomit  n   Tolerating PT/OT      Diarrhea  n   Tolerating Diet      Constipation  y   Other  y Foot pain    Could not obtain due to:         Objective:   Physical Exam:     Visit Vitals  /68 (BP 1 Location: Left upper arm, BP Patient Position: At rest;Supine)   Pulse 94   Temp 97.5 °F (36.4 °C)   Resp 19   Ht 6' 1\" (1.854 m)   Wt 75.8 kg (167 lb 1.7 oz)   SpO2 92%   BMI 22.05 kg/m²    O2 Flow Rate (L/min): 2 l/min O2 Device: None (Room air)    Temp (24hrs), Av.6 °F (36.4 °C), Min:97.5 °F (36.4 °C), Max:97.8 °F (36.6 °C)    No intake/output data recorded. No intake/output data recorded. General:   Patient is comfortably laying in the bed. Alert, cooperative, no distress, appears stated age. Lungs:   Clear to auscultation bilaterally. Chest wall:  No tenderness or deformity. Heart:  Regular rate and rhythm, S1, S2 normal, no murmur. Abdomen:   Soft, non-tender. Bowel sounds absent. Extremities:  Right second to swelling including distal part of the right foot. No discharge or no fluctuation. Right second toe with discoloration      Neurologic:  Patient has clear voice. He follows commands well. Data Review:       Recent Days:  Recent Labs     22  0135 22  0941 22  0403   WBC 6.7 6.6 6.9   HGB 9.4* 9.2* 8.9*   HCT 32.0* 30.6* 30.2*    165 163     Recent Labs     22  0135 22  0403 22  0342 22  1800 22  1504    142 140   < > 135*   K 4.4 4.6 3.4*   < > HEMOLYZED,RECOLLECT REQUESTED   * 113* 98   < > 93*   CO2 28 28 32   < > 33*   GLU 65 57* 184*   < > 138*   BUN 17 21* 20   < > 15   CREA 1.17 1.34* 1.64*   < > 1.75*   CA 9.2 9.5 10.9*   < > 11.4*   MG  --  2.2  --   --   --    ALB  --   --   --   --  3.1*   ALT  --   --   --   --  20    < > = values in this interval not displayed. No results for input(s): PH, PCO2, PO2, HCO3, FIO2 in the last 72 hours.     24 Hour Results:  Recent Results (from the past 24 hour(s))   CBC WITH AUTOMATED DIFF    Collection Time: 22  9:41 AM   Result Value Ref Range    WBC 6.6 4.1 - 11.1 K/uL RBC 2.89 (L) 4.10 - 5.70 M/uL    HGB 9.2 (L) 12.1 - 17.0 g/dL    HCT 30.6 (L) 36.6 - 50.3 %    .9 (H) 80.0 - 99.0 FL    MCH 31.8 26.0 - 34.0 PG    MCHC 30.1 30.0 - 36.5 g/dL    RDW 17.2 (H) 11.5 - 14.5 %    PLATELET 446 544 - 414 K/uL    MPV 10.8 8.9 - 12.9 FL    NRBC 0.0 0  WBC    ABSOLUTE NRBC 0.00 0.00 - 0.01 K/uL    NEUTROPHILS 69 32 - 75 %    LYMPHOCYTES 12 12 - 49 %    MONOCYTES 12 5 - 13 %    EOSINOPHILS 6 0 - 7 %    BASOPHILS 1 0 - 1 %    IMMATURE GRANULOCYTES 0 0.0 - 0.5 %    ABS. NEUTROPHILS 4.5 1.8 - 8.0 K/UL    ABS. LYMPHOCYTES 0.8 0.8 - 3.5 K/UL    ABS. MONOCYTES 0.8 0.0 - 1.0 K/UL    ABS. EOSINOPHILS 0.4 0.0 - 0.4 K/UL    ABS. BASOPHILS 0.1 0.0 - 0.1 K/UL    ABS. IMM.  GRANS. 0.0 0.00 - 0.04 K/UL    DF SMEAR SCANNED      RBC COMMENTS ANISOCYTOSIS  1+        RBC COMMENTS MACROCYTOSIS  1+       GLUCOSE, POC    Collection Time: 02/23/22  4:39 PM   Result Value Ref Range    Glucose (POC) 67 65 - 117 mg/dL    Performed by Yodit 15  PCT    GLUCOSE, POC    Collection Time: 02/23/22  5:18 PM   Result Value Ref Range    Glucose (POC) 112 65 - 117 mg/dL    Performed by Mitesh Mederos    GLUCOSE, POC    Collection Time: 02/23/22 10:30 PM   Result Value Ref Range    Glucose (POC) 65 65 - 117 mg/dL    Performed by [id number], POC    Collection Time: 02/23/22 11:03 PM   Result Value Ref Range    Glucose (POC) 86 65 - 117 mg/dL    Performed by [id number] [email or web address], RANDOM    Collection Time: 02/24/22  1:35 AM   Result Value Ref Range    Vancomycin, random 23.6 UG/ML   METABOLIC PANEL, BASIC    Collection Time: 02/24/22  1:35 AM   Result Value Ref Range    Sodium 144 136 - 145 mmol/L    Potassium 4.4 3.5 - 5.1 mmol/L    Chloride 113 (H) 97 - 108 mmol/L    CO2 28 21 - 32 mmol/L    Anion gap 3 (L) 5 - 15 mmol/L    Glucose 65 65 - 100 mg/dL    BUN 17 6 - 20 MG/DL    Creatinine 1.17 0.70 - 1.30 MG/DL    BUN/Creatinine ratio 15 12 - 20      GFR est AA >60 >60 ml/min/1.73m2    GFR est non-AA 59 (L) >60 ml/min/1.73m2    Calcium 9.2 8.5 - 10.1 MG/DL   CBC WITH AUTOMATED DIFF    Collection Time: 02/24/22  1:35 AM   Result Value Ref Range    WBC 6.7 4.1 - 11.1 K/uL    RBC 2.99 (L) 4.10 - 5.70 M/uL    HGB 9.4 (L) 12.1 - 17.0 g/dL    HCT 32.0 (L) 36.6 - 50.3 %    .0 (H) 80.0 - 99.0 FL    MCH 31.4 26.0 - 34.0 PG    MCHC 29.4 (L) 30.0 - 36.5 g/dL    RDW 17.0 (H) 11.5 - 14.5 %    PLATELET 484 368 - 780 K/uL    MPV 11.2 8.9 - 12.9 FL    NRBC 0.0 0  WBC    ABSOLUTE NRBC 0.00 0.00 - 0.01 K/uL    NEUTROPHILS 66 32 - 75 %    LYMPHOCYTES 12 12 - 49 %    MONOCYTES 13 5 - 13 %    EOSINOPHILS 8 (H) 0 - 7 %    BASOPHILS 1 0 - 1 %    IMMATURE GRANULOCYTES 0 0.0 - 0.5 %    ABS. NEUTROPHILS 4.4 1.8 - 8.0 K/UL    ABS. LYMPHOCYTES 0.8 0.8 - 3.5 K/UL    ABS. MONOCYTES 0.9 0.0 - 1.0 K/UL    ABS. EOSINOPHILS 0.5 (H) 0.0 - 0.4 K/UL    ABS. BASOPHILS 0.1 0.0 - 0.1 K/UL    ABS. IMM.  GRANS. 0.0 0.00 - 0.04 K/UL    DF SMEAR SCANNED      RBC COMMENTS ANISOCYTOSIS  1+        RBC COMMENTS MACROCYTOSIS  1+        RBC COMMENTS OVALOCYTES  PRESENT       GLUCOSE, POC    Collection Time: 02/24/22  6:50 AM   Result Value Ref Range    Glucose (POC) 58 (L) 65 - 117 mg/dL    Performed by [id number], POC    Collection Time: 02/24/22  7:31 AM   Result Value Ref Range    Glucose (POC) 69 65 - 117 mg/dL    Performed by [email or web address], POC    Collection Time: 02/24/22  7:42 AM   Result Value Ref Range    Glucose (POC) 76 65 - 117 mg/dL    Performed by John Hence        Problem List:  Problem List as of 2/24/2022 Date Reviewed: 2/12/2022          Codes Class Noted - Resolved    Intestinal adhesions with complete obstruction ([facility] ICD-10-CM: M85.10  ICD-9-CM: 560.81  2/21/2022 - Present        Failure to thrive (child) ICD-10-CM: R62.51  ICD-9-CM: 783.41  2/21/2022 - Present        Falls frequently ICD-10-CM: R29.6  ICD-9-CM: V15.88  2/21/2022 - Present        Moderate protein-calorie malnutrition ([facility] ICD-10-CM: E44.0  ICD-9-CM: 263.0  2/21/2022 - Present        Acute renal injury ([facility] ICD-10-CM: N17.9  ICD-9-CM: 584.9  2/21/2022 - Present        Intestinal obstruction ([facility] ICD-10-CM: R41.428  ICD-9-CM: 560.9  2/21/2022 - Present        Cellulitis of right leg ICD-10-CM: L03.115  ICD-9-CM: 682.6  2/12/2022 - Present        Pneumonia due to COVID-19 virus ICD-10-CM: U07.1, J12.82  ICD-9-CM: 480.8, 079.89  12/29/2021 - Present        Personal history of atrial fibrillation ICD-10-CM: Z86.79  ICD-9-CM: V12.59  11/9/2020 - Present        Carotid stenosis, asymptomatic, right ICD-10-CM: I65.21  ICD-9-CM: 433.10  5/29/2020 - Present        Hyperlipemia, mixed ICD-10-CM: E78.2  ICD-9-CM: 272.2  9/27/2018 - Present        Aortic stenosis, moderate ICD-10-CM: I35.0  ICD-9-CM: 424.1  4/10/2018 - Present        Mild concentric left ventricular hypertrophy (LVH) ICD-10-CM: I51.7  ICD-9-CM: 429.3  4/10/2018 - Present        Chronic diarrhea ICD-10-CM: K52.9  ICD-9-CM: 787.91  9/29/2017 - Present        Microalbuminuria ICD-10-CM: R80.9  ICD-9-CM: 791.0  12/10/2015 - Present        Right inguinal hernia ICD-10-CM: K40.90  ICD-9-CM: 550.90  11/2/2015 - Present        Left inguinal hernia ICD-10-CM: K40.90  ICD-9-CM: 550.90  9/14/2015 - Present        Multiple myeloma ([facility] ICD-10-CM: C90.00  ICD-9-CM: 203.00  12/27/2012 - Present        Hypercalcemia ICD-10-CM: B53.33  ICD-9-CM: 275.42  4/25/2012 - Present    Overview Signed 4/25/2012  5:49 PM by Trini Ramirez MD     2012, PTH normal, abnormal protein electrophoresis, referred to Hem-Onc             S/P AAA repair 2/9/2012 ICD-10-CM: Y04.876, Z86.79  ICD-9-CM: V45.89  2/22/2012 - Present    Overview Signed 2/22/2012  1:38 PM by MD Dr Kyaw Correa             Pre-diabetes ICD-10-CM: R73.03  ICD-9-CM: 790.29  9/7/2010 - Present    Overview Addendum 11/1/2011  9:29 AM by Tirni Ramirez MD     2005; Eyad Do             Sinus tachycardia ICD-10-CM: R00.0  ICD-9-CM: 427.89  9/7/2010 - Present        H/O Heavy Alcohol Use ICD-9-CM: [place] Analisa  9/7/2010 - Present        Vitamin B12 deficiency ICD-10-CM: E53.8  ICD-9-CM: 266.2  9/7/2010 - Present        Hyperhomocysteinemia ([facility] ICD-10-CM: E72.11  ICD-9-CM: 270.4  9/7/2010 - Present        Mild Allergic Rhinitis ICD-10-CM: J30.9  ICD-9-CM: 477.9  9/7/2010 - Present        History of skin cancer ICD-10-CM: Z85.828  ICD-9-CM: V10.83  9/7/2010 - Present    Overview Signed 9/7/2010  9:03 AM by Libby Acosta MD     Followed by Valerio Sanderson             HTN (hypertension) ICD-10-CM: I10  ICD-9-CM: 401.9  4/6/2010 - Present    Overview Signed 9/7/2010  8:55 AM by Libby Acosta MD     1990             H/O Carotid Stenosis ICD-10-CM: I65.29  ICD-9-CM: 433.10  4/6/2010 - Present    Overview Addendum 9/7/2010  8:58 AM by Libby Acosta MD     Oct 2003; 50%left subclavian and 50-70% SHEILA              GERD (gastroesophageal reflux disease) ICD-10-CM: K21.9  ICD-9-CM: 530.81  4/6/2010 - Present        RESOLVED: Myeloma ([facility] ICD-10-CM: C90.00  ICD-9-CM: 203.00  1/6/2015 - 6/10/2016        RESOLVED: Gastrointestinal bleeding ICD-10-CM: K92.2  ICD-9-CM: 578.9  12/26/2014 - 2/28/2018        RESOLVED: Connor Reading stone pancreatitis ICD-10-CM: K85.10  ICD-9-CM: 577.0, 574.20  1/23/2014 - 2/28/2018        RESOLVED: Acute cholecystitis ICD-10-CM: K81.0  ICD-9-CM: 575.0  1/23/2014 - 2/28/2018        RESOLVED: Abnormal serum protein electrophoresis ICD-10-CM: R77.8  ICD-9-CM: 790.99  4/25/2012 - 2/28/2018    Overview Signed 4/25/2012  5:48 PM by Libby Acosta MD     4/2012, referred to Hem-Onc             RESOLVED: Atrial fibrillation 1/2012 ICD-10-CM: I48.91  ICD-9-CM: 427.31  2/22/2012 - 11/9/2020    Overview Signed 2/22/2012  1:33 PM by Libby Acosta MD     Cardio Dr Clover Bee: AAA (abdominal aortic aneurysm) [facility]) ICD-10-CM: I71.4  ICD-9-CM: 441.4  1/6/2012 - 9/26/2018    Overview Signed 1/6/2012  2:07 PM by Lan Quezada MD     Detected on plain films back xray 1/2012; Ct scheduled             RESOLVED: Anemia ICD-10-CM: D64.9  ICD-9-CM: 285.9  1/5/2012 - 2/28/2018        RESOLVED: Hypertriglyceridemia ICD-10-CM: E78.1  ICD-9-CM: 272.1  9/7/2010 - 9/27/2018    Overview Signed 9/7/2010  8:55 AM by Lan Quezada MD     2003             RESOLVED: Bilateral Carotid Bruits, L>R ICD-10-CM: R09.89  ICD-9-CM: 785.9  9/7/2010 - 2/28/2018    Overview Signed 9/7/2010  8:57 AM by Lan Quezada MD     10/03             RESOLVED: Anemia due to GI bleed 2007 ICD-10-CM: D64.9  ICD-9-CM: 285.9  4/6/2010 - 2/28/2018        RESOLVED: GI bleed, duodenitis 2007 ICD-10-CM: K92.2  ICD-9-CM: 578.9  4/6/2010 - 2/28/2018    Overview Addendum 9/7/2010  9:01 AM by Lan Quezada MD     8/07 Duodenitis                       Medications reviewed  Current Facility-Administered Medications   Medication Dose Route Frequency    dextrose 5 % - 0.45% NaCl infusion  125 mL/hr IntraVENous CONTINUOUS    mupirocin (BACTROBAN) 2 % ointment   Topical DAILY    pantoprazole (PROTONIX) 40 mg in 0.9% sodium chloride 10 mL injection  40 mg IntraVENous DAILY    phenol throat spray (CHLORASEPTIC) 1 Spray  1 Spray Oral PRN    HYDROcodone-acetaminophen (NORCO) 5-325 mg per tablet 1 Tablet  1 Tablet Oral Q4H PRN    HYDROmorphone (DILAUDID) injection 0.5-1 mg  0.5-1 mg IntraVENous Q2H PRN    ondansetron (ZOFRAN) injection 4 mg  4 mg IntraVENous Q4H PRN    acetaminophen (TYLENOL) tablet 650 mg  650 mg Oral Q6H PRN    HYDROcodone-acetaminophen (NORCO)  mg tablet 1 Tablet  1 Tablet Oral Q4H PRN    hydrALAZINE (APRESOLINE) 20 mg/mL injection 20 mg  20 mg IntraVENous Q6H PRN    enoxaparin (LOVENOX) injection 40 mg  40 mg SubCUTAneous Q24H    glucose chewable tablet 16 g  4 Tablet Oral PRN    dextrose 10 % infusion 0-250 mL  0-250 mL IntraVENous PRN    glucagon (GLUCAGEN) injection 1 mg  1 mg IntraMUSCular PRN    insulin lispro (HUMALOG) injection   SubCUTAneous AC&HS    aspirin delayed-release tablet 81 mg  81 mg Oral QHS    [Held by provider] furosemide (LASIX) tablet 20 mg  20 mg Oral BID    [Held by provider] pregabalin (LYRICA) capsule 50 mg  50 mg Oral BID    Vancomycin - pharmacy to dose   Other Rx Dosing/Monitoring    albuterol (PROVENTIL HFA, VENTOLIN HFA, PROAIR HFA) inhaler 1 Puff  1 Puff Inhalation Q6H PRN    cefepime (MAXIPIME) 2 g in sterile water (preservative free) 10 mL IV syringe  2 g IntraVENous Q12H       Care Plan discussed with: Patient/Family and Nurse    Total time spent with patient: 30 minutes.     [facility], DO

## 2022-02-24 NOTE — ANESTHESIA PROCEDURE NOTES
Central Line Placement    Start time: 2/24/2022 1:31 PM  End time: 2/24/2022 1:44 PM  Performed by: Cole Slade MD  Authorized by: Cole Slade MD     Indications: vascular access  Preanesthetic Checklist: patient identified, risks and benefits discussed, anesthesia consent, site marked, patient being monitored and timeout performed      Pre-procedure: All elements of maximal sterile barrier technique followed?  Yes    2% Chlorhexidine for cutaneous antisepsis, Hand hygiene performed prior to catheter insertion and Ultrasound guidance    Sterile Ultrasound Technique followed?: Yes            Procedure:   Prep:  ChloraPrep  Location: internal jugular  Orientation:  Left  Patient position:  Trendelenburg  Catheter type:  Quad lumen  Catheter size:  8.5 Fr  Catheter length:  16 cm  Number of attempts:  1  Successful placement: Yes      Assessment:   Post-procedure:  Catheter secured, sterile dressing applied and sterile dressing with CHG applied  Assessment:  Free fluid flow, blood return through all ports, placement verified by x-ray and guidewire removal verified  Insertion:  Uncomplicated  Patient tolerance:  Patient tolerated the procedure well with no immediate complications

## 2022-02-24 NOTE — PROGRESS NOTES
Transition of Care: TBD; possibly home with [facility] vs rehab (no recommendations or orders yet)      Transport Plan: TBD; possibly in car with family vs BLS (not set up yet)     RUR: 20%     DX: intestinal obstruction     Main contact is Elie Pickett- [phone]     Discharge pending:  -pending KUB (to be done today 2/24)  -pending possible surgery  -patient has SBO  -patient is NPO  -patient is now confused (baseline is alert and oriented x 4)    -pending diet advancement  -pending progress with PT/OT and recs  -pending medical progress and care recs from general surgery  -pending possible palliative care consult  -discharge likely greater than 48 hours     NOTE: . Patient w/ history of cellulitis, abdominal aortic aneurysm repair, inguinal hernia repairs, HTN, and multiple myeloma. ; patient had covid back in fall of 2021    daughter Hayden Peon [phone] ; Informed patient w/ history of chronic diarrhea since 2011 however no BM during hospitalization or at home. Patient had not complained of abdominal pain until after ED visit. [facility] RN had placed f/u call today and received updates on patient referred  to PCP however no availability RN requested patient to be evaluated in the ED.    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      CM following  Jo Diane RN, CRM

## 2022-02-24 NOTE — ANESTHESIA POSTPROCEDURE EVALUATION
Post-Anesthesia Evaluation and Assessment    Patient: Don Sutton MRN: [id number]  SSN: [id number]    YOB: 1934  Age: 80 y.o. Sex: male      I have evaluated the patient and they are stable and ready for discharge from the PACU. Cardiovascular Function/Vital Signs  Visit Vitals  BP (!) 109/52   Pulse 90   Temp 37.3 °C (99.2 °F)   Resp 19   Ht 6' 1\" (1.854 m)   Wt 75.8 kg (167 lb 1.7 oz)   SpO2 (!) 87%   BMI 22.05 kg/m²       Patient is status post General anesthesia for Procedure(s):  LAPAROTOMY EXPLORATORY & RIGHT HEMICOLECTOMY. Nausea/Vomiting: None    Postoperative hydration reviewed and adequate. Pain:  Pain Scale 1: Numeric (0 - 10) (02/24/22 1217)  Pain Intensity 1: 0 (02/24/22 1217)   Managed    Neurological Status:   Neuro (WDL): Exceptions to WDL (02/24/22 1236)  Neuro  Neurologic State: Confused; Restless (02/24/22 1236)  Orientation Level: Disoriented to situation;Disoriented to time (02/24/22 1236)  Assessment L Pupil: Round (02/24/22 1236)  Size L Pupil (mm): 3 (02/24/22 1236)  Assessment R Pupil: Round (02/24/22 1236)  Size R Pupil (mm): 3 (02/24/22 1236)   At baseline    Mental Status, Level of Consciousness: Alert and  oriented to person, place, and time    Pulmonary Status:   O2 Device: None (02/24/22 1544)   Adequate oxygenation and airway patent    Complications related to anesthesia: None    Post-anesthesia assessment completed. No concerns    Signed By: Fransisca Rothman MD     February 24, 2022              Procedure(s):  LAPAROTOMY EXPLORATORY & RIGHT HEMICOLECTOMY. general    <BSHSIANPOST>    INITIAL Post-op Vital signs:   Vitals Value Taken Time   /52 02/24/22 1555   Temp 37.3 °C (99.2 °F) 02/24/22 1544   Pulse 91 02/24/22 1557   Resp 16 02/24/22 1600   SpO2 88 % 02/24/22 1600   Vitals shown include unvalidated device data.

## 2022-02-24 NOTE — ANESTHESIA PREPROCEDURE EVALUATION
Relevant Problems   RESPIRATORY SYSTEM   (+) Pneumonia due to COVID-19 virus      CARDIOVASCULAR   (+) Aortic stenosis, moderate   (+) HTN (hypertension)      GASTROINTESTINAL   (+) GERD (gastroesophageal reflux disease)      RENAL FAILURE   (+) Acute renal injury ([address]      PERSONAL HX & FAMILY HX OF CANCER   (+) Multiple myeloma (HCC)       Anesthetic History   No history of anesthetic complications            Review of Systems / Medical History  Patient summary reviewed, nursing notes reviewed and pertinent labs reviewed    Pulmonary  Within defined limits                 Neuro/Psych   Within defined limits           Cardiovascular    Hypertension: well controlled        Dysrhythmias : atrial flutter  PAD    Exercise tolerance: <4 METS     GI/Hepatic/Renal     GERD: well controlled          Comments: SBO Endo/Other    Diabetes: well controlled, type 2    Arthritis     Other Findings              Physical Exam    Airway  Mallampati: I  TM Distance: > 6 cm  Neck ROM: normal range of motion   Mouth opening: Normal     Cardiovascular  Regular rate and rhythm,  S1 and S2 normal,  no murmur, click, rub, or gallop             Dental  No notable dental hx       Pulmonary  Breath sounds clear to auscultation               Abdominal  GI exam deferred       Other Findings            Anesthetic Plan    ASA: 3  Anesthesia type: general    Monitoring Plan: CVP      Induction: Intravenous  Anesthetic plan and risks discussed with: Patient

## 2022-02-24 NOTE — PROGRESS NOTES
TRANSFER - IN REPORT:    Verbal report received from [id number] Isabella Maradiaga RN(name) on Alpesh Patches  being received from PACU(unit) for routine post - op      Report consisted of patients Situation, Background, Assessment and   Recommendations(SBAR). Information from the following report(s) OR Summary was reviewed with the receiving nurse. Opportunity for questions and clarification was provided. Assessment completed upon patients arrival to unit and care assumed.

## 2022-02-24 NOTE — PROGRESS NOTES
Physical Therapy  2/24/2022    Chart reviewed. Patient in OR for surgical intervention for his SBO. F/u for re-evaluation tomorrow. Thank you.     Janet Briones, PT, DPT

## 2022-02-25 NOTE — PROGRESS NOTES
Patient was in office today with gf, notified myself and provider that he was unable to tolerate the Sotalol therapy due to it causing myalgia. Patient reported feeling sluggish/tired and fatigued. Stated he is not on any new medications either since last OV. Patient is still having runs of A-Fib and due to patient not being able to tolerate previous tried/failed medications provider requested patient be referred to EP specialist for evaluation of Tikosyn therapy in [facility]. Provider also notified patient until he is seen he would like him on Metoprolol Tartrate 25 mg 1 tablet p.o. BID to hopefully help with some of the symptoms and runs of atrial fibrillation. Patient verbalized understanding and was in agreeance, patient was notified to continue with interrogations at this time, patient was notified to get follow up with gf when she is seen for follow up as well. -MARLON;LELAND     Transition of Care: TBD; possibly home with [facility] vs rehab (no recommendations or orders yet)      Transport Plan: TBD; possibly in car with family vs BLS (not set up yet)     RUR: 20%     DX: intestinal obstruction     Main contact is Kortney Laird- [phone]     Discharge pending:  -patient is POD#1  of right hemicolectomy  -pending podiatry consult and recs  -pending MRI of foot (to be done today 2/25)  -patient is NPO  -patient is continues to be confused (baseline is alert and oriented x 4)    -pending diet advancement  -pending progress with PT/OT and recs  -pending medical progress and care recs from general surgery  -pending possible palliative care consult  -discharge likely greater than 48 hours     NOTE: . Patient w/ history of cellulitis, abdominal aortic aneurysm repair, inguinal hernia repairs, HTN, and multiple myeloma. ; patient had covid back in fall of 2021    daughter Mariah Laird [phone] ; Informed patient w/ history of chronic diarrhea since 2011 however no BM during hospitalization or at home. Patient had not complained of abdominal pain until after ED visit.  [facility] RN had placed f/u call today and received updates on patient referred  to PCP however no availability RN requested patient to be evaluated in the ED.   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      CM following  Anna Oswald RN, CRM

## 2022-02-25 NOTE — PROGRESS NOTES
General Surgery Daily Progress Note    Admit Date: 2022  Post-Operative Day: 1 Day Post-Op from Procedure(s):  LAPAROTOMY EXPLORATORY & RIGHT HEMICOLECTOMY     Subjective:     Last 24 hrs: pt is a little confused but pleasant; says no flatus, no n/v, ng out since early am; has been straight cathed twice w/ low volumes   IV at 75cc/hr    Objective:     Blood pressure (!) 133/53, pulse 74, temperature 98.5 °F (36.9 °C), resp. rate 16, height 6' 1\" (1.854 m), weight 167 lb 1.7 oz (75.8 kg), SpO2 96 %. Temp (24hrs), Av.8 °F (37.1 °C), Min:98 °F (36.7 °C), Max:99.9 °F (37.7 °C)      _____________________  Physical Exam:     Alert, in no acute distress. Cardiovascular: RRR, no peripheral edema  Abdomen: soft, some distant BS, drsg dry w/ old bloody drainage      Assessment:   Active Problems:    HTN (hypertension) (2010)      Overview:       H/O Carotid Stenosis (2010)      Overview: Oct 2003; 50%left subclavian and 50-70% SHEILA       Pre-diabetes (2010)      Overview: ;  Optho Dr Leonard Streeter      Multiple myeloma [facility]) (2012)      Aortic stenosis, moderate (4/10/2018)      Pneumonia due to COVID-19 virus (2021)      Cellulitis of right leg (2022)      Intestinal adhesions with complete obstruction ([address] (2022)      Failure to thrive (child) (2022)      Falls frequently (2022)      Moderate protein-calorie malnutrition ([address] (2022)      Acute renal injury ([address] (2022)            Plan:     May have ice chips  Increase IVF to 125cc/hr  If needs straight cath again should place rosas  Gi/dvt proph  Cont abx  Am labs    Data Review:    Recent Labs     22  0111 22  0216 22  0135   WBC 9.6 13.0* 6.7   HGB 8.7* 10.0* 9.4*   HCT 28.9* 33.6* 32.0*    196 161     Recent Labs     22  01122  0216 22  0135    142 144   K 3.5 4.2 4.4   * 113* 113*   CO2 28 25 28   * 173* 65   BUN 12 14 17   CREA 0.99 1.09 1. 17   CA 8.3* 8.4* 9.2   MG 1.6  --   --    PHOS 1.6*  --   --      No results for input(s): AML, LPSE in the last 72 hours. ______________________  Medications:    Current Facility-Administered Medications   Medication Dose Route Frequency    vancomycin (VANCOCIN) 1,000 mg in 0.9% sodium chloride 250 mL (VIAL-MATE)  1,000 mg IntraVENous Q24H    dextrose 5 % - 0.45% NaCl infusion  125 mL/hr IntraVENous CONTINUOUS    mupirocin (BACTROBAN) 2 % ointment   Topical DAILY    pantoprazole (PROTONIX) 40 mg in 0.9% sodium chloride 10 mL injection  40 mg IntraVENous DAILY    phenol throat spray (CHLORASEPTIC) 1 Spray  1 Spray Oral PRN    HYDROcodone-acetaminophen (NORCO) 5-325 mg per tablet 1 Tablet  1 Tablet Oral Q4H PRN    HYDROmorphone (DILAUDID) injection 0.5-1 mg  0.5-1 mg IntraVENous Q2H PRN    ondansetron (ZOFRAN) injection 4 mg  4 mg IntraVENous Q4H PRN    acetaminophen (TYLENOL) tablet 650 mg  650 mg Oral Q6H PRN    hydrALAZINE (APRESOLINE) 20 mg/mL injection 20 mg  20 mg IntraVENous Q6H PRN    enoxaparin (LOVENOX) injection 40 mg  40 mg SubCUTAneous Q24H    glucose chewable tablet 16 g  4 Tablet Oral PRN    dextrose 10 % infusion 0-250 mL  0-250 mL IntraVENous PRN    glucagon (GLUCAGEN) injection 1 mg  1 mg IntraMUSCular PRN    aspirin delayed-release tablet 81 mg  81 mg Oral QHS    [Held by provider] furosemide (LASIX) tablet 20 mg  20 mg Oral BID    [Held by provider] pregabalin (LYRICA) capsule 50 mg  50 mg Oral BID    Vancomycin - pharmacy to dose   Other Rx Dosing/Monitoring    albuterol (PROVENTIL HFA, VENTOLIN HFA, PROAIR HFA) inhaler 1 Puff  1 Puff Inhalation Q6H PRN    cefepime (MAXIPIME) 2 g in sterile water (preservative free) 10 mL IV syringe  2 g IntraVENous Q12H       Sherry Cunningham MD  2/26/2022      Attending addendum:  Patient seen and examined independently, agree with above note. Daughter and son in law at bedside. Discussed plan with them.       Intestinal adhesions with complete obstruction (HCC)  Doing better this AM, no flatus or BM, but feeling much better. Will await bowel function prior to advancing diet. Discussed with family and patient. Pneumonia due to COVID-19 virus  He does have residual groundglass opacities on his chest x-ray along with mildly elevated white count on admission, which could indicate ongoing recovery from his Covid pneumonia from a month ago. This does increase his operative risk significantly. He has been using his walker to ambulate up and down the hallway, and he does feel that his strength has been returning over the last few weeks. Cellulitis of right leg  Podiatry consulted.

## 2022-02-25 NOTE — CONSULTS
Podiatry Consult    Subjective:         Date of Consultation: February 25, 2022     Patient is a 80 y.o.  male who is being seen for right foot pain and continued infection. I met patient on his previous hospitalization less than two weeks ago for right foot cellulitis. His daughter noted that the area had been improving, but it looked worse yesterday. However, she does note it has improved today. Patient is continuing to have significant pain in the forefoot area. She has noted cyclical swelling and pain to the patient's forefoot. Patient is currently admitting for SBO and is currently in some pain and having some difficulty relating his own interval history due to yesterday's procedure, and appears mildly confused at this time. His daughter and son and providing most of the history today.     Patient Active Problem List    Diagnosis Date Noted    Intestinal adhesions with complete obstruction ([address] 02/21/2022    Failure to thrive (child) 02/21/2022    Falls frequently 02/21/2022    Moderate protein-calorie malnutrition ([address] 02/21/2022    Acute renal injury ([address] 02/21/2022    Cellulitis of right leg 02/12/2022    Pneumonia due to COVID-19 virus 12/29/2021    Personal history of atrial fibrillation 11/09/2020    Carotid stenosis, asymptomatic, right 05/29/2020    Hyperlipemia, mixed 09/27/2018    Aortic stenosis, moderate 04/10/2018    Mild concentric left ventricular hypertrophy (LVH) 04/10/2018    Chronic diarrhea 09/29/2017    Microalbuminuria 12/10/2015    Right inguinal hernia 11/02/2015    Left inguinal hernia 09/14/2015    Multiple myeloma ([address] 12/27/2012    Hypercalcemia 04/25/2012    S/P AAA repair 2/9/2012 02/22/2012    Pre-diabetes 09/07/2010    Sinus tachycardia 09/07/2010    H/O Heavy Alcohol Use 09/07/2010    Vitamin B12 deficiency 09/07/2010    Hyperhomocysteinemia ([address] 09/07/2010    Mild Allergic Rhinitis 09/07/2010    History of skin cancer 09/07/2010    HTN (hypertension) 04/06/2010    H/O Carotid Stenosis 04/06/2010    GERD (gastroesophageal reflux disease) 04/06/2010     Past Medical History:   Diagnosis Date    AAA (abdominal aortic aneurysm) ([facility] 1/6/2012    Abnormal serum protein electrophoresis 4/25/2012    Anemia 4/6/2010    Anemia 1/5/2012    Anemia due to GI bleed 2007 4/6/2010    Aneurysm ([facility]     AAA    Arrhythmia     ATRIAL FIB PT STATES HE DOESN'T HAVE    Arthritis     Atrial fibrillation 1/2012 2/22/2012    Bilateral Carotid Bruits, L>R 9/7/2010    Bone cancer ([address]     Borderline diabetes mellitus 4/6/2010    Cancer ([facility]     SKIN MELANOMA ON BACK     Carotid stenosis 4/6/2010    Carotid stenosis     Chronic pain     Diabetes mellitus, type 2 ([facility] 9/7/2010    NO MEDS    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    ETOH abuse 4/6/2010    GERD (gastroesophageal reflux disease) 4/6/2010    GI bleed 4/6/2010    GI bleed, duodenitis 2007 4/6/2010    H/O Heavy Alcohol Use 9/7/2010    History of skin cancer 9/7/2010    HTN (hypertension) 4/6/2010    Hypercalcemia 4/25/2012    Hyperhomocysteinemia ([facility] 9/7/2010    Hypertriglyceridemia 9/7/2010    Left inguinal hernia 9/14/2015    Lipids blood increased 4/6/2010    Microalbuminuria 12/10/2015    Mild Allergic Rhinitis 9/7/2010    Pre-diabetes 9/7/2010    [place] [facility] [place]2015    S/P AAA repair 2/9/2012 2/22/2012    Sinus tachycardia 9/7/2010    Tachycardia 4/6/2010    Vitamin B12 deficiency 9/7/2010      Past Surgical History:   Procedure Laterality Date    COLONOSCOPY  8/2007    Normal; Dr Abdi De Dios EGD  8/2007    small hiatal hernia, mild duodenitis; Dr Abdi De Dios HX AAA REPAIR  2/9/2012    Dr Vira Dean    HX CATARACT REMOVAL      both eyes    HX CHOLECYSTECTOMY  1-29-14    lap sandi- [facility]- Dr. Amy Gonzalez Left 9/24/15    left indirect inguinal by Dr. Carol Mcpherson Right 11/2/15    inguinal w/ mesh by Dr. Carol Mcpherson Bilateral 10/2015 And 2015    Inguinal    BLUE DOPPLER  3/26/2012    BLUE Echo; + clot      Family History   Problem Relation Age of Onset    Heart Disease Mother          age 80    Cancer Father          brain tumor age 80    Heart Disease Father     Substance Abuse Daughter          cocaine OD age 43 in 200    Other Son          PE/DVT age 43    Heart Disease Brother    Danis [address] Other Daughter         alive and well    Anesth Problems Neg Hx       Social History     Tobacco Use    Smoking status: Former Smoker     Packs/day: 1.50     Years: 55.00     Pack years: 82.50     Quit date: 2001     Years since quittin.1    Smokeless tobacco: Never Used    Tobacco comment: used to smoke 1 1/2 ppd x ~ 40 yrs   Substance Use Topics    Alcohol use:  Yes     Alcohol/week: 14.0 standard drinks     Types: 14 Glasses of wine per week     Comment: 14     Current Facility-Administered Medications   Medication Dose Route Frequency Provider Last Rate Last Admin    vancomycin (VANCOCIN) 1,000 mg in 0.9% sodium chloride 250 mL (VIAL-MATE)  1,000 mg IntraVENous Q24H Payton Lima MD        dextrose 5 % - 0.45% NaCl infusion  125 mL/hr IntraVENous CONTINUOUS Waqar, Yusef Bautista  mL/hr at 22 0854 125 mL/hr at 22 0854    mupirocin (BACTROBAN) 2 % ointment   Topical DAILY Marlene SHERMAN DPM   Given at 22 0911    pantoprazole (PROTONIX) 40 mg in 0.9% sodium chloride 10 mL injection  40 mg IntraVENous DAILY Sherri Resendez NP   40 mg at 22 0856    phenol throat spray (CHLORASEPTIC) 1 Spray  1 Spray Oral PRN Stephen Montes MD   1 [place] at 22 1734    HYDROcodone-acetaminophen (NORCO) 5-325 mg per tablet 1 Tablet  1 Tablet Oral Q4H PRN Stephen Montes MD   1 Tablet at 22 1923    HYDROmorphone (DILAUDID) injection 0.5-1 mg  0.5-1 mg IntraVENous Q2H PRN Edinson Covington MD   1 mg at 02/25/22 0906    ondansetron [facility]) injection 4 mg  4 mg IntraVENous Q4H PRN Edinson Covington MD   4 mg at 02/21/22 2148    acetaminophen (TYLENOL) tablet 650 mg  650 mg Oral Q6H PRN Edinson Covington MD        hydrALAZINE (APRESOLINE) 20 mg/mL injection 20 mg  20 mg IntraVENous Q6H PRN Edinson Covington MD   20 mg at 02/24/22 0655    enoxaparin (LOVENOX) injection 40 mg  40 mg SubCUTAneous Q24H Edinson Covington MD        glucose chewable tablet 16 g  4 Tablet Oral PRN EDMUND Ruiz        dextrose 10 % infusion 0-250 mL  0-250 mL IntraVENous PRN JOSHUA RuizP 750 mL/hr at 02/23/22 2236 125 mL at 02/23/22 2236    glucagon (GLUCAGEN) injection 1 mg  1 mg IntraMUSCular PRN EDMUND Ruiz        aspirin delayed-release tablet 81 mg  81 mg Oral QHS [place], [place]   81 mg at 02/24/22 2219    [Held by provider] furosemide (LASIX) tablet 20 mg  20 mg Oral BID Ale Primus , FNP        [Held by provider] pregabalin (LYRICA) capsule 50 mg  50 mg Oral BID [place], [place]        Vancomycin - pharmacy to dose   Other Rx Dosing/Monitoring Ale Primus , FNP        albuterol (PROVENTIL HFA, VENTOLIN HFA, PROAIR HFA) inhaler 1 Puff  1 Puff Inhalation Q6H PRN Ale Primus , FNP        cefepime (MAXIPIME) 2 g in sterile water (preservative free) 10 mL IV syringe  2 g IntraVENous Q12H Ale Primus M,  mL/hr at 02/25/22 0855 2 g at 02/25/22 4884      Allergies   Allergen Reactions    Codeine Nausea and Vomiting     Blurred vision, felt faint    Contrast Agent [Iodine] Hives     Needs premedication w/ Benadryl prn (since 1/2012 reaction)    Dexamethasone Other (comments)     \"Deathly ill\"    Fish Oil Nausea Only    Glipizide Other (comments)     DRASTIC DROP IN BLOOD GLUCOSE, fasting    Metformin Diarrhea    Niacin Other (comments)     Flushing    Norvasc [Amlodipine] Other (comments)     Leg edema    Optiray 350 [Ioversol] Hives     He does not know what this is        Review of Systems:  Review of systems not obtained due to patient factors. Objective:     Patient Vitals for the past 8 hrs:   BP Temp Pulse Resp SpO2   22 0749 (!) 162/64 98.4 °F (36.9 °C) 87 16 93 %     Temp (24hrs), Av.2 °F (36.8 °C), Min:97.5 °F (36.4 °C), Max:99.2 °F (37.3 °C)      Physical Exam:    Right foot: Weakly palpable DP and PT. No erythema, edema or calor of the right foot. Significant tenderness of the right forefoot, particularly in the areas of the 2nd and 3rd metatarsals. Worsened eschar to the right 2nd toe with no overt signs of infection. New wound noted to the lateral aspect of the right 4th toe.     Lab Review:   Recent Results (from the past 24 hour(s))   GLUCOSE, POC    Collection Time: 22 12:52 PM   Result Value Ref Range    Glucose (POC) 68 65 - 117 mg/dL    Performed by [facility]    GLUCOSE, POC    Collection Time: 22  4:42 PM   Result Value Ref Range    Glucose (POC) 67 65 - 117 mg/dL    Performed by Sherryle Donalds    GLUCOSE, POC    Collection Time: 22 10:13 PM   Result Value Ref Range    Glucose (POC) 121 (H) 65 - 117 mg/dL    Performed by Gabrielle LOPEZ    VANCOMYCIN, RANDOM    Collection Time: 22  2:16 AM   Result Value Ref Range    Vancomycin, random 15.6 UG/ML   CBC W/O DIFF    Collection Time: 22  2:16 AM   Result Value Ref Range    WBC 13.0 (H) 4.1 - 11.1 K/uL    RBC 3.20 (L) 4.10 - 5.70 M/uL    HGB 10.0 (L) 12.1 - 17.0 g/dL    HCT 33.6 (L) 36.6 - 50.3 %    .0 (H) 80.0 - 99.0 FL    MCH 31.3 26.0 - 34.0 PG    MCHC 29.8 (L) 30.0 - 36.5 g/dL    RDW 16.8 (H) 11.5 - 14.5 %    PLATELET 381 008 - 314 K/uL    MPV 11.2 8.9 - 12.9 FL    NRBC 0.0 0  WBC    ABSOLUTE NRBC 0.00 0.00 - 7.94 K/uL   METABOLIC PANEL, BASIC    Collection Time: 22  2:16 AM   Result Value Ref Range    Sodium 142 136 - 145 mmol/L    Potassium 4.2 3.5 - 5.1 mmol/L    Chloride 113 (H) 97 - 108 mmol/L    CO2 25 21 - 32 mmol/L    Anion gap 4 (L) 5 - 15 mmol/L    Glucose 173 (H) 65 - 100 mg/dL    BUN 14 6 - 20 MG/DL    Creatinine 1.09 0.70 - 1.30 MG/DL    BUN/Creatinine ratio 13 12 - 20      GFR est AA >60 >60 ml/min/1.73m2    GFR est non-AA >60 >60 ml/min/1.73m2    Calcium 8.4 (L) 8.5 - 10.1 MG/DL   GLUCOSE, POC    Collection Time: 02/25/22  5:27 AM   Result Value Ref Range    Glucose (POC) 179 (H) 65 - 117 mg/dL    Performed by Molly Cain   PCT    ANKLE BRACHIAL INDEX    Collection Time: 02/25/22 10:54 AM   Result Value Ref Range    Left dorsalis pedis  mmHg    Left arm  mmHg    Right arm  mmHg    Right dorsalis pedis BP 38 mmHg    Left posterior tibial 132 mmHg    Right posterior tibial 53 mmHg    Left MARYURI 0.97     Right MARYURI 0.39    GLUCOSE, POC    Collection Time: 02/25/22 11:36 AM   Result Value Ref Range    Glucose (POC) 167 (H) 65 - 117 mg/dL    Performed by Ana Clark  PCT        Impression:     Right foot pain  Ulcers of right 2nd and fourth digits    Recommendation:     -Recommend MRI of the right foot due to cyclical periods of forefoot swelling and erythema after reviewing pictures of the patient's foot yesterday and significant tenderness of the metatarsals despite negative x-rays.  -Preliminary ABIs reviews showing arterial obstruction of the RLE, vascular consult pending  -Continue LWC to the digits of the RLE  -No signs of deeper infection at this time. Pain is likely a vascular issue, but will obtain MRI to rule out any underlying deep fracture due to hx of multiple myeloma or any stress fracture that may have been missed on x-ray. -WBAT in surgical shoe.  -Will continue to follow. Dr. Jaqueline Abraham to round over weekend.

## 2022-02-25 NOTE — PROGRESS NOTES
Hospitalist Progress Note          Jacky Yanez MD  Please call  and page for questions. Call physician on-call through the  7pm-7am    Daily Progress Note: 2/25/2022    Primary care provider:Mohinder Edmonds NP    Date of admission: 2/21/2022  1:18 PM    Admission summery and hospital course:  HPI\"80year-old man with a past medical history significant for type 2 diabetes, multiple myeloma, hypertension, dyslipidemia, peripheral neuropathy, status post abdominal aortic aneurysm repair in 2012 and COVID-pneumonia.  Patient was recently admitted on 2/15 for management of cellulitis. Presented to the emergency room several days ago treated for diarrhea with Imodium, and now with progressive abdominal pain nausea vomiting. Patient seen in the ED today  for abdominal pain started 12 to 24 hours ago associated with vomiting and constipation.  The hospitalist team has been consulted to assist general surgery in medical management.   Patient examined at bedside.  Patient is ill-appearing, should not reports health has been declining since Covid diagnosis in December 2021.  Patient denies chest pain, dizziness, shortness of breath during examination.  Patient endorses abdominal pain nausea and vomiting.  Productive cough noted on evaluation, patient reports he has had a cough since Covid diagnosis and it is somewhat improved.  NG tube is in place draining dark brown bilious fluid.  Patient reports abdominal distention has improved some since placement of NG.  Patient's son-in-law is at bedside. \"  02/22/2022: Patient NG tube accidentally removed. Subjective: Follow up SBO. Patient seen and examined. Patient with right hemicolectomy yesterday. Had evidence of colon cancer. NG removed this AM. Plans for bowel rest today. General surgery at bedside during rounds. Patient has intermittent confusion. Pain controlled.    Assessment/Plan:   Small bowel obstruction  -general surgery   -Underwent right hemicolectomy. Evidence of colon cancer. Pathology pending  -NPO with sips of water    Acute metabolic encephalopathy:   -Multifactorial in setting of recent surgery, narcotics, hospitalization, infection  -monitor closely, supportive care  -limit narcotics as able    History of Type 2 diabetes mellitus, Ha1c 6.3 with hypoglycemia:   -does need SSI, will discontinue  -continue accuchecks q4 hours  -decrease D5    Cellulitis with right toe ulcer  -review of photos from prior hospitalization demonstrates worsening  -failed outpateint keflex, now on vanc and cefepime  -podiatry consulted. MRI ordered  -MARYURI with moderate to severe arterial occlusion. Vascular surgery consulted      History of Covid infection  Pneumonia  -CXR with scattered opacities  -getting broad spectrum antibiotics      Anemia of iron deficiency and chronic disease  Monitor CBC, transfuse for Hgb less than 7. Peripheral neuropathy: Hold Lyrica for low blood pressure and ongoing bowel obstruction.   Hypertension: Blood pressure is reasonable now. Continue to hold Lasix.   Failure to thrive, Protein calorie malnutrition: Nutrition consult.    Recurrent falls: PT/OT. Hypokalemia: Replace and monitor.     See orders for other plans. VTE prophylaxis: Enoxaparin- patient refused   Code status: DNR.   Discussed plan of care with Patient/Family and Nurse  Discharge planning: As per primary team.  Follow-up PT/OT       Review of Systems:     Review of Systems:  Symptom  Y/N  Comments   Symptom  Y/N  Comments    Fever/Chills   n   Chest Pain  n    Poor Appetite   n   Edema  n     Cough  n   Abdominal Pain  n     Sputum  n   Joint Pain      SOB/PARKINSON  n   Pruritis/Rash      Nausea/vomit  n   Tolerating PT/OT      Diarrhea  n   Tolerating Diet      Constipation  y   Other  y Foot pain    Could not obtain due to:         Objective:   Physical Exam:     Visit Vitals  BP (!) 162/64   Pulse 87   Temp 98.4 °F (36.9 °C)   Resp 16   Ht 6' 1\" (1.854 m)   Wt 75.8 kg (167 lb 1.7 oz)   SpO2 93%   BMI 22.05 kg/m²    O2 Flow Rate (L/min): 2 l/min O2 Device: None (Room air)    Temp (24hrs), Av.2 °F (36.8 °C), Min:97.5 °F (36.4 °C), Max:99.2 °F (37.3 °C)    No intake/output data recorded.  1901 -  0700  In: 2150 [I.V.:2150]  Out: 1450 [Urine:1225]      General:   Patient is comfortably laying in the bed. Alert, cooperative, no distress, appears stated age. Lungs:    Clear to auscultation bilaterally. Chest wall:   No tenderness or deformity. Heart:  Regular rate and rhythm, S1, S2 normal, no murmur. Abdomen:   Soft, central bandage, +bowel sounds   Extremities:  Right second to swelling including distal part of the right foot. No discharge or no fluctuation. Right second toe with discoloration      Neurologic:  Patient has clear voice. He follows commands well. Data Review:       Recent Days:  Recent Labs     22  0216 22  0135 22  0941   WBC 13.0* 6.7 6.6   HGB 10.0* 9.4* 9.2*   HCT 33.6* 32.0* 30.6*    161 165     Recent Labs     22  0216 22  0135 22  0403    144 142   K 4.2 4.4 4.6   * 113* 113*   CO2 25 28 28   * 65 57*   BUN 14 17 21*   CREA 1.09 1.17 1.34*   CA 8.4* 9.2 9.5   MG  --   --  2.2     No results for input(s): PH, PCO2, PO2, HCO3, FIO2 in the last 72 hours.     24 Hour Results:  Recent Results (from the past 24 hour(s))   GLUCOSE, POC    Collection Time: 22 11:26 AM   Result Value Ref Range    Glucose (POC) 90 65 - 117 mg/dL    Performed by Aubree Hickey, POC    Collection Time: 22 12:52 PM   Result Value Ref Range    Glucose (POC) 68 65 - 117 mg/dL    Performed by Kylie Valdez, POC    Collection Time: 22  4:42 PM   Result Value Ref Range    Glucose (POC) 67 65 - 117 mg/dL    Performed by [address], POC    Collection Time: 22 10:13 PM   Result Value Ref Range    Glucose (POC) 121 (H) 65 - 117 mg/dL    Performed by Wolfe Chinmay   PCT    VANCOMYCIN, RANDOM    Collection Time: 02/25/22  2:16 AM   Result Value Ref Range    Vancomycin, random 15.6 UG/ML   CBC W/O DIFF    Collection Time: 02/25/22  2:16 AM   Result Value Ref Range    WBC 13.0 (H) 4.1 - 11.1 K/uL    RBC 3.20 (L) 4.10 - 5.70 M/uL    HGB 10.0 (L) 12.1 - 17.0 g/dL    HCT 33.6 (L) 36.6 - 50.3 %    .0 (H) 80.0 - 99.0 FL    MCH 31.3 26.0 - 34.0 PG    MCHC 29.8 (L) 30.0 - 36.5 g/dL    RDW 16.8 (H) 11.5 - 14.5 %    PLATELET 501 888 - 935 K/uL    MPV 11.2 8.9 - 12.9 FL    NRBC 0.0 0  WBC    ABSOLUTE NRBC 0.00 0.00 - 1.17 K/uL   METABOLIC PANEL, BASIC    Collection Time: 02/25/22  2:16 AM   Result Value Ref Range    Sodium 142 136 - 145 mmol/L    Potassium 4.2 3.5 - 5.1 mmol/L    Chloride 113 (H) 97 - 108 mmol/L    CO2 25 21 - 32 mmol/L    Anion gap 4 (L) 5 - 15 mmol/L    Glucose 173 (H) 65 - 100 mg/dL    BUN 14 6 - 20 MG/DL    Creatinine 1.09 0.70 - 1.30 MG/DL    BUN/Creatinine ratio 13 12 - 20      GFR est AA >60 >60 ml/min/1.73m2    GFR est non-AA >60 >60 ml/min/1.73m2    Calcium 8.4 (L) 8.5 - 10.1 MG/DL   GLUCOSE, POC    Collection Time: 02/25/22  5:27 AM   Result Value Ref Range    Glucose (POC) 179 (H) 65 - 117 mg/dL    Performed by Wolfe Moy   PCT    ANKLE BRACHIAL INDEX    Collection Time: 02/25/22 10:54 AM   Result Value Ref Range    Left dorsalis pedis  mmHg    Left arm  mmHg    Right arm  mmHg    Right dorsalis pedis BP 38 mmHg    Left posterior tibial 132 mmHg    Right posterior tibial 53 mmHg    Left MARYURI 0.97     Right MARYURI 0.39        Problem List:  Problem List as of 2/25/2022 Date Reviewed: 2/12/2022          Codes Class Noted - Resolved    Intestinal adhesions with complete obstruction (HCC) ICD-10-CM: R67.89  ICD-9-CM: 560.81  2/21/2022 - Present        Failure to thrive (child) ICD-10-CM: R62.51  ICD-9-CM: 783.41  2/21/2022 - Present        Falls frequently ICD-10-CM: R29.6  ICD-9-CM: V15.88  2/21/2022 - Present        Moderate protein-calorie malnutrition ([facility] ICD-10-CM: E44.0  ICD-9-CM: 263.0  2/21/2022 - Present        Acute renal injury ([facility] ICD-10-CM: N17.9  ICD-9-CM: 584.9  2/21/2022 - Present        Cellulitis of right leg ICD-10-CM: L03.115  ICD-9-CM: 682.6  2/12/2022 - Present        Pneumonia due to COVID-19 virus ICD-10-CM: U07.1, J12.82  ICD-9-CM: 480.8, 079.89  12/29/2021 - Present        Personal history of atrial fibrillation ICD-10-CM: Z86.79  ICD-9-CM: V12.59  11/9/2020 - Present        Carotid stenosis, asymptomatic, right ICD-10-CM: I65.21  ICD-9-CM: 433.10  5/29/2020 - Present        Hyperlipemia, mixed ICD-10-CM: E78.2  ICD-9-CM: 272.2  9/27/2018 - Present        Aortic stenosis, moderate ICD-10-CM: I35.0  ICD-9-CM: 424.1  4/10/2018 - Present        Mild concentric left ventricular hypertrophy (LVH) ICD-10-CM: I51.7  ICD-9-CM: 429.3  4/10/2018 - Present        Chronic diarrhea ICD-10-CM: K52.9  ICD-9-CM: 787.91  9/29/2017 - Present        Microalbuminuria ICD-10-CM: R80.9  ICD-9-CM: 791.0  12/10/2015 - Present        Right inguinal hernia ICD-10-CM: K40.90  ICD-9-CM: 550.90  11/2/2015 - Present        Left inguinal hernia ICD-10-CM: K40.90  ICD-9-CM: 550.90  9/14/2015 - Present        Multiple myeloma ([facility] ICD-10-CM: C90.00  ICD-9-CM: 203.00  12/27/2012 - Present        Hypercalcemia ICD-10-CM: T24.66  ICD-9-CM: 275.42  4/25/2012 - Present    Overview Signed 4/25/2012  5:49 PM by Rubén Farah MD     2012, PTH normal, abnormal protein electrophoresis, referred to Hem-Onc             S/P AAA repair 2/9/2012 ICD-10-CM: A09.475, Z86.79  ICD-9-CM: V45.89  2/22/2012 - Present    Overview Signed 2/22/2012  1:38 PM by MD Dr Ina Banda             Pre-diabetes ICD-10-CM: R73.03  ICD-9-CM: 790.29  9/7/2010 - Present    Overview Addendum 11/1/2011  9:29 AM by Rubén Farah MD     2005;  Optho Dr Sally Multani tachycardia ICD-10-CM: R00.0  ICD-9-CM: 427.89  9/7/2010 - Present        H/O Heavy Alcohol Use ICD-9-CM: Darling Roof  9/7/2010 - Present        Vitamin B12 deficiency ICD-10-CM: E53.8  ICD-9-CM: 266.2  9/7/2010 - Present        Hyperhomocysteinemia ([facility] ICD-10-CM: E72.11  ICD-9-CM: 270.4  9/7/2010 - Present        Mild Allergic Rhinitis ICD-10-CM: J30.9  ICD-9-CM: 477.9  9/7/2010 - Present        History of skin cancer ICD-10-CM: Z85.828  ICD-9-CM: V10.83  9/7/2010 - Present    Overview Signed 9/7/2010  9:03 AM by Ashley Martinez MD     Followed by Drrojas Kyle             HTN (hypertension) ICD-10-CM: I10  ICD-9-CM: 401.9  4/6/2010 - Present    Overview Signed 9/7/2010  8:55 AM by Ashley Martinez MD     1990             H/O Carotid Stenosis ICD-10-CM: I65.29  ICD-9-CM: 433.10  4/6/2010 - Present    Overview Addendum 9/7/2010  8:58 AM by Ashley Martinez MD     Oct 2003; 50%left subclavian and 50-70% SHEILA              GERD (gastroesophageal reflux disease) ICD-10-CM: K21.9  ICD-9-CM: 530.81  4/6/2010 - Present        RESOLVED: Intestinal obstruction ([facility] ICD-10-CM: U47.869  ICD-9-CM: 560.9  2/21/2022 - 2/24/2022        RESOLVED: Myeloma ([facility] ICD-10-CM: C90.00  ICD-9-CM: 203.00  1/6/2015 - 6/10/2016        RESOLVED: Gastrointestinal bleeding ICD-10-CM: K92.2  ICD-9-CM: 578.9  12/26/2014 - 2/28/2018        RESOLVED: Devin Held stone pancreatitis ICD-10-CM: K85.10  ICD-9-CM: 577.0, 574.20  1/23/2014 - 2/28/2018        RESOLVED: Acute cholecystitis ICD-10-CM: K81.0  ICD-9-CM: 575.0  1/23/2014 - 2/28/2018        RESOLVED: Abnormal serum protein electrophoresis ICD-10-CM: R77.8  ICD-9-CM: 790.99  4/25/2012 - 2/28/2018    Overview Signed 4/25/2012  5:48 PM by Ashley Martinez MD     4/2012, referred to Hem-Onc             RESOLVED: Atrial fibrillation 1/2012 ICD-10-CM: I48.91  ICD-9-CM: 427.31  2/22/2012 - 11/9/2020    Overview Signed 2/22/2012  1:33 PM by Ashley Martinez MD Cardio Dr Rosita Frankel: AAA (abdominal aortic aneurysm) ([facility] ICD-10-CM: I71.4  ICD-9-CM: 441.4  1/6/2012 - 9/26/2018    Overview Signed 1/6/2012  2:07 PM by Ai Bowser MD     Detected on plain films back xray 1/2012; Ct scheduled             RESOLVED: Anemia ICD-10-CM: D64.9  ICD-9-CM: 285.9  1/5/2012 - 2/28/2018        RESOLVED: Hypertriglyceridemia ICD-10-CM: E78.1  ICD-9-CM: 272.1  9/7/2010 - 9/27/2018    Overview Signed 9/7/2010  8:55 AM by Ai Bowser MD     2003             RESOLVED: Bilateral Carotid Bruits, L>R ICD-10-CM: R09.89  ICD-9-CM: 785.9  9/7/2010 - 2/28/2018    Overview Signed 9/7/2010  8:57 AM by Ai Bowser MD     10/03             RESOLVED: Anemia due to GI bleed 2007 ICD-10-CM: D64.9  ICD-9-CM: 285.9  4/6/2010 - 2/28/2018        RESOLVED: GI bleed, duodenitis 2007 ICD-10-CM: K92.2  ICD-9-CM: 578.9  4/6/2010 - 2/28/2018    Overview Addendum 9/7/2010  9:01 AM by Ai Bowser MD     8/07 Duodenitis                       Medications reviewed  Current Facility-Administered Medications   Medication Dose Route Frequency    vancomycin (VANCOCIN) 1,000 mg in 0.9% sodium chloride 250 mL (VIAL-MATE)  1,000 mg IntraVENous Q24H    dextrose 5 % - 0.45% NaCl infusion  125 mL/hr IntraVENous CONTINUOUS    mupirocin (BACTROBAN) 2 % ointment   Topical DAILY    pantoprazole (PROTONIX) 40 mg in 0.9% sodium chloride 10 mL injection  40 mg IntraVENous DAILY    phenol throat spray (CHLORASEPTIC) 1 Spray  1 Spray Oral PRN    HYDROcodone-acetaminophen (NORCO) 5-325 mg per tablet 1 Tablet  1 Tablet Oral Q4H PRN    HYDROmorphone (DILAUDID) injection 0.5-1 mg  0.5-1 mg IntraVENous Q2H PRN    ondansetron (ZOFRAN) injection 4 mg  4 mg IntraVENous Q4H PRN    acetaminophen (TYLENOL) tablet 650 mg  650 mg Oral Q6H PRN    hydrALAZINE (APRESOLINE) 20 mg/mL injection 20 mg  20 mg IntraVENous Q6H PRN    enoxaparin (LOVENOX) injection 40 mg  40 mg SubCUTAneous Q24H    glucose chewable tablet 16 g  4 Tablet Oral PRN    dextrose 10 % infusion 0-250 mL  0-250 mL IntraVENous PRN    glucagon (GLUCAGEN) injection 1 mg  1 mg IntraMUSCular PRN    aspirin delayed-release tablet 81 mg  81 mg Oral QHS    [Held by provider] furosemide (LASIX) tablet 20 mg  20 mg Oral BID    [Held by provider] pregabalin (LYRICA) capsule 50 mg  50 mg Oral BID    Vancomycin - pharmacy to dose   Other Rx Dosing/Monitoring    albuterol (PROVENTIL HFA, VENTOLIN HFA, PROAIR HFA) inhaler 1 Puff  1 Puff Inhalation Q6H PRN    cefepime (MAXIPIME) 2 g in sterile water (preservative free) 10 mL IV syringe  2 g IntraVENous Q12H       Care Plan discussed with: Patient/Family and Nurse    Total time spent with patient: 30 minutes.     [address] [facility],

## 2022-02-25 NOTE — PROGRESS NOTES
Attempted MARYURI (ankle brachial index) exam at 2100, patient unable to withstand blood pressure cuff inflation as well as unable to keep arms, legs, and feet still for the study. Will attempt exam again at a later time. [address] [facility]  [facility].

## 2022-02-25 NOTE — PROGRESS NOTES
0700 RN recheck NG tube position and suction chamber lever. NG tube insertion sarabjit 59 cm intact. Suction draining chamber 400 ml.     0740 RN revisit patient and find patient self pull NG tube. 0745 Bedside and Verbal shift change report given to Steve Marinelli RN (oncoming nurse) by Nessa Mitchell RN (offgoing nurse). Report included the following information SBAR, Kardex, Intake/Output and MAR.      1655 RN page on call hospitalist and she said pass down to oncoming nurse and wait for surgeon rounding to see further direction . Oncoming nurse will follow.

## 2022-02-25 NOTE — PROGRESS NOTES
Occupational therapy  02/25/22     OT eval order received and acknowledged. OT eval attempted at 12:21 PM however spoke with PT and patient agitated/confused today, frequently yelling out. Not appropriate to participate with therapy at this time. Will continue to follow patient and attempt OT eval at a later time.      Thank you,  Sanjay Segal, OTR/L

## 2022-02-25 NOTE — PROGRESS NOTES
Pharmacy consult note:  Day #5 of Vancomycin  Indication:  Cellulitis w/rt toe ulcer, failed outpt cephalexin; CAP  -Obstructive RT colon CA found during Exploratory Lap   -recent COVID infection  Current regimen:  Dosing by level  Abx regimen:  Vancomycin, cefepime    Recent Labs     22  0216 22  0135 22  0941 22  0403 22  0403   WBC 13.0* 6.7 6.6   < > 6.9   CREA 1.09 1.17  --   --  1.34*   BUN 14 17  --   --  21*    < > = values in this interval not displayed. SCr Trend:  Recent Labs     22  0216 22  0135 22  0403 22  0342 22  1800 22  1504 22  1006 22  0602 02/15/22  0445 22  0455   CREA 1.09 1.17 1.34* 1.64* 1.58* 1.75* 1.29 1.28 1.10 1.20   BUN 14 17 21* 20 15 15 9 9 11 12     Est CrCl: 51.2 ml/min  Temp (24hrs), Av.2 °F (36.8 °C), Min:97.5 °F (36.4 °C), Max:99.2 °F (37.3 °C)    Cultures:    Blood: NGTD - pending    Goal target range AUC/ERICA 400-600    Recent level history:  Date/Time Dose & Interval Measured Level (mcg/mL) Associated AUC/ERICA Dose Adjustment     @ 0403  1750 mg LD  @ 2346 12.1 ~ 28 hrs p dose   --- 750 mg x 1     @ 0135  750 mg x1  @ 1348  14.9 ~ 11.75 hrs p dose   ---  1000 mg x1    @ 0216  1000 mg x1  @ 1132  15.6 ~ 14.5 hrs pd dose   ---                             Plan: Renal function returning to baseline. Change dose to 1000 mg IV q24h. Pharmacy will continue to monitor this patient daily for changes in clinical status and renal function.

## 2022-02-25 NOTE — PROGRESS NOTES
Physical Therapy Note    Chart reviewed in prep for PT treatment. Pt currently confused and agitated, frequently yelling out. Pt is not appropriate for PT interventions at this time. Will follow up when able and appropriate. Thank you.     Zafar Yoon PT, DPT

## 2022-02-26 NOTE — PROGRESS NOTES
Progress Note    Patient: Evita Harvey MRN: [id number]  SSN: [id number]    YOB: 1934  Age: 80 y.o. Sex: male      Admit Date: 2022    2 Days Post-Op    Procedure:  Procedure(s):  LAPAROTOMY EXPLORATORY & RIGHT HEMICOLECTOMY    Subjective:     Patient complains mainly of toe pain. Denies any nausea or vomiting. Not really passing any gas. Objective:     Visit Vitals  /60   Pulse 76   Temp 98.4 °F (36.9 °C)   Resp 16   Ht 6' 1\" (1.854 m)   Wt 75.8 kg (167 lb 1.7 oz)   SpO2 93%   BMI 22.05 kg/m²       Temp (24hrs), Av.9 °F (37.2 °C), Min:98.4 °F (36.9 °C), Max:99.9 °F (37.7 °C)      Physical Exam:    General alert in no acute distress  Lungs clear bilaterally  Heart regular rate and rhythm  Abdomen soft mildly distended nontender minimal bowel sounds    Data Review: images and reports reviewed    Lab Review: All lab results for the last 24 hours reviewed.   Recent Results (from the past 24 hour(s))   CBC W/O DIFF    Collection Time: 22  1:11 AM   Result Value Ref Range    WBC 9.6 4.1 - 11.1 K/uL    RBC 2.78 (L) 4.10 - 5.70 M/uL    HGB 8.7 (L) 12.1 - 17.0 g/dL    HCT 28.9 (L) 36.6 - 50.3 %    .0 (H) 80.0 - 99.0 FL    MCH 31.3 26.0 - 34.0 PG    MCHC 30.1 30.0 - 36.5 g/dL    RDW 16.9 (H) 11.5 - 14.5 %    PLATELET 364 261 - 992 K/uL    MPV 11.4 8.9 - 12.9 FL    NRBC 0.0 0  WBC    ABSOLUTE NRBC 0.00 0.00 - 9.98 K/uL   METABOLIC PANEL, BASIC    Collection Time: 22  1:11 AM   Result Value Ref Range    Sodium 141 136 - 145 mmol/L    Potassium 3.5 3.5 - 5.1 mmol/L    Chloride 112 (H) 97 - 108 mmol/L    CO2 28 21 - 32 mmol/L    Anion gap 1 (L) 5 - 15 mmol/L    Glucose 149 (H) 65 - 100 mg/dL    BUN 12 6 - 20 MG/DL    Creatinine 0.99 0.70 - 1.30 MG/DL    BUN/Creatinine ratio 12 12 - 20      GFR est AA >60 >60 ml/min/1.73m2    GFR est non-AA >60 >60 ml/min/1.73m2    Calcium 8.3 (L) 8.5 - 10.1 MG/DL   MAGNESIUM    Collection Time: 22  1:11 AM   Result Value Ref Range    Magnesium 1.6 1.6 - 2.4 mg/dL   PHOSPHORUS    Collection Time: 02/26/22  1:11 AM   Result Value Ref Range    Phosphorus 1.6 (L) 2.6 - 4.7 MG/DL   GLUCOSE, POC    Collection Time: 02/26/22 11:05 AM   Result Value Ref Range    Glucose (POC) 146 (H) 65 - 117 mg/dL    Performed by Cathey Epley  PCT    GLUCOSE, POC    Collection Time: 02/26/22  4:17 PM   Result Value Ref Range    Glucose (POC) 107 65 - 117 mg/dL    Performed by [facility] Epley  PCT        Assessment:     Hospital Problems  Date Reviewed: 2/12/2022          Codes Class Noted POA    Intestinal adhesions with complete obstruction ([facility] ICD-10-CM: K22.16  ICD-9-CM: 560.81  2/21/2022 Yes        Failure to thrive (child) ICD-10-CM: R62.51  ICD-9-CM: 783.41  2/21/2022 Unknown        Falls frequently ICD-10-CM: R29.6  ICD-9-CM: V15.88  2/21/2022 Unknown        Moderate protein-calorie malnutrition ([facility] ICD-10-CM: E44.0  ICD-9-CM: 263.0  2/21/2022 Unknown        Acute renal injury ([facility] ICD-10-CM: N17.9  ICD-9-CM: 584.9  2/21/2022 Unknown        Cellulitis of right leg ICD-10-CM: L03.115  ICD-9-CM: 682.6  2/12/2022 Yes        Pneumonia due to COVID-19 virus ICD-10-CM: U07.1, J12.82  ICD-9-CM: 480.8, 079.89  12/29/2021 Yes        Aortic stenosis, moderate ICD-10-CM: I35.0  ICD-9-CM: 424.1  4/10/2018 Yes        Multiple myeloma ([facility] ICD-10-CM: C90.00  ICD-9-CM: 203.00  12/27/2012 Yes        Pre-diabetes ICD-10-CM: R73.03  ICD-9-CM: 790.29  9/7/2010 Yes    Overview Addendum 11/1/2011  9:29 AM by Jeannie Sanchez MD     2005;  Optho Dr Mirela Angeles             HTN (hypertension) ICD-10-CM: I10  ICD-9-CM: 401.9  4/6/2010 Yes    Overview Signed 9/7/2010  8:55 AM by Jeannie Sanchez MD     1990             H/O Carotid Stenosis ICD-10-CM: I65.29  ICD-9-CM: 433.10  4/6/2010 Yes    Overview Addendum 9/7/2010  8:58 AM by Jeannie Sanchez MD     Oct 2003; 50%left subclavian and 50-70% SHEILA                    Plan/Recommendations/Medical Decision Making: Explained the patient that he is in pain in his toe due to the ischemia. He has been seen by podiatry who is ordered ABIs and recommended vascular surgery consult. Consult has been placed. Status post right hemicolectomy-overall seems to be doing okay. Okay to start clear liquid diet. Needs to be out of bed and ambulating if possible. Intermittent encephalopathy secondary to infection narcotics and sundowning.

## 2022-02-26 NOTE — ROUTINE PROCESS
Bedside shift change report given to Sandro Evans RN (oncoming nurse) by Miranda (offgoing nurse).  Report included the following information SBAR, Kardex, ED Summary, OR Summary, Procedure Summary, Intake/Output, MAR, Recent Results and Med Rec Status.

## 2022-02-26 NOTE — PROGRESS NOTES
Hospitalist Progress Note          Albert Potter MD  Please call  and page for questions. Call physician on-call through the  7pm-7am    Daily Progress Note: 2/26/2022    Primary care provider:Viktor Edmonds NP    Date of admission: 2/21/2022  1:18 PM    Admission summery and hospital course:  HPI\"80year-old man with a past medical history significant for type 2 diabetes, multiple myeloma, hypertension, dyslipidemia, peripheral neuropathy, status post abdominal aortic aneurysm repair in 2012 and COVID-pneumonia.  Patient was recently admitted on 2/15 for management of cellulitis. Presented to the emergency room several days ago treated for diarrhea with Imodium, and now with progressive abdominal pain nausea vomiting. Patient seen in the ED today  for abdominal pain started 12 to 24 hours ago associated with vomiting and constipation.  The hospitalist team has been consulted to assist general surgery in medical management.   Patient examined at bedside.  Patient is ill-appearing, should not reports health has been declining since Covid diagnosis in December 2021.  Patient denies chest pain, dizziness, shortness of breath during examination.  Patient endorses abdominal pain nausea and vomiting.  Productive cough noted on evaluation, patient reports he has had a cough since Covid diagnosis and it is somewhat improved.  NG tube is in place draining dark brown bilious fluid.  Patient reports abdominal distention has improved some since placement of NG.  Patient's son-in-law is at bedside. \"  02/22/2022: Patient NG tube accidentally removed. Subjective: Follow up SBO. Patient seen and examined. Alert and oriented but has intermittent delusions. States that we are injecting medication into his toe. Able to discuss medical issues when redirected. Has foot pain. Denies abdominal pain. No bowel movement.        Assessment/Plan:   Small bowel obstruction  -general surgery following   -Underwent right hemicolectomy 2/24. Evidence of colon cancer. Pathology pending  -Advance diet to clear liquids    Acute metabolic encephalopathy: intermittent   -Multifactorial in setting of recent surgery, narcotics, hospitalization, infection  -monitor closely, supportive care  -limit narcotics as able    Cellulitis with right toe ulcer/osteomyelitis   -review of photos from prior hospitalization demonstrates worsening  -failed outpateint keflex, now on vanc and cefepime  -podiatry consulted. MRI with evidence of osteomyelitis   -MARYURI with moderate to severe arterial occlusion. Vascular surgery consulted     History of Type 2 diabetes mellitus, Ha1c 6.3 with hypoglycemia:   -continue accuchecks q6 hours  -decrease D5     History of Covid infection  Pneumonia  -CXR with scattered opacities  -getting broad spectrum antibiotics      Anemia of iron deficiency and chronic disease  Monitor CBC, transfuse for Hgb less than 7. Peripheral neuropathy: Hold Lyrica for low blood pressure and ongoing bowel obstruction.   Hypertension: Blood pressure is reasonable now. Continue to hold Lasix.   Failure to thrive, Protein calorie malnutrition: Nutrition consult.    Recurrent falls: PT/OT. Hypokalemia: Replace and monitor.     See orders for other plans. VTE prophylaxis: Enoxaparin- patient refused   Code status: DNR.   Discussed plan of care with Patient/Family and Nurse  Discharge planning: As per primary team.  Follow-up PT/OT       Review of Systems:     Review of Systems:  Symptom  Y/N  Comments   Symptom  Y/N  Comments    Fever/Chills   n   Chest Pain  n    Poor Appetite   n   Edema  n     Cough  n   Abdominal Pain  n     Sputum  n   Joint Pain      SOB/PARKINSON  n   Pruritis/Rash      Nausea/vomit  n   Tolerating PT/OT      Diarrhea  n   Tolerating Diet      Constipation  y   Other  y Foot pain    Could not obtain due to:         Objective:   Physical Exam:     Visit Vitals  /60   Pulse 76   Temp 98.4 °F (36.9 °C)   Resp 16   Ht 6' 1\" (1.854 m)   Wt 75.8 kg (167 lb 1.7 oz)   SpO2 93%   BMI 22.05 kg/m²    O2 Flow Rate (L/min): 2 l/min O2 Device: None (Room air)    Temp (24hrs), Av.9 °F (37.2 °C), Min:98.4 °F (36.9 °C), Max:99.9 °F (37.7 °C)    No intake/output data recorded.  1901 -  0700  In: 1750 [I.V.:1750]  Out: 425 [Urine:425]      General:   Patient is comfortably laying in the bed. Alert, cooperative, no distress, appears stated age. Lungs:    Clear to auscultation bilaterally. Chest wall:   No tenderness or deformity. Heart:  Regular rate and rhythm, S1, S2 normal, no murmur. Abdomen:   Soft, central bandage, +bowel sounds   Extremities:  Right second to swelling including distal part of the right foot. No discharge or no fluctuation. Right second toe with discoloration      Neurologic:  Patient has clear voice. He follows commands well. Data Review:       Recent Days:  Recent Labs     22  0111 22  0216 22  0135   WBC 9.6 13.0* 6.7   HGB 8.7* 10.0* 9.4*   HCT 28.9* 33.6* 32.0*    196 161     Recent Labs     22  0111 22  0216 22  0135    142 144   K 3.5 4.2 4.4   * 113* 113*   CO2 28 25 28   * 173* 65   BUN 12 14 17   CREA 0.99 1.09 1.17   CA 8.3* 8.4* 9.2   MG 1.6  --   --    PHOS 1.6*  --   --      No results for input(s): PH, PCO2, PO2, HCO3, FIO2 in the last 72 hours.     24 Hour Results:  Recent Results (from the past 24 hour(s))   CBC W/O DIFF    Collection Time: 22  1:11 AM   Result Value Ref Range    WBC 9.6 4.1 - 11.1 K/uL    RBC 2.78 (L) 4.10 - 5.70 M/uL    HGB 8.7 (L) 12.1 - 17.0 g/dL    HCT 28.9 (L) 36.6 - 50.3 %    .0 (H) 80.0 - 99.0 FL    MCH 31.3 26.0 - 34.0 PG    MCHC 30.1 30.0 - 36.5 g/dL    RDW 16.9 (H) 11.5 - 14.5 %    PLATELET 456 147 - 453 K/uL    MPV 11.4 8.9 - 12.9 FL    NRBC 0.0 0  WBC    ABSOLUTE NRBC 0.00 0.00 - 8.28 K/uL   METABOLIC PANEL, BASIC    Collection Time: 02/26/22  1:11 AM   Result Value Ref Range    Sodium 141 136 - 145 mmol/L    Potassium 3.5 3.5 - 5.1 mmol/L    Chloride 112 (H) 97 - 108 mmol/L    CO2 28 21 - 32 mmol/L    Anion gap 1 (L) 5 - 15 mmol/L    Glucose 149 (H) 65 - 100 mg/dL    BUN 12 6 - 20 MG/DL    Creatinine 0.99 0.70 - 1.30 MG/DL    BUN/Creatinine ratio 12 12 - 20      GFR est AA >60 >60 ml/min/1.73m2    GFR est non-AA >60 >60 ml/min/1.73m2    Calcium 8.3 (L) 8.5 - 10.1 MG/DL   MAGNESIUM    Collection Time: 02/26/22  1:11 AM   Result Value Ref Range    Magnesium 1.6 1.6 - 2.4 mg/dL   PHOSPHORUS    Collection Time: 02/26/22  1:11 AM   Result Value Ref Range    Phosphorus 1.6 (L) 2.6 - 4.7 MG/DL   GLUCOSE, POC    Collection Time: 02/26/22 11:05 AM   Result Value Ref Range    Glucose (POC) 146 (H) 65 - 117 mg/dL    Performed by Virgel Severance PCT    GLUCOSE, POC    Collection Time: 02/26/22  4:17 PM   Result Value Ref Range    Glucose (POC) 107 65 - 117 mg/dL    Performed by Virgel Severance PCT        Problem List:  Problem List as of 2/26/2022 Date Reviewed: 2/12/2022          Codes Class Noted - Resolved    Intestinal adhesions with complete obstruction (HCC) ICD-10-CM: I58.51  ICD-9-CM: 560.81  2/21/2022 - Present        Failure to thrive (child) ICD-10-CM: R62.51  ICD-9-CM: 783.41  2/21/2022 - Present        Falls frequently ICD-10-CM: R29.6  ICD-9-CM: V15.88  2/21/2022 - Present        Moderate protein-calorie malnutrition (HCC) ICD-10-CM: E44.0  ICD-9-CM: 263.0  2/21/2022 - Present        Acute renal injury ([facility] ICD-10-CM: N17.9  ICD-9-CM: 584.9  2/21/2022 - Present        Cellulitis of right leg ICD-10-CM: L03.115  ICD-9-CM: 682.6  2/12/2022 - Present        Pneumonia due to COVID-19 virus ICD-10-CM: U07.1, J12.82  ICD-9-CM: 480.8, 079.89  12/29/2021 - Present        Personal history of atrial fibrillation ICD-10-CM: Z86.79  ICD-9-CM: V12.59  11/9/2020 - Present        Carotid stenosis, asymptomatic, right ICD-10-CM: B11.96  ICD-9-CM: 433.10 5/29/2020 - Present        Hyperlipemia, mixed ICD-10-CM: E78.2  ICD-9-CM: 272.2  9/27/2018 - Present        Aortic stenosis, moderate ICD-10-CM: I35.0  ICD-9-CM: 424.1  4/10/2018 - Present        Mild concentric left ventricular hypertrophy (LVH) ICD-10-CM: I51.7  ICD-9-CM: 429.3  4/10/2018 - Present        Chronic diarrhea ICD-10-CM: K52.9  ICD-9-CM: 787.91  9/29/2017 - Present        Microalbuminuria ICD-10-CM: R80.9  ICD-9-CM: 791.0  12/10/2015 - Present        Right inguinal hernia ICD-10-CM: K40.90  ICD-9-CM: 550.90  11/2/2015 - Present        Left inguinal hernia ICD-10-CM: K40.90  ICD-9-CM: 550.90  9/14/2015 - Present        Multiple myeloma ([facility] [address] [facility] ICD-10-CM: C90.00  ICD-9-CM: 203.00  12/27/2012 - Present        Hypercalcemia ICD-10-CM: E05.03  ICD-9-CM: 275.42  4/25/2012 - Present    Overview Signed 4/25/2012  5:49 PM by Luciana Knight MD     2012, PTH normal, abnormal protein electrophoresis, referred to Hem-Onc             S/P AAA repair 2/9/2012 ICD-10-CM: I16.043, Z86.79  ICD-9-CM: V45.89  2/22/2012 - Present    Overview Signed 2/22/2012  1:38 PM by MD Dr Ayesha Sloan             Pre-diabetes ICD-10-CM: R73.03  ICD-9-CM: 790.29  9/7/2010 - Present    Overview Addendum 11/1/2011  9:29 AM by Luciana Knight MD     2005;  Optho Dr Baylee Benitez             Sinus tachycardia ICD-10-CM: R00.0  ICD-9-CM: 427.89  9/7/2010 - Present        H/O Heavy Alcohol Use ICD-9-CM: Miguel Labella  9/7/2010 - Present        Vitamin B12 deficiency ICD-10-CM: E53.8  ICD-9-CM: 266.2  9/7/2010 - Present        Hyperhomocysteinemia ([facility] [address] ICD-10-CM: E72.11  ICD-9-CM: 270.4  9/7/2010 - Present        Mild Allergic Rhinitis ICD-10-CM: J30.9  ICD-9-CM: 477.9  9/7/2010 - Present        History of skin cancer ICD-10-CM: M11.217  ICD-9-CM: V10.83  9/7/2010 - Present    Overview Signed 9/7/2010  9:03 AM by Luciana Knight MD     Followed by Valerio Pastor             HTN (hypertension) ICD-10-CM: I10  ICD-9-CM: 401.9  4/6/2010 - Present    Overview Signed 9/7/2010  8:55 AM by Brie Proctor MD     1990             H/O Carotid Stenosis ICD-10-CM: I65.29  ICD-9-CM: 433.10  4/6/2010 - Present    Overview Addendum 9/7/2010  8:58 AM by Brie Proctor MD     Oct 2003; 50%left subclavian and 50-70% SHEILA              GERD (gastroesophageal reflux disease) ICD-10-CM: K21.9  ICD-9-CM: 530.81  4/6/2010 - Present        RESOLVED: Intestinal obstruction ([facility] ICD-10-CM: C06.086  ICD-9-CM: 560.9  2/21/2022 - 2/24/2022        RESOLVED: Myeloma ([facility] ICD-10-CM: C90.00  ICD-9-CM: 203.00  1/6/2015 - 6/10/2016        RESOLVED: Gastrointestinal bleeding ICD-10-CM: K92.2  ICD-9-CM: 578.9  12/26/2014 - 2/28/2018        RESOLVED: Dilan Denier stone pancreatitis ICD-10-CM: K85.10  ICD-9-CM: 577.0, 574.20  1/23/2014 - 2/28/2018        RESOLVED: Acute cholecystitis ICD-10-CM: K81.0  ICD-9-CM: 575.0  1/23/2014 - 2/28/2018        RESOLVED: Abnormal serum protein electrophoresis ICD-10-CM: R77.8  ICD-9-CM: 790.99  4/25/2012 - 2/28/2018    Overview Signed 4/25/2012  5:48 PM by Brie Proctor MD     4/2012, referred to Hem-Onc             RESOLVED: Atrial fibrillation 1/2012 ICD-10-CM: I48.91  ICD-9-CM: 427.31  2/22/2012 - 11/9/2020    Overview Signed 2/22/2012  1:33 PM by Brie Proctor MD     Cardio Dr Sada Blackde: AAA (abdominal aortic aneurysm) ([facility] ICD-10-CM: I71.4  ICD-9-CM: 441.4  1/6/2012 - 9/26/2018    Overview Signed 1/6/2012  2:07 PM by Brie Proctor MD     Detected on plain films back xray 1/2012; Ct scheduled             RESOLVED: Anemia ICD-10-CM: D64.9  ICD-9-CM: 285.9  1/5/2012 - 2/28/2018        RESOLVED: Hypertriglyceridemia ICD-10-CM: E78.1  ICD-9-CM: 272.1  9/7/2010 - 9/27/2018    Overview Signed 9/7/2010  8:55 AM by Brie Proctor MD     2003             RESOLVED: Bilateral Carotid Bruits, L>R ICD-10-CM: P89.86  ICD-9-CM: 785.9  9/7/2010 - 2/28/2018 Overview Signed 9/7/2010  8:57 AM by Holli Su MD     10/03             RESOLVED: Anemia due to GI bleed 2007 ICD-10-CM: D64.9  ICD-9-CM: 285.9  4/6/2010 - 2/28/2018        RESOLVED: GI bleed, duodenitis 2007 ICD-10-CM: K92.2  ICD-9-CM: 578.9  4/6/2010 - 2/28/2018    Overview Addendum 9/7/2010  9:01 AM by Holli Su MD     8/07 Duodenitis                       Medications reviewed  Current Facility-Administered Medications   Medication Dose Route Frequency    [START ON 2/27/2022] Vancomycin Random - Please draw level on 2/27 w/ AM labs, thanks!    Other ONCE    vancomycin (VANCOCIN) 1,000 mg in 0.9% sodium chloride 250 mL (VIAL-MATE)  1,000 mg IntraVENous Q24H    dextrose 5 % - 0.45% NaCl infusion  125 mL/hr IntraVENous CONTINUOUS    mupirocin (BACTROBAN) 2 % ointment   Topical DAILY    pantoprazole (PROTONIX) 40 mg in 0.9% sodium chloride 10 mL injection  40 mg IntraVENous DAILY    phenol throat spray (CHLORASEPTIC) 1 Spray  1 Spray Oral PRN    HYDROcodone-acetaminophen (NORCO) 5-325 mg per tablet 1 Tablet  1 Tablet Oral Q4H PRN    HYDROmorphone (DILAUDID) injection 0.5-1 mg  0.5-1 mg IntraVENous Q2H PRN    ondansetron (ZOFRAN) injection 4 mg  4 mg IntraVENous Q4H PRN    acetaminophen (TYLENOL) tablet 650 mg  650 mg Oral Q6H PRN    hydrALAZINE (APRESOLINE) 20 mg/mL injection 20 mg  20 mg IntraVENous Q6H PRN    enoxaparin (LOVENOX) injection 40 mg  40 mg SubCUTAneous Q24H    glucose chewable tablet 16 g  4 Tablet Oral PRN    dextrose 10 % infusion 0-250 mL  0-250 mL IntraVENous PRN    glucagon (GLUCAGEN) injection 1 mg  1 mg IntraMUSCular PRN    aspirin delayed-release tablet 81 mg  81 mg Oral QHS    [Held by provider] furosemide (LASIX) tablet 20 mg  20 mg Oral BID    [Held by provider] pregabalin (LYRICA) capsule 50 mg  50 mg Oral BID    Vancomycin - pharmacy to dose   Other Rx Dosing/Monitoring    albuterol (PROVENTIL HFA, VENTOLIN HFA, PROAIR HFA) inhaler 1 Puff  1 Puff Inhalation Q6H PRN    cefepime (MAXIPIME) 2 g in sterile water (preservative free) 10 mL IV syringe  2 g IntraVENous Q12H       Care Plan discussed with: Patient/Family and Nurse    Total time spent with patient: 30 minutes.     [address] [facility],

## 2022-02-26 NOTE — PROGRESS NOTES
Void inc x1. Turned and repositioned. abd softly distended. Dressing changed on rt foot. 2nd toe dark and painful with bothered.

## 2022-02-26 NOTE — PROGRESS NOTES
Transition of Care Plan   RUR- 20% High Risk   DISPOSITION: The disposition plan is pending medical progression and recommendation.  F/U with PCP/Specialist     Transport: [facility]/family     Disposition plan is not confirmed at this time. CM to assist with JOSE needs as they arise.     Carlitos Lovelace, MSW, CRM, LMHP-e

## 2022-02-26 NOTE — PROGRESS NOTES
Progress Note    Patient: Kari Bender MRN: [id number]  SSN: [id number]    YOB: 1934  Age: 80 y.o. Sex: male      Admit Date: 2/21/2022    Subjective:     Patient seen and examined at bedside. Denies any n/v/f/ns/c.    Objective:     Visit Vitals  BP (!) 125/59 (BP 1 Location: Right upper arm, BP Patient Position: Supine)   Pulse 85   Temp 98.6 °F (37 °C)   Resp 16   Ht 6' 1\" (1.854 m)   Wt 75.8 kg (167 lb 1.7 oz)   SpO2 95%   BMI 22.05 kg/m²      Right Foot Exam: +large eschar along proximal 50% of 2nd toe with some darkening noted in the toe. Eschar is dry. Labs/Radiology Review: images and reports reviewed    Assessment:     Hospital Problems  Date Reviewed: 2/12/2022          Codes Class Noted POA    Intestinal adhesions with complete obstruction [facility]) ICD-10-CM: K56.52  ICD-9-CM: 560.81  2/21/2022 Yes        Failure to thrive (child) ICD-10-CM: R62.51  ICD-9-CM: 783.41  2/21/2022 Unknown        Falls frequently ICD-10-CM: R29.6  ICD-9-CM: V15.88  2/21/2022 Unknown        Mod[facility]cal[facility] ICD-10-CM: E44.0  ICD-9-CM: 263.0  2/21/2022 Unknown        Acute renal injury ([facility] ICD-10-CM: N17.9  ICD-9-CM: 584.9  2/21/2022 Unknown        Cellulitis of right leg ICD-10-CM: L03.115  ICD-9-CM: 682.6  2/12/2022 Yes        Pneumonia due to COVID-19 virus ICD-10-CM: U07.1, J12.82  ICD-9-CM: 480.8, 079.89  12/29/2021 Yes        Aortic stenosis, moderate ICD-10-CM: I35.0  ICD-9-CM: 424.1  4/10/2018 Yes        Multiple myeloma ([facility] ICD-10-CM: C90.00  ICD-9-CM: 203.00  12/27/2012 Yes        Pre-diabetes ICD-10-CM: R73.03  ICD-9-CM: 790.29  9/7/2010 Yes    Overview Addendum 11/1/2011  9:29 AM by Zachary Ibanez MD     2005;  Optho Dr Sarkis Pop             HTN (hypertension) ICD-10-CM: I10  ICD-9-CM: 401.9  4/6/2010 Yes    Overview Signed 9/7/2010  8:55 AM by Zachary Ibanez MD     1990             H/O Carotid Stenosis ICD-10-CM: L86.06  ICD-9-CM: 433.10  4/6/2010 Yes    Overview Addendum 2010  8:58 AM by Libby Acosta MD     Oct 2003; 50%left subclavian and 50-70% SHEILA                    Plan/Recommendations/Medical Decision Makin.) Had long discussion with Pt. Reviewed my findings with him. 2.) MRI shows signal uptake consistent with osteomyelitis in right 2nd toe. Normally, next step would be amputation of the infected toe, however, Pt has severely diminished bloodflow in the right lower extremity. Vascular sx consulted - awaiting assessment. Pt will need revascularization prior to any toe amputation. 3.) Continue antibxs (vanco/cefepime). 4.) Dr. Lizeth Shin to reassess on Monday.

## 2022-02-27 NOTE — ROUTINE PROCESS
Notified the pharmacy through [facility] that patient is allergic to Lovenox.  Added to allergy list.

## 2022-02-27 NOTE — PROGRESS NOTES
Pt is doing ok. Pain of R foot has been an issue today. Pain is controlled for some time with IV Dilaudid. Pt did not complain of abdominal pain today. Pt now on clears and tolerating well.

## 2022-02-27 NOTE — PROGRESS NOTES
Progress Note    Patient: Jacky Huffman MRN: [id number]  SSN: [id number]    YOB: 1934  Age: 80 y.o. Sex: male      Admit Date: 2022    3 Days Post-Op    Procedure:  Procedure(s):  LAPAROTOMY EXPLORATORY & RIGHT HEMICOLECTOMY    Subjective:     Patient had bowel movement today. Large. Tolerating clear liquids did not like the broth    Objective:     Visit Vitals  BP (!) 145/58   Pulse 90   Temp 100.2 °F (37.9 °C)   Resp 20   Ht 6' 1\" (1.854 m)   Wt 76.6 kg (168 lb 14 oz)   SpO2 93%   BMI 22.28 kg/m²       Temp (24hrs), Av.9 °F (37.2 °C), Min:98 °F (36.7 °C), Max:100.4 °F (38 °C)      Physical Exam:    Neuro alert and oriented no acute distress  Lungs clear bilaterally  Heart regular rate and rhythm  Abdomen soft mildly distended excellent bowel sounds dressing intact    Data Review: images and reports reviewed    Lab Review: All lab results for the last 24 hours reviewed.   Recent Results (from the past 24 hour(s))   GLUCOSE, POC    Collection Time: 22  4:17 PM   Result Value Ref Range    Glucose (POC) 107 65 - 117 mg/dL    Performed by Galindo Travis  PCT    VANCOMYCIN, RANDOM    Collection Time: 22  2:48 AM   Result Value Ref Range    Vancomycin, random 16.7 UG/ML   CBC W/O DIFF    Collection Time: 22  2:48 AM   Result Value Ref Range    WBC 8.7 4.1 - 11.1 K/uL    RBC 3.13 (L) 4.10 - 5.70 M/uL    HGB 9.7 (L) 12.1 - 17.0 g/dL    HCT 32.7 (L) 36.6 - 50.3 %    .5 (H) 80.0 - 99.0 FL    MCH 31.0 26.0 - 34.0 PG    MCHC 29.7 (L) 30.0 - 36.5 g/dL    RDW 16.6 (H) 11.5 - 14.5 %    PLATELET 606 935 - 562 K/uL    MPV 11.0 8.9 - 12.9 FL    NRBC 0.0 0  WBC    ABSOLUTE NRBC 0.00 0.00 - 8.93 K/uL   METABOLIC PANEL, BASIC    Collection Time: 22  2:48 AM   Result Value Ref Range    Sodium 139 136 - 145 mmol/L    Potassium 3.3 (L) 3.5 - 5.1 mmol/L    Chloride 111 (H) 97 - 108 mmol/L    CO2 25 21 - 32 mmol/L    Anion gap 3 (L) 5 - 15 mmol/L    Glucose 112 (H) 65 - 100 mg/dL    BUN 11 6 - 20 MG/DL    Creatinine 0.94 0.70 - 1.30 MG/DL    BUN/Creatinine ratio 12 12 - 20      GFR est AA >60 >60 ml/min/1.73m2    GFR est non-AA >60 >60 ml/min/1.73m2    Calcium 8.4 (L) 8.5 - 10.1 MG/DL   MAGNESIUM    Collection Time: 02/27/22  2:48 AM   Result Value Ref Range    Magnesium 1.6 1.6 - 2.4 mg/dL   PHOSPHORUS    Collection Time: 02/27/22  2:48 AM   Result Value Ref Range    Phosphorus 1.7 (L) 2.6 - 4.7 MG/DL   GLUCOSE, POC    Collection Time: 02/27/22 11:18 AM   Result Value Ref Range    Glucose (POC) 127 (H) 65 - 117 mg/dL    Performed by Destiney Chong  PCT    LACTIC ACID    Collection Time: 02/27/22 12:56 PM   Result Value Ref Range    Lactic acid 1.2 0.4 - 2.0 MMOL/L       Assessment:     Hospital Problems  Date Reviewed: 2/12/2022          Codes Class Noted POA    Intestinal adhesions with complete obstruction ([facility] ICD-10-CM: K56.52  ICD-9-CM: 560.81  2/21/2022 Yes        Failure to thrive (child) ICD-10-CM: R62.51  ICD-9-CM: 783.41  2/21/2022 Unknown        Falls frequently ICD-10-CM: R29.6  ICD-9-CM: V15.88  2/21/2022 Unknown        Moderate protein-calorie malnutrition ([facility] ICD-10-CM: E44.0  ICD-9-CM: 263.0  2/21/2022 Unknown        Acute renal injury ([facility] ICD-10-CM: N17.9  ICD-9-CM: 584.9  2/21/2022 Unknown        Cellulitis of right leg ICD-10-CM: L03.115  ICD-9-CM: 682.6  2/12/2022 Yes        Pneumonia due to COVID-19 virus ICD-10-CM: U07.1, J12.82  ICD-9-CM: 480.8, 079.89  12/29/2021 Yes        Aortic stenosis, moderate ICD-10-CM: I35.0  ICD-9-CM: 424.1  4/10/2018 Yes        Multiple myeloma ([facility] ICD-10-CM: C90.00  ICD-9-CM: 203.00  12/27/2012 Yes        Pre-diabetes ICD-10-CM: R73.03  ICD-9-CM: 790.29  9/7/2010 Yes    Overview Addendum 11/1/2011  9:29 AM by Toby Danielle MD     2005;  Optho Dr Carey Alexander             HTN (hypertension) ICD-10-CM: I10  ICD-9-CM: 401.9  4/6/2010 Yes    Overview Signed 9/7/2010  8:55 AM by Toby Danielle MD     1990             H/O Carotid Stenosis ICD-10-CM: I65.29  ICD-9-CM: 433.10  4/6/2010 Yes    Overview Addendum 9/7/2010  8:58 AM by Tesfaye Ackerman MD     Oct 2003; 50%left subclavian and 50-70% SHEILA                    Plan/Recommendations/Medical Decision Making:   Overall seems to be feeling better. Will advance diet to full liquids. Appreciate hospitalist help with medical management. Seen by vascular today will need to have angiogram at some point.

## 2022-02-27 NOTE — PROGRESS NOTES
Hospitalist Progress Note          Timo Donovan MD  Please call  and page for questions. Call physician on-call through the  7pm-7am    Daily Progress Note: 2/27/2022    Primary care provider:Holsinger, Valentina Cushing, NP    Date of admission: 2/21/2022  1:18 PM    Admission summery and hospital course:  HPI\"80year-old man with a past medical history significant for type 2 diabetes, multiple myeloma, hypertension, dyslipidemia, peripheral neuropathy, status post abdominal aortic aneurysm repair in 2012 and COVID-pneumonia.  Patient was recently admitted on 2/15 for management of cellulitis. Presented to the emergency room several days ago treated for diarrhea with Imodium, and now with progressive abdominal pain nausea vomiting. Patient seen in the ED today  for abdominal pain started 12 to 24 hours ago associated with vomiting and constipation.  The hospitalist team has been consulted to assist general surgery in medical management.   Patient examined at bedside.  Patient is ill-appearing, should not reports health has been declining since Covid diagnosis in December 2021.  Patient denies chest pain, dizziness, shortness of breath during examination.  Patient endorses abdominal pain nausea and vomiting.  Productive cough noted on evaluation, patient reports he has had a cough since Covid diagnosis and it is somewhat improved.  NG tube is in place draining dark brown bilious fluid.  Patient reports abdominal distention has improved some since placement of NG.  Patient's son-in-law is at bedside. \"  Subjective: Follow up SBO. Patient seen and examined. Cognition appears improved. Had large BM overnight. Fever later this AM. Hypotensive. Remains on IV antibiotics. Assessment/Plan:   Small bowel obstruction  -general surgery following   -Underwent right hemicolectomy 2/24. Evidence of colon cancer.  Pathology pending  -Advance diet to clear liquids 2/26  -pain control New fever, hypotension 2/27:  -Continues on broad-spectrum antibiotics  -Give fluid bolus  -Check lactic acid  -Blood and urine cultures    Acute metabolic encephalopathy: intermittent   -Multifactorial in setting of recent surgery, narcotics, hospitalization, infection  -monitor closely, supportive care  -limit narcotics as able    Cellulitis with right toe ulcer/osteomyelitis   -review of photos from prior hospitalization demonstrates worsening  -failed outpateint keflex, now on vanc and cefepime  -podiatry consulted. MRI with evidence of osteomyelitis   -MARYURI with moderate to severe arterial occlusion. Vascular surgery consulted    History of Type 2 diabetes mellitus, Ha1c 6.3 with hypoglycemia:   -continue accuchecks q6 hours  -continue decreased D5     History of Covid infection  Pneumonia  -CXR with scattered opacities  -getting broad spectrum antibiotics      Anemia of iron deficiency and chronic disease  Monitor CBC, transfuse for Hgb less than 7. Peripheral neuropathy: Hold Lyrica for low blood pressure and ongoing bowel obstruction.   Hypertension: Blood pressure is reasonable now. Continue to hold Lasix.   Failure to thrive, Protein calorie malnutrition: Nutrition consult.    Recurrent falls: PT/OT. Hypokalemia: Replace and monitor.     See orders for other plans. VTE prophylaxis: Enoxaparin- patient refused   Code status: DNR.   Discussed plan of care with Patient/Family and Nurse  Discharge planning: As per primary team.  Follow-up PT/OT       Review of Systems:     Review of Systems:  Symptom  Y/N  Comments   Symptom  Y/N  Comments    Fever/Chills   n   Chest Pain  n    Poor Appetite   n   Edema  n     Cough  n   Abdominal Pain  n     Sputum  n   Joint Pain      SOB/PARKINSON  n   Pruritis/Rash      Nausea/vomit  n   Tolerating PT/OT      Diarrhea  n   Tolerating Diet      Constipation  n   Other  y Foot pain    Could not obtain due to:         Objective:   Physical Exam:     Visit Vitals  BP (!) 91/48   Pulse 94   Temp 100.4 °F (38 °C)   Resp 20   Ht 6' 1\" (1.854 m)   Wt 76.6 kg (168 lb 14 oz)   SpO2 93%   BMI 22.28 kg/m²    O2 Flow Rate (L/min): 2 l/min O2 Device: None (Room air)    Temp (24hrs), Av.6 °F (37 °C), Min:98 °F (36.7 °C), Max:100.4 °F (38 °C)    No intake/output data recorded.  190 -  0700  In: 750 [I.V.:750]  Out: -       General:   Patient is comfortably laying in the bed. Alert, cooperative, no distress, appears stated age. Lungs:    Clear to auscultation bilaterally. Chest wall:   No tenderness or deformity. Heart:  Regular rate and rhythm, S1, S2 normal, no murmur. Abdomen:   Soft, central bandage, +bowel sounds   Extremities:  Right second to swelling including distal part of the right foot. No discharge or no fluctuation. Right second toe with discoloration      Neurologic:  Patient has clear voice. He follows commands well. AAOx3     Data Review:       Recent Days:  Recent Labs     22  0248 22  0111 22  0216   WBC 8.7 9.6 13.0*   HGB 9.7* 8.7* 10.0*   HCT 32.7* 28.9* 33.6*    159 196     Recent Labs     22  0248 22  0111 22  0216    141 142   K 3.3* 3.5 4.2   * 112* 113*   CO2  28 25   * 149* 173*   BUN 11 12 14   CREA 0.94 0.99 1.09   CA 8.4* 8.3* 8.4*   MG 1.6 1.6  --    PHOS 1.7* 1.6*  --      No results for input(s): PH, PCO2, PO2, HCO3, FIO2 in the last 72 hours.     24 Hour Results:  Recent Results (from the past 24 hour(s))   GLUCOSE, POC    Collection Time: 22  4:17 PM   Result Value Ref Range    Glucose (POC) 107 65 - 117 mg/dL    Performed by Leslie Jordan  PCT    VANCOMYCIN, RANDOM    Collection Time: 22  2:48 AM   Result Value Ref Range    Vancomycin, random 16.7 UG/ML   CBC W/O DIFF    Collection Time: 22  2:48 AM   Result Value Ref Range    WBC 8.7 4.1 - 11.1 K/uL    RBC 3.13 (L) 4.10 - 5.70 M/uL    HGB 9.7 (L) 12.1 - 17.0 g/dL    HCT 32.7 (L) 36.6 - 50.3 %    MCV 104.5 (H) 80.0 - 99.0 FL    MCH 31.0 26.0 - 34.0 PG    MCHC 29.7 (L) 30.0 - 36.5 g/dL    RDW 16.6 (H) 11.5 - 14.5 %    PLATELET 263 695 - 830 K/uL    MPV 11.0 8.9 - 12.9 FL    NRBC 0.0 0  WBC    ABSOLUTE NRBC 0.00 0.00 - 9.06 K/uL   METABOLIC PANEL, BASIC    Collection Time: 02/27/22  2:48 AM   Result Value Ref Range    Sodium 139 136 - 145 mmol/L    Potassium 3.3 (L) 3.5 - 5.1 mmol/L    Chloride 111 (H) 97 - 108 mmol/L    CO2 25 21 - 32 mmol/L    Anion gap 3 (L) 5 - 15 mmol/L    Glucose 112 (H) 65 - 100 mg/dL    BUN 11 6 - 20 MG/DL    Creatinine 0.94 0.70 - 1.30 MG/DL    BUN/Creatinine ratio 12 12 - 20      GFR est AA >60 >60 ml/min/1.73m2    GFR est non-AA >60 >60 ml/min/1.73m2    Calcium 8.4 (L) 8.5 - 10.1 MG/DL   MAGNESIUM    Collection Time: 02/27/22  2:48 AM   Result Value Ref Range    Magnesium 1.6 1.6 - 2.4 mg/dL   PHOSPHORUS    Collection Time: 02/27/22  2:48 AM   Result Value Ref Range    Phosphorus 1.7 (L) 2.6 - 4.7 MG/DL   GLUCOSE, POC    Collection Time: 02/27/22 11:18 AM   Result Value Ref Range    Glucose (POC) 127 (H) 65 - 117 mg/dL    Performed by Katalina Lau  PCT        Problem List:  Problem List as of 2/27/2022 Date Reviewed: 2/12/2022          Codes Class Noted - Resolved    Intestinal adhesions with complete obstruction (HCC) ICD-10-CM: B78.48  ICD-9-CM: 560.81  2/21/2022 - Present        Failure to thrive (child) ICD-10-CM: R62.51  ICD-9-CM: 783.41  2/21/2022 - Present        Falls frequently ICD-10-CM: R29.6  ICD-9-CM: V15.88  2/21/2022 - Present        Moderate protein-calorie malnutrition (HCC) ICD-10-CM: E44.0  ICD-9-CM: 263.0  2/21/2022 - Present        Acute renal injury ([facility] [address] ICD-10-CM: N17.9  ICD-9-CM: 584.9  2/21/2022 - Present        Cellulitis of right leg ICD-10-CM: L03.115  ICD-9-CM: 682.6  2/12/2022 - Present        Pneumonia due to COVID-19 virus ICD-10-CM: U07.1, J12.82  ICD-9-CM: 480.8, 079.89  12/29/2021 - Present        Personal history of atrial fibrillation ICD-10-CM: Z86.79  ICD-9-CM: V12.59  11/9/2020 - Present        Carotid stenosis, asymptomatic, right ICD-10-CM: I65.21  ICD-9-CM: 433.10  5/29/2020 - Present        Hyperlipemia, mixed ICD-10-CM: E78.2  ICD-9-CM: 272.2  9/27/2018 - Present        Aortic stenosis, moderate ICD-10-CM: I35.0  ICD-9-CM: 424.1  4/10/2018 - Present        Mild concentric left ventricular hypertrophy (LVH) ICD-10-CM: I51.7  ICD-9-CM: 429.3  4/10/2018 - Present        Chronic diarrhea ICD-10-CM: K52.9  ICD-9-CM: 787.91  9/29/2017 - Present        Microalbuminuria ICD-10-CM: R80.9  ICD-9-CM: 791.0  12/10/2015 - Present        Right inguinal hernia ICD-10-CM: K40.90  ICD-9-CM: 550.90  11/2/2015 - Present        Left inguinal hernia ICD-10-CM: K40.90  ICD-9-CM: 550.90  9/14/2015 - Present        [facility] ICD-10-CM: C90.00  ICD-9-CM: 203.00  12/27/2012 - Present        Hypercalcemia ICD-10-CM: O28.29  ICD-9-CM: 275.42  4/25/2012 - Present    Overview Signed 4/25/2012  5:49 PM by Gabriele Peterson MD     2012, PTH normal, abnormal protein electrophoresis, referred to Hem-Onc             S/P AAA repair 2/9/2012 ICD-10-CM: E06.421, Z86.79  ICD-9-CM: V45.89  2/22/2012 - Present    Overview Signed 2/22/2012  1:38 PM by MD Dr Dima Cuellar             Pre-diabetes ICD-10-CM: R73.03  ICD-9-CM: 790.29  9/7/2010 - Present    Overview Addendum 11/1/2011  9:29 AM by Gabriele Peterson MD     2005;  Optho Dr Jana Granger             Sinus tachycardia ICD-10-CM: R00.0  ICD-9-CM: 427.89  9/7/2010 - Present        H/O Heavy Alcohol Use ICD-9-CM: [place] Lama  9/7/2010 - Present        Vitamin B12 deficiency ICD-10-CM: E53.8  ICD-9-CM: 266.2  9/7/2010 - Present        Hyperhomocysteinemia ([facility] [address] ICD-10-CM: E72.11  ICD-9-CM: 270.4  9/7/2010 - Present        Mild Allergic Rhinitis ICD-10-CM: J30.9  ICD-9-CM: 477.9  9/7/2010 - Present        History of skin cancer ICD-10-CM: O18.828  ICD-9-CM: V10.83  9/7/2010 - Present Overview Signed 9/7/2010  9:03 AM by eWi Cuellar MD     Followed by Drrojas Cueto             HTN (hypertension) ICD-10-CM: I10  ICD-9-CM: 401.9  4/6/2010 - Present    Overview Signed 9/7/2010  8:55 AM by Wei Cuellar MD     1990             H/O Carotid Stenosis ICD-10-CM: I65.29  ICD-9-CM: 433.10  4/6/2010 - Present    Overview Addendum 9/7/2010  8:58 AM by Wei Cuellar MD     Oct 2003; 50%left subclavian and 50-70% SHEILA              GERD (gastroesophageal reflux disease) ICD-10-CM: K21.9  ICD-9-CM: 530.81  4/6/2010 - Present        RESOLVED: Intestinal obstruction ([facility] ICD-10-CM: C59.626  ICD-9-CM: 560.9  2/21/2022 - 2/24/2022        RESOLVED: Myeloma ([facility] ICD-10-CM: C90.00  ICD-9-CM: 203.00  1/6/2015 - 6/10/2016        RESOLVED: Gastrointestinal bleeding ICD-10-CM: K92.2  ICD-9-CM: 578.9  12/26/2014 - 2/28/2018        RESOLVED: Gall stone pancreatitis ICD-10-CM: K85.10  ICD-9-CM: 577.0, 574.20  1/23/2014 - 2/28/2018        RESOLVED: Acute cholecystitis ICD-10-CM: K81.0  ICD-9-CM: 575.0  1/23/2014 - 2/28/2018        RESOLVED: Abnormal serum protein electrophoresis ICD-10-CM: R77.8  ICD-9-CM: 790.99  4/25/2012 - 2/28/2018    Overview Signed 4/25/2012  5:48 PM by Wei Cuellar MD     4/2012, referred to Hem-Onc             RESOLVED: Atrial fibrillation 1/2012 ICD-10-CM: I48.91  ICD-9-CM: 427.31  2/22/2012 - 11/9/2020    Overview Signed 2/22/2012  1:33 PM by Wei Cuellar MD     Cardio Dr Annamarie Canton: AAA (abdominal aortic aneurysm) ([facility] [address] ICD-10-CM: I71.4  ICD-9-CM: 441.4  1/6/2012 - 9/26/2018    Overview Signed 1/6/2012  2:07 PM by Wei Cuellar MD     Detected on plain films back xray 1/2012; Ct scheduled             RESOLVED: Anemia ICD-10-CM: D64.9  ICD-9-CM: 285.9  1/5/2012 - 2/28/2018        RESOLVED: Hypertriglyceridemia ICD-10-CM: E78.1  ICD-9-CM: 272.1  9/7/2010 - 9/27/2018    Overview Signed 9/7/2010  8:55 AM by Evelin Snowden MD     2003             RESOLVED: Bilateral Carotid Bruits, L>R ICD-10-CM: R09.89  ICD-9-CM: 785.9  9/7/2010 - 2/28/2018    Overview Signed 9/7/2010  8:57 AM by Evelin Snowden MD     10/03             RESOLVED: Anemia due to GI bleed 2007 ICD-10-CM: D64.9  ICD-9-CM: 285.9  4/6/2010 - 2/28/2018        RESOLVED: GI bleed, duodenitis 2007 ICD-10-CM: K92.2  ICD-9-CM: 578.9  4/6/2010 - 2/28/2018    Overview Addendum 9/7/2010  9:01 AM by Evelin Snowden MD     8/07 Duodenitis                       Medications reviewed  Current Facility-Administered Medications   Medication Dose Route Frequency    sodium chloride 0.9 % bolus infusion 500 mL  500 mL IntraVENous ONCE    heparin (porcine) injection 5,000 Units  5,000 Units SubCUTAneous Q8H    vancomycin (VANCOCIN) 1,000 mg in 0.9% sodium chloride 250 mL (VIAL-MATE)  1,000 mg IntraVENous Q24H    dextrose 5 % - 0.45% NaCl infusion  50 mL/hr IntraVENous CONTINUOUS    mupirocin (BACTROBAN) 2 % ointment   Topical DAILY    pantoprazole (PROTONIX) 40 mg in 0.9% sodium chloride 10 mL injection  40 mg IntraVENous DAILY    phenol throat spray (CHLORASEPTIC) 1 Spray  1 Spray Oral PRN    HYDROcodone-acetaminophen (NORCO) 5-325 mg per tablet 1 Tablet  1 Tablet Oral Q4H PRN    HYDROmorphone (DILAUDID) injection 0.5-1 mg  0.5-1 mg IntraVENous Q2H PRN    ondansetron (ZOFRAN) injection 4 mg  4 mg IntraVENous Q4H PRN    acetaminophen (TYLENOL) tablet 650 mg  650 mg Oral Q6H PRN    hydrALAZINE (APRESOLINE) 20 mg/mL injection 20 mg  20 mg IntraVENous Q6H PRN    glucose chewable tablet 16 g  4 Tablet Oral PRN    dextrose 10 % infusion 0-250 mL  0-250 mL IntraVENous PRN    glucagon (GLUCAGEN) injection 1 mg  1 mg IntraMUSCular PRN    aspirin delayed-release tablet 81 mg  81 mg Oral QHS    [Held by provider] pregabalin (LYRICA) capsule 50 mg  50 mg Oral BID    Vancomycin - pharmacy to dose   Other Rx Dosing/Monitoring    albuterol (PROVENTIL HFA, VENTOLIN HFA, PROAIR HFA) inhaler 1 Puff  1 Puff Inhalation Q6H PRN    cefepime (MAXIPIME) 2 g in sterile water (preservative free) 10 mL IV syringe  2 g IntraVENous Q12H       Care Plan discussed with: Patient/Family and Nurse    Total time spent with patient: 30 minutes.     [address] [facility],

## 2022-02-27 NOTE — ROUTINE PROCESS
Bedside shift change report given to Ed MARJORIE Musa (oncoming nurse) by MARJORIE Jean (offgoing nurse).  Report included the following information SBAR, Kardex, ED Summary, OR Summary, Procedure Summary, Intake/Output, MAR, Recent Results and Med Rec Status.

## 2022-02-27 NOTE — PROGRESS NOTES
Physician Progress Note      Steven Dunn  CSN #:                  [id number]  :                       1934  ADMIT DATE:       2022 1:18 PM  DISCH DATE:  RESPONDING  PROVIDER #:        CARMEN Carrasco DO          QUERY TEXT:    Dear Hospitalist,  Pt admitted with SBO. Noted documentation of CAP in the hospitalist consult note on , however there is subsequent documentation of Concern for Pneumonia by Hospitalist, with pt being treated with ABX. If possible, please document in progress notes and discharge summary:    The medical record reflects the following:  Risk Factors: elderly patient  Clinical Indicators: admitted with SBO, noted to have productive cough and SOB at times, with documentation of rhonchi and decreased inspiratory effort on exam. CXR showing 'scattered patchy groundglass opacities concerning for PNA'. WBC 15.2 max, Lactic Acid 2.29 on admission. CAP is documented in the Hospitalist's consult note on , with subsequent documentation of 'Concern for PNA'  Treatment: Maxipime/ Levaquin/ Vanc, Albuterol inhaler prn. Thank you,  Minus Declan AMADOR  Clinical Documentation  [phone]  Options provided:  -- PNA confirmed present on admission  -- PNA ruled out  -- Other - I will add my own diagnosis  -- Disagree - Not applicable / Not valid  -- Disagree - Clinically unable to determine / Unknown  -- Refer to Clinical Documentation Reviewer    PROVIDER RESPONSE TEXT:    The diagnosis of PNA was confirmed as present on admission.     Query created by: Octavio Bruce on 2022 7:08 AM      Electronically signed by:  Kim Weller DO 2022 7:17 AM

## 2022-02-27 NOTE — PROGRESS NOTES
Pharmacist Note - Vancomycin Dosing  Therapy day 7  Indication: Cellulitis w/rt toe ulcer, failed outpt cephalexin; CAP  - MRI (2/25) - Findings nonspecific though compatible with osteomyelitis  -Obstructive RT colon CA found during Exploratory Lap 2/24  -recent COVID infection  Current regimen: 1000 mg IV every 24 hours    Recent Labs     02/27/22  0248 02/26/22  0111 02/25/22  0216   WBC 8.7 9.6 13.0*   CREA 0.94 0.99 1.09   BUN 11 12 14       A random vancomycin level of 16.5 mcg/mL was obtained and from this level, the patient's AUC24 is calculated to be 410 with the current regimen. Goal target range AUC/ERICA 400-600      Plan: Continue current regimen. Pharmacy will continue to monitor this patient daily for changes in clinical status and renal function. *Random vancomycin levels are used to calculate AUC/ERICA, this level should not be interpreted as a trough. Vancomycin has been dosed using Bayesian kinetics software to target an AUC24:ERICA of 400-600, which provides adequate exposure for as assumed infection due to MRSA with an ERICA of 1 or less while reducing the risk of nephrotoxicity as seen with traditional trough based dosing goals.     Jesus Morrow, PharmD, BCPP, [facility], [facility]

## 2022-02-27 NOTE — ROUTINE PROCESS
Notified the Dr. Through perfect serve that this patient states that he is allergic to Lovenox. Added to the allergy list and not getting lovenox for past two days. Pharmacy wanted the Dr to d/c this medication from his MAR.

## 2022-02-28 PROBLEM — C18.2 MALIGNANT NEOPLASM OF ASCENDING COLON (HCC): Status: ACTIVE | Noted: 2022-01-01

## 2022-02-28 PROBLEM — K56.52 INTESTINAL ADHESIONS WITH COMPLETE OBSTRUCTION (HCC): Status: RESOLVED | Noted: 2022-01-01 | Resolved: 2022-01-01

## 2022-02-28 NOTE — PROGRESS NOTES
Bedside and Verbal shift change report given to Wayne Weaver RN (oncoming nurse) by Chuck Silverio RN (offgoing nurse). Report included the following information SBAR, Kardex, Intake/Output and MAR.

## 2022-02-28 NOTE — PROGRESS NOTES
Hospitalist Progress Note          Biju Carlin MD  Please call  and page for questions. Call physician on-call through the  7pm-7am    Daily Progress Note: 2/28/2022    Primary care provider:Makenzie Edmonds NP    Date of admission: 2/21/2022  1:18 PM    Admission summery and hospital course:  HPI\"80year-old man with a past medical history significant for type 2 diabetes, multiple myeloma, hypertension, dyslipidemia, peripheral neuropathy, status post abdominal aortic aneurysm repair in 2012 and COVID-pneumonia.  Patient was recently admitted on 2/15 for management of cellulitis. Presented to the emergency room several days ago treated for diarrhea with Imodium, and now with progressive abdominal pain nausea vomiting. Patient seen in the ED today  for abdominal pain started 12 to 24 hours ago associated with vomiting and constipation.  The hospitalist team has been consulted to assist general surgery in medical management.   Patient examined at bedside.  Patient is ill-appearing, should not reports health has been declining since Covid diagnosis in December 2021.  Patient denies chest pain, dizziness, shortness of breath during examination.  Patient endorses abdominal pain nausea and vomiting.  Productive cough noted on evaluation, patient reports he has had a cough since Covid diagnosis and it is somewhat improved.  NG tube is in place draining dark brown bilious fluid.  Patient reports abdominal distention has improved some since placement of NG.  Patient's son-in-law is at bedside. \"  Subjective: Follow up [facility]. Patient seen and examined. Unable to see until later in day. Persistent loose stools, +WBC. Has intermittent confusion- overall unchanged with intermittent outbursts. Right arteriogram with popliteal occlusion, unable to cross occlusion. Needs right popliteal bypass but will hold now due to recent surgery.    Pathology from surgery with invasive adenocarcinoma, 7 out of 14 lymph nodes positive. Updated family. Stated patient has chronic diarrhea. Also concerned that pain is not being addressed      Assessment/Plan:   Small bowel obstruction due to invasive colon adenocarcinoma   -Underwent right hemicolectomy 2/24. Evidence of adenocarcinoma, +7/14 lymph nodes  -Remains on full liquids   -pain control   -Oncology consult 3/1. Known to [facility], Dr. Dav Burroughs  -CT abd/pelvis on admission with numerous indeterminate hypodensities in the liver and right kidney. Will discussed PET vs MRI with oncology    New fever, hypotension 2/27, leukocytosis:  -Continues on broad-spectrum antibiotics  -s/p bolus  -Continue lactic acid  -Blood cultures NGTD  -monitor closely     Cellulitis with right toe ulcer/osteomyelitis   Right lower extremity arterial occlusion   -review of photos from prior hospitalization demonstrates worsening  -failed outpateint keflex, now on vanc and cefepime  -podiatry consulted. MRI with evidence of osteomyelitis   -MARYURI with moderate to severe arterial occlusion  -Right arteriogram with popliteal occlusion, unable to cross occlusion. Needs right popliteal bypass but will hold now due to recent surgery    Acute metabolic encephalopathy: persistent   -Multifactorial in setting of recent surgery, narcotics, hospitalization, infection  -monitor closely, supportive care  -limit narcotics as able    History of Type 2 diabetes mellitus, Ha1c 6.3 with hypoglycemia:   -continue accuchecks q6 hours  -continue decreased D5     History of Covid infection  Pneumonia  -CXR with scattered opacities  -getting broad spectrum antibiotics      Anemia of iron deficiency and chronic disease  Monitor CBC, transfuse for Hgb less than 7. Peripheral neuropathy: Hold Lyrica for low blood pressure and ongoing bowel obstruction.   Hypertension: Blood pressure is reasonable now.   Continue to hold Lasix  Failure to thrive, Protein calorie malnutrition: Nutrition consult    Recurrent falls: PT/OT  Hypokalemia: Replace and monitor     See orders for other plans. VTE prophylaxis: Enoxaparin- patient refused   Code status: DNR. Discussed plan of care with Patient/Family and Nurse  Discharge planning: PT/OT       Review of Systems:     Review of Systems:  Symptom  Y/N  Comments   Symptom  Y/N  Comments    Fever/Chills   n   Chest Pain  n    Poor Appetite   n   Edema  n     Cough  n   Abdominal Pain  n     Sputum  n   Joint Pain      SOB/PARKINSON  n   Pruritis/Rash      Nausea/vomit  n   Tolerating PT/OT      Diarrhea  n   Tolerating Diet      Constipation  n   Other  y Foot pain    Could not obtain due to:         Objective:   Physical Exam:     Visit Vitals  /64   Pulse 72   Temp 97.8 °F (36.6 °C)   Resp 16   Ht 6' 1\" (1.854 m)   Wt 76.6 kg (168 lb 14 oz)   SpO2 92%   BMI 22.28 kg/m²    O2 Flow Rate (L/min): 2 l/min O2 Device: None (Room air)    Temp (24hrs), Av.2 °F (36.8 °C), Min:97.8 °F (36.6 °C), Max:100 °F (37.8 °C)     07 -  1900  In: 300 [I.V.:300]  Out: 25    No intake/output data recorded. General:   Patient is comfortably laying in the bed. Alert, cooperative, no distress, appears stated age. Lungs:    Clear to auscultation bilaterally. Chest wall:   No tenderness or deformity. Heart:  Regular rate and rhythm, S1, S2 normal, no murmur. Abdomen:   Soft, central bandage, +bowel sounds   Extremities:  Right second to swelling including distal part of the right foot. No discharge or no fluctuation. Right second toe with discoloration      Neurologic:  Patient has clear voice. He follows commands well.   AAOx3     Data Review:       Recent Days:  Recent Labs     22  0435 22  0248 22  0111   WBC 20.2* 8.7 9.6   HGB 8.5* 9.7* 8.7*   HCT 27.6* 32.7* 28.9*    161 159     Recent Labs     22  0435 22  1848 22  0248 22  01122  011    140 139   < > 141   K 3.2* 3.4* 3.3*   < > 3.5   CL 113* 112* 111*   < > 112*   CO2 25 24 25   < > 28   * 162* 112*   < > 149*   BUN 12 9 11   < > 12   CREA 1.00 1.02 0.94   < > 0.99   CA 7.5* 7.6* 8.4*   < > 8.3*   MG 1.4*  --  1.6  --  1.6   PHOS 1.7*  --  1.7*  --  1.6*    < > = values in this interval not displayed. No results for input(s): PH, PCO2, PO2, HCO3, FIO2 in the last 72 hours.     24 Hour Results:  Recent Results (from the past 24 hour(s))   METABOLIC PANEL, BASIC    Collection Time: 02/27/22  6:48 PM   Result Value Ref Range    Sodium 140 136 - 145 mmol/L    Potassium 3.4 (L) 3.5 - 5.1 mmol/L    Chloride 112 (H) 97 - 108 mmol/L    CO2 24 21 - 32 mmol/L    Anion gap 4 (L) 5 - 15 mmol/L    Glucose 162 (H) 65 - 100 mg/dL    BUN 9 6 - 20 MG/DL    Creatinine 1.02 0.70 - 1.30 MG/DL    BUN/Creatinine ratio 9 (L) 12 - 20      GFR est AA >60 >60 ml/min/1.73m2    GFR est non-AA >60 >60 ml/min/1.73m2    Calcium 7.6 (L) 8.5 - 10.1 MG/DL   GLUCOSE, POC    Collection Time: 02/28/22 12:21 AM   Result Value Ref Range    Glucose (POC) 131 (H) 65 - 117 mg/dL    Performed by ANTWAN Agudelo    CBC W/O DIFF    Collection Time: 02/28/22  4:35 AM   Result Value Ref Range    WBC 20.2 (H) 4.1 - 11.1 K/uL    RBC 2.69 (L) 4.10 - 5.70 M/uL    HGB 8.5 (L) 12.1 - 17.0 g/dL    HCT 27.6 (L) 36.6 - 50.3 %    .6 (H) 80.0 - 99.0 FL    MCH 31.6 26.0 - 34.0 PG    MCHC 30.8 30.0 - 36.5 g/dL    RDW 16.7 (H) 11.5 - 14.5 %    PLATELET 025 570 - 528 K/uL    MPV 11.5 8.9 - 12.9 FL    NRBC 0.0 0  WBC    ABSOLUTE NRBC 0.00 0.00 - 8.35 K/uL   METABOLIC PANEL, BASIC    Collection Time: 02/28/22  4:35 AM   Result Value Ref Range    Sodium 141 136 - 145 mmol/L    Potassium 3.2 (L) 3.5 - 5.1 mmol/L    Chloride 113 (H) 97 - 108 mmol/L    CO2 25 21 - 32 mmol/L    Anion gap 3 (L) 5 - 15 mmol/L    Glucose 115 (H) 65 - 100 mg/dL    BUN 12 6 - 20 MG/DL    Creatinine 1.00 0.70 - 1.30 MG/DL    BUN/Creatinine ratio 12 12 - 20      GFR est AA >60 >60 ml/min/1.73m2    GFR est non-AA >60 >60 ml/min/1.73m2    Calcium 7.5 (L) 8.5 - 10.1 MG/DL   MAGNESIUM    Collection Time: 02/28/22  4:35 AM   Result Value Ref Range    Magnesium 1.4 (L) 1.6 - 2.4 mg/dL   PHOSPHORUS    Collection Time: 02/28/22  4:35 AM   Result Value Ref Range    Phosphorus 1.7 (L) 2.6 - 4.7 MG/DL   COVID-19 RAPID TEST    Collection Time: 02/28/22  4:43 AM   Result Value Ref Range    Specimen source Nasopharyngeal      COVID-19 rapid test Not detected NOTD     GLUCOSE, POC    Collection Time: 02/28/22  5:24 AM   Result Value Ref Range    Glucose (POC) 122 (H) 65 - 117 mg/dL    Performed by [address] [place] [address], POC    Collection Time: 02/28/22  5:06 PM   Result Value Ref Range    Glucose (POC) 92 65 - 117 mg/dL    Performed by Vu To  PCT        Problem List:  Problem List as of 2/28/2022 Date Reviewed: 2/28/2022          Codes Class Noted - Resolved    Malignant neoplasm of ascending colon ([facility] ICD-10-CM: C18.2  ICD-9-CM: 153.6  2/28/2022 - Present        Failure to thrive (child) ICD-10-CM: R62.51  ICD-9-CM: 783.41  2/21/2022 - Present        Falls frequently ICD-10-CM: R29.6  ICD-9-CM: V15.88  2/21/2022 - Present        Moderate protein-calorie malnutrition ([facility] ICD-10-CM: E44.0  ICD-9-CM: 263.0  2/21/2022 - Present        Acute renal injury ([facility] ICD-10-CM: N17.9  ICD-9-CM: 584.9  2/21/2022 - Present        Cellulitis of right leg ICD-10-CM: L03.115  ICD-9-CM: 682.6  2/12/2022 - Present        Pneumonia due to COVID-19 virus ICD-10-CM: U07.1, J12.82  ICD-9-CM: 480.8, 079.89  12/29/2021 - Present        Personal history of atrial fibrillation ICD-10-CM: Z86.79  ICD-9-CM: V12.59  11/9/2020 - Present        Carotid stenosis, asymptomatic, right ICD-10-CM: I65.21  ICD-9-CM: 433.10  5/29/2020 - Present        Hyperlipemia, mixed ICD-10-CM: E78.2  ICD-9-CM: 272.2  9/27/2018 - Present        Aortic stenosis, moderate ICD-10-CM: I35.0  ICD-9-CM: 424.1  4/10/2018 - Present        Mild concentric left ventricular hypertrophy (LVH) ICD-10-CM: I51.7  ICD-9-CM: 429.3  4/10/2018 - Present        Chronic diarrhea ICD-10-CM: K52.9  ICD-9-CM: 787.91  9/29/2017 - Present        Microalbuminuria ICD-10-CM: R80.9  ICD-9-CM: 791.0  12/10/2015 - Present        Right inguinal hernia ICD-10-CM: K40.90  ICD-9-CM: 550.90  11/2/2015 - Present        Left inguinal hernia ICD-10-CM: K40.90  ICD-9-CM: 550.90  9/14/2015 - Present        Multiple myeloma ([facility] ICD-10-CM: C90.00  ICD-9-CM: 203.00  12/27/2012 - Present        Hypercalcemia ICD-10-CM: S58.92  ICD-9-CM: 275.42  4/25/2012 - Present    Overview Signed 4/25/2012  5:49 PM by Vania Ho MD     2012, PTH normal, abnormal protein electrophoresis, referred to Hem-Onc             S/P AAA repair 2/9/2012 ICD-10-CM: K32.489, Z86.79  ICD-9-CM: V45.89  2/22/2012 - Present    Overview Signed 2/22/2012  1:38 PM by MD Dr Ct Camacho             Pre-diabetes ICD-10-CM: R73.03  ICD-9-CM: 790.29  9/7/2010 - Present    Overview Addendum 11/1/2011  9:29 AM by Vania Ho MD     2005;  Optho Dr Didier Huerta             Sinus tachycardia ICD-10-CM: R00.0  ICD-9-CM: 427.89  9/7/2010 - Present        H/O Heavy Alcohol Use ICD-9-CM: Sherrel Jayro  9/7/2010 - Present        Vitamin B12 deficiency ICD-10-CM: E53.8  ICD-9-CM: 266.2  9/7/2010 - Present        Hyperhomocysteinemia ([facility] ICD-10-CM: E72.11  ICD-9-CM: 270.4  9/7/2010 - Present        Mild Allergic Rhinitis ICD-10-CM: J30.9  ICD-9-CM: 477.9  9/7/2010 - Present        History of skin cancer ICD-10-CM: T02.911  ICD-9-CM: V10.83  9/7/2010 - Present    Overview Signed 9/7/2010  9:03 AM by Vania Ho MD     Followed by [facility]             HTN (hypertension) ICD-10-CM: I10  ICD-9-CM: 401.9  4/6/2010 - Present    Overview Signed 9/7/2010  8:55 AM by Vania Ho MD     1990             H/O Carotid Stenosis ICD-10-CM: U97.17  ICD-9-CM: 433.10  4/6/2010 - Present    Overview Addendum 9/7/2010  8:58 AM by Evelin Snowden MD     Oct 2003; 50%left subclavian and 50-70% SHEILA              GERD (gastroesophageal reflux disease) ICD-10-CM: K21.9  ICD-9-CM: 530.81  4/6/2010 - Present        RESOLVED: Intestinal adhesions with complete obstruction ([facility] ICD-10-CM: K56.52  ICD-9-CM: 560.81  2/21/2022 - 2/28/2022        RESOLVED: Intestinal obstruction ([facility] ICD-10-CM: L89.853  ICD-9-CM: 560.9  2/21/2022 - 2/24/2022        RESOLVED: Myeloma ([facility] ICD-10-CM: C90.00  ICD-9-CM: 203.00  1/6/2015 - 6/10/2016        RESOLVED: Gastrointestinal bleeding ICD-10-CM: K92.2  ICD-9-CM: 578.9  12/26/2014 - 2/28/2018        RESOLVED: Gloriajean Lulas stone pancreatitis ICD-10-CM: K85.10  ICD-9-CM: 577.0, 574.20  1/23/2014 - 2/28/2018        RESOLVED: Acute cholecystitis ICD-10-CM: K81.0  ICD-9-CM: 575.0  1/23/2014 - 2/28/2018        RESOLVED: Abnormal serum protein electrophoresis ICD-10-CM: R77.8  ICD-9-CM: 790.99  4/25/2012 - 2/28/2018    Overview Signed 4/25/2012  5:48 PM by Evelin Snowden MD     4/2012, referred to Hem-Onc             RESOLVED: Atrial fibrillation 1/2012 ICD-10-CM: I48.91  ICD-9-CM: 427.31  2/22/2012 - 11/9/2020    Overview Signed 2/22/2012  1:33 PM by Evelin Snowden MD     Cardio Dr Kyra Clarke: AAA (abdominal aortic aneurysm) ([facility] ICD-10-CM: I71.4  ICD-9-CM: 441.4  1/6/2012 - 9/26/2018    Overview Signed 1/6/2012  2:07 PM by Evelin Snowden MD     Detected on plain films back xray 1/2012; Ct scheduled             RESOLVED: Anemia ICD-10-CM: D64.9  ICD-9-CM: 285.9  1/5/2012 - 2/28/2018        RESOLVED: Hypertriglyceridemia ICD-10-CM: E78.1  ICD-9-CM: 272.1  9/7/2010 - 9/27/2018    Overview Signed 9/7/2010  8:55 AM by Evelin Snowden MD     2003             RESOLVED: Bilateral Carotid Bruits, L>R ICD-10-CM: A15.26  ICD-9-CM: 785.9  9/7/2010 - 2/28/2018    Overview Signed 9/7/2010  8:57 AM by Evelin Snowden MD     10/03             RESOLVED: Anemia due to GI bleed 2007 ICD-10-CM: D64.9  ICD-9-CM: 285.9  4/6/2010 - 2/28/2018        RESOLVED: GI bleed, duodenitis 2007 ICD-10-CM: K92.2  ICD-9-CM: 578.9  4/6/2010 - 2/28/2018    Overview Addendum 9/7/2010  9:01 AM by Wei Cuellar MD     8/07 Duodenitis                       Medications reviewed  Current Facility-Administered Medications   Medication Dose Route Frequency    acetaminophen (TYLENOL) tablet 650 mg  650 mg Oral TID    zinc oxide-cod liver oil (DESITIN) 40 % paste   Topical PRN    [Held by provider] heparin (porcine) injection 5,000 Units  5,000 Units SubCUTAneous Q8H    vancomycin (VANCOCIN) 1,000 mg in 0.9% sodium chloride 250 mL (VIAL-MATE)  1,000 mg IntraVENous Q24H    dextrose 5 % - 0.45% NaCl infusion  50 mL/hr IntraVENous CONTINUOUS    mupirocin (BACTROBAN) 2 % ointment   Topical DAILY    pantoprazole (PROTONIX) 40 mg in 0.9% sodium chloride 10 mL injection  40 mg IntraVENous DAILY    phenol throat spray (CHLORASEPTIC) 1 Spray  1 Spray Oral PRN    HYDROcodone-acetaminophen (NORCO) 5-325 mg per tablet 1 Tablet  1 Tablet Oral Q4H PRN    HYDROmorphone (DILAUDID) injection 0.5-1 mg  0.5-1 mg IntraVENous Q2H PRN    ondansetron (ZOFRAN) injection 4 mg  4 mg IntraVENous Q4H PRN    acetaminophen (TYLENOL) tablet 650 mg  650 mg Oral Q6H PRN    hydrALAZINE (APRESOLINE) 20 mg/mL injection 20 mg  20 mg IntraVENous Q6H PRN    glucose chewable tablet 16 g  4 Tablet Oral PRN    dextrose 10 % infusion 0-250 mL  0-250 mL IntraVENous PRN    glucagon (GLUCAGEN) injection 1 mg  1 mg IntraMUSCular PRN    aspirin delayed-release tablet 81 mg  81 mg Oral QHS    [Held by provider] pregabalin (LYRICA) capsule 50 mg  50 mg Oral BID    Vancomycin - pharmacy to dose   Other Rx Dosing/Monitoring    albuterol (PROVENTIL HFA, VENTOLIN HFA, PROAIR HFA) inhaler 1 Puff  1 Puff Inhalation Q6H PRN    cefepime (MAXIPIME) 2 g in sterile water (preservative free) 10 mL IV syringe  2 g IntraVENous Q12H       Care Plan discussed with: Patient/Family and Nurse    Total time spent with patient: 30 minutes.     [address] [facility], DO

## 2022-02-28 NOTE — BRIEF OP NOTE
Brief Postoperative Note    Patient: Kay Sam  YOB: 1934  MRN: [id number]    Date of Procedure: 2/28/2022     Pre-Op Diagnosis: PVD WITH GANGRENE    Post-Op Diagnosis: Same as preoperative diagnosis.       Procedure(s):  RIGHT LEG ARTERIOGRAM.REQUEST TO FOLLOW    Surgeon(s):  Ramón Galeas MD    Surgical Assistant: None    Anesthesia: MAC     Estimated Blood Loss (mL): Minimal    Complications: None    Specimens: * No specimens in log *     Implants: * No implants in log *    Drains:   Rectal Tube (Active)       [REMOVED] Nasogastric Tube 02/21/22 (Removed)       [REMOVED] Nasogastric Tube 02/24/22 (Removed)   Site Assessment Clean, dry, & intact 02/25/22 0700   Securement Device Tape 02/25/22 0700   G Port Status Intermittent Suction 02/25/22 0700   External Insertion Jorge (cms) 59 cms 02/25/22 0700   Drainage Description Sanguinous 02/25/22 0700   Drainage Chamber Level (ml) 400 ml 02/25/22 0700   Output (ml) 200 ml 02/24/22 1900       Findings: popliteal occlusion just above knee, single vessel peroneal reconstitutes proximal lower leg and fills foot, unable to cross occlusion to revascularize with endovascular approach    Electronically Signed by Paul Ayala MD on 2/28/2022 at 2:46 PM

## 2022-02-28 NOTE — PROGRESS NOTES
Pt is doing well after procedure. Pt states he is very tired and would like to take a nap. No complaints of pain at this time.

## 2022-02-28 NOTE — PROGRESS NOTES
Bedside and Verbal shift change report given to Hellen Clark (oncoming nurse) by Kaipl Ribeiro (offgoing nurse). Report included the following information SBAR, Kardex, Procedure Summary, Intake/Output, MAR and Recent Results.

## 2022-02-28 NOTE — PROGRESS NOTES
200 Dr. Hiren Buchanan gave telephone with read back order for fecal management system. Orders in to reflect.

## 2022-02-28 NOTE — PROGRESS NOTES
Vascular:    Dry gangrene right second toe with associated PVD. Plan arteriogram with possible intervention today. He agrees.

## 2022-02-28 NOTE — ANESTHESIA POSTPROCEDURE EVALUATION
Post-Anesthesia Evaluation and Assessment    Patient: Lam Paulino MRN: [id number]  SSN: [id number]    YOB: 1934  Age: 80 y.o. Sex: male      I have evaluated the patient and they are stable and ready for discharge from the PACU. Cardiovascular Function/Vital Signs  Visit Vitals  BP (!) 122/53   Pulse 80   Temp 36.6 °C (97.9 °F)   Resp 18   Ht 6' 1\" (1.854 m)   Wt 76.6 kg (168 lb 14 oz)   SpO2 90%   BMI 22.28 kg/m²       Patient is status post MAC anesthesia for Procedure(s):  RIGHT LEG ARTERIOGRAM.REQUEST TO FOLLOW. Nausea/Vomiting: None    Postoperative hydration reviewed and adequate. Pain:  Pain Scale 1: Numeric (0 - 10) (02/28/22 1500)  Pain Intensity 1: 0 (02/28/22 1500)   Managed    Neurological Status:   Neuro (WDL): Within Defined Limits (02/28/22 1530)  Neuro  Neurologic State: Alert; Appropriate for age (02/28/22 1530)  Orientation Level: Oriented X4 (02/28/22 1530)  Assessment L Pupil: Round (02/24/22 1236)  Size L Pupil (mm): 3 (02/24/22 1236)  Assessment R Pupil: Round (02/24/22 1236)  Size R Pupil (mm): 3 (02/24/22 1236)   At baseline    Mental Status, Level of Consciousness: Alert and  oriented to person, place, and time    Pulmonary Status:   O2 Device: None (Room air) (02/28/22 1530)   Adequate oxygenation and airway patent    Complications related to anesthesia: None    Post-anesthesia assessment completed. No concerns    Signed By: Louise Adler MD     February 28, 2022              Procedure(s):  RIGHT LEG ARTERIOGRAM.REQUEST TO FOLLOW. MAC    <BSHSIANPOST>    INITIAL Post-op Vital signs:   Vitals Value Taken Time   /42 02/28/22 1600   Temp 36.6 °C (97.9 °F) 02/28/22 1530   Pulse 70 02/28/22 1602   Resp 19 02/28/22 1602   SpO2 91 % 02/28/22 1602   Vitals shown include unvalidated device data.

## 2022-02-28 NOTE — ANESTHESIA PREPROCEDURE EVALUATION
Relevant Problems   RESPIRATORY SYSTEM   (+) Pneumonia due to COVID-19 virus      CARDIOVASCULAR   (+) Aortic stenosis, moderate   (+) HTN (hypertension)      GASTROINTESTINAL   (+) GERD (gastroesophageal reflux disease)      RENAL FAILURE   (+) Acute renal injury ([address]      PERSONAL HX & FAMILY HX OF CANCER   (+) Multiple myeloma (HCC)       Anesthetic History   No history of anesthetic complications            Review of Systems / Medical History  Patient summary reviewed, nursing notes reviewed and pertinent labs reviewed    Pulmonary  Within defined limits                 Neuro/Psych         Psychiatric history    Comments: Hx etho abuse Cardiovascular    Hypertension: well controlled  Valvular problems/murmurs: aortic stenosis      Dysrhythmias : atrial flutter  PAD    Exercise tolerance: <4 METS  Comments: AV area by continuity VTI is 0.7 cm2.    GI/Hepatic/Renal     GERD: well controlled          Comments: [facility] Endo/Other    Diabetes: well controlled, type 2    Arthritis     Other Findings            Physical Exam    Airway  Mallampati: I  TM Distance: > 6 cm  Neck ROM: normal range of motion   Mouth opening: Normal     Cardiovascular  Regular rate and rhythm,  S1 and S2 normal,  no murmur, click, rub, or gallop  Rhythm: regular           Dental    Dentition: Full lower dentures and Full upper dentures     Pulmonary  Breath sounds clear to auscultation               Abdominal  GI exam deferred       Other Findings            Anesthetic Plan    ASA: 3  Anesthesia type: MAC          Induction: Intravenous  Anesthetic plan and risks discussed with: Patient and Family

## 2022-02-28 NOTE — PROGRESS NOTES
Transition of Care: TBD; possibly home with [facility] vs rehab (no recommendations or orders yet)      Transport Plan: TBD; possibly in car with family vs BLS (not set up yet)     RUR: 21%     DX: intestinal obstruction     Main contact is Trisha Hyman- [phone]     Discharge pending:  -pending possible vascular intervention/procedure (to be done on 2/28)   -patient is POD#4  of right hemicolectomy  -pending podiatry final recs  -patient is continues to be confused (baseline is alert and oriented x 4)    -pending diet advancement  -pending progress with PT/OT and recs  -pending medical progress and care recs from general surgery  -pending possible palliative care consult  -discharge likely greater than 48 hours     NOTE: . Patient w/ history of cellulitis, abdominal aortic aneurysm repair, inguinal hernia repairs, HTN, and multiple myeloma. ; patient had covid back in fall of 2021    daughter Dipesh Hyman [phone] ; Informed patient w/ history of chronic diarrhea since 2011 however no BM during hospitalization or at home. Patient had not complained of abdominal pain until after ED visit.  [facility] RN had placed f/u call today and received updates on patient referred  to PCP however no availability RN requested patient to be evaluated in the ED.   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      CM following  Liberty Cortes RN, CRM

## 2022-02-28 NOTE — PROGRESS NOTES
General Surgery Daily Progress Note    Admit Date: 2022  Post-Operative Day: 4 Days Post-Op from Procedure(s):  RIGHT LEG ARTERIOGRAM.REQUEST TO FOLLOW     Subjective:     Last 24 hrs: pt is somewhat confused; clearly having foot pain; no abd pain; has FMS  Son in law in room. WBC up to 20K and having some low grade fevers. Objective:     Blood pressure (!) 122/53, pulse 80, temperature 97.9 °F (36.6 °C), resp. rate 18, height 6' 1\" (1.854 m), weight 168 lb 14 oz (76.6 kg), SpO2 90 %. Temp (24hrs), Av.3 °F (36.8 °C), Min:97.9 °F (36.6 °C), Max:100 °F (37.8 °C)      _____________________  Physical Exam:     Alert, wincing in pain, in no acute distress. Cardiovascular: RRR, no peripheral edema  Abdomen: soft, nl BS, drsg intact       Assessment:   Active Problems:    HTN (hypertension) (2010)      Overview:       H/O Carotid Stenosis (2010)      Overview: Oct 2003; 50%left subclavian and 50-70% SHEILA       Pre-diabetes (2010)      Overview: ;  Optho Dr Ralu Mitchell      Multiple myeloma [facility]) (2012)      Aortic stenosis, moderate (4/10/2018)      Pneumonia due to COVID-19 virus (2021)      Cellulitis of right leg (2022)      Failure to thrive (child) (2022)      Falls frequently (2022)      Moderate protein-calorie malnutrition ([address] (2022)      Acute renal injury ([address] (2022)      Malignant neoplasm of ascending colon ([address] (2022)            Plan:     Arteriogram today  Npo   Pain mgmt  Cont FMS  Assume WBC & fever is the gangrenous foot - no abd pain and nl abd exam  Follow labs  Cont abx  Dr Lynette Hudson following for hospitalist service    Data Review:    Recent Labs     22  0435 22  0248 22  0111   WBC 20.2* 8.7 9.6   HGB 8.5* 9.7* 8.7*   HCT 27.6* 32.7* 28.9*    161 159     Recent Labs     22  0435 22  1848 22  0248 22  0111 22  0111    140 139   < > 141   K 3.2* 3.4* 3.3*   < > 3.5   CL 113* 112* 111*   < > 112*   CO2 25 24 25   < > 28   * 162* 112*   < > 149*   BUN 12 9 11   < > 12   CREA 1.00 1.02 0.94   < > 0.99   CA 7.5* 7.6* 8.4*   < > 8.3*   MG 1.4*  --  1.6  --  1.6   PHOS 1.7*  --  1.7*  --  1.6*    < > = values in this interval not displayed. No results for input(s): AML, LPSE in the last 72 hours.         ______________________  Medications:    Current Facility-Administered Medications   Medication Dose Route Frequency    acetaminophen (TYLENOL) tablet 650 mg  650 mg Oral TID    zinc oxide-cod liver oil (DESITIN) 40 % paste   Topical PRN    [Held by provider] heparin (porcine) injection 5,000 Units  5,000 Units SubCUTAneous Q8H    vancomycin (VANCOCIN) 1,000 mg in 0.9% sodium chloride 250 mL (VIAL-MATE)  1,000 mg IntraVENous Q24H    dextrose 5 % - 0.45% NaCl infusion  50 mL/hr IntraVENous CONTINUOUS    mupirocin (BACTROBAN) 2 % ointment   Topical DAILY    pantoprazole (PROTONIX) 40 mg in 0.9% sodium chloride 10 mL injection  40 mg IntraVENous DAILY    phenol throat spray (CHLORASEPTIC) 1 Spray  1 Spray Oral PRN    HYDROcodone-acetaminophen (NORCO) 5-325 mg per tablet 1 Tablet  1 Tablet Oral Q4H PRN    HYDROmorphone (DILAUDID) injection 0.5-1 mg  0.5-1 mg IntraVENous Q2H PRN    ondansetron (ZOFRAN) injection 4 mg  4 mg IntraVENous Q4H PRN    acetaminophen (TYLENOL) tablet 650 mg  650 mg Oral Q6H PRN    hydrALAZINE (APRESOLINE) 20 mg/mL injection 20 mg  20 mg IntraVENous Q6H PRN    glucose chewable tablet 16 g  4 Tablet Oral PRN    dextrose 10 % infusion 0-250 mL  0-250 mL IntraVENous PRN    glucagon (GLUCAGEN) injection 1 mg  1 mg IntraMUSCular PRN    aspirin delayed-release tablet 81 mg  81 mg Oral QHS    [Held by provider] pregabalin (LYRICA) capsule 50 mg  50 mg Oral BID    Vancomycin - pharmacy to dose   Other Rx Dosing/Monitoring    albuterol (PROVENTIL HFA, VENTOLIN HFA, PROAIR HFA) inhaler 1 Puff  1 Puff Inhalation Q6H PRN    cefepime (MAXIPIME) 2 g in sterile water (preservative free) 10 mL IV syringe  2 g IntraVENous Q12H       Dneisha Carr MD  2/28/2022    Attending addendum:  Patient seen and examined independently, agree with above note. Doing well post op remains in hospital due to right foot gangrene and poor blood flow. Intestinal adhesions with complete obstruction (HCC)       Pneumonia due to COVID-19 virus  Likely resolved    Cellulitis of right leg  Vascular surgery A gram shows he will need right pop-peroneal bypass. He does say foot feels better currently. Appreciate assistance. Malignant neoplasm of ascending colon [facility])  Doing well from a GI standpoint postoperatively. Bowel function returned, no nausea, emesis or abdominal pain. Main problem currently is right foot.

## 2022-02-28 NOTE — PROGRESS NOTES
Occupational Therapy  2/28/2022    Order received and chart reviewed. Spoke with nursing who reported pt in pain and due for pain medications. Requested to hold therapy at this time. Will follow up for eval as appropriate. 2:15 followed up for eval. Pt off floor for vascular intervention/procedure. Will continue to follow for evaluation.      Thank you,   Marcelle Vargas, OTR/L

## 2022-02-28 NOTE — PROGRESS NOTES
Progress Note    Patient: Ann Rodrigez MRN: [id number]  SSN: [id number]    YOB: 1934  Age: 80 y.o. Sex: male      Admit Date: 2/21/2022    Subjective:     Patient seen and examined at bedside. Denies any n/v/f/ns/c. Is able to relate to me that he understands that he is having a procedure that is to bring more blood flow to his foot and that someone told him over the weekend that his right second toe will need to be taken off at some point. Objective:     Visit Vitals  /66   Pulse 92   Temp 97.9 °F (36.6 °C)   Resp 16   Ht 6' 1\" (1.854 m)   Wt 76.6 kg (168 lb 14 oz)   SpO2 94%   BMI 22.28 kg/m²      Right Foot Exam: +large eschar along proximal 50% of 2nd toe with some darkening noted in the toe. Eschar is dry. 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 Maryanne Perez.   -Discussed with patient that while his MRI does indicate osteomyelitis, we will not proceed with any procedures regarding the right foot including right 2nd toe amputation until he has been successfully revascularized. -Will patient does have some moments of mild confusion, he is able to articulate an overall understanding of his medical care. -Continue antibxs (vanco/cefepime).    -Will continue to follow

## 2022-02-28 NOTE — PROGRESS NOTES
Vascular:    He will need right popliteal peroneal bypass to revascularize followed by second toe amp. Will hold off on this at present to allow further recovery post colon resection before doing another significant procedure. His toe is dry and not overtly infected. Discussed with his daughter.

## 2022-02-28 NOTE — PERIOP NOTES
TRANSFER - OUT REPORT:    Verbal report given to Radha(name) on Sivan Monahan  being transferred to [facility](unit) for routine post - op       Report consisted of patients Situation, Background, Assessment and   Recommendations(SBAR). Information from the following report(s) SBAR, OR Summary, Intake/Output, MAR and Cardiac Rhythm SR, 1*AVB. was reviewed with the receiving nurse. Opportunity for questions and clarification was provided. Is the patient on 02? NO    Is the patient on a monitor? NO    Is the nurse transporting with the patient? NO    Surgical Waiting Area notified of patient's transfer from PACU? NO      The following personal items collected during your admission accompanied patient upon transfer:   Dental Appliance: Dental Appliances: None  Vision: Visual Aid: None  Hearing Aid:    Jewelry: Jewelry: None  Clothing: Clothing: With patient  Other Valuables:  Other Valuables: None  Valuables sent to safe:

## 2022-03-01 NOTE — OP NOTES
[facility]  OPERATIVE REPORT    Name:  Jonatan King  MR#:  [id number]  :  1934  ACCOUNT #:  [de-identified]  DATE OF SERVICE:  2022      PREOPERATIVE DIAGNOSIS:  Peripheral vascular disease with gangrene, right second toe. POSTOPERATIVE DIAGNOSIS:  Peripheral vascular disease with gangrene, right second toe. PROCEDURES REMOVED:  1. Abdominal aortogram.  2.  Right lower extremity runoff arteriogram.  3.  Third order catheterization, lower extremity artery. SURGEON:  Gonzalez Herman MD    ASSISTANT:  None. ANESTHESIA:  Local with sedation. COMPLICATIONS:  None. SPECIMENS REMOVED:  None. IMPLANTS:  None. ESTIMATED BLOOD LOSS:  Minimal.    INDICATIONS:  The patient is an 63-year-old male who is currently hospitalized following an urgent colon resection for an obstructing colon cancer. He is slowly recovering from the procedure and was found to have a gangrenous right second toe with evidence of osteomyelitis on MRI. He has absent pulses below the femoral level and an ankle-brachial index of 0.4. He will undergo angiographic evaluation. PROCEDURE:  The patient's right and left groins were prepped and draped. The left common femoral artery was percutaneously cannulated and a guidewire passed superiorly. A 5-Turkish sheath was inserted. A guidewire and catheter were passed into the abdominal aorta. An abdominal aortogram was performed that showed a patent infrarenal aortic graft from a previous AAA repair and patent iliac arteries bilaterally without significant stenosis. The catheter was then advanced across the aortic bifurcation using a Glidewire and a rim catheter. The right external iliac artery was selectively catheterized. Further runoff views of the right femoral area proximally were obtained. The catheter was then selectively advanced into the right superficial femoral artery for further runoff views of the right lower leg and foot.   This showed a patent superficial femoral artery throughout without evidence of stenosis. The popliteal artery is patent proximally and then is occluded just above the knee joint. The peroneal artery reconstitutes in the proximal lower leg and fills the foot. The anterior tibial artery reconstitutes in the distal lower leg and also fills the foot. The 5-Belgian sheath was then exchanged for a long 6-Belgian sheath. Using a Glidewire and catheter, attempts were made to cross the popliteal occlusion, but were unsuccessful. The sheath was then pulled back to the left external iliac artery for left femoral arteriogram.  This showed a patent common femoral, proximal superficial femoral and profunda femoris arteries. The sheath was then removed over a guidewire and the puncture site closed with an Angio-Seal device. Dressings were applied and the patient was returned to the recovery room in stable condition. SUMMARY:  The patient has proximal popliteal artery occlusion with single-vessel peroneal artery runoff. The peroneal reconstitutes in the proximal lower leg. The occlusion was unable to be crossed with a wire and catheter. The patient will require open revascularization for a limb salvage.       Michael Escalona MD      [id number]  D:  02/28/2022 14:53  T:  03/01/2022 0:18  JOB #:  [id number]

## 2022-03-01 NOTE — ROUTINE PROCESS
Bedside and Verbal shift change report given to Trent Rodriguez RN (oncoming nurse) by MARJORIE Barrett (offgoing nurse). Report included the following information SBAR, Kardex, Intake/Output, MAR and Recent Results.

## 2022-03-01 NOTE — PROGRESS NOTES
Spiritual Care Partner Volunteer visited patient in [address] [place] [address] on 3.1.22    Documented by Corinna Awad, Staff , on 3.1.22  Please call [phone] to page  if needed

## 2022-03-01 NOTE — PROGRESS NOTES
Progress Note    Patient: Ina Gutierrez MRN: [id number]  SSN: [id number]    YOB: 1934  Age: 80 y.o. Sex: male      Admit Date: 2/21/2022    Subjective:     Patient seen and examined at bedside. Denies any n/v/f/ns/c. He relates that he feels overwhelmed by his diagnoses and the number of procedures that he will need. Objective:     Visit Vitals  BP (!) 112/58   Pulse 62   Temp 97.4 °F (36.3 °C)   Resp 16   Ht 6' 1\" (1.854 m)   Wt 76.6 kg (168 lb 14 oz)   SpO2 96%   BMI 22.28 kg/m²      Right Foot Exam: +large eschar along proximal 50% of 2nd toe with some darkening noted in the toe. Eschar is dry. Some tenderness to the digit but no overt cellulitis. 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 Monroe French. As patient feels overwhelmed by number of procedures, will plan for Dr. Monroe French to perform the second toe amputation at same time as bypass in order to minimize time of procedures and anesthesia for patient. Patient agreeable to this plan. -Will sign off at this time. Please reconsult as needed.

## 2022-03-01 NOTE — PROGRESS NOTES
Pt is doing well. Pt has some pain in his R leg, tramadol helps with pain. Pt received 3K riders, 1 mag rider, and one bag of phosphorus. Pt tolerated it well. Pt was able to sit at the bedside with PT. Rectal bag was changed today.

## 2022-03-01 NOTE — PROGRESS NOTES
General Surgery Daily Progress Note    Admit Date: 2022  Post-Operative Day: 1 Day Post-Op from Procedure(s):  RIGHT LEG ARTERIOGRAM.REQUEST TO FOLLOW     Subjective:     Last 24 hrs: pt is c/o some foot pain; o/w no problems. FMS in place   Path report is back;  WBC is trending down    Objective:     Blood pressure (!) 117/53, pulse 69, temperature 98.1 °F (36.7 °C), resp. rate 18, height 6' 1\" (1.854 m), weight 168 lb 14 oz (76.6 kg), SpO2 97 %. Temp (24hrs), Av.9 °F (36.6 °C), Min:97.4 °F (36.3 °C), Max:98.1 °F (36.7 °C)      _____________________  Physical Exam:     Alert, in no acute distress. Cardiovascular: RRR, no peripheral edema  Abdomen: soft, nl Bs, surgical drsg in place - changed in OR yesterday      Assessment:   Active Problems:    HTN (hypertension) (2010)      Overview:       H/O Carotid Stenosis (2010)      Overview: Oct 2003; 50%left subclavian and 50-70% SHEILA       Pre-diabetes (2010)      Overview: ; Optho Dr Escobar Body      Multiple myeloma [facility]) (2012)      Aortic stenosis, moderate (4/10/2018)      Pneumonia due to COVID-19 virus (2021)      Cellulitis of right leg (2022)      Failure to thrive (child) (2022)      Falls frequently (2022)      Moderate protein-calorie malnutrition ([address] (2022)      Acute renal injury ([address] (2022)      Malignant neoplasm of ascending colon ([address] (2022)            Plan:     Cont full liquids - ?  Adv to solids  Dr Ha Sebastian to see, who is his previous oncologist  Cont abx  PPI    Data Review:    Recent Labs     22  0215 02/28/22   WBC 16.7* 20.2* 8.7   HGB 8.1* 8.5* 9.7*   HCT 26.5* 27.6* 32.7*   * 156 161     Recent Labs     22  02122  0435 22  1848 228 22    141 140   < > 139   K 2.9* 3.2* 3.4*   < > 3.3*   * 113* 112*   < > 111*   CO2 26 25 24   < > 25   * 115* 162*   < > 112*   BUN 15 12 9   < > 11 CREA 0.98 1.00 1.02   < > 0.94   CA 7.3* 7.5* 7.6*   < > 8.4*   MG 1.5* 1.4*  --   --  1.6   PHOS 1.4* 1.7*  --   --  1.7*    < > = values in this interval not displayed. No results for input(s): AML, LPSE in the last 72 hours.         ______________________  Medications:    Current Facility-Administered Medications   Medication Dose Route Frequency    [START ON 3/2/2022] pantoprazole (PROTONIX) tablet 40 mg  40 mg Oral ACB    acetaminophen (TYLENOL) tablet 650 mg  650 mg Oral TID    oxyCODONE IR (ROXICODONE) tablet 2.5-5 mg  2.5-5 mg Oral Q4H PRN    traMADoL (ULTRAM) tablet 50 mg  50 mg Oral Q6H PRN    zinc oxide-cod liver oil (DESITIN) 40 % paste   Topical PRN    [Held by provider] heparin (porcine) injection 5,000 Units  5,000 Units SubCUTAneous Q8H    vancomycin (VANCOCIN) 1,000 mg in 0.9% sodium chloride 250 mL (VIAL-MATE)  1,000 mg IntraVENous Q24H    dextrose 5 % - 0.45% NaCl infusion  25 mL/hr IntraVENous CONTINUOUS    mupirocin (BACTROBAN) 2 % ointment   Topical DAILY    phenol throat spray (CHLORASEPTIC) 1 Spray  1 Spray Oral PRN    HYDROmorphone (DILAUDID) injection 0.5-1 mg  0.5-1 mg IntraVENous Q2H PRN    ondansetron (ZOFRAN) injection 4 mg  4 mg IntraVENous Q4H PRN    acetaminophen (TYLENOL) tablet 650 mg  650 mg Oral Q6H PRN    hydrALAZINE (APRESOLINE) 20 mg/mL injection 20 mg  20 mg IntraVENous Q6H PRN    glucose chewable tablet 16 g  4 Tablet Oral PRN    dextrose 10 % infusion 0-250 mL  0-250 mL IntraVENous PRN    glucagon (GLUCAGEN) injection 1 mg  1 mg IntraMUSCular PRN    aspirin delayed-release tablet 81 mg  81 mg Oral QHS    [Held by provider] pregabalin (LYRICA) capsule 50 mg  50 mg Oral BID    Vancomycin - pharmacy to dose   Other Rx Dosing/Monitoring    albuterol (PROVENTIL HFA, VENTOLIN HFA, PROAIR HFA) inhaler 1 Puff  1 Puff Inhalation Q6H PRN    cefepime (MAXIPIME) 2 g in sterile water (preservative free) 10 mL IV syringe  2 g IntraVENous Q12H       Payton Lima MD  3/1/2022    Attending addendum:  Patient seen and examined independently, agree with above note. Doing well from surgical standpoint WBC trending back down. Intestinal adhesions with complete obstruction (HCC)       Pneumonia due to COVID-19 virus  resolved    Cellulitis of right leg  Vascular surgery A gram shows he will need right pop-peroneal bypass electively. Appreciate assistance. Malignant neoplasm of ascending colon [facility])  Doing well from a GI standpoint postoperatively. Bowel function returned, no nausea, emesis or abdominal pain. Main problem currently is right foot.

## 2022-03-01 NOTE — PROGRESS NOTES
Problem: Mobility Impaired (Adult and Pediatric)  Goal: *Acute Goals and Plan of Care (Insert Text)  Description: FUNCTIONAL STATUS PRIOR TO ADMISSION: Patient was modified independent using a rolling walker for functional mobility. HOME SUPPORT PRIOR TO ADMISSION: The patient lived with spouse but did not require assist. Pt is spouse's primary caregiver (does not have to provide physical assistance). Pt's daughter and son in law are involved in family's care and checks on them daily and assists as needed    Physical Therapy Goals  Initiated 2/22/2022 reviewed 3/1/2022 and still appropriate  1. Patient will move from supine to sit and sit to supine  in bed with modified independence within 7 day(s). 2.  Patient will transfer from bed to chair and chair to bed with modified independence using the least restrictive device within 7 day(s). 3.  Patient will perform sit to stand with modified independence within 7 day(s). 4.  Patient will ambulate with modified independence for 200 feet with the least restrictive device within 7 day(s). 5.  Patient will ascend/descend 12 stairs with single handrail(s) with supervision/set-up within 7 day(s).'  Outcome: Progressing Towards Goal     PHYSICAL THERAPY TREATMENT: WEEKLY REASSESSMENT  Patient: Evita Harvey (89 y.o. male)  Date: 3/1/2022  Primary Diagnosis: Intestinal obstruction ([facility] [address] [K56.609]  Procedure(s) (LRB):  RIGHT LEG ARTERIOGRAM.REQUEST TO FOLLOW (Right) 1 Day Post-Op   Precautions:   Fall,DNR      ASSESSMENT  Patient continues with skilled PT services and is slowly progressing towards goals. Pt generally mobilized at a Min A to Mod A level during session. Pt remained extremely anxious during session which manifested in pt declining OOB activity 2/2 fear of needing to void and apprehension while sitting EOB. Pt was able to sit EOB ~5 minutes however complained of lightheadedness with BP taken, not recorded, and unremarkable.  Pt then requested to return to supine and was assisted with Mod A before being positioned for comfort in NAD, all needs met, call bell within reach. Was unable to fully re-assess pt this session 2/2 declining OOB activity. Pt provided with post op shoe for future ambulation trials. Patient's progression toward goals since last assessment: improved sitting balance    Current Level of Function Impacting Discharge (mobility/balance): declining ambulation, Mod A for sit>supine, Min A for supine>sit    Functional Outcome Measure: The patient scored 30/100 on the Barthel outcome measure which is indicative of very dependent. Other factors to consider for discharge: pt overwhelmed with current hospital course         PLAN :  Goals have been updated based on progression since last assessment. Patient continues to benefit from skilled intervention to address the above impairments. Recommendations and Planned Interventions: bed mobility training, transfer training, gait training, therapeutic exercises, neuromuscular re-education, patient and family training/education, and therapeutic activities      Frequency/Duration: Patient will be followed by physical therapy:  3 times a week to address goals. Recommendation for discharge: (in order for the patient to meet his/her long term goals)  To be determined: SNF vs HHPT with 24/7 assist depending on hospital course    This discharge recommendation:  Has been made in collaboration with the attending provider and/or case management    IF patient discharges home will need the following DME: to be determined (TBD)         SUBJECTIVE:   Patient stated i'm just so nervous.     OBJECTIVE DATA SUMMARY:   HISTORY:    Past Medical History:   Diagnosis Date    AAA (abdominal aortic aneurysm) ([facility] [address] 1/6/2012    Abnormal serum protein electrophoresis 4/25/2012    Anemia 4/6/2010    Anemia 1/5/2012    Anemia due to GI bleed 2007 4/6/2010    Aneurysm ([address]     AAA    Arrhythmia     ATRIAL FIB PT STATES HE DOESN'T HAVE    Arthritis     Atrial fibrillation 1/2012 2/22/2012    Bilateral Carotid Bruits, L>R 9/7/2010    Bone cancer ([facility] [address]     Borderline diabetes mellitus 4/6/2010    Cancer ([facility] [address]     SKIN MELANOMA ON BACK     Carotid stenosis 4/6/2010    Carotid stenosis     Chronic pain     Diabetes mellitus, type 2 ([facility] [address] 9/7/2010    NO MEDS    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    ETOH abuse 4/6/2010    GERD (gastroesophageal reflux disease) 4/6/2010    GI bleed 4/6/2010    GI bleed, duodenitis 2007 4/6/2010    H/O Heavy Alcohol Use 9/7/2010    History of skin cancer 9/7/2010    HTN (hypertension) 4/6/2010    Hypercalcemia 4/25/2012    Hyperhomocysteinemia ([facility] [address] 9/7/2010    Hypertriglyceridemia 9/7/2010    Left inguinal hernia 9/14/2015    Lipids blood increased 4/6/2010    Microalbuminuria 12/10/2015    Mild Allergic Rhinitis 9/7/2010    Pre-diabetes 9/7/2010    2005;  Optho Dr Ezio Munoz     Right inguinal hernia 11/2/2015    S/P AAA repair 2/9/2012 2/22/2012    Sinus tachycardia 9/7/2010    Tachycardia 4/6/2010    Vitamin B12 deficiency 9/7/2010     Past Surgical History:   Procedure Laterality Date    COLONOSCOPY  8/2007    Normal; Dr Della York    EGD  8/2007    small hiatal hernia, mild duodenitis; Dr Rona Marshall AAA REPAIR  2/9/2012    Dr Reza Sharma CATARACT REMOVAL      both eyes    HX CHOLECYSTECTOMY  1-29-14    [facility]- Dr. Meredith Dewey    HX HERNIA REPAIR Left 9/24/15    left indirect inguinal by Dr. Shari Li Right 11/2/15    inguinal w/ mesh by Dr. Shari Li Bilateral 10/2015 And 11/2015    Inguinal    BLUE DOPPLER  3/26/2012    BLUE Echo; + clot       Personal factors and/or comorbidities impacting plan of care: HTN, cancer    Home Situation  Home Environment: Private residence  # Steps to Enter: 5 (with landings)  Rails to Enter: Yes  Hand Rails : Right  One/Two Story Residence: Two story  Living Alone: No  Support Systems: Spouse/Significant Other,Child(manuela)  Patient Expects to be Discharged to[de-identified] Home  Current DME Used/Available at Home: Cane, straight,Walker, [employer], rollator,Shower chair  Tub or Shower Type: Tub/Shower combination    EXAMINATION/PRESENTATION/DECISION MAKING:   Critical Behavior:  Neurologic State: Alert  Orientation Level: Oriented X4,Appropriate for age  Cognition: Follows commands  Safety/Judgement: Awareness of environment  Hearing: Auditory  Auditory Impairment: None    Range Of Motion:  AROM: Generally decreased, functional       Strength:    Strength: Generally decreased, functional       Tone & Sensation:                  Sensation: Intact               Coordination:     Vision:   Corrective Lenses: Glasses  Functional Mobility:  Bed Mobility:     Supine to Sit: Minimum assistance  Sit to Supine: Moderate assistance       Balance:   Sitting: Intact  Standing - Static: Good  Standing - Dynamic : Good;Fair    Functional Measure:  Barthel Index:    Bathin  Bladder: 5  Bowels: 0  Groomin  Dressin  Feeding: 10  Mobility: 0  Stairs: 0  Toilet Use: 0  Transfer (Bed to Chair and Back): 5  Total: 30/100       The Barthel ADL Index: Guidelines  1. The index should be used as a record of what a patient does, not as a record of what a patient could do. 2. The main aim is to establish degree of independence from any help, physical or verbal, however minor and for whatever reason. 3. The need for supervision renders the patient not independent. 4. A patient's performance should be established using the best available evidence. Asking the patient, friends/relatives and nurses are the usual sources, but direct observation and common sense are also important. However direct testing is not needed. 5. Usually the patient's performance over the preceding 24-48 hours is important, but occasionally longer periods will be relevant.   6. Middle categories imply that the patient supplies over 50 per cent of the effort. 7. Use of aids to be independent is allowed. Score Interpretation (from [facility])    Independent   60-79 Minimally independent   40-59 Partially dependent   20-39 Very dependent   <20 Totally dependent     -Stephani Knapp., Barthel, D.W. (1965). Functional evaluation: the Barthel Index. [facility] ([address] (1997). The Barthel activities of daily living index: self-reporting versus actual performance in the old (> or = 75 years). Journal of [address] [place] [address] 45(7), [facility], J.NAVJOTF, Jose Roa., Aubree Amanda. (1999). Measuring the change in disability after inpatient rehabilitation; comparison of the responsiveness of the Barthel Index and Functional [place] Measure. Journal of Neurology, Neurosurgery, and Psychiatry, 66(4), 743-362. Anna Marie Aldridge, N.J.A, FRANCI Brooks, & Gil Jeffery MNILES. (2004) Assessment of post-stroke quality of life in cost-effectiveness studies: The usefulness of the Barthel Index and the EuroQoL-5D. Quality of Life Research, 13, 427-43           Pain Rating:  Pt did not quantify pain, was positioned for comfort at end of session    Activity Tolerance:   Fair and requires rest breaks    After treatment patient left in no apparent distress:   Supine in bed, Call bell within reach, and Side rails x 3    COMMUNICATION/EDUCATION:   The patients plan of care was discussed with: Occupational therapist.     Fall prevention education was provided and the patient/caregiver indicated understanding., Patient/family have participated as able in goal setting and plan of care. , and Patient/family agree to work toward stated goals and plan of care.     Thank you for this referral.  Brent Holstein, PT   Time Calculation: 27 mins

## 2022-03-01 NOTE — PROGRESS NOTES
Hospitalist Progress Note          Mateo Breaux MD  Please call  and page for questions. Call physician on-call through the  7pm-7am    Daily Progress Note: 3/1/2022    Primary care provider:Tirso Edmonds NP    Date of admission: 2/21/2022  1:18 PM    Admission summery and hospital course:  HPI\"80year-old man with a past medical history significant for type 2 diabetes, multiple myeloma, hypertension, dyslipidemia, peripheral neuropathy, status post abdominal aortic aneurysm repair in 2012 and COVID-pneumonia.  Patient was recently admitted on 2/15 for management of cellulitis. Presented to the emergency room several days ago treated for diarrhea with Imodium, and now with progressive abdominal pain nausea vomiting. Patient seen in the ED today  for abdominal pain started 12 to 24 hours ago associated with vomiting and constipation.  The hospitalist team has been consulted to assist general surgery in medical management.   Patient examined at bedside.  Patient is ill-appearing, should not reports health has been declining since Covid diagnosis in December 2021.  Patient denies chest pain, dizziness, shortness of breath during examination.  Patient endorses abdominal pain nausea and vomiting.  Productive cough noted on evaluation, patient reports he has had a cough since Covid diagnosis and it is somewhat improved.  NG tube is in place draining dark brown bilious fluid.  Patient reports abdominal distention has improved some since placement of NG.  Patient's son-in-law is at bedside. \"  Subjective: Follow up SBO. Patient seen and examined earlier today with son in law at bedside. Patient overwhelmed by needing more procedures for foot. I then discussed pathology results from hemicolectomy. Oncology consulted. Patient was more overwhelmed. Discussed possible palliative care involvement for symptomatic management and care decisions.  Patient was agreeable. Assessment/Plan:   Small bowel obstruction due to invasive colon adenocarcinoma   -Underwent right hemicolectomy 2/24. Evidence of adenocarcinoma, +7/14 lymph nodes  -Upgrade to GI lite diet    -Oncology consult 3/1. Known to [facility], Dr. Alexia Yousif  -CT abd/pelvis on admission with numerous indeterminate hypodensities in the liver and right kidney. Will discuss PET vs MRI with oncology  -pain control     New fever, hypotension 2/27, leukocytosis:  -Continues on broad-spectrum antibiotics- will tentatively discontinue 3/2  -no evidence of new infection   -s/p bolus  -Blood cultures NGTD  -monitor closely     Right second toe dry gangrene  Right lower extremity arterial occlusion   -review of photos from prior hospitalization demonstrates worsening  -failed outpateint keflex. On broad spectrum antibiotics. Discussed with podiatry- more consistent with dry gangrene  -podiatry consulted. MRI with evidence of osteomyelitis   -MARYURI with moderate to severe arterial occlusion  -Right arteriogram with popliteal occlusion, unable to cross occlusion. Needs right popliteal bypass but will hold now due to recent surgery. Will get second great toe amputation at same time as popliteal bypass    Acute metabolic encephalopathy: improved   -Multifactorial in setting of recent surgery, narcotics, hospitalization, infection  -monitor closely, supportive care  -limit narcotics as able    History of Type 2 diabetes mellitus, Ha1c 6.3 with hypoglycemia:   -continue accuchecks q6 hours  -continue decreased D5     History of Covid infection  Pneumonia  -CXR with scattered opacities     Anemia of iron deficiency and chronic disease  Monitor CBC, transfuse for Hgb less than 7. Peripheral neuropathy: Hold Lyrica for low blood pressure and ongoing bowel obstruction.   Hypertension: Blood pressure is reasonable now.   Continue to hold Lasix  Failure to thrive, Protein calorie malnutrition: Nutrition consult    Recurrent falls: PT/OT  Hypokalemia: Replace and monitor    Palliative care consult      See orders for other plans. VTE prophylaxis: Enoxaparin- patient refused   Code status: DNR. Discussed plan of care with Patient/Family and Nurse  Discharge planning: PT/OT       Review of Systems:     Review of Systems:  Symptom  Y/N  Comments   Symptom  Y/N  Comments    Fever/Chills   n   Chest Pain  n    Poor Appetite   n   Edema  n     Cough  n   Abdominal Pain  n     Sputum  n   Joint Pain      SOB/PARKINSON  n   Pruritis/Rash      Nausea/vomit  n   Tolerating PT/OT      Diarrhea  n   Tolerating Diet      Constipation  n   Other  y Foot pain    Could not obtain due to:         Objective:   Physical Exam:     Visit Vitals  BP (!) 112/58   Pulse 62   Temp 97.4 °F (36.3 °C)   Resp 16   Ht 6' 1\" (1.854 m)   Wt 76.6 kg (168 lb 14 oz)   SpO2 96%   BMI 22.28 kg/m²    O2 Flow Rate (L/min): 2 l/min O2 Device: None (Room air)    Temp (24hrs), Av.8 °F (36.6 °C), Min:97.4 °F (36.3 °C), Max:98.1 °F (36.7 °C)    701 -  190  In: -   Out: 1100 [Drains:1100]   1901 -  07  In: 300 [I.V.:300]  Out: 25       General:   Patient is comfortably laying in the bed. Alert, cooperative, no distress, appears stated age. Lungs:    Clear to auscultation bilaterally. Chest wall:   No tenderness or deformity. Heart:  Regular rate and rhythm, S1, S2 normal, no murmur. Abdomen:   Soft, central bandage, +bowel sounds   Extremities:  Right second with dark discoloration. No discharge or no fluctuation. Some discoloration 5th toe. Neurologic:  Patient has clear voice. He follows commands well.   AAOx3     Data Review:       Recent Days:  Recent Labs     22  04322  0248   WBC 16.7* 20.2* 8.7   HGB 8.1* 8.5* 9.7*   HCT 26.5* 27.6* 32.7*   * 156 161     Recent Labs     22  0435 22  1848 22  0248 22  0248    141 140   < > 139   K 2.9* 3.2* 3.4*   < > 3.3*   CL 114* 113* 112*   < > 111*   CO2 26 25 24   < > 25   * 115* 162*   < > 112*   BUN 15 12 9   < > 11   CREA 0.98 1.00 1.02   < > 0.94   CA 7.3* 7.5* 7.6*   < > 8.4*   MG 1.5* 1.4*  --   --  1.6   PHOS 1.4* 1.7*  --   --  1.7*    < > = values in this interval not displayed. No results for input(s): PH, PCO2, PO2, HCO3, FIO2 in the last 72 hours.     24 Hour Results:  Recent Results (from the past 24 hour(s))   GLUCOSE, POC    Collection Time: 02/28/22  5:06 PM   Result Value Ref Range    Glucose (POC) 92 65 - 117 mg/dL    Performed by Filiberto Montes  PCT    CBC W/O DIFF    Collection Time: 03/01/22  2:15 AM   Result Value Ref Range    WBC 16.7 (H) 4.1 - 11.1 K/uL    RBC 2.59 (L) 4.10 - 5.70 M/uL    HGB 8.1 (L) 12.1 - 17.0 g/dL    HCT 26.5 (L) 36.6 - 50.3 %    .3 (H) 80.0 - 99.0 FL    MCH 31.3 26.0 - 34.0 PG    MCHC 30.6 30.0 - 36.5 g/dL    RDW 16.4 (H) 11.5 - 14.5 %    PLATELET 586 (L) 794 - 400 K/uL    MPV 11.4 8.9 - 12.9 FL    NRBC 0.0 0  WBC    ABSOLUTE NRBC 0.00 0.00 - 3.89 K/uL   METABOLIC PANEL, BASIC    Collection Time: 03/01/22  2:15 AM   Result Value Ref Range    Sodium 143 136 - 145 mmol/L    Potassium 2.9 (L) 3.5 - 5.1 mmol/L    Chloride 114 (H) 97 - 108 mmol/L    CO2 26 21 - 32 mmol/L    Anion gap 3 (L) 5 - 15 mmol/L    Glucose 116 (H) 65 - 100 mg/dL    BUN 15 6 - 20 MG/DL    Creatinine 0.98 0.70 - 1.30 MG/DL    BUN/Creatinine ratio 15 12 - 20      GFR est AA >60 >60 ml/min/1.73m2    GFR est non-AA >60 >60 ml/min/1.73m2    Calcium 7.3 (L) 8.5 - 10.1 MG/DL   MAGNESIUM    Collection Time: 03/01/22  2:15 AM   Result Value Ref Range    Magnesium 1.5 (L) 1.6 - 2.4 mg/dL   PHOSPHORUS    Collection Time: 03/01/22  2:15 AM   Result Value Ref Range    Phosphorus 1.4 (L) 2.6 - 4.7 MG/DL   GLUCOSE, POC    Collection Time: 03/01/22  5:41 AM   Result Value Ref Range    Glucose (POC) 98 65 - 117 mg/dL    Performed by Rojas, POC    Collection Time: 03/01/22 11:02 AM   Result Value Ref Range    Glucose (POC) 136 (H) 65 - 117 mg/dL    Performed by Ian Sheffield        Problem List:  Problem List as of 3/1/2022 Date Reviewed: 2/28/2022          Codes Class Noted - Resolved    Malignant neoplasm of ascending colon ([facility] ICD-10-CM: C18.2  ICD-9-CM: 153.6  2/28/2022 - Present        Failure to thrive (child) ICD-10-CM: R62.51  ICD-9-CM: 783.41  2/21/2022 - Present        Falls frequently ICD-10-CM: R29.6  ICD-9-CM: V15.88  2/21/2022 - Present        Moderate protein-calorie malnutrition ([facility] ICD-10-CM: E44.0  ICD-9-CM: 263.0  2/21/2022 - Present        Acute renal injury ([facility] ICD-10-CM: N17.9  ICD-9-CM: 584.9  2/21/2022 - Present        Cellulitis of right leg ICD-10-CM: L03.115  ICD-9-CM: 682.6  2/12/2022 - Present        Pneumonia due to COVID-19 virus ICD-10-CM: U07.1, J12.82  ICD-9-CM: 480.8, 079.89  12/29/2021 - Present        Personal history of atrial fibrillation ICD-10-CM: Z86.79  ICD-9-CM: V12.59  11/9/2020 - Present        Carotid stenosis, asymptomatic, right ICD-10-CM: I65.21  ICD-9-CM: 433.10  5/29/2020 - Present        Hyperlipemia, mixed ICD-10-CM: E78.2  ICD-9-CM: 272.2  9/27/2018 - Present        Aortic stenosis, moderate ICD-10-CM: I35.0  ICD-9-CM: 424.1  4/10/2018 - Present        Mild concentric left ventricular hypertrophy (LVH) ICD-10-CM: I51.7  ICD-9-CM: 429.3  4/10/2018 - Present        Chronic diarrhea ICD-10-CM: K52.9  ICD-9-CM: 787.91  9/29/2017 - Present        Microalbuminuria ICD-10-CM: R80.9  ICD-9-CM: 791.0  12/10/2015 - Present        Right inguinal hernia ICD-10-CM: K40.90  ICD-9-CM: 550.90  11/2/2015 - Present        Left inguinal hernia ICD-10-CM: K40.90  ICD-9-CM: 550.90  9/14/2015 - Present        Multiple myeloma ([facility] ICD-10-CM: C90.00  ICD-9-CM: 203.00  12/27/2012 - Present        Hypercalcemia ICD-10-CM: X21.33  ICD-9-CM: 275.42  4/25/2012 - Present    Overview Signed 4/25/2012  5:49 PM by David Alatorre MD     2012, PTH normal, abnormal protein electrophoresis, referred to Hem-Onc             S/P AAA repair 2/9/2012 ICD-10-CM: Z98.890, Z86.79  ICD-9-CM: V45.89  2/22/2012 - Present    Overview Signed 2/22/2012  1:38 PM by MD Dr Ina Banda             Pre-diabetes ICD-10-CM: R73.03  ICD-9-CM: 790.29  9/7/2010 - Present    Overview Addendum 11/1/2011  9:29 AM by Rubén Farah MD     2005;  Optho Dr Barb Raines             Sinus tachycardia ICD-10-CM: R00.0  ICD-9-CM: 427.89  9/7/2010 - Present        H/O Heavy Alcohol Use ICD-9-CM: Brendia Flurry  9/7/2010 - Present        Vitamin B12 deficiency ICD-10-CM: E53.8  ICD-9-CM: 266.2  9/7/2010 - Present        Hyperhomocysteinemia ([facility] ICD-10-CM: E72.11  ICD-9-CM: 270.4  9/7/2010 - Present        Mild Allergic Rhinitis ICD-10-CM: J30.9  ICD-9-CM: 477.9  9/7/2010 - Present        History of skin cancer ICD-10-CM: Z85.828  ICD-9-CM: V10.83  9/7/2010 - Present    Overview Signed 9/7/2010  9:03 AM by Rubén Farah MD     Followed by Valerio Torres             HTN (hypertension) ICD-10-CM: I10  ICD-9-CM: 401.9  4/6/2010 - Present    Overview Signed 9/7/2010  8:55 AM by uRbén Farah MD     1990             H/O Carotid Stenosis ICD-10-CM: I65.29  ICD-9-CM: 433.10  4/6/2010 - Present    Overview Addendum 9/7/2010  8:58 AM by Rubén Farah MD     Oct 2003; 50%left subclavian and 50-70% SHEILA              GERD (gastroesophageal reflux disease) ICD-10-CM: K21.9  ICD-9-CM: 530.81  4/6/2010 - Present        RESOLVED: Intestinal adhesions with complete obstruction ([facility] ICD-10-CM: K56.52  ICD-9-CM: 560.81  2/21/2022 - 2/28/2022        RESOLVED: Intestinal obstruction ([facility] ICD-10-CM: V50.865  ICD-9-CM: 560.9  2/21/2022 - 2/24/2022        RESOLVED: Myeloma ([facility] ICD-10-CM: C90.00  ICD-9-CM: 203.00  1/6/2015 - 6/10/2016        RESOLVED: Gastrointestinal bleeding ICD-10-CM: K92.2  ICD-9-CM: 578.9  12/26/2014 - 2/28/2018        RESOLVED: Pansy Nielson stone pancreatitis ICD-10-CM: K85.10  ICD-9-CM: 568.2, 574.20  1/23/2014 - 2/28/2018        RESOLVED: Acute cholecystitis ICD-10-CM: K81.0  ICD-9-CM: 575.0  1/23/2014 - 2/28/2018        RESOLVED: Abnormal serum protein electrophoresis ICD-10-CM: R77.8  ICD-9-CM: 790.99  4/25/2012 - 2/28/2018    Overview Signed 4/25/2012  5:48 PM by Kenya Choudhuyr MD     4/2012, referred to Hem-Onc             RESOLVED: Atrial fibrillation 1/2012 ICD-10-CM: I48.91  ICD-9-CM: 427.31  2/22/2012 - 11/9/2020    Overview Signed 2/22/2012  1:33 PM by Kenya Choudhury MD     Cardio Dr Haroldo Kimble: AAA (abdominal aortic aneurysm) ([facility] ICD-10-CM: I71.4  ICD-9-CM: 441.4  1/6/2012 - 9/26/2018    Overview Signed 1/6/2012  2:07 PM by Kenya Choudhury MD     Detected on plain films back xray 1/2012; Ct scheduled             RESOLVED: Anemia ICD-10-CM: D64.9  ICD-9-CM: 285.9  1/5/2012 - 2/28/2018        RESOLVED: Hypertriglyceridemia ICD-10-CM: E78.1  ICD-9-CM: 272.1  9/7/2010 - 9/27/2018    Overview Signed 9/7/2010  8:55 AM by Kenya Choudhury MD     2003             RESOLVED: Bilateral Carotid Bruits, L>R ICD-10-CM: R09.89  ICD-9-CM: 785.9  9/7/2010 - 2/28/2018    Overview Signed 9/7/2010  8:57 AM by Kenya Choudhury MD     10/03             RESOLVED: Anemia due to GI bleed 2007 ICD-10-CM: D64.9  ICD-9-CM: 285.9  4/6/2010 - 2/28/2018        RESOLVED: GI bleed, duodenitis 2007 ICD-10-CM: K92.2  ICD-9-CM: 578.9  4/6/2010 - 2/28/2018    Overview Addendum 9/7/2010  9:01 AM by Kenya Choudhury MD     8/07 Duodenitis                       Medications reviewed  Current Facility-Administered Medications   Medication Dose Route Frequency    [START ON 3/2/2022] pantoprazole (PROTONIX) tablet 40 mg  40 mg Oral ACB    acetaminophen (TYLENOL) tablet 650 mg  650 mg Oral TID    oxyCODONE IR (ROXICODONE) tablet 2.5-5 mg  2.5-5 mg Oral Q4H PRN    traMADoL (ULTRAM) tablet 50 mg  50 mg Oral Q6H PRN    zinc oxide-cod liver oil (DESITIN) 40 % paste   Topical PRN    [Held by provider] heparin (porcine) injection 5,000 Units  5,000 Units SubCUTAneous Q8H    vancomycin (VANCOCIN) 1,000 mg in 0.9% sodium chloride 250 mL (VIAL-MATE)  1,000 mg IntraVENous Q24H    dextrose 5 % - 0.45% NaCl infusion  50 mL/hr IntraVENous CONTINUOUS    mupirocin (BACTROBAN) 2 % ointment   Topical DAILY    phenol throat spray (CHLORASEPTIC) 1 Spray  1 Spray Oral PRN    HYDROmorphone (DILAUDID) injection 0.5-1 mg  0.5-1 mg IntraVENous Q2H PRN    ondansetron (ZOFRAN) injection 4 mg  4 mg IntraVENous Q4H PRN    acetaminophen (TYLENOL) tablet 650 mg  650 mg Oral Q6H PRN    hydrALAZINE (APRESOLINE) 20 mg/mL injection 20 mg  20 mg IntraVENous Q6H PRN    glucose chewable tablet 16 g  4 Tablet Oral PRN    dextrose 10 % infusion 0-250 mL  0-250 mL IntraVENous PRN    glucagon (GLUCAGEN) injection 1 mg  1 mg IntraMUSCular PRN    aspirin delayed-release tablet 81 mg  81 mg Oral QHS    [Held by provider] pregabalin (LYRICA) capsule 50 mg  50 mg Oral BID    Vancomycin - pharmacy to dose   Other Rx Dosing/Monitoring    albuterol (PROVENTIL HFA, VENTOLIN HFA, PROAIR HFA) inhaler 1 Puff  1 Puff Inhalation Q6H PRN    cefepime (MAXIPIME) 2 g in sterile water (preservative free) 10 mL IV syringe  2 g IntraVENous Q12H       Care Plan discussed with: Patient/Family and Nurse    Total time spent with patient: 30 minutes.     Kourtney Jha DO

## 2022-03-01 NOTE — CONSULTS
Patient seen, chart reviewed, note dictated. 79 y/o man with a h/o MM, now admitted with abdominal pain and vomiting, found to have on non-contrast CT A/P a distal SB obstruction. Underwent ex-lap by Dr. Claudio Ring and was noted to have distension of the cecum. Right hemicolectomy was performed. This tissue was submitted to pathology and was noted to contain a moderately differentiated adenocarcinoma, + LVI, 7/14 LNs affected, margins negative, pT3N2b.     1. Colon cancer: I recommended that he discuss his adjuvant options with Dr. Jeremi Henry when he is feeling better. 2. Multiple myeloma: treatment on hold given his declining health. 3. Anemia: likely multifactorial including myeloma, GI malignancy, post-op, ACD. 4. Right LE PVD: Tentative plan for revascularization and right toe amputation. Thank you for consult. Will f/u in the outpatient setting. Appreciate excellent Hospitalist/Gen Surgery/Vasc Surgery care!     Corina Rodriguez MD  Hem/Onc  [id number]

## 2022-03-01 NOTE — PROGRESS NOTES
Clinical Pharmacy Note: IV to PO Automatic Conversion  Please note: Jovon Kunz medication (pantoprazole) has been changed from IV to PO based on the following critiera:    Patient is tolerating oral medications  Patient is tolerating a diet more advanced than clear liquids  Patient is not requiring vasopressors    This IV to PO conversion is based on the P&[employer] approved automatic conversion policy for eligible patients. Please call with questions.

## 2022-03-02 NOTE — WOUND CARE
WOCN Note:     Follow-up visit for toe. Followed by Dr. Ben Treadwell, podiatry, & by Dr. Zara Woodruff, vascular.     Chart shows:  Admitted for obstruction. History of Covid-19 in 2021, falls, cellulitis  WBC = 14.1  Admitted from home and lives with wife.     Assessment:   Appropriately conversational moved independently from supine to sitting on side of the bed. Reports tenderness in right 4th toe with cleaning. Wearing brief and has a FlexiSeal that was inserted 2/27/22    Assisted turn onto right & left for incont care of urine  Sacrum and buttocks intact & has no redness; sacral foam in use  Surface: total care foam mattress     Bilateral heels; Heels offloaded with pillows. Generalized edema & erythema to right foot.     1. POA dry eschar to right second toe  ~3 x 3 x 0.1 cm  No exudate  Toenail is black    Open to air     2. POA erosion to right 4th toe on lateral side  0.4 x 0.4 x 0.1 cm  Extremely painful and while I can get a q-tip between, I am unable to see the wound bed  Scant yellow exudate  Bactroban applied     Wound Recommendations:    Right 4th toe: clean with saline, apply mupirocin and cover with dry gauze.   Change daily.     Transition of Care: Plan to follow weekly and as needed while admitted to hospital.      JOSHUA MorinN, RN, [facility]  Certified Wound, Ostomy, Continence Nurse  office [phone]  Available via [address] [place] [address]

## 2022-03-02 NOTE — PROGRESS NOTES
Day #10 of Vancomycin  Indication:  RLE cellulitis with toe ulcer/osteomyelitis + CAP  Current regimen:  1000 mg IV Q24H  Abx regimen:  vancomycin + cefepime   ID Following ?: NO  Frequency of BMP?: tomorrow AM  Recent Labs     22  0422 22  0215 22  0435   WBC 14.1* 16.7* 20.2*   CREA 0.85 0.98 1.00   BUN 13 15 12     Est CrCl: 66.3 ml/min  Temp (24hrs), Av.9 °F (36.6 °C), Min:97.6 °F (36.4 °C), Max:98.1 °F (36.7 °C)    Cultures:    blood - NG, final    blood - NG, in progress     MRSA Swab ordered (if applicable)? NO    Goal target range AUC/ERICA 400-600    Recent level history:  Date/Time Dose & Interval Measured Level (mcg/mL) Associated AUC/ERICA Dose Adjustment      1750 mg x 1 @ 23:46         12.1 mcg/mL (28 hr)  750 mg x 1 @ 13:48       14.9 mcg/mL (12 hr)  1000 mg x 1 @ 11:32     15.6 mcg/mL (14.5 hr)  1000 mg Q24H     1000 mg Q24H 16.5 mcg/mL 410 NA               Plan: Renal function continues to improve. Now predicting subtherapeutic AUC of 378 mg/L*hr on current regimen.  Change to vancomycin 1250 mg IV every 24 hours  empirically for predicted AUC of 464 mg/L*hr.

## 2022-03-02 NOTE — PROGRESS NOTES
Day #10 of cefepime  Indication:  RLE cellulitis with toe ulcer/osteomyelitis + CAP  Current regimen:  2 g IV every 12 hours  Abx regimen: Vancomycin + Cefepime  Recent Labs     22  0422 22  0215 22  0435   WBC 14.1* 16.7* 20.2*   CREA 0.85 0.98 1.00   BUN 13 15 12     Est CrCl: 66.3 ml/min; UO: -- ml/kg/hr  Temp (24hrs), Av.9 °F (36.6 °C), Min:97.6 °F (36.4 °C), Max:98.1 °F (36.7 °C)    Cultures:    blood - NG, final    blood - NG, in progress     Plan: Change to cefepime 2 g IV every 8 hours per [facility] [employer] Committee Protocol with respect to renal function (CrCl >60 ml/min). Pharmacy will continue to monitor patient daily and will make dosage adjustments based upon changing renal function.

## 2022-03-02 NOTE — PROGRESS NOTES
Physical Therapy - defer  Chart reviewed. RN reporting pt is feeling overwhelmed/ too much going on (currently eating lunch, having line pulled after lunch f/b 30 min bedrest), requested therapy defer to later in the day though pt will likely decline. Will check back as able.

## 2022-03-02 NOTE — PROGRESS NOTES
General Surgery Daily Progress Note    Admit Date: 2022  Post-Operative Day: 2 Days Post-Op from Procedure(s):  RIGHT LEG ARTERIOGRAM.REQUEST TO FOLLOW     Subjective:     Last 24 hrs: pt is frustrated; lying in liquid stool; abd feels more distended, denies nausea but doesn't feel like eating  Wbc cont to trend down. Seen by heme onc    Objective:     Blood pressure (!) 168/73, pulse 69, temperature 97.6 °F (36.4 °C), resp. rate 18, height 6' 1\" (1.854 m), weight 168 lb 14 oz (76.6 kg), SpO2 97 %. Temp (24hrs), Av.9 °F (36.6 °C), Min:97.6 °F (36.4 °C), Max:98.1 °F (36.7 °C)      _____________________  Physical Exam:     Alert and Oriented, x3, in no acute distress. Cardiovascular: RRR, no peripheral edema  Abdomen: some distention; +BS      Assessment:   Active Problems:    HTN (hypertension) (2010)      Overview:       H/O Carotid Stenosis (2010)      Overview: Oct 2003; 50%left subclavian and 50-70% SHEILA       Pre-diabetes (2010)      Overview: ;  Optho Dr Jana Granger      Multiple myeloma [facility]) (2012)      Aortic stenosis, moderate (4/10/2018)      Pneumonia due to COVID-19 virus (2021)      Cellulitis of right leg (2022)      Failure to thrive (child) (2022)      Falls frequently (2022)      Moderate protein-calorie malnutrition ([address] (2022)      Acute renal injury ([address] (2022)      Malignant neoplasm of ascending colon ([address] (2022)            Plan:     Probably needs tpn - not really drinking much  Cont abx  R leg bypass and toe amputation Monday  Palliative care following - multiple medical issues  Heme onc following  Daily labs    Data Review:    Recent Labs     03/22  0435   WBC 14.1* 16.7* 20.2*   HGB 8.8* 8.1* 8.5*   HCT 28.9* 26.5* 27.6*    142* 156     Recent Labs     22  043    143 141   K 3.2* 2.9* 3.2*   * 114* 113*   CO2 24 26 25   * 116* 115*   BUN 13 15 12   CREA 0.85 0.98 1.00   CA 7.4* 7.3* 7.5*   MG 2.0 1.5* 1.4*   PHOS 1.4* 1.4* 1.7*     No results for input(s): AML, LPSE in the last 72 hours.         ______________________  Medications:    Current Facility-Administered Medications   Medication Dose Route Frequency    zolpidem (AMBIEN) tablet 5 mg  5 mg Oral QHS PRN    sodium phosphate 30 mmol in 0.9% sodium chloride 250 mL infusion   IntraVENous ONCE    vancomycin (VANCOCIN) 1,250 mg in 0.9% sodium chloride 250 mL (VIAL-MATE)  1,250 mg IntraVENous Q24H    cefepime (MAXIPIME) 2 g in sterile water (preservative free) 10 mL IV syringe  2 g IntraVENous Q8H    pantoprazole (PROTONIX) tablet 40 mg  40 mg Oral ACB    acetaminophen (TYLENOL) tablet 650 mg  650 mg Oral TID    oxyCODONE IR (ROXICODONE) tablet 2.5-5 mg  2.5-5 mg Oral Q4H PRN    traMADoL (ULTRAM) tablet 50 mg  50 mg Oral Q6H PRN    zinc oxide-cod liver oil (DESITIN) 40 % paste   Topical PRN    heparin (porcine) injection 5,000 Units  5,000 Units SubCUTAneous Q8H    dextrose 5 % - 0.45% NaCl infusion  25 mL/hr IntraVENous CONTINUOUS    mupirocin (BACTROBAN) 2 % ointment   Topical DAILY    phenol throat spray (CHLORASEPTIC) 1 Spray  1 Spray Oral PRN    HYDROmorphone (DILAUDID) injection 0.5-1 mg  0.5-1 mg IntraVENous Q2H PRN    ondansetron (ZOFRAN) injection 4 mg  4 mg IntraVENous Q4H PRN    acetaminophen (TYLENOL) tablet 650 mg  650 mg Oral Q6H PRN    hydrALAZINE (APRESOLINE) 20 mg/mL injection 20 mg  20 mg IntraVENous Q6H PRN    glucose chewable tablet 16 g  4 Tablet Oral PRN    dextrose 10 % infusion 0-250 mL  0-250 mL IntraVENous PRN    glucagon (GLUCAGEN) injection 1 mg  1 mg IntraMUSCular PRN    aspirin delayed-release tablet 81 mg  81 mg Oral QHS    [Held by provider] pregabalin (LYRICA) capsule 50 mg  50 mg Oral BID    Vancomycin - pharmacy to dose   Other Rx Dosing/Monitoring    albuterol (PROVENTIL HFA, VENTOLIN HFA, PROAIR HFA) inhaler 1 Puff  1 Puff Inhalation Q6H PRN       Shantel Mancilla NP  3/2/2022

## 2022-03-02 NOTE — PROGRESS NOTES
Occupational Therapy    Chart reviewed in prep for skilled OT treatment; however, pt being taken off the floor to vascular. Will defer treatment and follow up later as able and appropriate.     Thank you,  Mandie Dia, OT

## 2022-03-02 NOTE — PROGRESS NOTES
Transition of Care: TBD; possibly home with [facility] vs rehab (no recommendations or orders yet); patient not medically ready     Transport Plan: TBD; possibly in car with family vs BLS (not set up yet)     RUR: 20%     DX: intestinal obstruction/right foot cellulitis/osteomyelitis     Main contact is Nate Perry- [phone]     Discharge pending:  -pending likely vascular intervention-right leg bypass and toe amp (next Monday 3/7)  -patient is POD#7  of right hemicolectomy-evidence of adenocarcinoma  -pending pain control   -patient continues on IV antibiotics  -pending diet advancement  -pending progress with PT/OT and recs  -pending medical progress and care recs from general surgery, podiatry, hemo onc. Palliative, vascular  -discharge  greater than 48 hours     NOTE: . Patient w/ history of cellulitis, abdominal aortic aneurysm repair, inguinal hernia repairs, HTN, and multiple myeloma. ; patient had covid back in fall of 2021    tasneem Perry [phone] ; Informed patient w/ history of chronic diarrhea since 2011 however no BM during hospitalization or at home. Patient had not complained of abdominal pain until after ED visit.  [facility] RN had placed f/u call today and received updates on patient referred  to PCP however no availability RN requested patient to be evaluated in the ED.   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      CM following  Alphonso Sanchez RN, CRM

## 2022-03-02 NOTE — PROGRESS NOTES
Hospitalist Progress Note          Paulina Brink MD  Please call  and page for questions. Call physician on-call through the  7pm-7am    Daily Progress Note: 3/2/2022    Primary care provider:Milton Edmonds NP    Date of admission: 2/21/2022  1:18 PM    Admission summery and hospital course:  HPI\"80year-old man with a past medical history significant for type 2 diabetes, multiple myeloma, hypertension, dyslipidemia, peripheral neuropathy, status post abdominal aortic aneurysm repair in 2012 and COVID-pneumonia.  Patient was recently admitted on 2/15 for management of cellulitis. Presented to the emergency room several days ago treated for diarrhea with Imodium, and now with progressive abdominal pain nausea vomiting. Patient seen in the ED today  for abdominal pain started 12 to 24 hours ago associated with vomiting and constipation.  The hospitalist team has been consulted to assist general surgery in medical management.   Patient examined at bedside.  Patient is ill-appearing, should not reports health has been declining since Covid diagnosis in December 2021.  Patient denies chest pain, dizziness, shortness of breath during examination.  Patient endorses abdominal pain nausea and vomiting.  Productive cough noted on evaluation, patient reports he has had a cough since Covid diagnosis and it is somewhat improved.  NG tube is in place draining dark brown bilious fluid.  Patient reports abdominal distention has improved some since placement of NG.  Patient's son-in-law is at bedside. \"  Subjective: Follow up SBO. Patient seen and examined earlier today. He had a busy morning and tired. Underwent doppler for vein mapping and PICC line placement (goal for CVL removal).      Assessment/Plan:   Right second toe dry gangrene  Right lower extremity arterial occlusion   -review of photos from prior hospitalization demonstrates worsening  -podiatry and vascular consulted. -MRI with evidence of osteomyelitis. Discussed with podiatry. More consistent with dry gangrene. Antibiotics not required. D/c 3/2  -MARYURI with moderate to severe arterial occlusion  -Right arteriogram 2/28 with popliteal occlusion, unable to cross occlusion. Needs right popliteal bypass-possible next week. Will get second great toe amputation at same time as popliteal bypass. Podiatry signed off    Small bowel obstruction due to invasive colon adenocarcinoma   -Underwent right hemicolectomy 2/24. Evidence of adenocarcinoma, +7/14 lymph nodes  -Continue GI lite diet    -Oncology consult 3/1. Known to [facility], Dr. Cece Judd. Will follow up outpatient  -pain control     New fever, hypotension 2/27, leukocytosis: resolved  -cultures remain negative. Will stop IV antibiotics 3/2    Acute metabolic encephalopathy: resolved  -Multifactorial in setting of recent surgery, narcotics, hospitalization, infection  -monitor closely, supportive care  -limit narcotics as able    History of Type 2 diabetes mellitus, Ha1c 6.3 with hypoglycemia:   -continue accuchecks q6 hours  -s/p D5     History of Covid infection  Pneumonia  -CXR with scattered opacities, concerning for PNA. -s/p treatment      Anemia of iron deficiency and chronic disease  Monitor CBC, transfuse for Hgb less than 7. Peripheral neuropathy: Hold Lyrica for low blood pressure and ongoing bowel obstruction.   Hypertension: Blood pressure is reasonable now. Continue to hold Lasix  Failure to thrive, Protein calorie malnutrition: Nutrition consult    Recurrent falls: PT/OT  Hypokalemia: Replace and monitor    Palliative care consulted 3/2     See orders for other plans. VTE prophylaxis: heparin  Code status: DNR.   Discussed plan of care with Patient/Family and Nurse  Discharge planning: PT/OT       Review of Systems:     Review of Systems:  Symptom  Y/N  Comments   Symptom  Y/N  Comments    Fever/Chills   n   Chest Pain  n    Poor Appetite   n   Edema  n Cough  n   Abdominal Pain  n     Sputum  n   Joint Pain      SOB/PARKINSON  n   Pruritis/Rash      Nausea/vomit  n   Tolerating PT/OT      Diarrhea  n   Tolerating Diet      Constipation  n   Other  y Foot pain    Could not obtain due to:         Objective:   Physical Exam:     Visit Vitals  /64   Pulse 73   Temp 98 °F (36.7 °C)   Resp 18   Ht 6' 1\" (1.854 m)   Wt 76.6 kg (168 lb 14 oz)   SpO2 96%   BMI 22.28 kg/m²    O2 Flow Rate (L/min): 2 l/min O2 Device: None (Room air)    Temp (24hrs), Av.9 °F (36.6 °C), Min:97.6 °F (36.4 °C), Max:98.1 °F (36.7 °C)    No intake/output data recorded.  1901 -  0700  In: -   Out: 1100 [Drains:1100]      General:   Patient is comfortably laying in the bed. Alert, cooperative, no distress, appears stated age. Lungs:    Clear to auscultation bilaterally. Chest wall:   No tenderness or deformity. Heart:  Regular rate and rhythm, S1, S2 normal, no murmur. Abdomen:   Soft, central bandage, +bowel sounds   Extremities:  Right second with dark discoloration. No discharge or no fluctuation. Some discoloration 5th toe. Neurologic:  Patient has clear voice. He follows commands well. AAOx3     Data Review:       Recent Days:  Recent Labs     22  0422 22  0215 22  0435   WBC 14.1* 16.7* 20.2*   HGB 8.8* 8.1* 8.5*   HCT 28.9* 26.5* 27.6*    142* 156     Recent Labs     22  0422 22  0215 22  0435    143 141   K 3.2* 2.9* 3.2*   * 114* 113*   CO2 24 26 25   * 116* 115*   BUN 13 15 12   CREA 0.85 0.98 1.00   CA 7.4* 7.3* 7.5*   MG 2.0 1.5* 1.4*   PHOS 1.4* 1.4* 1.7*     No results for input(s): PH, PCO2, PO2, HCO3, FIO2 in the last 72 hours.     24 Hour Results:  Recent Results (from the past 24 hour(s))   GLUCOSE, POC    Collection Time: 22  6:01 PM   Result Value Ref Range    Glucose (POC) 157 (H) 65 - 117 mg/dL    Performed by Aubree Hickey, POC    Collection Time: 22 11:54 PM   Result Value Ref Range    Glucose (POC) 144 (H) 65 - 117 mg/dL    Performed by Kendrick Reyes    CBC W/O DIFF    Collection Time: 03/02/22  4:22 AM   Result Value Ref Range    WBC 14.1 (H) 4.1 - 11.1 K/uL    RBC 2.83 (L) 4.10 - 5.70 M/uL    HGB 8.8 (L) 12.1 - 17.0 g/dL    HCT 28.9 (L) 36.6 - 50.3 %    .1 (H) 80.0 - 99.0 FL    MCH 31.1 26.0 - 34.0 PG    MCHC 30.4 30.0 - 36.5 g/dL    RDW 16.4 (H) 11.5 - 14.5 %    PLATELET 655 077 - 261 K/uL    MPV 12.0 8.9 - 12.9 FL    NRBC 0.0 0  WBC    ABSOLUTE NRBC 0.00 0.00 - 0.01 K/uL   MAGNESIUM    Collection Time: 03/02/22  4:22 AM   Result Value Ref Range    Magnesium 2.0 1.6 - 2.4 mg/dL   METABOLIC PANEL, BASIC    Collection Time: 03/02/22  4:22 AM   Result Value Ref Range    Sodium 143 136 - 145 mmol/L    Potassium 3.2 (L) 3.5 - 5.1 mmol/L    Chloride 116 (H) 97 - 108 mmol/L    CO2 24 21 - 32 mmol/L    Anion gap 3 (L) 5 - 15 mmol/L    Glucose 113 (H) 65 - 100 mg/dL    BUN 13 6 - 20 MG/DL    Creatinine 0.85 0.70 - 1.30 MG/DL    BUN/Creatinine ratio 15 12 - 20      GFR est AA >60 >60 ml/min/1.73m2    GFR est non-AA >60 >60 ml/min/1.73m2    Calcium 7.4 (L) 8.5 - 10.1 MG/DL   PHOSPHORUS    Collection Time: 03/02/22  4:22 AM   Result Value Ref Range    Phosphorus 1.4 (L) 2.6 - 4.7 MG/DL   GLUCOSE, POC    Collection Time: 03/02/22  5:13 AM   Result Value Ref Range    Glucose (POC) 111 65 - 117 mg/dL    Performed by Hilton Marin   PCT    DUPLEX LOWER EXT VEIN MAPPING RIGHT    Collection Time: 03/02/22 10:05 AM   Result Value Ref Range    Right GSV Thigh Prox Diam 0.40 cm    Right GSV Thigh Mid Diam 0.35 cm    Right GSV Thigh Dist Diam 0.36 cm    Right GSV at Knee Diam 0.32 cm    Right GSV BK Prox Diam 0.28 cm    Right GSV Ankle Diam 0.19 cm    Right SSV Prox Diam 0.24 cm    Right SSV Dist Diam 0.24 cm   GLUCOSE, POC    Collection Time: 03/02/22 11:27 AM   Result Value Ref Range    Glucose (POC) 155 (H) 65 - 117 mg/dL    Performed by Charisma Hernandez Problem List:  Problem List as of 3/2/2022 Date Reviewed: 2/28/2022          Codes Class Noted - Resolved    Malignant neoplasm of ascending colon ([facility] ICD-10-CM: C18.2  ICD-9-CM: 153.6  2/28/2022 - Present        Failure to thrive (child) ICD-10-CM: R62.51  ICD-9-CM: 783.41  2/21/2022 - Present        Falls frequently ICD-10-CM: R29.6  ICD-9-CM: V15.88  2/21/2022 - Present        Moderate protein-calorie malnutrition ([facility] ICD-10-CM: E44.0  ICD-9-CM: 263.0  2/21/2022 - Present        Acute renal injury ([facility] ICD-10-CM: N17.9  ICD-9-CM: 584.9  2/21/2022 - Present        Cellulitis of right leg ICD-10-CM: L03.115  ICD-9-CM: 682.6  2/12/2022 - Present        Pneumonia due to COVID-19 virus ICD-10-CM: U07.1, J12.82  ICD-9-CM: 480.8, 079.89  12/29/2021 - Present        Personal history of atrial fibrillation ICD-10-CM: Z86.79  ICD-9-CM: V12.59  11/9/2020 - Present        Carotid stenosis, asymptomatic, right ICD-10-CM: I65.21  ICD-9-CM: 433.10  5/29/2020 - Present        Hyperlipemia, mixed ICD-10-CM: E78.2  ICD-9-CM: 272.2  9/27/2018 - Present        Aortic stenosis, moderate ICD-10-CM: I35.0  ICD-9-CM: 424.1  4/10/2018 - Present        Mild concentric left ventricular hypertrophy (LVH) ICD-10-CM: I51.7  ICD-9-CM: 429.3  4/10/2018 - Present        Chronic diarrhea ICD-10-CM: K52.9  ICD-9-CM: 787.91  9/29/2017 - Present        Microalbuminuria ICD-10-CM: R80.9  ICD-9-CM: 791.0  12/10/2015 - Present        Right inguinal hernia ICD-10-CM: K40.90  ICD-9-CM: 550.90  11/2/2015 - Present        Left inguinal hernia ICD-10-CM: K40.90  ICD-9-CM: 550.90  9/14/2015 - Present        Multiple myeloma ([facility] ICD-10-CM: C90.00  ICD-9-CM: 203.00  12/27/2012 - Present        Hypercalcemia ICD-10-CM: T92.88  ICD-9-CM: 275.42  4/25/2012 - Present    Overview Signed 4/25/2012  5:49 PM by Vania Ho MD     2012, PTH normal, abnormal protein electrophoresis, referred to Hem-Onc             S/P AAA repair 2/9/2012 ICD-10-CM: B33.776, Z86.79  ICD-9-CM: V45.89  2/22/2012 - Present    Overview Signed 2/22/2012  1:38 PM by MD Dr Cailtin Barry             Pre-diabetes ICD-10-CM: R73.03  ICD-9-CM: 790.29  9/7/2010 - Present    Overview Addendum 11/1/2011  9:29 AM by Jeannine Garcia MD     2005;  Optho Dr Roberto Cuellar             Sinus tachycardia ICD-10-CM: R00.0  ICD-9-CM: 427.89  9/7/2010 - Present        H/O Heavy Alcohol Use ICD-9-CM: Lorrayne Ceja  9/7/2010 - Present        Vitamin B12 deficiency ICD-10-CM: E53.8  ICD-9-CM: 266.2  9/7/2010 - Present        Hyperhomocysteinemia ([facility] ICD-10-CM: E72.11  ICD-9-CM: 270.4  9/7/2010 - Present        Mild Allergic Rhinitis ICD-10-CM: J30.9  ICD-9-CM: 477.9  9/7/2010 - Present        History of skin cancer ICD-10-CM: Z85.828  ICD-9-CM: V10.83  9/7/2010 - Present    Overview Signed 9/7/2010  9:03 AM by Jeannine Garcia MD     Followed by Valerio Amaya             HTN (hypertension) ICD-10-CM: I10  ICD-9-CM: 401.9  4/6/2010 - Present    Overview Signed 9/7/2010  8:55 AM by Jeannine Garcia MD     1990             H/O Carotid Stenosis ICD-10-CM: I65.29  ICD-9-CM: 433.10  4/6/2010 - Present    Overview Addendum 9/7/2010  8:58 AM by Jeannine Garcia MD     Oct 2003; 50%left subclavian and 50-70% SHEILA              GERD (gastroesophageal reflux disease) ICD-10-CM: K21.9  ICD-9-CM: 530.81  4/6/2010 - Present        RESOLVED: Intestinal adhesions with complete obstruction ([facility] ICD-10-CM: K56.52  ICD-9-CM: 560.81  2/21/2022 - 2/28/2022        RESOLVED: Intestinal obstruction ([facility] ICD-10-CM: M91.703  ICD-9-CM: 560.9  2/21/2022 - 2/24/2022        RESOLVED: Myeloma ([facility] ICD-10-CM: C90.00  ICD-9-CM: 203.00  1/6/2015 - 6/10/2016        RESOLVED: Gastrointestinal bleeding ICD-10-CM: K92.2  ICD-9-CM: 578.9  12/26/2014 - 2/28/2018        RESOLVED: Gall stone pancreatitis ICD-10-CM: K85.10  ICD-9-CM: 577.0, 574.20  1/23/2014 - 2/28/2018        RESOLVED: Acute cholecystitis ICD-10-CM: K81.0  ICD-9-CM: 575.0  1/23/2014 - 2/28/2018        RESOLVED: Abnormal serum protein electrophoresis ICD-10-CM: R77.8  ICD-9-CM: 790.99  4/25/2012 - 2/28/2018    Overview Signed 4/25/2012  5:48 PM by Jack Baker MD     4/2012, referred to Hem-Onc             RESOLVED: Atrial fibrillation 1/2012 ICD-10-CM: I48.91  ICD-9-CM: 427.31  2/22/2012 - 11/9/2020    Overview Signed 2/22/2012  1:33 PM by Jack Baker MD     Cardio Dr Tonia Taylor: AAA (abdominal aortic aneurysm) ([facility] ICD-10-CM: I71.4  ICD-9-CM: 441.4  1/6/2012 - 9/26/2018    Overview Signed 1/6/2012  2:07 PM by Jack Baker MD     Detected on plain films back xray 1/2012; Ct scheduled             RESOLVED: Anemia ICD-10-CM: D64.9  ICD-9-CM: 285.9  1/5/2012 - 2/28/2018        RESOLVED: Hypertriglyceridemia ICD-10-CM: E78.1  ICD-9-CM: 272.1  9/7/2010 - 9/27/2018    Overview Signed 9/7/2010  8:55 AM by Jack Baker MD     2003             RESOLVED: Bilateral Carotid Bruits, L>R ICD-10-CM: R09.89  ICD-9-CM: 785.9  9/7/2010 - 2/28/2018    Overview Signed 9/7/2010  8:57 AM by Jack Baker MD     10/03             RESOLVED: Anemia due to GI bleed 2007 ICD-10-CM: D64.9  ICD-9-CM: 285.9  4/6/2010 - 2/28/2018        RESOLVED: GI bleed, duodenitis 2007 ICD-10-CM: K92.2  ICD-9-CM: 578.9  4/6/2010 - 2/28/2018    Overview Addendum 9/7/2010  9:01 AM by Jack Baker MD     8/07 Duodenitis                       Medications reviewed  Current Facility-Administered Medications   Medication Dose Route Frequency    zolpidem (AMBIEN) tablet 5 mg  5 mg Oral QHS PRN    pantoprazole (PROTONIX) tablet 40 mg  40 mg Oral ACB    acetaminophen (TYLENOL) tablet 650 mg  650 mg Oral TID    oxyCODONE IR (ROXICODONE) tablet 2.5-5 mg  2.5-5 mg Oral Q4H PRN    traMADoL (ULTRAM) tablet 50 mg  50 mg Oral Q6H PRN    zinc oxide-cod liver oil (DESITIN) 40 % paste   Topical PRN    heparin (porcine) injection 5,000 Units  5,000 Units SubCUTAneous Q8H    dextrose 5 % - 0.45% NaCl infusion  25 mL/hr IntraVENous CONTINUOUS    mupirocin (BACTROBAN) 2 % ointment   Topical DAILY    phenol throat spray (CHLORASEPTIC) 1 Spray  1 Spray Oral PRN    HYDROmorphone (DILAUDID) injection 0.5-1 mg  0.5-1 mg IntraVENous Q2H PRN    ondansetron (ZOFRAN) injection 4 mg  4 mg IntraVENous Q4H PRN    acetaminophen (TYLENOL) tablet 650 mg  650 mg Oral Q6H PRN    hydrALAZINE (APRESOLINE) 20 mg/mL injection 20 mg  20 mg IntraVENous Q6H PRN    glucose chewable tablet 16 g  4 Tablet Oral PRN    dextrose 10 % infusion 0-250 mL  0-250 mL IntraVENous PRN    glucagon (GLUCAGEN) injection 1 mg  1 mg IntraMUSCular PRN    aspirin delayed-release tablet 81 mg  81 mg Oral QHS    [Held by provider] pregabalin (LYRICA) capsule 50 mg  50 mg Oral BID    albuterol (PROVENTIL HFA, VENTOLIN HFA, PROAIR HFA) inhaler 1 Puff  1 Puff Inhalation Q6H PRN       Care Plan discussed with: Patient/Family and Nurse    Total time spent with patient: 30 minutes.     [address] [facility], DO

## 2022-03-02 NOTE — PROGRESS NOTES
PICC Placement Note    PRE-PROCEDURE VERIFICATION  Correct Procedure: yes  Correct Site:  yes  Temperature: Temp: 97.6 °F (36.4 °C), Temperature Source: Temp Source: Oral  Recent Labs     03/02/22  0422   BUN 13   CREA 0.85      WBC 14.1*     Allergies: Codeine, Contrast agent [iodine], Dexamethasone, Fish oil, Glipizide, Lovenox [enoxaparin], Metformin, Niacin, Norvasc [amlodipine], and Optiray 350 [ioversol]  Education materials for PICC Care given: yes. See Patient Education activity for further details. PICC Booklet placed at bedside: yes    PROCEDURE DETAIL  Consent was obtained and all questions were answered related to risks and benefits. A double lumen PICC line was inserted, as a sterile procedure using ultrasound and modified Seldinger technique for desire for reliable access. The following documentation is in addition to the PICC properties in the lines/airways flowsheet :  Lot #: [id number]  Lidocaine 1% administered intradermally :yes  Internal Catheter Total Length: 39 (cm)  Vein Selection for PICC:right brachial  Central Line Bundle followed yes  Complication Related to Insertion:no    The placement was verified by EKG, MAX P WAVE @ 39 (cm). PER EKG PICC TIP @ C/A junction.          Line is okay to use: yes    Mary Myrick RN

## 2022-03-02 NOTE — PROGRESS NOTES
Palliative Medicine      Code Status: DNR    Advance Care Planning:  Advance Care Planning 5/29/2020   Patient's Healthcare Decision Maker is: -Patient's spouse has dementia, Decision maker is daughter Dani Vidal, secondary Alice Cassidy (SIMONE)   Confirm Advance Directive Yes, on file   Does the patient have other document types -N/A     The patient has an AMD on file listing his wife, Therese Milligan, as primary healthcare agent, however, she has dementia- [address] is his daughter Dani Vidal, (Primary Decision Maker), followed by SIMONE Cassidy     Patient / Family Encounter Documentation    Participants (names): Dr. Nitin Morrow     Narrative: The Palliative Medicine SW met with the patient at bedside along with Dr. Nitin Morrow, he is pleasant and engaged- he is aware of confusion he experienced during this admission- describes to us instances of confusion. He is alert and oriented today, able to tell us year, where he is, president, etc.- he also recognizes his cancer diagnosis, and plan for upcoming surgery on Monday. He shares with us additional history- he has been through a lot in recent months, from his COVID-19 diagnosis in December and 15 lb weight loss, he shares at baseline he is ambulatory, driving, maintaining the home, etc.     He verbalizes his hope to get stronger, overwhelmed with hospitalization, but taking things one step at a time. He recognizes that he needs to focus on his acute issues/management, before discussing possible treatment options/plan for his cancer diagnosis. Palliative Medicine will continue to follow along with you in the patient's care- SW will provide ongoing support. Psychosocial Issues Identified/ Resilience Factors: The patient lives at home with his wife Roberth Thomas, they have been  for 63 years.  The patient's wife has Dementia (family currently assisting with her needs), and typically the patient's spouse is primary caregiver- assists with all of the cleaning, driving, etc. The patient shares that his wife is ambulatory, able to communicate and recognize family, however, does not retain information for long periods/more than a few days. The patient has one daughter Tarsha Miller that is very involved in his life- lives close-by, approximately 15 minutes. The patient originally had three children, however, he lost his other daughter and son when they were 43years old. The patient is retired from [employer], prior to this, he worked in sales- traveled frequently across the country, [place], [place], etc., and shares that he enjoyed meeting new people. Caregiver [place]: High risk for caregiver burden given that the patient was assisting in caring for his spouse- he reports she is ambulatory, communicative- was needing assistance with housekeeping, meal prep, etc.- he shares that his daughter/SIMONE are assisting now, they also placed cameras throughout the home for additional monitoring/support to ensure her safety.      Goals of Care / Plan: DNR, continue restorative care    Thank you for including Palliative Medicine in the care of [place] [address] [place] [address]  [phone]

## 2022-03-02 NOTE — CONSULTS
Palliative Medicine Consult  [place]: [phone] ([phone])    Patient Name: Merlene Noriega  YOB: 1934    Date of Initial Consult: 3/2/22  Reason for Consult: Overwhelming sx  Requesting Provider: Svetlana Sloan  Primary Care Physician: Jimbo Navarro, KENNETH     SUMMARY:   Merlene Noriega is a 80 y.o. with a past history of multiple myeloma since 2012 (Dr Stefan Montes), recent decline, recent hospital stay w/ R foot cellulitis, AAA s/p repair 2012, DM, COVID 12/2021  who was admitted on 2/21/2022 from home w/ abdominal pain and nausea- CT showing SOB. Surgery consulted in ED, poor operative candidate. NGT placed but no improvement thus required ex lap and R hemicolectomy 2/24/22 - found to have obstructive R colon cancer w/ 7/14 + lymph nodes. Course also complicated by confusion/agitation and R foot pain. Found to have R popliteal occlusion, R 2nd toe osteomyelitis. Plan is for vascular surgery next week. Current medical issues leading to Palliative Medicine involvement include: care decisions. Social: Pt  to Kamini Ron x 61 year- he is her caretaker as she has dementia. They had 3 children, 1 living- dtr Demetrice Brennan. She and her  Janel Gamino very supportive and live nearby w/ their 2 dtrs. 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 PALLIATIVE DIAGNOSES:   1. L foot pain due to osteomyelitis, vascular issues  2. Abdominal pain improving  3. New dx of colon cancer   4. Debility and weight loss since COVID 12/2021  5. Delirium now improved   6. Goals of care       PLAN:   1. Along w/ [place] Dean meet w/ pt who is alert/oriented and engaging. Admits to having confusion earlier this admission, but as mentation cleared was recently told about his new dx of colon cancer. Has been through a lot since COVID 12/2021 w/ 15 lb weight loss and decline in function.  Previously had been driving, doing a lot of housekeeping and all other ADLs for his and his wife- wife is ambulatory and can communicate and recognize family but cannot retain new information for more than a few days, so he is uncertain how much about his new dx he will share w/ her. 2. Trying to take one day at a time, focusing on his RLE surgery next week first, to improve his function and get stronger for possible tx for his colon cancer. 3. See Liz's note for more info- we are following for support and post-op pain if needed in the future- has been difficult to balance pain control w/ alertness, but seems to be doing well on tramadol. 4. Initial consult note routed to primary continuity provider and/or primary health care team members  5. Communicated plan of care with: Palliative [facility] Team incl Miguel Barnett NP     GOALS OF CARE / TREATMENT PREFERENCES:     GOALS OF CARE:  Patient/Health Care Proxy Stated Goals: Rehabilitation    TREATMENT PREFERENCES:   Code Status: DNR    Patient and family's personal goals include: getting home       Advance Care Planning:  [x] [facility] Team has updated the ACP Navigator with [address] and Patient Capacity      Primary Decision Maker: fredis hudson - [place] - [phone]  Advance Care Planning 5/29/2020   Patient's Healthcare Decision Maker is: -   Confirm Advance Directive Yes, on file   Does the patient have other document types -       Medical Interventions: Limited additional interventions       Other:    As far as possible, the palliative care team has discussed with patient / health care proxy about goals of care / treatment preferences for patient. HISTORY:     History obtained from: Pt, chart, staff    CHIEF COMPLAINT: Fatigue    HPI/SUBJECTIVE:    The patient is:   [x] Verbal and participatory  [] Non-participatory due to:     Pt comfortable, just had PICC placed. Denies pain until I do an exam and touch his R foot.      Clinical Pain Assessment (nonverbal scale for severity on nonverbal patients):   Clinical Pain Assessment  Severity: 2  Location: L foot  Character: aching  Duration: a few weeks  Effect: harder to function  Factors: better w/ tramadol  Frequency: most of the day     Activity (Movement): Lying quietly, normal position    Duration: for how long has pt been experiencing pain (e.g., 2 days, 1 month, years)  Frequency: how often pain is an issue (e.g., several times per day, once every few days, constant)     FUNCTIONAL ASSESSMENT:     Palliative Performance Scale (PPS):  PPS: 60       PSYCHOSOCIAL/SPIRITUAL SCREENING:     Palliative IDT has assessed this patient for cultural preferences / practices and a referral made as appropriate to needs (Cultural Services, Patient Advocacy, Ethics, etc.)    Any spiritual / Rastafari concerns:  [] Yes /  [x] No   If \"Yes\" to discuss with pastoral care during IDT     Does caregiver feel burdened by caring for their loved one:   [] Yes /  [x] No /  [] No Caregiver Present    If \"Yes\" to discuss with social work during IDT    Anticipatory grief assessment:   [x] Normal  / [] Maladaptive     If \"Maladaptive\" to discuss with social work during IDT    ESAS Anxiety: Anxiety: 0    ESAS Depression: Depression: 0        REVIEW OF SYSTEMS:     Positive and pertinent negative findings in ROS are noted above in HPI. The following systems were [x] reviewed / [] unable to be reviewed as noted in HPI  Other findings are noted below. Systems: constitutional, ears/nose/mouth/throat, respiratory, gastrointestinal, genitourinary, musculoskeletal, integumentary, neurologic, psychiatric, endocrine. Positive findings noted below.   Modified ESAS Completed by: provider   Fatigue: 5 Drowsiness: 0   Depression: 0 Pain: 2   Anxiety: 0 Nausea: 0   Anorexia: 3 Dyspnea: 0           Stool Occurrence(s): 1        PHYSICAL EXAM:     From RN flowsheet:  Wt Readings from Last 3 Encounters:   02/28/22 168 lb 14 oz (76.6 kg)   02/12/22 160 lb (72.6 kg)   12/29/21 173 lb 15.1 oz (78.9 kg)     Blood pressure (!) 168/73, pulse 69, temperature 97.6 °F (36.4 °C), resp. rate 18, height 6' 1\" (1.854 m), weight 168 lb 14 oz (76.6 kg), SpO2 97 %. Pain Scale 1: Numeric (0 - 10)  Pain Intensity 1: 9  Pain Onset 1: pta  Pain Location 1: Foot  Pain Orientation 1: Right  Pain Description 1: Aching  Pain Intervention(s) 1: Medication (see MAR)  Last bowel movement, if known:     Constitutional: awake, alert, oriented, thin   Eyes: pupils equal, anicteric  ENMT: no nasal discharge, moist mucous membranes, poor lower dentition   Respiratory: breathing not labored, symmetric  Gastrointestinal: soft, dressing midline, slightly distended, +BS  Musculoskeletal: no deformity, ischemic 2nd toe on R  Skin: warm, dry  Neurologic: following commands, moving all extremities  Psychiatric: full affect, no hallucinations         HISTORY:     Active Problems:    HTN (hypertension) (4/6/2010)      Overview: 1990      H/O Carotid Stenosis (4/6/2010)      Overview: Oct 2003; 50%left subclavian and 50-70% SHEILA       Pre-diabetes (9/7/2010)      Overview: 2005;  Optho Dr Carey Alexander      Multiple myeloma [facility]) (12/27/2012)      Aortic stenosis, moderate (4/10/2018)      Pneumonia due to COVID-19 virus (12/29/2021)      Cellulitis of right leg (2/12/2022)      Failure to thrive (child) (2/21/2022)      Falls frequently (2/21/2022)      Moderate protein-calorie malnutrition ([address] (2/21/2022)      Acute renal injury ([address] (2/21/2022)      Malignant neoplasm of ascending colon ([address] (2/28/2022)      Past Medical History:   Diagnosis Date    AAA (abdominal aortic aneurysm) ([address] 1/6/2012    Abnormal serum protein electrophoresis 4/25/2012    Anemia 4/6/2010    Anemia 1/5/2012    Anemia due to GI bleed 2007 4/6/2010    Aneurysm (HCC)     AAA    Arrhythmia     ATRIAL FIB PT STATES HE DOESN'T HAVE    Arthritis     Atrial fibrillation 1/2012 2/22/2012    Bilateral Carotid Bruits, L>R 2010    [facility] cancer [facility]     Borderline diabetes mellitus 2010    Cancer ([facility]     SKIN MELANOMA ON BACK     Carotid stenosis 2010    Carotid stenosis     Chronic pain     Diabetes mellitus, type 2 ([facility] 2010    NO MEDS    Disturbances of sulphur-bearing amino-acid metabolism 2010    ETOH abuse 2010    GERD (gastroesophageal reflux disease) 2010    GI bleed 2010    GI bleed, duodenitis 2007 2010    H/O Heavy Alcohol Use 2010    History of skin cancer 2010    HTN (hypertension) 2010    Hypercalcemia 2012    Hyperhomocysteinemia ([facility] 2010    Hypertriglyceridemia 2010    Left inguinal hernia 2015    Lipids blood increased 2010    Microalbuminuria 12/10/2015    Mild Allergic Rhinitis 2010    Pre-diabetes 2010    2005;  Optho Dr Audie Hoffman Right inguinal hernia 2015    S/P AAA repair 2012    Sinus tachycardia 2010    Tachycardia 2010    Vitamin B12 deficiency 2010      Past Surgical History:   Procedure Laterality Date    COLONOSCOPY  2007    Normal; Dr Elvira Finn EGD  2007    small hiatal hernia, mild duodenitis; Dr Elvira Finn HX AAA REPAIR  2012    Dr Kyaw Younger    HX CATARACT REMOVAL      both eyes    HX CHOLECYSTECTOMY  14    [facility]- Dr. Tessy Acosta HX HERNIA REPAIR Left 9/24/15    left indirect inguinal by Dr. Ludivina Montez Right 11/2/15    inguinal w/ mesh by Dr. Ludivina Montez Bilateral 10/2015 And 2015    Inguinal    BLUE DOPPLER  3/26/2012    BLUE Echo; + clot      Family History   Problem Relation Age of Onset    Heart Disease Mother          age 80    Cancer Father          brain tumor age 80    Heart Disease Father     Substance Abuse Daughter          cocaine OD age 43 in 200    Other Son          PE/DVT age 43    Heart Disease Brother  Cancer Brother         BLADDER    Other Daughter         alive and well    Anesth Problems Neg Hx       History reviewed, no pertinent family history. Social History     Tobacco Use    Smoking status: Former Smoker     Packs/day: 1.50     Years: 55.00     Pack years: 82.50     Quit date: 2001     Years since quittin.1    Smokeless tobacco: Never Used    Tobacco comment: used to smoke 1 1/2 ppd x ~ 40 yrs   Substance Use Topics    Alcohol use: Yes     Alcohol/week: 14.0 standard drinks     Types: 14 Glasses of wine per week     Comment: 14     Allergies   Allergen Reactions    Codeine Nausea and Vomiting     Blurred vision, felt faint    Contrast Agent [Iodine] Hives     Needs premedication w/ Benadryl prn (since 2012 reaction)    Dexamethasone Other (comments)     \"Deathly ill\"    Fish Oil Nausea Only    Glipizide Other (comments)     DRASTIC DROP IN BLOOD GLUCOSE, fasting    Lovenox [Enoxaparin] Hives     Patient states blisters all over the body.     Metformin Diarrhea    Niacin Other (comments)     Flushing    Norvasc [Amlodipine] Other (comments)     Leg edema    Optiray 350 [Ioversol] Hives     He does not know what this is      Current Facility-Administered Medications   Medication Dose Route Frequency    zolpidem (AMBIEN) tablet 5 mg  5 mg Oral QHS PRN    sodium phosphate 30 mmol in 0.9% sodium chloride 250 mL infusion   IntraVENous ONCE    vancomycin (VANCOCIN) 1,250 mg in 0.9% sodium chloride 250 mL (VIAL-MATE)  1,250 mg IntraVENous Q24H    cefepime (MAXIPIME) 2 g in sterile water (preservative free) 10 mL IV syringe  2 g IntraVENous Q8H    pantoprazole (PROTONIX) tablet 40 mg  40 mg Oral ACB    acetaminophen (TYLENOL) tablet 650 mg  650 mg Oral TID    oxyCODONE IR (ROXICODONE) tablet 2.5-5 mg  2.5-5 mg Oral Q4H PRN    traMADoL (ULTRAM) tablet 50 mg  50 mg Oral Q6H PRN    zinc oxide-cod liver oil (DESITIN) 40 % paste   Topical PRN    heparin (porcine) injection 5,000 Units  5,000 Units SubCUTAneous Q8H    dextrose 5 % - 0.45% NaCl infusion  25 mL/hr IntraVENous CONTINUOUS    mupirocin (BACTROBAN) 2 % ointment   Topical DAILY    phenol throat spray (CHLORASEPTIC) 1 Spray  1 Spray Oral PRN    HYDROmorphone (DILAUDID) injection 0.5-1 mg  0.5-1 mg IntraVENous Q2H PRN    ondansetron (ZOFRAN) injection 4 mg  4 mg IntraVENous Q4H PRN    acetaminophen (TYLENOL) tablet 650 mg  650 mg Oral Q6H PRN    hydrALAZINE (APRESOLINE) 20 mg/mL injection 20 mg  20 mg IntraVENous Q6H PRN    glucose chewable tablet 16 g  4 Tablet Oral PRN    dextrose 10 % infusion 0-250 mL  0-250 mL IntraVENous PRN    glucagon (GLUCAGEN) injection 1 mg  1 mg IntraMUSCular PRN    aspirin delayed-release tablet 81 mg  81 mg Oral QHS    [Held by provider] pregabalin (LYRICA) capsule 50 mg  50 mg Oral BID    Vancomycin - pharmacy to dose   Other Rx Dosing/Monitoring    albuterol (PROVENTIL HFA, VENTOLIN HFA, PROAIR HFA) inhaler 1 Puff  1 Puff Inhalation Q6H PRN          LAB AND IMAGING FINDINGS:     Lab Results   Component Value Date/Time    WBC 14.1 (H) 03/02/2022 04:22 AM    HGB 8.8 (L) 03/02/2022 04:22 AM    PLATELET 910 72/63/7928 04:22 AM     Lab Results   Component Value Date/Time    Sodium 143 03/02/2022 04:22 AM    Potassium 3.2 (L) 03/02/2022 04:22 AM    Chloride 116 (H) 03/02/2022 04:22 AM    CO2 24 03/02/2022 04:22 AM    BUN 13 03/02/2022 04:22 AM    Creatinine 0.85 03/02/2022 04:22 AM    Calcium 7.4 (L) 03/02/2022 04:22 AM    Magnesium 2.0 03/02/2022 04:22 AM    Phosphorus 1.4 (L) 03/02/2022 04:22 AM      Lab Results   Component Value Date/Time    Alk.  phosphatase 70 02/21/2022 03:04 PM    Protein, total 7.0 02/21/2022 03:04 PM    Albumin 3.1 (L) 02/21/2022 03:04 PM    Globulin 3.9 02/21/2022 03:04 PM     Lab Results   Component Value Date/Time    INR 1.1 05/15/2020 09:31 AM    Prothrombin time 11.0 05/15/2020 09:31 AM    aPTT 26.2 05/15/2020 09:31 AM      Lab Results   Component Value Date/Time Iron 21 (L) 02/13/2022 02:38 AM    TIBC 254 02/13/2022 02:37 AM    Iron % saturation 8 (L) 02/13/2022 02:37 AM    Ferritin 51 02/13/2022 02:37 AM      No results found for: PH, PCO2, PO2  No components found for: [place]   Lab Results   Component Value Date/Time    CK 44 12/27/2014 03:44 AM    CK - MB 1.0 12/27/2014 03:44 AM                Total time: 70 min   Counseling / coordination time, spent as noted above: 50 min   > 50% counseling / coordination?: yes    Prolonged service was provided for  []30 min   []75 min in face to face time in the presence of the patient, spent as noted above. Time Start:   Time End:   Note: this can only be billed with 69312 (initial) or 87987 (follow up). If multiple start / stop times, list each separately.

## 2022-03-02 NOTE — CONSULTS
[address]    Name:  Peri Regan  MR#:  [id number]  :  1934  ACCOUNT #:  [de-identified]  DATE OF SERVICE:  2022    HEMATOLOGY/ONCOLOGY      CONSULTATION    REASON FOR ADMISSION:  Abdominal pain and vomiting. HISTORY OF PRESENT ILLNESS:  The patient is a very charming 63-year-old man who has been followed closely by Dr. Tarsha Zamarripa with multiple myeloma since . He has been treated with multiple regimens including Revlimid, bortezomib, dexamethasone with a prolonged response in . Alemtuzumab, Pomalyst , dexamethasone; this was held in 2021 due to COVID pneumonia. He is the sole caregiver for his wife with advanced dementia. He has had some other difficulties in the past including abdominal aortic aneurysm, status post open repair; carotid artery stenosis, status post endarterectomy; peripheral neuropathy; atrial fibrillation; and hypertension. He presented to this hospital on 2022 with abdominal pain, and noncontrast CT of the abdomen showed a high-grade distal small bowel obstruction. He was taken to the OR by    and was noted to have distension of the cecum. He underwent a right hemicolectomy. This tissue was submitted to pathology and unfortunately was noted to have contained a moderately differentiated adenocarcinoma, positive lymphovascular invasion, 7/14 lymph nodes affected, margins negative, therefore, making it pT3 N2b. Our service was asked to comment on his newly diagnosis colon cancer. PAST MEDICAL HISTORY:  1.  Multiple myeloma as above. 2.  Hypertension. 3.  Hyperlipidemia. 4.  Atrial fibrillation. 5.  Diabetes. 6.  GERD. 7.  Peripheral vascular disease. 8.  Previous aortic aneurysm, status post open repair. 9.  Status post cholecystectomy. 10.  Bilateral hernia repair. 11.  Right carotid endarterectomy. CURRENT MEDICATIONS:  1. Tylenol 650 mg three times daily. 2.  Aspirin 81 mg daily.   3.  Cefepime 2 g IV q.12 hours. 4.  Protonix 40 mg daily. 5.  Vancomycin 1 g IV q.24 hours. ALLERGIES:  CODEINE, FISH OIL, IODINE, IV CONTRAST. SOCIAL HISTORY:  He is the primary caregiver for his wife with dementia. He does have a daughter and son-in-law who are close to them and live in town and have been helping him. He quit smoking in 2001. FAMILY HISTORY:  Father had a brain tumor. REVIEW OF SYSTEMS:  GENERAL:  No fevers or chills. HEENT:  No auditory or visual change. LYMPHATIC:  No lumps or bumps in neck, underarms, or groin. CARDIOVASCULAR:  No chest pain or palpitations. PULMONARY:  No cough or shortness of breath. GASTROINTESTINAL:  As above. GENITOURINARY:  No dysuria or incontinence. SKIN:  No rash or changing mole. MUSCULOSKELETAL:  No red or swollen joints. NEUROLOGIC:  No irritability or syncope. PHYSICAL EXAMINATION:  GENERAL:  Pleasant, in no acute distress. VITAL SIGNS:  /59, temperature 97.8, pulse 75, saturating 98% on room air. HEENT:  Sclerae anicteric. Oropharynx clear. NECK:  Supple without lymphadenopathy or thyromegaly. LUNGS:  Clear to auscultation bilaterally. HEART:  Regular rhythm without murmur, rub, or gallop. ABDOMEN:  Nontender, nondistended. Normoactive bowel sounds. No hepatosplenomegaly. EXTREMITIES:  He does have a gangrenous right second toe. ASSESSMENT AND PLAN:  An 26-year-old man with history of multiple myeloma, now admitted with abdominal pain and found to have lymph node positive, lymphovascular invasion positive colon cancer, status post right hemicolectomy. 1.  Colon Cancer:  I recommended that he discuss his adjuvant treatment options with Dr. Heraclio Arriola when he is feeling better. 2.  Multiple Myeloma:  Treatment on hold given his declining health. 3.  Anemia:  Likely multifactorial including contributions from myeloma, gastrointestinal malignancy, recent surgery, and anemia of chronic disease.   4.  Right lower extremity peripheral vascular disease:  Tentative plan for revascularization, right toe amputation. Thank you for consult. We will follow up in the outpatient setting. I appreciate excellent Hospitalist, General Surgery, Vascular Surgery care.       MD ELDA Larios/[email or web address]  D:  03/01/2022 18:02  T:  03/01/2022 21:17  JOB #:  [id number]  CC:  Katalina Crain MD

## 2022-03-02 NOTE — PROGRESS NOTES
Vascular:    Mental status better, very aware of multiple medical issues    Right foot unchanged    Restarted sq heparin, ordered ambien for sleep     Continue current care from my standpoint - tentatively plan right leg bypass and toe amp Monday depending on course

## 2022-03-03 NOTE — PROGRESS NOTES
General Surgery Daily Progress Note    Admit Date: 2022  Post-Operative Day: 3 Days Post-Op from Procedure(s):  RIGHT LEG ARTERIOGRAM.REQUEST TO FOLLOW     Subjective:     Last 24 hrs: patient stated that he feels well rested and slept well, but woke up a little confused this morning. He is not currently having any pain but still feels a little \"swollen\". +flatus, +BM. WBCs trending down      Objective:     Blood pressure (!) 133/54, pulse 80, temperature 97.7 °F (36.5 °C), resp. rate 16, height 6' 1\" (1.854 m), weight 168 lb 14 oz (76.6 kg), SpO2 95 %. Temp (24hrs), Av.6 °F (36.4 °C), Min:97.1 °F (36.2 °C), Max:98 °F (36.7 °C)      _____________________  Physical Exam:     Alert, in no acute distress. Cardiovascular: RRR                       RT foot swollen-dry eschar to right second toe~3 x 3 x 0.1 cm,  No exudate,Toenail is black, DYLAN  Lungs:CTAB   Abdomen: slightly distended, +BS, no TTP, mid abd aquacel dsg intact with small shadow drainage       Assessment:   Active Problems:    HTN (hypertension) (2010)      Overview:       H/O Carotid Stenosis (2010)      Overview: Oct 2003; 50%left subclavian and 50-70% SHEILA       Pre-diabetes (2010)      Overview: ;  Optho Dr George Do      Multiple myeloma [facility]) (2012)      Aortic stenosis, moderate (4/10/2018)      Pneumonia due to COVID-19 virus (2021)      Cellulitis of right leg (2022)      Failure to thrive (child) (2022)      Falls frequently (2022)      Moderate protein-calorie malnutrition ([address] (2022)      Acute renal injury ([address] (2022)      Malignant neoplasm of ascending colon ([address] (2022)            Plan:   Daily labs  May need TPN- not taking in much PO  PPI  R leg bypass and toe amputation Monday  Palliative care following - multiple medical issues  Heme onc following      Data Review:    Recent Labs     22  0234 22  0422 22  0215   WBC 13.9* 14.1* 16.7*   HGB 9.4* 8.8* 8.1*   HCT 31.0* 28.9* 26.5*    165 142*     Recent Labs     03/03/22  0234 03/02/22  0422 03/01/22  0215    143 143   K 3.4* 3.2* 2.9*   * 116* 114*   CO2 21 24 26   * 113* 116*   BUN 17 13 15   CREA 0.99 0.85 0.98   CA 7.6* 7.4* 7.3*   MG 1.9 2.0 1.5*   PHOS 2.0* 1.4* 1.4*     No results for input(s): AML, LPSE in the last 72 hours.         ______________________  Medications:    Current Facility-Administered Medications   Medication Dose Route Frequency    diphenhydrAMINE (BENADRYL) capsule 25 mg  25 mg Oral QHS PRN    pantoprazole (PROTONIX) tablet 40 mg  40 mg Oral ACB    acetaminophen (TYLENOL) tablet 650 mg  650 mg Oral TID    oxyCODONE IR (ROXICODONE) tablet 2.5-5 mg  2.5-5 mg Oral Q4H PRN    traMADoL (ULTRAM) tablet 50 mg  50 mg Oral Q6H PRN    zinc oxide-cod liver oil (DESITIN) 40 % paste   Topical PRN    heparin (porcine) injection 5,000 Units  5,000 Units SubCUTAneous Q8H    mupirocin (BACTROBAN) 2 % ointment   Topical DAILY    phenol throat spray (CHLORASEPTIC) 1 Spray  1 Spray Oral PRN    HYDROmorphone (DILAUDID) injection 0.5-1 mg  0.5-1 mg IntraVENous Q2H PRN    ondansetron (ZOFRAN) injection 4 mg  4 mg IntraVENous Q4H PRN    acetaminophen (TYLENOL) tablet 650 mg  650 mg Oral Q6H PRN    hydrALAZINE (APRESOLINE) 20 mg/mL injection 20 mg  20 mg IntraVENous Q6H PRN    glucose chewable tablet 16 g  4 Tablet Oral PRN    dextrose 10 % infusion 0-250 mL  0-250 mL IntraVENous PRN    glucagon (GLUCAGEN) injection 1 mg  1 mg IntraMUSCular PRN    aspirin delayed-release tablet 81 mg  81 mg Oral QHS    [Held by provider] pregabalin (LYRICA) capsule 50 mg  50 mg Oral BID    albuterol (PROVENTIL HFA, VENTOLIN HFA, PROAIR HFA) inhaler 1 Puff  1 Puff Inhalation Q6H PRN       Colin Oneal NP  3/3/2022

## 2022-03-03 NOTE — PROGRESS NOTES
Bedside shift change report given to Seven Villavicencio0 (oncoming nurse) by Buddy Barba RN (offgoing nurse). Report included the following information SBAR, Kardex, Intake/Output, MAR and Recent Results.

## 2022-03-03 NOTE — PROGRESS NOTES
Hospitalist Progress Note             Daily Progress Note: 3/3/2022    Primary care provider:Altagracia Edmonds NP    Date of admission: 2/21/2022  1:18 PM    Admission summery and hospital course:  HPI\"80year-old man with a past medical history significant for type 2 diabetes, multiple myeloma, hypertension, dyslipidemia, peripheral neuropathy, status post abdominal aortic aneurysm repair in 2012 and COVID-pneumonia.  Patient was recently admitted on 2/15 for management of cellulitis. Presented to the emergency room several days ago treated for diarrhea with Imodium, and now with progressive abdominal pain nausea vomiting. Patient seen in the ED today  for abdominal pain started 12 to 24 hours ago associated with vomiting and constipation.  The hospitalist team has been consulted to assist general surgery in medical management.   Patient examined at bedside.  Patient is ill-appearing, should not reports health has been declining since Covid diagnosis in December 2021.  Patient denies chest pain, dizziness, shortness of breath during examination.  Patient endorses abdominal pain nausea and vomiting.  Productive cough noted on evaluation, patient reports he has had a cough since Covid diagnosis and it is somewhat improved.  NG tube is in place draining dark brown bilious fluid.  Patient reports abdominal distention has improved some since placement of NG.  Patient's son-in-law is at bedside. \"  Subjective:   Having bowel movements and flatus, denies abdominal pain, n/v.     Assessment/Plan:   Right second toe dry gangrene  Right lower extremity arterial occlusion   -review of photos from prior hospitalization demonstrates worsening  -podiatry and vascular consulted. -MRI with evidence of osteomyelitis. Discussed with podiatry. More consistent with dry gangrene. Antibiotics not required.  D/c 3/2  -MARYURI with moderate to severe arterial occlusion  -Right arteriogram 2/28 with popliteal occlusion, unable to cross occlusion. Needs right popliteal bypass-possible next week. Will get second great toe amputation at same time as popliteal bypass. Podiatry signed off    Small bowel obstruction due to invasive colon adenocarcinoma   -Underwent right hemicolectomy 2/24. Evidence of adenocarcinoma, +7/14 lymph nodes  -Continue GI lite diet    -Oncology consult 3/1. Known to [facility], Dr. Ha Sebastian. Will follow up outpatient  -pain control     New fever, hypotension 2/27, leukocytosis: resolved  -cultures remain negative. Stopped IV antibiotics 3/2    Acute metabolic encephalopathy: resolved  -Multifactorial in setting of recent surgery, narcotics, hospitalization, infection  -monitor closely, supportive care  -limit narcotics as able    History of Type 2 diabetes mellitus, Ha1c 6.3 with hypoglycemia:   -continue accuchecks q6 hours  -s/p D5     History of Covid infection  Pneumonia  -CXR with scattered opacities, concerning for PNA. -s/p treatment      Anemia of iron deficiency and chronic disease  Monitor CBC, transfuse for Hgb less than 7. Peripheral neuropathy: Hold Lyrica for low blood pressure and ongoing bowel obstruction.   Hypertension: Blood pressure is reasonable now. Continue to hold Lasix  Failure to thrive, Protein calorie malnutrition: Nutrition consult    Recurrent falls: PT/OT  Hypokalemia: Replace and monitor  Insomnia: Ambien reportedly worked but caused him some confusion. Noted he was started on diphenhydramine, generally like avoiding this in geriatric patients. Will try PRN low-dose trazodone instead. Palliative care consulted 3/2    See orders for other plans. VTE prophylaxis: heparin  Code status: DNR.   Discussed plan of care with Patient/Family and Nurse  Discharge planning: PT/OT       Review of Systems:     Review of Systems:  As per HPI                                                                                         Objective:   Physical Exam:     Visit Vitals  BP (!) 164/60 (BP 1 Location: Left upper arm, BP Patient Position: At rest;Sitting) Comment: rn notified   Pulse 77   Temp 98.9 °F (37.2 °C)   Resp 18   Ht 6' 1\" (1.854 m)   Wt 76.6 kg (168 lb 14 oz)   SpO2 97%   BMI 22.28 kg/m²    O2 Flow Rate (L/min): 2 l/min O2 Device: None (Room air)    Temp (24hrs), Av.8 °F (36.6 °C), Min:97.1 °F (36.2 °C), Max:98.9 °F (37.2 °C)    No intake/output data recorded. No intake/output data recorded. General:   NAD non-toxic  HEENT: anicteric sclerae   Lungs:    Clear to auscultation bilaterally, normal work of breathing. Chest wall:   No tenderness or deformity. Heart:  Regular rate and rhythm, S1, S2 normal, no murmur. No edema    Abdomen:   Soft, central bandage, +bowel sounds   Extremities:  Right second with dark discoloration. No discharge or no fluctuation. Some discoloration 5th toe. Neurologic:  grossly non-focal     Data Review:       Recent Days:  Recent Labs     22  0234 22  0422 22  0215   WBC 13.9* 14.1* 16.7*   HGB 9.4* 8.8* 8.1*   HCT 31.0* 28.9* 26.5*    165 142*     Recent Labs     22  0234 22  0422 22  0215    143 143   K 3.4* 3.2* 2.9*   * 116* 114*   CO2 21 24 26   * 113* 116*   BUN 17 13 15   CREA 0.99 0.85 0.98   CA 7.6* 7.4* 7.3*   MG 1.9 2.0 1.5*   PHOS 2.0* 1.4* 1.4*     No results for input(s): PH, PCO2, PO2, HCO3, FIO2 in the last 72 hours.     24 Hour Results:  Recent Results (from the past 24 hour(s))   GLUCOSE, POC    Collection Time: 22  5:19 PM   Result Value Ref Range    Glucose (POC) 143 (H) 65 - 117 mg/dL    Performed by [address] [place] [address], [facility]    Collection Time: 22  2:34 AM   Result Value Ref Range    Sodium 145 136 - 145 mmol/L    Potassium 3.4 (L) 3.5 - 5.1 mmol/L    Chloride 117 (H) 97 - 108 mmol/L    CO2 21 21 - 32 mmol/L    Anion gap 7 5 - 15 mmol/L    Glucose 116 (H) 65 - 100 mg/dL    BUN 17 6 - 20 MG/DL    Creatinine 0.99 0.70 - 1.30 MG/DL    BUN/Creatinine ratio 17 12 - 20      GFR est AA >60 >60 ml/min/1.73m2    GFR est non-AA >60 >60 ml/min/1.73m2    Calcium 7.6 (L) 8.5 - 10.1 MG/DL   CBC W/O DIFF    Collection Time: 03/03/22  2:34 AM   Result Value Ref Range    WBC 13.9 (H) 4.1 - 11.1 K/uL    RBC 3.07 (L) 4.10 - 5.70 M/uL    HGB 9.4 (L) 12.1 - 17.0 g/dL    HCT 31.0 (L) 36.6 - 50.3 %    .0 (H) 80.0 - 99.0 FL    MCH 30.6 26.0 - 34.0 PG    MCHC 30.3 30.0 - 36.5 g/dL    RDW 16.8 (H) 11.5 - 14.5 %    PLATELET 320 406 - 540 K/uL    MPV 11.6 8.9 - 12.9 FL    NRBC 0.0 0  WBC    ABSOLUTE NRBC 0.00 0.00 - 0.01 K/uL   MAGNESIUM    Collection Time: 03/03/22  2:34 AM   Result Value Ref Range    Magnesium 1.9 1.6 - 2.4 mg/dL   PHOSPHORUS    Collection Time: 03/03/22  2:34 AM   Result Value Ref Range    Phosphorus 2.0 (L) 2.6 - 4.7 MG/DL   GLUCOSE, POC    Collection Time: 03/03/22 12:05 PM   Result Value Ref Range    Glucose (POC) 137 (H) 65 - 117 mg/dL    Performed by Natalie Brown. (CON)        Problem List:  Problem List as of 3/3/2022 Date Reviewed: 2/28/2022          Codes Class Noted - Resolved    Malignant neoplasm of ascending colon ([facility] ICD-10-CM: C18.2  ICD-9-CM: 153.6  2/28/2022 - Present        Failure to thrive (child) ICD-10-CM: R62.51  ICD-9-CM: 783.41  2/21/2022 - Present        Falls frequently ICD-10-CM: R29.6  ICD-9-CM: V15.88  2/21/2022 - Present        Moderate protein-calorie malnutrition (HCC) ICD-10-CM: E44.0  ICD-9-CM: 263.0  2/21/2022 - Present        Acute renal injury ([facility] ICD-10-CM: N17.9  ICD-9-CM: 584.9  2/21/2022 - Present        Cellulitis of right leg ICD-10-CM: L03.115  ICD-9-CM: 682.6  2/12/2022 - Present        Pneumonia due to COVID-19 virus ICD-10-CM: U07.1, J12.82  ICD-9-CM: 480.8, 079.89  12/29/2021 - Present        Personal history of atrial fibrillation ICD-10-CM: Z86.79  ICD-9-CM: V12.59  11/9/2020 - Present        Carotid stenosis, asymptomatic, right ICD-10-CM: B04.97  ICD-9-CM: 433.10 5/29/2020 - Present        Hyperlipemia, mixed ICD-10-CM: E78.2  ICD-9-CM: 272.2  9/27/2018 - Present        Aortic stenosis, moderate ICD-10-CM: I35.0  ICD-9-CM: 424.1  4/10/2018 - Present        Mild concentric left ventricular hypertrophy (LVH) ICD-10-CM: I51.7  ICD-9-CM: 429.3  4/10/2018 - Present        Chronic diarrhea ICD-10-CM: K52.9  ICD-9-CM: 787.91  9/29/2017 - Present        Microalbuminuria ICD-10-CM: R80.9  ICD-9-CM: 791.0  12/10/2015 - Present        Right inguinal hernia ICD-10-CM: K40.90  ICD-9-CM: 550.90  11/2/2015 - Present        Left inguinal hernia ICD-10-CM: K40.90  ICD-9-CM: 550.90  9/14/2015 - Present        Multiple myeloma ([facility] ICD-10-CM: C90.00  ICD-9-CM: 203.00  12/27/2012 - Present        Hypercalcemia ICD-10-CM: N90.84  ICD-9-CM: 275.42  4/25/2012 - Present    Overview Signed 4/25/2012  5:49 PM by Toby Danielle MD     2012, PTH normal, abnormal protein electrophoresis, referred to Hem-Onc             S/P AAA repair 2/9/2012 ICD-10-CM: R13.550, Z86.79  ICD-9-CM: V45.89  2/22/2012 - Present    Overview Signed 2/22/2012  1:38 PM by MD Dr Vira Tyson             Pre-diabetes ICD-10-CM: R73.03  ICD-9-CM: 790.29  9/7/2010 - Present    Overview Addendum 11/1/2011  9:29 AM by Toby Danielle MD     2005;  Optho Dr Carey Alexander             Sinus tachycardia ICD-10-CM: R00.0  ICD-9-CM: 427.89  9/7/2010 - Present        H/O Heavy Alcohol Use ICD-9-CM: Lieutenant Click  9/7/2010 - Present        Vitamin B12 deficiency ICD-10-CM: E53.8  ICD-9-CM: 266.2  9/7/2010 - Present        Hyperhomocysteinemia ([facility] [address] ICD-10-CM: E72.11  ICD-9-CM: 270.4  9/7/2010 - Present        Mild Allergic Rhinitis ICD-10-CM: J30.9  ICD-9-CM: 477.9  9/7/2010 - Present        History of skin cancer ICD-10-CM: K97.435  ICD-9-CM: V10.83  9/7/2010 - Present    Overview Signed 9/7/2010  9:03 AM by Toby Danielle MD     Followed by Valerio Thomas             HTN (hypertension) ICD-10-CM: I10  ICD-9-CM: 401.9  4/6/2010 - Present    Overview Signed 9/7/2010  8:55 AM by Jeannie Sanchez MD     1990             H/O Carotid Stenosis ICD-10-CM: I65.29  ICD-9-CM: 433.10  4/6/2010 - Present    Overview Addendum 9/7/2010  8:58 AM by Jeannie Sanchez MD     Oct 2003; 50%left subclavian and 50-70% SHEILA              GERD (gastroesophageal reflux disease) ICD-10-CM: K21.9  ICD-9-CM: 530.81  4/6/2010 - Present        RESOLVED: Intestinal adhesions with complete obstruction ([facility] ICD-10-CM: K56.52  ICD-9-CM: 560.81  2/21/2022 - 2/28/2022        RESOLVED: Intestinal obstruction ([facility] ICD-10-CM: M06.811  ICD-9-CM: 560.9  2/21/2022 - 2/24/2022        RESOLVED: Myeloma ([facility] ICD-10-CM: C90.00  ICD-9-CM: 203.00  1/6/2015 - 6/10/2016        RESOLVED: Gastrointestinal bleeding ICD-10-CM: K92.2  ICD-9-CM: 578.9  12/26/2014 - 2/28/2018        RESOLVED: Nupur Belem stone pancreatitis ICD-10-CM: K85.10  ICD-9-CM: 577.0, 574.20  1/23/2014 - 2/28/2018        RESOLVED: Acute cholecystitis ICD-10-CM: K81.0  ICD-9-CM: 575.0  1/23/2014 - 2/28/2018        RESOLVED: Abnormal serum protein electrophoresis ICD-10-CM: R77.8  ICD-9-CM: 790.99  4/25/2012 - 2/28/2018    Overview Signed 4/25/2012  5:48 PM by Jeannie Sanchez MD     4/2012, referred to Hem-Onc             RESOLVED: Atrial fibrillation 1/2012 ICD-10-CM: I48.91  ICD-9-CM: 427.31  2/22/2012 - 11/9/2020    Overview Signed 2/22/2012  1:33 PM by Jeannie Sanchez MD     Cardio Dr Leigha López: AAA (abdominal aortic aneurysm) ([facility] ICD-10-CM: I71.4  ICD-9-CM: 441.4  1/6/2012 - 9/26/2018    Overview Signed 1/6/2012  2:07 PM by Jeannie Sanchez MD     Detected on plain films back xray 1/2012; Ct scheduled             RESOLVED: Anemia ICD-10-CM: D64.9  ICD-9-CM: 285.9  1/5/2012 - 2/28/2018        RESOLVED: Hypertriglyceridemia ICD-10-CM: E78.1  ICD-9-CM: 272.1  9/7/2010 - 9/27/2018    Overview Signed 9/7/2010  8:55 AM by Linda Sierra Emilee Conner MD     2003             RESOLVED: Bilateral Carotid Bruits, L>R ICD-10-CM: R09.89  ICD-9-CM: 785.9  9/7/2010 - 2/28/2018    Overview Signed 9/7/2010  8:57 AM by Dominique Bansal MD     10/03             RESOLVED: Anemia due to GI bleed 2007 ICD-10-CM: D64.9  ICD-9-CM: 285.9  4/6/2010 - 2/28/2018        RESOLVED: GI bleed, duodenitis 2007 ICD-10-CM: K92.2  ICD-9-CM: 578.9  4/6/2010 - 2/28/2018    Overview Addendum 9/7/2010  9:01 AM by Dominique Bansal MD     8/07 Duodenitis                       Medications reviewed  Current Facility-Administered Medications   Medication Dose Route Frequency    diphenhydrAMINE (BENADRYL) capsule 25 mg  25 mg Oral QHS PRN    pantoprazole (PROTONIX) tablet 40 mg  40 mg Oral ACB    acetaminophen (TYLENOL) tablet 650 mg  650 mg Oral TID    oxyCODONE IR (ROXICODONE) tablet 2.5-5 mg  2.5-5 mg Oral Q4H PRN    traMADoL (ULTRAM) tablet 50 mg  50 mg Oral Q6H PRN    zinc oxide-cod liver oil (DESITIN) 40 % paste   Topical PRN    heparin (porcine) injection 5,000 Units  5,000 Units SubCUTAneous Q8H    mupirocin (BACTROBAN) 2 % ointment   Topical DAILY    phenol throat spray (CHLORASEPTIC) 1 Spray  1 Spray Oral PRN    HYDROmorphone (DILAUDID) injection 0.5-1 mg  0.5-1 mg IntraVENous Q2H PRN    ondansetron (ZOFRAN) injection 4 mg  4 mg IntraVENous Q4H PRN    acetaminophen (TYLENOL) tablet 650 mg  650 mg Oral Q6H PRN    hydrALAZINE (APRESOLINE) 20 mg/mL injection 20 mg  20 mg IntraVENous Q6H PRN    glucose chewable tablet 16 g  4 Tablet Oral PRN    dextrose 10 % infusion 0-250 mL  0-250 mL IntraVENous PRN    glucagon (GLUCAGEN) injection 1 mg  1 mg IntraMUSCular PRN    aspirin delayed-release tablet 81 mg  81 mg Oral QHS    [Held by provider] pregabalin (LYRICA) capsule 50 mg  50 mg Oral BID    albuterol (PROVENTIL HFA, VENTOLIN HFA, PROAIR HFA) inhaler 1 Puff  1 Puff Inhalation Q6H PRN       Care Plan discussed with: Patient/Family    Shae Hill MD

## 2022-03-03 NOTE — PROGRESS NOTES
Bedside shift change report given to Seven Villavicencio0 (oncoming nurse) by Lisandro Castro RN (offgoing nurse). Report included the following information SBAR, Kardex, Intake/Output, MAR and Recent Results.

## 2022-03-03 NOTE — PROGRESS NOTES
Vascular:    Says he slept better with Arpita Dollk but was confused during the night - will DC and try benadryl    Vein mapping shows adequate vein for bypass right leg    Plan OR Monday

## 2022-03-03 NOTE — PROGRESS NOTES
Bedside shift change report given to Crissy Myers RN (oncoming nurse) by Luis Trejo RN (offgoing nurse). Report included the following information SBAR, Kardex, Intake/Output, MAR and Recent Results.

## 2022-03-03 NOTE — PROGRESS NOTES
Transition of Care: TBD; possibly home with [facility] vs rehab (no recommendations or orders yet); patient not medically ready     Transport Plan: TBD; possibly in car with family vs BLS (not set up yet)     RUR: 20%     DX: intestinal obstruction/right foot cellulitis/osteomyelitis     Main contact is daughterDavey Pleas Myrtie Canavan- [phone]     Discharge pending:  -pending vascular intervention-right leg bypass and toe amp (next Monday 3/7)  -patient is POD#8  of right hemicolectomy-evidence of adenocarcinoma  -pending pain control   -patient continues on IV antibiotics  -pending diet advancement  -pending progress with PT/OT and recs  -pending medical progress and care recs from general surgery, podiatry, hemo onc. Palliative, vascular  -discharge  greater than 48 hours     NOTE: . Patient w/ history of cellulitis, abdominal aortic aneurysm repair, inguinal hernia repairs, HTN, and multiple myeloma. ; patient had covid back in fall of 2021    daughter Myrtie Canavan [phone] ; Informed patient w/ history of chronic diarrhea since 2011 however no BM during hospitalization or at home. Patient had not complained of abdominal pain until after ED visit.  Beltran RN had placed f/u call today and received updates on patient referred  to PCP however no availability RN requested patient to be evaluated in the ED.   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     CM noted from chart and in [facility] rounds     CM following  Leonor aJin RN, CRM

## 2022-03-03 NOTE — PROGRESS NOTES
Palliative Medicine  [facility]: [phone] ([phone])  [facility]: [phone])    The Palliative Medicine SW met with the patient at bedside for ongoing psychosocial support- the patient is visiting with his wife. The patient is in good spirits today- he shares he is in a good mood, happy to see his wife- bright affect today, joking with his wife about getting up and dancing. The patient shared that he is doing better today, however, he did verbalize having limited PO intake- specifically, he shared that he is having abdominal discomfort- does not describe it as pain, but as bloating/discomfort particularly after he eats food, etc. SW will share with our provider- overall he denies having pain, in good spirits today. Brief visit today- patient is visiting with his wife (she does not drive, dementia), SW wanted to respect the visiting time patient is able to have with his wife at bedside. Patient is agreeable for SW to come back tomorrow for ongoing psychosocial support. Plan for surgery on Monday, continue current care. Thank you for including Palliative Medicine in the care of Mindy Casey.        Hebert Iverson [facility]  [phone]

## 2022-03-04 NOTE — PROGRESS NOTES
Problem: Self Care Deficits Care Plan (Adult)  Goal: *Acute Goals and Plan of Care (Insert Text)  Description: FUNCTIONAL STATUS PRIOR TO ADMISSION: Pt was Mod I with RW and rollator for functional mobility and independent in ADLs. 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 HOME SUPPORT: The patient lived with wife but did not require assist. Children live near by and are able to provide some support. Occupational Therapy Goals  Initiated 3/1/2022  1. Patient will perform grooming standing at sink with minimal assistance/contact guard assist within 7 day(s). 2.  Patient will perform UB and LB bathing with minimal assistance/contact guard assist within 7 day(s). 3.  Patient will perform lower body dressing with minimal assistance/contact guard assist within 7 day(s). 4.  Patient will perform toilet transfers with minimal assistance/contact guard assist within 7 day(s). 5.  Patient will participate in upper extremity therapeutic exercise/activities with modified independence for 10 minutes within 7 day(s). 6.  Patient will utilize energy conservation techniques during functional activities with verbal cues within 7 day(s). Outcome: Progressing Towards Goal     OCCUPATIONAL THERAPY TREATMENT  Patient: Selvin Mishra (14 y.o. male)  Date: 3/4/2022  Diagnosis: [facility]inal [facility] ([facility] [U01.478] <principal problem not specified>  Procedure(s) (LRB):  RIGHT LEG ARTERIOGRAM.REQUEST TO FOLLOW (Right) 4 Days Post-Op  Precautions: Fall,DNR  Chart, occupational therapy assessment, plan of care, and goals were reviewed. ASSESSMENT  Patient continues with skilled OT services and is progressing towards goals, however, remains limited by generalized weakness, BLE edema, incontinence, and impaired functional mobility. Pt cleared for therapy by nursing and received supine in bed willing to work with therapy. Pt completed bed mobility to sit EOB (Min A) and don boot (RLE) and sock (LLE) with Max A. Pt participated in UE AROM exercises at EOB and willing to stand. Completed sit>stand with RW and Min-Mod Ax1 with pt unsteady and needing to sit due to incontinence. Attempted second sit>stand with improvement and pt took 2 sidesteps towards head of bed. Functional mobility currently limited by anxiety about incontinence (pt uninterested in condom catheter and prefers briefs). Pt returned to supine and rolled for brief change. At end of session pt supine in bed with all needs met. Pending medical progression, currently recommend SNF at discharge vs HHOT. Current Level of Function Impacting Discharge (ADLs): Max A for LB ADLs, Min-Mod A for sit<>stand, Set up for UB ADLs     Other factors to consider for discharge: Mod I at baseline          PLAN :  Patient continues to benefit from skilled intervention to address the above impairments. Continue treatment per established plan of care to address goals. Recommend with staff: bed in chair position for meals, roll for brief changes    Recommend next OT session: OOB to recliner     Recommendation for discharge: (in order for the patient to meet his/her long term goals)  Therapy up to 5 days/week in SNF setting vs HHOT     This discharge recommendation:  Has been made in collaboration with the attending provider and/or case management    IF patient discharges home will need the following DME: TBD at discharge       SUBJECTIVE:   Patient stated it was a pleasure.     OBJECTIVE DATA SUMMARY:   Cognitive/Behavioral Status:  Neurologic State: Alert  Orientation Level: Oriented X4  Cognition: Follows commands  Perception: Appears intact  Perseveration: No perseveration noted  Safety/Judgement: Awareness of environment    Functional Mobility and Transfers for ADLs:  Bed Mobility:  Rolling: Stand-by assistance  Supine to Sit: Minimum assistance  Sit to Supine: Minimum assistance; Moderate assistance (LE management)  Scooting: Stand-by assistance    Transfers:  Sit to Stand: Minimum assistance       Balance:  Sitting: Intact  Standing: Impaired; With support  Standing - Static: Fair  Standing - Dynamic : Fair    ADL Intervention:     Upper Body Dressing Assistance  Dressing Assistance: [facility]\"": Set-up    Lower Body Dressing Assistance  Dressing Assistance: Maximum assistance  Protective Undergarmet: Maximum assistance  Socks: Maximum assistance  Shoes with Velcro: Maximum assistance  Position Performed: Seated edge of bed         Cognitive Retraining  Safety/Judgement: Awareness of environment    Therapeutic Exercises:   UE AROM (shoulder abduction/adduction, shoulder flexion/extension, scapular retraction, bicep curls)     Pain:  None noted    Activity Tolerance:   Good    After treatment patient left in no apparent distress:   Supine in bed, Call bell within reach, Bed / chair alarm activated, Caregiver / family present, and Side rails x 3    COMMUNICATION/COLLABORATION:   The patients plan of care was discussed with: Registered nurse.      Queenie Carcamo OT  Time Calculation: 36 mins

## 2022-03-04 NOTE — PROGRESS NOTES
Hospitalist Progress Note             Daily Progress Note: 3/4/2022    Primary care provider:Luciana Edmonds NP    Date of admission: 2/21/2022  1:18 PM    Admission summery and hospital course:  HPI\"80year-old man with a past medical history significant for type 2 diabetes, multiple myeloma, hypertension, dyslipidemia, peripheral neuropathy, status post abdominal aortic aneurysm repair in 2012 and COVID-pneumonia.  Patient was recently admitted on 2/15 for management of cellulitis. Presented to the emergency room several days ago treated for diarrhea with Imodium, and now with progressive abdominal pain nausea vomiting. Patient seen in the ED today  for abdominal pain started 12 to 24 hours ago associated with vomiting and constipation.  The hospitalist team has been consulted to assist general surgery in medical management.   Patient examined at bedside.  Patient is ill-appearing, should not reports health has been declining since Covid diagnosis in December 2021.  Patient denies chest pain, dizziness, shortness of breath during examination.  Patient endorses abdominal pain nausea and vomiting.  Productive cough noted on evaluation, patient reports he has had a cough since Covid diagnosis and it is somewhat improved.  NG tube is in place draining dark brown bilious fluid.  Patient reports abdominal distention has improved some since placement of NG.  Patient's son-in-law is at bedside. \"  Subjective:   Having bowel movements and flatus, denies abdominal pain, n/v.     Assessment/Plan:   Right second toe dry gangrene  Right lower extremity arterial occlusion   -MRI with evidence of osteomyelitis. Discussed with podiatry. More consistent with dry gangrene. Antibiotics not required. D/c 3/2  -MARYURI with moderate to severe arterial occlusion  -Right arteriogram 2/28 with popliteal occlusion, unable to cross occlusion. Needs right popliteal bypass-possible next week.  Will get second great toe amputation at same time as popliteal bypass. Podiatry signed off    Small bowel obstruction due to invasive colon adenocarcinoma   -Underwent right hemicolectomy 2/24. Evidence of adenocarcinoma, +7/14 lymph nodes  -Continue GI lite diet    -Oncology consult 3/1. Known to I, Dr. Kate Sloan. Will follow up outpatient  -pain control     New fever, hypotension 2/27, leukocytosis: resolved  -cultures remain negative. Stopped IV antibiotics 3/2    Acute metabolic encephalopathy: resolved  -Multifactorial in setting of recent surgery, narcotics, hospitalization, infection  -monitor closely, supportive care  -limit narcotics as able    History of Type 2 diabetes mellitus, Ha1c 6.3 with hypoglycemia:   -continue accuchecks q6 hours  -stopped D5     History of Covid infection  Pneumonia  -CXR with scattered opacities, concerning for PNA. -s/p treatment     Anemia of iron deficiency and chronic disease  Monitor CBC, transfuse for Hgb less than 7. Peripheral neuropathy: can resume pregabalin.   Hypertension: Blood pressure is reasonable now. Continue to hold Lasix  Failure to thrive, Protein calorie malnutrition: Nutrition consult    Recurrent falls: PT/OT  Hypokalemia: Replace and monitor  Insomnia: Ambien reportedly worked but caused him some confusion. Noted he was started on diphenhydramine, generally like avoiding this in geriatric patients. Will try PRN low-dose trazodone instead. Palliative care consulted 3/2    See orders for other plans. VTE prophylaxis: heparin  Code status: DNR.   Discussed plan of care with Patient/Family and Nurse  Discharge planning: PT/OT       Review of Systems:     Review of Systems:  As per HPI                                                                                         Objective:   Physical Exam:     Visit Vitals  /61   Pulse 96   Temp 97.6 °F (36.4 °C)   Resp 16   Ht 6' 1\" (1.854 m)   Wt 76.6 kg (168 lb 14 oz)   SpO2 95%   BMI 22.28 kg/m²    O2 Flow Rate (L/min): 2 l/min O2 Device: None (Room air)    Temp (24hrs), Av.8 °F (36.6 °C), Min:97.4 °F (36.3 °C), Max:98.9 °F (37.2 °C)    No intake/output data recorded.  1901 -  0700  In: -   Out: 500 [Drains:500]      General:   NAD non-toxic  HEENT: anicteric sclerae   Lungs:    Clear to auscultation bilaterally, normal work of breathing. Chest wall:   No tenderness or deformity. Heart:  Regular rate and rhythm, S1, S2 normal, no murmur. No edema    Abdomen:   Soft, central bandage, +bowel sounds   Extremities:  Right second with dark discoloration. No discharge or no fluctuation. Some discoloration 5th toe. Neurologic:  grossly non-focal     Data Review:       Recent Days:  Recent Labs     226 22  0234 22  0422   WBC 12.4* 13.9* 14.1*   HGB 8.7* 9.4* 8.8*   HCT 28.7* 31.0* 28.9*    192 165     Recent Labs     22  0326 22  0234 22  0422   * 145 143   K 3.7 3.4* 3.2*   * 117* 116*   CO2 23 21 24   GLU 93 116* 113*   BUN 20 17 13   CREA 1.05 0.99 0.85   CA 7.4* 7.6* 7.4*   MG 2.0 1.9 2.0   PHOS  --  2.0* 1.4*     No results for input(s): PH, PCO2, PO2, HCO3, FIO2 in the last 72 hours.     24 Hour Results:  Recent Results (from the past 24 hour(s))   GLUCOSE, POC    Collection Time: 22  5:52 PM   Result Value Ref Range    Glucose (POC) 113 65 - 117 mg/dL    Performed by Winnie White (CON)    CBC W/O DIFF    Collection Time: 22  3:26 AM   Result Value Ref Range    WBC 12.4 (H) 4.1 - 11.1 K/uL    RBC 2.83 (L) 4.10 - 5.70 M/uL    HGB 8.7 (L) 12.1 - 17.0 g/dL    HCT 28.7 (L) 36.6 - 50.3 %    .4 (H) 80.0 - 99.0 FL    MCH 30.7 26.0 - 34.0 PG    MCHC 30.3 30.0 - 36.5 g/dL    RDW 16.8 (H) 11.5 - 14.5 %    PLATELET 970 298 - 316 K/uL    MPV 11.0 8.9 - 12.9 FL    NRBC 0.0 0  WBC    ABSOLUTE NRBC 0.00 0.00 - 0.07 K/uL   METABOLIC PANEL, BASIC    Collection Time: 22  3:26 AM   Result Value Ref Range    Sodium 146 (H) 136 - 145 mmol/L    Potassium 3.7 3.5 - 5.1 mmol/L    Chloride 119 (H) 97 - 108 mmol/L    CO2 23 21 - 32 mmol/L    Anion gap 4 (L) 5 - 15 mmol/L    Glucose 93 65 - 100 mg/dL    BUN 20 6 - 20 MG/DL    Creatinine 1.05 0.70 - 1.30 MG/DL    BUN/Creatinine ratio 19 12 - 20      GFR est AA >60 >60 ml/min/1.73m2    GFR est non-AA >60 >60 ml/min/1.73m2    Calcium 7.4 (L) 8.5 - 10.1 MG/DL   MAGNESIUM    Collection Time: 03/04/22  3:26 AM   Result Value Ref Range    Magnesium 2.0 1.6 - 2.4 mg/dL       Problem List:  Problem List as of 3/4/2022 Date Reviewed: 2/28/2022          Codes Class Noted - Resolved    Malignant neoplasm of ascending colon (HCC) ICD-10-CM: C18.2  ICD-9-CM: 153.6  2/28/2022 - Present        Failure to thrive (child) ICD-10-CM: R62.51  ICD-9-CM: 783.41  2/21/2022 - Present        Falls frequently ICD-10-CM: R29.6  ICD-9-CM: V15.88  2/21/2022 - Present        Moderate protein-calorie malnutrition (HCC) ICD-10-CM: E44.0  ICD-9-CM: 263.0  2/21/2022 - Present        Acute renal injury ([facility] ICD-10-CM: N17.9  ICD-9-CM: 584.9  2/21/2022 - Present        Cellulitis of right leg ICD-10-CM: L03.115  ICD-9-CM: 682.6  2/12/2022 - Present        Pneumonia due to COVID-19 virus ICD-10-CM: U07.1, J12.82  ICD-9-CM: 480.8, 079.89  12/29/2021 - Present        Personal history of atrial fibrillation ICD-10-CM: Z86.79  ICD-9-CM: V12.59  11/9/2020 - Present        Carotid stenosis, asymptomatic, right ICD-10-CM: I65.21  ICD-9-CM: 433.10  5/29/2020 - Present        Hyperlipemia, mixed ICD-10-CM: E78.2  ICD-9-CM: 272.2  9/27/2018 - Present        Aortic stenosis, moderate ICD-10-CM: I35.0  ICD-9-CM: 424.1  4/10/2018 - Present        Mild concentric left ventricular hypertrophy (LVH) ICD-10-CM: I51.7  ICD-9-CM: 429.3  4/10/2018 - Present        Chronic diarrhea ICD-10-CM: K52.9  ICD-9-CM: 787.91  9/29/2017 - Present        Microalbuminuria ICD-10-CM: R80.9  ICD-9-CM: 791.0  12/10/2015 - Present        Right inguinal hernia ICD-10-CM: K40.90  ICD-9-CM: 550.90  11/2/2015 - Present        Left inguinal hernia ICD-10-CM: K40.90  ICD-9-CM: 550.90  9/14/2015 - Present        Multiple myeloma ([facility] ICD-10-CM: C90.00  ICD-9-CM: 203.00  12/27/2012 - Present        Hypercalcemia ICD-10-CM: A07.46  ICD-9-CM: 275.42  4/25/2012 - Present    Overview Signed 4/25/2012  5:49 PM by Jack Baker MD     2012, PTH normal, abnormal protein electrophoresis, referred to Hem-Onc             S/P AAA repair 2/9/2012 ICD-10-CM: H31.239, Z86.79  ICD-9-CM: V45.89  2/22/2012 - Present    Overview Signed 2/22/2012  1:38 PM by MD Dr Opal Jarquin             Pre-diabetes ICD-10-CM: R73.03  ICD-9-CM: 790.29  9/7/2010 - Present    Overview Addendum 11/1/2011  9:29 AM by Jack Baker MD     2005;  Optho Dr Egan Ship             Sinus tachycardia ICD-10-CM: R00.0  ICD-9-CM: 427.89  9/7/2010 - Present        H/O Heavy Alcohol Use ICD-9-CM: Freddy Lute  9/7/2010 - Present        Vitamin B12 deficiency ICD-10-CM: E53.8  ICD-9-CM: 266.2  9/7/2010 - Present        Hyperhomocysteinemia ([facility] ICD-10-CM: E72.11  ICD-9-CM: 270.4  9/7/2010 - Present        Mild Allergic Rhinitis ICD-10-CM: J30.9  ICD-9-CM: 477.9  9/7/2010 - Present        History of skin cancer ICD-10-CM: Z85.828  ICD-9-CM: V10.83  9/7/2010 - Present    Overview Signed 9/7/2010  9:03 AM by Jack Baker MD     Followed by Valerio Krishnan             HTN (hypertension) ICD-10-CM: I10  ICD-9-CM: 401.9  4/6/2010 - Present    Overview Signed 9/7/2010  8:55 AM by Jack Baker MD     1990             H/O Carotid Stenosis ICD-10-CM: I65.29  ICD-9-CM: 433.10  4/6/2010 - Present    Overview Addendum 9/7/2010  8:58 AM by Jack Baker MD     Oct 2003; 50%left subclavian and 50-70% SHEILA              GERD (gastroesophageal reflux disease) ICD-10-CM: K21.9  ICD-9-CM: 530.81  4/6/2010 - Present        RESOLVED: Intestinal adhesions with complete obstruction [facility]) ICD-10-CM: K56.52  ICD-9-CM: 560.81  2/21/2022 - 2/28/2022        RESOLVED: Intestinal obstruction ([facility] ICD-10-CM: F74.614  ICD-9-CM: 560.9  2/21/2022 - 2/24/2022        RESOLVED: Myeloma ([facility] ICD-10-CM: C90.00  ICD-9-CM: 203.00  1/6/2015 - 6/10/2016        RESOLVED: Gastrointestinal bleeding ICD-10-CM: K92.2  ICD-9-CM: 578.9  12/26/2014 - 2/28/2018        RESOLVED: Gall stone pancreatitis ICD-10-CM: K85.10  ICD-9-CM: 577.0, 574.20  1/23/2014 - 2/28/2018        RESOLVED: Acute cholecystitis ICD-10-CM: K81.0  ICD-9-CM: 575.0  1/23/2014 - 2/28/2018        RESOLVED: Abnormal serum protein electrophoresis ICD-10-CM: R77.8  ICD-9-CM: 790.99  4/25/2012 - 2/28/2018    Overview Signed 4/25/2012  5:48 PM by Michell Yoo MD     4/2012, referred to Hem-Onc             RESOLVED: Atrial fibrillation 1/2012 ICD-10-CM: I48.91  ICD-9-CM: 427.31  2/22/2012 - 11/9/2020    Overview Signed 2/22/2012  1:33 PM by Michell Yoo MD     Cardio Dr Yesy Caicedo: AAA (abdominal aortic aneurysm) ([facility] ICD-10-CM: I71.4  ICD-9-CM: 441.4  1/6/2012 - 9/26/2018    Overview Signed 1/6/2012  2:07 PM by Michell Yoo MD     Detected on plain films back xray 1/2012; Ct scheduled             RESOLVED: Anemia ICD-10-CM: D64.9  ICD-9-CM: 285.9  1/5/2012 - 2/28/2018        RESOLVED: Hypertriglyceridemia ICD-10-CM: E78.1  ICD-9-CM: 272.1  9/7/2010 - 9/27/2018    Overview Signed 9/7/2010  8:55 AM by Michell Yoo MD     2003             RESOLVED: Bilateral Carotid Bruits, L>R ICD-10-CM: R09.89  ICD-9-CM: 785.9  9/7/2010 - 2/28/2018    Overview Signed 9/7/2010  8:57 AM by Michell Yoo MD     10/03             RESOLVED: Anemia due to GI bleed 2007 ICD-10-CM: D64.9  ICD-9-CM: 285.9  4/6/2010 - 2/28/2018        RESOLVED: GI bleed, duodenitis 2007 ICD-10-CM: K92.2  ICD-9-CM: 578.9  4/6/2010 - 2/28/2018    Overview Addendum 9/7/2010  9:01 AM by Michell Yoo MD 8/07 Duodenitis                       Medications reviewed  Current Facility-Administered Medications   Medication Dose Route Frequency    traZODone (DESYREL) tablet 50 mg  50 mg Oral QHS PRN    pantoprazole (PROTONIX) tablet 40 mg  40 mg Oral ACB    acetaminophen (TYLENOL) tablet 650 mg  650 mg Oral TID    oxyCODONE IR (ROXICODONE) tablet 2.5-5 mg  2.5-5 mg Oral Q4H PRN    traMADoL (ULTRAM) tablet 50 mg  50 mg Oral Q6H PRN    zinc oxide-cod liver oil (DESITIN) 40 % paste   Topical PRN    heparin (porcine) injection 5,000 Units  5,000 Units SubCUTAneous Q8H    mupirocin (BACTROBAN) 2 % ointment   Topical DAILY    phenol throat spray (CHLORASEPTIC) 1 Spray  1 Spray Oral PRN    HYDROmorphone (DILAUDID) injection 0.5-1 mg  0.5-1 mg IntraVENous Q2H PRN    ondansetron (ZOFRAN) injection 4 mg  4 mg IntraVENous Q4H PRN    acetaminophen (TYLENOL) tablet 650 mg  650 mg Oral Q6H PRN    hydrALAZINE (APRESOLINE) 20 mg/mL injection 20 mg  20 mg IntraVENous Q6H PRN    glucose chewable tablet 16 g  4 Tablet Oral PRN    dextrose 10 % infusion 0-250 mL  0-250 mL IntraVENous PRN    glucagon (GLUCAGEN) injection 1 mg  1 mg IntraMUSCular PRN    aspirin delayed-release tablet 81 mg  81 mg Oral QHS    [Held by provider] pregabalin (LYRICA) capsule 50 mg  50 mg Oral BID    albuterol (PROVENTIL HFA, VENTOLIN HFA, PROAIR HFA) inhaler 1 Puff  1 Puff Inhalation Q6H PRN       Care Plan discussed with: Patient/Family    Sheila Armenta MD

## 2022-03-04 NOTE — PROGRESS NOTES
Palliative Medicine  [facility]: [phone] ([phone])  [facility]: [phone] ([phone])    The Palliative Medicine SW met with patient at bedside for psychosocial support- he shares that overnight he did not get a lot of sleeping, drowsy today but overall shares that he is feeling okay. SW inquired about the patient's spirits- he shares that he is in pretty good spirits today, he enjoyed visiting with his wife yesterday. He shares that it does bring him peace knowing that his daughter and son-in-law are taking good care of her while he is in the hospital, providing support to his wife. The patient denied any anxieties- he shares that he is feeling okay about surgery on Monday, he is eager to work with PT/OT, he shares that it is his goal to build strength and open to going to rehabilitation facility, his ultimate goal to return home. SW provided emotional support, reflective listening- will continue to provide support to patient while IP. Palliative Medicine will continue to follow along with you in the patient's care. Please call our team directly for any specific concerns.        Thank you for including Palliative Medicine in the care of Hitesh Campos, [place]  [phone]

## 2022-03-04 NOTE — PROGRESS NOTES
Bedside shift change report given to Yaritza Sue RN and TONY Miranda RN (oncoming nurse) by Ja Car RN (offgoing nurse). Report included the following information SBAR, Kardex, Intake/Output, MAR, Recent Results and Med Rec Status.

## 2022-03-04 NOTE — PROGRESS NOTES
Problem: Mobility Impaired (Adult and Pediatric)  Goal: *Acute Goals and Plan of Care (Insert Text)  Description: FUNCTIONAL STATUS PRIOR TO ADMISSION: Patient was modified independent using a rolling walker for functional mobility. HOME SUPPORT PRIOR TO ADMISSION: The patient lived with spouse but did not require assist. Pt is spouse's primary caregiver (does not have to provide physical assistance). Pt's daughter and son in law are involved in family's care and checks on them daily and assists as needed    Physical Therapy Goals  Initiated 2/22/2022 reviewed 3/1/2022 and still appropriate  1. Patient will move from supine to sit and sit to supine  in bed with modified independence within 7 day(s). 2.  Patient will transfer from bed to chair and chair to bed with modified independence using the least restrictive device within 7 day(s). 3.  Patient will perform sit to stand with modified independence within 7 day(s). 4.  Patient will ambulate with modified independence for 200 feet with the least restrictive device within 7 day(s). 5.  Patient will ascend/descend 12 stairs with single handrail(s) with supervision/set-up within 7 day(s).'  Outcome: Progressing Towards Goal   PHYSICAL THERAPY TREATMENT  Patient: Laly Christian (87 y.o. male)  Date: 3/4/2022  Diagnosis: Intestinal obstruction ([facility] [Z46.719] <principal problem not specified>  Procedure(s) (LRB):  RIGHT LEG ARTERIOGRAM.REQUEST TO FOLLOW (Right) 4 Days Post-Op  Precautions: Fall,DNR  Chart, physical therapy assessment, plan of care and goals were reviewed. ASSESSMENT  Patient continues with skilled PT services and is progressing towards goals. He was agreeable to therapy this afternoon. He attempted pre-gait activity, completing standing marches and steps fwd/back w/ RW and CGA.  He did require bed to be elevated to his comfort to help w/ sit>stand (Min A) and required Mod A x1 to scoot hips to EOB as pt's hips seemed to be stuck in mattress \"hole\". Pt returned to bed w/all items in reach. TO have procedure completed Monday. Continue to recommend SNF (rehab) vs HHPT pending progress w/ mobility. Current Level of Function Impacting Discharge (mobility/balance):CGA-Mod Ax1 for functional mobility    Other factors to consider for discharge: OR Monday (RLE bypass and toe amputation)         PLAN :  Patient continues to benefit from skilled intervention to address the above impairments. Continue treatment per established plan of care. to address goals. Recommendation for discharge: (in order for the patient to meet his/her long term goals)  To be determined: SNF rehab vs HHPT pending pt's progress and mobility. Pt to have procedure on Monday 3/7/2022    This discharge recommendation:  Has been made in collaboration with the attending provider and/or case management    IF patient discharges home will need the following DME: Pt owns RW. SUBJECTIVE:   Patient stated \" It's so cold. \"  OBJECTIVE DATA SUMMARY:   Critical Behavior:  Neurologic State: Alert  Orientation Level: Oriented X4  Cognition: Follows commands  Safety/Judgement: Awareness of environment  Functional Mobility Training:  Bed Mobility:  Rolling: Stand-by assistance  Supine to Sit: Stand-by assistance;Assist x1;Bed Modified  Sit to Supine: Minimum assistance; Moderate assistance (LE management)  Scooting: Moderate assistance;Assist x1 (scoot to EOB; \"hole\" in mattress)        Transfers:  Sit to Stand: Minimum assistance;Assist x1;Other (comment) (bed height elevated to pt comfort)  Stand to Sit: Contact guard assistance;Assist x1                             Balance:  Sitting: Intact  Standing: Impaired; With support  Standing - Static: Constant support; Fair (RW)  Standing - Dynamic : Constant support; Fair  Ambulation/Gait Training:  Distance (ft): 2 Feet (ft)  Assistive Device: Gait belt;Walker, rolling  Ambulation - Level of Assistance: Contact guard assistance;Assist x1 Gait Abnormalities: Antalgic;Decreased step clearance; Step to gait        Base of Support: Narrowed     Speed/Monica: Shuffled; Slow  Step Length: Left shortened;Right shortened    Stairs: Therapeutic Exercises:   Standing marches w/RW support  Pain Rating:  No verbal c/o pain reported by pt. Activity Tolerance:   Fair    After treatment patient left in no apparent distress:   Supine in bed, Heels elevated for pressure relief, Call bell within reach, Bed / chair alarm activated, and Side rails x 3    COMMUNICATION/COLLABORATION:   The patients plan of care was discussed with: Registered nurse.      Torie Feng PTA   Time Calculation: 17 mins

## 2022-03-04 NOTE — PROGRESS NOTES
General Surgery Daily Progress Note    Admit Date: 2022  Post-Operative Day: 4 Days Post-Op from Procedure(s):  RIGHT LEG ARTERIOGRAM.REQUEST TO FOLLOW     Subjective:     Last 24 hrs:  Patient feeling much better, no c/o pain at this time. He reports +BMs, +Flatus, and denies N/V. WBCs continue to trend down. Objective:     Blood pressure 128/69, pulse 74, temperature 97.4 °F (36.3 °C), resp. rate 18, height 6' 1\" (1.854 m), weight 168 lb 14 oz (76.6 kg), SpO2 99 %. Temp (24hrs), Av °F (36.7 °C), Min:97.4 °F (36.3 °C), Max:98.9 °F (37.2 °C)      _____________________  Physical Exam:       Alert, in no acute distress. Cardiovascular: RRR                       RT foot swollen-dry eschar to right second toe~3 x 3 x 0.1 cm,  No exudate,Toenail is black, DYLAN  Lungs:CTAB   Abdomen: slightly distended, +BS, no TTP, mid abd aquacel dsg intact with small shadow drainage     Assessment:   Active Problems:    HTN (hypertension) (2010)      Overview:       H/O Carotid Stenosis (2010)      Overview: Oct 2003; 50%left subclavian and 50-70% SHEILA       Pre-diabetes (2010)      Overview: ; Optho Dr Silvia Ware      Multiple myeloma [facility]) (2012)      Aortic stenosis, moderate (4/10/2018)      Pneumonia due to COVID-19 virus (2021)      Cellulitis of right leg (2022)      Failure to thrive (child) (2022)      Falls frequently (2022)      Moderate protein-calorie malnutrition ([address] (2022)      Acute renal injury ([address] (2022)      Malignant neoplasm of ascending colon ([address] (2022)            Plan:     Daily labs   Continue with diet as tolerated- patient stated that he is doing better with his intake.   PPI  R leg bypass and toe amputation Monday  Palliative care following - multiple medical issues  Heme onc following      Data Review:    Recent Labs     22  0326 22  0234 22  0422   WBC 12.4* 13.9* 14.1*   HGB 8.7* 9.4* 8.8*   HCT 28.7* 31.0* 28.9*    192 165     Recent Labs     03/04/22  0326 03/03/22  0234 03/02/22  0422   * 145 143   K 3.7 3.4* 3.2*   * 117* 116*   CO2 23 21 24   GLU 93 116* 113*   BUN 20 17 13   CREA 1.05 0.99 0.85   CA 7.4* 7.6* 7.4*   MG 2.0 1.9 2.0   PHOS  --  2.0* 1.4*     No results for input(s): AML, LPSE in the last 72 hours. ______________________  Medications:    Current Facility-Administered Medications   Medication Dose Route Frequency    traZODone (DESYREL) tablet 50 mg  50 mg Oral QHS PRN    pantoprazole (PROTONIX) tablet 40 mg  40 mg Oral ACB    acetaminophen (TYLENOL) tablet 650 mg  650 mg Oral TID    oxyCODONE IR (ROXICODONE) tablet 2.5-5 mg  2.5-5 mg Oral Q4H PRN    traMADoL (ULTRAM) tablet 50 mg  50 mg Oral Q6H PRN    zinc oxide-cod liver oil (DESITIN) 40 % paste   Topical PRN    heparin (porcine) injection 5,000 Units  5,000 Units SubCUTAneous Q8H    mupirocin (BACTROBAN) 2 % ointment   Topical DAILY    phenol throat spray (CHLORASEPTIC) 1 Spray  1 Spray Oral PRN    HYDROmorphone (DILAUDID) injection 0.5-1 mg  0.5-1 mg IntraVENous Q2H PRN    ondansetron (ZOFRAN) injection 4 mg  4 mg IntraVENous Q4H PRN    acetaminophen (TYLENOL) tablet 650 mg  650 mg Oral Q6H PRN    hydrALAZINE (APRESOLINE) 20 mg/mL injection 20 mg  20 mg IntraVENous Q6H PRN    glucose chewable tablet 16 g  4 Tablet Oral PRN    dextrose 10 % infusion 0-250 mL  0-250 mL IntraVENous PRN    glucagon (GLUCAGEN) injection 1 mg  1 mg IntraMUSCular PRN    aspirin delayed-release tablet 81 mg  81 mg Oral QHS    [Held by provider] pregabalin (LYRICA) capsule 50 mg  50 mg Oral BID    albuterol (PROVENTIL HFA, VENTOLIN HFA, PROAIR HFA) inhaler 1 Puff  1 Puff Inhalation Q6H PRN       Madison Salvage, NP  3/4/2022      Attending addendum:  Patient seen and examined independently, agree with above note. Pt doing well from surgical standpoint.   Will sign off for now, please re-consult if any general surgical issues arise.  Thank you.

## 2022-03-05 NOTE — PROGRESS NOTES
Hospitalist Progress Note             Daily Progress Note: 3/5/2022    Primary care provider:Fiona Edmonds NP    Date of admission: 2/21/2022  1:18 PM    Admission summery and hospital course:  HPI\"80year-old man with a past medical history significant for type 2 diabetes, multiple myeloma, hypertension, dyslipidemia, peripheral neuropathy, status post abdominal aortic aneurysm repair in 2012 and COVID-pneumonia.  Patient was recently admitted on 2/15 for management of cellulitis. Presented to the emergency room several days ago treated for diarrhea with Imodium, and now with progressive abdominal pain nausea vomiting. Patient seen in the ED today  for abdominal pain started 12 to 24 hours ago associated with vomiting and constipation.  The hospitalist team has been consulted to assist general surgery in medical management.   Patient examined at bedside.  Patient is ill-appearing, should not reports health has been declining since Covid diagnosis in December 2021.  Patient denies chest pain, dizziness, shortness of breath during examination.  Patient endorses abdominal pain nausea and vomiting.  Productive cough noted on evaluation, patient reports he has had a cough since Covid diagnosis and it is somewhat improved.  NG tube is in place draining dark brown bilious fluid.  Patient reports abdominal distention has improved some since placement of NG.  Patient's son-in-law is at bedside. \"  Subjective:   Having bowel movements and flatus, denies abdominal pain, n/v.     Assessment/Plan:   Right second toe dry gangrene  Right lower extremity arterial occlusion   -MRI with evidence of osteomyelitis. Discussed with podiatry. More consistent with dry gangrene. Antibiotics not required. D/c 3/2  -MARYURI with moderate to severe arterial occlusion  -Right arteriogram 2/28 with popliteal occlusion, unable to cross occlusion. Needs right popliteal bypass-possible next week.  Will get second great toe amputation at same time as popliteal bypass. Podiatry signed off    Small bowel obstruction due to invasive colon adenocarcinoma   -Underwent right hemicolectomy 2/24. Evidence of adenocarcinoma, +7/14 lymph nodes  -Continue GI lite diet    -Oncology consult 3/1. Known to I, Dr. Jordon De. Will follow up outpatient  -pain control     New fever, hypotension 2/27, leukocytosis: resolved  -cultures remain negative. Stopped IV antibiotics 3/2    Acute metabolic encephalopathy: resolved  -Multifactorial in setting of recent surgery, narcotics, hospitalization, infection  -monitor closely, supportive care  -limit narcotics as able    History of Type 2 diabetes mellitus, Ha1c 6.3 with hypoglycemia:   -continue accuchecks q6 hours  -stopped D5     History of Covid infection  Pneumonia  -CXR with scattered opacities, concerning for PNA. -s/p treatment     Anemia of iron deficiency and chronic disease  Monitor CBC, transfuse for Hgb less than 7. Peripheral neuropathy: can resume pregabalin.   Hypertension: Blood pressure is reasonable now. Continue to hold Lasix  Failure to thrive, Protein calorie malnutrition: Nutrition consult    Recurrent falls: PT/OT  Hypokalemia: Replace and monitor  Insomnia: Ambien reportedly worked but caused him some confusion. Noted he was started on diphenhydramine, generally like avoiding this in geriatric patients. Will try PRN low-dose trazodone instead. Palliative care consulted 3/2    See orders for other plans. VTE prophylaxis: heparin  Code status: DNR.   Discussed plan of care with Patient/Family  Discharge planning: PT/OT       Review of Systems:     Review of Systems:  As per HPI                                                                                         Objective:   Physical Exam:     Visit Vitals  BP (!) 122/55   Pulse 78   Temp 97.8 °F (36.6 °C)   Resp 12   Ht 6' 1\" (1.854 m)   Wt 76.6 kg (168 lb 14 oz)   SpO2 95%   BMI 22.28 kg/m²    O2 Flow Rate (L/min): 2 l/min O2 Device: None (Room air)    Temp (24hrs), Av.7 °F (36.5 °C), Min:97.4 °F (36.3 °C), Max:97.9 °F (36.6 °C)    No intake/output data recorded.  1901 -  0700  In: -   Out: 200 [Drains:200]      General:   NAD non-toxic  HEENT: anicteric sclerae   Lungs:    Clear to auscultation bilaterally, normal work of breathing. Chest wall:   No tenderness or deformity. Heart:  Regular rate and rhythm, S1, S2 normal, no murmur. No edema    Abdomen:   Soft, central bandage, +bowel sounds   Extremities:  R second toe w/ dry gangrene. Neurologic:  grossly non-focal     Data Review:       Recent Days:  Recent Labs     22  0326 22  0234   WBC 12.4* 13.9*   HGB 8.7* 9.4*   HCT 28.7* 31.0*    192     Recent Labs     22  0326 22  0234   * 145   K 3.7 3.4*   * 117*   CO2 23 21   GLU 93 116*   BUN 20 17   CREA 1.05 0.99   CA 7.4* 7.6*   MG 2.0 1.9   PHOS  --  2.0*     No results for input(s): PH, PCO2, PO2, HCO3, FIO2 in the last 72 hours. 24 Hour Results:  No results found for this or any previous visit (from the past 24 hour(s)).     Problem List:  Problem List as of 3/5/2022 Date Reviewed: 2022          Codes Class Noted - Resolved    Malignant neoplasm of ascending colon ([facility] ICD-10-CM: C18.2  ICD-9-CM: 153.6  2022 - Present        Failure to thrive (child) ICD-10-CM: R62.51  ICD-9-CM: 783.41  2022 - Present        Falls frequently ICD-10-CM: R29.6  ICD-9-CM: V15.88  2022 - Present        Moderate protein-calorie malnutrition ([facility] ICD-10-CM: E44.0  ICD-9-CM: 263.0  2022 - Present        Acute renal injury ([facility] ICD-10-CM: N17.9  ICD-9-CM: 584.9  2022 - Present        Cellulitis of right leg ICD-10-CM: L03.115  ICD-9-CM: 682.6  2022 - Present        Pneumonia due to COVID-19 virus ICD-10-CM: U07.1, J12.82  ICD-9-CM: 480.8, 079.89  2021 - Present        Personal history of atrial fibrillation ICD-10-CM: Z86.79  ICD-9-CM: V12.59  11/9/2020 - Present        Carotid stenosis, asymptomatic, right ICD-10-CM: I65.21  ICD-9-CM: 433.10  5/29/2020 - Present        Hyperlipemia, mixed ICD-10-CM: E78.2  ICD-9-CM: 272.2  9/27/2018 - Present        Aortic stenosis, moderate ICD-10-CM: I35.0  ICD-9-CM: 424.1  4/10/2018 - Present        Mild concentric left ventricular hypertrophy (LVH) ICD-10-CM: I51.7  ICD-9-CM: 429.3  4/10/2018 - Present        Chronic diarrhea ICD-10-CM: K52.9  ICD-9-CM: 787.91  9/29/2017 - Present        Microalbuminuria ICD-10-CM: R80.9  ICD-9-CM: 791.0  12/10/2015 - Present        Right inguinal hernia ICD-10-CM: K40.90  ICD-9-CM: 550.90  11/2/2015 - Present        Left inguinal hernia ICD-10-CM: K40.90  ICD-9-CM: 550.90  9/14/2015 - Present        Multiple myeloma ([facility] ICD-10-CM: C90.00  ICD-9-CM: 203.00  12/27/2012 - Present        Hypercalcemia ICD-10-CM: F81.37  ICD-9-CM: 275.42  4/25/2012 - Present    Overview Signed 4/25/2012  5:49 PM by Rubén Farah MD     2012, PTH normal, abnormal protein electrophoresis, referred to Hem-Onc             S/P AAA repair 2/9/2012 ICD-10-CM: C62.474, Z86.79  ICD-9-CM: V45.89  2/22/2012 - Present    Overview Signed 2/22/2012  1:38 PM by MD Dr Ina Banda             Pre-diabetes ICD-10-CM: R73.03  ICD-9-CM: 790.29  9/7/2010 - Present    Overview Addendum 11/1/2011  9:29 AM by Rubén Farah MD     2005;  Optho Dr Barb Raines             Sinus tachycardia ICD-10-CM: R00.0  ICD-9-CM: 427.89  9/7/2010 - Present        H/O Heavy Alcohol Use ICD-9-CM: Brendia Flurry  9/7/2010 - Present        Vitamin B12 deficiency ICD-10-CM: E53.8  ICD-9-CM: 266.2  9/7/2010 - Present        Hyperhomocysteinemia ([facility] ICD-10-CM: E72.11  ICD-9-CM: 270.4  9/7/2010 - Present        Mild Allergic Rhinitis ICD-10-CM: J30.9  ICD-9-CM: 477.9  9/7/2010 - Present        History of skin cancer ICD-10-CM: Z85.828  ICD-9-CM: V10.83  9/7/2010 - Present    Overview Signed 9/7/2010  9:03 AM by Sylvia White MD     Followed by Drm Dr Darryle Stade             HTN (hypertension) ICD-10-CM: I10  ICD-9-CM: 401.9  4/6/2010 - Present    Overview Signed 9/7/2010  8:55 AM by Sylvia White MD     1990             H/O Carotid Stenosis ICD-10-CM: I65.29  ICD-9-CM: 433.10  4/6/2010 - Present    Overview Addendum 9/7/2010  8:58 AM by Sylvia White MD     Oct 2003; 50%left subclavian and 50-70% SHEILA              GERD (gastroesophageal reflux disease) ICD-10-CM: K21.9  ICD-9-CM: 530.81  4/6/2010 - Present        RESOLVED: Intestinal adhesions with complete obstruction ([facility] ICD-10-CM: K56.52  ICD-9-CM: 560.81  2/21/2022 - 2/28/2022        RESOLVED: Intestinal obstruction ([address] ICD-10-CM: V88.098  ICD-9-CM: 560.9  2/21/2022 - 2/24/2022        RESOLVED: Myeloma ([address] ICD-10-CM: C90.00  ICD-9-CM: 203.00  1/6/2015 - 6/10/2016        RESOLVED: Gastrointestinal bleeding ICD-10-CM: K92.2  ICD-9-CM: 578.9  12/26/2014 - 2/28/2018        RESOLVED: Gall stone pancreatitis ICD-10-CM: K85.10  ICD-9-CM: 577.0, 574.20  1/23/2014 - 2/28/2018        RESOLVED: Acute cholecystitis ICD-10-CM: K81.0  ICD-9-CM: 575.0  1/23/2014 - 2/28/2018        RESOLVED: Abnormal serum protein electrophoresis ICD-10-CM: R77.8  ICD-9-CM: 790.99  4/25/2012 - 2/28/2018    Overview Signed 4/25/2012  5:48 PM by Sylvia White MD     4/2012, referred to Hem-Onc             RESOLVED: Atrial fibrillation 1/2012 ICD-10-CM: I48.91  ICD-9-CM: 427.31  2/22/2012 - 11/9/2020    Overview Signed 2/22/2012  1:33 PM by Sylvia White MD     Cardio Dr Rojas Ochoa: AAA (abdominal aortic aneurysm) ([facility] ICD-10-CM: I71.4  ICD-9-CM: 441.4  1/6/2012 - 9/26/2018    Overview Signed 1/6/2012  2:07 PM by Sylvia White MD     Detected on plain films back xray 1/2012; Ct scheduled             RESOLVED: Anemia ICD-10-CM: D64.9  ICD-9-CM: 285.9  1/5/2012 - 2/28/2018        RESOLVED: Hypertriglyceridemia ICD-10-CM: E78.1  ICD-9-CM: 272.1  9/7/2010 - 9/27/2018    Overview Signed 9/7/2010  8:55 AM by Michell Yoo MD     2003             RESOLVED: Bilateral Carotid Bruits, L>R ICD-10-CM: R09.89  ICD-9-CM: 785.9  9/7/2010 - 2/28/2018    Overview Signed 9/7/2010  8:57 AM by Michell Yoo MD     10/03             RESOLVED: Anemia due to GI bleed 2007 ICD-10-CM: D64.9  ICD-9-CM: 285.9  4/6/2010 - 2/28/2018        RESOLVED: GI bleed, duodenitis 2007 ICD-10-CM: K92.2  ICD-9-CM: 578.9  4/6/2010 - 2/28/2018    Overview Addendum 9/7/2010  9:01 AM by Michell Yoo MD     8/07 Duodenitis                       Medications reviewed  Current Facility-Administered Medications   Medication Dose Route Frequency    traZODone (DESYREL) tablet 50 mg  50 mg Oral QHS PRN    pantoprazole (PROTONIX) tablet 40 mg  40 mg Oral ACB    acetaminophen (TYLENOL) tablet 650 mg  650 mg Oral TID    oxyCODONE IR (ROXICODONE) tablet 2.5-5 mg  2.5-5 mg Oral Q4H PRN    traMADoL (ULTRAM) tablet 50 mg  50 mg Oral Q6H PRN    zinc oxide-cod liver oil (DESITIN) 40 % paste   Topical PRN    heparin (porcine) injection 5,000 Units  5,000 Units SubCUTAneous Q8H    mupirocin (BACTROBAN) 2 % ointment   Topical DAILY    phenol throat spray (CHLORASEPTIC) 1 Spray  1 Spray Oral PRN    HYDROmorphone (DILAUDID) injection 0.5-1 mg  0.5-1 mg IntraVENous Q2H PRN    ondansetron (ZOFRAN) injection 4 mg  4 mg IntraVENous Q4H PRN    acetaminophen (TYLENOL) tablet 650 mg  650 mg Oral Q6H PRN    hydrALAZINE (APRESOLINE) 20 mg/mL injection 20 mg  20 mg IntraVENous Q6H PRN    glucose chewable tablet 16 g  4 Tablet Oral PRN    dextrose 10 % infusion 0-250 mL  0-250 mL IntraVENous PRN    glucagon (GLUCAGEN) injection 1 mg  1 mg IntraMUSCular PRN    aspirin delayed-release tablet 81 mg  81 mg Oral QHS    pregabalin (LYRICA) capsule 50 mg  50 mg Oral BID    albuterol (PROVENTIL HFA, VENTOLIN HFA, PROAIR HFA) inhaler 1 Puff  1 Puff Inhalation Q6H PRN       Care Plan discussed with: Patient/Family    Sheila Armenta MD

## 2022-03-05 NOTE — PROGRESS NOTES
Pt has BLE +4 edema, now no pulses by palpation or doppler in RLE. LLE has doppler pulses, but none palpable. RN paged on call vascular surgeon, Dr. Art Plunkett, to inform him of change. No new orders at this time.

## 2022-03-05 NOTE — PROGRESS NOTES
TRANSFER - OUT REPORT:    Verbal report given to Pamela Alanis and Rika Gill on Ascencion Fleischer  being transferred to [address] [place] [address](unit) for routine progression of care       Report consisted of patients Situation, Background, Assessment and   Recommendations(SBAR). Information from the following report(s) SBAR was reviewed with the receiving nurse. Lines:   PICC Double Lumen 03/02/22 Right;Brachial (Active)   Central Line Being Utilized Yes 03/04/22 2012   Criteria for Appropriate Use Limited/no vessel suitable for conventional peripheral access 03/04/22 2012   Site Assessment Clean, dry, & intact 03/05/22 1000   Phlebitis Assessment 0 03/05/22 1000   Infiltration Assessment 0 03/05/22 1000   Arm Circumference (cm) 23 cm 03/02/22 1117   Date of Last Dressing Change 03/02/22 03/04/22 2012   Dressing Status Clean, dry, & intact 03/05/22 1000   Action Taken Open ports on tubing capped 03/04/22 2012   External Catheter Length (cm) 0 centimeters 03/02/22 1117   Dressing Type Disk with Chlorhexadine gluconate (CHG); Transparent 03/04/22 2012   Hub Color/Line Status Red;Capped 03/04/22 2012   Positive Blood Return (Site #1) Yes 03/04/22 2012   Hub Color/Line Status Purple;Capped 03/04/22 2012   Positive Blood Return (Site #2) Yes 03/04/22 2012   Alcohol Cap Used Yes 03/04/22 2012        Opportunity for questions and clarification was provided.       Patient transported with:   Transportation

## 2022-03-05 NOTE — PROGRESS NOTES
Dr. Lindsey Roberson made aware of Positive Covid test and made aware patient transferred to room 540/5W. Family member daughter made aware patient moved to room 540.

## 2022-03-05 NOTE — PROGRESS NOTES
Bedside shift change report given to Brook Acosta (oncoming nurse) by Pamela Khan RN (offgoing nurse). Report included the following information SBAR, Kardex, Intake/Output, MAR and Recent Results.

## 2022-03-06 NOTE — PROGRESS NOTES
Vascular:    Patient with positive rapid covid test 3/5, PCR pending. Had covid 12/2021. He is asymptomatic. From my perspective he should be treated as a previous covid infection over 20 days ago with no symptoms - will proceed with planned and urgent surgery tomorrow.

## 2022-03-07 NOTE — PROGRESS NOTES
Pts pre op checklist completed,no am labs were ordered for this Pt,also. spoke with OR ,I let them know that I had marked consent was signed and when rechecking noticed it was not signed,I gave them his daughters number to get any consents,her name is Ale Aleman -[phone]. NPO status maintained throughout the night. Pt was given two CHG baths,one at 0100am ,and second CHG bath was 0500am,pt was medicated for pain last at 525 am Oxycodone 5 mg po ,,resting comfortably at this time,Pt states Im just relaxing before I go to surgery today.

## 2022-03-07 NOTE — OP NOTES
[address] [facility]  OPERATIVE REPORT    Name:  Nae Leroy  MR#:  [id number]  :  1934  ACCOUNT #:  [de-identified]  DATE OF SERVICE:  2022      PREOPERATIVE DIAGNOSIS:  Peripheral vascular disease with gangrene of the right second toe. POSTOPERATIVE DIAGNOSIS:  Peripheral vascular disease with gangrene of the right second toe. PROCEDURE PERFORMED:  1. Right above-knee popliteal to proximal peroneal artery bypass with nonreversed saphenous vein. 2.  Amputation, right second toe. SURGEON:  Minnie Reilly MD    ASSISTANT:  Marquise Villegas. ANESTHESIA:  General.    COMPLICATIONS:  None. SPECIMENS REMOVED:  Right second toe. IMPLANTS:  None. ESTIMATED BLOOD LOSS:  50 mL. INDICATIONS:  The patient is an 60-year-old male who was hospitalized 3 weeks ago with small bowel obstruction. He was found to have an obstructing colon cancer and underwent laparotomy and colon resection. He was also noted to have gangrenous changes in the right second toe and absent foot pulses. An arteriogram was done that showed occlusion of the popliteal artery at the level of the knee with reconstitution of single-vessel peroneal artery runoff filling the foot. He will undergo bypass for limb salvage and toe amputation. PROCEDURE:  The patient's right leg and foot were prepped and draped. A longitudinal incision was made in the proximal medial thigh. The saphenous vein was identified and dissected free up to the saphenofemoral junction where it was ligated and divided. The vein was then dissected distally using two additional interrupted longitudinal incisions, one in the medial thigh and one in the distal thigh. The vein was harvested through the distal thigh and side branches ligated with silk ties. Through the distal thigh incision, the popliteal space was entered and the popliteal artery was identified and dissected free. He had a good pulse at this level.   A longitudinal incision was then made in the proximal medial lower leg. The incision was deepened into the fascial level. The proximal peroneal artery was dissected free. The vein was then removed and the patient heparinized. The popliteal artery was clamped. The saphenous vein graft was sewn end-to-side to the popliteal artery above the knee using running 5-0 Prolene. The vein was oriented in a nonreversed fashion. Flow was restored. The valves in the vein graft were then cut using a valvulotome establishing good flow through the bypass. The vein graft was then tunneled through the popliteal space and brought to lie alongside the peroneal artery where it was sewn end-to-side using running 6-0 Prolene. At the completion, there was a good pulse in the bypass and good flow by Doppler. The incisions were closed with Vicryl subcutaneous and fascial sutures and skin staples. Attention was then turned to the right foot. The second toe had dry gangrenous changes down nearly to its base. A circumferential incision was made from anterior to posterior at the base of the toes to healthy skin. The toe was amputated through the proximal phalanx. The proximal phalanx was debrided proximally. The incision was then closed with interrupted nylon sutures. Dressings were applied and the patient was returned to the recovery room in stable condition.       MD CARLEY Braga/[email or web address]  D:  03/07/2022 10:05  T:  03/07/2022 12:08  JOB #:  [id number]

## 2022-03-07 NOTE — BRIEF OP NOTE
Brief Postoperative Note    Patient: Lam Paulino  YOB: 1934  MRN: [id number]    Date of Procedure: 3/7/2022     Pre-Op Diagnosis: PERIPHERAL VASCULAR DISEASE WITH GANGRENE    Post-Op Diagnosis: Same as preoperative diagnosis.       Procedure(s):  RIGHT POPLITEAL TO PERONEAL  BYPASS with vein AND RIGHT SECOND TOE AMPUTATION    Surgeon(s):  Neeraj Xavier MD    Surgical Assistant: Surg Asst-1: Aurelio Gonzalez    Anesthesia: General     Estimated Blood Loss (mL): less than 50     Complications: None    Specimens:   ID Type Source Tests Collected by Time Destination   1 : right foot second toe Fresh Foot, Right  Neeraj Xavier MD 3/7/2022 9336 Pathology        Implants: * No implants in log *    Drains:   Rectal Tube (Active)   Site Assessment Clean, dry, & intact 03/04/22 2012   Drainage Description [facility] 03/04/22 2012   Drainage Chamber Level (ml) 700 ml 03/04/22 1755   Output (ml) 200 ml 03/04/22 1755       [REMOVED] Nasogastric Tube 02/21/22 (Removed)   Securement Device Adhesive-based roblero 02/21/22 2148       [REMOVED] Nasogastric Tube 02/24/22 (Removed)   Site Assessment Clean, dry, & intact 02/25/22 0700   Securement Device Tape 02/25/22 0700   G Port Status Intermittent Suction 02/25/22 0700   External Insertion Jorge (cms) 59 cms 02/25/22 0700   Drainage Description Sanguinous 02/25/22 0700   Drainage Chamber Level (ml) 400 ml 02/25/22 0700   Output (ml) 200 ml 02/24/22 1900       [REMOVED] External Urinary Catheter 03/04/22 (Removed)       Findings: none    Electronically Signed by Surekha Sahni MD on 3/7/2022 at 9:58 AM

## 2022-03-07 NOTE — PROGRESS NOTES
Hospitalist Progress Note             Daily Progress Note: 3/7/2022    Primary care provider:Rasheed Edmonds NP    Date of admission: 2/21/2022  1:18 PM    Admission summery and hospital course:  HPI\"80year-old man with a past medical history significant for type 2 diabetes, multiple myeloma, hypertension, dyslipidemia, peripheral neuropathy, status post abdominal aortic aneurysm repair in 2012 and COVID-pneumonia.  Patient was recently admitted on 2/15 for management of cellulitis. Presented to the emergency room several days ago treated for diarrhea with Imodium, and now with progressive abdominal pain nausea vomiting. Patient seen in the ED today  for abdominal pain started 12 to 24 hours ago associated with vomiting and constipation.  The hospitalist team has been consulted to assist general surgery in medical management.   Patient examined at bedside.  Patient is ill-appearing, should not reports health has been declining since Covid diagnosis in December 2021.  Patient denies chest pain, dizziness, shortness of breath during examination.  Patient endorses abdominal pain nausea and vomiting.  Productive cough noted on evaluation, patient reports he has had a cough since Covid diagnosis and it is somewhat improved.  NG tube is in place draining dark brown bilious fluid.  Patient reports abdominal distention has improved some since placement of NG.  Patient's son-in-law is at bedside. \"  Subjective:   S/p surgery today     Assessment/Plan:   PVD  Right second toe dry gangrene  Right lower extremity arterial occlusion     -MRI with evidence of osteomyelitis. Discussed with podiatry. More consistent with dry gangrene. Antibiotics not required. D/c 3/2  -MARYURI with moderate to severe arterial occlusion  -Right arteriogram 2/28 with popliteal occlusion, unable to cross occlusion.    -podiatry signed off   -vascular consulted,OR today 3/7, had right popliteal bypass-possible second Right great toe amputation   -pain control    Small bowel obstruction due to invasive colon adenocarcinoma   -Underwent right hemicolectomy . Evidence of adenocarcinoma, + lymph nodes  -Continue GI lite diet    -Oncology consult 3/1. Known to [facility], Dr. Samir Eric. Will follow up outpatient  -pain control     New fever, hypotension , leukocytosis: resolved  -cultures remain negative. Stopped IV antibiotics 3/2    Acute metabolic encephalopathy: resolved  -Multifactorial in setting of recent surgery, narcotics, hospitalization, infection  -monitor closely, supportive care  -limit narcotics as able    History of Type 2 diabetes mellitus, Ha1c 6.3 with hypoglycemia:   -continue accuchecks q6 hours  -stopped D5     History of Covid infection  Pneumonia  -CXR with scattered opacities, concerning for PNA. -s/p treatment     Anemia of iron deficiency and chronic disease  Monitor CBC, transfuse for Hgb less than 7. Peripheral neuropathy: can resume pregabalin.   Hypertension: Blood pressure is reasonable now. Continue to hold Lasix  Failure to thrive, Protein calorie malnutrition: Nutrition consult    Recurrent falls: PT/OT  Hypokalemia: Replace and monitor  Insomnia: PRN low-dose trazodone instead. Palliative care consulted 3/2    See orders for other plans. VTE prophylaxis: heparin  Code status: DNR. Discussed plan of care with Patient/Family and Nurse  Discharge planning: PT/OT , SNF vs home w/HH       Review of Systems:     Review of Systems:  As per HPI             Objective:   Physical Exam:     Visit Vitals  /62   Pulse 74   Temp 98.2 °F (36.8 °C)   Resp 18   Ht 6' 1\" (1.854 m)   Wt 76.6 kg (168 lb 14 oz)   SpO2 97%   BMI 22.28 kg/m²    O2 Flow Rate (L/min): 2 l/min O2 Device: None (Room air)    Temp (24hrs), Av.9 °F (36.6 °C), Min:97.3 °F (36.3 °C), Max:98.2 °F (36.8 °C)    No intake/output data recorded. No intake/output data recorded.       General:   NAD non-toxic  HEENT: anicteric sclerae Lungs:    Clear to auscultation bilaterally, normal work of breathing. Chest wall:   No tenderness or deformity. Heart:  Regular rate and rhythm, S1, S2 normal, no murmur. No edema    Abdomen:   Soft, central bandage, +bowel sounds   Extremities:  Right foot with dressing in place   Neurologic:  grossly non-focal     Data Review:       Recent Days:  Recent Labs     03/06/22  0256   WBC 16.9*   HGB 9.8*   HCT 32.2*        Recent Labs     03/06/22  0256      K 3.4*   *   CO2 23   *   BUN 25*   CREA 1.35*   CA 8.2*     No results for input(s): PH, PCO2, PO2, HCO3, FIO2 in the last 72 hours.     24 Hour Results:  Recent Results (from the past 24 hour(s))   GLUCOSE, POC    Collection Time: 03/06/22 12:57 PM   Result Value Ref Range    Glucose (POC) 143 (H) 65 - 117 mg/dL    Performed by Nneka Hummel    GLUCOSE, POC    Collection Time: 03/06/22  6:02 PM   Result Value Ref Range    Glucose (POC) 164 (H) 65 - 117 mg/dL    Performed by Christen Rogers    GLUCOSE, POC    Collection Time: 03/06/22  9:01 PM   Result Value Ref Range    Glucose (POC) 125 (H) 65 - 117 mg/dL    Performed by Rosendo Gil (JAIME RN)        Problem List:  Problem List as of 3/7/2022 Date Reviewed: 2/28/2022          Codes Class Noted - Resolved    Malignant neoplasm of ascending colon ([facility] ICD-10-CM: C18.2  ICD-9-CM: 153.6  2/28/2022 - Present        Failure to thrive (child) ICD-10-CM: R62.51  ICD-9-CM: 783.41  2/21/2022 - Present        Falls frequently ICD-10-CM: R29.6  ICD-9-CM: V15.88  2/21/2022 - Present        Moderate protein-calorie malnutrition ([facility] ICD-10-CM: E44.0  ICD-9-CM: 263.0  2/21/2022 - Present        Acute renal injury ([facility] ICD-10-CM: N17.9  ICD-9-CM: 584.9  2/21/2022 - Present        Cellulitis of right leg ICD-10-CM: L03.115  ICD-9-CM: 682.6  2/12/2022 - Present        Pneumonia due to COVID-19 virus ICD-10-CM: U07.1, J12.82  ICD-9-CM: 480.8, 079.89  12/29/2021 - Present        Personal history of atrial fibrillation ICD-10-CM: Z86.79  ICD-9-CM: V12.59  11/9/2020 - Present        Carotid stenosis, asymptomatic, right ICD-10-CM: I65.21  ICD-9-CM: 433.10  5/29/2020 - Present        Hyperlipemia, mixed ICD-10-CM: E78.2  ICD-9-CM: 272.2  9/27/2018 - Present        Aortic stenosis, moderate ICD-10-CM: I35.0  ICD-9-CM: 424.1  4/10/2018 - Present        Mild concentric left ventricular hypertrophy (LVH) ICD-10-CM: I51.7  ICD-9-CM: 429.3  4/10/2018 - Present        Chronic diarrhea ICD-10-CM: K52.9  ICD-9-CM: 787.91  9/29/2017 - Present        Microalbuminuria ICD-10-CM: R80.9  ICD-9-CM: 791.0  12/10/2015 - Present        Right inguinal hernia ICD-10-CM: K40.90  ICD-9-CM: 550.90  11/2/2015 - Present        Left inguinal hernia ICD-10-CM: K40.90  ICD-9-CM: 550.90  9/14/2015 - Present        Multiple myeloma ([facility] ICD-10-CM: C90.00  ICD-9-CM: 203.00  12/27/2012 - Present        Hypercalcemia ICD-10-CM: Y06.91  ICD-9-CM: 275.42  4/25/2012 - Present    Overview Signed 4/25/2012  5:49 PM by Rand Simons MD     2012, PTH normal, abnormal protein electrophoresis, referred to Hem-Onc             S/P AAA repair 2/9/2012 ICD-10-CM: Q33.803, Z86.79  ICD-9-CM: V45.89  2/22/2012 - Present    Overview Signed 2/22/2012  1:38 PM by MD Dr Bekah Hunt             Pre-diabetes ICD-10-CM: R73.03  ICD-9-CM: 790.29  9/7/2010 - Present    Overview Addendum 11/1/2011  9:29 AM by Rand Simons MD     2005;  Optho Dr Mckay Soriano             Sinus tachycardia ICD-10-CM: R00.0  ICD-9-CM: 427.89  9/7/2010 - Present        H/O Heavy Alcohol Use ICD-9-CM: Girma Choumilyter  9/7/2010 - Present        Vitamin B12 deficiency ICD-10-CM: E53.8  ICD-9-CM: 266.2  9/7/2010 - Present        Hyperhomocysteinemia ([facility] ICD-10-CM: E72.11  ICD-9-CM: 270.4  9/7/2010 - Present        Mild Allergic Rhinitis ICD-10-CM: J30.9  ICD-9-CM: 477.9  9/7/2010 - Present        History of skin cancer ICD-10-CM: Q68.870  ICD-9-CM: V10.83 9/7/2010 - Present    Overview Signed 9/7/2010  9:03 AM by Holli Su MD     Followed by Valerio Callaway             HTN (hypertension) ICD-10-CM: I10  ICD-9-CM: 401.9  4/6/2010 - Present    Overview Signed 9/7/2010  8:55 AM by Holli Su MD     1990             H/O Carotid Stenosis ICD-10-CM: I65.29  ICD-9-CM: 433.10  4/6/2010 - Present    Overview Addendum 9/7/2010  8:58 AM by Holli Su MD     Oct 2003; 50%left subclavian and 50-70% SHEILA              GERD (gastroesophageal reflux disease) ICD-10-CM: K21.9  ICD-9-CM: 530.81  4/6/2010 - Present        RESOLVED: Intestinal adhesions with complete obstruction ([facility] ICD-10-CM: K56.52  ICD-9-CM: 560.81  2/21/2022 - 2/28/2022        RESOLVED: Intestinal obstruction ([facility] ICD-10-CM: E13.510  ICD-9-CM: 560.9  2/21/2022 - 2/24/2022        RESOLVED: Myeloma ([facility] ICD-10-CM: C90.00  ICD-9-CM: 203.00  1/6/2015 - 6/10/2016        RESOLVED: Gastrointestinal bleeding ICD-10-CM: K92.2  ICD-9-CM: 578.9  12/26/2014 - 2/28/2018        RESOLVED: Gall stone pancreatitis ICD-10-CM: K85.10  ICD-9-CM: 577.0, 574.20  1/23/2014 - 2/28/2018        RESOLVED: Acute cholecystitis ICD-10-CM: K81.0  ICD-9-CM: 575.0  1/23/2014 - 2/28/2018        RESOLVED: Abnormal serum protein electrophoresis ICD-10-CM: R77.8  ICD-9-CM: 790.99  4/25/2012 - 2/28/2018    Overview Signed 4/25/2012  5:48 PM by Holli Su MD     4/2012, referred to Hem-Onc             RESOLVED: Atrial fibrillation 1/2012 ICD-10-CM: I48.91  ICD-9-CM: 427.31  2/22/2012 - 11/9/2020    Overview Signed 2/22/2012  1:33 PM by Holli Su MD     Cardio Dr Barbie Urias: AAA (abdominal aortic aneurysm) ([facility] ICD-10-CM: I71.4  ICD-9-CM: 441.4  1/6/2012 - 9/26/2018    Overview Signed 1/6/2012  2:07 PM by Holli Su MD     Detected on plain films back xray 1/2012; Ct scheduled             RESOLVED: Anemia ICD-10-CM: D64.9  ICD-9-CM: 285.9  1/5/2012 - 2/28/2018        RESOLVED: Hypertriglyceridemia ICD-10-CM: E78.1  ICD-9-CM: 272.1  9/7/2010 - 9/27/2018    Overview Signed 9/7/2010  8:55 AM by Dominique Bansal MD     2003             RESOLVED: Bilateral Carotid Bruits, L>R ICD-10-CM: R09.89  ICD-9-CM: 785.9  9/7/2010 - 2/28/2018    Overview Signed 9/7/2010  8:57 AM by Dominique Bansal MD     10/03             RESOLVED: Anemia due to GI bleed 2007 ICD-10-CM: D64.9  ICD-9-CM: 285.9  4/6/2010 - 2/28/2018        RESOLVED: GI bleed, duodenitis 2007 ICD-10-CM: K92.2  ICD-9-CM: 578.9  4/6/2010 - 2/28/2018    Overview Addendum 9/7/2010  9:01 AM by Dominique Bansal MD     8/07 Duodenitis                       Medications reviewed  Current Facility-Administered Medications   Medication Dose Route Frequency    traZODone (DESYREL) tablet 50 mg  50 mg Oral QHS PRN    pantoprazole (PROTONIX) tablet 40 mg  40 mg Oral ACB    acetaminophen (TYLENOL) tablet 650 mg  650 mg Oral TID    oxyCODONE IR (ROXICODONE) tablet 2.5-5 mg  2.5-5 mg Oral Q4H PRN    traMADoL (ULTRAM) tablet 50 mg  50 mg Oral Q6H PRN    zinc oxide-cod liver oil (DESITIN) 40 % paste   Topical PRN    heparin (porcine) injection 5,000 Units  5,000 Units SubCUTAneous Q8H    mupirocin (BACTROBAN) 2 % ointment   Topical DAILY    phenol throat spray (CHLORASEPTIC) 1 Spray  1 Spray Oral PRN    HYDROmorphone (DILAUDID) injection 0.5-1 mg  0.5-1 mg IntraVENous Q2H PRN    ondansetron (ZOFRAN) injection 4 mg  4 mg IntraVENous Q4H PRN    acetaminophen (TYLENOL) tablet 650 mg  650 mg Oral Q6H PRN    hydrALAZINE (APRESOLINE) 20 mg/mL injection 20 mg  20 mg IntraVENous Q6H PRN    glucose chewable tablet 16 g  4 Tablet Oral PRN    dextrose 10 % infusion 0-250 mL  0-250 mL IntraVENous PRN    glucagon (GLUCAGEN) injection 1 mg  1 mg IntraMUSCular PRN    aspirin delayed-release tablet 81 mg  81 mg Oral QHS    pregabalin (LYRICA) capsule 50 mg  50 mg Oral BID    albuterol (PROVENTIL HFA, VENTOLIN HFA, PROAIR HFA) inhaler 1 Puff  1 Puff Inhalation Q6H PRN     Facility-Administered Medications Ordered in Other Encounters   Medication Dose Route Frequency    fentaNYL citrate (PF) injection   IntraVENous PRN    lidocaine (PF) (XYLOCAINE) 20 mg/mL (2 %) injection   IntraVENous PRN    propofoL (DIPRIVAN) 10 mg/mL injection   IntraVENous PRN    rocuronium injection   IntraVENous PRN    PHENYLephrine (MAYUR-SYNEPHRINE) 10 mg in 0.9% sodium chloride 250 mL infusion   IntraVENous CONTINUOUS    PHENYLephrine (NEOSYNEPHRINE) in NS syringe   IntraVENous PRN    ceFAZolin (ANCEF) injection   IntraVENous PRN       Care Plan discussed with: Patient/Family    Royal Hedrick MD

## 2022-03-07 NOTE — PERIOP NOTES
Family update provided to the pt's daughter:  Marivel Martin. All questions are answered @ this time.

## 2022-03-07 NOTE — PROGRESS NOTES
Chart reviewed, patient POD#0 RIGHT POPLITEAL TO PERINEAL  BYPASS AND RIGHT SECOND TOE AMPUTATION. Will f/u tomorrow for re-evaluation. Thank you!

## 2022-03-07 NOTE — ANESTHESIA POSTPROCEDURE EVALUATION
Procedure(s):  RIGHT POPLITEAL TO PERINEAL  BYPASS with vein AND RIGHT SECOND TOE AMPUTATION. general    Anesthesia Post Evaluation        Patient location during evaluation: PACU  Patient participation: complete - patient participated  Level of consciousness: awake and alert  Pain management: adequate  Airway patency: patent  Anesthetic complications: no  Cardiovascular status: acceptable  Respiratory status: acceptable  Hydration status: acceptable  Comments: I have seen and evaluated the patient and is ready for discharge.  Dorothy Muniz MD    Post anesthesia nausea and vomiting:  none      INITIAL Post-op Vital signs:   Vitals Value Taken Time   /39 03/07/22 1007   Temp 37 °C (98.6 °F) 03/07/22 1007   Pulse 80 03/07/22 1007   Resp 20 03/07/22 1007   SpO2 98 % 03/07/22 1007

## 2022-03-07 NOTE — PROGRESS NOTES
Transition of Care Plan   RUR- 22%    DISPOSITION: The disposition plan is SNF vs. Home w/HH   F/U with PCP/Specialist     Transport: [facility]/family    S/P: RIGHT POPLITEAL TO PERONEAL  BYPASS with vein AND RIGHT SECOND TOE AMPUTATION 3/7    Pending PT/OT final Recs     CM: [address] [place],   [phone]'

## 2022-03-07 NOTE — ANESTHESIA PREPROCEDURE EVALUATION
Relevant Problems   RESPIRATORY SYSTEM   (+) Pneumonia due to COVID-19 virus      CARDIOVASCULAR   (+) Aortic stenosis, moderate   (+) HTN (hypertension)      GASTROINTESTINAL   (+) GERD (gastroesophageal reflux disease)      RENAL FAILURE   (+) Acute renal injury ([address]      PERSONAL HX & FAMILY HX OF CANCER   (+) Malignant neoplasm of ascending colon (HCC)   (+) Multiple myeloma (HCC)       Anesthetic History   No history of anesthetic complications            Review of Systems / Medical History  Patient summary reviewed, nursing notes reviewed and pertinent labs reviewed    Pulmonary  Within defined limits                 Neuro/Psych         Psychiatric history    Comments: Hx etho abuse Cardiovascular    Hypertension: well controlled  Valvular problems/murmurs: aortic stenosis      Dysrhythmias : atrial flutter  PAD    Exercise tolerance: <4 METS  Comments: AV area by continuity VTI is 0.7 cm2.    GI/Hepatic/Renal     GERD: well controlled          Comments: [facility]/Other    Diabetes: well controlled, type 2    Arthritis     Other Findings              Physical Exam    Airway  Mallampati: I  TM Distance: > 6 cm  Neck ROM: normal range of motion   Mouth opening: Normal     Cardiovascular  Regular rate and rhythm,  S1 and S2 normal,  no murmur, click, rub, or gallop  Rhythm: regular           Dental    Dentition: Full lower dentures and Full upper dentures     Pulmonary  Breath sounds clear to auscultation               Abdominal  GI exam deferred       Other Findings            Anesthetic Plan    ASA: 3  Anesthesia type: general          Induction: Intravenous  Anesthetic plan and risks discussed with: Patient and Family

## 2022-03-07 NOTE — PERIOP NOTES
TRANSFER - OUT REPORT:    Verbal report given to Erick (name) on Chela Severe  being transferred to 540(unit) for routine post - op       Report consisted of patients Situation, Background, Assessment and   Recommendations(SBAR). Time Pre op antibiotic given:0815  Anesthesia Stop time: 1003  Draper Present on Transfer to floor:Y  Order for Draper on Chart:Y  Discharge Prescriptions with Chart:N/A    Information from the following report(s) SBAR, OR Summary, Procedure Summary and MAR was reviewed with the receiving nurse. Opportunity for questions and clarification was provided. Is the patient on 02? YES       L/Min 2       Other N/A    Is the patient on a monitor? NO    Is the nurse transporting with the patient? YES (Came from OR 6, COVID pt)    Surgical Waiting Area notified of patient's transfer from PACU? YES      The following personal items collected during your admission accompanied patient upon transfer:   Dental Appliance: Dental Appliances: None  Vision: Visual Aid: At bedside,Glasses  Hearing Aid:    Jewelry: Jewelry: None  Clothing: Clothing: With patient  Other Valuables:  Other Valuables: None  Valuables sent to safe:

## 2022-03-08 NOTE — PROGRESS NOTES
Problem: Mobility Impaired (Adult and Pediatric)  Goal: *Acute Goals and Plan of Care (Insert Text)  Description: FUNCTIONAL STATUS PRIOR TO ADMISSION: Patient was modified independent using a rolling walker for functional mobility. HOME SUPPORT PRIOR TO ADMISSION: The patient lived with spouse but did not require assist. Pt is spouse's primary caregiver (does not have to provide physical assistance). Pt's daughter and son in law are involved in family's care and checks on them daily and assists as needed    Physical Therapy Goals    Re-evaluation 3/8/2022  1. Patient will move from supine to sit and sit to supine  in bed with modified independence within 7 day(s). 2.  Patient will transfer from bed to chair and chair to bed with modified independence using the least restrictive device within 7 day(s). 3.  Patient will perform sit to stand with modified independence within 7 day(s). 4.  Patient will ambulate with modified independence for 200 feet with the least restrictive device within 7 day(s). 5.  Patient will ascend/descend 12 stairs with single handrail(s) with supervision/set-up within 7 day(s). Initiated 2/22/2022 reviewed 3/1/2022 and still appropriate  1. Patient will move from supine to sit and sit to supine  in bed with modified independence within 7 day(s). 2.  Patient will transfer from bed to chair and chair to bed with modified independence using the least restrictive device within 7 day(s). 3.  Patient will perform sit to stand with modified independence within 7 day(s). 4.  Patient will ambulate with modified independence for 200 feet with the least restrictive device within 7 day(s).    5.  Patient will ascend/descend 12 stairs with single handrail(s) with supervision/set-up within 7 day(s).'  3/8/2022 0948 by Dread Iqbal PT  Outcome: Progressing Towards Goal   PHYSICAL THERAPY REEVALUATION  Patient: Linh Rivera (18 y.o. male)  Date: 3/8/2022  Primary Diagnosis: Intestinal obstruction (HCC) [K56.609]  Procedure(s) (LRB):  RIGHT POPLITEAL TO PERINEAL  BYPASS with vein AND RIGHT SECOND TOE AMPUTATION (Right) 1 Day Post-Op   Precautions:     Fall,DNR      ASSESSMENT  Based on the objective data described below, the patient presents with improving mobility following bypass and toe amputation yesterday. Complaint of pain but nursing did provide pain meds during session. Moving slowly getting to EOB and was able to stand and weightbear some through RLE. Minimal gait with RW but likely due to pain. Patient is most motivated and fully participating in session. He needs to be near fully independent as wife has memory issues. Feel he could manage inpatient rehab 3 hours/day if still needed by time released from acute setting. .    Current Level of Function Impacting Discharge (mobility/balance): min assist; limited gait on evaluation    Other factors to consider for discharge: independent PTA; is caregiver for wife     Patient will benefit from skilled therapy intervention to address the above noted impairments. PLAN :  Recommendations and Planned Interventions: bed mobility training, transfer training, gait training, therapeutic exercises, patient and family training/education, and therapeutic activities      Frequency/Duration: Patient will be followed by physical therapy:  5 times a week to address goals. Recommendation for discharge: (in order for the patient to meet his/her long term goals)  Therapy 3 hours per day 5-7 days per week vs home pending progress; if unable to go to [facility], then SNF    This discharge recommendation:  Has been made in collaboration with the attending provider and/or case management    Equipment recommendations for successful discharge (if) home: patient owns DME required for discharge         SUBJECTIVE:   Patient stated I know I'm going to need rehab. 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    GERD (gastroesophageal reflux disease) 4/6/2010    GI bleed, duodenitis 2007 4/6/2010    H/O Heavy Alcohol Use 9/7/2010    History of skin cancer 09/07/2010    melanoma back    HTN (hypertension) 4/6/2010    Hypercalcemia 4/25/2012    Hyperhomocysteinemia ([facility] [address] 9/7/2010    Hypertriglyceridemia 9/7/2010    Left inguinal hernia 9/14/2015    Lipids blood increased 4/6/2010    Microalbuminuria 12/10/2015    Mild Allergic Rhinitis 9/7/2010    Multiple myeloma ([facility] [address]     Pre-diabetes 9/7/2010 2005;  Optho Dr Sherice García     Right inguinal hernia 11/2/2015    S/P AAA repair 2/9/2012 2/22/2012    Sinus tachycardia 9/7/2010    Vitamin B12 deficiency 9/7/2010     Past Surgical History:   Procedure Laterality Date    COLONOSCOPY  8/2007    Normal; Dr Osman Land    EGD  8/2007    small hiatal hernia, mild duodenitis; Dr Tyson [place] AAA REPAIR  2/9/2012    Dr Eunice Johnson    [facility]      both eyes    HX CHOLECYSTECTOMY  1-29-14    lap sandi- [facility]- Dr. Gigi Carvajal Left 9/24/15    left indirect inguinal by Dr. Argentina Nash Right 11/2/15    inguinal w/ mesh by Dr. Argentina Nash Bilateral 10/2015 And 11/2015    Inguinal    BLUE DOPPLER  3/26/2012    BLUE Echo; + clot     Hospital course since last seen and reason for reevaluation: revascularization and toe amputation 3/7/22    Personal factors and/or comorbidities impacting plan of care:     Home Situation  Home Environment: Private residence  # Steps to Enter: 5 (with landings)  Rails to Enter: Yes  Hand Rails : Right  One/Two Story Residence: Two story  Living Alone: No  Support Systems: Spouse/Significant Other,Child(manuela)  Patient Expects to be Discharged to[de-identified] Home  Current DME Used/Available at Home: Cane, straight,Walker, rolling,Walker, rollator,Shower chair  Tub or Shower Type: Tub/Shower combination    EXAMINATION/PRESENTATION/DECISION MAKING:   Critical Behavior:  Neurologic State: Alert  Orientation Level: Oriented X4  Cognition: Follows commands  Safety/Judgement: Awareness of environment  Hearing: Auditory  Auditory Impairment: None  Range Of Motion:  AROM: Within functional limits               Strength:    Strength: Generally decreased, functional                    Tone & Sensation:   Tone: Normal         Coordination:  Coordination: Within functional limits  Functional Mobility:  Bed Mobility:     Supine to Sit: Additional time;Stand-by assistance     Scooting: Additional time  Transfers:  Sit to Stand: Contact guard assistance  Stand to Sit: Contact guard assistance  Stand Pivot Transfers: Contact guard assistance                    Balance:   Sitting: Intact  Standing: Impaired; With support  Standing - Static: Good  Standing - Dynamic : Fair  Ambulation/Gait Training:  Distance (ft): 3 Feet (ft)  Assistive Device: Gait belt;Walker, rolling  Ambulation - Level of Assistance: Contact guard assistance;Minimal assistance        Gait Abnormalities: Decreased step clearance  Right Side Weight Bearing: As tolerated        Stance: Right decreased  Speed/Monica: Pace decreased (<100 feet/min); Shuffled  Step Length: Right shortened;Left shortened  Swing Pattern: Right asymmetrical      Activity Tolerance:   Fair    After treatment patient left in no apparent distress:   Sitting in chair and Call bell within reach    COMMUNICATION/EDUCATION:   The patients plan of care was discussed with: Registered nurse. Fall prevention education was provided and the patient/caregiver indicated understanding., Patient/family have participated as able in goal setting and plan of care. , and Patient/family agree to work toward stated goals and plan of care.     Thank you for this referral.  Theresa Ramos, PT   Time Calculation: 34 mins

## 2022-03-08 NOTE — PROGRESS NOTES
Comprehensive Nutrition Assessment    Type and Reason for Visit: Reassess    Nutrition Recommendations/Plan:      1. Advance diet to pureed (previous diet prior to latest surgery)    2. Continue with Ensure Enlive      Nutrition Assessment:    81 yo male admitted for intestinal obstruction. PMhx: open AAA repair, hernia repairs, DM, bone CA, GERD, ETOH abuse (>10 years ago), HTN. Underweight per BMI per age. Weight hx in EMR indicates weight loss of 5.5kg over last 2 months (7% loss). Pt states he has had decreased appetite associated with weight loss since having COVID in December. C/o worsening ABD pain with n/v on admission. CT suggested intestinal obstruction distal ileum near ileocecal valve. KUB today shows persistent SBO. NPO since admission with NGT to suction. Pt pulled out NGT last night and had 1 episode of vomiting. MD notes plan to replace NGT if vomiting continues. MD recommending palliative consult vs surgery. Labs:     3/1:  F/u. S/P right hemicolectomy 2/24. Evidence of colon CA - path pending. Diet has advanced to full liquids. Pt states intakes have been poor, was on clear liquids yesterday and only had a few spoonfuls. Today he had all his yogurt, some spoonfuls of applesauce. Discussed ONS options, pt likes Ensure, states he will drink at least 2/day, and would like to try [employer]. Missing most bottom teeth and the few he has left need to also be removed, has difficulty chewing. Discussed texture options, pt would like pureed consistency food when diet advances. Labs: K+ 2.9, phos 1.4, Mg 1.5    3/8:  Pt visited but he was working with therapies. He is one day post \"RIGHT POPLITEAL TO PERONEAL  BYPASS with vein AND RIGHT SECOND TOE AMPUTATION\". He is currently on full liquid diet, but should advance to puree when ready; therapies recommending rehab so he can be as independent as possible.   Nothing new to add from nutrition stand point but RD will continue to follow. Malnutrition Assessment:  Malnutrition Status:  Severe malnutrition    Context:  Acute illness     Findings of the 6 clinical characteristics of malnutrition:   Energy Intake:  7 - 50% or less of est energy requirements for 5 or more days  Weight Loss:  No significant weight loss (7% loss x 2 months)     Body Fat Loss:  7 - Moderate body fat loss, Orbital   Muscle Mass Loss:  7 - Moderate muscle mass loss, Temples (temporalis),Clavicles (pectoralis & deltoids)  Fluid Accumulation:  No significant fluid accumulation,     Strength:  Not performed       Nutritionally Significant Medications: meds reviewed    Estimated Daily Nutrient Needs:  Energy (kcal): 4651-7391 (30-33 kcals/kg); Weight Used for Energy Requirements: Current  Protein (g): 110 (1.5 gm/kg); Weight Used for Protein Requirements: Current  Fluid (ml/day): 2200; Method Used for Fluid Requirements: 1 ml/kcal    Nutrition Related Findings:       BM:  3/8  Edema:   LLE: 2+ (3/7/2022 11:40 PM)  RLE: 2+ (3/7/2022 11:40 PM)      Wounds:  Surgical incision       Current Nutrition Therapies:   Diet: Full liquids  Supplements: none  Additional Caloric Sources: none    Meal intake:   Patient Vitals for the past 168 hrs:   % Diet Eaten   03/05/22 1000 51 - 75%     Supplement Intake: No data found.     Anthropometric Measures:  · Height:  6' 1\" (185.4 cm)  · Current Body Wt:  76.6 kg (168 lb 14 oz)   · Admission Body Wt:       · Usual Body Wt:        · Ideal Body Wt:  184:  87.9 %   · Adjusted Body Weight:   ; Weight Adjustment for: No adjustment   · Adjusted BMI:       · BMI Categories:  Underweight (BMI less than 22) age over 72     Wt Readings from Last 10 Encounters:   02/28/22 76.6 kg (168 lb 14 oz)   02/12/22 72.6 kg (160 lb)   12/29/21 78.9 kg (173 lb 15.1 oz)   03/23/21 78.9 kg (174 lb)   09/29/20 78 kg (172 lb)   09/14/20 78.9 kg (174 lb)   05/30/20 78 kg (171 lb 15.3 oz)   05/15/20 78.9 kg (174 lb)   03/05/20 78.5 kg (173 lb)   03/28/19 79 kg (174 lb 3.2 oz)       Nutrition Diagnosis:   · Inadequate oral intake related to inadequate protein-energy intake as evidenced by intake 26-50%      Nutrition Interventions:   Food and/or Nutrient Delivery: Modify current diet,Continue oral nutrition supplement  Nutrition Education and Counseling: No recommendations at this time  Coordination of Nutrition Care: Continue to monitor while inpatient    Goals:  PO intakes > 75% meals + 3 ONS each day within next 5-7 days       Nutrition Monitoring and Evaluation:   Behavioral-Environmental Outcomes: None identified  Food/Nutrient Intake Outcomes: Diet advancement/tolerance,Food and nutrient intake,Supplement intake  Physical Signs/Symptoms Outcomes: Biochemical data,GI status,Weight    Discharge Planning:    Continue oral nutrition supplement     Rayne Simms RD, CSP  Contact via [employer]

## 2022-03-08 NOTE — PROGRESS NOTES
Problem: Self Care Deficits Care Plan (Adult)  Goal: *Acute Goals and Plan of Care (Insert Text)  Description: FUNCTIONAL STATUS PRIOR TO ADMISSION: Pt was Mod I with RW and rollator for functional mobility and independent in ADLs. 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 HOME SUPPORT: The patient lived with wife but did not require assist. Children live near by and are able to provide some support. Occupational Therapy Goals  Initiated 3/1/2022, Weekly Re-assessment 3/8/2022 continued  1. Patient will perform grooming standing at sink with minimal assistance/contact guard assist within 7 day(s). 2.  Patient will perform UB and LB bathing with minimal assistance/contact guard assist within 7 day(s). 3.  Patient will perform lower body dressing with minimal assistance/contact guard assist within 7 day(s). 4.  Patient will perform toilet transfers with minimal assistance/contact guard assist within 7 day(s). 5.  Patient will participate in upper extremity therapeutic exercise/activities with modified independence for 10 minutes within 7 day(s). 6.  Patient will utilize energy conservation techniques during functional activities with verbal cues within 7 day(s). Outcome: Not Progressing Towards Goal   OCCUPATIONAL THERAPY RE-EVALUATION  Patient: Carmen Killian (37 y.o. male)  Date: 3/8/2022  Diagnosis: Intestinal obstruction ([email or web address] [K56.609] <principal problem not specified>  Procedure(s) (LRB):  RIGHT POPLITEAL TO PERINEAL  BYPASS with vein AND RIGHT SECOND TOE AMPUTATION (Right) 1 Day Post-Op  Precautions: Fall,DNR  Chart, occupational therapy assessment, plan of care, and goals were reviewed. ASSESSMENT  Based on the objective data described below, patient received sitting in bedside chair, amenable to session and requesting to get back to bed. Patient required Mod Ax2 to transfer sit to stand, once upright Min A to take small steps towards EOB and return to bedlevel.  Patient completed simple grooming/dressing ADL with up to Max A for LE dressing. Patient will benefit from IPR as patient will be able to tolerate 3 hours of therapy and is highly motivated to return to PLOF. Patient left reclined in bed, all needs in reach, NAD. Current Level of Function Impacting Discharge (ADLs): up to Mod Ax2 mobility, Max A ADL    Other factors to consider for discharge: below baseline, now s/p revascularization and toe amp 3/7/2022         PLAN :  Recommendations and Planned Interventions: self care training, functional mobility training, therapeutic exercise, balance training, therapeutic activities, endurance activities, patient education, home safety training and family training/education    Frequency/Duration: Patient will be followed by occupational therapy 5 times a week to address goals. Recommend with staff: OOB 3x daily for meals, functional mobility to bathroom    Recommend next OT session: OOB ADL    Recommendation for discharge: (in order for the patient to meet his/her long term goals)  Therapy 3 hours per day 5-7 days per week    This discharge recommendation:  Has not yet been discussed the attending provider and/or case management    Equipment recommendations for successful discharge (if) home: TBD pending progress       SUBJECTIVE:   Patient stated I know I'm going to need rehab now.     OBJECTIVE DATA SUMMARY:   Hospital course since last seen and reason for reevaluation: revascularization and toe amputation 3/7/2022    Cognitive/Behavioral Status:  Neurologic State: Alert  Orientation Level: Oriented X4  Cognition: Appropriate decision making; Follows commands  Perception: Appears intact  Perseveration: No perseveration noted  Safety/Judgement: Insight into deficits    Skin: intact    Edema: scrotal swelling    Hearing:   Auditory  Auditory Impairment: None    Vision/Perceptual:                                Corrective Lenses: Glasses    Range of Motion:  AROM: Generally decreased, functional                         Strength:  Strength: Generally decreased, functional                Coordination:  Coordination: Generally decreased, functional  Fine Motor Skills-Upper: Left Intact; Right Intact    Gross Motor Skills-Upper: Left Intact; Right Intact    Tone & Sensation:  Tone: Abnormal (BL ring finger flex contractures)  Sensation: Impaired (peripheral neuropathy)                        Functional Mobility and Transfers for ADLs:  Bed Mobility:  Supine to Sit: Additional time;Stand-by assistance  Sit to Supine: Minimum assistance  Scooting: Additional time    Transfers:  Sit to Stand: Moderate assistance;Assist x2     Bed to Chair: Minimum assistance;Assist x2    Balance:  Sitting: Intact  Standing: Impaired  Standing - Static: Fair  Standing - Dynamic : Fair    ADL Assessment:  Feeding: Independent    Oral Facial Hygiene/Grooming: Minimum assistance    Bathing: Minimum assistance    Upper Body Dressing: Minimum assistance    Lower Body Dressing: Maximum assistance    Toileting: Moderate assistance                ADL Intervention and task modifications:       Grooming  Grooming Assistance: Set-up; Supervision  Position Performed:  (bedlevel)  Washing Face: Set-up; Supervision                   Lower Body Dressing Assistance  Socks: Maximum assistance  Position Performed: Seated in chair         Cognitive Retraining  Safety/Judgement: Insight into deficits      Functional Measure:    Barthel Index:  Bathin  Bladder: 5  Bowels: 0  Groomin  Dressin  Feeding: 10  Mobility: 0  Stairs: 0  Toilet Use: 5  Transfer (Bed to Chair and Back): 5  Total: 35/100      The Barthel ADL Index: Guidelines  1. The index should be used as a record of what a patient does, not as a record of what a patient could do. 2. The main aim is to establish degree of independence from any help, physical or verbal, however minor and for whatever reason.   3. The need for supervision renders the patient not independent. 4. A patient's performance should be established using the best available evidence. Asking the patient, friends/relatives and nurses are the usual sources, but direct observation and common sense are also important. However direct testing is not needed. 5. Usually the patient's performance over the preceding 24-48 hours is important, but occasionally longer periods will be relevant. 6. Middle categories imply that the patient supplies over 50 per cent of the effort. 7. Use of aids to be independent is allowed. Score Interpretation (from [address] [address])    Independent   60-79 Minimally independent   40-59 Partially dependent   20-39 Very dependent   <20 Totally dependent     -Stephani Knapp., Barthel, D.W. (1965). Functional evaluation: the Barthel Index. [address] ([address] (1997). The Barthel activities of daily living index: self-reporting versus actual performance in the old (> or = 75 years). Journal of [address] [place] [address] 45(7), [address] [facility], VINCE, Juan Francisco Dial., Sha Valencia. (1999). Measuring the change in disability after inpatient rehabilitation; comparison of the responsiveness of the Barthel Index and Functional Douglas Measure. Journal of Neurology, Neurosurgery, and Psychiatry, 66(4), 629-045. Raheel Morales, N.J.A, FRANCI Brooks, & Car Russo MShirleyA. (2004) Assessment of post-stroke quality of life in cost-effectiveness studies: The usefulness of the Barthel Index and the EuroQoL-5D. Quality of Life Research, 13, 427-43         Pain:  None reported    Activity Tolerance:   Fair and requires rest breaks    After treatment patient left in no apparent distress:   Supine in bed, Heels elevated for pressure relief, Call bell within reach and Side rails x 3    COMMUNICATION/COLLABORATION:   The patients plan of care was discussed with: Physical therapist and Registered nurse.      Sera Musa, OT  Time Calculation: 25 mins

## 2022-03-08 NOTE — PROGRESS NOTES
Hospitalist Progress Note             Daily Progress Note: 3/8/2022    Primary care provider:Michel Edmonds NP    Date of admission: 2/21/2022  1:18 PM    Admission summery and hospital course:  HPI\"80year-old man with a past medical history significant for type 2 diabetes, multiple myeloma, hypertension, dyslipidemia, peripheral neuropathy, status post abdominal aortic aneurysm repair in 2012 and COVID-pneumonia.  Patient was recently admitted on 2/15 for management of cellulitis. Presented to the emergency room several days ago treated for diarrhea with Imodium, and now with progressive abdominal pain nausea vomiting. Patient seen in the ED today  for abdominal pain started 12 to 24 hours ago associated with vomiting and constipation.  The hospitalist team has been consulted to assist general surgery in medical management.   Patient examined at bedside.  Patient is ill-appearing, should not reports health has been declining since Covid diagnosis in December 2021.  Patient denies chest pain, dizziness, shortness of breath during examination.  Patient endorses abdominal pain nausea and vomiting.  Productive cough noted on evaluation, patient reports he has had a cough since Covid diagnosis and it is somewhat improved.  NG tube is in place draining dark brown bilious fluid.  Patient reports abdominal distention has improved some since placement of NG.  Patient's son-in-law is at bedside. \"  Subjective:   Pt denies pain, noted with low BP,no fever, denies chest pain, SOB, awake and alert  S/p toe amp rt foot 3/7 ,dressing clean and dry, pain controlled    NS started BP improved, hgb low 6.9    rosas noted with blood ,urolgogy consulted     Assessment/Plan:   PVD  Right second toe dry gangrene  Right lower extremity arterial occlusion     -MRI with evidence of osteomyelitis. Discussed with podiatry. More consistent with dry gangrene. Antibiotics not required.  D/c 3/2  -MAYRURI with moderate to severe arterial occlusion  -Right arteriogram 2/28 with popliteal occlusion, unable to cross occlusion. -podiatry signed off   -vascular consulted,s/p right popliteal bypass-possible second Right great toe amputation   -pain controlled  -PT today ,per vascular    Hypotension   -not on antihypertensives  -NS bolus ,BP improved  -hgb 6.9, transfuse PRBC    Acute on  Chronic  anemia   symptomatic with hypotension, hematuria  -transfuse 2 units of PRBC  -monitor h/h    Gross hematuria noted in rosas post op surgery   -urology consult appreciated, likely dur to rosas placement with enlarged porstate, recc   manual irrigation,monitor for bright red bleeding and clots, once UA is pink,no clots ok to d/c rosas   with voiding trial   -no flomax due to hypotension, start once bp is stable  -MRI w/c for further eval once stable     Small bowel obstruction due to invasive colon adenocarcinoma   -Underwent right hemicolectomy 2/24. Evidence of adenocarcinoma, +7/14 lymph nodes  -Continue GI lite diet    -Oncology consult 3/1. Known to I, Dr. Caron Porras. Will follow up outpatient  -pain control       Acute metabolic encephalopathy: resolved  -Multifactorial in setting of recent surgery, narcotics, hospitalization, infection  -monitor closely, supportive care  -limit narcotics as able    History of Type 2 diabetes mellitus, Ha1c 6.3 with hypoglycemia:   -continue accuchecks q6 hours  -s/p D5     History of Covid infection  Pneumonia  -CXR with scattered opacities, concerning for PNA. -s/p treatment     Anemia of iron deficiency and chronic disease  Monitor CBC, transfuse for Hgb less than 7. Peripheral neuropathy: can resume pregabalin.   Hypertension: Blood pressure is reasonable now. Continue to hold Lasix  Failure to thrive, Protein calorie malnutrition: Nutrition consult    Recurrent falls: PT/OT  Hypokalemia: Replace and monitor  Insomnia: PRN low-dose trazodone instead.     Palliative care consulted 3/2    See orders for other plans. VTE prophylaxis: heparin  Code status: DNR. Discussed plan of care with Patient/Family and Nurse  Discharge planning: PT/OT , SNF vs home w/HH       Review of Systems:     Review of Systems:  As per HPI             Objective:   Physical Exam:     Visit Vitals  BP (!) 117/51   Pulse 77   Temp 98.5 °F (36.9 °C)   Resp 18   Ht 6' 1\" (1.854 m)   Wt 76.6 kg (168 lb 14 oz)   SpO2 96%   BMI 22.28 kg/m²    O2 Flow Rate (L/min): 2 l/min O2 Device: None (Room air)    Temp (24hrs), Av.1 °F (36.7 °C), Min:97.5 °F (36.4 °C), Max:98.6 °F (37 °C)    701 - 1900  In: -   Out: 150 [Urine:150]   1901 -  07  In: 400 [I.V.:400]  Out: 2800 [Urine:2750]      General:   NAD non-toxic  HEENT: anicteric sclerae   Lungs:    Clear to auscultation bilaterally, normal work of breathing. Chest wall:   No tenderness or deformity. Heart:  Regular rate and rhythm, S1, S2 normal, no murmur. No edema    Abdomen:   Soft, central bandage, +bowel sounds   Extremities:  Right foot with dressing in place, dry ,no active bleeding    Neurologic:  grossly non-focal     Data Review:       Recent Days:  Recent Labs     22  1416 22  0110 22  0256   WBC  --  16.2* 16.9*   HGB 6.9* 8.0* 9.8*   HCT 22.3* 26.1* 32.2*   PLT  --  258 249     Recent Labs     22  0110 22  0256    144   K 3.7 3.4*   * 117*   CO2 23 23   * 135*   BUN 24* 25*   CREA 1.16 1.35*   CA 7.6* 8.2*     No results for input(s): PH, PCO2, PO2, HCO3, FIO2 in the last 72 hours.     24 Hour Results:  Recent Results (from the past 24 hour(s))   CBC WITH AUTOMATED DIFF    Collection Time: 22  1:10 AM   Result Value Ref Range    WBC 16.2 (H) 4.1 - 11.1 K/uL    RBC 2.57 (L) 4.10 - 5.70 M/uL    HGB 8.0 (L) 12.1 - 17.0 g/dL    HCT 26.1 (L) 36.6 - 50.3 %    .6 (H) 80.0 - 99.0 FL    MCH 31.1 26.0 - 34.0 PG    MCHC 30.7 30.0 - 36.5 g/dL    RDW 16.9 (H) 11.5 - 14.5 %    PLATELET 967 545 - 400 K/uL    MPV 10.7 8.9 - 12.9 FL    NRBC 0.0 0  WBC    ABSOLUTE NRBC 0.00 0.00 - 0.01 K/uL    NEUTROPHILS 81 (H) 32 - 75 %    LYMPHOCYTES 6 (L) 12 - 49 %    MONOCYTES 8 5 - 13 %    EOSINOPHILS 1 0 - 7 %    BASOPHILS 0 0 - 1 %    IMMATURE GRANULOCYTES 4 (H) 0.0 - 0.5 %    ABS. NEUTROPHILS 13.1 (H) 1.8 - 8.0 K/UL    ABS. LYMPHOCYTES 1.0 0.8 - 3.5 K/UL    ABS. MONOCYTES 1.3 (H) 0.0 - 1.0 K/UL    ABS. EOSINOPHILS 0.2 0.0 - 0.4 K/UL    ABS. BASOPHILS 0.0 0.0 - 0.1 K/UL    ABS. IMM.  GRANS. 0.6 (H) 0.00 - 0.04 K/UL    DF SMEAR SCANNED      RBC COMMENTS ANISOCYTOSIS  1+        RBC COMMENTS MACROCYTOSIS  1+        RBC COMMENTS OVALOCYTES  PRESENT       METABOLIC PANEL, BASIC    Collection Time: 03/08/22  1:10 AM   Result Value Ref Range    Sodium 143 136 - 145 mmol/L    Potassium 3.7 3.5 - 5.1 mmol/L    Chloride 117 (H) 97 - 108 mmol/L    CO2 23 21 - 32 mmol/L    Anion gap 3 (L) 5 - 15 mmol/L    Glucose 109 (H) 65 - 100 mg/dL    BUN 24 (H) 6 - 20 MG/DL    Creatinine 1.16 0.70 - 1.30 MG/DL    BUN/Creatinine ratio 21 (H) 12 - 20      GFR est AA >60 >60 ml/min/1.73m2    GFR est non-AA 60 (L) >60 ml/min/1.73m2    Calcium 7.6 (L) 8.5 - 10.1 MG/DL   HGB & HCT    Collection Time: 03/08/22  2:16 PM   Result Value Ref Range    HGB 6.9 (L) 12.1 - 17.0 g/dL    HCT 22.3 (L) 36.6 - 50.3 %       Problem List:  Problem List as of 3/8/2022 Date Reviewed: 2/28/2022          Codes Class Noted - Resolved    Malignant neoplasm of ascending colon (HCC) ICD-10-CM: C18.2  ICD-9-CM: 153.6  2/28/2022 - Present        Failure to thrive (child) ICD-10-CM: R62.51  ICD-9-CM: 783.41  2/21/2022 - Present        Falls frequently ICD-10-CM: R29.6  ICD-9-CM: V15.88  2/21/2022 - Present        Moderate protein-calorie malnutrition (HCC) ICD-10-CM: E44.0  ICD-9-CM: 263.0  2/21/2022 - Present        Acute renal injury ([facility] ICD-10-CM: N17.9  ICD-9-CM: 584.9  2/21/2022 - Present        Cellulitis of right leg ICD-10-CM: L03.115  ICD-9-CM: 682.6 2/12/2022 - Present        Pneumonia due to COVID-19 virus ICD-10-CM: U07.1, J12.82  ICD-9-CM: 480.8, 079.89  12/29/2021 - Present        Personal history of atrial fibrillation ICD-10-CM: Z86.79  ICD-9-CM: V12.59  11/9/2020 - Present        Carotid stenosis, asymptomatic, right ICD-10-CM: I65.21  ICD-9-CM: 433.10  5/29/2020 - Present        Hyperlipemia, mixed ICD-10-CM: E78.2  ICD-9-CM: 272.2  9/27/2018 - Present        Aortic stenosis, moderate ICD-10-CM: I35.0  ICD-9-CM: 424.1  4/10/2018 - Present        Mild concentric left ventricular hypertrophy (LVH) ICD-10-CM: I51.7  ICD-9-CM: 429.3  4/10/2018 - Present        Chronic diarrhea ICD-10-CM: K52.9  ICD-9-CM: 787.91  9/29/2017 - Present        Microalbuminuria ICD-10-CM: R80.9  ICD-9-CM: 791.0  12/10/2015 - Present        Right inguinal hernia ICD-10-CM: K40.90  ICD-9-CM: 550.90  11/2/2015 - Present        Left inguinal hernia ICD-10-CM: K40.90  ICD-9-CM: 550.90  9/14/2015 - Present        Multiple myeloma ([facility] ICD-10-CM: C90.00  ICD-9-CM: 203.00  12/27/2012 - Present        Hypercalcemia ICD-10-CM: N63.64  ICD-9-CM: 275.42  4/25/2012 - Present    Overview Signed 4/25/2012  5:49 PM by Yareli Saab MD     2012, PTH normal, abnormal protein electrophoresis, referred to Hem-Onc             S/P AAA repair 2/9/2012 ICD-10-CM: T82.778, Z86.79  ICD-9-CM: V45.89  2/22/2012 - Present    Overview Signed 2/22/2012  1:38 PM by MD Dr Veronica Monique             Pre-diabetes ICD-10-CM: R73.03  ICD-9-CM: 790.29  9/7/2010 - Present    Overview Addendum 11/1/2011  9:29 AM by Yareli Saab MD     2005;  Optho Dr Wolfgang Loco             Sinus tachycardia ICD-10-CM: R00.0  ICD-9-CM: 427.89  9/7/2010 - Present        H/O Heavy Alcohol Use ICD-9-CM: Youlanda Book  9/7/2010 - Present        Vitamin B12 deficiency ICD-10-CM: E53.8  ICD-9-CM: 266.2  9/7/2010 - Present        Hyperhomocysteinemia ([facility] ICD-10-CM: E72.11  ICD-9-CM: 270.4  9/7/2010 - Present Mild Allergic Rhinitis ICD-10-CM: J30.9  ICD-9-CM: 477.9  9/7/2010 - Present        History of skin cancer ICD-10-CM: Z85.828  ICD-9-CM: V10.83  9/7/2010 - Present    Overview Signed 9/7/2010  9:03 AM by Ashley Martinez MD     Followed by Valerio Kyle             HTN (hypertension) ICD-10-CM: I10  ICD-9-CM: 401.9  4/6/2010 - Present    Overview Signed 9/7/2010  8:55 AM by Ashley Martinez MD     1990             H/O Carotid Stenosis ICD-10-CM: I65.29  ICD-9-CM: 433.10  4/6/2010 - Present    Overview Addendum 9/7/2010  8:58 AM by Ashley Martinez MD     Oct 2003; 50%left subclavian and 50-70% SHEILA              GERD (gastroesophageal reflux disease) ICD-10-CM: K21.9  ICD-9-CM: 530.81  4/6/2010 - Present        RESOLVED: Intestinal adhesions with complete obstruction ([address] ICD-10-CM: K56.52  ICD-9-CM: 560.81  2/21/2022 - 2/28/2022        RESOLVED: Intestinal obstruction ([address] ICD-10-CM: Y36.542  ICD-9-CM: 560.9  2/21/2022 - 2/24/2022        RESOLVED: Myeloma ([facility] ICD-10-CM: C90.00  ICD-9-CM: 203.00  1/6/2015 - 6/10/2016        RESOLVED: Gastrointestinal bleeding ICD-10-CM: K92.2  ICD-9-CM: 578.9  12/26/2014 - 2/28/2018        RESOLVED: Gall stone pancreatitis ICD-10-CM: K85.10  ICD-9-CM: 577.0, 574.20  1/23/2014 - 2/28/2018        RESOLVED: Acute cholecystitis ICD-10-CM: K81.0  ICD-9-CM: 575.0  1/23/2014 - 2/28/2018        RESOLVED: Abnormal serum protein electrophoresis ICD-10-CM: R77.8  ICD-9-CM: 790.99  4/25/2012 - 2/28/2018    Overview Signed 4/25/2012  5:48 PM by Ashley Martinez MD     4/2012, referred to Hem-Onc             RESOLVED: Atrial fibrillation 1/2012 ICD-10-CM: I48.91  ICD-9-CM: 427.31  2/22/2012 - 11/9/2020    Overview Signed 2/22/2012  1:33 PM by Ashley Martinez MD     Cardio [facility] [place]: AAA (abdominal aortic aneurysm) ([facility] ICD-10-CM: I71.4  ICD-9-CM: 441.4  1/6/2012 - 9/26/2018    Overview Signed 1/6/2012  2:07 PM by Jelena Mark MD     Detected on plain films back xray 1/2012; Ct scheduled             RESOLVED: Anemia ICD-10-CM: D64.9  ICD-9-CM: 285.9  1/5/2012 - 2/28/2018        RESOLVED: Hypertriglyceridemia ICD-10-CM: E78.1  ICD-9-CM: 272.1  9/7/2010 - 9/27/2018    Overview Signed 9/7/2010  8:55 AM by Jelena Mark MD     2003             RESOLVED: Bilateral Carotid Bruits, L>R ICD-10-CM: R09.89  ICD-9-CM: 785.9  9/7/2010 - 2/28/2018    Overview Signed 9/7/2010  8:57 AM by Jelena Mark MD     10/03             RESOLVED: Anemia due to GI bleed 2007 ICD-10-CM: D64.9  ICD-9-CM: 285.9  4/6/2010 - 2/28/2018        RESOLVED: GI bleed, duodenitis 2007 ICD-10-CM: K92.2  ICD-9-CM: 578.9  4/6/2010 - 2/28/2018    Overview Addendum 9/7/2010  9:01 AM by Jelena Mark MD     8/07 Duodenitis                       Medications reviewed  Current Facility-Administered Medications   Medication Dose Route Frequency    0.9% sodium chloride infusion  125 mL/hr IntraVENous CONTINUOUS    sodium chloride 0.9 % bolus infusion 500 mL  500 mL IntraVENous CONTINUOUS    0.9% sodium chloride infusion 250 mL  250 mL IntraVENous PRN    diphenhydrAMINE (BENADRYL) capsule 25 mg  25 mg Oral Q6H PRN    furosemide (LASIX) injection 20 mg  20 mg IntraVENous ONCE    traZODone (DESYREL) tablet 50 mg  50 mg Oral QHS PRN    pantoprazole (PROTONIX) tablet 40 mg  40 mg Oral ACB    acetaminophen (TYLENOL) tablet 650 mg  650 mg Oral TID    oxyCODONE IR (ROXICODONE) tablet 2.5-5 mg  2.5-5 mg Oral Q4H PRN    traMADoL (ULTRAM) tablet 50 mg  50 mg Oral Q6H PRN    zinc oxide-cod liver oil (DESITIN) 40 % paste   Topical PRN    heparin (porcine) injection 5,000 Units  5,000 Units SubCUTAneous Q8H    mupirocin (BACTROBAN) 2 % ointment   Topical DAILY    phenol throat spray (CHLORASEPTIC) 1 Spray  1 Spray Oral PRN    HYDROmorphone (DILAUDID) injection 0.5-1 mg  0.5-1 mg IntraVENous Q2H PRN    ondansetron (ZOFRAN) injection 4 mg  4 mg IntraVENous Q4H PRN    acetaminophen (TYLENOL) tablet 650 mg  650 mg Oral Q6H PRN    hydrALAZINE (APRESOLINE) 20 mg/mL injection 20 mg  20 mg IntraVENous Q6H PRN    glucose chewable tablet 16 g  4 Tablet Oral PRN    dextrose 10 % infusion 0-250 mL  0-250 mL IntraVENous PRN    glucagon (GLUCAGEN) injection 1 mg  1 mg IntraMUSCular PRN    aspirin delayed-release tablet 81 mg  81 mg Oral QHS    pregabalin (LYRICA) capsule 50 mg  50 mg Oral BID    albuterol (PROVENTIL HFA, VENTOLIN HFA, PROAIR HFA) inhaler 1 Puff  1 Puff Inhalation Q6H PRN       Care Plan discussed with: patient/nursing     Elsie Centeno MD

## 2022-03-08 NOTE — CONSULTS
Requesting Provider: Shashi Galeana MD - Reason for Consultation: \"hematuria\"  Pre-existing [facility] Patient:   No                Patient: Sergio Sosa MRN: [id number]  SSN: [id number]    YOB: 1934  Age: 80 y.o. Sex: male     Location: [facility]       Code Status: DNR   PCP: Rikki Patricia NP  - [phone]   Emergency Contact:  Primary Emergency Contact: fredis hudson, Home Phone: [phone]   Race/Yazdanism/Language: Kathya Fairbanks / Guilherme Alford / Kike Chaudhry   Payor: Payor: [facility] / Plan: [employer]ra [employer] PPO/PFFS / Product Type: Managed Care Medicare /    Prior Admission Data: 2/17/22 [facility] EMERGENCY DEPT     Hospitalized:  Hospital Day: 12 - Admitted 2/21/2022  1:18 PM   POD # 1 Day Post-Op Procedure(s):  RIGHT POPLITEAL TO PERINEAL  BYPASS with vein AND RIGHT SECOND TOE AMPUTATION by Chelle Schneider MD - Blood Loss: 50 ml 2 Hr 23 Min 17 Sec     CONSULTANTS  IP CONSULT TO HOSPITALIST  IP CONSULT TO PODIATRY  IP CONSULT TO UROLOGY  IP CONSULT TO VASCULAR SURGERY   ADMISSION DIAGNOSES    ICD-10-CM ICD-9-CM   1. SBO (small bowel obstruction) ([facility]  K56.609 560.9   2. Weight loss  R63.4 783.21   3. Right foot pain  M79.671 729.5   4. Debility  R53.81 799.3   5. Goals of care, counseling/discussion  Z71.89 V65.49   6. Palliative care encounter  Z51.5 V66.7         Assessment/Plan:       · Gross Hematuria -  I suspect that the hematuria is secondary to catheter placement in the setting of an enlarged prostate. Hematuria resolved easily with manual irrigation. Monitor for the return and manually irrigate as needed for bright red bleeding and clots. Start finasteride for prostatic bleeding. If UA remains clear to light pink w/o clots, okay to DC rosas with voiding trial. I will arrange outpatient follow up for re-eval.   · Enlarged Prostate - Hold off on starting Flomax due to hypotension.  Okay to start for hx of incomplete emptying with enlarged prostate when hemodynamically stable. · Indeterminate Right 13 mm renal lesion - We discussed his CT findings and the ability to perform a contrast enhanced MRI to further evaluate the right renal lesion in the future once he overcomes his acute medical problems. Please call for further questions. Case discussed with Dr. Nichelle Peoples. CC: Constipation   HPI: He is a 80 y.o. male with a past medical history significant for type 2 diabetes, multiple myeloma, hypertension, dyslipidemia, peripheral neuropathy, status post abdominal aortic aneurysm repair in 2012 and COVID-pneumonia 12/2021 who was hospitalized 3 weeks ago with small bowel obstruction. He was found to have an obstructing lymphovascular invasion colon cancer and underwent laparotomy and colon resection. He was also noted to have gangrenous changes in the right second toe and absent foot pulses. He is now POD 1 Right above-knee popliteal to proximal peroneal artery bypass with nonreversed saphenous vein & Amputation, right second toe. Seen in consultation by Urology for gross hematuria. He is a new patient to [facility]. A rosas catheter was placed for vascular surgery one day ago. He had subsequent onset of gross hematuria. He denies fevers, chills, flank or abd pain. The catheter is draining bright red-yellow UA. He denies hx of gross hematuria. No hx of kidney stones or personal or FH of renal, prostate, or bladder CA. He is a former smoker with a 45 pack year hx. He is on baby aspirin and sub Q heparin. His PCP performs routine prostate exams and he denies known abnormalities. He admits hx of incomplete voiding and he was started on a medication that he is unable to name with improvement. He is AF, mildly hypotensive. WBC peaked at 20.2, now improving at 16.2. Hgb 8 (around 9-10 for most of this admission). Cr 1.16.     CT A/P on 2/21/22 showed no calculus or hydronephrosis. There is a 13 mm indeterminate right upper pole renal hypodensity. 5.2 cm right lower pole fluid density lesion favors cyst. Prostatomegaly. No bladder mass or calculus. I personally viewed the images. I manually irrigated the rosas with sterile water. A few old flecks of clot were evacuated. The urine cleared to clear-yellow. The patient tolerated well. Temp (24hrs), Av °F (36.7 °C), Min:97.3 °F (36.3 °C), Max:98.6 °F (37 °C)    Urinary Status: Rosas  Creatinine   Date/Time Value Ref Range Status   2022 01:10 AM 1.16 0.70 - 1.30 MG/DL Final   2022 02:56 AM 1.35 (H) 0.70 - 1.30 MG/DL Final   2022 03:26 AM 1.05 0.70 - 1.30 MG/DL Final   2022 02:34 AM 0.99 0.70 - 1.30 MG/DL Final   2022 04:22 AM 0.85 0.70 - 1.30 MG/DL Final     Current Antimicrobial Therapy (168h ago, onward)    None        Key Anti-Platelet Anticoagulant Meds             aspirin delayed-release 81 mg tablet Take 81 mg by mouth nightly.         Diet: ADULT DIET Full Liquid  ADULT ORAL NUTRITION SUPPLEMENT Breakfast, Lunch, Dinner; Standard High Calorie/High Protein  ADULT ORAL NUTRITION SUPPLEMENT Lunch, Dinner; Frozen Supplement  DIET ONE TIME MESSAGE -       Labs     Lab Results   Component Value Date/Time    Lactic acid 1.2 2022 12:56 PM    Lactic acid 0.8 2022 07:00 PM    Lactic acid 1.2 2022 10:06 AM    WBC 16.2 (H) 2022 01:10 AM    HCT 26.1 (L) 2022 01:10 AM    PLATELET 022  01:10 AM    Sodium 143 2022 01:10 AM    Potassium 3.7 2022 01:10 AM    Chloride 117 (H) 2022 01:10 AM    CO2 23 2022 01:10 AM    BUN 24 (H) 2022 01:10 AM    Creatinine 1.16 2022 01:10 AM    Glucose 109 (H) 2022 01:10 AM    Calcium 7.6 (L) 2022 01:10 AM    Magnesium 2.0 2022 03:26 AM    INR 1.1 05/15/2020 09:31 AM    Prostate Specific Ag 0.7 2011 09:51 AM    Prostate Specific Ag 0.6 2009 08:34 AM     UA:   Lab Results   Component Value Date/Time    Color DARK YELLOW 2014 06:54 PM Appearance CLOUDY (A) 01/23/2014 06:54 PM    Specific gravity 1.025 01/23/2014 06:54 PM    pH (UA) 5.5 01/23/2014 06:54 PM    Protein 100 (A) 01/23/2014 06:54 PM    Glucose 250 (A) 01/23/2014 06:54 PM    Ketone TRACE (A) 01/23/2014 06:54 PM    Bilirubin LARGE (A) 01/23/2014 06:54 PM    Urobilinogen 1.0 01/23/2014 06:54 PM    Nitrites NEGATIVE  01/23/2014 06:54 PM    Leukocyte Esterase NEGATIVE  01/23/2014 06:54 PM    Epithelial cells FEW 01/23/2014 06:54 PM    Bacteria 3+ (A) 01/23/2014 06:54 PM    WBC 0-4 01/23/2014 06:54 PM    RBC 5-10 01/23/2014 06:54 PM     Imaging     Results for orders placed during the hospital encounter of 02/21/22    CT ABD PELV WO CONT    Narrative  EXAM: CT ABD PELV WO CONT    INDICATION: left upper quadrant pain, vomiting, constipation    COMPARISON: PET/CT 9/14/2020, pelvic ultrasound 10/12/2020    IV CONTRAST: None. ORAL CONTRAST: None    TECHNIQUE:  Thin axial images were obtained through the abdomen and pelvis. Coronal and  sagittal reformats were generated. CT dose reduction was achieved through use of  a standardized protocol tailored for this examination and automatic exposure  control for dose modulation. The absence of intravenous contrast material reduces the sensitivity for  evaluation of the vasculature and solid organs. FINDINGS:  LOWER THORAX: Coronary artery calcifications. Patchy atelectasis/scarring in the  lung bases. Wall thickening the lower esophagus  LIVER: Indeterminate 1.5 cm hypodense lesion in the medial left hepatic lobe  (3-15) and 1.4 cm hypodense lesion in the posterior right hepatic lobe (3-22). BILIARY TREE: Cholecystectomy. CBD is not dilated. SPLEEN: within normal limits. PANCREAS: No focal abnormality. ADRENALS: Unremarkable. KIDNEYS/URETERS: No calculus or hydronephrosis. Stable 13 mm indeterminate right  upper pole renal hypodensity (3-28).  5.2 cm right lower pole fluid density  lesion favors cyst; no dedicated follow-up recommended  STOMACH: Unremarkable. SMALL BOWEL: Diffuse dilation of small bowel to the level of the distal ileum  versus ileocecal junction, compatible with high-grade distal small bowel  obstruction. No overt wall thickening  COLON: No dilatation or wall thickening. Diverticulosis. APPENDIX: Not identified  PERITONEUM: Small volume ascites. No pneumoperitoneum. RETROPERITONEUM: No lymphadenopathy. Atherosclerosis with stable 2.8 cm x 3.6 cm  infrarenal abdominal aortic aneurysm. REPRODUCTIVE ORGANS: Prostatomegaly. URINARY BLADDER: No mass or calculus. BONES: No destructive bone lesion. ABDOMINAL WALL: No mass or hernia. ADDITIONAL COMMENTS: N/A    Impression  1. High-grade distal small bowel obstruction. Small volume ascites, likely  reactive. 2.  Several indeterminate hypodensities in the liver and right kidney. Nonemergent contrast enhanced MRI can be obtained as an outpatient for further  evaluation if clinically indicated. 3.  Stable 3.8 cm infrarenal abdominal aortic aneurysm. 4.  Wall thickening in the lower esophagus, correlate for esophagitis.       US Results (most recent):  Results from [place] encounter on 02/21/22    POC US FOR VASCULAR ACCESS    Narrative  POCUS_Proc_PeripheralIV:    Exam Information:  Exam type:  Clinically indicated    Indication(s) for Exam:  The exam was performed with the following indications[de-identified]  Failed or  difficult IV access    Location:  Right  Specific site of peripheral line[de-identified]  Deep brachial vein    Complications:  None    Interpretation:  Successful U/S-guided peripheral line insertion    Confirmatory study:  What confirmatory study was done?:  Not applicable    Electronically signed by Juno Aceves on Monday, February 21, 2022 at 3:20 PM      Cultures     All Micro Results     Procedure Component Value Units Date/Time    COVID-19 RAPID TEST [[id number]]  (Abnormal) Collected: 03/05/22 1506    Order Status: Completed Specimen: Nasopharyngeal Updated: 03/05/22 1536     Specimen source Nasopharyngeal        COVID-19 rapid test Detected        Comment: Rapid Abbott ID Now       The specimen is POSITIVE for SARS-CoV-2, the novel coronavirus associated with COVID-19. This test has been authorized by the FDA under an Emergency Use Authorization (EUA) for use by authorized laboratories. Fact sheet for Healthcare Providers: [employer]  Fact sheet for Patients: [employer].co.nz       Methodology: Isothermal Nucleic Acid Amplification  CALLED TO AND READ BACK BY  LEXUS AMADOR @ [facility]/         CULTURE, BLOOD, PAIRED [[id number]] Collected: 02/27/22 1256    Order Status: Completed Specimen: Blood Updated: 03/04/22 0714     Special Requests: NO SPECIAL REQUESTS        Culture result: NO GROWTH 5 DAYS       COVID-19 RAPID TEST [[id number]] Collected: 02/28/22 0443    Order Status: Completed Specimen: Nasopharyngeal Updated: 02/28/22 0510     Specimen source Nasopharyngeal        COVID-19 rapid test Not detected        Comment: Rapid Abbott ID Now       Rapid NAAT:  The specimen is NEGATIVE for SARS-CoV-2, the novel coronavirus associated with COVID-19. Negative results should be treated as presumptive and, if inconsistent with clinical signs and symptoms or necessary for patient management, should be tested with an alternative molecular assay. Negative results do not preclude SARS-CoV-2 infection and should not be used as the sole basis for patient management decisions. This test has been authorized by the FDA under an Emergency Use Authorization (EUA) for use by authorized laboratories.    Fact sheet for Healthcare Providers: [employer]  Fact sheet for Patients: [employer].nz       Methodology: Isothermal Nucleic Acid Amplification         CULTURE, BLOOD [[id number]] Collected: 02/22/22 1900    Order Status: Completed Specimen: Blood Updated: 02/27/22 2117     Special Requests: NO SPECIAL REQUESTS        Culture result: NO GROWTH 5 DAYS       COVID-19 RAPID TEST [[id number]] Collected: 02/21/22 1730    Order Status: Completed Specimen: Nasopharyngeal Updated: 02/21/22 1953     Specimen source Nasopharyngeal        COVID-19 rapid test Not detected        Comment: Rapid Abbott ID Now       Rapid NAAT:  The specimen is NEGATIVE for SARS-CoV-2, the novel coronavirus associated with COVID-19. Negative results should be treated as presumptive and, if inconsistent with clinical signs and symptoms or necessary for patient management, should be tested with an alternative molecular assay. Negative results do not preclude SARS-CoV-2 infection and should not be used as the sole basis for patient management decisions. This test has been authorized by the FDA under an Emergency Use Authorization (EUA) for use by authorized laboratories. Fact sheet for Healthcare Providers: [employer]  Fact sheet for Patients: [employer].nz       Methodology: Isothermal Nucleic Acid Amplification         CULTURE, MRSA [[id number]] Collected: 02/21/22 1945    Order Status: Canceled Specimen: Nasal from Nares            Past History: (Complete 2+/3 areas)     Allergies   Allergen Reactions    Codeine Nausea and Vomiting     Blurred vision, felt faint    Contrast Agent [Iodine] Hives     Needs premedication w/ Benadryl prn (since 1/2012 reaction)    Dexamethasone Other (comments)     \"Deathly ill\"    Fish Oil Nausea Only    Glipizide Other (comments)     DRASTIC DROP IN BLOOD GLUCOSE, fasting    Lovenox [Enoxaparin] Hives     Patient states blisters all over the body.     Metformin Diarrhea    Niacin Other (comments)     Flushing    Norvasc [Amlodipine] Other (comments)     Leg edema    Optiray 350 [Ioversol] Hives     He does not know what this is      Current Facility-Administered Medications Medication Dose Route Frequency    0.9% sodium chloride infusion  75 mL/hr IntraVENous CONTINUOUS    traZODone (DESYREL) tablet 50 mg  50 mg Oral QHS PRN    pantoprazole (PROTONIX) tablet 40 mg  40 mg Oral ACB    acetaminophen (TYLENOL) tablet 650 mg  650 mg Oral TID    oxyCODONE IR (ROXICODONE) tablet 2.5-5 mg  2.5-5 mg Oral Q4H PRN    traMADoL (ULTRAM) tablet 50 mg  50 mg Oral Q6H PRN    zinc oxide-cod liver oil (DESITIN) 40 % paste   Topical PRN    heparin (porcine) injection 5,000 Units  5,000 Units SubCUTAneous Q8H    mupirocin (BACTROBAN) 2 % ointment   Topical DAILY    phenol throat spray (CHLORASEPTIC) 1 Spray  1 Spray Oral PRN    HYDROmorphone (DILAUDID) injection 0.5-1 mg  0.5-1 mg IntraVENous Q2H PRN    ondansetron (ZOFRAN) injection 4 mg  4 mg IntraVENous Q4H PRN    acetaminophen (TYLENOL) tablet 650 mg  650 mg Oral Q6H PRN    hydrALAZINE (APRESOLINE) 20 mg/mL injection 20 mg  20 mg IntraVENous Q6H PRN    glucose chewable tablet 16 g  4 Tablet Oral PRN    dextrose 10 % infusion 0-250 mL  0-250 mL IntraVENous PRN    glucagon (GLUCAGEN) injection 1 mg  1 mg IntraMUSCular PRN    aspirin delayed-release tablet 81 mg  81 mg Oral QHS    pregabalin (LYRICA) capsule 50 mg  50 mg Oral BID    albuterol (PROVENTIL HFA, VENTOLIN HFA, PROAIR HFA) inhaler 1 Puff  1 Puff Inhalation Q6H PRN    Prior to Admission medications    Medication Sig Start Date End Date Taking? Authorizing Provider   docusate sodium (Colace) 100 mg capsule Take 1 Capsule by mouth daily for 20 days. Take daily while taking Norco. 2/17/22 3/9/22  Gisselle Vee MD   furosemide (LASIX) 20 mg tablet Take 1 Tablet by mouth two (2) times a day. Hold for low blood pressure 2/16/22   Amalia Bernstein MD   mupirocin [facility]) 2 % ointment Apply to open wound on toe daily as instructed 2/16/22   Amalia Bernstein MD   ferrous sulfate 325 mg (65 mg iron) tablet Take 1 Tablet by mouth Daily (before breakfast).  Indications: anemia from inadequate iron 2/16/22   Vaughn Nicole MD   calcium carbonate (TUMS) 200 mg calcium (500 mg) chew Take 1 Tablet by mouth as needed. Other, MD Makayla   potassium chloride (KLOR-CON M10) 10 mEq tablet TAKE 1 TABLET BY MOUTH DAILY WITH LASIX 11/29/21   Lexie Edmonds NP   folic acid-vit D8-WATSON O90 (Folbic) 2.5-25-2 mg tablet Take 1 Tablet by mouth daily. 6/1/21   Lexie Edmonds NP   pregabalin (LYRICA) 50 mg capsule Take 50 mg by mouth two (2) times a day. 9/16/20   Provider, Historical   aspirin delayed-release 81 mg tablet Take 81 mg by mouth nightly. Provider, Historical   hydroxypropyl methylcellulose (GENTEAL MILD) 0.2 % ophthalmic solution Administer 2 Drops to both eyes as needed. Provider, Historical   loperamide (IMODIUM A-D) 2 mg tablet Take 4 mg by mouth three (3) times daily as needed. 3/28/19   Karmen Cooney MD   acyclovir (ZOVIRAX) 400 mg tablet TK 1 T PO QD 8/6/18   Provider, Historical   multivitamin (ONE A DAY) tablet Take 1 Tab by mouth daily.     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), GERD (gastroesophageal reflux disease) (4/6/2010), GI bleed, duodenitis 2007 (4/6/2010), H/O Heavy Alcohol Use (9/7/2010), History of skin cancer (09/07/2010), HTN (hypertension) (4/6/2010), Hypercalcemia (4/25/2012), Hyperhomocysteinemia ([address] (9/7/2010), Hypertriglyceridemia (9/7/2010), Left inguinal hernia (9/14/2015), Lipids blood increased (4/6/2010), Microalbuminuria (12/10/2015), Mild Allergic Rhinitis (9/7/2010), Multiple myeloma ([facility] Pre-diabetes (9/7/2010), Right inguinal hernia (11/2/2015), S/P AAA repair 2/9/2012 (2/22/2012), Sinus tachycardia (9/7/2010), and Vitamin B12 deficiency (9/7/2010). PSurgHx:  has a past surgical history that includes colonoscopy (8/2007); egd (8/2007); shakir doppler (3/26/2012); hx aaa repair (2/9/2012); hx cholecystectomy (1-29-14); hx gi; hx hernia repair (Left, 9/24/15); hx hernia repair (Right, 11/2/15); hx hernia repair (Bilateral, 10/2015 And 11/2015); hx cataract removal; and hx heent. PSocHx:  reports that he quit smoking about 21 years ago. He has a 82.50 pack-year smoking history. He has never used smokeless tobacco. He reports current alcohol use of about 14.0 standard drinks of alcohol per week. He reports that he does not use drugs.    ROS:  (Complete - 10 systems) - DENIES: Weightloss (Constitutional), Dry mouth (ENMT), Chest pain (CV), SOB (Respiratory), Constipation (GI), Pallor (Skin), TIA Sx (Neuro), Confusion (Psych), Easy bruising (Heme); ADMITS: Weakness (MS)     Physical Exam: (Comprehesive - 8+ 1995 Systems)     (1) Constitutional:  NAD; pleasant  FIO2:   on SpO2: O2 Sat (%): 96 %  O2 Device: None (Room air) O2 Flow Rate (L/min): 2 l/min  Patient Vitals for the past 24 hrs:   BP Temp Pulse Resp SpO2   03/08/22 0921 (!) 103/54 -- -- -- --   03/08/22 0855 130/61 97.7 °F (36.5 °C) 86 18 96 %   03/08/22 0507 (!) 123/54 98.2 °F (36.8 °C) 79 18 95 %   03/07/22 2340 110/67 98.6 °F (37 °C) 77 18 96 %   03/07/22 1539 (!) 90/45 97.5 °F (36.4 °C) 89 18 93 %   03/07/22 1443 (!) 98/58 97.3 °F (36.3 °C) 78 18 98 %   03/07/22 1312 (!) 95/55 97.5 °F (36.4 °C) 76 18 100 %   03/07/22 1121 (!) 106/54 98.2 °F (36.8 °C) 77 18 98 %   03/07/22 1045 (!) 110/36 -- 78 17 97 %   03/07/22 1030 (!) 105/35 -- 77 15 97 %   03/07/22 1020 (!) 106/37 -- 78 16 97 %   03/07/22 1015 (!) 99/47 -- 78 20 98 %   03/07/22 1010 (!) 114/39 -- 80 18 97 %   03/07/22 1007 (!) 103/39 98.6 °F (37 °C) 80 20 98 %       Date 03/07/22 0700 - 03/08/22 0659 03/08/22 0700 - 03/09/22 0659   Shift [id number] 1900-0659 24 Hour Total 3547-9518 0714-3216 24 Hour Total   INTAKE   I.V.(mL/kg/hr) 400(0.4)  400(0.2)        Volume (lactated Ringers infusion) 400  400      Shift Total(mL/kg) 400(5.2)  400(5.2)      OUTPUT   Urine(mL/kg/hr) 1800(2) 950(1) 2750(1.5)        Urine Output 1800  1800        Urine Output (mL) (Urinary Catheter 03/07/22 2- way; [place])  950 950      Blood 50  50        Estimated Blood Loss 50  50      Shift Total(mL/kg) 1850(24.2) 950(12.4) 2800(36.6)      NET -1450 -950 -2400      Weight (kg) 76.6 76.6 76.6 76.6 76.6 76.6      (2) ENMT:   moist mucous membranes, normal sinuses   (3) Respiratory:  breathing easily, no distress   (4) GI:  no abdominal masses, no tenderness   (5) :   [place], red to yellow UA, no CVA tenderness   (6) Lymphatic:  no adenopathy, neck supple   (7) Muscloskeletal:  no gross deformity, normal ROM   (8) Skin:  no rash, warm & dry   (9) Neuro:  Alert, no focal deficits, normal speech      Signed By: Omar Omalley NP  - March 8, 2022

## 2022-03-08 NOTE — PROGRESS NOTES
Vascular:    Awake, alert, comfortable    Leg and foot dressed, palpable graft pulse popliteal space    PCR covid test negative    Some blood in urine- rosas in place post op, can come out from my standpoint when OK with others    Consult PT for ambulation

## 2022-03-08 NOTE — PROGRESS NOTES
Transition of Care Plan   RUR- 22%    DISPOSITION: The disposition plan is SNF    F/U with PCP/Specialist    Transport: JERZY     13:36pm  SNF list emailed to the pt's son-in-law Monica Beltran, @ [email or web address]. Awaiting choices       1316  Per conversation with the pt: This cm reviewed over therapies recommendation for IPR. This cm inquired about the pt's thoughts on transitioning to a rehab facility. This cm informed the pt that with his payor source they typically do not approve IPR but SNF level care instead. The pt reported that he is open to the idea of going to either IPR or SNF but he would like to talk it over with his son-in-law. He requested that this cm f/u with him later this afternoon or tomorrow morning.          CM: [address] [place],   [phone]

## 2022-03-09 NOTE — PROGRESS NOTES
General Surgery Daily Progress Note    Admit Date: 2022  Post-Operative Day: 2 Days Post-Op from Procedure(s):  RIGHT POPLITEAL TO PERINEAL  BYPASS with vein AND RIGHT SECOND TOE AMPUTATION     Subjective:     Last 24 hrs: pt has lg amts clear yellow drainage from distal incision; staples are still present, no pain or nausea       Objective:     Blood pressure (!) 102/55, pulse 78, temperature 98 °F (36.7 °C), resp. rate 18, height 6' 1\" (1.854 m), weight 168 lb 14 oz (76.6 kg), SpO2 95 %. Temp (24hrs), Av.2 °F (36.8 °C), Min:97.4 °F (36.3 °C), Max:98.5 °F (36.9 °C)      _____________________  Physical Exam:     Alert and Oriented, x3, in no acute distress. Cardiovascular: RRR, no peripheral edema  Abdomen: soft, NT, incision clean w/o erythema or purulent drainage; lg amt serous-appearing drainage is coming from distal end and soaking drsgs      Assessment:   Active Problems:    HTN (hypertension) (2010)      Overview:       H/O Carotid Stenosis (2010)      Overview: Oct 2003; 50%left subclavian and 50-70% SHEILA       Pre-diabetes (2010)      Overview: ; Optho Dr Leonard Streeter      Multiple myeloma [facility]) (2012)      Aortic stenosis, moderate (4/10/2018)      Pneumonia due to COVID-19 virus (2021)      Cellulitis of right leg (2022)      Failure to thrive (child) (2022)      Falls frequently (2022)      Moderate protein-calorie malnutrition ([address] (2022)      Acute renal injury ([address] (2022)      Malignant neoplasm of ascending colon ([address] (2022)            Plan:     Doubt infection from this - hospitalist is ordering cultures, wbc is trending down  Cont drsgs, if continues may need to place ostomy appliance to collect drainage  ?  CT to see if seroma present - will d/w Dr Sheree Gamez Review:    Recent Labs     22  0413 22  2249 22  1416 22  0110 22  0110   WBC 13.5*  --   --   --  16.2*   HGB 7.9* 7.2* 6.9*   < > 8.0* HCT 25.6* 23.4* 22.3*   < > 26.1*     --   --   --  258    < > = values in this interval not displayed. Recent Labs     03/09/22  0413 03/08/22  0110    143   K 3.4* 3.7   * 117*   CO2 24 23   GLU 82 109*   BUN 18 24*   CREA 0.85 1.16   CA 7.1* 7.6*     No results for input(s): AML, LPSE in the last 72 hours.         ______________________  Medications:    Current Facility-Administered Medications   Medication Dose Route Frequency    piperacillin-tazobactam (ZOSYN) 3.375 g in 0.9% sodium chloride (MBP/ADV) 100 mL MBP  3.375 g IntraVENous Q8H    0.9% sodium chloride infusion  125 mL/hr IntraVENous CONTINUOUS    sodium chloride 0.9 % bolus infusion 500 mL  500 mL IntraVENous CONTINUOUS    0.9% sodium chloride infusion 250 mL  250 mL IntraVENous PRN    diphenhydrAMINE (BENADRYL) capsule 25 mg  25 mg Oral Q6H PRN    traZODone (DESYREL) tablet 50 mg  50 mg Oral QHS PRN    pantoprazole (PROTONIX) tablet 40 mg  40 mg Oral ACB    acetaminophen (TYLENOL) tablet 650 mg  650 mg Oral TID    oxyCODONE IR (ROXICODONE) tablet 2.5-5 mg  2.5-5 mg Oral Q4H PRN    traMADoL (ULTRAM) tablet 50 mg  50 mg Oral Q6H PRN    zinc oxide-cod liver oil (DESITIN) 40 % paste   Topical PRN    heparin (porcine) injection 5,000 Units  5,000 Units SubCUTAneous Q8H    mupirocin (BACTROBAN) 2 % ointment   Topical DAILY    phenol throat spray (CHLORASEPTIC) 1 Spray  1 Spray Oral PRN    HYDROmorphone (DILAUDID) injection 0.5-1 mg  0.5-1 mg IntraVENous Q2H PRN    ondansetron (ZOFRAN) injection 4 mg  4 mg IntraVENous Q4H PRN    acetaminophen (TYLENOL) tablet 650 mg  650 mg Oral Q6H PRN    hydrALAZINE (APRESOLINE) 20 mg/mL injection 20 mg  20 mg IntraVENous Q6H PRN    glucose chewable tablet 16 g  4 Tablet Oral PRN    dextrose 10 % infusion 0-250 mL  0-250 mL IntraVENous PRN    glucagon (GLUCAGEN) injection 1 mg  1 mg IntraMUSCular PRN    aspirin delayed-release tablet 81 mg  81 mg Oral QHS    pregabalin (LYRICA) capsule 50 mg  50 mg Oral BID    albuterol (PROVENTIL HFA, VENTOLIN HFA, PROAIR HFA) inhaler 1 Puff  1 Puff Inhalation Q6H PRN       Param Mauro NP  3/9/2022    Attending addendum:  Patient seen and examined independently, agree with above note. No obvious signs of infection CT reviewed and there is no signs of abscess or intraperitoneal infection or evisceration. The color and character of the fluid is similar to peritoneal fluid I suggested using an ostomy appliance to collect the drainage and prevent multiple dressing changes. This is nothing that should hold up his a discharge, and it should resolve on its own time.

## 2022-03-09 NOTE — PROGRESS NOTES
Problem: Mobility Impaired (Adult and Pediatric)  Goal: *Acute Goals and Plan of Care (Insert Text)  Description: FUNCTIONAL STATUS PRIOR TO ADMISSION: Patient was modified independent using a rolling walker for functional mobility. HOME SUPPORT PRIOR TO ADMISSION: The patient lived with spouse but did not require assist. Pt is spouse's primary caregiver (does not have to provide physical assistance). Pt's daughter and son in law are involved in family's care and checks on them daily and assists as needed    Physical Therapy Goals    Re-evaluation 3/8/2022  1. Patient will move from supine to sit and sit to supine  in bed with modified independence within 7 day(s). 2.  Patient will transfer from bed to chair and chair to bed with modified independence using the least restrictive device within 7 day(s). 3.  Patient will perform sit to stand with modified independence within 7 day(s). 4.  Patient will ambulate with modified independence for 200 feet with the least restrictive device within 7 day(s). 5.  Patient will ascend/descend 12 stairs with single handrail(s) with supervision/set-up within 7 day(s). Initiated 2/22/2022 reviewed 3/1/2022 and still appropriate  1. Patient will move from supine to sit and sit to supine  in bed with modified independence within 7 day(s). 2.  Patient will transfer from bed to chair and chair to bed with modified independence using the least restrictive device within 7 day(s). 3.  Patient will perform sit to stand with modified independence within 7 day(s). 4.  Patient will ambulate with modified independence for 200 feet with the least restrictive device within 7 day(s).    5.  Patient will ascend/descend 12 stairs with single handrail(s) with supervision/set-up within 7 day(s).'  Outcome: Progressing Towards Goal     PHYSICAL THERAPY TREATMENT  Patient: Kaylee Flowers (2 y.o. male)  Date: 3/9/2022  Diagnosis: Intestinal obstruction ([facility] [N86.735] <principal problem not specified>  Procedure(s) (LRB):  RIGHT POPLITEAL TO PERINEAL  BYPASS with vein AND RIGHT SECOND TOE AMPUTATION (Right) 2 Days Post-Op  Precautions: Fall,DNR  Chart, physical therapy assessment, plan of care and goals were reviewed. ASSESSMENT  Patient continues with skilled PT services and is progressing towards goals. Pt tolerates supine to sit with CGA and increased time. Pt tolerates sitting with increased assistance for posterior cleaning and for donning lotion. Pt tolerates stanidng x3 with mod-max assistance, noted buckling and overpressure on arms. Pt noted to be very swollen throughout groin and severely through L UE. Pt tolerates UE flex biceps, and grace punching with noted decrease in swelling very quickly. Pt tolerates transfer to the chair max A with increased buckling at this time. Add/abd of hips recommended for decreasing groin swelling. Will continue to follow. Current Level of Function Impacting Discharge (mobility/balance): Other factors to consider for discharge:          PLAN :  Patient continues to benefit from skilled intervention to address the above impairments. Continue treatment per established plan of care. to address goals. Recommendation for discharge: (in order for the patient to meet his/her long term goals)  Physical therapy at least 2 days/week in the home     This discharge recommendation:  Has been made in collaboration with the attending provider and/or case management    IF patient discharges home will need the following DME: rolling walker       SUBJECTIVE:   Patient stated I was doing better yesterday. I want to be independent so badly.     OBJECTIVE DATA SUMMARY:   Critical Behavior:  Neurologic State: Alert,Eyes open spontaneously  Orientation Level: Oriented X4  Cognition: Appropriate decision making,Appropriate for age attention/concentration,Appropriate safety awareness,Follows commands  Safety/Judgement: Insight into deficits  Functional Mobility Training:  Bed Mobility:  Rolling: Contact guard assistance  Supine to Sit: Contact guard assistance     Scooting: Contact guard assistance; Additional time        Transfers:  Sit to Stand: Moderate assistance;Maximum assistance  Stand to Sit: Moderate assistance        Bed to Chair: Maximum assistance; Moderate assistance (increased assist required today)                    Balance:  Sitting: Intact  Standing: Impaired;Pull to stand; With support  Standing - Static: Fair;Poor  Standing - Dynamic : Fair;Poor  Ambulation/Gait Training:              Pain Rating:  controlled    Activity Tolerance:   Fair, SpO2 stable on RA, and requires frequent rest breaks    After treatment patient left in no apparent distress:   Sitting in chair and Call bell within reach    COMMUNICATION/COLLABORATION:   The patients plan of care was discussed with: Registered nurse and Case management.      Varsha Hernandez, PT   Time Calculation: 41 mins

## 2022-03-09 NOTE — PROGRESS NOTES
Chart reviewed, patient with nursing just returned patient back to bed after sitting up for significant amount of time. Nursing asked to hold 2* about to go to CT for a scan and setup blood. Patient with noted L UE swelling. Patient stating no numbness tingling L UE. Instruction and demonstrated L UE > heart to decrease swelling, exercises 5 reps 1 set shoulder flexion, elbow. Nurse setup pillow as well. \"Believe me I want to work with you but now these ladies are working on me\". Patient highly motivated to return to independence. Will f/u.

## 2022-03-09 NOTE — WOUND CARE
WOCN Note:     Follow-up visit for toe. Followed by Dr. Juan C Serna, podiatry, & by Dr. Сергей Kumar, vascular.     Chart shows:  Admitted for obstruction. History of Covid-19 in 2021, falls, cellulitis  Admitted from home and lives with wife.     Assessment:   Appropriately conversational and reports no pain. Wearing brief and has a FlexiSeal that was inserted 2/27/22    Surface: total care foam mattress     Bilateral heels intact with no redness; Heels offloaded with pillows. Generalized edema & erythema to right foot.     1. POA dry eschar to right second toe  Now amputated with closed incision and dressed with Xeroform     2.  POA erosion to right 4th toe on lateral side  0.4 x 0.4 x 0.1 cm  100% soft tan  Bactroban applied     Wound Recommendations:    Right 4th toe: clean with saline, apply mupirocin and cover with dry gauze.  Change daily.     Transition of Care: Plan to follow weekly and as needed while admitted to hospital.     ANI Lovell, RN, [facility]  Certified Wound, Ostomy, Continence Nurse  office [phone]  Available via [address] [place] [address]

## 2022-03-09 NOTE — PROGRESS NOTES
Problem: Pressure Injury - Risk of  Goal: *Prevention of pressure injury  Description: Document Christiano Scale and appropriate interventions in the flowsheet. Outcome: Progressing Towards Goal  Note: Pressure Injury Interventions:  Sensory Interventions: Assess changes in LOC,Discuss PT/OT consult with provider,Keep linens dry and wrinkle-free,Maintain/enhance activity level    Moisture Interventions: Internal/External fecal devices,Internal/External urinary devices,Apply protective barrier, creams and emollients,Absorbent underpads    Activity Interventions: Pressure redistribution bed/mattress(bed type),Increase time out of bed,PT/OT evaluation    Mobility Interventions: Pressure redistribution bed/mattress (bed type),PT/OT evaluation,Float heels    Nutrition Interventions: Document food/fluid/supplement intake,Offer support with meals,snacks and hydration    Friction and Shear Interventions: Lift sheet,Lift team/patient mobility team,Feet elevated on foot rest,Apply protective barrier, creams and emollients                Problem: Patient Education: Go to Patient Education Activity  Goal: Patient/Family Education  Outcome: Progressing Towards Goal     Problem: Falls - Risk of  Goal: *Absence of Falls  Description: Document Al Fall Risk and appropriate interventions in the flowsheet.   Outcome: Progressing Towards Goal  Note: Fall Risk Interventions:  Mobility Interventions: Bed/chair exit alarm,Utilize gait belt for transfers/ambulation    Mentation Interventions: Update white board,Room close to nurse's station,Eyeglasses and hearing aids    Medication Interventions: Evaluate medications/consider consulting pharmacy    Elimination Interventions: Call light in reach,Patient to call for help with toileting needs    History of Falls Interventions: Bed/chair exit alarm

## 2022-03-09 NOTE — PROGRESS NOTES
Day #1 of Zosyn  Indication:  UTI  Current regimen:  3.375 gm q6h  Abx regimen: Monotherapy  Recent Labs     22  0413 22  0110   WBC 13.5* 16.2*   CREA 0.85 1.16   BUN 18 24*     Est CrCl: >20 ml/min;    Temp (24hrs), Av.2 °F (36.8 °C), Min:97.4 °F (36.3 °C), Max:98.5 °F (36.9 °C)    Cultures: Urine - pending     Plan: Change to 3.375 gm q8h    [place] Mix, PharmD

## 2022-03-09 NOTE — PROGRESS NOTES
Patient is having new drainage from midline incision at the bottom of incision above pubis ramus. Drainage is clear, yellow, and soaked through dressing, gown, pads, and sheets. Dry dressing placed, hospitalist paged. Urine cultures and abx ordered. Paging general surgery and urology now. No new pain.

## 2022-03-09 NOTE — PROGRESS NOTES
1 unit of PRBCs transfusion completed at 2135. Wrote completion date and time on paper slip. Transfusion started prior to night shift, date & time started not noted on paper slip from previous shift. Primary and secondary transfusionists also not signed off on slip.

## 2022-03-09 NOTE — PROGRESS NOTES
JOSE: anticipate d/c to SNF,referrals pending to [facility] in [facility]clink as well as [facility]ney in all scripts/careport; Negative PCR covid test 3/6; BLS Transport; Will need IMM letter prior to d/c     Pt will need [facility] for SNF placement    Primary contacts are: Esperanza Brunner [phone]  Daughter, Balta Pettit, [phone]    RUR: 21%    -Plan for CT scan to evaluate incisional drainage  -Pt in process of receiving PRBCs for low H/H  -Pt continues with a rosas catheter  -Vascular surgery following, s/p right pop. Bypass & second toe amputation  -Surgery following, s/p hemicolectomy  -PT/OT following    -1330-CM reviewed pt chart & discussed pt case in rounds. As per nurse, pt needs a CT scan today and is receiving a blood transfusion, hence is not medically ready for d/c. CM spoke with pt's son-in-law Deepti Caal in regards to follow up on SNF choices. Jorge to contact CM back shortly will choices. CM to follow. 1530-CM contacted pt's daughter, Balta Pettit in follow up to SNF choices, family is agreeable to: 1. [facility] 2. [facility]  3. [facility]. Referrals sent. CM to follow. June Zuleta RN BSN CCM  Transition of Care Plan:     The Plan for Transition of Care is related to the following treatment goals:SNF providers    The Patient and/or patient representative was provided with a choice of provider and agrees  with the discharge plan. Yes [x] No []    A Freedom of choice list was provided with basic dialogue that supports the patient's individualized plan of care/goals and shares the quality data associated with the providers.        Yes [x] No []

## 2022-03-09 NOTE — PROGRESS NOTES
Hospitalist Progress Note             Daily Progress Note: 3/9/2022    Primary care provider:Yamilka Edmonds NP    Date of admission: 2/21/2022  1:18 PM    Admission summery and hospital course:  HPI\"80year-old man with a past medical history significant for type 2 diabetes, multiple myeloma, hypertension, dyslipidemia, peripheral neuropathy, status post abdominal aortic aneurysm repair in 2012 and COVID-pneumonia.  Patient was recently admitted on 2/15 for management of cellulitis. Presented to the emergency room several days ago treated for diarrhea with Imodium, and now with progressive abdominal pain nausea vomiting. Patient seen in the ED today  for abdominal pain started 12 to 24 hours ago associated with vomiting and constipation.  The hospitalist team has been consulted to assist general surgery in medical management.   Patient examined at bedside.  Patient is ill-appearing, should not reports health has been declining since Covid diagnosis in December 2021.  Patient denies chest pain, dizziness, shortness of breath during examination.  Patient endorses abdominal pain nausea and vomiting.  Productive cough noted on evaluation, patient reports he has had a cough since Covid diagnosis and it is somewhat improved.  NG tube is in place draining dark brown bilious fluid.  Patient reports abdominal distention has improved some since placement of NG.  Patient's son-in-law is at bedside. \"  Subjective:   S/p prbc, hg improved, pt noted with drainage from abdominal incision, no fever,  Denies  abdominal pain ,nausea vomiting  Still has hematuria  BP improved after transffusion    Assessment/Plan:   Small bowel obstruction due to invasive colon adenocarcinoma   -Underwent right hemicolectomy 2/24. Evidence of adenocarcinoma, +7/14 lymph nodes  -Continue GI lite diet    -Oncology consult 3/1. Known to I, Dr. Zehra Coronel.  Will follow up outpatient  -pain control   -Drainage from mid abdominal incision,noted , cx drainage,wbc 13 down from 16,no fever  -check CT abd/pelvis  -start antibiotic  -surgery called ,appreciate eval,may need to place ostomy for drainage collection     Hypotension   -pt with drainage from surgical incision,hematuria   -s/p prbc, abx, check pro bryan for risks of sirs/sepsis  -check urine ,blood ,wound cultures     Acute on  Chronic  anemia   symptomatic with hypotension, hematuria  -hgb 6.9, s/p 1 unit  PRBC hg improved 7.9  - check iron studies  -monitor h/h      PVD  Right second toe dry gangrene  Right lower extremity arterial occlusion     -MRI with evidence of osteomyelitis. More consistent with dry gangrene.   -Antibiotics not required. D/c 3/2  -MARYURI with moderate to severe arterial occlusion  -Right arteriogram 2/28 with popliteal occlusion, unable to cross occlusion. -podiatry signed off   -vascular consulted,s/p right popliteal bypass-possible second Right great toe amputation   -pain controlled  -PT today ,per vascular      Gross hematuria noted in rosas post op surgery   -urology consult appreciated, likely dur to rosas placement with enlarged porstate, recc   manual irrigation,monitor for bright red bleeding and clots, once UA is pink,no clots ok to d/c rosas   with voiding trial   -no flomax due to hypotension, start once bp is stable  -MRI w/c for further eval once stable     Acute metabolic encephalopathy: resolved  -Multifactorial in setting of recent surgery, narcotics, hospitalization, infection  -monitor closely, supportive care  -limit narcotics as able    History of Type 2 diabetes mellitus, Ha1c 6.3   -continue accuchecks q6 hours       History of Covid infection  Pneumonia  -CXR with scattered opacities, concerning for PNA. -s/p treatment        Peripheral neuropathy: can resume pregabalin.   Hypertension: Blood pressure is reasonable now.   Continue to hold Lasix  Failure to thrive, Protein calorie malnutrition: Nutrition consult    Recurrent falls: PT/OT  Hypokalemia: Replace and monitor  Insomnia: PRN low-dose trazodone i    Palliative care consulted 3/    See orders for other plans. VTE prophylaxis:hold  heparin due to anemia/hematuria  Code status: DNR. Discussed plan of care with Patient/Family and Nurse  Discharge planning: PT/OT , SNF vs home w/HH       Review of Systems:     Review of Systems:  As per HPI             Objective:   Physical Exam:     Visit Vitals  BP (!) 102/55 (BP 1 Location: Left upper arm, BP Patient Position: At rest)   Pulse 78   Temp 98 °F (36.7 °C)   Resp 18   Ht 6' 1\" (1.854 m)   Wt 76.6 kg (168 lb 14 oz)   SpO2 95%   BMI 22.28 kg/m²    O2 Flow Rate (L/min): 2 l/min O2 Device: None (Room air)    Temp (24hrs), Av.2 °F (36.8 °C), Min:97.4 °F (36.3 °C), Max:98.5 °F (36.9 °C)    No intake/output data recorded.  1901 -  0700  In: 330.8   Out:  [Urine:2000]      General:   NAD no acute distress   HEENT: anicteric sclerae   Lungs:    Clear to auscultation bilaterally, normal work of breathing. Chest wall:   No tenderness or deformity. Heart:  Regular rate and rhythm, S1, S2 normal, no murmur. No edema    Abdomen:   Soft, central bandage, +bowel sounds, serous drainage from incision distally   Extremities:  Right foot with dressing in place, dry ,no active bleeding    Neurologic:  grossly non-focal,awake alert      Data Review:       Recent Days:  Recent Labs     22  0413 22  2249 22  1416 22  0110 22  0110   WBC 13.5*  --   --   --  16.2*   HGB 7.9* 7.2* 6.9*   < > 8.0*   HCT 25.6* 23.4* 22.3*   < > 26.1*     --   --   --  258    < > = values in this interval not displayed. Recent Labs     22  0413 22  0110    143   K 3.4* 3.7   * 117*   CO2 24 23   GLU 82 109*   BUN 18 24*   CREA 0.85 1.16   CA 7.1* 7.6*     No results for input(s): PH, PCO2, PO2, HCO3, FIO2 in the last 72 hours.     24 Hour Results:  Recent Results (from the past 24 hour(s)) HGB & HCT    Collection Time: 03/08/22  2:16 PM   Result Value Ref Range    HGB 6.9 (L) 12.1 - 17.0 g/dL    HCT 22.3 (L) 36.6 - 50.3 %   RBC, ALLOCATE    Collection Time: 03/08/22  3:30 PM   Result Value Ref Range    HISTORY CHECKED?  Historical check performed    TYPE & SCREEN    Collection Time: 03/08/22  4:06 PM   Result Value Ref Range    Crossmatch Expiration 03/11/2022,2359     ABO/Rh(D) A NEGATIVE     Antibody screen NEG     Unit number [id number]     Blood component type  LR     Unit division 00     Status of unit TRANSFUSED     Crossmatch result Compatible    HGB & HCT    Collection Time: 03/08/22 10:49 PM   Result Value Ref Range    HGB 7.2 (L) 12.1 - 17.0 g/dL    HCT 23.4 (L) 36.6 - 50.3 %   CBC W/O DIFF    Collection Time: 03/09/22  4:13 AM   Result Value Ref Range    WBC 13.5 (H) 4.1 - 11.1 K/uL    RBC 2.54 (L) 4.10 - 5.70 M/uL    HGB 7.9 (L) 12.1 - 17.0 g/dL    HCT 25.6 (L) 36.6 - 50.3 %    .8 (H) 80.0 - 99.0 FL    MCH 31.1 26.0 - 34.0 PG    MCHC 30.9 30.0 - 36.5 g/dL    RDW 17.2 (H) 11.5 - 14.5 %    PLATELET 793 535 - 257 K/uL    MPV 10.7 8.9 - 12.9 FL    NRBC 0.0 0  WBC    ABSOLUTE NRBC 0.00 0.00 - 4.32 K/uL   METABOLIC PANEL, BASIC    Collection Time: 03/09/22  4:13 AM   Result Value Ref Range    Sodium 145 136 - 145 mmol/L    Potassium 3.4 (L) 3.5 - 5.1 mmol/L    Chloride 119 (H) 97 - 108 mmol/L    CO2 24 21 - 32 mmol/L    Anion gap 2 (L) 5 - 15 mmol/L    Glucose 82 65 - 100 mg/dL    BUN 18 6 - 20 MG/DL    Creatinine 0.85 0.70 - 1.30 MG/DL    BUN/Creatinine ratio 21 (H) 12 - 20      GFR est AA >60 >60 ml/min/1.73m2    GFR est non-AA >60 >60 ml/min/1.73m2    Calcium 7.1 (L) 8.5 - 10.1 MG/DL       Problem List:  Problem List as of 3/9/2022 Date Reviewed: 2/28/2022          Codes Class Noted - Resolved    Malignant neoplasm of ascending colon ([facility] ICD-10-CM: C18.2  ICD-9-CM: 153.6  2/28/2022 - Present        Failure to thrive (child) ICD-10-CM: R62.51  ICD-9-CM: 783.41  2/21/2022 - Present        Falls frequently ICD-10-CM: R29.6  ICD-9-CM: V15.88  2/21/2022 - Present        Moderate protein-calorie malnutrition ([facility] ICD-10-CM: E44.0  ICD-9-CM: 263.0  2/21/2022 - Present        Acute renal injury ([facility] ICD-10-CM: N17.9  ICD-9-CM: 584.9  2/21/2022 - Present        Cellulitis of right leg ICD-10-CM: L03.115  ICD-9-CM: 682.6  2/12/2022 - Present        Pneumonia due to COVID-19 virus ICD-10-CM: U07.1, J12.82  ICD-9-CM: 480.8, 079.89  12/29/2021 - Present        Personal history of atrial fibrillation ICD-10-CM: Z86.79  ICD-9-CM: V12.59  11/9/2020 - Present        Carotid stenosis, asymptomatic, right ICD-10-CM: I65.21  ICD-9-CM: 433.10  5/29/2020 - Present        Hyperlipemia, mixed ICD-10-CM: E78.2  ICD-9-CM: 272.2  9/27/2018 - Present        Aortic stenosis, moderate ICD-10-CM: I35.0  ICD-9-CM: 424.1  4/10/2018 - Present        Mild concentric left ventricular hypertrophy (LVH) ICD-10-CM: I51.7  ICD-9-CM: 429.3  4/10/2018 - Present        Chronic diarrhea ICD-10-CM: K52.9  ICD-9-CM: 787.91  9/29/2017 - Present        Microalbuminuria ICD-10-CM: R80.9  ICD-9-CM: 791.0  12/10/2015 - Present        Right inguinal hernia ICD-10-CM: K40.90  ICD-9-CM: 550.90  11/2/2015 - Present        Left inguinal hernia ICD-10-CM: K40.90  ICD-9-CM: 550.90  9/14/2015 - Present        Multiple myeloma ([facility] ICD-10-CM: C90.00  ICD-9-CM: 203.00  12/27/2012 - Present        Hypercalcemia ICD-10-CM: B00.95  ICD-9-CM: 275.42  4/25/2012 - Present    Overview Signed 4/25/2012  5:49 PM by Toby Danielle MD     2012, PTH normal, abnormal protein electrophoresis, referred to Hem-Onc             S/P AAA repair 2/9/2012 ICD-10-CM: U56.240, Z86.79  ICD-9-CM: V45.89  2/22/2012 - Present    Overview Signed 2/22/2012  1:38 PM by MD Dr Vira Tyson             Pre-diabetes ICD-10-CM: R73.03  ICD-9-CM: 790.29  9/7/2010 - Present    Overview Addendum 11/1/2011  9:29 AM by Toby Danielle MD 2005; Optho Dr Raul Mitchell             Sinus tachycardia ICD-10-CM: R00.0  ICD-9-CM: 427.89  9/7/2010 - Present        H/O Heavy Alcohol Use ICD-9-CM: Chelle Nikko  9/7/2010 - Present        Vitamin B12 deficiency ICD-10-CM: E53.8  ICD-9-CM: 266.2  9/7/2010 - Present        Hyperhomocysteinemia ([facility] ICD-10-CM: E72.11  ICD-9-CM: 270.4  9/7/2010 - Present        Mild Allergic Rhinitis ICD-10-CM: J30.9  ICD-9-CM: 477.9  9/7/2010 - Present        History of skin cancer ICD-10-CM: Z85.828  ICD-9-CM: V10.83  9/7/2010 - Present    Overview Signed 9/7/2010  9:03 AM by Lan Quezada MD     Followed by Valerio Conner             HTN (hypertension) ICD-10-CM: I10  ICD-9-CM: 401.9  4/6/2010 - Present    Overview Signed 9/7/2010  8:55 AM by Lan Quezada MD     1990             H/O Carotid Stenosis ICD-10-CM: I65.29  ICD-9-CM: 433.10  4/6/2010 - Present    Overview Addendum 9/7/2010  8:58 AM by Lan Quezada MD     Oct 2003; 50%left subclavian and 50-70% SHEILA              GERD (gastroesophageal reflux disease) ICD-10-CM: K21.9  ICD-9-CM: 530.81  4/6/2010 - Present        RESOLVED: Intestinal adhesions with complete obstruction ([facility] ICD-10-CM: K56.52  ICD-9-CM: 560.81  2/21/2022 - 2/28/2022        RESOLVED: Intestinal obstruction ([facility] ICD-10-CM: G60.116  ICD-9-CM: 560.9  2/21/2022 - 2/24/2022        RESOLVED: Myeloma ([facility] ICD-10-CM: C90.00  ICD-9-CM: 203.00  1/6/2015 - 6/10/2016        RESOLVED: Gastrointestinal bleeding ICD-10-CM: K92.2  ICD-9-CM: 578.9  12/26/2014 - 2/28/2018        RESOLVED: Annamarie Bhakta stone pancreatitis ICD-10-CM: K85.10  ICD-9-CM: 577.0, 574.20  1/23/2014 - 2/28/2018        RESOLVED: Acute cholecystitis ICD-10-CM: K81.0  ICD-9-CM: 575.0  1/23/2014 - 2/28/2018        RESOLVED: Abnormal serum protein electrophoresis ICD-10-CM: R77.8  ICD-9-CM: 790.99  4/25/2012 - 2/28/2018    Overview Signed 4/25/2012  5:48 PM by Lan Quezada MD     4/2012, referred to Hem-Onc RESOLVED: Atrial fibrillation 1/2012 ICD-10-CM: I48.91  ICD-9-CM: 427.31  2/22/2012 - 11/9/2020    Overview Signed 2/22/2012  1:33 PM by Holli Su MD     Cardio Dr Barbie Urias: AAA (abdominal aortic aneurysm) [facility]) ICD-10-CM: I71.4  ICD-9-CM: 441.4  1/6/2012 - 9/26/2018    Overview Signed 1/6/2012  2:07 PM by Holli Su MD     Detected on plain films back xray 1/2012; Ct scheduled             RESOLVED: Anemia ICD-10-CM: D64.9  ICD-9-CM: 285.9  1/5/2012 - 2/28/2018        RESOLVED: Hypertriglyceridemia ICD-10-CM: E78.1  ICD-9-CM: 272.1  9/7/2010 - 9/27/2018    Overview Signed 9/7/2010  8:55 AM by Holli Su MD     2003             RESOLVED: Bilateral Carotid Bruits, L>R ICD-10-CM: R09.89  ICD-9-CM: 785.9  9/7/2010 - 2/28/2018    Overview Signed 9/7/2010  8:57 AM by Holli Su MD     10/03             RESOLVED: Anemia due to GI bleed 2007 ICD-10-CM: D64.9  ICD-9-CM: 285.9  4/6/2010 - 2/28/2018        RESOLVED: GI bleed, duodenitis 2007 ICD-10-CM: K92.2  ICD-9-CM: 578.9  4/6/2010 - 2/28/2018    Overview Addendum 9/7/2010  9:01 AM by Holli Su MD     8/07 Duodenitis                       Medications reviewed  Current Facility-Administered Medications   Medication Dose Route Frequency    piperacillin-tazobactam (ZOSYN) 3.375 g in 0.9% sodium chloride (MBP/ADV) 100 mL MBP  3.375 g IntraVENous Q8H    potassium chloride SR (KLOR-CON 10) tablet 40 mEq  40 mEq Oral DAILY    0.9% sodium chloride infusion  125 mL/hr IntraVENous CONTINUOUS    sodium chloride 0.9 % bolus infusion 500 mL  500 mL IntraVENous CONTINUOUS    0.9% sodium chloride infusion 250 mL  250 mL IntraVENous PRN    diphenhydrAMINE (BENADRYL) capsule 25 mg  25 mg Oral Q6H PRN    traZODone (DESYREL) tablet 50 mg  50 mg Oral QHS PRN    pantoprazole (PROTONIX) tablet 40 mg  40 mg Oral ACB    acetaminophen (TYLENOL) tablet 650 mg  650 mg Oral TID    oxyCODONE IR (ROXICODONE) tablet 2.5-5 mg  2.5-5 mg Oral Q4H PRN    traMADoL (ULTRAM) tablet 50 mg  50 mg Oral Q6H PRN    zinc oxide-cod liver oil (DESITIN) 40 % paste   Topical PRN    heparin (porcine) injection 5,000 Units  5,000 Units SubCUTAneous Q8H    mupirocin (BACTROBAN) 2 % ointment   Topical DAILY    phenol throat spray (CHLORASEPTIC) 1 Spray  1 Spray Oral PRN    HYDROmorphone (DILAUDID) injection 0.5-1 mg  0.5-1 mg IntraVENous Q2H PRN    ondansetron (ZOFRAN) injection 4 mg  4 mg IntraVENous Q4H PRN    acetaminophen (TYLENOL) tablet 650 mg  650 mg Oral Q6H PRN    hydrALAZINE (APRESOLINE) 20 mg/mL injection 20 mg  20 mg IntraVENous Q6H PRN    glucose chewable tablet 16 g  4 Tablet Oral PRN    dextrose 10 % infusion 0-250 mL  0-250 mL IntraVENous PRN    glucagon (GLUCAGEN) injection 1 mg  1 mg IntraMUSCular PRN    aspirin delayed-release tablet 81 mg  81 mg Oral QHS    pregabalin (LYRICA) capsule 50 mg  50 mg Oral BID    albuterol (PROVENTIL HFA, VENTOLIN HFA, PROAIR HFA) inhaler 1 Puff  1 Puff Inhalation Q6H PRN       Care Plan discussed with: patient/nursing     Amanda Rm MD

## 2022-03-09 NOTE — PROGRESS NOTES
Vascular:    Awake, alert, comfortable    Right leg incisions OK, graft pulse palpable    Hgb 7.9    Doing well post right leg bypass and toe amp - continue to mobilize with PT. Will order regular diet and see how he does with it.

## 2022-03-10 NOTE — PROGRESS NOTES
General Surgery Progress Note           HPI:  Selvin Mishra is a 80 y. o.male resting calmly bed when initially saw him, continues to have a drainage from his mid abdominal incision but there is no tenderness or pain in his abdomen he is having normal bowel movements and no fevers or chills or other issues. Objective     Physical Exam  Vitals and nursing note reviewed. Constitutional:       General: He is not in acute distress. Appearance: Normal appearance. He is not ill-appearing or toxic-appearing. HENT:      Head: Normocephalic and atraumatic. Right Ear: External ear normal.      Left Ear: External ear normal.      Mouth/Throat:      Mouth: Mucous membranes are moist.      Pharynx: Oropharynx is clear. No posterior oropharyngeal erythema. Eyes:      Extraocular Movements: Extraocular movements intact. Cardiovascular:      Rate and Rhythm: Normal rate and regular rhythm. Pulses: Normal pulses. Pulmonary:      Effort: Pulmonary effort is normal. No respiratory distress. Abdominal:      General: Abdomen is flat. There is no distension. Palpations: Abdomen is soft. Tenderness: There is no abdominal tenderness. There is no rebound. Hernia: No hernia is present. Comments: Incision clean and intact small amounts of peritoneal appearing fluid from the inferior aspect of the incision no signs of infection   Musculoskeletal:      Cervical back: Neck supple. Skin:     General: Skin is warm and dry. Neurological:      General: No focal deficit present. Mental Status: He is alert and oriented to person, place, and time.    Psychiatric:         Mood and Affect: Mood normal.         Behavior: Behavior normal.         Labs:  Recent Results (from the past 24 hour(s))   GLUCOSE, POC    Collection Time: 03/09/22  4:42 PM   Result Value Ref Range    Glucose (POC) 127 (H) 65 - 117 mg/dL    Performed by [address], BASIC    Collection Time: 03/10/22 12:58 AM   Result Value Ref Range    Sodium 145 136 - 145 mmol/L    Potassium 3.4 (L) 3.5 - 5.1 mmol/L    Chloride 118 (H) 97 - 108 mmol/L    CO2 23 21 - 32 mmol/L    Anion gap 4 (L) 5 - 15 mmol/L    Glucose 92 65 - 100 mg/dL    BUN 14 6 - 20 MG/DL    Creatinine 0.74 0.70 - 1.30 MG/DL    BUN/Creatinine ratio 19 12 - 20      GFR est AA >60 >60 ml/min/1.73m2    GFR est non-AA >60 >60 ml/min/1.73m2    Calcium 7.0 (L) 8.5 - 10.1 MG/DL   CBC W/O DIFF    Collection Time: 03/10/22 12:58 AM   Result Value Ref Range    WBC 16.6 (H) 4.1 - 11.1 K/uL    RBC 3.22 (L) 4.10 - 5.70 M/uL    HGB 9.9 (L) 12.1 - 17.0 g/dL    HCT 31.9 (L) 36.6 - 50.3 %    MCV 99.1 (H) 80.0 - 99.0 FL    MCH 30.7 26.0 - 34.0 PG    MCHC 31.0 30.0 - 36.5 g/dL    RDW 17.7 (H) 11.5 - 14.5 %    PLATELET 799 668 - 423 K/uL    MPV 11.0 8.9 - 12.9 FL    NRBC 0.0 0  WBC    ABSOLUTE NRBC 0.00 0.00 - 0.01 K/uL   IRON PROFILE    Collection Time: 03/10/22 12:58 AM   Result Value Ref Range    Iron 106 35 - 150 ug/dL    TIBC 188 (L) 250 - 450 ug/dL    Iron % saturation 56 (H) 20 - 50 %   GLUCOSE, POC    Collection Time: 03/10/22 11:55 AM   Result Value Ref Range    Glucose (POC) 140 (H) 65 - 117 mg/dL    Performed by Renetta Mac         Lab results have been reviewed by myself as of March 10, 2022     Vital Signs:  Patient Vitals for the past 24 hrs:   BP Temp Pulse Resp SpO2   03/10/22 1410 106/61 97.3 °F (36.3 °C) 78 18 93 %   03/10/22 0824 125/60 97.4 °F (36.3 °C) 81 18 93 %   03/10/22 0053 (!) 132/56 97.7 °F (36.5 °C) 84 18 95 %   03/09/22 1615 115/64 97.5 °F (36.4 °C) 73 16 95 %        Intake/Output this shift:    Intake/Output Summary (Last 24 hours) at 3/10/2022 1612  Last data filed at 3/10/2022 0703  Gross per 24 hour   Intake 287.9 ml   Output 2650 ml   Net -2362.1 ml        Malignant neoplasm of ascending colon (HCC)  Overall doing well postoperatively staples still in place there is likely peritoneal fluid from the abdominal incision which is consistent with a small fascial dehiscence and ascites, no obvious herniation either on physical exam or the CT scan there is no signs of infection on physical exam, or CT. Recommend ostomy appliance to control drainage and this should close with time. This should not hold up discharge. Cellulitis of right leg  postop popliteal to peroneal bypass foot doing well    Intestinal adhesions with complete obstruction (HCC)-resolved as of 2/28/2022           This documentation was facilitated by voice recognition software and may contain inadvertent typographical errors. If there are substantial concerns about the content of this note that may affect patient care, please contact me for clarification.

## 2022-03-10 NOTE — PROGRESS NOTES
Physical Therapy  Met with patient who was resting in bed. He very politely declined PT this pm stating very fatigued from yesterday and being up this morning. He indicated working with UE's to help with edema. Stated legs \"numb\" after sitting with feet elevated high on chair. Will attempt to locate recliner chair for more comfortable seating. Will follow up tomorrow.   Carlton Landin, PT

## 2022-03-10 NOTE — PROGRESS NOTES
JOSE: anticipate d/c to SNF,referrals status as follows:   [facility] declined  [facility] pending  [facility] accepted  Negative PCR covid test 3/6; BLS Transport; Will need IMM letter prior to d/c      Pt will need [address] for SNF placement     Primary contacts are: Kristen Child [phone]  Daughter, Chris Cortes, [phone]     RUR: 21%     -Plan for CT scan to evaluate incisional drainage  -Pt in process of receiving PRBCs for low H/H  -Pt continues with a rosas catheter  -Vascular surgery following, s/p right pop. Bypass & second toe amputation  -Surgery following, s/p hemicolectomy  -PT/OT following    1300-CM reviewed pt chart & sent perfect serve to Attending to check status of pt's medical stability for d/c. CM noted h/h improved today to 9.9/31/9, however nurse advised pt is still having abdominal incisional drainage, surgery to evaluate. SNFs referral status as above. CM to follow. 1530-CM spoke with Attending in regards to pt's status and she advised pt is having hematuria as well as abdominal incisional drainage and hence is not medically ready for d/c yet. CM to follow.   Taylor Hewitt RN BSN CCM

## 2022-03-10 NOTE — PROGRESS NOTES
Problem: Self Care Deficits Care Plan (Adult)  Goal: *Acute Goals and Plan of Care (Insert Text)  Description: FUNCTIONAL STATUS PRIOR TO ADMISSION: Pt was Mod I with RW and rollator for functional mobility and independent in ADLs. 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 HOME SUPPORT: The patient lived with wife but did not require assist. Children live near by and are able to provide some support. Occupational Therapy Goals  Initiated 3/1/2022, Weekly Re-assessment 3/8/2022 continued  1. Patient will perform grooming standing at sink with minimal assistance/contact guard assist within 7 day(s). 2.  Patient will perform UB and LB bathing with minimal assistance/contact guard assist within 7 day(s). 3.  Patient will perform lower body dressing with minimal assistance/contact guard assist within 7 day(s). 4.  Patient will perform toilet transfers with minimal assistance/contact guard assist within 7 day(s). 5.  Patient will participate in upper extremity therapeutic exercise/activities with modified independence for 10 minutes within 7 day(s). 6.  Patient will utilize energy conservation techniques during functional activities with verbal cues within 7 day(s). Outcome: Progressing Towards Goal   OCCUPATIONAL THERAPY TREATMENT  Patient: Alpesh Ku (22 y.o. male)  Date: 3/10/2022  Diagnosis: Intestinal obstruction ([facility] [K56.609] <principal problem not specified>  Procedure(s) (LRB):  RIGHT POPLITEAL TO PERINEAL  BYPASS with vein AND RIGHT SECOND TOE AMPUTATION (Right) 3 Days Post-Op  Precautions: Fall,DNR  Chart, occupational therapy assessment, plan of care, and goals were reviewed. ASSESSMENT  Patient continues with skilled OT services and is progressing towards goals. ADLs limited by strength, functional reach, overall endurance, swelling ( L UE, groin, LEs L > R); cognition (STM loss, complex processing, application of safety techniques v.  Anxiety v. Fear of falling), wound management (R foot, drain leaking, rosas cath, rectal tube), and static standing balance therefore dynamic. Current Level of Function Impacting Discharge (ADLs): min assistance overall upper body ADLs, max assistance overall lower body ADLs, sit<>stand max A, bed mobility moderate assistance, standing tolerance ~10 seconds. Other factors to consider for discharge: wife elderly         PLAN :  Patient continues to benefit from skilled intervention to address the above impairments. Continue treatment per established plan of care to address goals. Recommend with staff: continue OOB to chair with Carolyn Lift, completing ADLs as able    Recommend next OT session: lower body ADLs, standing ADL    Recommendation for discharge: (in order for the patient to meet his/her long term goals)  Therapy 3 hours per day 5-7 days per week  As patient can tolerate 3 hours of therapy. If not an option will need rehab as patient is not able to manage at home, highly motivated to be independent, requires medical management, and therapy. This discharge recommendation:  Has not yet been discussed the attending provider and/or case management    IF patient discharges home will need the following DME: TBD       SUBJECTIVE:   Patient stated I want to do what I can. ... I have been doing this every chance I get (exercise L UE).     OBJECTIVE DATA SUMMARY:   Cognitive/Behavioral Status:                      Functional Mobility and Transfers for ADLs:  Bed Mobility:  Sit to Supine: Moderate assistance (A B LEs)  Instruction on benefits log rolling for increased independence and decrease pain, patient declined 2* preferred assistance as has in past with LEs.   Transfers:  Sit to Stand: Maximum assistance (chair)  Scoot to edge, nose over toes, hands on chair, gait belt, lines organized first 2* patient attempting to stand throughout     Bed to Chair: Moderate assistance (RW, physical assist, verbal cues) gait belt    Instruction throughout as to technique to increase safety, independence, and prevent increased pain [id number] with moderate verbal cues step by step. Balance:  Sitting: Intact; Without support  Standing: Impaired; Without support  Standing - Static: Poor  Standing - Dynamic : Poor    ADL Intervention:                       Patient received desiring to go back to bed after sitting up >4 hours. Completed upper body ADLs and breakfast.   Instruction to attempt as would at home. Lower Body Dressing Assistance  Socks: Maximum assistance  Shoes with Velcro: Maximum assistance  Slip on Shoes with Back: Maximum assistance  Position Performed: Seated edge of bed         Cognitive Retraining  Problem Solving: Identifying the task; Identifying the problem;General alternative solution  Attention to Task: Multi-task    Therapeutic Exercises:   Patient demonstrated shoulder flexion, elbow flexion 10 reps 1 set v. Gravity. Instruction to elevate, propped with pillows in bed. Patient agreed would help. Limited recall from yesterday     Pain:  R foot    Activity Tolerance:   Good    After treatment patient left in no apparent distress:   Supine in bed, Heels elevated for pressure relief, Call bell within reach and Side rails x 3    COMMUNICATION/COLLABORATION:   The patients plan of care was discussed with: Physical therapist and Registered nurse.      Gordon Nash  Time Calculation: 14 mins

## 2022-03-10 NOTE — PROGRESS NOTES
Hospitalist Progress Note             Daily Progress Note: 3/10/2022    Primary care provider:Linda Edmonds NP    Date of admission: 2/21/2022  1:18 PM    Admission summery and hospital course:  HPI\"80year-old man with a past medical history significant for type 2 diabetes, multiple myeloma, hypertension, dyslipidemia, peripheral neuropathy, status post abdominal aortic aneurysm repair in 2012 and COVID-pneumonia.  Patient was recently admitted on 2/15 for management of cellulitis. Presented to the emergency room several days ago treated for diarrhea with Imodium, and now with progressive abdominal pain nausea vomiting. Patient seen in the ED today  for abdominal pain started 12 to 24 hours ago associated with vomiting and constipation.  The hospitalist team has been consulted to assist general surgery in medical management.   Patient examined at bedside.  Patient is ill-appearing, should not reports health has been declining since Covid diagnosis in December 2021.  Patient denies chest pain, dizziness, shortness of breath during examination.  Patient endorses abdominal pain nausea and vomiting.  Productive cough noted on evaluation, patient reports he has had a cough since Covid diagnosis and it is somewhat improved.  NG tube is in place draining dark brown bilious fluid.  Patient reports abdominal distention has improved some since placement of NG.  Patient's son-in-law is at bedside. \"  Subjective:     Pt c/o pain rt leg s/p vascular surgery left leg  Post op rosas insertion ,with hematuria  Has drianage from abdominal incision, post op hemicolectomy  Anemia improved, on prbc ,iron  BP improved after transffusion    Assessment/Plan:   Small bowel obstruction due to invasive colon adenocarcinoma   -Underwent right hemicolectomy 2/24. Evidence of adenocarcinoma, +7/14 lymph nodes  -Continue GI lite diet    -Oncology consult 3/1. Known to I, Dr. Bridgett Esposito.  Will follow up outpatient  -pain control   3/9/22-Drainage from mid abdominal incision,noted , cx drainage,wbc 13 down from 16,no fever-CT abd/pelvis show no hematoma or drain able fluid  -on empiric IV Zosyn, f/u cultures  -wbc is 16k <--13,no fever  -surgery f/u ,appreciate eval,may need to place ostomy for drainage collection     PVD  Right second toe dry gangrene  Right lower extremity arterial occlusion     -MRI with evidence of osteomyelitis. More consistent with dry gangrene.   -Antibiotics not required. D/c 3/2  -MARYURI with moderate to severe arterial occlusion  -Right arteriogram 2/28 with popliteal occlusion, unable to cross occlusion. -podiatry signed off   -vascular consulted,  3/7-s/p right popliteal bypass-possible second Right great toe amputation   -pain controlled  -PT  ,per vascular    Hypotension ,resolved  -pt with drainage from surgical incision,hematuria   -s/p prbc, abx, pro ca lowl for risks of sirs/sepsis  -f/u ,blood ,wound cultures so far neg,  -d/c ivf    Acute on  Chronic  anemia   symptomatic with hypotension, hematuria  -hgb 6.9, s/p 1 unit  PRBC hg improved 7.9  -venofer x 1 dose, hgb today 9. .9  -monitor h/h      Gross hematuria noted in rosas post op surgery   -urology consult appreciated, likely dur to rosas placement with enlarged porstate, recc   manual irrigation,monitor for bright red bleeding and clots, once UA is pink,no clots ok to d/c rosas   with voiding trial   -no flomax due to hypotension, start once bp is stable  -MRI w/c for further eval once stable     Acute metabolic encephalopathy: resolved  -Multifactorial in setting of recent surgery, narcotics, hospitalization, infection  -monitor closely, supportive care  -limit narcotics as able    History of Type 2 diabetes mellitus, Ha1c 6.3   -continue accuchecks q6 hours     L arm swelling   -doppler neg for DVT    History of Covid infection  Pneumonia  -CXR with scattered opacities, concerning for PNA.    -s/p treatment    Peripheral neuropathy: can resume pregabalin.   Hypertension: Blood pressure is reasonable now. Continue to hold Lasix  Failure to thrive, Protein calorie malnutrition: Nutrition consult    Recurrent falls: PT/OT  Hypokalemia: Replace and monitor  Insomnia: PRN low-dose trazodone i    Palliative care consulted 3/    See orders for other plans. VTE prophylaxis:hold  heparin due to anemia/hematuria  Code status: DNR. Discussed plan of care with Patient/Family and Nurse  Discharge planning: PT/OT , SNF vs IPR,  Once clinically  stable        Review of Systems:     Review of Systems:  As per HPI             Objective:   Physical Exam:     Visit Vitals  /61 (BP 1 Location: Left lower arm, BP Patient Position: At rest)   Pulse 78   Temp 97.3 °F (36.3 °C)   Resp 18   Ht 6' 1\" (1.854 m)   Wt 76.6 kg (168 lb 14 oz)   SpO2 93%   BMI 22.28 kg/m²    O2 Flow Rate (L/min): 2 l/min O2 Device: None (Room air)    Temp (24hrs), Av.5 °F (36.4 °C), Min:97.3 °F (36.3 °C), Max:97.8 °F (36.6 °C)    03/10 0701 - 03/10 1900  In: -   Out: 250 [Urine:250]   1901 - 03/10 0700  In: 618.7   Out: 3300 [Urine:1850; Drains:1450]      General:   NAD no acute distress   HEENT: anicteric sclerae   Lungs:    Clear to auscultation bilaterally, normal work of breathing. Chest wall:   No tenderness or deformity. Heart:  Regular rate and rhythm, S1, S2 normal, no murmur. No edema    Abdomen:   Soft, central bandage, +bowel sounds, serous drainage from incision distally   Extremities:  Right foot with dressing in place, dry ,no active bleeding   Rt leg incisions intact, mild erythema ,   Neurologic:  grossly non-focal,awake alert      Data Review:       Recent Days:  Recent Labs     03/10/22  0058 22  0413 22  2249 22  1416 22  0110   WBC 16.6* 13.5*  --   --  16.2*   HGB 9.9* 7.9* 7.2*   < > 8.0*   HCT 31.9* 25.6* 23.4*   < > 26.1*    210  --   --  258    < > = values in this interval not displayed. Recent Labs     03/10/22  0058 03/09/22  0413 03/08/22  0110    145 143   K 3.4* 3.4* 3.7   * 119* 117*   CO2 23 24 23   GLU 92 82 109*   BUN 14 18 24*   CREA 0.74 0.85 1.16   CA 7.0* 7.1* 7.6*     No results for input(s): PH, PCO2, PO2, HCO3, FIO2 in the last 72 hours.     24 Hour Results:  Recent Results (from the past 24 hour(s))   GLUCOSE, POC    Collection Time: 03/09/22  4:42 PM   Result Value Ref Range    Glucose (POC) 127 (H) 65 - 117 mg/dL    Performed by [employer]    METABOLIC PANEL, BASIC    Collection Time: 03/10/22 12:58 AM   Result Value Ref Range    Sodium 145 136 - 145 mmol/L    Potassium 3.4 (L) 3.5 - 5.1 mmol/L    Chloride 118 (H) 97 - 108 mmol/L    CO2 23 21 - 32 mmol/L    Anion gap 4 (L) 5 - 15 mmol/L    Glucose 92 65 - 100 mg/dL    BUN 14 6 - 20 MG/DL    Creatinine 0.74 0.70 - 1.30 MG/DL    BUN/Creatinine ratio 19 12 - 20      GFR est AA >60 >60 ml/min/1.73m2    GFR est non-AA >60 >60 ml/min/1.73m2    Calcium 7.0 (L) 8.5 - 10.1 MG/DL   CBC W/O DIFF    Collection Time: 03/10/22 12:58 AM   Result Value Ref Range    WBC 16.6 (H) 4.1 - 11.1 K/uL    RBC 3.22 (L) 4.10 - 5.70 M/uL    HGB 9.9 (L) 12.1 - 17.0 g/dL    HCT 31.9 (L) 36.6 - 50.3 %    MCV 99.1 (H) 80.0 - 99.0 FL    MCH 30.7 26.0 - 34.0 PG    MCHC 31.0 30.0 - 36.5 g/dL    RDW 17.7 (H) 11.5 - 14.5 %    PLATELET 364 466 - 144 K/uL    MPV 11.0 8.9 - 12.9 FL    NRBC 0.0 0  WBC    ABSOLUTE NRBC 0.00 0.00 - 0.01 K/uL   IRON PROFILE    Collection Time: 03/10/22 12:58 AM   Result Value Ref Range    Iron 106 35 - 150 ug/dL    TIBC 188 (L) 250 - 450 ug/dL    Iron % saturation 56 (H) 20 - 50 %   GLUCOSE, POC    Collection Time: 03/10/22 11:55 AM   Result Value Ref Range    Glucose (POC) 140 (H) 65 - 117 mg/dL    Performed by Jorge Gardner        Problem List:  Problem List as of 3/10/2022 Date Reviewed: 2/28/2022          Codes Class Noted - Resolved    Malignant neoplasm of ascending colon (HCC) ICD-10-CM: C18.2  ICD-9-CM: 153.6  2/28/2022 - Present        Failure to thrive (child) ICD-10-CM: R62.51  ICD-9-CM: 783.41  2/21/2022 - Present        Falls frequently ICD-10-CM: R29.6  ICD-9-CM: V15.88  2/21/2022 - Present        Moderate protein-calorie malnutrition ([facility] ICD-10-CM: E44.0  ICD-9-CM: 263.0  2/21/2022 - Present        Acute renal injury ([facility] ICD-10-CM: N17.9  ICD-9-CM: 584.9  2/21/2022 - Present        Cellulitis of right leg ICD-10-CM: L03.115  ICD-9-CM: 682.6  2/12/2022 - Present        Pneumonia due to COVID-19 virus ICD-10-CM: U07.1, J12.82  ICD-9-CM: 480.8, 079.89  12/29/2021 - Present        Personal history of atrial fibrillation ICD-10-CM: Z86.79  ICD-9-CM: V12.59  11/9/2020 - Present        Carotid stenosis, asymptomatic, right ICD-10-CM: I65.21  ICD-9-CM: 433.10  5/29/2020 - Present        Hyperlipemia, mixed ICD-10-CM: E78.2  ICD-9-CM: 272.2  9/27/2018 - Present        Aortic stenosis, moderate ICD-10-CM: I35.0  ICD-9-CM: 424.1  4/10/2018 - Present        Mild concentric left ventricular hypertrophy (LVH) ICD-10-CM: I51.7  ICD-9-CM: 429.3  4/10/2018 - Present        Chronic diarrhea ICD-10-CM: K52.9  ICD-9-CM: 787.91  9/29/2017 - Present        Microalbuminuria ICD-10-CM: R80.9  ICD-9-CM: 791.0  12/10/2015 - Present        Right inguinal hernia ICD-10-CM: K40.90  ICD-9-CM: 550.90  11/2/2015 - Present        Left inguinal hernia ICD-10-CM: K40.90  ICD-9-CM: 550.90  9/14/2015 - Present        Multiple myeloma ([facility] [address] ICD-10-CM: C90.00  ICD-9-CM: 203.00  12/27/2012 - Present        Hypercalcemia ICD-10-CM: X35.99  ICD-9-CM: 275.42  4/25/2012 - Present    Overview Signed 4/25/2012  5:49 PM by Mel Longoria MD     2012, PTH normal, abnormal protein electrophoresis, referred to Hem-Onc             S/P AAA repair 2/9/2012 ICD-10-CM: K43.271, Z86.79  ICD-9-CM: V45.89  2/22/2012 - Present    Overview Signed 2/22/2012  1:38 PM by MD Dr Marcia Hall             Pre-diabetes ICD-10-CM: R73.03  ICD-9-CM: 790.29  9/7/2010 - Present    Overview Addendum 11/1/2011  9:29 AM by Libby Acosta MD     2005;  Optho Dr George Do             Sinus tachycardia ICD-10-CM: R00.0  ICD-9-CM: 427.89  9/7/2010 - Present        H/O Heavy Alcohol Use ICD-9-CM: Sandra Analisa  9/7/2010 - Present        Vitamin B12 deficiency ICD-10-CM: E53.8  ICD-9-CM: 266.2  9/7/2010 - Present        Hyperhomocysteinemia ([facility] ICD-10-CM: E72.11  ICD-9-CM: 270.4  9/7/2010 - Present        Mild Allergic Rhinitis ICD-10-CM: J30.9  ICD-9-CM: 477.9  9/7/2010 - Present        History of skin cancer ICD-10-CM: Z85.828  ICD-9-CM: V10.83  9/7/2010 - Present    Overview Signed 9/7/2010  9:03 AM by Libby Acosta MD     Followed by Valerio Sanderson             HTN (hypertension) ICD-10-CM: I10  ICD-9-CM: 401.9  4/6/2010 - Present    Overview Signed 9/7/2010  8:55 AM by Libby Acosta MD     1990             H/O Carotid Stenosis ICD-10-CM: I65.29  ICD-9-CM: 433.10  4/6/2010 - Present    Overview Addendum 9/7/2010  8:58 AM by Libby Acosta MD     Oct 2003; 50%left subclavian and 50-70% SHEILA              GERD (gastroesophageal reflux disease) ICD-10-CM: K21.9  ICD-9-CM: 530.81  4/6/2010 - Present        RESOLVED: Intestinal adhesions with complete obstruction ([facility] [address] ICD-10-CM: K56.52  ICD-9-CM: 560.81  2/21/2022 - 2/28/2022        RESOLVED: Intestinal obstruction ([facility] [address] ICD-10-CM: M52.626  ICD-9-CM: 560.9  2/21/2022 - 2/24/2022        RESOLVED: Myeloma ([facility] ICD-10-CM: C90.00  ICD-9-CM: 203.00  1/6/2015 - 6/10/2016        RESOLVED: Gastrointestinal bleeding ICD-10-CM: K92.2  ICD-9-CM: 578.9  12/26/2014 - 2/28/2018        RESOLVED: Gall stone pancreatitis ICD-10-CM: K85.10  ICD-9-CM: 577.0, 574.20  1/23/2014 - 2/28/2018        RESOLVED: Acute cholecystitis ICD-10-CM: K81.0  ICD-9-CM: 575.0  1/23/2014 - 2/28/2018        RESOLVED: Abnormal serum protein electrophoresis ICD-10-CM: R77.8  ICD-9-CM: 790.99  4/25/2012 - 2/28/2018    Overview Signed 4/25/2012  5:48 PM by Ai Bowser MD     4/2012, referred to Hem-Onc             RESOLVED: Atrial fibrillation 1/2012 ICD-10-CM: I48.91  ICD-9-CM: 427.31  2/22/2012 - 11/9/2020    Overview Signed 2/22/2012  1:33 PM by Ai Bowser MD     Cardio Dr Rosita Frankel: AAA (abdominal aortic aneurysm) ([address] ICD-10-CM: I71.4  ICD-9-CM: 441.4  1/6/2012 - 9/26/2018    Overview Signed 1/6/2012  2:07 PM by Ai Bowser MD     Detected on plain films back xray 1/2012; Ct scheduled             RESOLVED: Anemia ICD-10-CM: D64.9  ICD-9-CM: 285.9  1/5/2012 - 2/28/2018        RESOLVED: Hypertriglyceridemia ICD-10-CM: E78.1  ICD-9-CM: 272.1  9/7/2010 - 9/27/2018    Overview Signed 9/7/2010  8:55 AM by Ai Bowser MD     2003             RESOLVED: Bilateral Carotid Bruits, L>R ICD-10-CM: R09.89  ICD-9-CM: 785.9  9/7/2010 - 2/28/2018    Overview Signed 9/7/2010  8:57 AM by Ai Bowser MD     10/03             RESOLVED: Anemia due to GI bleed 2007 ICD-10-CM: D64.9  ICD-9-CM: 285.9  4/6/2010 - 2/28/2018        RESOLVED: GI bleed, duodenitis 2007 ICD-10-CM: K92.2  ICD-9-CM: 578.9  4/6/2010 - 2/28/2018    Overview Addendum 9/7/2010  9:01 AM by Ai Bowser MD     8/07 Duodenitis                       Medications reviewed  Current Facility-Administered Medications   Medication Dose Route Frequency    piperacillin-tazobactam (ZOSYN) 3.375 g in 0.9% sodium chloride (MBP/ADV) 100 mL MBP  3.375 g IntraVENous Q8H    potassium chloride SR (KLOR-CON 10) tablet 40 mEq  40 mEq Oral DAILY    finasteride (PROSCAR) tablet 5 mg  5 mg Oral DAILY    0.9% sodium chloride infusion 250 mL  250 mL IntraVENous PRN    diphenhydrAMINE (BENADRYL) capsule 25 mg  25 mg Oral Q6H PRN    traZODone (DESYREL) tablet 50 mg  50 mg Oral QHS PRN    pantoprazole (PROTONIX) tablet 40 mg  40 mg Oral ACB    acetaminophen (TYLENOL) tablet 650 mg  650 mg Oral TID    oxyCODONE IR (ROXICODONE) tablet 2.5-5 mg  2.5-5 mg Oral Q4H PRN    traMADoL (ULTRAM) tablet 50 mg  50 mg Oral Q6H PRN    zinc oxide-cod liver oil (DESITIN) 40 % paste   Topical PRN    [Held by provider] heparin (porcine) injection 5,000 Units  5,000 Units SubCUTAneous Q8H    mupirocin (BACTROBAN) 2 % ointment   Topical DAILY    phenol throat spray (CHLORASEPTIC) 1 Spray  1 Spray Oral PRN    HYDROmorphone (DILAUDID) injection 0.5-1 mg  0.5-1 mg IntraVENous Q2H PRN    ondansetron (ZOFRAN) injection 4 mg  4 mg IntraVENous Q4H PRN    acetaminophen (TYLENOL) tablet 650 mg  650 mg Oral Q6H PRN    hydrALAZINE (APRESOLINE) 20 mg/mL injection 20 mg  20 mg IntraVENous Q6H PRN    glucose chewable tablet 16 g  4 Tablet Oral PRN    dextrose 10 % infusion 0-250 mL  0-250 mL IntraVENous PRN    glucagon (GLUCAGEN) injection 1 mg  1 mg IntraMUSCular PRN    aspirin delayed-release tablet 81 mg  81 mg Oral QHS    pregabalin (LYRICA) capsule 50 mg  50 mg Oral BID    albuterol (PROVENTIL HFA, VENTOLIN HFA, PROAIR HFA) inhaler 1 Puff  1 Puff Inhalation Q6H PRN       Care Plan discussed with: patient/nursing     Carl Zuleta MD

## 2022-03-10 NOTE — PROGRESS NOTES
Vascular:    Slept well, ate regular food yesterday    Leg incisions OK    Suspect fluid from abdominal incision is leaking ascites from small fascial dehiscence    Continue current care

## 2022-03-11 NOTE — PROGRESS NOTES
Problem: Self Care Deficits Care Plan (Adult)  Goal: *Acute Goals and Plan of Care (Insert Text)  Description: FUNCTIONAL STATUS PRIOR TO ADMISSION: Pt was Mod I with RW and rollator for functional mobility and independent in ADLs. 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 HOME SUPPORT: The patient lived with wife but did not require assist. Children live near by and are able to provide some support. Occupational Therapy Goals  Initiated 3/1/2022, Weekly Re-assessment 3/8/2022 continued  1. Patient will perform grooming standing at sink with minimal assistance/contact guard assist within 7 day(s). 2.  Patient will perform UB and LB bathing with minimal assistance/contact guard assist within 7 day(s). 3.  Patient will perform lower body dressing with minimal assistance/contact guard assist within 7 day(s). 4.  Patient will perform toilet transfers with minimal assistance/contact guard assist within 7 day(s). 5.  Patient will participate in upper extremity therapeutic exercise/activities with modified independence for 10 minutes within 7 day(s). 6.  Patient will utilize energy conservation techniques during functional activities with verbal cues within 7 day(s). Outcome: Progressing Towards Goal   OCCUPATIONAL THERAPY TREATMENT  Patient: Don Sutton (41 y.o. male)  Date: 3/11/2022  Diagnosis: Intestinal obstruction ([facility] [place] [facility] [K56.609] <principal problem not specified>  Procedure(s) (LRB):  RIGHT POPLITEAL TO PERINEAL  BYPASS with vein AND RIGHT SECOND TOE AMPUTATION (Right) 4 Days Post-Op  Precautions: Fall,DNR  Chart, occupational therapy assessment, plan of care, and goals were reviewed. ASSESSMENT  Patient continues with skilled OT services and is progressing towards goals, much improved today on static standing tolerance, sit<>stand and safety application.  ADLs limited by HTN, strength, functional reach, overall endurance, swelling ( L UE, groin, LEs L > R); cognition (STM loss, complex processing, application of safety techniques v. Anxiety v. Fear of falling; requires visual aid for recall), wound management (R foot, R thigh at incision leaking, rosas cath, rectal tube), and static standing balance therefore dynamic. Current Level of Function Impacting Discharge (ADLs): min assistance overall upper body ADLs, max assistance overall lower body ADLs, sit<>stand mod A, bed mobility moderate assistance, standing tolerance ~20 seconds. Other factors to consider for discharge: wife elderly         PLAN :  Patient continues to benefit from skilled intervention to address the above impairments. Continue treatment per established plan of care to address goals. Recommend with staff: continue OOB to chair with Carolyn Lift, completing ADLs as able    Recommend next OT session: lower body ADLs, standing ADL    Recommendation for discharge: (in order for the patient to meet his/her long term goals)  Therapy 3 hours per day 5-7 days per week  As patient can tolerate 3 hours of therapy. If not an option will need rehab as patient is not able to manage at home, highly motivated to be independent, requires medical management, and therapy. This discharge recommendation:  Has not yet been discussed the attending provider and/or case management    IF patient discharges home will need the following DME: TBD       SUBJECTIVE:   Patient stated I want to do what I can. ... I have been doing this every chance I get (exercise L UE).     OBJECTIVE DATA SUMMARY:   Cognitive/Behavioral Status:  Neurologic State: Alert;Eyes open spontaneously  Orientation Level: Oriented X4  Cognition: Follows commands; Appropriate decision making; Appropriate safety awareness             Functional Mobility and Transfers for ADLs:  Bed Mobility:  Supine to Sit: Supervision    Transfers:  Sit to Stand:  Moderate assistance  Verbal cues to scoot to EOB first prior to standing for increased ability; increase time and three scoots; physical assistance to stand, gait belt, RW   stand-sit: min assistance chair: RW, gait belt  Bed to Chair: Minimum assistance gait belt, RW      Balance:  Sitting: Intact; Without support  Standing: Impaired; Without support  Standing - Static: Fair  Standing - Dynamic : Poor    ADL Intervention:  Feeding  Feeding Assistance: Independent    Grooming  Washing Face: Set-up  Washing Hands: Set-up  Brushing Teeth: Set-up                 Lower Body Dressing Assistance  Shoes with Velcro: Moderate assistance   R PRAFO  L slipper with velcro A 2* slipper buckling under trunk flexion and functional reach attempts         Cognitive Retraining  Problem Solving: Identifying the task; Identifying the problem;General alternative solution  Attention to Task: Single task    Therapeutic Exercises:   Patient demonstrated shoulder flexion, elbow flexion 10 reps 1 set v. Matheny. Instruction on benefits resistance band, patient hesitant but then once visually presented patient pleased he is making progression enough to utilize. Pain:  R foot    Activity Tolerance:   Good    After treatment patient left in no apparent distress:   Sitting in chair and Call bell within reach    COMMUNICATION/COLLABORATION:   The patients plan of care was discussed with: Physical therapist and Registered nurse.      Crescencio Nash  Time Calculation: 18 mins

## 2022-03-11 NOTE — PROGRESS NOTES
Spiritual Care Assessment/Progress Note  [facility]      NAME: Alpesh Ku      MRN: [id number]  AGE: 80 y.o. SEX: male  Adventism Affiliation: Yarsanism   Language: English     3/11/2022     Total Time (in minutes): 19     Spiritual Assessment begun in [facility] 6W1 ORTHO SPINE through conversation with:         []Patient        [x] Family    [] Friend(s)        Reason for Consult: Palliative Care, Initial/Spiritual Assessment     Spiritual beliefs: (Please include comment if needed)     [x] Identifies with a rashmi tradition: (Non practicing) [place]         [] Supported by a rashmi community:            [] Claims no spiritual orientation:           [] Seeking spiritual identity:                [] Adheres to an individual form of spirituality:           [] Not able to assess:                           Identified resources for coping:      [x] Prayer                               [] Music                  [] Guided Imagery     [x] Family/friends                 [] Pet visits     [] Devotional reading                         [] Unknown     [] Other:                                               Interventions offered during this visit: (See comments for more details)    Patient Interventions: Spiritual care volunteer support     Family/Friend(s):  Affirmation of emotions/emotional suffering,Affirmation of rashmi,Catharsis/review of pertinent events in supportive environment,Prayer (assurance of),Life review/legacy,Initial Assessment     Plan of Care:     [] Support spiritual and/or cultural needs    [] Support AMD and/or advance care planning process      [] Support grieving process   [] Coordinate Rites and/or Rituals    [] Coordination with community clergy   [] No spiritual needs identified at this time   [] Detailed Plan of Care below (See Comments)  [] Make referral to Music Therapy  [] Make referral to Pet Therapy     [] Make referral to Addiction services  [] Make referral to [facility]  [] Make referral to Spiritual Care Partner  [] No future visits requested        [x] Contact Spiritual Care for further referrals     Comments: Provided support for this the family of this pt in [facility]. Facilitated life review to assess potential support needs or coping strategies. Pt's dtr offered review of pt's current situation. Per pt's dtr, pt is Christianity but doesn't attend a [employer]. Pt's dtr receives emotional support from her  as she supports this pt. Assured pt's dtr of prayers. Milagros Stevens MDiv.  Staff   Request  Support/Spiritual Care Services on [phone])

## 2022-03-11 NOTE — PROGRESS NOTES
Problem: Pressure Injury - Risk of  Goal: *Prevention of pressure injury  Description: Document Christiano Scale and appropriate interventions in the flowsheet.   Outcome: Progressing Towards Goal  Note: Pressure Injury Interventions:  Sensory Interventions: Assess need for specialty bed    Moisture Interventions: Apply protective barrier, creams and emollients,Absorbent underpads    Activity Interventions: Assess need for specialty bed,Increase time out of bed    Mobility Interventions: Assess need for specialty bed    Nutrition Interventions: Document food/fluid/supplement intake    Friction and Shear Interventions: Apply protective barrier, creams and emollients

## 2022-03-11 NOTE — PROGRESS NOTES
[address] [facility] Adult  Hospitalist Group                                                                                          Hospitalist Progress Note  Jevon Aguirre MD  Answering service: [phone] from in house phone        Date of Service:  3/11/2022  NAME:  Ирина Acuña  :  1934  MRN:  [id number]      Admission Summary:     A 60-year-old man with a past medical history significant for type 2 diabetes, multiple myeloma, hypertension, dyslipidemia, peripheral neuropathy, status post abdominal aortic aneurysm repair in  and COVID-pneumonia.  Patient was recently admitted on 2/15 for management of cellulitis. Presented to the emergency room several days ago treated for diarrhea with Imodium, and now with progressive abdominal pain nausea vomiting. Patient seen in the ED today  for abdominal pain started 12 to 24 hours ago associated with vomiting and constipation.  The hospitalist team has been consulted to assist general surgery in medical management.   Patient examined at bedside.  Patient is ill-appearing, should not reports health has been declining since Covid diagnosis in 2021.  Patient denies chest pain, dizziness, shortness of breath during examination.  Patient endorses abdominal pain nausea and vomiting.  Productive cough noted on evaluation, patient reports he has had a cough since Covid diagnosis and it is somewhat improved.  NG tube is in place draining dark brown bilious fluid.  Patient reports abdominal distention has improved some since placement of NG.  Patient's son-in-law is at bedside. \"     Interval history / Subjective:     He said leaking at incisional site, some abdominal discomfort, no vomiting, chest pain or shortness of breath. Assessment & Plan:     Small bowel obstruction due to invasive colon adenocarcinoma   -Underwent right hemicolectomy .  Evidence of adenocarcinoma, + lymph nodes  -diet advanced to regular diet -Drainage from mid abdominal incision, noted on 3/9/22, ostomy placed for drainage collection, cx drainage grow few enterococcus faecium   -CT abd/pelvis show no hematoma or drain able fluid  -on empiric IV Zosyn   -leukcytosis improving  -blood cx no growth  -Oncology consulted 3/1, recommend outpatient follow up  -surgical service on board     PVD  Right second toe dry gangrene  Right lower extremity arterial occlusion   -MRI with evidence of osteomyelitis. More consistent with dry gangrene.   -Antibiotics discontinued on 3/2  -MARYURI with moderate to severe arterial occlusion  -Right arteriogram 2/28 with popliteal occlusion, unable to cross occlusion. -seen by podiatry and signed off   -s/p right popliteal bypass and second Right great toe amputation   -pain controlled  -PT   -vascular surgeon on board     Hypotension ,resolved  -pt with drainage from surgical incision, hematuria   -s/p prbc, abx, pro ca lowl for risks of SIRS/sepsis  -BP normal, IVF discontinued     Acute on chronic  anemia   -symptomatic with hypotension, hematuria  -Hgb 6.9, s/p 1 unit  PRBC Hgb improved 7.9  -venofer x 1 dose,  -monitor H/H     Gross hematuria noted in rosas post op surgery   -likely due to rosas placement with enlarged porstate,   -on manual irrigation, has still gross hematuria, when hematuria improves ok to d/c rosas with voiding trial   -no flomax due to hypotension,   -MRI w/c for further eval once stable      Acute metabolic encephalopathy  -resolved, patient is conscious and alert, well oriented  -Multifactorial in setting of recent surgery, narcotics, hospitalization, infection  -limit narcotics as able  -continue neuro check and supportive care     T2DM  -A1c 6.3   -continue sliding scale and monitor finger stick glucose      Left arm swelling and edema  -doppler neg for DVT  -elevate the arm     Hx of COVID 19 pneumonia    -CXR with scattered opacities, concerning for PNA.    -s/p treatment  -improved, SpO2 % on RA     Peripheral neuropathy  -stable, resume pregabalin.     HTN  -had episode of low BP,  Lasix on hold, monitor BP, may consider norvasc if BP trending up    Failure to thrive, Protein calorie malnutrition  -continue nutritional supplement  - Nutrition consulted      Recurrent falls  -continue PT/OT  -fall precaution    Hypokalemia:  -Replace with kcl  and monitor    Insomnia  -continue PRN low-dose trazodone i          Code status: DNR  Prophylaxis: SCD    Care Plan discussed with: Patient, Nurse and CM  Anticipated Disposition: Inpatient Rehab, > 48 hrs     Hospital Problems  Date Reviewed: 2/28/2022          Codes Class Noted POA    Malignant neoplasm of ascending colon ([facility] ICD-10-CM: C18.2  ICD-9-CM: 153.6  2/28/2022 Unknown        Failure to thrive (child) ICD-10-CM: R62.51  ICD-9-CM: 783.41  2/21/2022 Unknown        Falls frequently ICD-10-CM: R29.6  ICD-9-CM: V15.88  2/21/2022 Unknown        Moderate protein[facility]calorie malnutrition ([facility] ICD-10-CM: E44.0  ICD-9-CM: 263.0  2/21/2022 Unknown        Acute renal injury ([facility] ICD-10-CM: N17.9  ICD-9-CM: 584.9  2/21/2022 Unknown        Cellulitis of right leg ICD-10-CM: L03.115  ICD-9-CM: 682.6  2/12/2022 Yes        Aortic stenosis, moderate ICD-10-CM: I35.0  ICD-9-CM: 424.1  4/10/2018 Yes        Multiple myeloma ([facility] ICD-10-CM: C90.00  ICD-9-CM: 203.00  12/27/2012 Yes        Pre-diabetes ICD-10-CM: R73.03  ICD-9-CM: 790.29  9/7/2010 Yes    Overview Addendum 11/1/2011  9:29 AM by Gabriele Peterson MD     2005;  Optho Dr Jana Granger             HTN (hypertension) ICD-10-CM: I10  ICD-9-CM: 401.9  4/6/2010 Yes    Overview Signed 9/7/2010  8:55 AM by Gabriele Peterson MD     1990             H/O Carotid Stenosis ICD-10-CM: I65.29  ICD-9-CM: 433.10  4/6/2010 Yes    Overview Addendum 9/7/2010  8:58 AM by Gabriele Peterson MD     Oct 2003; 50%left subclavian and 50-70% SHEILA                       Vital Signs:    Last 24hrs VS reviewed since prior progress note. Most recent are:  Visit Vitals  /70   Pulse 87   Temp 97.5 °F (36.4 °C)   Resp 18   Ht 6' 1\" (1.854 m)   Wt 76.6 kg (168 lb 14 oz)   SpO2 98%   BMI 22.28 kg/m²         Intake/Output Summary (Last 24 hours) at 3/11/2022 1409  Last data filed at 3/11/2022 6537  Gross per 24 hour   Intake --   Output 950 ml   Net -950 ml        Physical Examination:     I had a face to face encounter with this patient and independently examined them on 3/11/2022 as outlined below:          Constitutional:  No acute distress, cooperative, pleasant    ENT:  Oral mucosa moist, oropharynx benign. Resp:  CTA bilaterally. No wheezing/rhonchi/rales. No accessory muscle use. CV:  Regular rhythm, normal rate, no murmurs, gallops, rubs    GI:  Mid abdomen incision open wound with drainage, collecting ostomy in place, abdomen soft, non distended, non tender. normoactive bowel sounds, no hepatosplenomegaly     Musculoskeletal:  Right thigh dressed, medial thigh staples stable, right foot dressed    Neurologic:  Moves all extremities. AAOx3, CN II-XII reviewed            Data Review:    Review and/or order of clinical lab test  Review and/or order of tests in the radiology section of CPT  Review and/or order of tests in the medicine section of CPT      Labs:     Recent Labs     03/11/22  0237 03/10/22  0058   WBC 12.8* 16.6*   HGB 9.4* 9.9*   HCT 30.0* 31.9*    229     Recent Labs     03/11/22  0237 03/10/22  0058 03/09/22  0413    145 145   K 3.3* 3.4* 3.4*   * 118* 119*   CO2 21 23 24   BUN 11 14 18   CREA 0.76 0.74 0.85   * 92 82   CA 6.8* 7.0* 7.1*   MG 1.3*  --   --      No results for input(s): ALT, AP, TBIL, TBILI, TP, ALB, GLOB, GGT, AML, LPSE in the last 72 hours. No lab exists for component: SGOT, GPT, AMYP, HLPSE  No results for input(s): INR, PTP, APTT, INREXT in the last 72 hours.    Recent Labs     03/10/22  0058   TIBC 188*   PSAT 56*      Lab Results   Component Value Date/Time Folate 81.7 (H) 02/13/2022 02:38 AM      No results for input(s): PH, PCO2, PO2 in the last 72 hours. No results for input(s): CPK, CKNDX, TROIQ in the last 72 hours.     No lab exists for component: CPKMB  Lab Results   Component Value Date/Time    Cholesterol, total 193 03/23/2021 11:18 AM    HDL Cholesterol 83 03/23/2021 11:18 AM    LDL, calculated 84.2 03/23/2021 11:18 AM    Triglyceride 129 03/23/2021 11:18 AM    CHOL/HDL Ratio 2.3 03/23/2021 11:18 AM     Lab Results   Component Value Date/Time    Glucose (POC) 140 (H) 03/10/2022 11:55 AM    Glucose (POC) 127 (H) 03/09/2022 04:42 PM    Glucose (POC) 104 03/09/2022 11:44 AM    Glucose (POC) 125 (H) 03/06/2022 09:01 PM    Glucose (POC) 164 (H) 03/06/2022 06:02 PM     Lab Results   Component Value Date/Time    Color DARK YELLOW 01/23/2014 06:54 PM    Appearance CLOUDY (A) 01/23/2014 06:54 PM    Specific gravity 1.025 01/23/2014 06:54 PM    pH (UA) 5.5 01/23/2014 06:54 PM    Protein 100 (A) 01/23/2014 06:54 PM    Glucose 250 (A) 01/23/2014 06:54 PM    Ketone TRACE (A) 01/23/2014 06:54 PM    Bilirubin LARGE (A) 01/23/2014 06:54 PM    Urobilinogen 1.0 01/23/2014 06:54 PM    Nitrites NEGATIVE  01/23/2014 06:54 PM    Leukocyte Esterase NEGATIVE  01/23/2014 06:54 PM    Epithelial cells FEW 01/23/2014 06:54 PM    Bacteria 3+ (A) 01/23/2014 06:54 PM    WBC 0-4 01/23/2014 06:54 PM    RBC 5-10 01/23/2014 06:54 PM         Medications Reviewed:     Current Facility-Administered Medications   Medication Dose Route Frequency    piperacillin-tazobactam (ZOSYN) 3.375 g in 0.9% sodium chloride (MBP/ADV) 100 mL MBP  3.375 g IntraVENous Q8H    finasteride (PROSCAR) tablet 5 mg  5 mg Oral DAILY    0.9% sodium chloride infusion 250 mL  250 mL IntraVENous PRN    diphenhydrAMINE (BENADRYL) capsule 25 mg  25 mg Oral Q6H PRN    traZODone (DESYREL) tablet 50 mg  50 mg Oral QHS PRN    pantoprazole (PROTONIX) tablet 40 mg  40 mg Oral ACB    acetaminophen (TYLENOL) tablet 650 mg 650 mg Oral TID    oxyCODONE IR (ROXICODONE) tablet 2.5-5 mg  2.5-5 mg Oral Q4H PRN    traMADoL (ULTRAM) tablet 50 mg  50 mg Oral Q6H PRN    zinc oxide-cod liver oil (DESITIN) 40 % paste   Topical PRN    [Held by provider] heparin (porcine) injection 5,000 Units  5,000 Units SubCUTAneous Q8H    mupirocin (BACTROBAN) 2 % ointment   Topical DAILY    phenol throat spray (CHLORASEPTIC) 1 Spray  1 Spray Oral PRN    HYDROmorphone (DILAUDID) injection 0.5-1 mg  0.5-1 mg IntraVENous Q2H PRN    ondansetron (ZOFRAN) injection 4 mg  4 mg IntraVENous Q4H PRN    acetaminophen (TYLENOL) tablet 650 mg  650 mg Oral Q6H PRN    hydrALAZINE (APRESOLINE) 20 mg/mL injection 20 mg  20 mg IntraVENous Q6H PRN    glucose chewable tablet 16 g  4 Tablet Oral PRN    dextrose 10 % infusion 0-250 mL  0-250 mL IntraVENous PRN    glucagon (GLUCAGEN) injection 1 mg  1 mg IntraMUSCular PRN    aspirin delayed-release tablet 81 mg  81 mg Oral QHS    pregabalin (LYRICA) capsule 50 mg  50 mg Oral BID    albuterol (PROVENTIL HFA, VENTOLIN HFA, PROAIR HFA) inhaler 1 Puff  1 Puff Inhalation Q6H PRN     ______________________________________________________________________  EXPECTED LENGTH OF STAY: 3d 4h  ACTUAL LENGTH OF STAY:          18                 Kerline Pastor MD

## 2022-03-11 NOTE — PROGRESS NOTES
Vascular:    Doing well POD #4 after right pop peroneal bypass and toe amp. Incisions OK and bypass patent. Leaking edema fluid from thigh incisions. Improving overall with increased PO intake and improved mobility though still has a long way to go. Continue current care from my standpoint. Will follow up Monday.

## 2022-03-11 NOTE — PROGRESS NOTES
Spiritual Care Assessment/Progress Note  [facility]      NAME: Jayne Mathur      MRN: [id number]  AGE: 80 y.o.  SEX: male  Mormonism Affiliation: Uatsdin   Language: English     3/11/2022     Total Time (in minutes): 5     Spiritual Assessment begun in [facility] 6W1 ORTHO SPINE through conversation with:         []Patient        [] Family    [] Friend(s)        Reason for Consult: Palliative Care, Initial/Spiritual Assessment     Spiritual beliefs: (Please include comment if needed)     [] Identifies with a rashmi tradition:         [] Supported by a rashmi community:            [] Claims no spiritual orientation:           [] Seeking spiritual identity:                [] Adheres to an individual form of spirituality:           [x] Not able to assess:                           Identified resources for coping:      [] Prayer                               [] Music                  [] Guided Imagery     [] Family/friends                 [] Pet visits     [] Devotional reading                         [x] Unknown     [] Other:                                              Interventions offered during this visit: (See comments for more details)    Patient Interventions: Spiritual care volunteer support           Plan of Care:     [] Support spiritual and/or cultural needs    [] Support AMD and/or advance care planning process      [] Support grieving process   [] Coordinate Rites and/or Rituals    [] Coordination with community clergy   [] No spiritual needs identified at this time   [] Detailed Plan of Care below (See Comments)  [] Make referral to Music Therapy  [] Make referral to Pet Therapy     [] Make referral to Addiction services  [] Make referral to [facility]  [] Make referral to Spiritual Care Partner  [] No future visits requested        [x] Contact Spiritual Care for further referrals     Comments: Attempted Initial Spiritual Assessment for this pt in [facility] 540 via telephone due to droplet restrictions. Reviewed pt's chart prior to this visit. Pt was unable to be assessed at this time. No family/friends present at time of visit. Contact Spiritual Care Services for any spiritual or emotional support needs. Claudio Graham MDiv.  Staff   Request  Support/Spiritual Care Services on [phone])

## 2022-03-12 NOTE — PROGRESS NOTES
[address] [facility] Adult  Hospitalist Group                                                                                          Hospitalist Progress Note  Lance Cardenas MD  Answering service: [phone] from in house phone        Date of Service:  3/12/2022  NAME:  Vianca Montoya  :  1934  MRN:  [id number]      Admission Summary:     A 66-year-old man with a past medical history significant for type 2 diabetes, multiple myeloma, hypertension, dyslipidemia, peripheral neuropathy, status post abdominal aortic aneurysm repair in  and COVID-pneumonia.  Patient was recently admitted on 2/15 for management of cellulitis. Presented to the emergency room several days ago treated for diarrhea with Imodium, and now with progressive abdominal pain nausea vomiting. Patient seen in the ED today  for abdominal pain started 12 to 24 hours ago associated with vomiting and constipation.  The hospitalist team has been consulted to assist general surgery in medical management.   Patient examined at bedside.  Patient is ill-appearing, should not reports health has been declining since Covid diagnosis in 2021.  Patient denies chest pain, dizziness, shortness of breath during examination.  Patient endorses abdominal pain nausea and vomiting.  Productive cough noted on evaluation, patient reports he has had a cough since Covid diagnosis and it is somewhat improved.  NG tube is in place draining dark brown bilious fluid.  Patient reports abdominal distention has improved some since placement of NG.  Patient's son-in-law is at bedside. \"     Interval history / Subjective:     He said he feels better, but has still watery bowel movement, rectal tube in place, urine color is pink. No pain. Assessment & Plan:     Small bowel obstruction due to invasive colon adenocarcinoma   -Underwent right hemicolectomy .  Evidence of adenocarcinoma, + lymph nodes  -diet advanced to regular diet -Drainage from mid abdominal incision, noted on 3/9/22, ostomy placed for drainage collection, cx drainage grow few enterococcus faecium   -CT abd/pelvis show no hematoma or drain able fluid  -on empiric IV Zosyn   -leukcytosis improving  -blood cx no growth  -Oncology consulted 3/1, recommend outpatient follow up  -surgical service on board     PVD  Right second toe dry gangrene  Right lower extremity arterial occlusion   -MRI with evidence of osteomyelitis. More consistent with dry gangrene.   -Antibiotics discontinued on 3/2  -MARYURI with moderate to severe arterial occlusion  -Right arteriogram 2/28 with popliteal occlusion, unable to cross occlusion. -seen by podiatry and signed off   -s/p right popliteal bypass and second Right great toe amputation   -pain controlled  -PT   -vascular surgeon on board     Hypotension ,resolved  -pt with drainage from surgical incision, hematuria   -s/p prbc, abx, pro ca lowl for risks of SIRS/sepsis  -BP normal, IVF discontinued     Acute on chronic  anemia   -symptomatic with hypotension, hematuria  -Hgb 6.9, s/p 1 unit  PRBC Hgb improved 78.6  -venofer x 1 dose,  -monitor H/H     Gross hematuria noted in rosas post op surgery   -likely due to rosas placement with enlarged porstate,   -on manual irrigation, has still gross hematuria, when hematuria improves ok to d/c rosas with voiding trial   -no flomax due to hypotension,   -MRI w/c for further eval once stable      Acute metabolic encephalopathy  -resolved, patient is conscious and alert, well oriented  -Multifactorial in setting of recent surgery, narcotics, hospitalization, infection  -limit narcotics as able  -continue neuro check and supportive care     T2DM  -A1c 6.3   -continue sliding scale and monitor finger stick glucose      Left arm swelling and edema  -doppler neg for DVT  -elevate the arm     Hx of COVID 19 pneumonia    -CXR with scattered opacities, concerning for PNA.    -s/p treatment  -improved, SpO2 % on RA     Peripheral neuropathy  -stable, resume pregabalin.     HTN  -had episode of low BP,  Lasix on hold, monitor BP, may consider norvasc if BP trending up    Failure to thrive, Protein calorie malnutrition  -continue nutritional supplement  - Nutrition consulted      Recurrent falls  -continue PT/OT  -fall precaution    Hypokalemia:  -Replace with kcl 40 meq x 2 doses and monitor    Insomnia  -continue PRN low-dose trazodone           Code status: DNR  Prophylaxis: SCD    Care Plan discussed with: Patient, Nurse and CM  Anticipated Disposition: Inpatient Rehab, > 48 hrs     Hospital Problems  Date Reviewed: 2/28/2022          Codes Class Noted POA    Malignant neoplasm of ascending colon ([facility] ICD-10-CM: C18.2  ICD-9-CM: 153.6  2/28/2022 Unknown        Failure to thrive (child) ICD-10-CM: R62.51  ICD-9-CM: 783.41  2/21/2022 Unknown        Falls frequently ICD-10-CM: R29.6  ICD-9-CM: V15.88  2/21/2022 Unknown        Moderate protein-calorie malnutrition ([facility] ICD-10-CM: E44.0  ICD-9-CM: 263.0  2/21/2022 Unknown        Acute renal injury ([facility] ICD-10-CM: N17.9  ICD-9-CM: 584.9  2/21/2022 Unknown        Cellulitis of right leg ICD-10-CM: L03.115  ICD-9-CM: 682.6  2/12/2022 Yes        Aortic stenosis, moderate ICD-10-CM: I35.0  ICD-9-CM: 424.1  4/10/2018 Yes        Multiple myeloma ([facility] ICD-10-CM: C90.00  ICD-9-CM: 203.00  12/27/2012 Yes        Pre-diabetes ICD-10-CM: R73.03  ICD-9-CM: 790.29  9/7/2010 Yes    Overview Addendum 11/1/2011  9:29 AM by Vania Ho MD     2005;  Optho Dr Didier Huerta             HTN (hypertension) ICD-10-CM: I10  ICD-9-CM: 401.9  4/6/2010 Yes    Overview Signed 9/7/2010  8:55 AM by Vania Ho MD     1990             H/O Carotid Stenosis ICD-10-CM: I65.29  ICD-9-CM: 433.10  4/6/2010 Yes    Overview Addendum 9/7/2010  8:58 AM by Vania Ho MD     Oct 2003; 50%left subclavian and 50-70% SHEILA                       Vital Signs:    Last 24hrs VS reviewed since prior progress note. Most recent are:  Visit Vitals  /61   Pulse 64   Temp 97.5 °F (36.4 °C)   Resp 16   Ht 6' 1\" (1.854 m)   Wt 76.6 kg (168 lb 14 oz)   SpO2 98%   BMI 22.28 kg/m²         Intake/Output Summary (Last 24 hours) at 3/12/2022 1235  Last data filed at 3/12/2022 8868  Gross per 24 hour   Intake --   Output 1100 ml   Net -1100 ml        Physical Examination:     I had a face to face encounter with this patient and independently examined them on 3/12/2022 as outlined below:          Constitutional:  No acute distress, cooperative, pleasant    ENT:  Oral mucosa moist, oropharynx benign. Resp:  CTA bilaterally. No wheezing/rhonchi/rales. No accessory muscle use. CV:  Regular rhythm, normal rate, no murmurs, gallops, rubs    GI:  Mid abdomen incision open wound with drainage, collecting ostomy in place, abdomen soft, non distended, non tender. normoactive bowel sounds, no hepatosplenomegaly     Musculoskeletal:  Right thigh dressed, medial thigh staples stable, right foot dressed    Neurologic:  Moves all extremities. AAOx3, CN II-XII reviewed            Data Review:    Review and/or order of clinical lab test  Review and/or order of tests in the radiology section of CPT  Review and/or order of tests in the medicine section of CPT      Labs:     Recent Labs     03/12/22 0452 03/11/22  0237   WBC 9.5 12.8*   HGB 8.6* 9.4*   HCT 27.7* 30.0*    230     Recent Labs     03/12/22 0452 03/11/22  0237 03/10/22  0058    144 145   K 3.2* 3.3* 3.4*   * 119* 118*   CO2 24 21 23   BUN 8 11 14   CREA 0.63* 0.76 0.74   GLU 81 101* 92   CA 6.5* 6.8* 7.0*   MG  --  1.3*  --      No results for input(s): ALT, AP, TBIL, TBILI, TP, ALB, GLOB, GGT, AML, LPSE in the last 72 hours. No lab exists for component: SGOT, GPT, AMYP, HLPSE  No results for input(s): INR, PTP, APTT, INREXT, INREXT in the last 72 hours.    Recent Labs     03/10/22  0058   TIBC 188*   PSAT 56*      Lab Results   Component Value Date/Time    Folate 81.7 (H) 02/13/2022 02:38 AM      No results for input(s): PH, PCO2, PO2 in the last 72 hours. No results for input(s): CPK, CKNDX, TROIQ in the last 72 hours.     No lab exists for component: CPKMB  Lab Results   Component Value Date/Time    Cholesterol, total 193 03/23/2021 11:18 AM    HDL Cholesterol 83 03/23/2021 11:18 AM    LDL, calculated 84.2 03/23/2021 11:18 AM    Triglyceride 129 03/23/2021 11:18 AM    CHOL/HDL Ratio 2.3 03/23/2021 11:18 AM     Lab Results   Component Value Date/Time    Glucose (POC) 78 03/12/2022 06:16 AM    Glucose (POC) 140 (H) 03/10/2022 11:55 AM    Glucose (POC) 127 (H) 03/09/2022 04:42 PM    Glucose (POC) 104 03/09/2022 11:44 AM    Glucose (POC) 125 (H) 03/06/2022 09:01 PM     Lab Results   Component Value Date/Time    Color DARK YELLOW 01/23/2014 06:54 PM    Appearance CLOUDY (A) 01/23/2014 06:54 PM    Specific gravity 1.025 01/23/2014 06:54 PM    pH (UA) 5.5 01/23/2014 06:54 PM    Protein 100 (A) 01/23/2014 06:54 PM    Glucose 250 (A) 01/23/2014 06:54 PM    Ketone TRACE (A) 01/23/2014 06:54 PM    Bilirubin LARGE (A) 01/23/2014 06:54 PM    Urobilinogen 1.0 01/23/2014 06:54 PM    Nitrites NEGATIVE  01/23/2014 06:54 PM    Leukocyte Esterase NEGATIVE  01/23/2014 06:54 PM    Epithelial cells FEW 01/23/2014 06:54 PM    Bacteria 3+ (A) 01/23/2014 06:54 PM    WBC 0-4 01/23/2014 06:54 PM    RBC 5-10 01/23/2014 06:54 PM         Medications Reviewed:     Current Facility-Administered Medications   Medication Dose Route Frequency    finasteride (PROSCAR) tablet 5 mg  5 mg Oral DAILY    0.9% sodium chloride infusion 250 mL  250 mL IntraVENous PRN    diphenhydrAMINE (BENADRYL) capsule 25 mg  25 mg Oral Q6H PRN    traZODone (DESYREL) tablet 50 mg  50 mg Oral QHS PRN    pantoprazole (PROTONIX) tablet 40 mg  40 mg Oral ACB    acetaminophen (TYLENOL) tablet 650 mg  650 mg Oral TID    oxyCODONE IR (ROXICODONE) tablet 2.5-5 mg  2.5-5 mg Oral Q4H PRN  traMADoL (ULTRAM) tablet 50 mg  50 mg Oral Q6H PRN    zinc oxide-cod liver oil (DESITIN) 40 % paste   Topical PRN    [Held by provider] heparin (porcine) injection 5,000 Units  5,000 Units SubCUTAneous Q8H    mupirocin (BACTROBAN) 2 % ointment   Topical DAILY    phenol throat spray (CHLORASEPTIC) 1 Spray  1 Spray Oral PRN    HYDROmorphone (DILAUDID) injection 0.5-1 mg  0.5-1 mg IntraVENous Q2H PRN    ondansetron (ZOFRAN) injection 4 mg  4 mg IntraVENous Q4H PRN    acetaminophen (TYLENOL) tablet 650 mg  650 mg Oral Q6H PRN    hydrALAZINE (APRESOLINE) 20 mg/mL injection 20 mg  20 mg IntraVENous Q6H PRN    glucose chewable tablet 16 g  4 Tablet Oral PRN    dextrose 10 % infusion 0-250 mL  0-250 mL IntraVENous PRN    glucagon (GLUCAGEN) injection 1 mg  1 mg IntraMUSCular PRN    aspirin delayed-release tablet 81 mg  81 mg Oral QHS    pregabalin (LYRICA) capsule 50 mg  50 mg Oral BID    albuterol (PROVENTIL HFA, VENTOLIN HFA, PROAIR HFA) inhaler 1 Puff  1 Puff Inhalation Q6H PRN     ______________________________________________________________________  EXPECTED LENGTH OF STAY: 3d 4h  ACTUAL LENGTH OF STAY:          19                 Konstantin Vail MD

## 2022-03-13 NOTE — PROGRESS NOTES
[address] [facility] Adult  Hospitalist Group                                                                                          Hospitalist Progress Note  Mohini Parson MD  Answering service: [phone] OR [phone] from in house phone        Date of Service:  3/13/2022  NAME:  Ian Gutierrez  :  1934  MRN:  [id number]      Admission Summary:     A 80-year-old man with a past medical history significant for type 2 diabetes, multiple myeloma, hypertension, dyslipidemia, peripheral neuropathy, status post abdominal aortic aneurysm repair in  and COVID-pneumonia.  Patient was recently admitted on 2/15 for management of cellulitis. Presented to the emergency room several days ago treated for diarrhea with Imodium, and now with progressive abdominal pain nausea vomiting. Patient seen in the ED today  for abdominal pain started 12 to 24 hours ago associated with vomiting and constipation.  The hospitalist team has been consulted to assist general surgery in medical management.   Patient examined at bedside.  Patient is ill-appearing, should not reports health has been declining since Covid diagnosis in 2021.  Patient denies chest pain, dizziness, shortness of breath during examination.  Patient endorses abdominal pain nausea and vomiting.  Productive cough noted on evaluation, patient reports he has had a cough since Covid diagnosis and it is somewhat improved.  NG tube is in place draining dark brown bilious fluid.  Patient reports abdominal distention has improved some since placement of NG.  Patient's son-in-law is at bedside. \"     Interval history / Subjective:     He said his right thigh incision draining clear fluid, no pain, rectal tube in place, has still gross hematuria     Assessment & Plan:     Small bowel obstruction due to invasive colon adenocarcinoma   -Underwent right hemicolectomy .  Evidence of adenocarcinoma, + lymph nodes  -diet advanced to regular diet -Drainage from mid abdominal incision, noted on 3/9/22, ostomy placed for drainage collection, cx drainage grow few vancomycin resistant enterococcus faecium, rare candida  -rectal tube in place  -CT abd/pelvis show no hematoma or drain able fluid  -empiric IV Zosyn discontinued, consult to ID  -leukcytosis improving  -blood cx no growth  -Oncology consulted 3/1, recommend outpatient follow up  -surgical service on board      PVD  Right second toe dry gangrene  Right lower extremity arterial occlusion   -MRI with evidence of osteomyelitis. More consistent with dry gangrene.   -Antibiotics discontinued on 3/2  -MARYURI with moderate to severe arterial occlusion  -Right arteriogram 2/28 with popliteal occlusion, unable to cross occlusion.    -seen by podiatry and signed off   -s/p right popliteal bypass and second Right great toe amputation   -right thigh incision draining clear fluid, dressing soaked, he has also bilateral pretibial and pedal edema, will give lasix 40 mg po  -pain controlled  -continue PT   -vascular surgeon on board     Hypotension ,resolved  -pt with drainage from surgical incision, hematuria   -s/p prbc, abx, pro ca lowl for risks of SIRS/sepsis  -BP normal, IVF discontinued     Acute on chronic  anemia   -symptomatic with hypotension, hematuria  -Hgb 6.9, s/p 1 unit  PRBC Hgb improved  9.1  -venofer x 1 dose,  -monitor H/H     Gross hematuria noted in rosas post op surgery   -likely due to rosas placement with enlarged porstate,   -on manual irrigation, has still gross hematuria, when hematuria improves ok to d/c rosas with voiding trial   -no flomax due to hypotension,   -MRI w/c for further eval once stable      Acute metabolic encephalopathy  -resolved, patient is conscious and alert, well oriented  -Multifactorial in setting of recent surgery, narcotics, hospitalization, infection  -limit narcotics as able  -continue neuro check and supportive care     T2DM  -A1c 6.3   -continue sliding scale and monitor finger stick glucose      Left arm swelling and edema  -doppler neg for DVT  -elevate the arm, start lasix 40 mg po q daily     Hx of COVID 19 pneumonia    -CXR with scattered opacities, concerning for PNA. -s/p treatment  -improved, SpO2 % on RA     Peripheral neuropathy  -stable, resume pregabalin.     HTN  -had episode of low BP,  Lasix on hold, monitor BP, may consider norvasc if BP trending up    Failure to thrive, Protein calorie malnutrition  -continue nutritional supplement  - Nutrition consulted      Recurrent falls  -continue PT/OT  -fall precaution    Hypokalemia:  -Replaced and resolved    Insomnia  -continue PRN low-dose trazodone           Code status: DNR  Prophylaxis: SCD    Care Plan discussed with: Patient, Nurse and CM  Anticipated Disposition: Inpatient Rehab, > 48 hrs     Hospital Problems  Date Reviewed: 2/28/2022          Codes Class Noted POA    Malignant neoplasm of ascending colon ([facility] ICD-10-CM: C18.2  ICD-9-CM: 153.6  2/28/2022 Unknown        Failure to thrive (child) ICD-10-CM: R62.51  ICD-9-CM: 783.41  2/21/2022 Unknown        Falls frequently ICD-10-CM: R29.6  ICD-9-CM: V15.88  2/21/2022 Unknown        Moderate protein-calorie malnutrition ([facility] ICD-10-CM: E44.0  ICD-9-CM: 263.0  2/21/2022 Unknown        Acute renal injury ([facility] ICD-10-CM: N17.9  ICD-9-CM: 584.9  2/21/2022 Unknown        Cellulitis of right leg ICD-10-CM: L03.115  ICD-9-CM: 682.6  2/12/2022 Yes        Aortic stenosis, moderate ICD-10-CM: I35.0  ICD-9-CM: 424.1  4/10/2018 Yes        Multiple myeloma ([facility] ICD-10-CM: C90.00  ICD-9-CM: 203.00  12/27/2012 Yes        Pre-diabetes ICD-10-CM: R73.03  ICD-9-CM: 790.29  9/7/2010 Yes    Overview Addendum 11/1/2011  9:29 AM by Gabriele Peterson MD     2005;  Optho Dr Jana Granger             HTN (hypertension) ICD-10-CM: I10  ICD-9-CM: 401.9  4/6/2010 Yes    Overview Signed 9/7/2010  8:55 AM by Gabriele Peterson MD     1990             H/O Carotid Stenosis ICD-10-CM: I65.29  ICD-9-CM: 433.10  4/6/2010 Yes    Overview Addendum 9/7/2010  8:58 AM by Flora Medina MD     Oct 2003; 50%left subclavian and 50-70% SHEILA                       Vital Signs:    Last 24hrs VS reviewed since prior progress note. Most recent are:  Visit Vitals  BP (!) 143/77 (BP 1 Location: Left upper arm, BP Patient Position: At rest)   Pulse 74   Temp 98.1 °F (36.7 °C)   Resp 17   Ht 6' 1\" (1.854 m)   Wt 76.6 kg (168 lb 14 oz)   SpO2 97%   BMI 22.28 kg/m²         Intake/Output Summary (Last 24 hours) at 3/13/2022 1015  Last data filed at 3/13/2022 0747  Gross per 24 hour   Intake --   Output 2700 ml   Net -2700 ml        Physical Examination:     I had a face to face encounter with this patient and independently examined them on 3/13/2022 as outlined below:          Constitutional:  No acute distress, cooperative, pleasant    ENT:  Oral mucosa moist, oropharynx benign. Resp:  CTA bilaterally. No wheezing/rhonchi/rales. No accessory muscle use. CV:  Regular rhythm, normal rate, no murmurs, gallops, rubs    GI:  Mid abdomen incision open wound with drainage, collecting ostomy in place, abdomen soft, non distended, non tender. normoactive bowel sounds, no hepatosplenomegaly     Musculoskeletal:  Right thigh dressed, medial thigh staples stable, right foot dressed    Neurologic:  Moves all extremities.   AAOx3, CN II-XII reviewed            Data Review:    Review and/or order of clinical lab test  Review and/or order of tests in the radiology section of CPT  Review and/or order of tests in the medicine section of CPT      Labs:     Recent Labs     03/13/22 0419 03/12/22 0452   WBC 10.8 9.5   HGB 9.1* 8.6*   HCT 28.7* 27.7*    220     Recent Labs     03/13/22  0419 03/12/22 0452 03/11/22  0237    145 144   K 3.6 3.2* 3.3*   * 118* 119*   CO2 24 24 21   BUN 7 8 11   CREA 0.61* 0.63* 0.76   GLU 92 81 101*   CA 6.6* 6.5* 6.8*   MG  --   --  1.3*     No results for input(s): ALT, AP, TBIL, TBILI, TP, ALB, GLOB, GGT, AML, LPSE in the last 72 hours. No lab exists for component: SGOT, GPT, AMYP, HLPSE  No results for input(s): INR, PTP, APTT, INREXT, INREXT in the last 72 hours. No results for input(s): FE, TIBC, PSAT, FERR in the last 72 hours. Lab Results   Component Value Date/Time    Folate 81.7 (H) 02/13/2022 02:38 AM      No results for input(s): PH, PCO2, PO2 in the last 72 hours. No results for input(s): CPK, CKNDX, TROIQ in the last 72 hours.     No lab exists for component: CPKMB  Lab Results   Component Value Date/Time    Cholesterol, total 193 03/23/2021 11:18 AM    HDL Cholesterol 83 03/23/2021 11:18 AM    LDL, calculated 84.2 03/23/2021 11:18 AM    Triglyceride 129 03/23/2021 11:18 AM    CHOL/HDL Ratio 2.3 03/23/2021 11:18 AM     Lab Results   Component Value Date/Time    Glucose (POC) 76 03/13/2022 06:18 AM    Glucose (POC) 116 03/12/2022 09:34 PM    Glucose (POC) 155 (H) 03/12/2022 05:13 PM    Glucose (POC) 78 03/12/2022 06:16 AM    Glucose (POC) 140 (H) 03/10/2022 11:55 AM     Lab Results   Component Value Date/Time    Color DARK YELLOW 01/23/2014 06:54 PM    Appearance CLOUDY (A) 01/23/2014 06:54 PM    Specific gravity 1.025 01/23/2014 06:54 PM    pH (UA) 5.5 01/23/2014 06:54 PM    Protein 100 (A) 01/23/2014 06:54 PM    Glucose 250 (A) 01/23/2014 06:54 PM    Ketone TRACE (A) 01/23/2014 06:54 PM    Bilirubin LARGE (A) 01/23/2014 06:54 PM    Urobilinogen 1.0 01/23/2014 06:54 PM    Nitrites NEGATIVE  01/23/2014 06:54 PM    Leukocyte Esterase NEGATIVE  01/23/2014 06:54 PM    Epithelial cells FEW 01/23/2014 06:54 PM    Bacteria 3+ (A) 01/23/2014 06:54 PM    WBC 0-4 01/23/2014 06:54 PM    RBC 5-10 01/23/2014 06:54 PM         Medications Reviewed:     Current Facility-Administered Medications   Medication Dose Route Frequency    finasteride (PROSCAR) tablet 5 mg  5 mg Oral DAILY    0.9% sodium chloride infusion 250 mL  250 mL IntraVENous PRN    diphenhydrAMINE (BENADRYL) capsule 25 mg  25 mg Oral Q6H PRN    traZODone (DESYREL) tablet 50 mg  50 mg Oral QHS PRN    pantoprazole (PROTONIX) tablet 40 mg  40 mg Oral ACB    acetaminophen (TYLENOL) tablet 650 mg  650 mg Oral TID    oxyCODONE IR (ROXICODONE) tablet 2.5-5 mg  2.5-5 mg Oral Q4H PRN    traMADoL (ULTRAM) tablet 50 mg  50 mg Oral Q6H PRN    zinc oxide-cod liver oil (DESITIN) 40 % paste   Topical PRN    [Held by provider] heparin (porcine) injection 5,000 Units  5,000 Units SubCUTAneous Q8H    mupirocin (BACTROBAN) 2 % ointment   Topical DAILY    phenol throat spray (CHLORASEPTIC) 1 Spray  1 Spray Oral PRN    HYDROmorphone (DILAUDID) injection 0.5-1 mg  0.5-1 mg IntraVENous Q2H PRN    ondansetron (ZOFRAN) injection 4 mg  4 mg IntraVENous Q4H PRN    acetaminophen (TYLENOL) tablet 650 mg  650 mg Oral Q6H PRN    hydrALAZINE (APRESOLINE) 20 mg/mL injection 20 mg  20 mg IntraVENous Q6H PRN    glucose chewable tablet 16 g  4 Tablet Oral PRN    dextrose 10 % infusion 0-250 mL  0-250 mL IntraVENous PRN    glucagon (GLUCAGEN) injection 1 mg  1 mg IntraMUSCular PRN    aspirin delayed-release tablet 81 mg  81 mg Oral QHS    pregabalin (LYRICA) capsule 50 mg  50 mg Oral BID    albuterol (PROVENTIL HFA, VENTOLIN HFA, PROAIR HFA) inhaler 1 Puff  1 Puff Inhalation Q6H PRN     ______________________________________________________________________  EXPECTED LENGTH OF STAY: 3d 4h  ACTUAL LENGTH OF STAY:          20                 Saleem Soliz MD

## 2022-03-13 NOTE — PROGRESS NOTES
Lab called this RN for patient wound culture result and MD was notified, no new order at this time . Will continues to monitor.

## 2022-03-13 NOTE — CONSULTS
Infectious Disease Consult    Today's Date: 3/13/2022   Admit Date: 2/21/2022    Impression:   · SBO  · Metastatic adenocarcinoma of colon  · Status post ex lap  · Midline wound dehiscence with drainage--not obviously infected  · Cultures with VRE and C tropicalis   · Gross hematuria  · COVID positive with recent history of COVID infection  · No clinical symptoms    Plan:   · Adjust antibiotic therapy  · Imaging of abdomen seems reasonable  · Follow up studies  · Work up any fevers    Anti-infectives:   · Will change to daptomycin and Eraxis (drug interactions with fluconazole)    Subjective:   Date of Consultation:  March 13, 2022  Referring Physician: Dr Tayo Kaba    Patient is a 80 y.o. male admitted with SBO secondary to invasive colon cancer. He has numerous other medical problems as outlined below. He had exploratory laparotomy with bowel resection--now with copious drainage from dehisced lower region of abdominal wound. He is growing VRE and Candida species from wound culture and we are asked to see him in consultation.       Patient Active Problem List   Diagnosis Code    HTN (hypertension) I5    H/O Carotid Stenosis I65.29    GERD (gastroesophageal reflux disease) K21.9    Pre-diabetes R73.03    Sinus tachycardia R00.0    H/O Heavy Alcohol Use     Vitamin B12 deficiency E53.8    Hyperhomocysteinemia (HCC) E72.11    Mild Allergic Rhinitis J30.9    History of skin cancer Z85.828    S/P AAA repair 2/9/2012 Z98.890, Z86.79    Hypercalcemia E83.52    Multiple myeloma ([facility] C90.00    Left inguinal hernia K40.90    Right inguinal hernia K40.90    Microalbuminuria R80.9    Chronic diarrhea K52.9    Aortic stenosis, moderate I35.0    Mild concentric left ventricular hypertrophy (LVH) I51.7    Hyperlipemia, mixed E78.2    Carotid stenosis, asymptomatic, right I65.21    Personal history of atrial fibrillation Z86.79    Cellulitis of right leg L03.115    Failure to thrive (child) R62.51    Falls frequently R29.6    Moderate protein-calorie malnutrition (HCC) E44.0    Acute renal injury ([address] N17.9    Malignant neoplasm of ascending colon (HCC) C18.2     Past Medical History:   Diagnosis Date    AAA (abdominal aortic aneurysm) ([address] 2012    repair in     Abnormal serum protein electrophoresis 2012    Anemia 2010    Anemia due to GI bleed 2010    Arthritis     Atrial fibrillation 2012    Pt states doesn't have    Bilateral Carotid Bruits, L>R 2010    Bone cancer ([address]     Carotid stenosis 2010    Chronic pain     Diabetes mellitus, type 2 ([address] 2010    NO MEDS    Disturbances of sulphur-bearing amino-acid metabolism 2010    GERD (gastroesophageal reflux disease) 2010    GI bleed, duodenitis 2010    H/O Heavy Alcohol Use 2010    History of skin cancer 2010    melanoma back    HTN (hypertension) 2010    Hypercalcemia 2012    Hyperhomocysteinemia ([address] 2010    Hypertriglyceridemia 2010    Left inguinal hernia 2015    Lipids blood increased 2010    Microalbuminuria 12/10/2015    Mild Allergic Rhinitis 2010    Multiple myeloma ([address]     Pre-diabetes 2010;  Optho Dr Liban Soni Right inguinal hernia 2015    S/P AAA repair 2012    Sinus tachycardia 2010    Vitamin B12 deficiency 2010      Family History   Problem Relation Age of Onset    Heart Disease Mother          age 80    Cancer Father          brain tumor age 80    Heart Disease Father     Substance Abuse Daughter          cocaine OD age 43 in 200    Other Son          PE/DVT age 43    Heart Disease Brother     Cancer Brother         [address] Other Daughter         alive and well    Anesth Problems Neg Hx       Social History     Tobacco Use    Smoking status: Former Smoker     Packs/day: 1.50     Years: 55.00     Pack years: 82.50     Quit date: 2001 Years since quittin.2    Smokeless tobacco: Never Used    Tobacco comment: used to smoke 1 1/2 ppd x ~ 40 yrs   Substance Use Topics    Alcohol use: Yes     Alcohol/week: 14.0 standard drinks     Types: 14 Glasses of wine per week     Comment: 14     Past Surgical History:   Procedure Laterality Date    COLONOSCOPY  2007    Normal; Dr Errol Espinoza EGD  2007    small hiatal hernia, mild duodenitis; Dr Errol Espinoza HX AAA REPAIR  2012    Dr Caitlin Ortiz    HX CATARACT REMOVAL      both eyes    HX CHOLECYSTECTOMY  14    lap sandi- [facility]- Dr. Dewey Bain Left 9/24/15    left indirect inguinal by Dr. Christ Laws Right 11/2/15    inguinal w/ mesh by Dr. Christ Laws Bilateral 10/2015 And 2015    Inguinal    BLUE DOPPLER  3/26/2012    BLUE Echo; + clot      Prior to Admission medications    Medication Sig Start Date End Date Taking? Authorizing Provider   furosemide (LASIX) 20 mg tablet Take 1 Tablet by mouth two (2) times a day. Hold for low blood pressure 22   Hans Padgett MD   [facility]) 2 % ointment Apply to open wound on toe daily as instructed 22   Hans Padgett MD   ferrous sulfate 325 mg (65 mg iron) tablet Take 1 Tablet by mouth Daily (before breakfast). Indications: anemia from inadequate iron 22   Hans Padgett MD   calcium carbonate (TUMS) 200 mg calcium (500 mg) chew Take 1 Tablet by mouth as needed. Other, MD Makayla   potassium chloride (KLOR-CON M10) 10 mEq tablet TAKE 1 TABLET BY MOUTH DAILY WITH LASIX 21   Cristino Edmonds NP   folic acid-vit Z2-DLF H02 (Folbic) 2.5-25-2 mg tablet Take 1 Tablet by mouth daily. 21   Cristino Edmonds NP   pregabalin (LYRICA) 50 mg capsule Take 50 mg by mouth two (2) times a day. 20   Provider, Historical   aspirin delayed-release 81 mg tablet Take 81 mg by mouth nightly.     Provider, Historical   hydroxypropyl methylcellulose (GENTEAL MILD) 0.2 % ophthalmic solution Administer 2 Drops to both eyes as needed. Provider, Historical   loperamide (IMODIUM A-D) 2 mg tablet Take 4 mg by mouth three (3) times daily as needed. 3/28/19   Li Morel MD   acyclovir (ZOVIRAX) 400 mg tablet TK 1 T PO QD 18   Provider, Historical   multivitamin (ONE A DAY) tablet Take 1 Tab by mouth daily. Provider, Historical       Allergies   Allergen Reactions    Codeine Nausea and Vomiting     Blurred vision, felt faint    Contrast Agent [Iodine] Hives     Needs premedication w/ Benadryl prn (since 2012 reaction)    Dexamethasone Other (comments)     \"Deathly ill\"    Fish Oil Nausea Only    Glipizide Other (comments)     DRASTIC DROP IN BLOOD GLUCOSE, fasting    Lovenox [Enoxaparin] Hives     Patient states blisters all over the body.  Metformin Diarrhea    Niacin Other (comments)     Flushing    Norvasc [Amlodipine] Other (comments)     Leg edema    Optiray 350 [Ioversol] Hives     He does not know what this is        Review of Systems:  A comprehensive review of systems was negative except for that written in the History of Present Illness. Objective:     Visit Vitals  BP (!) 143/77 (BP 1 Location: Left upper arm, BP Patient Position: At rest)   Pulse 74   Temp 98.1 °F (36.7 °C)   Resp 17   Ht 6' 1\" (1.854 m)   Wt 76.6 kg (168 lb 14 oz)   SpO2 97%   BMI 22.28 kg/m²     Temp (24hrs), Av.8 °F (36.6 °C), Min:97.7 °F (36.5 °C), Max:98.1 °F (36.7 °C)       Lines:  PICC:       Physical Exam:  Lungs:  clear to auscultation bilaterally  Heart:  regular rate and rhythm  Abdomen:  soft, non-tender. Bowel sounds normal. No masses,  no organomegaly, inferior aspect of midline wound is open with large amount of clear drainage. No obvious odor, redness of skin, etc. Not tender to palpation.      Data Review:     CBC:  Recent Labs     22  0419 22  0452 22  0237   WBC 10.8 9.5 12.8*   HGB 9.1* 8.6* 9.4*   HCT 28.7* 27.7* 30.0*    220 230       BMP:  Recent Labs     03/13/22  0419 03/12/22  0452 03/11/22  0237   CREA 0.61* 0.63* 0.76   BUN 7 8 11    145 144   K 3.6 3.2* 3.3*   * 118* 119*   CO2 24 24 21   AGAP 1* 3* 4*   GLU 92 81 101*       LFTS:  No results for input(s): TBILI, ALT, AP, TP, ALB in the last 72 hours. No lab exists for component: SGOT    Microbiology:     All Micro Results     Procedure Component Value Units Date/Time    CULTURE, Kandice Dun STAIN [[id number]]  (Abnormal)  (Susceptibility) Collected: 03/09/22 1258    Order Status: Completed Specimen: Wound from Abdomen Updated: 03/13/22 0950     Special Requests: NO SPECIAL REQUESTS        GRAM STAIN NO WBCS SEEN      NO ORGANISMS SEEN        Culture result:       FEW * VANCOMYCIN RESISTANT ENTEROCOCCUS FAECIUM *            RARE MARTÍN TROPICALIS       CULTURE, BLOOD, PAIRED [[id number]] Collected: 03/09/22 1144    Order Status: Completed Specimen: Blood Updated: 03/12/22 0959     Special Requests: NO SPECIAL REQUESTS        Culture result: NO GROWTH 3 DAYS       CULTURE, URINE [[id number]]  (Abnormal) Collected: 03/09/22 1137    Order Status: Completed Specimen: Urine Updated: 03/11/22 1211     Special Requests: NO SPECIAL REQUESTS        [place] Count --        >100,000  COLONIES/mL       Culture result: CANDIDA TROPICALIS       COVID-19 RAPID TEST [[id number]]  (Abnormal) Collected: 03/05/22 1506    Order Status: Completed Specimen: Nasopharyngeal Updated: 03/05/22 1536     Specimen source Nasopharyngeal        COVID-19 rapid test Detected        Comment: Rapid Abbott ID Now       The specimen is POSITIVE for SARS-CoV-2, the novel coronavirus associated with COVID-19. This test has been authorized by the FDA under an Emergency Use Authorization (EUA) for use by authorized laboratories.         Fact sheet for Healthcare Providers: ConventionUpdate.co.nz  Fact sheet for Patients: [employer]Opentopic       Methodology: Isothermal Nucleic Acid Amplification  CALLED TO AND READ BACK BY  LEXUS RN @ 1536/RH         CULTURE, BLOOD, PAIRED [[id number]] Collected: 02/27/22 1256    Order Status: Completed Specimen: Blood Updated: 03/04/22 0714     Special Requests: NO SPECIAL REQUESTS        Culture result: NO GROWTH 5 DAYS       COVID-19 RAPID TEST [[id number]] Collected: 02/28/22 0443    Order Status: Completed Specimen: Nasopharyngeal Updated: 02/28/22 0510     Specimen source Nasopharyngeal        COVID-19 rapid test Not detected        Comment: Rapid Abbott ID Now       Rapid NAAT:  The specimen is NEGATIVE for SARS-CoV-2, the novel coronavirus associated with COVID-19. Negative results should be treated as presumptive and, if inconsistent with clinical signs and symptoms or necessary for patient management, should be tested with an alternative molecular assay. Negative results do not preclude SARS-CoV-2 infection and should not be used as the sole basis for patient management decisions. This test has been authorized by the FDA under an Emergency Use Authorization (EUA) for use by authorized laboratories.    Fact sheet for Healthcare Providers: [employer].nz  Fact sheet for Patients: [employer]nz       Methodology: Isothermal Nucleic Acid Amplification         CULTURE, BLOOD [[id number]] Collected: 02/22/22 1900    Order Status: Completed Specimen: Blood Updated: 02/27/22 2117     Special Requests: NO SPECIAL REQUESTS        Culture result: NO GROWTH 5 DAYS       COVID-19 RAPID TEST [[id number]] Collected: 02/21/22 1730    Order Status: Completed Specimen: Nasopharyngeal Updated: 02/21/22 1953     Specimen source Nasopharyngeal        COVID-19 rapid test Not detected        Comment: Rapid Abbott ID Now       Rapid NAAT:  The specimen is NEGATIVE for SARS-CoV-2, the novel coronavirus associated with COVID-19. Negative results should be treated as presumptive and, if inconsistent with clinical signs and symptoms or necessary for patient management, should be tested with an alternative molecular assay. Negative results do not preclude SARS-CoV-2 infection and should not be used as the sole basis for patient management decisions. This test has been authorized by the FDA under an Emergency Use Authorization (EUA) for use by authorized laboratories.    Fact sheet for Healthcare Providers: ConventionUpdate.co.nz  Fact sheet for Patients: [employer]Update.co.nz       Methodology: Isothermal Nucleic Acid Amplification         CULTURE, MRSA [[id number]] Collected: 02/21/22 1945    Order Status: Canceled Specimen: Nasal from Nares           Imaging:   Reviewed     Signed By: Telma Lu MD     March 13, 2022

## 2022-03-13 NOTE — PROGRESS NOTES
Problem: Pressure Injury - Risk of  Goal: *Prevention of pressure injury  Description: Document Christiano Scale and appropriate interventions in the flowsheet. Outcome: Progressing Towards Goal  Note: Pressure Injury Interventions:  Sensory Interventions: Assess need for specialty bed    Moisture Interventions: Apply protective barrier, creams and emollients    Activity Interventions: Pressure redistribution bed/mattress(bed type)    Mobility Interventions: HOB 30 degrees or less    Nutrition Interventions: Document food/fluid/supplement intake    Friction and Shear Interventions: Lift sheet,HOB 30 degrees or les  Problem: Falls - Risk of  Goal: *Absence of Falls  Description: Document Al Fall Risk and appropriate interventions in the flowsheet. Outcome: Progressing Towards Goal  Note: Fall Risk Interventions:  Mobility Interventions: Communicate number of staff needed for ambulation/transfer    Mentation Interventions: Adequate sleep, hydration, pain control,Familiar objects from home    Medication Interventions: Patient to call before getting OOB    Elimination Interventions: Call light in reach    History of Falls Interventions: Utilize gait belt for transfer/ambulation  Problem: Airway Clearance - Ineffective  Goal: Achieve or maintain patent airway  Outcome: Progressing Towards Goal     Problem:  Body Temperature -  Risk of, Imbalanced  Goal: Ability to maintain a body temperature within defined limits  Outcome: Progressing Towards Goal     Problem: Breathing Pattern - Ineffective  Goal: Ability to achieve and maintain a regular respiratory rate  Outcome: Progressing Towards Goal     Problem: Nutrition Deficits  Goal: Optimize nutrtional status  Outcome: Progressing Towards Goal     Problem: Risk for Fluid Volume Deficit  Goal: Maintain absence of muscle cramping  Outcome: Progressing Towards Goal     Problem: Risk for Fluid Volume Deficit  Goal: Maintain normal heart rhythm  Outcome: Progressing Towards Goal     Problem: Patient Education: Go to Patient Education Activity  Goal: Patient/Family Education  Outcome: Progressing Towards Goal

## 2022-03-14 NOTE — PROGRESS NOTES
Problem: Self Care Deficits Care Plan (Adult)  Goal: *Acute Goals and Plan of Care (Insert Text)  Description: FUNCTIONAL STATUS PRIOR TO ADMISSION: Pt was Mod I with RW and rollator for functional mobility and independent in ADLs. 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 HOME SUPPORT: The patient lived with wife but did not require assist. Children live near by and are able to provide some support. Occupational Therapy Goals  Initiated 3/1/2022, Weekly Re-assessment 3/8/2022 continued  1. Patient will perform grooming standing at sink with minimal assistance/contact guard assist within 7 day(s). 2.  Patient will perform UB and LB bathing with minimal assistance/contact guard assist within 7 day(s). 3.  Patient will perform lower body dressing with minimal assistance/contact guard assist within 7 day(s). 4.  Patient will perform toilet transfers with minimal assistance/contact guard assist within 7 day(s). 5.  Patient will participate in upper extremity therapeutic exercise/activities with modified independence for 10 minutes within 7 day(s). 6.  Patient will utilize energy conservation techniques during functional activities with verbal cues within 7 day(s). Outcome: Progressing Towards Goal   OCCUPATIONAL THERAPY TREATMENT  Patient: Ann Rodrigez (12 y.o. male)  Date: 3/14/2022  Diagnosis: Intestinal obstruction ([facility] [K56.609] <principal problem not specified>  Procedure(s) (LRB):  RIGHT POPLITEAL TO PERINEAL  BYPASS with vein AND RIGHT SECOND TOE AMPUTATION (Right) 7 Days Post-Op  Precautions: Fall,DNR  Chart, occupational therapy assessment, plan of care, and goals were reviewed. ASSESSMENT  Patient continues with skilled OT services and is progressing towards goals. Pt received supine in bed with HOB elevated. Pt slightly irritated that therapy arrived at noon when lunch should arrive, however, no lunch had been delivered.   Pt reported no pain with his R LE, but expressed increase discomfort with rectal tube. Pt achieved sitting on EOB with min assist.  Performed sit to stand with min assist x 2 using RW, rectal tube leaking. Pt/bed linens needed to be changed. Pt declined sitting in chair until nursing could address his rectal tube. Nursing notified. Overall, pt limited by decrease activity tolerance, generalized weakness, rectal tube, urinary catheter and impaired standing balance. PTA, pt was the primary caregiver for his wife. Pt is significantly below his baseline and recommend IPR. Current Level of Function Impacting Discharge (ADLs): mod to max assist     Other factors to consider for discharge: caregiver for his wife         PLAN :  Patient continues to benefit from skilled intervention to address the above impairments. Continue treatment per established plan of care to address goals. Recommend with staff:     Recommend next OT session: see goals    Recommendation for discharge: (in order for the patient to meet his/her long term goals)  Therapy 3 hours per day 5-7 days per week    This discharge recommendation:  A follow-up discussion with the attending provider and/or case management is planned    IF patient discharges home will need the following DME: TBD       SUBJECTIVE:   Patient stated [facility] do you all come at the worst time.     OBJECTIVE DATA SUMMARY:   Cognitive/Behavioral Status:  Neurologic State: Alert  Orientation Level: Oriented X4  Cognition: Follows commands        Safety/Judgement: Awareness of environment    Functional Mobility and Transfers for ADLs:  Bed Mobility:  Supine to Sit: Minimum assistance  Sit to Supine: Minimum assistance    Transfers:  Sit to Stand: Minimum assistance;Assist x2          Balance:  Sitting: Intact  Standing: Impaired; With support    ADL Intervention:  Feeding  Feeding Assistance: Independent              Lower Body Bathing  Perineal  : Maximum assistance  Position Performed:  Other (sidelying)    Upper Body [address]: Set-up    Lower Body [address] with Velcro: Minimum assistance  Position Performed: Seated edge of bed    Toileting  Bladder Hygiene: Total assistance (dependent)  Bowel Hygiene: Total assistance (dependent)  Clothing Management: Minimum assistance    Cognitive Retraining  Safety/Judgement: Awareness of environment        Pain:  No c/o    Activity Tolerance:   Fair    After treatment patient left in no apparent distress:   Supine in bed    COMMUNICATION/COLLABORATION:   The patients plan of care was discussed with: Physical therapist and Registered nurse.      Hollie Diez OTR/L  Time Calculation: 23 mins

## 2022-03-14 NOTE — CONSULTS
[address]   [address] [facility] [address]        GASTROENTEROLOGY CONSULTATION NOTE  Will Charu [place]  [phone] office  [phone] NP/PA in-hospital cell phone M-F until 4:30PM  After 5PM or on weekends, please call  for physician on call        NAME:  Sivan Monahan   :   1934   MRN:   [id number]       Referring Physician: Dr. Gallito Khanna Date: 3/14/2022 11:02 AM    Chief Complaint: diarrhea     History of Present Illness:  Patient is a 80 y.o. who is seen in consultation at the request of Dr. Hussein Baca for chronic diarrhea rectal tube in place, patient with bowel obstruction due to colon cancer, status post hemicolectomy. Patient has a past medical history significant for hypertension, dyslipidemia, diabetes mellitus, multiple myeloma, and COVID pneumonia. He was admitted to the hospital on 22 for small bowel obstruction due to invasive colon adenocarcinoma. He underwent right hemicolectomy on 22 with evidence of adenocarcinoma, +7/14 lymph nodes. History of chronic diarrhea for the last approximately 10 years. Patient reports having approximately 6 watery bowel movements daily. He was taking Imodium daily (up to 4 tabs daily). Following surgery this admission, patient reports intermittent abdominal cramping and distension followed by flatus and bowel movement that usually occurs after eating. Rectal tube has been in place. No hematochezia or melena. No nausea or vomiting. Colonoscopy in  by Dr. Tesfaye Tanner was normal.    I have reviewed the emergency room note, hospital admission note, notes by all other clinicians who have seen the patient during this hospitalization to date. I have reviewed the problem list and the reason for this hospitalization. I have reviewed the allergies and the medications the patient was taking at home prior to this hospitalization.     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    GERD (gastroesophageal reflux disease) 4/6/2010    GI bleed, duodenitis 2007 4/6/2010    H/O Heavy Alcohol Use 9/7/2010    History of skin cancer 09/07/2010    melanoma back    HTN (hypertension) 4/6/2010    Hypercalcemia 4/25/2012    Hyperhomocysteinemia ([facility] [address] 9/7/2010    Hypertriglyceridemia 9/7/2010    Left inguinal hernia 9/14/2015    Lipids blood increased 4/6/2010    Microalbuminuria 12/10/2015    Mild Allergic Rhinitis 9/7/2010    Multiple myeloma ([facility] [address]     Pre-diabetes 9/7/2010 2005; Optho Dr Crow Dukes Right inguinal hernia 11/2/2015    S/P AAA repair 2/9/2012 2/22/2012    Sinus tachycardia 9/7/2010    Vitamin B12 deficiency 9/7/2010       PSH:  Past Surgical History:   Procedure Laterality Date    COLONOSCOPY  8/2007    Normal; Dr Eric Amaya    EGD  8/2007    small hiatal hernia, mild duodenitis; Dr Bhavesh Keyes HX AAA REPAIR  2/9/2012    Dr Fausto Ortiz    HX CATARACT REMOVAL      both eyes    HX CHOLECYSTECTOMY  1-29-14    [facility]- Dr. Monroe Kingston Left 9/24/15    left indirect inguinal by Dr. Raman Santos Right 11/2/15    inguinal w/ mesh by Dr. Raman Santos Bilateral 10/2015 And 11/2015    Inguinal    BLUE DOPPLER  3/26/2012    BLUE Echo; + clot       Allergies:   Allergies   Allergen Reactions    Codeine Nausea and Vomiting     Blurred vision, felt faint    Contrast Agent [Iodine] Hives     Needs premedication w/ Benadryl prn (since 1/2012 reaction)    Dexamethasone Other (comments)     \"Deathly ill\"    Fish Oil Nausea Only    Glipizide Other (comments)     DRASTIC DROP IN BLOOD GLUCOSE, fasting    Lovenox [Enoxaparin] Hives     Patient states blisters all over the body.  Metformin Diarrhea    Niacin Other (comments)     Flushing    Norvasc [Amlodipine] Other (comments)     Leg edema    Optiray 350 [Ioversol] Hives     He does not know what this is       Home Medications:  Prior to Admission Medications   Prescriptions Last Dose Informant Patient Reported? Taking? HYDROcodone-acetaminophen (Norco) 5-325 mg per tablet   No No   Sig: Take 1 Tablet by mouth every six (6) hours as needed for Pain for up to 3 days. Max Daily Amount: 4 Tablets. acyclovir (ZOVIRAX) 400 mg tablet   Yes No   Sig: TK 1 T PO QD   aspirin delayed-release 81 mg tablet   Yes No   Sig: Take 81 mg by mouth nightly. calcium carbonate (TUMS) 200 mg calcium (500 mg) chew   Yes No   Sig: Take 1 Tablet by mouth as needed. cephALEXin (Keflex) 500 mg capsule   No No   Sig: Take 1 Capsule by mouth four (4) times daily for 6 days. docusate sodium (Colace) 100 mg capsule   No No   Sig: Take 1 Capsule by mouth daily for 20 days. Take daily while taking Norco.   ferrous sulfate 325 mg (65 mg iron) tablet   No No   Sig: Take 1 Tablet by mouth Daily (before breakfast). Indications: anemia from inadequate iron   folic acid-vit T6-GSX C95 (Folbic) 2.5-25-2 mg tablet   No No   Sig: Take 1 Tablet by mouth daily. furosemide (LASIX) 20 mg tablet   No No   Sig: Take 1 Tablet by mouth two (2) times a day. Hold for low blood pressure   hydroxypropyl methylcellulose (GENTEAL MILD) 0.2 % ophthalmic solution   Yes No   Sig: Administer 2 Drops to both eyes as needed. loperamide (IMODIUM A-D) 2 mg tablet   Yes No   Sig: Take 4 mg by mouth three (3) times daily as needed.    multivitamin (ONE A DAY) tablet   Yes No   Sig: Take 1 Tab by mouth daily. mupirocin (BACTROBAN) 2 % ointment   No No   Sig: Apply to open wound on toe daily as instructed   potassium chloride (KLOR-CON M10) 10 mEq tablet   No No   Sig: TAKE 1 TABLET BY MOUTH DAILY WITH LASIX   pregabalin (LYRICA) 50 mg capsule   Yes No   Sig: Take 50 mg by mouth two (2) times a day.       Facility-Administered Medications: None       Hospital Medications:  Current Facility-Administered Medications   Medication Dose Route Frequency    potassium chloride 10 mEq in 100 ml IVPB  10 mEq IntraVENous Q1H    furosemide (LASIX) tablet 40 mg  40 mg Oral DAILY    anidulafungin (ERAXIS) 100 mg in 0.9% sodium chloride 130 mL IVPB  100 mg IntraVENous Q24H    DAPTOmycin (CUBICIN) 300 mg in 0.9% sodium chloride 50 mL IVPB RF formulation  300 mg IntraVENous Q24H    finasteride (PROSCAR) tablet 5 mg  5 mg Oral DAILY    0.9% sodium chloride infusion 250 mL  250 mL IntraVENous PRN    diphenhydrAMINE (BENADRYL) capsule 25 mg  25 mg Oral Q6H PRN    traZODone (DESYREL) tablet 50 mg  50 mg Oral QHS PRN    pantoprazole (PROTONIX) tablet 40 mg  40 mg Oral ACB    acetaminophen (TYLENOL) tablet 650 mg  650 mg Oral TID    oxyCODONE IR (ROXICODONE) tablet 2.5-5 mg  2.5-5 mg Oral Q4H PRN    traMADoL (ULTRAM) tablet 50 mg  50 mg Oral Q6H PRN    zinc oxide-cod liver oil (DESITIN) 40 % paste   Topical PRN    [Held by provider] heparin (porcine) injection 5,000 Units  5,000 Units SubCUTAneous Q8H    mupirocin (BACTROBAN) 2 % ointment   Topical DAILY    phenol throat spray (CHLORASEPTIC) 1 Spray  1 Spray Oral PRN    HYDROmorphone (DILAUDID) injection 0.5-1 mg  0.5-1 mg IntraVENous Q2H PRN    ondansetron (ZOFRAN) injection 4 mg  4 mg IntraVENous Q4H PRN    acetaminophen (TYLENOL) tablet 650 mg  650 mg Oral Q6H PRN    hydrALAZINE (APRESOLINE) 20 mg/mL injection 20 mg  20 mg IntraVENous Q6H PRN    glucose chewable tablet 16 g  4 Tablet Oral PRN    dextrose 10 % infusion 0-250 mL  0-250 mL IntraVENous PRN    glucagon (GLUCAGEN) injection 1 mg  1 mg IntraMUSCular PRN    [Held by provider] aspirin delayed-release tablet 81 mg  81 mg Oral QHS    pregabalin (LYRICA) capsule 50 mg  50 mg Oral BID    albuterol (PROVENTIL HFA, VENTOLIN HFA, PROAIR HFA) inhaler 1 Puff  1 Puff Inhalation Q6H PRN       Social History:  Social History     Tobacco Use    Smoking status: Former Smoker     Packs/day: 1.50     Years: 55.00     Pack years: 82.50     Quit date: 2001     Years since quittin.2    Smokeless tobacco: Never Used    Tobacco comment: used to smoke 1 1/2 ppd x ~ 40 yrs   Substance Use Topics    Alcohol use: Yes     Alcohol/week: 14.0 standard drinks     Types: 14 Glasses of wine per week     Comment: 14       Family History:  Family History   Problem Relation Age of Onset    Heart Disease Mother          age 80    Cancer Father          brain tumor age 80    Heart Disease Father     Substance Abuse Daughter          cocaine OD age 43 in 200   [place] Other Son          PE/DVT age 43   [address] Other Daughter         alive and well    Anesth Problems Neg Hx        Review of Systems:  Constitutional: negative fever, negative chills, negative weight loss  Eyes:   negative visual changes  ENT:   negative sore throat, tongue or lip swelling  Respiratory:  negative cough, negative dyspnea  Cards:  negative for chest pain, palpitations  GI:   See HPI  :  negative for frequency, dysuria  Integument:  negative for rash and pruritus  Heme:  negative for easy bruising and gum/nose bleeding  Musculoskeletal:negative for myalgias, back pain and muscle weakness  Neuro:    negative for headaches, dizziness  Psych: negative for feelings of anxiety, depression     Objective:     Patient Vitals for the past 8 hrs:   BP Temp Pulse Resp SpO2   22 0825 (!) 130/59 97.8 °F (36.6 °C) 77 18 97 %     No intake/output data recorded.   1901 -  0700  In: -   Out: 3550 [Urine:2350; Drains:600]    EXAM:     CONST:  Pleasant male lying in bed, no acute distress   NEURO:  Alert and oriented   HEENT: EOMI, no scleral icterus   LUNGS: No acute respiratory distress   CARD:  S1 S2   ABD:  Soft, non distended, no tenderness, no rebound, no guarding. + Bowel sounds. Midline incision with drainage bag. FMS in place with brown stool. EXT:  Right foot dressing, right thigh staples in place   PSYCH: Not anxious or agitated     Data Review     Recent Labs     03/14/22  0303 03/13/22 0419   WBC 11.3* 10.8   HGB 9.6* 9.1*   HCT 30.5* 28.7*    212     Recent Labs     03/14/22 0303 03/13/22 0419    144   K 2.9* 3.6   * 119*   CO2 26 24   BUN 5* 7   CREA 0.60* 0.61*   * 92   CA 6.6* 6.6*     No results for input(s): AP, TBIL, TP, ALB, GLOB, GGT, AML, LPSE in the last 72 hours. No lab exists for component: SGOT, GPT, AMYP, HLPSE  No results for input(s): INR, PTP, APTT, INREXT in the last 72 hours. Assessment:   · Diarrhea: WBC 11.3. CT abdomen/pelvis without contrast (3/9/22): body wall edema and small volume fluid around the liver and in the right paracolic gutter; small bilateral pleural effusions; no evidence of drainable fluid collection or hematoma. History of C difficile in 2014.    · Small bowel obstruction due to invasive colon adenocarcinoma: status post right hemicolectomy on 2/24/22  · Midline wound dehiscence with drainage - cultures with VRE and Candida tropicalis  · Peripheral vascular disease with gangrene of right second toe: status post right above-knee popliteal to proximal peroneal artery bypass and amputation of right second toe on 3/7/22  · Acute on chronic anemia  · Multiple myeloma  · Diabetes mellitus  · Left arm swelling and edema  · History of COVID pneumonia  · Hypertension     Patient Active Problem List   Diagnosis Code    HTN (hypertension) I10    H/O Carotid Stenosis I65.29    GERD (gastroesophageal reflux disease) K21.9    Pre-diabetes R73.03    Sinus tachycardia R00.0    H/O Heavy Alcohol Use     Vitamin B12 deficiency E53.8    Hyperhomocysteinemia (HCC) E72.11    Mild Allergic Rhinitis J30.9    History of skin cancer Z85.828    S/P AAA repair 2/9/2012 Z98.890, Z86.79    Hypercalcemia E83.52    Multiple myeloma ([facility] [address] C90.00    Left inguinal hernia K40.90    Right inguinal hernia K40.90    Microalbuminuria R80.9    Chronic diarrhea K52.9    Aortic stenosis, moderate I35.0    Mild concentric left ventricular hypertrophy (LVH) I51.7    Hyperlipemia, mixed E78.2    Carotid stenosis, asymptomatic, right I65.21    Personal history of atrial fibrillation Z86.79    Cellulitis of right leg L03.115    Failure to thrive (child) R62.51    Falls frequently R29.6    Moderate protein-calorie malnutrition (HCC) E44.0    Acute renal injury ([facility] [address] N17.9    Malignant neoplasm of ascending colon (HCC) C18.2     Plan:     · Remove rectal tube  · Stool studies  · ID following  · Oncology evaluated and recommended outpatient follow up  · Patient was discussed with Dr. Nicolette Coombs  · Thank you for allowing me to participate in care of Cheryle Saravia     Signed By: Louis Barksdale     3/14/2022  11:02 AM       GI Attending: Agree with above plan. Follow up stool studies. Patient has remote history of c diff. Will follow along with you. Tom Coombs MD

## 2022-03-14 NOTE — PROGRESS NOTES
[facility] [facility]                                                                                          Hospitalist Progress Note  Dilcia Degroot MD  Answering service: [phone] OR [phone] from in house phone        Date of Service:  3/14/2022  NAME:  Evita Harvey  :  1934  MRN:  [id number]      Admission Summary:     A 66-year-old man with a past medical history significant for type 2 diabetes, multiple myeloma, hypertension, dyslipidemia, peripheral neuropathy, status post abdominal aortic aneurysm repair in  and COVID-pneumonia.  Patient was recently admitted on 2/15 for management of cellulitis. Presented to the emergency room several days ago treated for diarrhea with Imodium, and now with progressive abdominal pain nausea vomiting. Patient seen in the ED for abdominal pain started 12 to 24 hours ago associated with vomiting and constipation.  The hospitalist team has been consulted to assist general surgery in medical management.   Patient examined at bedside.  Patient is ill-appearing, should not reports health has been declining since Covid diagnosis in 2021.  Patient denies chest pain, dizziness, shortness of breath during examination.  Patient endorses abdominal pain nausea and vomiting.  Productive cough noted on evaluation, patient reports he has had a cough since Covid diagnosis and it is somewhat improved.  NG tube is in place draining dark brown bilious fluid.  Patient reports abdominal distention has improved some since placement of NG.  Patient's son-in-law is at bedside. \"     Interval history / Subjective:     He said he feels the same, he said he has still diarrhea, no abdominal pain, no vomiting     Assessment & Plan:     Small bowel obstruction due to invasive colon adenocarcinoma   -Underwent right hemicolectomy .  Evidence of adenocarcinoma, + lymph nodes  -diet advanced to regular diet   -Drainage from mid abdominal incision, noted on 3/9/22, ostomy placed for drainage collection, cx drainage grow few vancomycin resistant enterococcus faecium, rare candida  -rectal tube in place  -CT abd/pelvis show no hematoma or drain able fluid  -wound cx grow vancomycin resistant enterococcus faecium and rare candida tropicalis, ID consulted and antibiotics switched to iv daptomycin, eraxis is added  -leukcytosis improving  -blood cx no growth  -Oncology consulted 3/1, recommend outpatient follow up  -surgical service on board      PVD  Right second toe dry gangrene  Right lower extremity arterial occlusion   -MRI with evidence of osteomyelitis. More consistent with dry gangrene.   -Antibiotics discontinued on 3/2  -MARYURI with moderate to severe arterial occlusion  -Right arteriogram 2/28 with popliteal occlusion, unable to cross occlusion.    -seen by podiatry and signed off   -s/p right popliteal bypass and second Right great toe amputation   -right thigh incision draining very little clear fluid, he has also bilateral pretibial and pedal edema, on Lasix 40 mg po daily  -pain controlled  -continue PT   -vascular surgeon on board    Chronic diarrhea   -he said he has having diarrhea since 2012  -rectal tube in place   -stool for enteric bacteria, C diff  -GI is consulted      Hypotension ,resolved  -pt with drainage from surgical incision, hematuria   -s/p prbc, abx, pro ca lowl for risks of SIRS/sepsis  -BP normal, IVF discontinued     Acute on chronic  anemia   -symptomatic with hypotension, hematuria  -Hgb 6.9, s/p 1 unit  PRBC Hgb improved  9.6  -venofer x 1 dose,  -monitor H/H     Gross hematuria noted in rosas post op surgery   -likely due to rosas placement with enlarged porstate,   -on manual irrigation, has still gross hematuria, when hematuria improves ok to d/c rosas with voiding trial   -no flomax due to hypotension,   -MRI w/c for further eval once stable      Acute metabolic encephalopathy  -resolved, patient is conscious and alert, well oriented  -Multifactorial in setting of recent surgery, narcotics, hospitalization, infection  -limit narcotics as able  -continue neuro check and supportive care     T2DM  -A1c 6.3   -continue sliding scale and monitor finger stick glucose      Left arm swelling and edema and bilateral lower ext edema  -doppler neg for DVT  -elevate the arm,   -start lasix 40 mg po q daily     Hx of COVID 19 pneumonia    -CXR with scattered opacities, concerning for PNA.    -s/p treatment  -improved, SpO2 % on RA     Peripheral neuropathy  -stable, resume pregabalin.     HTN  -had episode of low BP,  Lasix on hold, monitor BP, may consider norvasc if BP trending up    Failure to thrive, Protein calorie malnutrition  -continue nutritional supplement  - Nutrition consulted      Recurrent falls  -continue PT/OT  -fall precaution    Hypokalemia:  -replace with kcl and repeat in am    Hypomagnesemia  -replace with iv mg    Insomnia  -continue PRN low-dose trazodone           Code status: DNR  Prophylaxis: SCD    Care Plan discussed with: Patient, Nurse and CM  Anticipated Disposition: Inpatient Rehab, > 48 hrs  I called his son in law, Latia Edwards at , updated him and answered his questions     Hospital Problems  Date Reviewed: 2/28/2022          Codes Class Noted POA    Malignant neoplasm of ascending colon ([facility] ICD-10-CM: C18.2  ICD-9-CM: 153.6  2/28/2022 Unknown        Failure to thrive (child) ICD-10-CM: R62.51  ICD-9-CM: 783.41  2/21/2022 Unknown        Falls frequently ICD-10-CM: R29.6  ICD-9-CM: V15.88  2/21/2022 Unknown        Moderate protein-calorie malnutrition ([facility] ICD-10-CM: E44.0  ICD-9-CM: 263.0  2/21/2022 Unknown        Acute renal injury ([facility] ICD-10-CM: N17.9  ICD-9-CM: 584.9  2/21/2022 Unknown        Cellulitis of right leg ICD-10-CM: L03.115  ICD-9-CM: 682.6  2/12/2022 Yes        Aortic stenosis, moderate ICD-10-CM: I35.0  ICD-9-CM: 424.1  4/10/2018 Yes        Multiple myeloma ([facility] ICD-10-CM: C90.00  ICD-9-CM: 203.00  12/27/2012 Yes        Pre-diabetes ICD-10-CM: R73.03  ICD-9-CM: 790.29  9/7/2010 Yes    Overview Addendum 11/1/2011  9:29 AM by Vania Ho MD     2005; Optho Dr Didier Huerta             HTN (hypertension) ICD-10-CM: I10  ICD-9-CM: 401.9  4/6/2010 Yes    Overview Signed 9/7/2010  8:55 AM by Vania Ho MD     1990             H/O Carotid Stenosis ICD-10-CM: I65.29  ICD-9-CM: 433.10  4/6/2010 Yes    Overview Addendum 9/7/2010  8:58 AM by Vania Ho MD     Oct 2003; 50%left subclavian and 50-70% SHEILA                       Vital Signs:    Last 24hrs VS reviewed since prior progress note. Most recent are:  Visit Vitals  BP (!) 130/59 (BP 1 Location: Left upper arm, BP Patient Position: At rest)   Pulse 77   Temp 97.8 °F (36.6 °C)   Resp 18   Ht 6' 1\" (1.854 m)   Wt 76.6 kg (168 lb 14 oz)   SpO2 97%   BMI 22.28 kg/m²         Intake/Output Summary (Last 24 hours) at 3/14/2022 1246  Last data filed at 3/14/2022 0301  Gross per 24 hour   Intake --   Output 3150 ml   Net -3150 ml        Physical Examination:     I had a face to face encounter with this patient and independently examined them on 3/14/2022 as outlined below:          Constitutional:  No acute distress, cooperative, pleasant    ENT:  Oral mucosa moist, oropharynx benign. Resp:  CTA bilaterally. No wheezing/rhonchi/rales. No accessory muscle use. CV:  Regular rhythm, normal rate, no murmurs, gallops, rubs    GI:  Mid abdomen incision open wound with drainage, collecting ostomy in place, abdomen soft, non distended, non tender. normoactive bowel sounds, no hepatosplenomegaly     Musculoskeletal:  Right thigh dressed, medial thigh staples stable, right foot dressed    Neurologic:  Moves all extremities.   AAOx3, CN II-XII reviewed            Data Review:    Review and/or order of clinical lab test  Review and/or order of tests in the radiology section of CPT  Review and/or order of tests in the medicine section of [facility]      Labs:     Recent Labs     03/14/22  0303 03/13/22  0419   WBC 11.3* 10.8   HGB 9.6* 9.1*   HCT 30.5* 28.7*    212     Recent Labs     03/14/22  0303 03/13/22  0419 03/12/22  0452    144 145   K 2.9* 3.6 3.2*   * 119* 118*   CO2 26 24 24   BUN 5* 7 8   CREA 0.60* 0.61* 0.63*   * 92 81   CA 6.6* 6.6* 6.5*   MG 1.4*  --   --      No results for input(s): ALT, AP, TBIL, TBILI, TP, ALB, GLOB, GGT, AML, LPSE in the last 72 hours. No lab exists for component: SGOT, GPT, AMYP, HLPSE  No results for input(s): INR, PTP, APTT, INREXT, INREXT in the last 72 hours. No results for input(s): FE, TIBC, PSAT, FERR in the last 72 hours. Lab Results   Component Value Date/Time    Folate 81.7 (H) 02/13/2022 02:38 AM      No results for input(s): PH, PCO2, PO2 in the last 72 hours.   Recent Labs     03/13/22  1530   CPK 33*     Lab Results   Component Value Date/Time    Cholesterol, total 193 03/23/2021 11:18 AM    HDL Cholesterol 83 03/23/2021 11:18 AM    LDL, calculated 84.2 03/23/2021 11:18 AM    Triglyceride 129 03/23/2021 11:18 AM    CHOL/HDL Ratio 2.3 03/23/2021 11:18 AM     Lab Results   Component Value Date/Time    Glucose (POC) 122 (H) 03/14/2022 11:53 AM    Glucose (POC) 88 03/14/2022 12:15 AM    Glucose (POC) 117 03/13/2022 05:31 PM    Glucose (POC) 76 03/13/2022 06:18 AM    Glucose (POC) 116 03/12/2022 09:34 PM     Lab Results   Component Value Date/Time    Color DARK YELLOW 01/23/2014 06:54 PM    Appearance CLOUDY (A) 01/23/2014 06:54 PM    Specific gravity 1.025 01/23/2014 06:54 PM    pH (UA) 5.5 01/23/2014 06:54 PM    Protein 100 (A) 01/23/2014 06:54 PM    Glucose 250 (A) 01/23/2014 06:54 PM    Ketone TRACE (A) 01/23/2014 06:54 PM    Bilirubin LARGE (A) 01/23/2014 06:54 PM    Urobilinogen 1.0 01/23/2014 06:54 PM    Nitrites NEGATIVE  01/23/2014 06:54 PM    Leukocyte Esterase NEGATIVE  01/23/2014 06:54 PM    Epithelial cells FEW 01/23/2014 06:54 PM Bacteria 3+ (A) 01/23/2014 06:54 PM    WBC 0-4 01/23/2014 06:54 PM    RBC 5-10 01/23/2014 06:54 PM         Medications Reviewed:     Current Facility-Administered Medications   Medication Dose Route Frequency    furosemide (LASIX) tablet 40 mg  40 mg Oral DAILY    anidulafungin (ERAXIS) 100 mg in 0.9% sodium chloride 130 mL IVPB  100 mg IntraVENous Q24H    DAPTOmycin (CUBICIN) 300 mg in 0.9% sodium chloride 50 mL IVPB RF formulation  300 mg IntraVENous Q24H    finasteride (PROSCAR) tablet 5 mg  5 mg Oral DAILY    0.9% sodium chloride infusion 250 mL  250 mL IntraVENous PRN    diphenhydrAMINE (BENADRYL) capsule 25 mg  25 mg Oral Q6H PRN    traZODone (DESYREL) tablet 50 mg  50 mg Oral QHS PRN    pantoprazole (PROTONIX) tablet 40 mg  40 mg Oral ACB    acetaminophen (TYLENOL) tablet 650 mg  650 mg Oral TID    oxyCODONE IR (ROXICODONE) tablet 2.5-5 mg  2.5-5 mg Oral Q4H PRN    traMADoL (ULTRAM) tablet 50 mg  50 mg Oral Q6H PRN    zinc oxide-cod liver oil (DESITIN) 40 % paste   Topical PRN    [Held by provider] heparin (porcine) injection 5,000 Units  5,000 Units SubCUTAneous Q8H    mupirocin (BACTROBAN) 2 % ointment   Topical DAILY    phenol throat spray (CHLORASEPTIC) 1 Spray  1 Spray Oral PRN    HYDROmorphone (DILAUDID) injection 0.5-1 mg  0.5-1 mg IntraVENous Q2H PRN    ondansetron (ZOFRAN) injection 4 mg  4 mg IntraVENous Q4H PRN    acetaminophen (TYLENOL) tablet 650 mg  650 mg Oral Q6H PRN    hydrALAZINE (APRESOLINE) 20 mg/mL injection 20 mg  20 mg IntraVENous Q6H PRN    glucose chewable tablet 16 g  4 Tablet Oral PRN    dextrose 10 % infusion 0-250 mL  0-250 mL IntraVENous PRN    glucagon (GLUCAGEN) injection 1 mg  1 mg IntraMUSCular PRN    [Held by provider] aspirin delayed-release tablet 81 mg  81 mg Oral QHS    pregabalin (LYRICA) capsule 50 mg  50 mg Oral BID    albuterol (PROVENTIL HFA, VENTOLIN HFA, PROAIR HFA) inhaler 1 Puff  1 Puff Inhalation Q6H PRN ______________________________________________________________________  EXPECTED LENGTH OF STAY: 3d 4h  ACTUAL LENGTH OF STAY:          21                 Luis A Ceja MD

## 2022-03-14 NOTE — PROGRESS NOTES
Problem: Mobility Impaired (Adult and Pediatric)  Goal: *Acute Goals and Plan of Care (Insert Text)  Description: FUNCTIONAL STATUS PRIOR TO ADMISSION: Patient was modified independent using a rolling walker for functional mobility. HOME SUPPORT PRIOR TO ADMISSION: The patient lived with spouse but did not require assist. Pt is spouse's primary caregiver (does not have to provide physical assistance). Pt's daughter and son in law are involved in family's care and checks on them daily and assists as needed    Physical Therapy Goals    Re-evaluation 3/8/2022  1. Patient will move from supine to sit and sit to supine  in bed with modified independence within 7 day(s). 2.  Patient will transfer from bed to chair and chair to bed with modified independence using the least restrictive device within 7 day(s). 3.  Patient will perform sit to stand with modified independence within 7 day(s). 4.  Patient will ambulate with modified independence for 200 feet with the least restrictive device within 7 day(s). 5.  Patient will ascend/descend 12 stairs with single handrail(s) with supervision/set-up within 7 day(s). Initiated 2/22/2022 reviewed 3/1/2022 and still appropriate  1. Patient will move from supine to sit and sit to supine  in bed with modified independence within 7 day(s). 2.  Patient will transfer from bed to chair and chair to bed with modified independence using the least restrictive device within 7 day(s). 3.  Patient will perform sit to stand with modified independence within 7 day(s). 4.  Patient will ambulate with modified independence for 200 feet with the least restrictive device within 7 day(s).    5.  Patient will ascend/descend 12 stairs with single handrail(s) with supervision/set-up within 7 day(s).'  Outcome: Not Progressing Towards Goal   PHYSICAL THERAPY TREATMENT  Patient: Jacky Huffman (84 y.o. male)  Date: 3/14/2022  Diagnosis: [facility] [I36.216] <principal problem not specified>  Procedure(s) (LRB):  RIGHT POPLITEAL TO PERINEAL  BYPASS with vein AND RIGHT SECOND TOE AMPUTATION (Right) 7 Days Post-Op  Precautions: Fall,DNR  Chart, physical therapy assessment, plan of care and goals were reviewed. ASSESSMENT  Patient continues with skilled PT services and is not progressing towards goals. Pt resting in bed and agreeable to participate with therapy with some encouragement. Pt able to transfer supine to sit with minimal assistance. He stood with a rolling walker however noted  loose stool with rectal tube in place on pad and on floor and pt assisted to sit EOB for hygiene. Pt declined standing or transferring to chair to change linens therefore assisted to supine position with minimal assistance. He does well rolling side to side to complete hygiene and linen change. RN notified and came in to check rectal tube. Pt expressed frustration with timing of visit and his concern with rectal tube issue. Pt is motivated to increase his activity and desires to progress with ambulation when able. Pt requesting PT to see him earlier in the day. Current Level of Function Impacting Discharge (mobility/balance): bed mobility minimal assistance, sit to stand transfer with rolling walker and minimal assist x 2, deferred ambulation due to issue with rectal tube    Other factors to consider for discharge: primary caregiver for wife, fall risk, well below his baseline          PLAN :  Patient continues to benefit from skilled intervention to address the above impairments. Continue treatment per established plan of care. to address goals.     Recommendation for discharge: (in order for the patient to meet his/her long term goals)  Therapy 3 hours per day 5-7 days per week    This discharge recommendation:  Has not yet been discussed the attending provider and/or case management    IF patient discharges home will need the following DME: rolling walker, would need full time caregiver for his wife as pt is primary caregiver        SUBJECTIVE:   Patient stated Jorge Serrano you come early in the morning?     OBJECTIVE DATA SUMMARY:   Critical Behavior:  Neurologic State: Alert  Orientation Level: Oriented X4  Cognition: Follows commands  Safety/Judgement: Awareness of environment  Functional Mobility Training:  Bed Mobility:  Rolling: Contact guard assistance;Assist x1;Additional time; Adaptive equipment  Supine to Sit: Minimum assistance  Sit to Supine: Minimum assistance           Transfers:  Sit to Stand: Minimum assistance;Assist x2  Stand to Sit: Contact guard assistance;Assist x2                             Balance:  Sitting: Intact  Standing: Impaired; With support  Standing - Static: Fair  Pain Rating:  Denies pain in RLE    Activity Tolerance:   Poor, loose stool with rectal tube in place, discomfort from rectal tube    After treatment patient left in no apparent distress:   Supine in bed and Call bell within reach    COMMUNICATION/COLLABORATION:   The patients plan of care was discussed with: Registered nurse.      Janeth Hogan   Time Calculation: 20 mins

## 2022-03-14 NOTE — PROGRESS NOTES
ID Progress Note  3/14/2022    Subjective:     No discomfort    Review of Systems:            Symptom Y/N Comments   Symptom Y/N Comments   Fever/Chills  n     Chest Pain n       Poor Appetite       Edema        Cough       Abdominal Pain n       Sputum       Joint Pain        SOB/PARKINSON n      Pruritis/Rash        Nausea/vomit n      Tolerating PT/OT        Diarrhea  y     Tolerating Diet        Constipation  n     Other           Could NOT obtain due to:       Objective:     Vitals:   Visit Vitals  /60 (BP 1 Location: Left upper arm, BP Patient Position: At rest)   Pulse 85   Temp 97.9 °F (36.6 °C)   Resp 18   Ht 6' 1\" (1.854 m)   Wt 76.6 kg (168 lb 14 oz)   SpO2 97%   BMI 22.28 kg/m²        Tmax:  Temp (24hrs), Av.1 °F (36.7 °C), Min:97.8 °F (36.6 °C), Max:98.4 °F (36.9 °C)      PHYSICAL EXAM:  General: Chronically ill appearing. WD, WN. Alert, cooperative, no acute distress    EENT:  EOMI. Anicteric sclerae. Dry mucous membrane  Resp:  CTA bilaterally, no wheezing or rales. No accessory muscle use  CV:  Regular  rhythm,  No edema  GI:  Soft, Non distended, Non tender. +Bowel sounds  Neurologic:  Alert and oriented X 3, normal speech,   Psych:   Fair insight. Not anxious nor agitated  Skin:  No rashes.   No jaundice    Labs:   Lab Results   Component Value Date/Time    WBC 11.3 (H) 2022 03:03 AM    HGB 9.6 (L) 2022 03:03 AM    HCT 30.5 (L) 2022 03:03 AM    PLATELET 222  03:03 AM    MCV 98.1 2022 03:03 AM     Lab Results   Component Value Date/Time    Sodium 143 2022 03:03 AM    Potassium 2.9 (L) 2022 03:03 AM    Chloride 114 (H) 2022 03:03 AM    CO2 26 2022 03:03 AM    Anion gap 3 (L) 2022 03:03 AM    Glucose 119 (H) 2022 03:03 AM    BUN 5 (L) 2022 03:03 AM    Creatinine 0.60 (L) 2022 03:03 AM    BUN/Creatinine ratio 8 (L) 2022 03:03 AM    GFR est AA >60 2022 03:03 AM    GFR est non-AA >60 2022 03:03 AM Calcium 6.6 (L) 03/14/2022 03:03 AM    Bilirubin, total 0.6 02/21/2022 03:04 PM    Alk.  phosphatase 70 02/21/2022 03:04 PM    Protein, total 7.0 02/21/2022 03:04 PM    Albumin 3.1 (L) 02/21/2022 03:04 PM    Globulin 3.9 02/21/2022 03:04 PM    A-G Ratio 0.8 (L) 02/21/2022 03:04 PM    ALT (SGPT) 20 02/21/2022 03:04 PM         Cultures:   Results     Procedure Component Value Units Date/Time    ENTERIC BACTERIA PANEL, DNA [[id number]]     Order Status: Sent Specimen: Stool     C. DIFFICILE AG & TOXIN A/B [[id number]]     Order Status: Sent Specimen: Stool     OVA & PARASITES, STOOL [[id number]]     Order Status: Sent Specimen: Feces from Stool     CULTURE, Kam  STAIN [[id number]]  (Abnormal)  (Susceptibility) Collected: 03/09/22 1258    Order Status: Completed Specimen: Wound from Abdomen Updated: 03/13/22 0950     Special Requests: NO SPECIAL REQUESTS        GRAM STAIN NO WBCS SEEN      NO ORGANISMS SEEN        Culture result:       FEW * VANCOMYCIN RESISTANT ENTEROCOCCUS FAECIUM *            RARE MARTÍN TROPICALIS       Susceptibility      Vancomycin resistant enterococcus facium     ERICA     Ampicillin ($) Resistant     Daptomycin ($$$$$) Susceptible  [1]      Linezolid ($$$$$) Susceptible     Vancomycin ($) Resistant                 [1]  Dose Dependent  (SENSITIVITIES PERFORMED BY E-TEST)          Linear View                   CULTURE, BLOOD, PAIRED [[id number]] Collected: 03/09/22 1244    Order Status: Completed Specimen: Blood Updated: 03/14/22 0722     Special Requests: NO SPECIAL REQUESTS        Culture result: NO GROWTH 5 DAYS       CULTURE, URINE [[id number]]  (Abnormal) Collected: 03/09/22 1137    Order Status: Completed Specimen: Urine Updated: 03/11/22 1211     Special Requests: NO SPECIAL REQUESTS        [place] Count --        >100,000  COLONIES/mL       Culture result: CANDIDA TROPICALIS       COVID-19 RAPID TEST [[id number]]  (Abnormal) Collected: 03/05/22 1506    Order Status: Completed Specimen: Nasopharyngeal Updated: 03/05/22 1536     Specimen source Nasopharyngeal        COVID-19 rapid test Detected        Comment: Rapid Abbott ID Now       The specimen is POSITIVE for SARS-CoV-2, the novel coronavirus associated with COVID-19. This test has been authorized by the FDA under an Emergency Use Authorization (EUA) for use by authorized laboratories. Fact sheet for Healthcare Providers: ConventionUpdate.co.nz  Fact sheet for Patients: Bridestorydate.co.nz       Methodology: Isothermal Nucleic Acid Amplification  CALLED TO AND READ BACK BY  LEXUS AMADOR @ [facility]/                Assessment and Plan   Small bowel obstruction due to invasive colon adenocarcinoma  S/p right hemicolectomy on 2/24/22  Midline wound dehiscence with drainage   - afebrile, WBC 11.3    Urine cultures (3/9) Candida tropicalis    Wound cx (3/9) candida tropicalis and VRE      Continue with IV Eraxis and daptomycin, last dose 3/26/2022    Pt may complete remaining ABX therapy with PO diflucan and zyvox upon discharge.       ms    Hx of chronic Diarrhea  Hx of C-diff  - GI following; check for C-diff      Above plan of care discussed and agreed with Dr. Lyric Polanco, NP

## 2022-03-14 NOTE — PROGRESS NOTES
Vascular:    Right thigh incision continues to drain serous fluid. Otherwise OK - foot well perfused and bypass open. Now POD #7 after right leg bypass and toe amp - nothing to add to current care. Will follow.

## 2022-03-14 NOTE — PROGRESS NOTES
Received orders for stool studies including c diff and my manager said that the patient does not meet criteria to send a sample. Notified GI and that they can speak to supervisor to override.

## 2022-03-15 NOTE — PROGRESS NOTES
ID Progress Note  3/15/2022    Subjective:     No discomfort    Review of Systems:            Symptom Y/N Comments   Symptom Y/N Comments   Fever/Chills  n     Chest Pain n       Poor Appetite       Edema        Cough       Abdominal Pain n       Sputum       Joint Pain        SOB/PARKINSON n      Pruritis/Rash        Nausea/vomit n      Tolerating PT/OT        Diarrhea  y     Tolerating Diet        Constipation  n     Other           Could NOT obtain due to:       Objective:     Vitals:   Visit Vitals  BP (!) 146/70 (BP 1 Location: Left upper arm, BP Patient Position: At rest)   Pulse 77   Temp 98.1 °F (36.7 °C)   Resp 18   Ht 6' 1\" (1.854 m)   Wt 76.6 kg (168 lb 14 oz)   SpO2 96%   BMI 22.28 kg/m²        Tmax:  Temp (24hrs), Av.3 °F (36.8 °C), Min:97.9 °F (36.6 °C), Max:98.7 °F (37.1 °C)      PHYSICAL EXAM:  General: Chronically ill appearing. WD, WN. Alert, cooperative, no acute distress    EENT:  EOMI. Anicteric sclerae. Dry mucous membrane  Resp:  CTA bilaterally, no wheezing or rales. No accessory muscle use  CV:  Regular  rhythm,  No edema  GI:  Soft, Non distended, Non tender. +Bowel sounds  Neurologic:  Alert and oriented X 3, normal speech,   Psych:   Fair insight. Not anxious nor agitated  Skin:  No rashes.   No jaundice    Right thigh incision    Labs:   Lab Results   Component Value Date/Time    WBC 11.3 (H) 03/15/2022 02:41 AM    HGB 9.7 (L) 03/15/2022 02:41 AM    HCT 30.8 (L) 03/15/2022 02:41 AM    PLATELET 042  02:41 AM    MCV 96.9 03/15/2022 02:41 AM     Lab Results   Component Value Date/Time    Sodium 142 03/15/2022 02:41 AM    Potassium 2.6 (LL) 03/15/2022 02:41 AM    Chloride 111 (H) 03/15/2022 02:41 AM    CO2 28 03/15/2022 02:41 AM    Anion gap 3 (L) 03/15/2022 02:41 AM    Glucose 99 03/15/2022 02:41 AM    BUN 4 (L) 03/15/2022 02:41 AM    Creatinine 0.55 (L) 03/15/2022 02:41 AM    BUN/Creatinine ratio 7 (L) 03/15/2022 02:41 AM    GFR est AA >60 03/15/2022 02:41 AM    GFR est non-AA >60 03/15/2022 02:41 AM    Calcium 7.2 (L) 03/15/2022 02:41 AM    Bilirubin, total 0.6 02/21/2022 03:04 PM    Alk. phosphatase 70 02/21/2022 03:04 PM    Protein, total 7.0 02/21/2022 03:04 PM    Albumin 3.1 (L) 02/21/2022 03:04 PM    Globulin 3.9 02/21/2022 03:04 PM    A-G Ratio 0.8 (L) 02/21/2022 03:04 PM    ALT (SGPT) 20 02/21/2022 03:04 PM         Cultures:   Results     Procedure Component Value Units Date/Time    ENTERIC BACTERIA PANEL, DNA [[id number]] Collected: 03/14/22 1552    Order Status: Completed Specimen: Stool Updated: 03/14/22 1605    C.  DIFFICILE AG & TOXIN A/B [[id number]] Collected: 03/14/22 1552    Order Status: Completed Specimen: Stool Updated: 03/14/22 1604    OVA & PARASITES, STOOL [[id number]] Collected: 03/14/22 1552    Order Status: Completed Specimen: Feces from Stool Updated: 03/14/22 1604    CULTURE, WOUND Brea Evy STAIN [[id number]]  (Abnormal)  (Susceptibility) Collected: 03/09/22 1258    Order Status: Completed Specimen: Wound from Abdomen Updated: 03/13/22 0950     Special Requests: NO SPECIAL REQUESTS        GRAM STAIN NO WBCS SEEN      NO ORGANISMS SEEN        Culture result:       FEW * VANCOMYCIN RESISTANT ENTEROCOCCUS FAECIUM *            RARE MARTÍN TROPICALIS       Susceptibility      Vancomycin resistant enterococcus facium     ERICA     Ampicillin ($) Resistant     Daptomycin ($$$$$) Susceptible  [1]      Linezolid ($$$$$) Susceptible     Vancomycin ($) Resistant                 [1]  Dose Dependent  (SENSITIVITIES PERFORMED BY E-TEST)          Linear View                   CULTURE, BLOOD, PAIRED [[id number]] Collected: 03/09/22 1244    Order Status: Completed Specimen: Blood Updated: 03/14/22 0722     Special Requests: NO SPECIAL REQUESTS        Culture result: NO GROWTH 5 DAYS       CULTURE, URINE [[id number]]  (Abnormal) Collected: 03/09/22 1137    Order Status: Completed Specimen: Urine Updated: 03/11/22 1211     Special Requests: NO SPECIAL REQUESTS        [place] Count -- >100,000  COLONIES/mL       Culture result: CANDIDA TROPICALIS       COVID-19 RAPID TEST [[id number]]  (Abnormal) Collected: 03/05/22 1506    Order Status: Completed Specimen: Nasopharyngeal Updated: 03/05/22 1536     Specimen source Nasopharyngeal        COVID-19 rapid test Detected        Comment: Rapid Abbott ID Now       The specimen is POSITIVE for SARS-CoV-2, the novel coronavirus associated with COVID-19. This test has been authorized by the FDA under an Emergency Use Authorization (EUA) for use by authorized laboratories. Fact sheet for Healthcare Providers: ConventionUpdate.co.nz  Fact sheet for Patients: ConventionUpdate.co.nz       Methodology: Isothermal Nucleic Acid Amplification  CALLED TO AND READ BACK BY  LEXUS AMADOR @ [facility]/                Assessment and Plan   Small bowel obstruction due to invasive colon adenocarcinoma  S/p right hemicolectomy on 2/24/22  Midline wound dehiscence with drainage   - afebrile, WBC 11.3    Urine cultures (3/9) Candida tropicalis    Wound cx (3/9) candida tropicalis and VRE      Continue with IV Eraxis and daptomycin, last dose 3/26/2022    Pt may complete remaining ABX therapy with PO diflucan and zyvox upon discharge.       ms    Fever work up if temp >= 100.4    chronic Diarrhea  Hx of C-diff  - GI following; C-diff, enteric bacterial panel (-), O & P pending      Above plan of care discussed and agreed with Dr. Lyric Polanco, NP

## 2022-03-15 NOTE — PROGRESS NOTES
Attempted PT session in PM-- pt asleep after OT session, does not rouse to voice. Informed RN to call PT after pt wakes up, will continue to follow and return if patient wakes up prior to end of day.     Arielle Swift, DPT, PT

## 2022-03-15 NOTE — PROGRESS NOTES
JOSE:    RUR 21%    Disposition: [facility] accepted. Loyce Smoke will be needed.       Transportation:BLS    Follow up: PCP/Specialist    Primary contacts: Jorge [place](Son in [place]) [phone]  Armand Lou [place](Daughter) [phone]    Leena Faust RN/CRM  [phone]

## 2022-03-15 NOTE — PROGRESS NOTES
Problem: Self Care Deficits Care Plan (Adult)  Goal: *Acute Goals and Plan of Care (Insert Text)  Description: FUNCTIONAL STATUS PRIOR TO ADMISSION: Pt was Mod I with RW and rollator for functional mobility and independent in ADLs. 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 HOME SUPPORT: The patient lived with wife but did not require assist. Children live near by and are able to provide some support. Occupational Therapy Goals  Reviewed 3/15/22, see goals below  1. Patient will perform grooming standing at sink with minimal assistance/contact guard assist within 7 day(s). 2.  Patient will perform UB and LB bathing with minimal assistance/contact guard assist within 7 day(s), partially achieved, UB min assist, con't LB goal  3. Patient will perform lower body dressing with minimal assistance/contact guard assist within 7 day(s), con't   4. Patient will perform toilet transfers with minimal assistance/contact guard assist within 7 day(s)m, con't   5. Patient will participate in upper extremity therapeutic exercise/activities with modified independence for 10 minutes within 7 day(s), Con't   6. Patient will utilize energy conservation techniques during functional activities with verbal cues within 7 day(s), Con't    Initiated 3/1/2022, Weekly Re-assessment 3/8/2022 continued  1. Patient will perform grooming standing at sink with minimal assistance/contact guard assist within 7 day(s). 2.  Patient will perform UB and LB bathing with minimal assistance/contact guard assist within 7 day(s), partially achieved, UB min assist, con't LB goal  3. Patient will perform lower body dressing with minimal assistance/contact guard assist within 7 day(s), con't   4. Patient will perform toilet transfers with minimal assistance/contact guard assist within 7 day(s)m, con't   5.   Patient will participate in upper extremity therapeutic exercise/activities with modified independence for 10 minutes within 7 day(s), Con't   6. Patient will utilize energy conservation techniques during functional activities with verbal cues within 7 day(s), Con't    Outcome: Progressing Towards Goal   OCCUPATIONAL THERAPY TREATMENT/WEEKLY RE-ASSESSMENT  Patient: Merlene Noriega (70 y.o. male)  Date: 3/15/2022  Diagnosis: [facility] [K56.609] <principal problem not specified>  Procedure(s) (LRB):  RIGHT POPLITEAL TO PERINEAL  BYPASS with vein AND RIGHT SECOND TOE AMPUTATION (Right) 8 Days Post-Op  Precautions: Fall,DNR  Chart, occupational therapy assessment, plan of care, and goals were reviewed. ASSESSMENT  Patient continues with skilled OT services and is progressing towards goals. Pt received seated in recliner chair and in good spirits. Pt excited about getting up and wanting to amublate in room. Performed sit to stand with min to mod assist x 1 using RW. L foot needed to be blocked d/t slipping. When standing, pt had loose stool (rectal tube was removed yesterday). Pt accessed bathroom with min assist using RW, pt unsteady/shakey. Pt remained seated on commode for >10 min. OT donned large yellow sock on L slipper to provide increase traction with sit to stand from low commode, max assist x 1. In standing, total assist for hygiene d/t decrease balance and increase amounts to clean/manage. Pt re-positioned back on commode for a rest break and asked assistance from nursing. Pt transferred to recliner chair positioned outside bathroom door. Pt wanted remain seated in chair. Reviewed yellow thera-band exercises. Demonstrated understanding. Will con't to follow. Recommend IPR. Current Level of Function Impacting Discharge (ADLs): min to max assist with sit to stand pending surface height    Other factors to consider for discharge: primary caretaker of his wife         PLAN :  Goals have been updated based on progression since last assessment.  Patient continues to benefit from skilled intervention to address the above impairments. Continue to follow patient 5 times a week to address goals. Recommend with staff: Suri Vee for meals    Recommend next OT session: see goals    Recommendation for discharge: (in order for the patient to meet his/her long term goals)  Therapy 3 hours per day 5-7 days per week    This discharge recommendation:  A follow-up discussion with the attending provider and/or case management is planned    IF patient discharges home will need the following DME: TBD       SUBJECTIVE:   Patient stated I feel so good.     OBJECTIVE DATA SUMMARY:   Cognitive/Behavioral Status:  Neurologic State: Alert  Orientation Level: Oriented X4  Cognition: Follows commands        Safety/Judgement: Awareness of environment    Functional Mobility and Transfers for ADLs:  Bed Mobility:       Transfers:  Sit to Stand: Moderate assistance;Maximum assistance; Other (comment) (from lower surface level)  Functional Transfers  Bathroom Mobility: Minimum assistance  Toilet Transfer : Moderate assistance;Maximum assistance;Assist x1       Balance:  Sitting: Intact  Standing: Impaired; With support  Standing - Static: Fair;Constant support    ADL Intervention:  Feeding  Feeding Assistance: Independent    Grooming  Grooming Assistance: Set-up    Upper Body Bathing  Bathing Assistance: Minimum assistance  Position Performed: Seated in chair    Lower [address]: Moderate assistance  Perineal  : Maximum assistance  Position Performed: Standing;Seated on toilet  Adaptive Equipment: Grab bar;Walker  Lower Body : Moderate assistance  Position Performed: Seated in chair    Upper [address]: Set-up    Lower Body Dressing Assistance  Underpants: Maximum assistance  Protective Undergarmet: Maximum assistance    Toileting  Bowel Hygiene:  Moderate assistance;Maximum assistance  Clothing Management: Maximum assistance  Adaptive Equipment: Walker    Cognitive Retraining  Safety/Judgement: Awareness of environment    Therapeutic Exercises:   Reviewed yellow thera-band exercises    Pain:  No c/o of pain    Activity Tolerance:   Good    After treatment patient left in no apparent distress:   Sitting in chair and Call bell within reach    COMMUNICATION/COLLABORATION:   The patients plan of care was discussed with: Registered nurse.      Elli Art OTR/L  Time Calculation: 47 mins

## 2022-03-15 NOTE — PROGRESS NOTES
Problem: Pressure Injury - Risk of  Goal: *Prevention of pressure injury  Description: Document Christiano Scale and appropriate interventions in the flowsheet. Outcome: Progressing Towards Goal  Note: Pressure Injury Interventions:  Sensory Interventions: Assess need for specialty bed    Moisture Interventions: Check for incontinence Q2 hours and as needed    Activity Interventions: Pressure redistribution bed/mattress(bed type)    Mobility Interventions: HOB 30 degrees or less    Nutrition Interventions: Document food/fluid/supplement intake    Friction and Shear Interventions: HOB 30 degrees or less,Minimize layers                Problem: Falls - Risk of  Goal: *Absence of Falls  Description: Document Al Fall Risk and appropriate interventions in the flowsheet.   Outcome: Progressing Towards Goal  Note: Fall Risk Interventions:  Mobility Interventions: Communicate number of staff needed for ambulation/transfer    Mentation Interventions: Adequate sleep, hydration, pain control    Medication Interventions: Patient to call before getting OOB    Elimination Interventions: Call light in reach    History of Falls Interventions: Bed/chair exit alarm

## 2022-03-15 NOTE — PROGRESS NOTES
[address]  [place], [address]       GI PROGRESS NOTE  Will Michelle Clarke  [phone] office  [phone] NP/PA in-hospital cell phone M-F until 4:30PM  After 5PM or on weekends, please call  for physician on call      NAME: Vianca Montoya   :  1934   MRN:  [id number]       Subjective:   Patient reports that he feels much better today. No nausea, vomiting, or abdominal pain. He reports 3-4 loose bowel movements today. RN at the bedside. Objective:     VITALS:   Last 24hrs VS reviewed since prior progress note. Most recent are:  Visit Vitals  BP (!) 146/72 (BP 1 Location: Left upper arm, BP Patient Position: At rest)   Pulse (!) 53   Temp 97.2 °F (36.2 °C)   Resp 18   Ht 6' 1\" (1.854 m)   Wt 76.6 kg (168 lb 14 oz)   SpO2 100%   BMI 22.28 kg/m²       PHYSICAL EXAM:              CONST:          Pleasant male lying in bed, no acute distress              NEURO:          Alert and oriented              HEENT:           EOMI, no scleral icterus              LUNGS:           No acute respiratory distress              ABD:                Soft, non distended, no tenderness, no rebound, no guarding.  Midline incision with drainage bag              PSYCH:           Not anxious or agitated    Lab Data Reviewed:     Recent Results (from the past 24 hour(s))   ENTERIC BACTERIA PANEL, DNA    Collection Time: 22  3:52 PM   Result Value Ref Range    Shigella species, DNA Negative NEG      Campylobacter species, DNA Negative NEG      Vibrio species, DNA Negative NEG      Enterotoxigen E Coli, DNA Negative NEG      Shiga toxin producing, DNA Negative NEG      Salmonella species, DNA Negative NEG      P. shigelloides, DNA Negative NEG      Y. enterocolitica, DNA Negative NEG     C. DIFFICILE AG & TOXIN A/B    Collection Time: 22  3:52 PM   Result Value Ref Range    GDH ANTIGEN Negative NEG      C. difficile toxin Negative NEG      INTERPRETATION NEGATIVE FOR TOXIGENIC C. DIFFICILE NTXCD     GLUCOSE, POC    Collection Time: 03/15/22 12:22 AM   Result Value Ref Range    Glucose (POC) 105 65 - 117 mg/dL    Performed by Qranior    METABOLIC PANEL, BASIC    Collection Time: 03/15/22  2:41 AM   Result Value Ref Range    Sodium 142 136 - 145 mmol/L    Potassium 2.6 (LL) 3.5 - 5.1 mmol/L    Chloride 111 (H) 97 - 108 mmol/L    CO2 28 21 - 32 mmol/L    Anion gap 3 (L) 5 - 15 mmol/L    Glucose 99 65 - 100 mg/dL    BUN 4 (L) 6 - 20 MG/DL    Creatinine 0.55 (L) 0.70 - 1.30 MG/DL    BUN/Creatinine ratio 7 (L) 12 - 20      GFR est AA >60 >60 ml/min/1.73m2    GFR est non-AA >60 >60 ml/min/1.73m2    Calcium 7.2 (L) 8.5 - 10.1 MG/DL   CBC W/O DIFF    Collection Time: 03/15/22  2:41 AM   Result Value Ref Range    WBC 11.3 (H) 4.1 - 11.1 K/uL    RBC 3.18 (L) 4.10 - 5.70 M/uL    HGB 9.7 (L) 12.1 - 17.0 g/dL    HCT 30.8 (L) 36.6 - 50.3 %    MCV 96.9 80.0 - 99.0 FL    MCH 30.5 26.0 - 34.0 PG    MCHC 31.5 30.0 - 36.5 g/dL    RDW 17.2 (H) 11.5 - 14.5 %    PLATELET 649 965 - 504 K/uL    MPV 10.9 8.9 - 12.9 FL    NRBC 0.0 0  WBC    ABSOLUTE NRBC 0.00 0.00 - 0.01 K/uL   MAGNESIUM    Collection Time: 03/15/22  2:41 AM   Result Value Ref Range    Magnesium 1.8 1.6 - 2.4 mg/dL   GLUCOSE, POC    Collection Time: 03/15/22  6:20 AM   Result Value Ref Range    Glucose (POC) 111 65 - 117 mg/dL    Performed by Qraniokamila    GLUCOSE, POC    Collection Time: 03/15/22 11:59 AM   Result Value Ref Range    Glucose (POC) 148 (H) 65 - 117 mg/dL    Performed by Kimani Dupree   PCT        Assessment:   · Diarrhea: CT abdomen/pelvis without contrast (3/9/22): body wall edema and small volume fluid around the liver and in the right paracolic gutter; small bilateral pleural effusions; no evidence of drainable fluid collection or hematoma. History of C difficile in 2014. Negative enteric bacteria panel and C difficile (3/14/22); ova & parasites pending.     · Small bowel obstruction due to invasive colon adenocarcinoma: status post right hemicolectomy on 2/24/22  · Midline wound dehiscence with drainage - cultures with VRE and Candida tropicalis  · Peripheral vascular disease with gangrene of right second toe: status post right above-knee popliteal to proximal peroneal artery bypass and amputation of right second toe on 3/7/22  · Acute on chronic anemia  · Multiple myeloma  · Diabetes mellitus  · Left arm swelling and edema  · History of COVID pneumonia  · Hypertension     Patient Active Problem List   Diagnosis Code    HTN (hypertension) I10    H/O Carotid Stenosis I65.29    GERD (gastroesophageal reflux disease) K21.9    Pre-diabetes R73.03    Sinus tachycardia R00.0    H/O Heavy Alcohol Use     Vitamin B12 deficiency E53.8    Hyperhomocysteinemia (HCC) E72.11    Mild Allergic Rhinitis J30.9    History of skin cancer Z85.828    S/P AAA repair 2/9/2012 Z98.890, Z86.79    Hypercalcemia E83.52    Multiple myeloma ([facility] C90.00    Left inguinal hernia K40.90    Right inguinal hernia K40.90    Microalbuminuria R80.9    Chronic diarrhea K52.9    Aortic stenosis, moderate I35.0    Mild concentric left ventricular hypertrophy (LVH) I51.7    Hyperlipemia, mixed E78.2    Carotid stenosis, asymptomatic, right I65.21    Personal history of atrial fibrillation Z86.79    Cellulitis of right leg L03.115    Failure to thrive (child) R62.51    Falls frequently R29.6    Moderate protein-calorie malnutrition (HCC) E44.0    Acute renal injury ([facility] N17.9    Malignant neoplasm of ascending colon (HCC) C18.2     Plan:   · Enteric bacteria panel and C. difficile were negative. Hold off on anti-diarrheals at this time. · ID following  · Oncology evaluated and recommended outpatient follow up  · We will sign off and be available again as needed. Please call us with any questions. Thank you. Signed By: HEENA Mc     3/15/2022  3:09 PM       GI Attending: Agree with above plan.  Please call again as needed. Tom Stewart MD

## 2022-03-15 NOTE — PROGRESS NOTES
[address] [facility]  Hospitalist Group                                                                                          Hospitalist Progress Note  Kirti Archuleta MD  Answering service: [phone] from in house phone        Date of Service:  3/15/2022  NAME:  Néstor Avina  :  1934  MRN:  [id number]      Admission Summary:     A 42-year-old man with a past medical history significant for type 2 diabetes, multiple myeloma, hypertension, dyslipidemia, peripheral neuropathy, status post abdominal aortic aneurysm repair in  and COVID-pneumonia.  Patient was recently admitted on 2/15 for management of cellulitis. Presented to the emergency room several days ago treated for diarrhea with Imodium, and now with progressive abdominal pain nausea vomiting. Patient seen in the ED for abdominal pain started 12 to 24 hours ago associated with vomiting and constipation.  The hospitalist team has been consulted to assist general surgery in medical management.   Patient examined at bedside.  Patient is ill-appearing, should not reports health has been declining since Covid diagnosis in 2021.  Patient denies chest pain, dizziness, shortness of breath during examination.  Patient endorses abdominal pain nausea and vomiting.  Productive cough noted on evaluation, patient reports he has had a cough since Covid diagnosis and it is somewhat improved.  NG tube is in place draining dark brown bilious fluid.  Patient reports abdominal distention has improved some since placement of NG.  Patient's son-in-law is at bedside. \"     Interval history / Subjective:     He said he feels better, he said he has still diarrhea, no abdominal pain, no vomiting     Assessment & Plan:     Small bowel obstruction due to invasive colon adenocarcinoma   -Underwent right hemicolectomy .  Evidence of adenocarcinoma, + lymph nodes  -diet advanced to regular diet   -Drainage from mid abdominal incision, noted on 3/9/22, ostomy placed for drainage collection, cx drainage grow few vancomycin resistant enterococcus faecium, rare candida  -rectal tube in place  -CT abd/pelvis show no hematoma or drain able fluid  -wound cx grow vancomycin resistant enterococcus faecium and rare candida tropicalis, ID consulted and antibiotics switched to iv daptomycin and eraxis, to switch to oral zyvox and diflucan on discharge  -leukcytosis improving  -blood cx no growth  -Oncology consulted 3/1, recommend outpatient follow up  -surgical service on board      PVD  Right second toe dry gangrene  Right lower extremity arterial occlusion   -MRI with evidence of osteomyelitis. More consistent with dry gangrene.   -Antibiotics discontinued on 3/2  -MARYURI with moderate to severe arterial occlusion  -Right arteriogram 2/28 with popliteal occlusion, unable to cross occlusion.    -seen by podiatry and signed off   -s/p right popliteal bypass and second Right great toe amputation   -right thigh incision draining very little clear fluid, he has also bilateral pretibial and pedal edema, on Lasix 40 mg po daily  -pain controlled  -continue PT   -vascular surgeon on board    Chronic diarrhea   -he said he has having diarrhea since 2012  -rectal tube discontinued on 3/14  -stool for enteric bacteria and C diff negative  -GI is on board     Hypotension ,resolved  -pt with drainage from surgical incision, hematuria   -s/p prbc, abx, pro ca lowl for risks of SIRS/sepsis  -BP normal, IVF discontinued     Acute on chronic  anemia   -symptomatic with hypotension, hematuria  -Hgb 6.9, s/p 1 unit  PRBC Hgb improved  9.7  -venofer x 1 dose,  -monitor H/H     Gross hematuria noted in rosas post op surgery   -likely due to rosas placement with enlarged porstate,   -gross hematuria has resolved, rosas out on 3/14, retain urine and s/p straight cath, if he faile voiding trial, will replace rosas catheter and outpatient follow up  -no flomax due to hypotension, -MRI w/c for further eval once stable      Acute metabolic encephalopathy  -resolved, patient is conscious and alert, well oriented  -Multifactorial in setting of recent surgery, narcotics, hospitalization, infection  -limit narcotics as able  -continue neuro check and supportive care     T2DM  -A1c 6.3   -continue sliding scale and monitor finger stick glucose      Left arm swelling and edema and bilateral lower ext edema  -doppler neg for DVT  -elevate the arm,   -started lasix 40 mg po q daily     Hx of COVID 19 pneumonia    -CXR with scattered opacities, concerning for PNA.    -s/p treatment  -improved, SpO2 % on RA     Peripheral neuropathy  -stable, resume pregabalin.     HTN  -BP improved, on lasix , monitor BP, may consider norvasc if BP trending up    Failure to thrive, Protein calorie malnutrition  -continue nutritional supplement  - Nutrition consulted      Recurrent falls  -continue PT/OT  -fall precaution    Hypokalemia:  -replace with iv kcl and repeat in am    Hypomagnesemia  -improved    Insomnia  -continue PRN low-dose trazodone           Code status: DNR  Prophylaxis: SCD    Care Plan discussed with: Patient, Nurse and CM  Anticipated Disposition: Inpatient Rehab, > 48 hrs  I called his son in Kory singh at , updated him and answered his questions     Hospital Problems  Date Reviewed: 2/28/2022          Codes Class Noted POA    Malignant neoplasm of ascending colon ([facility] [address] ICD-10-CM: C18.2  ICD-9-CM: 153.6  2/28/2022 Unknown        Failure to thrive (child) ICD-10-CM: R62.51  ICD-9-CM: 783.41  2/21/2022 Unknown        Falls frequently ICD-10-CM: R29.6  ICD-9-CM: V15.88  2/21/2022 Unknown        Moderate protein-calorie malnutrition ([facility] [address] ICD-10-CM: E44.0  ICD-9-CM: 263.0  2/21/2022 Unknown        Acute renal injury ([facility] [address] ICD-10-CM: N17.9  ICD-9-CM: 584.9  2/21/2022 Unknown        Cellulitis of right leg ICD-10-CM: L03.115  ICD-9-CM: 682.6  2/12/2022 Yes        Aortic stenosis, moderate ICD-10-CM: I35.0  ICD-9-CM: 424.1  4/10/2018 Yes        Multiple myeloma ([facility] [address] ICD-10-CM: C90.00  ICD-9-CM: 203.00  12/27/2012 Yes        Pre-diabetes ICD-10-CM: R73.03  ICD-9-CM: 790.29  9/7/2010 Yes    Overview Addendum 11/1/2011  9:29 AM by Zachary Ibanez MD     2005; Optho Dr Sarkis Pop             HTN (hypertension) ICD-10-CM: I10  ICD-9-CM: 401.9  4/6/2010 Yes    Overview Signed 9/7/2010  8:55 AM by Zachary Ibanez MD     1990             H/O Carotid Stenosis ICD-10-CM: I65.29  ICD-9-CM: 433.10  4/6/2010 Yes    Overview Addendum 9/7/2010  8:58 AM by Zachary Ibanez MD     Oct 2003; 50%left subclavian and 50-70% SHEILA                       Vital Signs:    Last 24hrs VS reviewed since prior progress note. Most recent are:  Visit Vitals  BP (!) 146/70 (BP 1 Location: Left upper arm, BP Patient Position: At rest)   Pulse 77   Temp 98.1 °F (36.7 °C)   Resp 18   Ht 6' 1\" (1.854 m)   Wt 76.6 kg (168 lb 14 oz)   SpO2 96%   BMI 22.28 kg/m²         Intake/Output Summary (Last 24 hours) at 3/15/2022 1040  Last data filed at 3/15/2022 0930  Gross per 24 hour   Intake --   Output 3200 ml   Net -3200 ml        Physical Examination:     I had a face to face encounter with this patient and independently examined them on 3/15/2022 as outlined below:          Constitutional:  No acute distress, cooperative, pleasant    ENT:  Oral mucosa moist, oropharynx benign. Resp:  CTA bilaterally. No wheezing/rhonchi/rales. No accessory muscle use. CV:  Regular rhythm, normal rate, no murmurs, gallops, rubs    GI:  Mid abdomen incision open wound with drainage, collecting ostomy in place, abdomen soft, non distended, non tender. normoactive bowel sounds, no hepatosplenomegaly     Musculoskeletal:  Right thigh dressed, medial thigh staples stable, right foot dressed    Neurologic:  Moves all extremities.   AAOx3, CN II-XII reviewed            Data Review:    Review and/or order of clinical lab test  Review and/or order of tests in the radiology section of CPT  Review and/or order of tests in the medicine section of CPT      Labs:     Recent Labs     03/15/22  0241 03/14/22  0303   WBC 11.3* 11.3*   HGB 9.7* 9.6*   HCT 30.8* 30.5*    219     Recent Labs     03/15/22  0241 03/14/22  0303 03/13/22  0419    143 144   K 2.6* 2.9* 3.6   * 114* 119*   CO2 28 26 24   BUN 4* 5* 7   CREA 0.55* 0.60* 0.61*   GLU 99 119* 92   CA 7.2* 6.6* 6.6*   MG 1.8 1.4*  --      No results for input(s): ALT, AP, TBIL, TBILI, TP, ALB, GLOB, GGT, AML, LPSE in the last 72 hours. No lab exists for component: SGOT, GPT, AMYP, HLPSE  No results for input(s): INR, PTP, APTT, INREXT, INREXT in the last 72 hours. No results for input(s): FE, TIBC, PSAT, FERR in the last 72 hours. Lab Results   Component Value Date/Time    Folate 81.7 (H) 02/13/2022 02:38 AM      No results for input(s): PH, PCO2, PO2 in the last 72 hours.   Recent Labs     03/13/22  1530   CPK 33*     Lab Results   Component Value Date/Time    Cholesterol, total 193 03/23/2021 11:18 AM    HDL Cholesterol 83 03/23/2021 11:18 AM    LDL, calculated 84.2 03/23/2021 11:18 AM    Triglyceride 129 03/23/2021 11:18 AM    CHOL/HDL Ratio 2.3 03/23/2021 11:18 AM     Lab Results   Component Value Date/Time    Glucose (POC) 111 03/15/2022 06:20 AM    Glucose (POC) 105 03/15/2022 12:22 AM    Glucose (POC) 122 (H) 03/14/2022 11:53 AM    Glucose (POC) 88 03/14/2022 12:15 AM    Glucose (POC) 117 03/13/2022 05:31 PM     Lab Results   Component Value Date/Time    Color DARK YELLOW 01/23/2014 06:54 PM    Appearance CLOUDY (A) 01/23/2014 06:54 PM    Specific gravity 1.025 01/23/2014 06:54 PM    pH (UA) 5.5 01/23/2014 06:54 PM    Protein 100 (A) 01/23/2014 06:54 PM    Glucose 250 (A) 01/23/2014 06:54 PM    Ketone TRACE (A) 01/23/2014 06:54 PM    Bilirubin LARGE (A) 01/23/2014 06:54 PM    Urobilinogen 1.0 01/23/2014 06:54 PM    Nitrites NEGATIVE  01/23/2014 06:54 PM Leukocyte Esterase NEGATIVE  01/23/2014 06:54 PM    Epithelial cells FEW 01/23/2014 06:54 PM    Bacteria 3+ (A) 01/23/2014 06:54 PM    WBC 0-4 01/23/2014 06:54 PM    RBC 5-10 01/23/2014 06:54 PM         Medications Reviewed:     Current Facility-Administered Medications   Medication Dose Route Frequency    L.acidophilus-paracasei-S.thermophil-bifidobacter (RISAQUAD) 8 billion cell capsule  1 Capsule Oral DAILY    furosemide (LASIX) tablet 40 mg  40 mg Oral DAILY    anidulafungin (ERAXIS) 100 mg in 0.9% sodium chloride 130 mL IVPB  100 mg IntraVENous Q24H    DAPTOmycin (CUBICIN) 300 mg in 0.9% sodium chloride 50 mL IVPB RF formulation  300 mg IntraVENous Q24H    finasteride (PROSCAR) tablet 5 mg  5 mg Oral DAILY    0.9% sodium chloride infusion 250 mL  250 mL IntraVENous PRN    diphenhydrAMINE (BENADRYL) capsule 25 mg  25 mg Oral Q6H PRN    traZODone (DESYREL) tablet 50 mg  50 mg Oral QHS PRN    pantoprazole (PROTONIX) tablet 40 mg  40 mg Oral ACB    acetaminophen (TYLENOL) tablet 650 mg  650 mg Oral TID    oxyCODONE IR (ROXICODONE) tablet 2.5-5 mg  2.5-5 mg Oral Q4H PRN    traMADoL (ULTRAM) tablet 50 mg  50 mg Oral Q6H PRN    zinc oxide-cod liver oil (DESITIN) 40 % paste   Topical PRN    [Held by provider] heparin (porcine) injection 5,000 Units  5,000 Units SubCUTAneous Q8H    mupirocin (BACTROBAN) 2 % ointment   Topical DAILY    phenol throat spray (CHLORASEPTIC) 1 Spray  1 Spray Oral PRN    HYDROmorphone (DILAUDID) injection 0.5-1 mg  0.5-1 mg IntraVENous Q2H PRN    ondansetron (ZOFRAN) injection 4 mg  4 mg IntraVENous Q4H PRN    acetaminophen (TYLENOL) tablet 650 mg  650 mg Oral Q6H PRN    hydrALAZINE (APRESOLINE) 20 mg/mL injection 20 mg  20 mg IntraVENous Q6H PRN    glucose chewable tablet 16 g  4 Tablet Oral PRN    dextrose 10 % infusion 0-250 mL  0-250 mL IntraVENous PRN    glucagon (GLUCAGEN) injection 1 mg  1 mg IntraMUSCular PRN    [Held by provider] aspirin delayed-release tablet 81 mg  81 mg Oral QHS    pregabalin (LYRICA) capsule 50 mg  50 mg Oral BID    albuterol (PROVENTIL HFA, VENTOLIN HFA, PROAIR HFA) inhaler 1 Puff  1 Puff Inhalation Q6H PRN     ______________________________________________________________________  EXPECTED LENGTH OF STAY: 3d 4h  ACTUAL LENGTH OF STAY:          22                 Benoit Rodney MD

## 2022-03-15 NOTE — PROGRESS NOTES
Comprehensive Nutrition Assessment    Type and Reason for Visit: Reassess    Nutrition Recommendations/Plan:      1. Advance diet to pureed (previous diet prior to latest surgery)    2. Continue with Ensure Enlive      Nutrition Assessment:    81 yo male admitted for intestinal obstruction. PMhx: open AAA repair, hernia repairs, DM, bone CA, GERD, ETOH abuse (>10 years ago), HTN. Underweight per BMI per age. Weight hx in EMR indicates weight loss of 5.5kg over last 2 months (7% loss). Pt states he has had decreased appetite associated with weight loss since having COVID in December. C/o worsening ABD pain with n/v on admission. CT suggested intestinal obstruction distal ileum near ileocecal valve. KUB today shows persistent SBO. NPO since admission with NGT to suction. Pt pulled out NGT last night and had 1 episode of vomiting. MD notes plan to replace NGT if vomiting continues. MD recommending palliative consult vs surgery. Labs:     3/8:  Pt visited but he was working with therapies. He is one day post \"RIGHT POPLITEAL TO PERONEAL  BYPASS with vein AND RIGHT SECOND TOE AMPUTATION\". He is currently on full liquid diet, but should advance to puree when ready; therapies recommending rehab so he can be as independent as possible. Nothing new to add from nutrition stand point but RD will continue to follow. 3/15:  Visited pt this morning but he was on the commode, his RN was tending to another pt. Chart and labs reviewed. Latest CM report is that [facility] has accepted pt. Really nothing new to add from my perspective, will follow up again if pt is not dc'ed as planned.     Malnutrition Assessment:  Malnutrition Status:  Severe malnutrition    Context:  Acute illness     Findings of the 6 clinical characteristics of malnutrition:   Energy Intake:  7 - 50% or less of est energy requirements for 5 or more days  Weight Loss:  No significant weight loss (7% loss x 2 months)     Body Fat Loss:  7 - Moderate body fat loss, Orbital   Muscle Mass Loss:  7 - Moderate muscle mass loss, Temples (temporalis),Clavicles (pectoralis & deltoids)  Fluid Accumulation:  No significant fluid accumulation,     Strength:  Not performed       Nutritionally Significant Medications: meds reviewed    Estimated Daily Nutrient Needs:  Energy (kcal): 5995-2071 (30-33 kcals/kg); Weight Used for Energy Requirements: Current  Protein (g): 110 (1.5 gm/kg); Weight Used for Protein Requirements: Current  Fluid (ml/day): 2200; Method Used for Fluid Requirements: 1 ml/kcal    Nutrition Related Findings:       BM:  3/15  Edema:   LLE: 2+; Pitting (3/15/2022  8:00 AM)  LUE: 2+; Pitting (3/15/2022  8:00 AM)  RLE: 2+; Pitting (3/15/2022  8:00 AM)      Wounds:  Surgical incision       Current Nutrition Therapies:   Diet:  regular  Supplements: none  Additional Caloric Sources: none    Meal intake:   No data found. Supplement Intake: No data found.     Anthropometric Measures:  · Height:  6' 1\" (185.4 cm)  · Current Body Wt:  76.6 kg (168 lb 14 oz)   · Admission Body Wt:       · Usual Body Wt:        · Ideal Body Wt:  184:  87.9 %   · Adjusted Body Weight:   ; Weight Adjustment for: No adjustment   · Adjusted BMI:       · BMI Categories:  Underweight (BMI less than 22) age over 72     Wt Readings from Last 10 Encounters:   02/28/22 76.6 kg (168 lb 14 oz)   02/12/22 72.6 kg (160 lb)   12/29/21 78.9 kg (173 lb 15.1 oz)   03/23/21 78.9 kg (174 lb)   09/29/20 78 kg (172 lb)   09/14/20 78.9 kg (174 lb)   05/30/20 78 kg (171 lb 15.3 oz)   05/15/20 78.9 kg (174 lb)   03/05/20 78.5 kg (173 lb)   03/28/19 79 kg (174 lb 3.2 oz)       Nutrition Diagnosis:   · Inadequate oral intake related to inadequate protein-energy intake as evidenced by intake 26-50%      Nutrition Interventions:   Food and/or Nutrient Delivery: Modify current diet,Continue oral nutrition supplement  Nutrition Education and Counseling: No recommendations at this time  Coordination of Nutrition Care: Continue to monitor while inpatient    Goals:  PO intakes > 75% meals + 3 ONS each day within next 5-7 days       Nutrition Monitoring and Evaluation:   Behavioral-Environmental Outcomes: None identified  Food/Nutrient Intake Outcomes: Diet advancement/tolerance,Food and nutrient intake,Supplement intake  Physical Signs/Symptoms Outcomes: Biochemical data,GI status,Weight    Discharge Planning:    Continue oral nutrition supplement     Yeni Simms RD, CSP  Contact via [employer]

## 2022-03-15 NOTE — PROGRESS NOTES
Vascular:    Much better spirits today    Toe amp site looks good    Continue current care - following

## 2022-03-16 NOTE — PROGRESS NOTES
[facility] [facility] Adult  Hospitalist Group                                                                                          Hospitalist Progress Note  Mina Devlin MD  Answering service: [phone] from in house phone        Date of Service:  3/16/2022  NAME:  Bret Gupta  :  1934  MRN:  [id number]      Admission Summary:     A 70-year-old man with a past medical history significant for type 2 diabetes, multiple myeloma, hypertension, dyslipidemia, peripheral neuropathy, status post abdominal aortic aneurysm repair in  and COVID-pneumonia.  Patient was recently admitted on 2/15 for management of cellulitis. Presented to the emergency room several days ago treated for diarrhea with Imodium, and now with progressive abdominal pain nausea vomiting. Patient seen in the ED for abdominal pain started 12 to 24 hours ago associated with vomiting and constipation.  The hospitalist team has been consulted to assist general surgery in medical management.   Patient examined at bedside.  Patient is ill-appearing, should not reports health has been declining since Covid diagnosis in 2021.  Patient denies chest pain, dizziness, shortness of breath during examination.  Patient endorses abdominal pain nausea and vomiting.  Productive cough noted on evaluation, patient reports he has had a cough since Covid diagnosis and it is somewhat improved.  NG tube is in place draining dark brown bilious fluid.  Patient reports abdominal distention has improved some since placement of NG.  Patient's son-in-law is at bedside. \"     Interval history / Subjective:     He said he feels better, he said he had very lose bowel movement this morning, no chest pain     Assessment & Plan:     Small bowel obstruction due to invasive colon adenocarcinoma   -Underwent right hemicolectomy .  Evidence of adenocarcinoma, + lymph nodes  -diet advanced to regular diet   -Drainage from mid abdominal incision, noted on 3/9/22, ostomy placed for drainage collection,    -CT abd/pelvis show no hematoma or drain able fluid  -wound cx grow vancomycin resistant enterococcus faecium and rare candida tropicalis,  -on iv daptomycin and eraxis, to switch to oral zyvox and diflucan on discharge  -leukcytosis improving  -blood cx no growth  -Oncology consulted 3/1, recommend outpatient follow up  -surgical service and ID on board      PVD  Right second toe dry gangrene  Right lower extremity arterial occlusion   -MRI with evidence of osteomyelitis. More consistent with dry gangrene.   -Antibiotics discontinued on 3/2  -MARYURI with moderate to severe arterial occlusion  -Right arteriogram 2/28 with popliteal occlusion, unable to cross occlusion.    -seen by podiatry and signed off   -s/p right popliteal bypass and second Right great toe amputation   -right thigh incision draining very little clear fluid, he has also bilateral pretibial and pedal edema, on Lasix 40 mg po daily  -pain controlled  -continue PT   -vascular surgeon on board    Chronic diarrhea   -he said he has having diarrhea since 2012  -rectal tube discontinued on 3/14  -stool for enteric bacteria and C diff negative  -GI is on board     Hypotension ,resolved  -pt with drainage from surgical incision, hematuria   -s/p prbc, abx, pro ca lowl for risks of SIRS/sepsis  -BP normal, IVF discontinued     Acute on chronic  anemia   -symptomatic with hypotension, hematuria  -Hgb 6.9, s/p 1 unit  PRBC Hgb improved  9.7  -venofer x 1 dose,  -monitor H/H     Gross hematuria noted in rosas post op surgery   -likely due to rosas placement with enlarged porstate,   -gross hematuria has resolved, rosas out on 3/14, failed void and rosas placed back, need outpatient urologist after discharge  -no flomax due to hypotension,   -MRI w/c for further eval once stable      Acute metabolic encephalopathy  -resolved, patient is conscious and alert, well oriented  -Multifactorial in setting of recent surgery, narcotics, hospitalization, infection  -limit narcotics as able  -continue neuro check and supportive care     T2DM  -A1c 6.3   -continue sliding scale and monitor finger stick glucose      Left arm swelling and edema and bilateral lower ext edema  -doppler neg for DVT  -elevate the arm, improving  -continue lasix 40 mg po q daily     Hx of COVID 19 pneumonia    -CXR with scattered opacities, concerning for PNA.    -s/p treatment  -improved, SpO2 % on RA     Peripheral neuropathy  -stable, resume pregabalin.     HTN  -BP improved, on lasix , monitor BP, may consider norvasc if BP trending up    Failure to thrive, Protein calorie malnutrition  -continue nutritional supplement  - Nutrition consulted      Recurrent falls  -continue PT/OT  -fall precaution    Hypokalemia:  -replace with iv kcl and repeat in am    Hypomagnesemia  -improved    Insomnia  -continue PRN low-dose trazodone           Code status: DNR  Prophylaxis: SCD    Care Plan discussed with: Patient, Nurse and CM  Anticipated Disposition: Inpatient Rehab, in 24-48 hrs  I called his son in law, Diann Justice at , updated him and answered his questions     Hospital Problems  Date Reviewed: 2/28/2022          Codes Class Noted POA    Malignant neoplasm of ascending colon ([facility] [address] ICD-10-CM: C18.2  ICD-9-CM: 153.6  2/28/2022 Unknown        Failure to thrive (child) ICD-10-CM: R62.51  ICD-9-CM: 783.41  2/21/2022 Unknown        Falls frequently ICD-10-CM: R29.6  ICD-9-CM: V15.88  2/21/2022 Unknown        Moderate protein-calorie malnutrition ([facility] [address] ICD-10-CM: E44.0  ICD-9-CM: 263.0  2/21/2022 Unknown        Acute renal injury ([facility] [address] ICD-10-CM: N17.9  ICD-9-CM: 584.9  2/21/2022 Unknown        Cellulitis of right leg ICD-10-CM: L03.115  ICD-9-CM: 682.6  2/12/2022 Yes        Aortic stenosis, moderate ICD-10-CM: I35.0  ICD-9-CM: 424.1  4/10/2018 Yes        Multiple myeloma ([facility] ICD-10-CM: C90.00  ICD-9-CM: 203.00  12/27/2012 Yes        Pre-diabetes ICD-10-CM: R73.03  ICD-9-CM: 790.29  9/7/2010 Yes    Overview Addendum 11/1/2011  9:29 AM by Vania Ho MD     2005; Optho Dr Didier Huerta             HTN (hypertension) ICD-10-CM: I10  ICD-9-CM: 401.9  4/6/2010 Yes    Overview Signed 9/7/2010  8:55 AM by Vania Ho MD     1990             H/O Carotid Stenosis ICD-10-CM: I65.29  ICD-9-CM: 433.10  4/6/2010 Yes    Overview Addendum 9/7/2010  8:58 AM by Vania Ho MD     Oct 2003; 50%left subclavian and 50-70% SHEILA                       Vital Signs:    Last 24hrs VS reviewed since prior progress note. Most recent are:  Visit Vitals  /65 (BP 1 Location: Left upper arm, BP Patient Position: Sitting)   Pulse 91   Temp 97 °F (36.1 °C)   Resp 18   Ht 6' 1\" (1.854 m)   Wt 76.6 kg (168 lb 14 oz)   SpO2 98%   BMI 22.28 kg/m²         Intake/Output Summary (Last 24 hours) at 3/16/2022 1431  Last data filed at 3/16/2022 5517  Gross per 24 hour   Intake --   Output 2275 ml   Net -2275 ml        Physical Examination:     I had a face to face encounter with this patient and independently examined them on 3/16/2022 as outlined below:          Constitutional:  No acute distress, cooperative, pleasant    ENT:  Oral mucosa moist, oropharynx benign. Resp:  CTA bilaterally. No wheezing/rhonchi/rales. No accessory muscle use. CV:  Regular rhythm, normal rate, no murmurs, gallops, rubs    GI:  Mid abdomen incision open wound with drainage, collecting ostomy in place, abdomen soft, non distended, non tender. normoactive bowel sounds, no hepatosplenomegaly     Musculoskeletal:  Right thigh dressed, medial thigh staples stable, right foot dressed    Neurologic:  Moves all extremities.   AAOx3, CN II-XII reviewed            Data Review:    Review and/or order of clinical lab test  Review and/or order of tests in the radiology section of CPT  Review and/or order of tests in the medicine section of CPT      Labs:     Recent Labs 03/16/22 0425 03/15/22  0241   WBC 7.8 11.3*   HGB 8.4* 9.7*   HCT 26.7* 30.8*    247     Recent Labs     03/16/22  0425 03/15/22  0241 03/14/22  0303    142 143   K 2.5* 2.6* 2.9*   * 111* 114*   CO2 29 28 26   BUN 6 4* 5*   CREA 0.60* 0.55* 0.60*   GLU 85 99 119*   CA 7.2* 7.2* 6.6*   MG 1.6 1.8 1.4*     No results for input(s): ALT, AP, TBIL, TBILI, TP, ALB, GLOB, GGT, AML, LPSE in the last 72 hours. No lab exists for component: SGOT, GPT, AMYP, HLPSE  No results for input(s): INR, PTP, APTT, INREXT, INREXT in the last 72 hours. No results for input(s): FE, TIBC, PSAT, FERR in the last 72 hours. Lab Results   Component Value Date/Time    Folate 81.7 (H) 02/13/2022 02:38 AM      No results for input(s): PH, PCO2, PO2 in the last 72 hours.   Recent Labs     03/13/22  1530   CPK 33*     Lab Results   Component Value Date/Time    Cholesterol, total 193 03/23/2021 11:18 AM    HDL Cholesterol 83 03/23/2021 11:18 AM    LDL, calculated 84.2 03/23/2021 11:18 AM    Triglyceride 129 03/23/2021 11:18 AM    CHOL/HDL Ratio 2.3 03/23/2021 11:18 AM     Lab Results   Component Value Date/Time    Glucose (POC) 90 03/16/2022 11:31 AM    Glucose (POC) 78 03/16/2022 06:32 AM    Glucose (POC) 124 (H) 03/15/2022 11:22 PM    Glucose (POC) 112 03/15/2022 05:44 PM    Glucose (POC) 148 (H) 03/15/2022 11:59 AM     Lab Results   Component Value Date/Time    Color DARK YELLOW 01/23/2014 06:54 PM    Appearance CLOUDY (A) 01/23/2014 06:54 PM    Specific gravity 1.025 01/23/2014 06:54 PM    pH (UA) 5.5 01/23/2014 06:54 PM    Protein 100 (A) 01/23/2014 06:54 PM    Glucose 250 (A) 01/23/2014 06:54 PM    Ketone TRACE (A) 01/23/2014 06:54 PM    Bilirubin LARGE (A) 01/23/2014 06:54 PM    Urobilinogen 1.0 01/23/2014 06:54 PM    Nitrites NEGATIVE  01/23/2014 06:54 PM    Leukocyte Esterase NEGATIVE  01/23/2014 06:54 PM    Epithelial cells FEW 01/23/2014 06:54 PM    Bacteria 3+ (A) 01/23/2014 06:54 PM    WBC 0-4 01/23/2014 06:54 PM    RBC 5-10 01/23/2014 06:54 PM         Medications Reviewed:     Current Facility-Administered Medications   Medication Dose Route Frequency    L.acidophilus-paracasei-S.thermophil-bifidobacter (RISAQUAD) 8 billion cell capsule  1 Capsule Oral DAILY    tamsulosin (FLOMAX) capsule 0.4 mg  0.4 mg Oral DAILY    furosemide (LASIX) tablet 40 mg  40 mg Oral DAILY    anidulafungin (ERAXIS) 100 mg in 0.9% sodium chloride 130 mL IVPB  100 mg IntraVENous Q24H    DAPTOmycin (CUBICIN) 300 mg in 0.9% sodium chloride 50 mL IVPB RF formulation  300 mg IntraVENous Q24H    finasteride (PROSCAR) tablet 5 mg  5 mg Oral DAILY    0.9% sodium chloride infusion 250 mL  250 mL IntraVENous PRN    diphenhydrAMINE (BENADRYL) capsule 25 mg  25 mg Oral Q6H PRN    traZODone (DESYREL) tablet 50 mg  50 mg Oral QHS PRN    pantoprazole (PROTONIX) tablet 40 mg  40 mg Oral ACB    acetaminophen (TYLENOL) tablet 650 mg  650 mg Oral TID    oxyCODONE IR (ROXICODONE) tablet 2.5-5 mg  2.5-5 mg Oral Q4H PRN    traMADoL (ULTRAM) tablet 50 mg  50 mg Oral Q6H PRN    zinc oxide-cod liver oil (DESITIN) 40 % paste   Topical PRN    [Held by provider] heparin (porcine) injection 5,000 Units  5,000 Units SubCUTAneous Q8H    mupirocin (BACTROBAN) 2 % ointment   Topical DAILY    phenol throat spray (CHLORASEPTIC) 1 Spray  1 Spray Oral PRN    HYDROmorphone (DILAUDID) injection 0.5-1 mg  0.5-1 mg IntraVENous Q2H PRN    ondansetron (ZOFRAN) injection 4 mg  4 mg IntraVENous Q4H PRN    acetaminophen (TYLENOL) tablet 650 mg  650 mg Oral Q6H PRN    hydrALAZINE (APRESOLINE) 20 mg/mL injection 20 mg  20 mg IntraVENous Q6H PRN    glucose chewable tablet 16 g  4 Tablet Oral PRN    dextrose 10 % infusion 0-250 mL  0-250 mL IntraVENous PRN    glucagon (GLUCAGEN) injection 1 mg  1 mg IntraMUSCular PRN    [Held by provider] aspirin delayed-release tablet 81 mg  81 mg Oral QHS    pregabalin (LYRICA) capsule 50 mg  50 mg Oral BID    albuterol (PROVENTIL HFA, VENTOLIN HFA, PROAIR HFA) inhaler 1 Puff  1 Puff Inhalation Q6H PRN     ______________________________________________________________________  EXPECTED LENGTH OF STAY: 3d 4h  ACTUAL LENGTH OF STAY:          23                 David Price MD

## 2022-03-16 NOTE — PROGRESS NOTES
ID Progress Note  3/16/2022    Subjective:     No discomfort    Review of Systems:            Symptom Y/N Comments   Symptom Y/N Comments   Fever/Chills  n     Chest Pain n       Poor Appetite       Edema        Cough       Abdominal Pain n       Sputum       Joint Pain        SOB/PARKINSON n      Pruritis/Rash        Nausea/vomit n      Tolerating PT/OT        Diarrhea  y     Tolerating Diet        Constipation  n     Other           Could NOT obtain due to:       Objective:     Vitals:   Visit Vitals  BP (!) 140/54 (BP 1 Location: Left upper arm, BP Patient Position: At rest)   Pulse 71   Temp 97.4 °F (36.3 °C)   Resp 18   Ht 6' 1\" (1.854 m)   Wt 76.6 kg (168 lb 14 oz)   SpO2 97%   BMI 22.28 kg/m²        Tmax:  Temp (24hrs), Av.7 °F (36.5 °C), Min:97.2 °F (36.2 °C), Max:98.2 °F (36.8 °C)      PHYSICAL EXAM:  General: Chronically ill appearing. WD, WN. Alert, cooperative, no acute distress    EENT:  EOMI. Anicteric sclerae. Dry mucous membrane  Resp:  CTA bilaterally, no wheezing or rales. No accessory muscle use  CV:  Regular  rhythm,  + edema  GI:  Soft, Non distended, Non tender. +Bowel sounds  Neurologic:  Alert and oriented X 3, normal speech,   Psych:   Fair insight. Not anxious nor agitated  Skin:  No rashes.   No jaundice    Right thigh (3 segments about 10cm long); incision approximate, no erythema, RLE diffuse edema, serous drainage noted from old dressing, no active drainage    Labs:   Lab Results   Component Value Date/Time    WBC 7.8 2022 04:25 AM    HGB 8.4 (L) 2022 04:25 AM    HCT 26.7 (L) 2022 04:25 AM    PLATELET 392  04:25 AM    MCV 97.4 2022 04:25 AM     Lab Results   Component Value Date/Time    Sodium 145 2022 04:25 AM    Potassium 2.5 (LL) 2022 04:25 AM    Chloride 113 (H) 2022 04:25 AM    CO2 29 2022 04:25 AM    Anion gap 3 (L) 2022 04:25 AM    Glucose 85 2022 04:25 AM    BUN 6 2022 04:25 AM    Creatinine 0.60 (L) 03/16/2022 04:25 AM    BUN/Creatinine ratio 10 (L) 03/16/2022 04:25 AM    GFR est AA >60 03/16/2022 04:25 AM    GFR est non-AA >60 03/16/2022 04:25 AM    Calcium 7.2 (L) 03/16/2022 04:25 AM    Bilirubin, total 0.6 02/21/2022 03:04 PM    Alk.  phosphatase 70 02/21/2022 03:04 PM    Protein, total 7.0 02/21/2022 03:04 PM    Albumin 3.1 (L) 02/21/2022 03:04 PM    Globulin 3.9 02/21/2022 03:04 PM    A-G Ratio 0.8 (L) 02/21/2022 03:04 PM    ALT (SGPT) 20 02/21/2022 03:04 PM         Cultures:   Results     Procedure Component Value Units Date/Time    ENTERIC BACTERIA PANEL, DNA [[id number]] Collected: 03/14/22 1552    Order Status: Completed Specimen: Stool Updated: 03/15/22 1407     Shigella species, DNA Negative        Campylobacter species, DNA Negative        Vibrio species, DNA Negative        Enterotoxigen E Coli, DNA Negative        Shiga toxin producing, DNA Negative        Salmonella species, DNA Negative        P. shigelloides, DNA Negative        Y. enterocolitica, DNA Negative       C. DIFFICILE AG & TOXIN A/B [[id number]] Collected: 03/14/22 1552    Order Status: Completed Specimen: Stool Updated: 03/15/22 1014     GDH ANTIGEN Negative        C. difficile toxin Negative        INTERPRETATION       NEGATIVE FOR TOXIGENIC C. DIFFICILE          OVA & PARASITES, STOOL [[id number]] Collected: 03/14/22 1552    Order Status: Completed Specimen: Feces from Stool Updated: 03/14/22 1604    CULTURE, WOUND Murriel Hutching STAIN [[id number]]  (Abnormal)  (Susceptibility) Collected: 03/09/22 1258    Order Status: Completed Specimen: Wound from Abdomen Updated: 03/13/22 0950     Special Requests: NO SPECIAL REQUESTS        GRAM STAIN NO WBCS SEEN      NO ORGANISMS SEEN        Culture result:       FEW * VANCOMYCIN RESISTANT ENTEROCOCCUS FAECIUM *            RARE MARTÍN TROPICALIS       Susceptibility      Vancomycin resistant enterococcus facium     ERICA     Ampicillin ($) Resistant     Daptomycin ($$$$$) Susceptible  [1]      Linezolid ($$$$$) Susceptible     Vancomycin ($) Resistant                 [1]  Dose Dependent  (SENSITIVITIES PERFORMED BY E-TEST)          Linear View                   CULTURE, BLOOD, PAIRED [[id number]] Collected: 03/09/22 1244    Order Status: Completed Specimen: Blood Updated: 03/14/22 0722     Special Requests: NO SPECIAL REQUESTS        Culture result: NO GROWTH 5 DAYS       CULTURE, URINE [[id number]]  (Abnormal) Collected: 03/09/22 1137    Order Status: Completed Specimen: Urine Updated: 03/11/22 1211     Special Requests: NO SPECIAL REQUESTS        [place] Count --        >100,000  COLONIES/mL       Culture result: CANDIDA TROPICALIS       COVID-19 RAPID TEST [[id number]]  (Abnormal) Collected: 03/05/22 1506    Order Status: Completed Specimen: Nasopharyngeal Updated: 03/05/22 1536     Specimen source Nasopharyngeal        COVID-19 rapid test Detected        Comment: Rapid [employer] ID Now       The specimen is POSITIVE for SARS-CoV-2, the novel coronavirus associated with COVID-19. This test has been authorized by the FDA under an Emergency Use Authorization (EUA) for use by authorized laboratories. Fact sheet for Healthcare Providers: ConventionUpdate.co.nz  Fact sheet for Patients: ConventionUpdate.co.nz       Methodology: Isothermal Nucleic Acid Amplification  CALLED TO AND READ BACK BY  LEXUS AMADOR @ [facility]/                Assessment and Plan   Small bowel obstruction due to invasive colon adenocarcinoma  S/p right hemicolectomy on 2/24/22  Midline wound dehiscence with drainage   - afebrile, WBC 7.8    Urine cultures (3/9) Candida tropicalis    Wound cx (3/9) candida tropicalis and VRE      Continue with IV Eraxis and daptomycin, last dose 3/26/2022    Pt may complete remaining ABX therapy with PO diflucan and zyvox upon discharge.       ms    Fever work up if temp >= 100.4    chronic Diarrhea  Hx of C-diff  - GI following; C-diff, enteric bacterial panel (-), O & P pending      Above plan of care discussed and agreed with Dr. Yessy Avina     Pt is clear to discharge in ID stand point. Recommend to d/w GI team about chronic diarrhea issue.     Maribell Sandoval NP

## 2022-03-16 NOTE — PROGRESS NOTES
JOSE:    RUR 22%    Disposition: [facility] rehab-[facility] accepted. [employer] auth initiated today. CM spoke to Glenny Cunningham with [facility] [phone].   Reference# [id number]    Clinicals faxed to [phone]    Transportation:[facility]     Follow up: PCP/Specialist     Primary contacts: Tessie Batista [place](Son in [place]) [phone]  [facility] [place](Daughter) [phone]     Opal Pulido RN/CRM  [phone]

## 2022-03-16 NOTE — PROGRESS NOTES
Problem: Mobility Impaired (Adult and Pediatric)  Goal: *Acute Goals and Plan of Care (Insert Text)  Description: FUNCTIONAL STATUS PRIOR TO ADMISSION: Patient was modified independent using a rolling walker for functional mobility. HOME SUPPORT PRIOR TO ADMISSION: The patient lived with spouse but did not require assist. Pt is spouse's primary caregiver (does not have to provide physical assistance). Pt's daughter and son in law are involved in family's care and checks on them daily and assists as needed    Physical Therapy Goals    Re-evaluation 3/16/2022 - All goal remain appropriate - making progress toward all goals - limited by decreased activity tolerance and frequent diarrhea. Re-evaluation 3/8/2022  1. Patient will move from supine to sit and sit to supine  in bed with modified independence within 7 day(s). 2.  Patient will transfer from bed to chair and chair to bed with modified independence using the least restrictive device within 7 day(s). 3.  Patient will perform sit to stand with modified independence within 7 day(s). 4.  Patient will ambulate with modified independence for 200 feet with the least restrictive device within 7 day(s). 5.  Patient will ascend/descend 12 stairs with single handrail(s) with supervision/set-up within 7 day(s). Initiated 2/22/2022 reviewed 3/1/2022 and still appropriate  1. Patient will move from supine to sit and sit to supine  in bed with modified independence within 7 day(s). 2.  Patient will transfer from bed to chair and chair to bed with modified independence using the least restrictive device within 7 day(s). 3.  Patient will perform sit to stand with modified independence within 7 day(s). 4.  Patient will ambulate with modified independence for 200 feet with the least restrictive device within 7 day(s).    5.  Patient will ascend/descend 12 stairs with single handrail(s) with supervision/set-up within 7 day(s).'  Outcome: Progressing Towards Goal     PHYSICAL THERAPY TREATMENT: WEEKLY REASSESSMENT  Patient: Merlene Noriega (11 y.o. male)  Date: 3/16/2022  Primary Diagnosis: Intestinal obstruction ([facility] [address] [K56.609]  Procedure(s) (LRB):  RIGHT POPLITEAL TO PERINEAL  BYPASS with vein AND RIGHT SECOND TOE AMPUTATION (Right) 9 Days Post-Op   Precautions:   Fall,DNR      ASSESSMENT  Patient continues with skilled PT services and is progressing towards goals. Pt is making steady gains although continues to be below baseline function. Pt presents with generalized weakness, decreased activity tolerance, decline in functional mobility and impaired gait/balance with RW. Continue to recommend SNF for rehab - awaiting insurance auth. Patient's progression toward goals since last assessment: Amb increased to 40 feet with RW, tolerating OOB 1.5 hours    Current Level of Function Impacting Discharge (mobility/balance): Bed mobility not assessed today; mod assist sit to stand from low surfaces, CGA to min assist with RW short distances, Decreased activity tolerance, Moderate fall risk    Functional Outcome Measure: The patient scored 21/28 on the Tinetti Balance outcome measure which is indicative of  moderate fall risk. Other factors to consider for discharge: PLIF indep with RW /rollator ; also primary caregiver  for supervision/safety for wife with dementia; good support from other family members who check on pt/wife daily         PLAN :  Goals have been updated based on progression since last assessment. Patient continues to benefit from skilled intervention to address the above impairments. Recommendations and Planned Interventions: bed mobility training, transfer training, gait training, therapeutic exercises, patient and family training/education, and therapeutic activities      Frequency/Duration: Patient will be followed by physical therapy:  5 times a week to address goals.     Recommendation for discharge: (in order for the patient to meet his/her long term goals)  Therapy up to 5 days/week in SNF setting    This discharge recommendation:  Has been made in collaboration with the attending provider and/or case management    IF patient discharges home will need the following DME: patient owns DME required for discharge         SUBJECTIVE:   Patient stated i'm getting depressed - I miss my wife. 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    GERD (gastroesophageal reflux disease) 4/6/2010    GI bleed, duodenitis 2007 4/6/2010    H/O Heavy Alcohol Use 9/7/2010    History of skin cancer 09/07/2010    melanoma back    HTN (hypertension) 4/6/2010    Hypercalcemia 4/25/2012    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/2010    Hypertriglyceridemia 9/7/2010    Left inguinal hernia 9/14/2015    Lipids blood increased 4/6/2010    Microalbuminuria 12/10/2015    Mild Allergic Rhinitis 9/7/2010    Multiple myeloma ([address]     Pre-diabetes 9/7/2010    2005;  Optho Dr Roberto Cuellar     Right inguinal hernia 11/2/2015    S/P AAA repair 2/9/2012 2/22/2012    Sinus tachycardia 9/7/2010    Vitamin B12 deficiency 9/7/2010     Past Surgical History:   Procedure Laterality Date    COLONOSCOPY  8/2007    Normal; Dr Anderson Bone    EGD  8/2007    small hiatal hernia, mild duodenitis; Dr Josie Magaña AAA REPAIR  2/9/2012    Dr Katherine Yuan CATARACT REMOVAL      both eyes    HX CHOLECYSTECTOMY  1-29-14    [facility]- Dr. Norman Gowers LASIK BOTH EYES    HX HERNIA REPAIR Left 9/24/15    left indirect inguinal by Dr. Sol Lee Right 11/2/15    inguinal w/ mesh by Dr. Sol Lee Bilateral 10/2015 And 11/2015    Inguinal    BLUE DOPPLER  3/26/2012    BLUE Echo; + clot       Personal factors and/or comorbidities impacting plan of care: as above    Home Situation  Home Environment: Private residence  # Steps to Enter: 5 (with landings)  Rails to Enter: Yes  Hand Rails : Right  One/Two Story Residence: Two story  Living Alone: No  Support Systems: Spouse/Significant Other,Child(manuela)  Patient Expects to be Discharged to[de-identified] Skilled nursing facility  Current DME Used/Available at Home: Jade Jerod, straight,Walker, [employer], rollator,Shower chair  Tub or Shower Type: Tub/Shower combination    EXAMINATION/PRESENTATION/DECISION MAKING:   Critical Behavior:  Neurologic State: Alert  Orientation Level: Oriented X4  Cognition: Appropriate decision making,Appropriate for age attention/concentration,Follows commands  Safety/Judgement: Awareness of environment  Hearing: Auditory  Auditory Impairment: None    Range Of Motion:  AROM: Generally decreased, functional                       Strength:    Strength: Generally decreased, functional                    Tone & Sensation:   Tone: Normal              Sensation: Impaired (pe)               Coordination:  Coordination: Generally decreased, functional  Vision: Occ wears eye patch, wears glasses  Functional Mobility:  Bed Mobility:  Rolling: Other (comment) (pt up in bedside chair )           Transfers:  Sit to Stand: Moderate assistance;Assist x1;Additional time; Adaptive equipment (from low surfaces)  Stand to Sit: Minimum assistance; Additional time;Assist x1                       Balance:   Sitting: Intact  Standing: Impaired; With support  Standing - Static: Good;Constant support  Standing - Dynamic : Fair;Constant support  Ambulation/Gait Training:  Distance (ft): 40 Feet (ft) (fatigues easily )  Assistive Device: Walker, rolling;Gait belt  Ambulation - Level of Assistance: Contact guard assistance; Adaptive equipment; Additional time;Assist x1        Gait Abnormalities: Decreased step clearance  Right Side Weight Bearing: As tolerated     Base of Support: Narrowed  Stance: Right decreased  Speed/Monica: Pace decreased (<100 feet/min)  Step Length: Right shortened;Left shortened  Swing Pattern: Right asymmetrical         Stairs:   Not assessed - unable to progress to stairs at this time secondary to fatigues easily            Therapeutic Exercises/Activity:   Pt instructed in 5 reps of seated hip flexion and knee extension - to be performed when up in chair - 5 - 10 reps as tolerated. Pt reports OOB x 1xday - 1 - 1.5 hrs - pt instructed to get OOB for lunch and dinner. Pt also amb with nursing assist to bathroom. Functional Measure:  Tinetti test:    Sitting Balance: 1  Arises: 1  Attempts to Rise: 2  Immediate Standing Balance: 1  Standing Balance: 1  Nudged: 1  Eyes Closed: 1  Turn 360 Degrees - Continuous/Discontinuous: 1  Turn 360 Degrees - Steady/Unsteady: 1  Sitting Down: 1  Balance Score: 11 Balance total score  Indication of Gait: 1  R Step Length/Height: 1  L Step Length/Height: 1  R Foot Clearance: 1  L Foot Clearance: 1  Step Symmetry: 1  Step Continuity: 1  Path: 1  Trunk: 1  Walking Time: 1  Gait Score: 10 Gait total score  Total Score: 21/28 Overall total score         Tinetti Tool Score Risk of Falls  <19 = High Fall Risk  19-24 = Moderate Fall Risk  25-28 = Low Fall Risk  Tinetti ME. Performance-Oriented Assessment of Mobility Problems in Elderly Patients. Bruce 66; [id number].  (Scoring Description: PT Bulletin Feb. 10, 1993)    Older adults: Iain Dobson et al, 2009; n = 1000 [facility] elderly evaluated with ABC, BHUPENDRA, ADL, and IADL)  · Mean BHUPENDRA score for males aged 69-68 years = 26.21(3.40)  · Mean BHUPENDRA score for females age 69-68 years = 25.16(4.30)  · Mean BHUPENDRA score for males over 80 years = 23.29(6.02)  · Mean BHUPENDRA score for females over 80 years = 17.20(8.32)          Pain Rating:  Pt denied pain. Activity Tolerance:   Improving - slowly progress exercise/activity tolerance     After treatment patient left in no apparent distress:   Pt in bathroom secondary to diarrhea - RN notified. COMMUNICATION/EDUCATION:   The patients plan of care was discussed with: Registered nurse. Fall prevention education was provided and the patient/caregiver indicated understanding., Patient/family have participated as able in goal setting and plan of care. , and Patient/family agree to work toward stated goals and plan of care.     Thank you for this referral.  Jon Mckeon, PT   Time Calculation: 23 mins

## 2022-03-17 NOTE — PROGRESS NOTES
Bedside and Verbal shift change report given to Donte Iqbal (oncoming nurse) by [facility] (offgoing nurse). Report included the following information SBAR, Kardex, Intake/Output, MAR and Recent Results.

## 2022-03-17 NOTE — PROGRESS NOTES
ID Progress Note  3/17/2022    Subjective:     No discomfort    Review of Systems:            Symptom Y/N Comments   Symptom Y/N Comments   Fever/Chills  n     Chest Pain n       Poor Appetite       Edema        Cough       Abdominal Pain n       Sputum       Joint Pain        SOB/PARKINSON n      Pruritis/Rash        Nausea/vomit n      Tolerating PT/OT        Diarrhea  y     Tolerating Diet        Constipation  n     Other           Could NOT obtain due to:       Objective:     Vitals:   Visit Vitals  BP (!) 155/55 (BP 1 Location: Left upper arm, BP Patient Position: At rest)   Pulse 84   Temp 97.7 °F (36.5 °C)   Resp 18   Ht 6' 1\" (1.854 m)   Wt 76.6 kg (168 lb 14 oz)   SpO2 100%   BMI 22.28 kg/m²        Tmax:  Temp (24hrs), Av.4 °F (36.3 °C), Min:97 °F (36.1 °C), Max:97.7 °F (36.5 °C)      PHYSICAL EXAM:  General: Chronically ill appearing. WD, WN. Alert, cooperative, no acute distress    EENT:  EOMI. Anicteric sclerae. Dry mucous membrane  Resp:  CTA bilaterally, no wheezing or rales. No accessory muscle use  CV:  Regular  rhythm,  + edema  GI:  Soft, Non distended, Non tender. +Bowel sounds  Neurologic:  Alert and oriented X 3, normal speech,   Psych:   Fair insight. Not anxious nor agitated  Skin:  No rashes.   No jaundice    Right thigh (3 segments about 10cm long); incision approximate, no erythema, RLE diffuse edema, serous drainage noted from old dressing, no active drainage    Labs:   Lab Results   Component Value Date/Time    WBC 8.7 2022 12:58 AM    HGB 8.6 (L) 2022 12:58 AM    HCT 27.6 (L) 2022 12:58 AM    PLATELET 994  12:58 AM    MCV 98.9 2022 12:58 AM     Lab Results   Component Value Date/Time    Sodium 142 2022 12:58 AM    Potassium 2.6 (LL) 2022 12:58 AM    Chloride 110 (H) 2022 12:58 AM    CO2 28 2022 12:58 AM    Anion gap 4 (L) 2022 12:58 AM    Glucose 106 (H) 2022 12:58 AM    BUN 6 2022 12:58 AM    Creatinine 0.67 (L) 03/17/2022 12:58 AM    BUN/Creatinine ratio 9 (L) 03/17/2022 12:58 AM    GFR est AA >60 03/17/2022 12:58 AM    GFR est non-AA >60 03/17/2022 12:58 AM    Calcium 7.2 (L) 03/17/2022 12:58 AM    Bilirubin, total 0.6 02/21/2022 03:04 PM    Alk. phosphatase 70 02/21/2022 03:04 PM    Protein, total 7.0 02/21/2022 03:04 PM    Albumin 3.1 (L) 02/21/2022 03:04 PM    Globulin 3.9 02/21/2022 03:04 PM    A-G Ratio 0.8 (L) 02/21/2022 03:04 PM    ALT (SGPT) 20 02/21/2022 03:04 PM         Cultures:   Results     Procedure Component Value Units Date/Time    ENTERIC BACTERIA PANEL, DNA [[id number]] Collected: 03/14/22 1552    Order Status: Completed Specimen: Stool Updated: 03/15/22 1407     Shigella species, DNA Negative        Campylobacter species, DNA Negative        Vibrio species, DNA Negative        Enterotoxigen E Coli, DNA Negative        Shiga toxin producing, DNA Negative        Salmonella species, DNA Negative        P. shigelloides, DNA Negative        Y. enterocolitica, DNA Negative       C. DIFFICILE AG & TOXIN A/B [[id number]] Collected: 03/14/22 1552    Order Status: Completed Specimen: Stool Updated: 03/15/22 1014     [address] ANTIGEN Negative        C. difficile toxin Negative        INTERPRETATION       NEGATIVE FOR TOXIGENIC C. DIFFICILE          OVA & PARASITES, STOOL [[id number]] Collected: 03/14/22 1552    Order Status: Completed Specimen: Feces from Stool Updated: 03/16/22 1236     Source STOOL        Ova & Parasite exam Final Report Below        Comment: (NOTE)  These results were obtained using wet preparation(s) and trichrome  stained smear. This test does not include testing for  Cryptosporidium parvum, Cyclospora, or Microsporidia.   Performed At: [address] [id number]  Trino Pettit MD ZH:[id number]         Verlan Combe STAIN [[id number]]  (Abnormal)  (Susceptibility) Collected: 03/09/22 1255    Order Status: Completed Specimen: Wound from Abdomen Updated: 03/13/22 0950     Special Requests: NO SPECIAL REQUESTS        GRAM STAIN NO WBCS SEEN      NO ORGANISMS SEEN        Culture result:       FEW * VANCOMYCIN RESISTANT ENTEROCOCCUS FAECIUM *            RARE MARTÍN TROPICALIS       Susceptibility      Vancomycin resistant enterococcus facium     ERICA     Ampicillin ($) Resistant     Daptomycin ($$$$$) Susceptible  [1]      Linezolid ($$$$$) Susceptible     Vancomycin ($) Resistant                 [1]  Dose Dependent  (SENSITIVITIES PERFORMED BY E-TEST)          Linear View                   CULTURE, BLOOD, PAIRED [[id number]] Collected: 03/09/22 1244    Order Status: Completed Specimen: Blood Updated: 03/14/22 0722     Special Requests: NO SPECIAL REQUESTS        Culture result: NO GROWTH 5 DAYS       CULTURE, URINE [[id number]]  (Abnormal) Collected: 03/09/22 1137    Order Status: Completed Specimen: Urine Updated: 03/11/22 1211     Special Requests: NO SPECIAL REQUESTS        [place] Count --        >100,000  COLONIES/mL       Culture result: CANDIDA TROPICALIS       COVID-19 RAPID TEST [[id number]]  (Abnormal) Collected: 03/05/22 1506    Order Status: Completed Specimen: Nasopharyngeal Updated: 03/05/22 1536     Specimen source Nasopharyngeal        COVID-19 rapid test Detected        Comment: Rapid [employer] ID Now       The specimen is POSITIVE for SARS-CoV-2, the novel coronavirus associated with COVID-19. This test has been authorized by the FDA under an Emergency Use Authorization (EUA) for use by authorized laboratories.         Fact sheet for Healthcare Providers: ConventionUpdate.co.nz  Fact sheet for Patients: ConventionUpdate.co.nz       Methodology: Isothermal Nucleic Acid Amplification  CALLED TO AND READ BACK BY  LEXUS RN @ [facility]/                Assessment and Plan   Small bowel obstruction due to invasive colon adenocarcinoma  S/p right hemicolectomy on 2/24/22  Midline wound dehiscence with drainage   - afebrile, WBC 7.8    Urine cultures (3/9) Candida tropicalis    Wound cx (3/9) candida tropicalis and VRE      Continue with IV Eraxis and daptomycin, last dose 3/26/2022    Pt may complete remaining ABX therapy with PO diflucan and zyvox upon discharge.  ms    Fever work up if temp >= 100.4    chronic Diarrhea  Hx of C-diff  - GI following; C-diff, enteric bacterial panel (-), O & P (-)      Above plan of care discussed and agreed with Dr. Bin Tesfaye     Pt is clear to discharge in ID stand point. Recommend to d/w GI team about chronic diarrhea issue.     Maribell Harris NP

## 2022-03-17 NOTE — PROGRESS NOTES
Problem: Pressure Injury - Risk of  Goal: *Prevention of pressure injury  Description: Document Christiano Scale and appropriate interventions in the flowsheet. Outcome: Progressing Towards Goal  Note: Pressure Injury Interventions:  Sensory Interventions: Assess changes in LOC    Moisture Interventions: Check for incontinence Q2 hours and as needed,Absorbent underpads,Apply protective barrier, creams and emollients    Activity Interventions: Pressure redistribution bed/mattress(bed type)    Mobility Interventions: HOB 30 degrees or less    Nutrition Interventions: Document food/fluid/supplement intake    Friction and Shear Interventions: HOB 30 degrees or less                Problem: Falls - Risk of  Goal: *Absence of Falls  Description: Document Al Fall Risk and appropriate interventions in the flowsheet.   Outcome: Progressing Towards Goal  Note: Fall Risk Interventions:  Mobility Interventions: Bed/chair exit alarm    Mentation Interventions: Adequate sleep, hydration, pain control,Bed/chair exit alarm    Medication Interventions: Bed/chair exit alarm,Patient to call before getting OOB    Elimination Interventions: Call light in reach,Bed/chair exit alarm    History of Falls Interventions: Bed/chair exit alarm

## 2022-03-17 NOTE — WOUND CARE
WOCN Note:     Follow-up visit for toe. Followed by Dr. Michael Esparza, podiatry, & by Dr. Herve Anyaa, vascular.     Chart shows:  Admitted for obstruction. History of Covid-19 in 2021, falls, cellulitis  Admitted from home and lives with wife.     Assessment:   Appropriately conversational and reports no pain.   Draper but also Wearing brief due to diarrhea. Cleaned of small amount of stool; perineum and scrotum are red and raw - barrier cream applied.    Surface: total care foam mattress     Bilateral heels intact with no redness; Heels offloaded with pillows. Generalized edema & erythema to right foot.     Midline incision well-approximated with staples. Dry dressing applied.      2nd toe amputation site well-approximated with sutures.      POA erosion to right 4th toe on lateral side  0.4 x 0.4 x 0.1 cm  100% soft tan  Bactroban applied     Wound Recommendations:    Right 4th toe: clean with saline, apply mupirocin and cover with dry gauze.  Change daily.     Transition of Care: Plan to follow weekly and as needed while admitted to hospital.     JOSHUA HerndonN, RN, [facility]  Certified Wound, Ostomy, Continence Nurse  office [phone]  Available via [address] [place] [address]

## 2022-03-17 NOTE — PROGRESS NOTES
Blossom Reyes 272  174 [facility], [address]       GI PROGRESS NOTE  Darby Hawk, [facility] office  [phone] NP/PA in-hospital cell phone M-F until 4:30PM  After 5PM or on weekends, please call  for physician on call      NAME: Chela Severe   :  1934   MRN:  [id number]       Subjective:   He's had chronic diarrhea >10 years, has only tried Imodium which works fairly well. He had cholecystectomy, unsure when. Objective:     VITALS:   Last 24hrs VS reviewed since prior progress note. Most recent are:  Visit Vitals  BP (!) 155/72 (BP 1 Location: Left upper arm, BP Patient Position: At rest)   Pulse 82   Temp 97.1 °F (36.2 °C)   Resp 16   Ht 6' 1\" (1.854 m)   Wt 76.6 kg (168 lb 14 oz)   SpO2 97%   BMI 22.28 kg/m²       PHYSICAL EXAM:              CONST:          Pleasant male lying in bed, no acute distress              NEURO:          Alert and oriented              HEENT:           EOMI, no scleral icterus              LUNGS:           No acute respiratory distress              ABD:                Soft, non distended, no tenderness, no rebound, no guarding.  Midline incision                PSYCH:           Not anxious or agitated    Lab Data Reviewed:     Recent Results (from the past 24 hour(s))   GLUCOSE, POC    Collection Time: 22  4:41 PM   Result Value Ref Range    Glucose (POC) 153 (H) 65 - 117 mg/dL    Performed by Tony Fiore, POC    Collection Time: 22 12:51 AM   Result Value Ref Range    Glucose (POC) 108 65 - 117 mg/dL    Performed by Shar cochran RN    METABOLIC PANEL, BASIC    Collection Time: 22 12:58 AM   Result Value Ref Range    Sodium 142 136 - 145 mmol/L    Potassium 2.6 (LL) 3.5 - 5.1 mmol/L    Chloride 110 (H) 97 - 108 mmol/L    CO2 28 21 - 32 mmol/L    Anion gap 4 (L) 5 - 15 mmol/L    Glucose 106 (H) 65 - 100 mg/dL    BUN 6 6 - 20 MG/DL    Creatinine 0.67 (L) 0.70 - 1.30 MG/DL BUN/Creatinine ratio 9 (L) 12 - 20      GFR est AA >60 >60 ml/min/1.73m2    GFR est non-AA >60 >60 ml/min/1.73m2    Calcium 7.2 (L) 8.5 - 10.1 MG/DL   CBC W/O DIFF    Collection Time: 03/17/22 12:58 AM   Result Value Ref Range    WBC 8.7 4.1 - 11.1 K/uL    RBC 2.79 (L) 4.10 - 5.70 M/uL    HGB 8.6 (L) 12.1 - 17.0 g/dL    HCT 27.6 (L) 36.6 - 50.3 %    MCV 98.9 80.0 - 99.0 FL    MCH 30.8 26.0 - 34.0 PG    MCHC 31.2 30.0 - 36.5 g/dL    RDW 17.4 (H) 11.5 - 14.5 %    PLATELET 805 277 - 233 K/uL    MPV 10.6 8.9 - 12.9 FL    NRBC 0.0 0  WBC    ABSOLUTE NRBC 0.00 0.00 - 0.01 K/uL   MAGNESIUM    Collection Time: 03/17/22 12:58 AM   Result Value Ref Range    Magnesium 1.8 1.6 - 2.4 mg/dL   GLUCOSE, POC    Collection Time: 03/17/22  6:49 AM   Result Value Ref Range    Glucose (POC) 99 65 - 117 mg/dL    Performed by Mikki Bain    GLUCOSE, POC    Collection Time: 03/17/22 12:22 PM   Result Value Ref Range    Glucose (POC) 97 65 - 117 mg/dL    Performed by Marisela Dudley   PCT        Assessment:   · Diarrhea: CT abdomen/pelvis without contrast (3/9/22): body wall edema and small volume fluid around the liver and in the right paracolic gutter; small bilateral pleural effusions; no evidence of drainable fluid collection or hematoma. History of C difficile in 2014. Negative enteric bacteria panel and C difficile (3/14/22); ova & parasites negative .     · Small bowel obstruction due to invasive colon adenocarcinoma: status post right hemicolectomy on 2/24/22  · Midline wound dehiscence with drainage - cultures with VRE and Candida tropicalis  · Peripheral vascular disease with gangrene of right second toe: status post right above-knee popliteal to proximal peroneal artery bypass and amputation of right second toe on 3/7/22  · Acute on chronic anemia  · Multiple myeloma  · Diabetes mellitus  · Left arm swelling and edema  · History of COVID pneumonia  · Hypertension     Patient Active Problem List   Diagnosis Code    HTN (hypertension) I10    H/O Carotid Stenosis I65.29    GERD (gastroesophageal reflux disease) K21.9    Pre-diabetes R73.03    Sinus tachycardia R00.0    H/O Heavy Alcohol Use     Vitamin B12 deficiency E53.8    Hyperhomocysteinemia (HCC) E72.11    Mild Allergic Rhinitis J30.9    History of skin cancer Z85.828    S/P AAA repair 2/9/2012 Z98.890, Z86.79    Hypercalcemia E83.52    Multiple myeloma ([facility] C90.00    Left inguinal hernia K40.90    Right inguinal hernia K40.90    Microalbuminuria R80.9    Chronic diarrhea K52.9    Aortic stenosis, moderate I35.0    Mild concentric left ventricular hypertrophy (LVH) I51.7    Hyperlipemia, mixed E78.2    Carotid stenosis, asymptomatic, right I65.21    Personal history of atrial fibrillation Z86.79    Cellulitis of right leg L03.115    Failure to thrive (child) R62.51    Falls frequently R29.6    Moderate protein-calorie malnutrition (HCC) E44.0    Acute renal injury ([facility] [address] N17.9    Malignant neoplasm of ascending colon (HCC) C18.2     Plan:   · Trial Colestipol      Signed By: Teresita Potter NP     3/17/2022  3:09 PM      GI Attending: Agree with above plan. Hopefully colestipol will work better than loperamide given his post-cholecystectomy state. Tom Palomo MD

## 2022-03-17 NOTE — PROGRESS NOTES
[address] [facility]  Hospitalist Group                                                                                          Hospitalist Progress Note  Candida Ryan MD  Answering service: [phone] or [phone] from in house phone        Date of Service:  3/17/2022  NAME:  Allan Mauro  :  1934  MRN:  [id number]      Admission Summary:     A 41-year-old man with a past medical history significant for type 2 diabetes, multiple myeloma, hypertension, dyslipidemia, peripheral neuropathy, status post abdominal aortic aneurysm repair in  and COVID-pneumonia.  Patient was recently admitted on 2/15 for management of cellulitis. Presented to the emergency room several days ago treated for diarrhea with Imodium, and now with progressive abdominal pain nausea vomiting. Patient seen in the ED for abdominal pain started 12 to 24 hours ago associated with vomiting and constipation.  The hospitalist team has been consulted to assist general surgery in medical management.   Patient examined at bedside.  Patient is ill-appearing, should not reports health has been declining since Covid diagnosis in 2021.  Patient denies chest pain, dizziness, shortness of breath during examination.  Patient endorses abdominal pain nausea and vomiting.  Productive cough noted on evaluation, patient reports he has had a cough since Covid diagnosis and it is somewhat improved.  NG tube is in place draining dark brown bilious fluid.  Patient reports abdominal distention has improved some since placement of NG.  Patient's son-in-law is at bedside. \"     Interval history / Subjective:   F/u SBO  Continues to have diarrhea \"Uncontrollable\"  Usually takes imodium at home at least twice or thrice a day  Hypokalemia  No abd pain, nausea or vomiting     Assessment & Plan:     Small bowel obstruction due to invasive colon adenocarcinoma   -Underwent right hemicolectomy .  Evidence of adenocarcinoma, + lymph nodes  -diet advanced to regular diet   -Drainage from mid abdominal incision, noted on 3/9/22, ostomy placed for drainage collection,    -CT abd/pelvis show no hematoma or drain able fluid  -wound cx grow vancomycin resistant enterococcus faecium and rare candida tropicalis,  -on iv daptomycin and eraxis, to switch to oral zyvox and diflucan on discharge  -leukcytosis improving  -blood cx no growth  -Oncology consulted 3/1, recommend outpatient follow up  -surgical service and ID on board      PVD  Right second toe dry gangrene  Right lower extremity arterial occlusion   -MRI with evidence of osteomyelitis. More consistent with dry gangrene.   -Antibiotics discontinued on 3/2  -MARUYRI with moderate to severe arterial occlusion  -Right arteriogram 2/28 with popliteal occlusion, unable to cross occlusion.    -seen by podiatry and signed off   -s/p right popliteal bypass and second Right great toe amputation   -right thigh incision draining very little clear fluid, he has also bilateral pretibial and pedal edema, on Lasix 40 mg po daily  -pain controlled  -continue PT   -vascular surgeon on board    Chronic diarrhea   -he said he has having diarrhea since 2012  -rectal tube discontinued on 3/14  -stool for enteric bacteria and C diff negative  -Spoke to GI, Dr Sariah Pal kindly agreed on seeing the patient today     Hypotension ,resolved  -pt with drainage from surgical incision, hematuria   -s/p prbc, abx, pro ca lowl for risks of SIRS/sepsis  -BP normal, IVF discontinued     Acute on chronic  anemia   -symptomatic with hypotension, hematuria  -Hgb 6.9, s/p 1 unit  PRBC Hgb improved  9.7  -venofer x 1 dose,  -monitor H/H     Gross hematuria noted in rosas post op surgery   -likely due to rosas placement with enlarged porstate,   -gross hematuria has resolved, rosas out on 3/14, failed void and rosas placed back, need outpatient urologist after discharge  -no flomax due to hypotension,   -MRI w/c for further eval once stable      Acute metabolic encephalopathy  -resolved, patient is conscious and alert, well oriented  -Multifactorial in setting of recent surgery, narcotics, hospitalization, infection  -limit narcotics as able  -continue neuro check and supportive care     T2DM  -A1c 6.3   -continue sliding scale and monitor finger stick glucose      Left arm swelling and edema and bilateral lower ext edema  -doppler neg for DVT  -elevate the arm, improving  -continue lasix 40 mg po q daily     Hx of COVID 19 pneumonia    -CXR with scattered opacities, concerning for PNA.    -s/p treatment  -improved, SpO2 % on RA     Peripheral neuropathy  -stable, resume pregabalin.     HTN  -BP improved, on lasix , monitor BP, may consider norvasc if BP trending up    Failure to thrive, Protein calorie malnutrition  -continue nutritional supplement  - Nutrition consulted      Recurrent falls  -continue PT/OT  -fall precaution    Hypokalemia:  -replace with iv kcl and repeat in am    Hypomagnesemia  -improved    Insomnia  -continue PRN low-dose trazodone           Code status: DNR  Prophylaxis: SCD    Plan: Follow GI, control bowel movement, control hypokalemia before discharge to rehab    Care Plan discussed with: Patient, Nurse and CM  Anticipated Disposition: Inpatient Rehab, in 24-48 hrs  I called his son in , Lisa Dean at , updated him and answered his questions     Hospital Problems  Date Reviewed: 2/28/2022          Codes Class Noted POA    Malignant [facility] ascending colon [facility] ICD-10-CM: C18.2  ICD-9-CM: 153.6  2/28/2022 Unknown        Failure to thrive (child) ICD-10-CM: R62.51  ICD-9-CM: 783.41  2/21/2022 Unknown        Falls frequently ICD-10-CM: R29.6  ICD-9-CM: V15.88  2/21/2022 Unknown        Moderate protein-calorie malnutrition ([facility] ICD-10-CM: E44.0  ICD-9-CM: 263.0  2/21/2022 Unknown        Acute renal injury ([facility] ICD-10-CM: N17.9  ICD-9-CM: 584.9  2/21/2022 Unknown        Cellulitis of right leg ICD-10-CM: D55.413  ICD-9-CM: 682.6  2/12/2022 Yes        Aortic stenosis, moderate ICD-10-CM: I35.0  ICD-9-CM: 424.1  4/10/2018 Yes        Multiple myeloma ([facility] ICD-10-CM: C90.00  ICD-9-CM: 203.00  12/27/2012 Yes        Pre-diabetes ICD-10-CM: R73.03  ICD-9-CM: 790.29  9/7/2010 Yes    Overview Addendum 11/1/2011  9:29 AM by Yareli Saab MD     2005; Optho Dr Wolfgang Loco             HTN (hypertension) ICD-10-CM: I10  ICD-9-CM: 401.9  4/6/2010 Yes    Overview Signed 9/7/2010  8:55 AM by Yareli Saab MD     1990             H/O Carotid Stenosis ICD-10-CM: I65.29  ICD-9-CM: 433.10  4/6/2010 Yes    Overview Addendum 9/7/2010  8:58 AM by Yareli Saab MD     Oct 2003; 50%left subclavian and 50-70% SHEILA                       Vital Signs:    Last 24hrs VS reviewed since prior progress note. Most recent are:  Visit Vitals  BP (!) 155/72 (BP 1 Location: Left upper arm, BP Patient Position: At rest)   Pulse 82   Temp 97.1 °F (36.2 °C)   Resp 16   Ht 6' 1\" (1.854 m)   Wt 76.6 kg (168 lb 14 oz)   SpO2 97%   BMI 22.28 kg/m²         Intake/Output Summary (Last 24 hours) at 3/17/2022 1307  Last data filed at 3/17/2022 6078  Gross per 24 hour   Intake --   Output 1000 ml   Net -1000 ml        Physical Examination:     I had a face to face encounter with this patient and independently examined them on 3/17/2022 as outlined below:          Constitutional:  No acute distress, cooperative, pleasant    ENT:  Oral mucosa moist, oropharynx benign. Resp:  CTA bilaterally. No wheezing/rhonchi/rales. No accessory muscle use. CV:  Regular rhythm, normal rate, no murmurs, gallops, rubs    GI:  Mid abdomen incision open wound with drainage, collecting ostomy in place, abdomen soft, non distended, non tender. normoactive bowel sounds, no hepatosplenomegaly     Musculoskeletal:  Right thigh dressed, medial thigh staples stable, right foot dressed    Neurologic:  Moves all extremities.   AAOx3, CN II-XII reviewed Data Review:    Review and/or order of clinical lab test  Review and/or order of tests in the radiology section of CPT  Review and/or order of tests in the medicine section of CPT      Labs:     Recent Labs     03/17/22 0058 03/16/22 0425   WBC 8.7 7.8   HGB 8.6* 8.4*   HCT 27.6* 26.7*    200     Recent Labs     03/17/22 0058 03/16/22 0425 03/15/22  0241    145 142   K 2.6* 2.5* 2.6*   * 113* 111*   CO2 28 29 28   BUN 6 6 4*   CREA 0.67* 0.60* 0.55*   * 85 99   CA 7.2* 7.2* 7.2*   MG 1.8 1.6 1.8     No results for input(s): ALT, AP, TBIL, TBILI, TP, ALB, GLOB, GGT, AML, LPSE in the last 72 hours. No lab exists for component: SGOT, GPT, AMYP, HLPSE  No results for input(s): INR, PTP, APTT, INREXT, INREXT in the last 72 hours. No results for input(s): FE, TIBC, PSAT, FERR in the last 72 hours. Lab Results   Component Value Date/Time    Folate 81.7 (H) 02/13/2022 02:38 AM      No results for input(s): PH, PCO2, PO2 in the last 72 hours. No results for input(s): CPK, CKNDX, TROIQ in the last 72 hours.     No lab exists for component: CPKMB  Lab Results   Component Value Date/Time    Cholesterol, total 193 03/23/2021 11:18 AM    HDL Cholesterol 83 03/23/2021 11:18 AM    LDL, calculated 84.2 03/23/2021 11:18 AM    Triglyceride 129 03/23/2021 11:18 AM    CHOL/HDL Ratio 2.3 03/23/2021 11:18 AM     Lab Results   Component Value Date/Time    Glucose (POC) 97 03/17/2022 12:22 PM    Glucose (POC) 99 03/17/2022 06:49 AM    Glucose (POC) 108 03/17/2022 12:51 AM    Glucose (POC) 153 (H) 03/16/2022 04:41 PM    Glucose (POC) 90 03/16/2022 11:31 AM     Lab Results   Component Value Date/Time    Color DARK YELLOW 01/23/2014 06:54 PM    Appearance CLOUDY (A) 01/23/2014 06:54 PM    Specific gravity 1.025 01/23/2014 06:54 PM    pH (UA) 5.5 01/23/2014 06:54 PM    Protein 100 (A) 01/23/2014 06:54 PM    Glucose 250 (A) 01/23/2014 06:54 PM    Ketone TRACE (A) 01/23/2014 06:54 PM    Bilirubin LARGE (A) 01/23/2014 06:54 PM    Urobilinogen 1.0 01/23/2014 06:54 PM    Nitrites NEGATIVE  01/23/2014 06:54 PM    Leukocyte Esterase NEGATIVE  01/23/2014 06:54 PM    Epithelial cells FEW 01/23/2014 06:54 PM    Bacteria 3+ (A) 01/23/2014 06:54 PM    WBC 0-4 01/23/2014 06:54 PM    RBC 5-10 01/23/2014 06:54 PM         Medications Reviewed:     Current Facility-Administered Medications   Medication Dose Route Frequency    potassium chloride 10 mEq in 100 ml IVPB  10 mEq IntraVENous Q1H    magnesium sulfate 2 g/50 ml IVPB (premix or compounded)  2 g IntraVENous ONCE    L.acidophilus-paracasei-S.thermophil-bifidobacter (RISAQUAD) 8 billion cell capsule  1 Capsule Oral DAILY    tamsulosin (FLOMAX) capsule 0.4 mg  0.4 mg Oral DAILY    furosemide (LASIX) tablet 40 mg  40 mg Oral DAILY    anidulafungin (ERAXIS) 100 mg in 0.9% sodium chloride 130 mL IVPB  100 mg IntraVENous Q24H    DAPTOmycin (CUBICIN) 300 mg in 0.9% sodium chloride 50 mL IVPB RF formulation  300 mg IntraVENous Q24H    finasteride (PROSCAR) tablet 5 mg  5 mg Oral DAILY    0.9% sodium chloride infusion 250 mL  250 mL IntraVENous PRN    diphenhydrAMINE (BENADRYL) capsule 25 mg  25 mg Oral Q6H PRN    traZODone (DESYREL) tablet 50 mg  50 mg Oral QHS PRN    pantoprazole (PROTONIX) tablet 40 mg  40 mg Oral ACB    acetaminophen (TYLENOL) tablet 650 mg  650 mg Oral TID    oxyCODONE IR (ROXICODONE) tablet 2.5-5 mg  2.5-5 mg Oral Q4H PRN    traMADoL (ULTRAM) tablet 50 mg  50 mg Oral Q6H PRN    zinc oxide-cod liver oil (DESITIN) 40 % paste   Topical PRN    [Held by provider] heparin (porcine) injection 5,000 Units  5,000 Units SubCUTAneous Q8H    mupirocin (BACTROBAN) 2 % ointment   Topical DAILY    phenol throat spray (CHLORASEPTIC) 1 Spray  1 Spray Oral PRN    HYDROmorphone (DILAUDID) injection 0.5-1 mg  0.5-1 mg IntraVENous Q2H PRN    ondansetron (ZOFRAN) injection 4 mg  4 mg IntraVENous Q4H PRN    acetaminophen (TYLENOL) tablet 650 mg  650 mg Oral Q6H PRN    hydrALAZINE (APRESOLINE) 20 mg/mL injection 20 mg  20 mg IntraVENous Q6H PRN    glucose chewable tablet 16 g  4 Tablet Oral PRN    dextrose 10 % infusion 0-250 mL  0-250 mL IntraVENous PRN    glucagon (GLUCAGEN) injection 1 mg  1 mg IntraMUSCular PRN    [Held by provider] aspirin delayed-release tablet 81 mg  81 mg Oral QHS    pregabalin (LYRICA) capsule 50 mg  50 mg Oral BID    albuterol (PROVENTIL HFA, VENTOLIN HFA, PROAIR HFA) inhaler 1 Puff  1 Puff Inhalation Q6H PRN     ______________________________________________________________________  EXPECTED LENGTH OF STAY: 3d 4h  ACTUAL LENGTH OF STAY:          24                 Na Mckeon MD

## 2022-03-17 NOTE — PROGRESS NOTES
Vascular:    No new issues. Continue dry dressings to leg incisions if draining. Will follow up next week if remains in hospital. Follow up with me 3 weeks post op if discharged.

## 2022-03-17 NOTE — PROGRESS NOTES
Transition of Care Plan   RUR- 21%    DISPOSITION: The disposition plan is SNF;  [facility] [phone]  o Auth approved; Marlon Meigs reference#[id number]/Plan TIFQ#[id number]  - Start date: 3/17-3/19/ Fax: [phone]    F/U with PCP/Specialist     Transport: [facility];S    Primary contacts: Tessie Bartholomew(Son in [place]) [phone]  Desiree Bartholomew(Daughter) [phone]      CM: Karen Barriga MS,   [phone]

## 2022-03-17 NOTE — PROGRESS NOTES
Informed Dr. Azra Morrow that pt's potassium has been critically low since midnight last night. Per Dr. Azra Morrow, he will order PO and IV potassium. Informed Dr. Azra Morrow that pt has had 3 loose BMs today. He is extremely tired of going to the bathroom, and he is wondering if he could have Imodium.  He said GI said he can't have it for some reason

## 2022-03-18 PROBLEM — R62.51 FAILURE TO THRIVE (CHILD): Status: ACTIVE | Noted: 2022-01-01

## 2022-03-18 PROBLEM — N17.9 ACUTE RENAL INJURY (HCC): Status: ACTIVE | Noted: 2022-01-01

## 2022-03-18 PROBLEM — I51.7 MILD CONCENTRIC LEFT VENTRICULAR HYPERTROPHY (LVH): Status: ACTIVE | Noted: 2018-04-10

## 2022-03-18 PROBLEM — C18.2 MALIGNANT NEOPLASM OF ASCENDING COLON (HCC): Status: ACTIVE | Noted: 2022-01-01

## 2022-03-18 PROBLEM — E44.0 MODERATE PROTEIN-CALORIE MALNUTRITION (HCC): Status: ACTIVE | Noted: 2022-01-01

## 2022-03-18 PROBLEM — I65.21 CAROTID STENOSIS, ASYMPTOMATIC, RIGHT: Status: ACTIVE | Noted: 2020-05-29

## 2022-03-18 NOTE — PROGRESS NOTES
Physical Therapy    Attempted PT treatment. Pt declined participation due to recently informed of d/c to SNF today.      Ninfa Catalan, PT, MPT

## 2022-03-18 NOTE — DISCHARGE INSTRUCTIONS
Discharge SNF/Rehab Instructions/LTAC       PATIENT ID: Sergio Sosa  MRN: [id number]   YOB: 1934    DATE OF ADMISSION: 2/21/2022  1:18 PM    DATE OF DISCHARGE: 3/18/2022    PRIMARY CARE PROVIDER: Rikki Patricia NP       ATTENDING PHYSICIAN: Michael Lilly MD  DISCHARGING PROVIDER: Jessica Heart MD     To contact this individual call [phone] and ask the  to page. If unavailable ask to be transferred the Adult Hospitalist Department. CONSULTATIONS: IP CONSULT TO HOSPITALIST  IP CONSULT TO PODIATRY  IP CONSULT TO UROLOGY  IP CONSULT TO INFECTIOUS DISEASES  IP CONSULT TO GASTROENTEROLOGY  IP CONSULT TO VASCULAR SURGERY    PROCEDURES/SURGERIES: Procedure(s):  RIGHT POPLITEAL TO PERINEAL  BYPASS with vein AND RIGHT SECOND TOE AMPUTATION    ADMITTING DIAGNOSES & HOSPITAL COURSE:   Small bowel obstruction due to invasive colon adenocarcinoma   -Underwent right hemicolectomy 2/24. 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 More consistent with dry gangrene.   -Antibiotics discontinued on 3/2  -MARYURI with moderate to severe arterial occlusion  -Right arteriogram 2/28 with popliteal occlusion, unable to cross occlusion.    -seen by podiatry and signed off   -s/p right popliteal bypass and second Right great toe amputation   -right thigh incision draining very little clear fluid, he has also bilateral pretibial and pedal edema, on Lasix 40 mg po daily  -pain controlled  -continue PT   -vascular surgeon on board    Chronic diarrhea   -he said he has having diarrhea since 2012  -rectal tube discontinued on 3/14  -stool for enteric bacteria and C diff negative  -Spoke to GI, Dr Yesica Matos cleared for discharge      Hypotension ,resolved  -pt with drainage from surgical incision, hematuria   -s/p prbc, abx, pro ca lowl for risks of SIRS/sepsis  -BP normal, IVF discontinued     Acute on chronic  anemia   -symptomatic with hypotension, hematuria  -Hgb 6.9, s/p 1 unit  PRBC Hgb improved  9.7  -venofer x 1 dose,  -monitor H/H     Gross hematuria noted in rosas post op surgery   -likely due to rosas placement with enlarged porstate,   -gross hematuria has resolved, rosas out on 3/14, failed void and roass placed back, need outpatient urologist after discharge  -no flomax due to hypotension,   -MRI w/c for further eval once stable      Acute metabolic encephalopathy  -resolved, patient is conscious and alert, well oriented  -Multifactorial in setting of recent surgery, narcotics, hospitalization, infection  -limit narcotics as able  -continue neuro check and supportive care     T2DM  -A1c 6.3   -continue sliding scale and monitor finger stick glucose      Left arm swelling and edema and bilateral lower ext edema  -doppler neg for DVT  -elevate the arm, improving  -continue lasix 40 mg po q daily     Hx of COVID 19 pneumonia    -CXR with scattered opacities, concerning for PNA.    -s/p treatment  -improved, SpO2 % on RA     Peripheral neuropathy  -stable, resume pregabalin.     HTN  -BP improved, on lasix , monitor BP, may consider norvasc if BP trending up    Failure to thrive, Protein calorie malnutrition  -continue nutritional supplement  - Nutrition consulted      Recurrent falls  -continue PT/OT  -fall precaution    Hypokalemia:  -replace with iv kcl and repeat in am    Hypomagnesemia  -improved    Insomnia  -continue PRN low-dose trazodone         PENDING TEST RESULTS:   At the time of discharge the following test results are still pending: none    FOLLOW UP APPOINTMENTS:    Follow-up Information     Follow up With Specialties Details [facility]   Follow up on 5/4/22 at 10:15 AM with Dr. Destiney Toribio for urine check. Call to re-schedule if needed. [phone] [address]    Kayla Edmonds NP Nurse Practitioner In 1 week  [address]  [phone]      Ugo Wild MD Vascular Surgery In 3 weeks    [facility] [address] Woodruff MD Gastroenterology In 2 weeks  [facility] [address] [facility]      Carlitos Mann MD Hematology and Oncology, Hematology, Oncology In 2 weeks  [facility] [id number] [phone]             ADDITIONAL CARE RECOMMENDATIONS:   Follow up with PMD  Follow up with GI, oncology, Vascular surgery , urology    DIET: Cardiac Diet    ACTIVITY: Activity as tolerated      DISCHARGE MEDICATIONS:   See Medication Reconciliation Form      NOTIFY YOUR PHYSICIAN FOR ANY OF THE FOLLOWING:   Fever over 101 degrees for 24 hours. Chest pain, shortness of breath, fever, chills, nausea, vomiting, diarrhea, change in mentation, falling, weakness, bleeding. Severe pain or pain not relieved by medications. Or, any other signs or symptoms that you may have questions about.     DISPOSITION:    Home With:   OT  PT  HH  RN      x SNF/Inpatient Rehab/LTAC    Independent/assisted living    Hospice    Other:       PATIENT CONDITION AT DISCHARGE:     Functional status    Poor    x Deconditioned     Independent      Cognition    x Lucid     Forgetful     Dementia      Catheters/lines (plus indication)    Draper     PICC     PEG    x None      Code status     Full code    x DNR PHYSICAL EXAMINATION AT DISCHARGE:  Please see montserrat [email or web address]      CHRONIC MEDICAL DIAGNOSES:  Problem List as of 3/18/2022 Date Reviewed: 3/17/2022          Codes Class Noted - Resolved    * (Principal) Malignant neoplasm of ascending colon ([facility] ICD-10-CM: C18.2  ICD-9-CM: 153.6  2/28/2022 - Present        Failure to thrive (child) ICD-10-CM: R62.51  ICD-9-CM: 783.41  2/21/2022 - Present        Falls frequently ICD-10-CM: R29.6  ICD-9-CM: V15.88  2/21/2022 - Present        Moderate protein-calorie malnutrition ([facility] ICD-10-CM: E44.0  ICD-9-CM: 263.0  2/21/2022 - Present        Acute renal injury ([facility] ICD-10-CM: N17.9  ICD-9-CM: 584.9  2/21/2022 - Present        Cellulitis of right leg ICD-10-CM: L03.115  ICD-9-CM: 682.6  2/12/2022 - Present        Personal history of atrial fibrillation ICD-10-CM: Z86.79  ICD-9-CM: V12.59  11/9/2020 - Present        Carotid stenosis, asymptomatic, right ICD-10-CM: I65.21  ICD-9-CM: 433.10  5/29/2020 - Present        Hyperlipemia, mixed ICD-10-CM: E78.2  ICD-9-CM: 272.2  9/27/2018 - Present        Aortic stenosis, moderate ICD-10-CM: I35.0  ICD-9-CM: 424.1  4/10/2018 - Present        Mild concentric left ventricular hypertrophy (LVH) ICD-10-CM: I51.7  ICD-9-CM: 429.3  4/10/2018 - Present        Chronic diarrhea ICD-10-CM: K52.9  ICD-9-CM: 787.91  9/29/2017 - Present        Microalbuminuria ICD-10-CM: R80.9  ICD-9-CM: 791.0  12/10/2015 - Present        Right inguinal hernia ICD-10-CM: K40.90  ICD-9-CM: 550.90  11/2/2015 - Present        Left inguinal hernia ICD-10-CM: K40.90  ICD-9-CM: 550.90  9/14/2015 - Present        Multiple myeloma ([facility] ICD-10-CM: C90.00  ICD-9-CM: 203.00  12/27/2012 - Present        Hypercalcemia ICD-10-CM: K11.61  ICD-9-CM: 275.42  4/25/2012 - Present    Overview Signed 4/25/2012  5:49 PM by Zachary Ibanez MD     2012, PTH normal, abnormal protein electrophoresis, referred to Hem-Onc             S/P AAA repair 2/9/2012 ICD-10-CM: N34.810, Z86.79  ICD-9-CM: V45.89  2/22/2012 - Present    Overview Signed 2/22/2012  1:38 PM by MD Dr Deo Thomas             Pre-diabetes ICD-10-CM: R73.03  ICD-9-CM: 790.29  9/7/2010 - Present    Overview Addendum 11/1/2011  9:29 AM by Michell Yoo MD     2005;  Optho Dr Luis Angel Marks             Sinus tachycardia ICD-10-CM: R00.0  ICD-9-CM: 427.89  9/7/2010 - Present        H/O Heavy Alcohol Use ICD-9-CM: Corrinne Sailors  9/7/2010 - Present        Vitamin B12 deficiency ICD-10-CM: E53.8  ICD-9-CM: 266.2  9/7/2010 - Present        Hyperhomocysteinemia ([facility] ICD-10-CM: E72.11  ICD-9-CM: 270.4  9/7/2010 - Present        Mild Allergic Rhinitis ICD-10-CM: J30.9  ICD-9-CM: 477.9  9/7/2010 - Present        History of skin cancer ICD-10-CM: Z85.828  ICD-9-CM: V10.83  9/7/2010 - Present    Overview Signed 9/7/2010  9:03 AM by Michell Yoo MD     Followed by Valerio Carmona             HTN (hypertension) ICD-10-CM: I10  ICD-9-CM: 401.9  4/6/2010 - Present    Overview Signed 9/7/2010  8:55 AM by Michell Yoo MD     1990             H/O Carotid Stenosis ICD-10-CM: I65.29  ICD-9-CM: 433.10  4/6/2010 - Present    Overview Addendum 9/7/2010  8:58 AM by Michell Yoo MD     Oct 2003; 50%left subclavian and 50-70% SHEILA              GERD (gastroesophageal reflux disease) ICD-10-CM: K21.9  ICD-9-CM: 530.81  4/6/2010 - Present        RESOLVED: Intestinal adhesions with complete obstruction ([facility] ICD-10-CM: K56.52  ICD-9-CM: 560.81  2/21/2022 - 2/28/2022        RESOLVED: Intestinal obstruction ([facility] ICD-10-CM: A88.117  ICD-9-CM: 560.9  2/21/2022 - 2/24/2022        RESOLVED: Myeloma ([facility] ICD-10-CM: C90.00  ICD-9-CM: 203.00  1/6/2015 - 6/10/2016        RESOLVED: Gastrointestinal bleeding ICD-10-CM: K92.2  ICD-9-CM: 578.9  12/26/2014 - 2/28/2018        RESOLVED: Gall stone pancreatitis ICD-10-CM: K85.10  ICD-9-CM: 577.0, 574.20  1/23/2014 - 2/28/2018        RESOLVED: Acute cholecystitis ICD-10-CM: K81.0  ICD-9-CM: 575.0  1/23/2014 - 2/28/2018        RESOLVED: Abnormal serum protein electrophoresis ICD-10-CM: R77.8  ICD-9-CM: 790.99  4/25/2012 - 2/28/2018    Overview Signed 4/25/2012  5:48 PM by Zachary Ibanez MD     4/2012, referred to Hem-Onc             RESOLVED: Atrial fibrillation 1/2012 ICD-10-CM: I48.91  ICD-9-CM: 427.31  2/22/2012 - 11/9/2020    Overview Signed 2/22/2012  1:33 PM by Zachary Ibanez MD     Cardio Dr Turner Rom: AAA (abdominal aortic aneurysm) [facility]) ICD-10-CM: I71.4  ICD-9-CM: 441.4  1/6/2012 - 9/26/2018    Overview Signed 1/6/2012  2:07 PM by Zachary Ibanez MD     Detected on plain films back xray 1/2012; Ct scheduled             RESOLVED: Anemia ICD-10-CM: D64.9  ICD-9-CM: 285.9  1/5/2012 - 2/28/2018        RESOLVED: Hypertriglyceridemia ICD-10-CM: E78.1  ICD-9-CM: 272.1  9/7/2010 - 9/27/2018    Overview Signed 9/7/2010  8:55 AM by Zachary Ibanez MD     2003             RESOLVED: Bilateral Carotid Bruits, L>R ICD-10-CM: R09.89  ICD-9-CM: 785.9  9/7/2010 - 2/28/2018    Overview Signed 9/7/2010  8:57 AM by Zachary Ibanez MD     10/03             RESOLVED: Anemia due to GI bleed 2007 ICD-10-CM: D64.9  ICD-9-CM: 285.9  4/6/2010 - 2/28/2018        RESOLVED: GI bleed, duodenitis 2007 ICD-10-CM: K92.2  ICD-9-CM: 578.9  4/6/2010 - 2/28/2018    Overview Addendum 9/7/2010  9:01 AM by Zachary Ibanez MD     8/07 Duodenitis                         CDMP Checked:   Yes x     PROBLEM LIST Updated:  Yes x         Signed:   Parag Palm MD  3/18/2022  1:47 PM

## 2022-03-18 NOTE — PROGRESS NOTES
Transition of Care Plan   RUR- 21%    DISPOSITION: The disposition plan is SNF; [facility] SN[facility]   o Call Report: [phone]   - Updated ID notes uploaded to All scripts  ? Auth approved; Cody Vincent 251 reference#[id number]/Plan SIZR#[id number]  § Start date: 3/17-3/19/ Fax: [phone]    F/U with PCP/Specialist     Transport: [facility] 3:30pm     Transition of Care Plan to SNF/Rehab    SNF/Rehab Transition:  Patient has been accepted to [facility] and meets criteria for admission. Patient will transported by [facility] and expected to leave at 3:30pm.    Communication to Patient/Family:  Met with patient (identified care giver) and they are agreeable to the transition plan. Communication to SNF/Rehab:  Bedside RN, Rasheed, has been notified to update the transition plan to the facility and call report (phone number [phone]. Discharge information has been updated on the AVS.       Nursing Please include all hard scripts for controlled substances, med rec and dc summary, and AVS in packet. Reviewed and confirmed with facility,, can manage the patient care needs for the following:     Isabel  with (X) only those applicable:    Medication:  [x]  Medications will be available at the facility  []  IV Antibiotics  []  Controlled Substance - hard copy to be sent with patient   []  Weekly Labs   Documents:  [x] Hard RX  [x] MAR  [x] Kardex  [x] AVS  [x]Transfer Summary  [x]Discharge   Equipment:  []  CPAP/BiPAP  []  Wound Vacuum  []  Draper or Urinary Device  []  PICC/Central Line  []  Nebulizer  []  Ventilator   Treatment:  []Isolation (for MRSA, VRE, etc.)  []Surgical Drain Management  []Tracheostomy Care  []Dressing Changes  []Dialysis with transportation and chair time.   []PEG Care  []Oxygen  []Daily Weights for Heart Failure   Dietary:  []Any diet limitations  []Tube Feedings   []Total Parenteral Management (TPN)   Eligible for Medicaid Long Term Services and Supports  Yes:  [] Eligible for medical assistance or will become eligible within 180 days and UAI completed. [] Provider/Patient and/or support system has requested screening. [] UAI copy provided to patient or responsible party,.  [] UAI unavailable at discharge will send once processed to SNF provider. [] UAI unavailable at discharged mailed to patient  No:   [x] Private pay and is not financially eligible for Medicaid within the next 180 days. [] Reside out-of-state. [] A residents of a state owned/operated facility that is licensed  by [address] [facility] or [facility]  [] Enrollment in [facility] hospice services  [] [address] [place] [address]  [] Patient /Family declines to have screening completed or provide financial information for screening     Financial Resources:  Medicaid    [] Initiated and application pending   [] Full coverage     Advanced Care Plan:  []Surrogate Decision Maker of Care  []POA  [x]Communicated Code Status (DDNR\")    Other       Medicare pt has received, reviewed, and signed 2nd IM letter informing them of their right to appeal the discharge. Signed copy has been placed on pt bedside chart.   CM: [address] [place],   [phone]

## 2022-03-18 NOTE — PROGRESS NOTES
This RN attempted to call report to twice [phone], received no answer, and was unable to leave a message dt a full mailbox. Will attempt again later.

## 2022-03-18 NOTE — PROGRESS NOTES
[address]  [facility], [address]       GI PROGRESS NOTE  Mary Sky, [facility] office  [phone] NP/PA in-hospital cell phone M-F until 4:30PM  After 5PM or on weekends, please call  for physician on call      NAME: Ascencion Fleischer   :  1934   MRN:  [id number]       Subjective:   He only got one dose of colestipol but reports dramatic improvement in diarrhea. Objective:     VITALS:   Last 24hrs VS reviewed since prior progress note. Most recent are:  Visit Vitals  /64 (BP 1 Location: Left upper arm, BP Patient Position: Sitting)   Pulse 90   Temp 98.5 °F (36.9 °C)   Resp 16   Ht 6' 1\" (1.854 m)   Wt 76.6 kg (168 lb 14 oz)   SpO2 96%   BMI 22.28 kg/m²       PHYSICAL EXAM:              CONST:          Pleasant male lying in bed, no acute distress              NEURO:          Alert and oriented              HEENT:           EOMI, no scleral icterus              LUNGS:           No acute respiratory distress              ABD:                Soft, non distended, no tenderness, no rebound, no guarding.  Midline incision                PSYCH:           Not anxious or agitated    Lab Data Reviewed:     Recent Results (from the past 24 hour(s))   METABOLIC PANEL, BASIC    Collection Time: 22  6:24 PM   Result Value Ref Range    Sodium 140 136 - 145 mmol/L    Potassium 3.3 (L) 3.5 - 5.1 mmol/L    Chloride 109 (H) 97 - 108 mmol/L    CO2 29 21 - 32 mmol/L    Anion gap 2 (L) 5 - 15 mmol/L    Glucose 122 (H) 65 - 100 mg/dL    BUN 5 (L) 6 - 20 MG/DL    Creatinine 0.71 0.70 - 1.30 MG/DL    BUN/Creatinine ratio 7 (L) 12 - 20      GFR est AA >60 >60 ml/min/1.73m2    GFR est non-AA >60 >60 ml/min/1.73m2    Calcium 7.5 (L) 8.5 - 10.1 MG/DL   GLUCOSE, POC    Collection Time: 22  1:12 AM   Result Value Ref Range    Glucose (POC) 106 65 - 117 mg/dL    Performed by Mirella aguilarer RN    METABOLIC PANEL, BASIC    Collection Time: 22 2:28 AM   Result Value Ref Range    Sodium 142 136 - 145 mmol/L    Potassium 3.4 (L) 3.5 - 5.1 mmol/L    Chloride 110 (H) 97 - 108 mmol/L    CO2 26 21 - 32 mmol/L    Anion gap 6 5 - 15 mmol/L    Glucose 94 65 - 100 mg/dL    BUN 5 (L) 6 - 20 MG/DL    Creatinine 0.61 (L) 0.70 - 1.30 MG/DL    BUN/Creatinine ratio 8 (L) 12 - 20      GFR est AA >60 >60 ml/min/1.73m2    GFR est non-AA >60 >60 ml/min/1.73m2    Calcium 7.5 (L) 8.5 - 10.1 MG/DL   CBC W/O DIFF    Collection Time: 03/18/22  2:28 AM   Result Value Ref Range    WBC 9.1 4.1 - 11.1 K/uL    RBC 2.89 (L) 4.10 - 5.70 M/uL    HGB 8.8 (L) 12.1 - 17.0 g/dL    HCT 28.5 (L) 36.6 - 50.3 %    MCV 98.6 80.0 - 99.0 FL    MCH 30.4 26.0 - 34.0 PG    MCHC 30.9 30.0 - 36.5 g/dL    RDW 17.5 (H) 11.5 - 14.5 %    PLATELET 030 441 - 849 K/uL    MPV 10.7 8.9 - 12.9 FL    NRBC 0.0 0  WBC    ABSOLUTE NRBC 0.00 0.00 - 0.01 K/uL   GLUCOSE, POC    Collection Time: 03/18/22  6:18 AM   Result Value Ref Range    Glucose (POC) 92 65 - 117 mg/dL    Performed by Maddi Mauro        Assessment:   · Diarrhea: CT abdomen/pelvis without contrast (3/9/22): body wall edema and small volume fluid around the liver and in the right paracolic gutter; small bilateral pleural effusions; no evidence of drainable fluid collection or hematoma. History of C difficile in 2014. Negative enteric bacteria panel and C difficile (3/14/22); ova & parasites negative .     · Small bowel obstruction due to invasive colon adenocarcinoma: status post right hemicolectomy on 2/24/22  · Midline wound dehiscence with drainage - cultures with VRE and Candida tropicalis  · Peripheral vascular disease with gangrene of right second toe: status post right above-knee popliteal to proximal peroneal artery bypass and amputation of right second toe on 3/7/22  · Acute on chronic anemia  · Multiple myeloma  · Diabetes mellitus  · Left arm swelling and edema  · History of COVID pneumonia  · Hypertension     Patient Active Problem List   Diagnosis Code    HTN (hypertension) I5    H/O Carotid Stenosis I65.29    GERD (gastroesophageal reflux disease) K21.9    Pre-diabetes R73.03    Sinus tachycardia R00.0    H/O Heavy Alcohol Use     Vitamin B12 deficiency E53.8    Hyperhomocysteinemia (HCC) E72.11    Mild Allergic Rhinitis J30.9    History of skin cancer Z85.828    S/P AAA repair 2/9/2012 Z98.890, Z86.79    Hypercalcemia E83.52    Multiple myeloma ([facility] C90.00    Left inguinal hernia K40.90    Right inguinal hernia K40.90    Microalbuminuria R80.9    Chronic diarrhea K52.9    Aortic stenosis, moderate I35.0    Mild concentric left ventricular hypertrophy (LVH) I51.7    Hyperlipemia, mixed E78.2    Carotid stenosis, asymptomatic, right I65.21    Personal history of atrial fibrillation Z86.79    Cellulitis of right leg L03.115    Failure to thrive (child) R62.51    Falls frequently R29.6    Moderate protein-calorie malnutrition (HCC) E44.0    Acute renal injury ([facility] N17.9    Malignant neoplasm of ascending colon (HCC) C18.2     Plan:   · Continue BID Colestipol   · Will sign off and be available to see again on request      Signed By: Lorie Solano NP     3/18/2022  3:09 PM      GI Attending: Agree with above plan. Discussed with Dr. Gene Macdonald. Will continue colestipol and follow up as an outpatient in 1-2 months. Tom Charles MD

## 2022-03-18 NOTE — PROGRESS NOTES
ID Progress Note  3/18/2022    Subjective:     No discomfort    Review of Systems:            Symptom Y/N Comments   Symptom Y/N Comments   Fever/Chills  n     Chest Pain n       Poor Appetite       Edema        Cough       Abdominal Pain n       Sputum       Joint Pain        SOB/PARKINSON n      Pruritis/Rash        Nausea/vomit n      Tolerating PT/OT        Diarrhea  y     Tolerating Diet        Constipation  n     Other           Could NOT obtain due to:       Objective:     Vitals:   Visit Vitals  /64 (BP 1 Location: Left upper arm, BP Patient Position: Sitting)   Pulse 90   Temp 98.5 °F (36.9 °C)   Resp 16   Ht 6' 1\" (1.854 m)   Wt 76.6 kg (168 lb 14 oz)   SpO2 96%   BMI 22.28 kg/m²        Tmax:  Temp (24hrs), Av.5 °F (36.9 °C), Min:98.3 °F (36.8 °C), Max:98.7 °F (37.1 °C)      PHYSICAL EXAM:  General: Chronically ill appearing. WD, WN. Alert, cooperative, no acute distress    EENT:  EOMI. Anicteric sclerae. Dry mucous membrane  Resp:  CTA bilaterally, no wheezing or rales. No accessory muscle use  CV:  Regular  rhythm,  + edema  GI:  Soft, Non distended, Non tender. +Bowel sounds  Neurologic:  Alert and oriented X 3, normal speech,   Psych:   Fair insight. Not anxious nor agitated  Skin:  No rashes.   No jaundice    Right thigh (3 segments about 10cm long); incision approximate, no erythema, RLE diffuse edema, serous drainage noted from old dressing, no active drainage    Labs:   Lab Results   Component Value Date/Time    WBC 9.1 2022 02:28 AM    HGB 8.8 (L) 2022 02:28 AM    HCT 28.5 (L) 2022 02:28 AM    PLATELET 818  02:28 AM    MCV 98.6 2022 02:28 AM     Lab Results   Component Value Date/Time    Sodium 142 2022 02:28 AM    Potassium 3.4 (L) 2022 02:28 AM    Chloride 110 (H) 2022 02:28 AM    CO2 26 2022 02:28 AM    Anion gap 6 2022 02:28 AM    Glucose 94 2022 02:28 AM    BUN 5 (L) 2022 02:28 AM    Creatinine 0.61 (L) 03/18/2022 02:28 AM    BUN/Creatinine ratio 8 (L) 03/18/2022 02:28 AM    GFR est AA >60 03/18/2022 02:28 AM    GFR est non-AA >60 03/18/2022 02:28 AM    Calcium 7.5 (L) 03/18/2022 02:28 AM    Bilirubin, total 0.6 02/21/2022 03:04 PM    Alk. phosphatase 70 02/21/2022 03:04 PM    Protein, total 7.0 02/21/2022 03:04 PM    Albumin 3.1 (L) 02/21/2022 03:04 PM    Globulin 3.9 02/21/2022 03:04 PM    A-G Ratio 0.8 (L) 02/21/2022 03:04 PM    ALT (SGPT) 20 02/21/2022 03:04 PM         Cultures:   Results     Procedure Component Value Units Date/Time    ENTERIC BACTERIA PANEL, DNA [[id number]] Collected: 03/14/22 1552    Order Status: Completed Specimen: Stool Updated: 03/15/22 1407     Shigella species, DNA Negative        Campylobacter species, DNA Negative        Vibrio species, DNA Negative        Enterotoxigen E Coli, DNA Negative        Shiga toxin producing, DNA Negative        Salmonella species, DNA Negative        P. shigelloides, DNA Negative        Y. enterocolitica, DNA Negative       C. DIFFICILE AG & TOXIN A/B [[id number]] Collected: 03/14/22 1552    Order Status: Completed Specimen: Stool Updated: 03/15/22 1014     [address] ANTIGEN Negative        C. difficile toxin Negative        INTERPRETATION       NEGATIVE FOR TOXIGENIC C. DIFFICILE          OVA & PARASITES, STOOL [[id number]] Collected: 03/14/22 1552    Order Status: Completed Specimen: Feces from Stool Updated: 03/16/22 1236     Source STOOL        Ova & Parasite exam Final Report Below        Comment: (NOTE)  These results were obtained using wet preparation(s) and trichrome  stained smear. This test does not include testing for  Cryptosporidium parvum, Cyclospora, or Microsporidia.   Performed At: [address] [id number]  Nivia Rolle MD [id number]         Neville Listen STAIN [[id number]]  (Abnormal)  (Susceptibility) Collected: 03/09/22 1258    Order Status: Completed Specimen: Wound from Abdomen Updated: 03/13/22 0950     Special Requests: NO SPECIAL REQUESTS        GRAM STAIN NO WBCS SEEN      NO ORGANISMS SEEN        Culture result:       FEW * VANCOMYCIN RESISTANT ENTEROCOCCUS FAECIUM *            RARE MARTÍN TROPICALIS       Susceptibility      Vancomycin resistant enterococcus facium     ERICA     Ampicillin ($) Resistant     Daptomycin ($$$$$) Susceptible  [1]      Linezolid ($$$$$) Susceptible     Vancomycin ($) Resistant                 [1]  Dose Dependent  (SENSITIVITIES PERFORMED BY E-TEST)          Linear View                   CULTURE, BLOOD, PAIRED [[id number]] Collected: 03/09/22 1244    Order Status: Completed Specimen: Blood Updated: 03/14/22 0722     Special Requests: NO SPECIAL REQUESTS        Culture result: NO GROWTH 5 DAYS       CULTURE, URINE [[id number]]  (Abnormal) Collected: 03/09/22 1137    Order Status: Completed Specimen: Urine Updated: 03/11/22 1211     Special Requests: NO SPECIAL REQUESTS        [place] Count --        >100,000  COLONIES/mL       Culture result: CANDIDA TROPICALIS       COVID-19 RAPID TEST [[id number]]  (Abnormal) Collected: 03/05/22 1506    Order Status: Completed Specimen: Nasopharyngeal Updated: 03/05/22 1536     Specimen source Nasopharyngeal        COVID-19 rapid test Detected        Comment: Rapid [employer] ID Now       The specimen is POSITIVE for SARS-CoV-2, the novel coronavirus associated with COVID-19. This test has been authorized by the FDA under an Emergency Use Authorization (EUA) for use by authorized laboratories.         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  ms    Fever work up if temp >= 100.4    chronic Diarrhea  Hx of C-diff  - GI following; C-diff, enteric bacterial panel (-), O & P (-)      Above plan of care discussed and agreed with Dr. Carolyn Chavez to d/w GI team about chronic diarrhea issue. ID team signing off. Please contact us with any questions.     Maribell Griggs NP

## 2022-03-18 NOTE — PROGRESS NOTES
[address] [facility]  Hospitalist Group                                                                                          Hospitalist Progress Note  Sherry Alex MD  Answering service: [phone] OR [phone] from in house phone        Date of Service:  3/18/2022  NAME:  Matthew Alford  :  1934  MRN:  [id number]      Admission Summary:     A 54-year-old man with a past medical history significant for type 2 diabetes, multiple myeloma, hypertension, dyslipidemia, peripheral neuropathy, status post abdominal aortic aneurysm repair in  and COVID-pneumonia.  Patient was recently admitted on 2/15 for management of cellulitis. Presented to the emergency room several days ago treated for diarrhea with Imodium, and now with progressive abdominal pain nausea vomiting. Patient seen in the ED for abdominal pain started 12 to 24 hours ago associated with vomiting and constipation.  The hospitalist team has been consulted to assist general surgery in medical management.   Patient examined at bedside.  Patient is ill-appearing, should not reports health has been declining since Covid diagnosis in 2021.  Patient denies chest pain, dizziness, shortness of breath during examination.  Patient endorses abdominal pain nausea and vomiting.  Productive cough noted on evaluation, patient reports he has had a cough since Covid diagnosis and it is somewhat improved.  NG tube is in place draining dark brown bilious fluid.  Patient reports abdominal distention has improved some since placement of NG.  Patient's son-in-law is at bedside. \"     Interval history / Subjective:   F/u SBO  Diarrhea has improved  Hypokalemia improved  No abd pain, nausea or vomiting     Assessment & Plan:     Small bowel obstruction due to invasive colon adenocarcinoma   -Underwent right hemicolectomy .  Evidence of adenocarcinoma, + lymph nodes  -diet advanced to regular diet   -Drainage from mid abdominal incision, noted on 3/9/22, ostomy placed for drainage collection,    -CT abd/pelvis show no hematoma or drain able fluid  -wound cx grow vancomycin resistant enterococcus faecium and rare candida tropicalis,  -on iv daptomycin and eraxis, to switch to oral zyvox and diflucan on discharge  -leukcytosis improving  -blood cx no growth  -Oncology consulted 3/1, recommend outpatient follow up  -surgical service and ID on board      PVD  Right second toe dry gangrene  Right lower extremity arterial occlusion   -MRI with evidence of osteomyelitis. More consistent with dry gangrene.   -Antibiotics discontinued on 3/2  -MARYURI with moderate to severe arterial occlusion  -Right arteriogram 2/28 with popliteal occlusion, unable to cross occlusion.    -seen by podiatry and signed off   -s/p right popliteal bypass and second Right great toe amputation   -right thigh incision draining very little clear fluid, he has also bilateral pretibial and pedal edema, on Lasix 40 mg po daily  -pain controlled  -continue PT   -vascular surgeon on board    Chronic diarrhea   -he said he has having diarrhea since 2012  -rectal tube discontinued on 3/14  -stool for enteric bacteria and C diff negative  -Spoke to GI, Dr Tangela Valerio cleared for discharge      Hypotension ,resolved  -pt with drainage from surgical incision, hematuria   -s/p prbc, abx, pro ca lowl for risks of SIRS/sepsis  -BP normal, IVF discontinued     Acute on chronic  anemia   -symptomatic with hypotension, hematuria  -Hgb 6.9, s/p 1 unit  PRBC Hgb improved  9.7  -venofer x 1 dose,  -monitor H/H     Gross hematuria noted in rosas post op surgery   -likely due to rosas placement with enlarged porstate,   -gross hematuria has resolved, rosas out on 3/14, failed void and rosas placed back, need outpatient urologist after discharge  -no flomax due to hypotension,   -MRI w/c for further eval once stable      Acute metabolic encephalopathy  -resolved, patient is conscious and alert, well oriented  -Multifactorial in setting of recent surgery, narcotics, hospitalization, infection  -limit narcotics as able  -continue neuro check and supportive care     T2DM  -A1c 6.3   -continue sliding scale and monitor finger stick glucose      Left arm swelling and edema and bilateral lower ext edema  -doppler neg for DVT  -elevate the arm, improving  -continue lasix 40 mg po q daily     Hx of COVID 19 pneumonia    -CXR with scattered opacities, concerning for PNA.    -s/p treatment  -improved, SpO2 % on RA     Peripheral neuropathy  -stable, resume pregabalin.     HTN  -BP improved, on lasix , monitor BP, may consider norvasc if BP trending up    Failure to thrive, Protein calorie malnutrition  -continue nutritional supplement  - Nutrition consulted      Recurrent falls  -continue PT/OT  -fall precaution    Hypokalemia:  -replace with iv kcl and repeat in am    Hypomagnesemia  -improved    Insomnia  -continue PRN low-dose trazodone           Code status: DNR  Prophylaxis: SCD    Plan: Discharge today    Care Plan discussed with: Patient, Nurse and CM  Anticipated Disposition: Inpatient Rehab, in 24-48 hrs  I called his son in law, Juan Johnson at , updated him and answered his questions     Hospital Problems  Date Reviewed: 3/17/2022          Codes Class Noted POA    * (Principal) Malignant neoplasm of ascending colon ([facility] [address] ICD-10-CM: C18.2  ICD-9-CM: 153.6  2/28/2022 Unknown        Failure to thrive (child) ICD-10-CM: R62.51  ICD-9-CM: 783.41  2/21/2022 Unknown        Falls frequently ICD-10-CM: R29.6  ICD-9-CM: V15.88  2/21/2022 Unknown        Moderate protein-calorie malnutrition ([facility] [address] ICD-10-CM: E44.0  ICD-9-CM: 263.0  2/21/2022 Unknown        Acute renal injury ([facility] [address] ICD-10-CM: N17.9  ICD-9-CM: 584.9  2/21/2022 Unknown        Cellulitis of right leg ICD-10-CM: L03.115  ICD-9-CM: 682.6  2/12/2022 Yes        Aortic stenosis, moderate ICD-10-CM: I35.0  ICD-9-CM: 424.1  4/10/2018 Yes Multiple myeloma ([facility] [address] ICD-10-CM: C90.00  ICD-9-CM: 203.00  12/27/2012 Yes        Pre-diabetes ICD-10-CM: R73.03  ICD-9-CM: 790.29  9/7/2010 Yes    Overview Addendum 11/1/2011  9:29 AM by Trini Ramirez MD     2005; Optho Dr Bryn Rucker             HTN (hypertension) ICD-10-CM: I10  ICD-9-CM: 401.9  4/6/2010 Yes    Overview Signed 9/7/2010  8:55 AM by Trini Ramirez MD     1990             H/O Carotid Stenosis ICD-10-CM: I65.29  ICD-9-CM: 433.10  4/6/2010 Yes    Overview Addendum 9/7/2010  8:58 AM by Trini Ramirez MD     Oct 2003; 50%left subclavian and 50-70% SHEILA                       Vital Signs:    Last 24hrs VS reviewed since prior progress note. Most recent are:  Visit Vitals  /64 (BP 1 Location: Left upper arm, BP Patient Position: Sitting)   Pulse 90   Temp 98.5 °F (36.9 °C)   Resp 16   Ht 6' 1\" (1.854 m)   Wt 76.6 kg (168 lb 14 oz)   SpO2 96%   BMI 22.28 kg/m²         Intake/Output Summary (Last 24 hours) at 3/18/2022 1332  Last data filed at 3/18/2022 3868  Gross per 24 hour   Intake --   Output 1850 ml   Net -1850 ml        Physical Examination:     I had a face to face encounter with this patient and independently examined them on 3/18/2022 as outlined below:          Constitutional:  No acute distress, cooperative, pleasant    ENT:  Oral mucosa moist, oropharynx benign. Resp:  CTA bilaterally. No wheezing/rhonchi/rales. No accessory muscle use. CV:  Regular rhythm, normal rate, no murmurs, gallops, rubs    GI:  Mid abdomen incision open wound with drainage, collecting ostomy in place, abdomen soft, non distended, non tender. normoactive bowel sounds, no hepatosplenomegaly     Musculoskeletal:  Right thigh dressed, medial thigh staples stable, right foot dressed    Neurologic:  Moves all extremities.   AAOx3, CN II-XII reviewed            Data Review:    Review and/or order of clinical lab test  Review and/or order of tests in the radiology section of CPT  Review and/or order of tests in the medicine section of [facility]      Labs:     Recent Labs     03/18/22 0228 03/17/22 0058   WBC 9.1 8.7   HGB 8.8* 8.6*   HCT 28.5* 27.6*    199     Recent Labs     03/18/22 0228 03/17/22  1824 03/17/22 0058 03/16/22  0425 03/16/22  0425    140 142   < > 145   K 3.4* 3.3* 2.6*   < > 2.5*   * 109* 110*   < > 113*   CO2 26 29 28   < > 29   BUN 5* 5* 6   < > 6   CREA 0.61* 0.71 0.67*   < > 0.60*   GLU 94 122* 106*   < > 85   CA 7.5* 7.5* 7.2*   < > 7.2*   MG  --   --  1.8  --  1.6    < > = values in this interval not displayed. No results for input(s): ALT, AP, TBIL, TBILI, TP, ALB, GLOB, GGT, AML, LPSE in the last 72 hours. No lab exists for component: SGOT, GPT, AMYP, HLPSE  No results for input(s): INR, PTP, APTT, INREXT, INREXT in the last 72 hours. No results for input(s): FE, TIBC, PSAT, FERR in the last 72 hours. Lab Results   Component Value Date/Time    Folate 81.7 (H) 02/13/2022 02:38 AM      No results for input(s): PH, PCO2, PO2 in the last 72 hours. No results for input(s): CPK, CKNDX, TROIQ in the last 72 hours.     No lab exists for component: CPKMB  Lab Results   Component Value Date/Time    Cholesterol, total 193 03/23/2021 11:18 AM    HDL Cholesterol 83 03/23/2021 11:18 AM    LDL, calculated 84.2 03/23/2021 11:18 AM    Triglyceride 129 03/23/2021 11:18 AM    CHOL/HDL Ratio 2.3 03/23/2021 11:18 AM     Lab Results   Component Value Date/Time    Glucose (POC) 92 03/18/2022 06:18 AM    Glucose (POC) 106 03/18/2022 01:12 AM    Glucose (POC) 97 03/17/2022 12:22 PM    Glucose (POC) 99 03/17/2022 06:49 AM    Glucose (POC) 108 03/17/2022 12:51 AM     Lab Results   Component Value Date/Time    Color DARK YELLOW 01/23/2014 06:54 PM    Appearance CLOUDY (A) 01/23/2014 06:54 PM    Specific gravity 1.025 01/23/2014 06:54 PM    pH (UA) 5.5 01/23/2014 06:54 PM    Protein 100 (A) 01/23/2014 06:54 PM    Glucose 250 (A) 01/23/2014 06:54 PM    Ketone TRACE (A) 01/23/2014 06:54 PM    Bilirubin LARGE (A) 01/23/2014 06:54 PM    Urobilinogen 1.0 01/23/2014 06:54 PM    Nitrites NEGATIVE  01/23/2014 06:54 PM    Leukocyte Esterase NEGATIVE  01/23/2014 06:54 PM    Epithelial cells FEW 01/23/2014 06:54 PM    Bacteria 3+ (A) 01/23/2014 06:54 PM    WBC 0-4 01/23/2014 06:54 PM    RBC 5-10 01/23/2014 06:54 PM         Medications Reviewed:     Current Facility-Administered Medications   Medication Dose Route Frequency    potassium chloride SR (KLOR-CON 10) tablet 40 mEq  40 mEq Oral BID    colestipoL (COLESTID) tablet 2 g  2 g Oral BID    L.acidophilus-paracasei-S.thermophil-bifidobacter (RISAQUAD) 8 billion cell capsule  1 Capsule Oral DAILY    tamsulosin (FLOMAX) capsule 0.4 mg  0.4 mg Oral DAILY    furosemide (LASIX) tablet 40 mg  40 mg Oral DAILY    anidulafungin (ERAXIS) 100 mg in 0.9% sodium chloride 130 mL IVPB  100 mg IntraVENous Q24H    DAPTOmycin (CUBICIN) 300 mg in 0.9% sodium chloride 50 mL IVPB RF formulation  300 mg IntraVENous Q24H    finasteride (PROSCAR) tablet 5 mg  5 mg Oral DAILY    0.9% sodium chloride infusion 250 mL  250 mL IntraVENous PRN    diphenhydrAMINE (BENADRYL) capsule 25 mg  25 mg Oral Q6H PRN    traZODone (DESYREL) tablet 50 mg  50 mg Oral QHS PRN    pantoprazole (PROTONIX) tablet 40 mg  40 mg Oral ACB    acetaminophen (TYLENOL) tablet 650 mg  650 mg Oral TID    oxyCODONE IR (ROXICODONE) tablet 2.5-5 mg  2.5-5 mg Oral Q4H PRN    traMADoL (ULTRAM) tablet 50 mg  50 mg Oral Q6H PRN    zinc oxide-cod liver oil (DESITIN) 40 % paste   Topical PRN    [Held by provider] heparin (porcine) injection 5,000 Units  5,000 Units SubCUTAneous Q8H    mupirocin (BACTROBAN) 2 % ointment   Topical DAILY    phenol throat spray (CHLORASEPTIC) 1 Spray  1 Spray Oral PRN    HYDROmorphone (DILAUDID) injection 0.5-1 mg  0.5-1 mg IntraVENous Q2H PRN    ondansetron (ZOFRAN) injection 4 mg  4 mg IntraVENous Q4H PRN    acetaminophen (TYLENOL) tablet 650 mg  650 mg Oral Q6H PRN    hydrALAZINE (APRESOLINE) 20 mg/mL injection 20 mg  20 mg IntraVENous Q6H PRN    glucose chewable tablet 16 g  4 Tablet Oral PRN    dextrose 10 % infusion 0-250 mL  0-250 mL IntraVENous PRN    glucagon (GLUCAGEN) injection 1 mg  1 mg IntraMUSCular PRN    [Held by provider] aspirin delayed-release tablet 81 mg  81 mg Oral QHS    pregabalin (LYRICA) capsule 50 mg  50 mg Oral BID    albuterol (PROVENTIL HFA, VENTOLIN HFA, PROAIR HFA) inhaler 1 Puff  1 Puff Inhalation Q6H PRN     ______________________________________________________________________  EXPECTED LENGTH OF STAY: 3d 4h  ACTUAL LENGTH OF STAY:          Dulce Hanley MD

## 2022-03-18 NOTE — PROGRESS NOTES
Problem: Pressure Injury - Risk of  Goal: *Prevention of pressure injury  Description: Document Christiano Scale and appropriate interventions in the flowsheet. Outcome: Progressing Towards Goal  Note: Pressure Injury Interventions:  Sensory Interventions: Assess changes in LOC    Moisture Interventions: Check for incontinence Q2 hours and as needed,Absorbent underpads,Apply protective barrier, creams and emollients    Activity Interventions: Increase time out of bed    Mobility Interventions: HOB 30 degrees or less    Nutrition Interventions: Document food/fluid/supplement intake    Friction and Shear Interventions: HOB 30 degrees or less       Problem: Falls - Risk of  Goal: *Absence of Falls  Description: Document Al Fall Risk and appropriate interventions in the flowsheet.   Outcome: Progressing Towards Goal  Note: Fall Risk Interventions:  Mobility Interventions: Patient to call before getting OOB    Mentation Interventions: Adequate sleep, hydration, pain control,Bed/chair exit alarm    Medication Interventions: Patient to call before getting OOB    Elimination Interventions: Call light in reach    History of Falls Interventions: Bed/chair exit alarm

## 2022-03-19 PROBLEM — E78.2 HYPERLIPEMIA, MIXED: Status: ACTIVE | Noted: 2018-09-27

## 2022-03-19 PROBLEM — I35.0 AORTIC STENOSIS, MODERATE: Status: ACTIVE | Noted: 2018-04-10

## 2022-03-19 PROBLEM — K52.9 CHRONIC DIARRHEA: Status: ACTIVE | Noted: 2017-09-29

## 2022-03-20 PROBLEM — R29.6 FALLS FREQUENTLY: Status: ACTIVE | Noted: 2022-01-01

## 2022-03-20 PROBLEM — L03.115 CELLULITIS OF RIGHT LEG: Status: ACTIVE | Noted: 2022-01-01

## 2022-03-20 PROBLEM — Z86.79 PERSONAL HISTORY OF ATRIAL FIBRILLATION: Status: ACTIVE | Noted: 2020-11-09

## 2022-03-24 NOTE — DISCHARGE SUMMARY
Discharge Summary       PATIENT ID: Vianca Montoya  MRN: [id number]   YOB: 1934    DATE OF ADMISSION: 2/21/2022  1:18 PM    DATE OF DISCHARGE: 3/18/2022   PRIMARY CARE PROVIDER: Venice Ragsdale NP     ATTENDING PHYSICIAN: Dr Jaclyn Beatty  DISCHARGING PROVIDER: Jaclyn Beatty MD    To contact this individual call [phone] and ask the  to page. If unavailable ask to be transferred the Adult Hospitalist Department. CONSULTATIONS: IP CONSULT TO HOSPITALIST  IP CONSULT TO PODIATRY  IP CONSULT TO UROLOGY  IP CONSULT TO INFECTIOUS DISEASES  IP CONSULT TO GASTROENTEROLOGY  IP CONSULT TO VASCULAR SURGERY    PROCEDURES/SURGERIES: Procedure(s):  RIGHT POPLITEAL TO PERINEAL  BYPASS with vein AND RIGHT SECOND TOE AMPUTATION    DISCHARGE DIAGNOSES:   Small bowel obstruction due to invasive colon adenocarcinoma   -Underwent right hemicolectomy 2/24. 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 More consistent with dry gangrene.   -Antibiotics discontinued on 3/2  -MARYURI with moderate to severe arterial occlusion  -Right arteriogram 2/28 with popliteal occlusion, unable to cross occlusion.    -seen by podiatry and signed off   -s/p right popliteal bypass and second Right great toe amputation   -right thigh incision draining very little clear fluid, he has also bilateral pretibial and pedal edema, on Lasix 40 mg po daily  -pain controlled  -continue PT   -vascular surgeon on board    Chronic diarrhea   -he said he has having diarrhea since 2012  -rectal tube discontinued on 3/14  -stool for enteric bacteria and C diff negative  -Spoke to GI, Dr Yesica Matos cleared for discharge      Hypotension ,resolved  -pt with drainage from surgical incision, hematuria   -s/p prbc, abx, pro ca lowl for risks of SIRS/sepsis  -BP normal, IVF discontinued     Acute on chronic  anemia   -symptomatic with hypotension, hematuria  -Hgb 6.9, s/p 1 unit  PRBC Hgb improved  9.7  -venofer x 1 dose,  -monitor H/H     Gross hematuria noted in rosas post op surgery   -likely due to rosas placement with enlarged porstate,   -gross hematuria has resolved, rosas out on 3/14, failed void and rosas placed back, need outpatient urologist after discharge  -no flomax due to hypotension,   -MRI w/c for further eval once stable      Acute metabolic encephalopathy  -resolved, patient is conscious and alert, well oriented  -Multifactorial in setting of recent surgery, narcotics, hospitalization, infection  -limit narcotics as able  -continue neuro check and supportive care     T2DM  -A1c 6.3   -continue sliding scale and monitor finger stick glucose      Left arm swelling and edema and bilateral lower ext edema  -doppler neg for DVT  -elevate the arm, improving  -continue lasix 40 mg po q daily     Hx of COVID 19 pneumonia    -CXR with scattered opacities, concerning for PNA.    -s/p treatment  -improved, SpO2 % on RA     Peripheral neuropathy  -stable, resume pregabalin.     HTN  -BP improved, on lasix , monitor BP, may consider norvasc if BP trending up    Failure to thrive, Protein calorie malnutrition  -continue nutritional supplement  - Nutrition consulted      Recurrent falls  -continue PT/OT  -fall precaution    Hypokalemia:  -replace with iv kcl and repeat in am    Hypomagnesemia  -improved    Insomnia  -continue PRN low-dose trazodone PENDING TEST RESULTS:   At the time of discharge the following test results are still pending: none    FOLLOW UP APPOINTMENTS:    Follow-up Information     Follow up With Specialties Details [facility]   Follow up on 5/4/22 at 10:15 AM with Dr. Ann Libman for urine check. Call to re-schedule if needed. [phone] [address]    Linda Edmonds NP Nurse Practitioner In 1 week  [address]  [phone]      Dasia Petersen MD Vascular Surgery In 3 weeks    [facility] [address]      Arturo MD [facility] In 2 weeks  [facility] [address] [facility]      Valarie Yang MD Hematology and Oncology, Hematology, Oncology In 2 weeks  [id number] [phone]             ADDITIONAL CARE RECOMMENDATIONS:   Follow up with PMD  Follow up with GI, oncology, Vascular surgery , urology    DIET: Cardiac Diet    ACTIVITY: Activity as tolerated      DISCHARGE MEDICATIONS:   See Medication Reconciliation Form      NOTIFY YOUR PHYSICIAN FOR ANY OF THE FOLLOWING:   Fever over 101 degrees for 24 hours. Chest pain, shortness of breath, fever, chills, nausea, vomiting, diarrhea, change in mentation, falling, weakness, bleeding. Severe pain or pain not relieved by medications. Or, any other signs or symptoms that you may have questions about.     DISPOSITION:    Home With:   OT  PT  HH  RN      x SNF/Inpatient Rehab/LTAC    Independent/assisted living    Hospice    Other:       PATIENT CONDITION AT DISCHARGE:     Functional status    Poor    x Deconditioned     Independent      Cognition    x Lucid     Forgetful     Dementia      Catheters/lines (plus indication)    Draper     PICC     PEG    x None      Code status     Full code    x DNR      PHYSICAL EXAMINATION AT DISCHARGE:  Please see progres snote        CHRONIC MEDICAL DIAGNOSES:  Problem List as of 3/18/2022 Date Reviewed: 3/17/2022          Codes Class Noted - Resolved    Failure to thrive (child) ICD-10-CM: R62.51  ICD-9-CM: 783.41  2/21/2022 - Present        Falls frequently ICD-10-CM: R29.6  ICD-9-CM: V15.88  2/21/2022 - Present        Moderate protein-calorie malnutrition ([facility] ICD-10-CM: E44.0  ICD-9-CM: 263.0  2/21/2022 - Present        Acute renal injury ([facility] ICD-10-CM: N17.9  ICD-9-CM: 584.9  2/21/2022 - Present        Cellulitis of right leg ICD-10-CM: L03.115  ICD-9-CM: 682.6  2/12/2022 - Present        Personal history of atrial fibrillation ICD-10-CM: Z86.79  ICD-9-CM: V12.59  11/9/2020 - Present        Carotid stenosis, asymptomatic, right ICD-10-CM: I65.21  ICD-9-CM: 433.10  5/29/2020 - Present        Hyperlipemia, mixed ICD-10-CM: E78.2  ICD-9-CM: 272.2  9/27/2018 - Present        Aortic stenosis, moderate ICD-10-CM: I35.0  ICD-9-CM: 424.1  4/10/2018 - Present        Mild concentric left ventricular hypertrophy (LVH) ICD-10-CM: I51.7  ICD-9-CM: 429.3  4/10/2018 - Present        Chronic diarrhea ICD-10-CM: K52.9  ICD-9-CM: 787.91  9/29/2017 - Present        Microalbuminuria ICD-10-CM: R80.9  ICD-9-CM: 791.0  12/10/2015 - Present        Right inguinal hernia ICD-10-CM: K40.90  ICD-9-CM: 550.90  11/2/2015 - Present        Left inguinal hernia ICD-10-CM: K40.90  ICD-9-CM: 550.90  9/14/2015 - Present        Multiple myeloma ([facility] ICD-10-CM: C90.00  ICD-9-CM: 203.00  12/27/2012 - Present        Hypercalcemia ICD-10-CM: G30.06  ICD-9-CM: 275.42  4/25/2012 - Present    Overview Signed 4/25/2012  5:49 PM by Catie Marie MD     2012, PTH normal, abnormal protein electrophoresis, referred to Hem-Onc             S/P AAA repair 2/9/2012 ICD-10-CM: S76.751, Z86.79  ICD-9-CM: V45.89  2/22/2012 - Present    Overview Signed 2/22/2012  1:38 PM by MD Dr Akhil Guadalupe             Pre-diabetes ICD-10-CM: R73.03  ICD-9-CM: 790.29  9/7/2010 - Present    Overview Addendum 11/1/2011  9:29 AM by Brie Proctor MD     2005;  Optho Dr Feliz Coto             Sinus tachycardia ICD-10-CM: R00.0  ICD-9-CM: 427.89  9/7/2010 - Present        H/O Heavy Alcohol Use ICD-9-CM: [place]  9/7/2010 - Present        Vitamin B12 deficiency ICD-10-CM: E53.8  ICD-9-CM: 266.2  9/7/2010 - Present        Hyperhomocysteinemia ([facility] ICD-10-CM: E72.11  ICD-9-CM: 270.4  9/7/2010 - Present        Mild Allergic Rhinitis ICD-10-CM: J30.9  ICD-9-CM: 477.9  9/7/2010 - Present        History of skin cancer ICD-10-CM: Z85.828  ICD-9-CM: V10.83  9/7/2010 - Present    Overview Signed 9/7/2010  9:03 AM by Brie Proctor MD     Followed by Valerio Joshi             HTN (hypertension) ICD-10-CM: I10  ICD-9-CM: 401.9  4/6/2010 - Present    Overview Signed 9/7/2010  8:55 AM by Brie Proctor MD     1990             H/O Carotid Stenosis ICD-10-CM: I65.29  ICD-9-CM: 433.10  4/6/2010 - Present    Overview Addendum 9/7/2010  8:58 AM by Brie Proctor MD     Oct 2003; 50%left subclavian and 50-70% SHEILA              GERD (gastroesophageal reflux disease) ICD-10-CM: K21.9  ICD-9-CM: 530.81  4/6/2010 - Present        RESOLVED: Intestinal adhesions with complete obstruction ([facility] ICD-10-CM: K56.52  ICD-9-CM: 560.81  2/21/2022 - 2/28/2022        RESOLVED: Intestinal obstruction ([facility] ICD-10-CM: A64.855  ICD-9-CM: 560.9  2/21/2022 - 2/24/2022        RESOLVED: Myeloma ([facility] ICD-10-CM: C90.00  ICD-9-CM: 203.00  1/6/2015 - 6/10/2016        RESOLVED: Gastrointestinal bleeding ICD-10-CM: K92.2  ICD-9-CM: 578.9  12/26/2014 - 2/28/2018        RESOLVED: Gall stone pancreatitis ICD-10-CM: K85.10  ICD-9-CM: 577.0, 574.20  1/23/2014 - 2/28/2018        RESOLVED: Acute cholecystitis ICD-10-CM: K81.0  ICD-9-CM: 575.0  1/23/2014 - 2/28/2018        RESOLVED: Abnormal serum protein electrophoresis ICD-10-CM: R77.8  ICD-9-CM: 790.99  4/25/2012 - 2/28/2018    Overview Signed 4/25/2012 5:48 PM by Zachary Ibanez MD     4/2012, referred to Hem-Onc             RESOLVED: Atrial fibrillation 1/2012 ICD-10-CM: I48.91  ICD-9-CM: 427.31  2/22/2012 - 11/9/2020    Overview Signed 2/22/2012  1:33 PM by Zachary Ibanez MD     Cardio Dr Turner Rom: AAA (abdominal aortic aneurysm) ([facility] ICD-10-CM: I71.4  ICD-9-CM: 441.4  1/6/2012 - 9/26/2018    Overview Signed 1/6/2012  2:07 PM by Zachary Ibanez MD     Detected on plain films back xray 1/2012; Ct scheduled             RESOLVED: Anemia ICD-10-CM: D64.9  ICD-9-CM: 285.9  1/5/2012 - 2/28/2018        RESOLVED: Hypertriglyceridemia ICD-10-CM: E78.1  ICD-9-CM: 272.1  9/7/2010 - 9/27/2018    Overview Signed 9/7/2010  8:55 AM by Zachary Ibanez MD     2003             RESOLVED: Bilateral Carotid Bruits, L>R ICD-10-CM: R09.89  ICD-9-CM: 785.9  9/7/2010 - 2/28/2018    Overview Signed 9/7/2010  8:57 AM by Zachary Ibanez MD     10/03             RESOLVED: Anemia due to GI bleed 2007 ICD-10-CM: D64.9  ICD-9-CM: 285.9  4/6/2010 - 2/28/2018        RESOLVED: GI bleed, duodenitis 2007 ICD-10-CM: K92.2  ICD-9-CM: 578.9  4/6/2010 - 2/28/2018    Overview Addendum 9/7/2010  9:01 AM by Zachary Ibanez MD     8/07 Duodenitis                 * (Principal) Malignant neoplasm of ascending colon ([facility] ICD-10-CM: C18.2  ICD-9-CM: 153.6  2/28/2022 - Present              Greater than 41 minutes were spent with the patient on counseling and coordination of care    Signed:   Parag Palm MD  3/23/2022  11:31 PM

## 2022-03-25 NOTE — ED TRIAGE NOTES
Pt arrived from via EMS with CC low potassium level of 2.4 and of diarrhea. Pt stated he has had diarrhea since 2012, but he does not believe the facility is giving him imodium.      Pt denies SOB, CP, fever/chills, N/V.

## 2022-03-26 PROBLEM — E87.6 HYPOKALEMIA: Status: ACTIVE | Noted: 2022-01-01

## 2022-03-26 PROBLEM — R19.7 DIARRHEA: Status: ACTIVE | Noted: 2022-01-01

## 2022-03-26 NOTE — PROGRESS NOTES
[facility] Adult  Hospitalist Group                                                                                          Hospitalist Progress Note  Louis Caldwell MD  Answering service: [phone] from in house phone        Date of Service:  3/26/2022  NAME:  Nam Mcduffie  :  1934  MRN:  [id number]      Admission Summary:   Nam Mcduffie is a 80 y.o. male medical history of AAA s/p repair in , colon adenocarcinoma, multiple myeloma, atrial fibrillation, GERD, dyslipidemia, and history of melanoma who presents with NEFTALY, and diarrhea.     He was recently hospitalized from -3/18 for SBO 2/2 obstructive colon adenocarcinoma. He is s/p R hemicolectomy. Post-operative course was complicated by wound infection requiring course of IV to oral abx. He was pending OP f/u with oncology. He also has PAD, and is s/p R popliteal bypass. He was dishcarged on lasix  Due to LE edema that was causing drainage.     In the ED, he was hemodynamically stable. Labs were significant for potassium of 3.0, creatinine 1.79 (b/l 0.6), ? Hypocalcemia. EKG with first-degree AV block, nonspecific ST changes, and prolonged QT. He is receiving rehab at [facility], and is eager to return. Interval history / Subjective:   Patient reporting numerous bouts of diarrhea  Reviewed his vitals, labs, and care plan with patient  Examined his foot wound/surgery site     Assessment & Plan:     #NEFTALY on CKD secondary to likely dehydration  Creatinine 1.79 (b/l 0.6), IV hydration, and hold home Lasix for now    Hypokalemia- ?  Due to diuretics- Replete potassium PO  Hypomagnesemia- replete PO     #Chronic diarrhea  Patient states that he has had diarrhea since 2012. He currently reports 7 soft bowel movements daily. He had C. difficile checked which was negative at last hospitalization, but has been on antibiotics since this time. Resume colestipol  Check stool culture, and C. difficile.   If negative will start Imodium because he says this has worked for him in the past     #Multiple myeloma  # Colon Adenocarcinoma  Was pending follow-up with Dr. Gina Riggs as an outpatient. Multiple myeloma treatment was on hold due to declining health.     #PVD  Status post popliteal bypass of the right lower extremity, and toe irritation  Resume aspirin    R foot wound and recent surgery- examined wound and recent bleeding from toenail noted    Code status: DNR  Prophylaxis: SCDs  Care Plan discussed with: patient  Anticipated Disposition: [address] Problems  Date Reviewed: 3/17/2022          Codes Class Noted POA    Diarrhea ICD-10-CM: R19.7  ICD-9-CM: 787.91  3/26/2022 Unknown        Hypokalemia ICD-10-CM: E87.6  ICD-9-CM: 276.8  3/26/2022 Unknown                Review of Systems:   A comprehensive review of systems was negative except for that written in the HPI. Vital Signs:    Last 24hrs VS reviewed since prior progress note. Most recent are:  Visit Vitals  BP (!) 165/64 (BP 1 Location: Left upper arm, BP Patient Position: At rest)   Pulse 87   Temp 97.4 °F (36.3 °C)   Resp 19   Ht 6' 1\" (1.854 m)   Wt 62.6 kg (138 lb)   SpO2 100%   BMI 18.21 kg/m²     Patient Vitals for the past 24 hrs:   Temp Pulse Resp BP SpO2   03/26/22 0715 97.4 °F (36.3 °C) 87 19 (!) 165/64 100 %   03/26/22 0700  80      03/26/22 0636  73 21 (!) 135/54 97 %   03/26/22 0456  79 22 (!) 152/76    03/26/22 0251    (!) 140/76    03/26/22 0132  78 16 (!) 127/90 97 %   03/26/22 0119    (!) 154/60    03/25/22 2317  84 16 (!) 158/62 97 %   03/25/22 1921 98.7 °F (37.1 °C) 87 16 (!) 161/80 96 %     No intake or output data in the 24 hours ending 03/26/22 5401     Physical Examination:     I had a face to face encounter with this patient and independently examined them on 3/26/2022 as outlined below:          Constitutional:  No acute distress, cooperative, pleasant    ENT:  Oral mucosa moist, oropharynx benign.     Resp:  CTA bilaterally. No wheezing/rhonchi/rales. No accessory muscle use. CV:  Regular rhythm, normal rate, no murmurs, gallops, rubs    GI:  Soft, non distended, non tender. normoactive bowel sounds, no hepatosplenomegaly     Musculoskeletal:  No edema, warm, 2+ pulses throughout    Neurologic:  Moves all extremities. AAOx3, CN II-XII reviewed  Skin: wound on R foot as below                      Data Review:    Review and/or order of clinical lab test  Review and/or order of tests in the radiology section of CPT  Review and/or order of tests in the medicine section of CPT      Labs:     Recent Labs     03/26/22  0500 03/25/22 1935   WBC 9.1 11.3*   HGB 9.0* 9.5*   HCT 28.0* 29.4*    168     Recent Labs     03/26/22 0500 03/25/22 2255 03/25/22 1935    137 136   K 2.7* 2.5* 3.0*    102 101   CO2 26 29 27   BUN 14 17 18   CREA 1.34* 1.61* 1.79*   * 141* 163*   CA 6.9* 7.0* 7.1*   MG 1.4* 1.4*  --      Recent Labs     03/25/22 2255 03/25/22 1935   ALT 15 15   AP 69 75   TBILI 0.3 0.4   TP 5.8* 5.9*   ALB 2.4* 2.6*   GLOB 3.4 3.3     No results for input(s): INR, PTP, APTT, INREXT in the last 72 hours. No results for input(s): FE, TIBC, PSAT, FERR in the last 72 hours. Lab Results   Component Value Date/Time    Folate 81.7 (H) 02/13/2022 02:38 AM      No results for input(s): PH, PCO2, PO2 in the last 72 hours. No results for input(s): CPK, CKNDX, TROIQ in the last 72 hours.     No lab exists for component: CPKMB  Lab Results   Component Value Date/Time    Cholesterol, total 193 03/23/2021 11:18 AM    HDL Cholesterol 83 03/23/2021 11:18 AM    LDL, calculated 84.2 03/23/2021 11:18 AM    Triglyceride 129 03/23/2021 11:18 AM    CHOL/HDL Ratio 2.3 03/23/2021 11:18 AM     Lab Results   Component Value Date/Time    Glucose (POC) 92 03/18/2022 06:18 AM    Glucose (POC) 106 03/18/2022 01:12 AM    Glucose (POC) 97 03/17/2022 12:22 PM    Glucose (POC) 99 03/17/2022 06:49 AM    Glucose (POC) 108 03/17/2022 12:51 AM     Lab Results   Component Value Date/Time    Color YELLOW/STRAW 03/26/2022 02:29 AM    Appearance TURBID (A) 03/26/2022 02:29 AM    Specific gravity 1.009 03/26/2022 02:29 AM    pH (UA) 6.5 03/26/2022 02:29 AM    Protein 100 (A) 03/26/2022 02:29 AM    Glucose Negative 03/26/2022 02:29 AM    Ketone Negative 03/26/2022 02:29 AM    Bilirubin Negative 03/26/2022 02:29 AM    Urobilinogen 0.2 03/26/2022 02:29 AM    Nitrites Negative 03/26/2022 02:29 AM    Leukocyte Esterase LARGE (A) 03/26/2022 02:29 AM    Epithelial cells FEW 03/26/2022 02:29 AM    Bacteria 3+ (A) 03/26/2022 02:29 AM    WBC  03/26/2022 02:29 AM    RBC 10-20 03/26/2022 02:29 AM         Medications Reviewed:     Current Facility-Administered Medications   Medication Dose Route Frequency    sodium chloride (NS) flush 5-40 mL  5-40 mL IntraVENous Q8H    sodium chloride (NS) flush 5-40 mL  5-40 mL IntraVENous PRN    acetaminophen (TYLENOL) tablet 650 mg  650 mg Oral Q6H PRN    Or    acetaminophen (TYLENOL) suppository 650 mg  650 mg Rectal Q6H PRN    ondansetron (ZOFRAN ODT) tablet 4 mg  4 mg Oral Q8H PRN    Or    ondansetron (ZOFRAN) injection 4 mg  4 mg IntraVENous Q6H PRN    lactated Ringers infusion  75 mL/hr IntraVENous CONTINUOUS     ______________________________________________________________________  EXPECTED LENGTH OF STAY: - - -  ACTUAL LENGTH OF STAY:          0                 Hebert Espinal MD

## 2022-03-26 NOTE — PROGRESS NOTES
Bedside and Verbal shift change report given to Sikernes Risk Management (oncoming nurse) by Juno Vines RN (offgoing nurse). Report included the following information SBAR, Kardex, Intake/Output, MAR and Recent Results.

## 2022-03-26 NOTE — PROGRESS NOTES
Problem: Risk for Spread of Infection  Goal: Prevent transmission of infectious organism to others  Description: Prevent the transmission of infectious organisms to other patients, staff members, and visitors. 3/26/2022 1122 by Eladia Roman  Outcome: Progressing Towards Goal  3/26/2022 1122 by Eladia Roman  Outcome: Progressing Towards Goal     Problem: Patient Education:  Go to Education Activity  Goal: Patient/Family Education  3/26/2022 1122 by Eladia Roman  Outcome: Progressing Towards Goal  3/26/2022 1122 by Eladia Roman  Outcome: Progressing Towards Goal     Problem: Falls - Risk of  Goal: *Absence of Falls  Description: Document Rosa Treadwell Fall Risk and appropriate interventions in the flowsheet.   3/26/2022 1122 by Eladia Roman  Outcome: Progressing Towards Goal  Note: Fall Risk Interventions:  Mobility Interventions: Communicate number of staff needed for ambulation/transfer,Patient to call before getting OOB,PT Consult for mobility concerns,PT Consult for assist device competence,OT consult for ADLs         Medication Interventions: Assess postural VS orthostatic hypotension,Evaluate medications/consider consulting pharmacy,Patient to call before getting OOB,Teach patient to arise slowly    Elimination Interventions: Call light in reach,Patient to call for help with toileting needs,Stay With Me (per policy),Toilet paper/wipes in reach,Toileting schedule/hourly rounds,Urinal in reach           3/26/2022 1122 by Eladia Roman  Outcome: Progressing Towards Goal  Note: Fall Risk Interventions:  Mobility Interventions: Communicate number of staff needed for ambulation/transfer,Patient to call before getting OOB,PT Consult for mobility concerns,PT Consult for assist device competence,OT consult for ADLs         Medication Interventions: Assess postural VS orthostatic hypotension,Evaluate medications/consider consulting pharmacy,Patient to call before getting OOB,Teach patient to arise slowly    Elimination Interventions: Call light in reach,Patient to call for help with toileting needs,Stay With Me (per policy),Toilet paper/wipes in reach,Toileting schedule/hourly rounds,Urinal in reach              Problem: Patient Education: Go to Patient Education Activity  Goal: Patient/Family Education  3/26/2022 1122 by Anita Bliss  Outcome: Progressing Towards Goal  3/26/2022 1122 by Anita Bliss  Outcome: Progressing Towards Goal     Problem: Pressure Injury - Risk of  Goal: *Prevention of pressure injury  Description: Document Christiano Scale and appropriate interventions in the flowsheet.   Outcome: Progressing Towards Goal  Note: Pressure Injury Interventions:       Moisture Interventions: Absorbent underpads,Limit adult briefs,Minimize layers    Activity Interventions: Increase time out of bed,Pressure redistribution bed/mattress(bed type),PT/OT evaluation    Mobility Interventions: HOB 30 degrees or less,Pressure redistribution bed/mattress (bed type),PT/OT evaluation         Friction and Shear Interventions: HOB 30 degrees or less,Minimize layers                Problem: Patient Education: Go to Patient Education Activity  Goal: Patient/Family Education  Outcome: Progressing Towards Goal

## 2022-03-26 NOTE — ED NOTES
TRANSFER - OUT REPORT:    Verbal report given to [facility] RN on Josr Adrian  being transferred to [facility] for routine progression of care       Report consisted of patients Situation, Background, Assessment and   Recommendations(SBAR). Information from the following report(s) SBAR, ED Summary and MAR was reviewed with the receiving nurse. Lines:   PICC Double Lumen 03/02/22 Right;Brachial (Active)       Peripheral IV 03/25/22 Right Forearm (Active)   Site Assessment Clean, dry, & intact 03/25/22 2309   Phlebitis Assessment 0 03/25/22 2309   Infiltration Assessment 0 03/25/22 2309   Dressing Status Clean, dry, & intact 03/25/22 2309        Opportunity for questions and clarification was provided.       Patient transported with:   [employer]

## 2022-03-26 NOTE — ED PROVIDER NOTES
The history is provided by the patient. Abnormal Lab Results  This is a recurrent problem. The current episode started 3 to 5 hours ago. The problem occurs constantly. The problem has not changed since onset. Pertinent negatives include no chest pain, no abdominal pain, no headaches and no shortness of breath. Nothing aggravates the symptoms. Nothing relieves the symptoms. He has tried nothing for the symptoms. The treatment provided no relief. 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    GERD (gastroesophageal reflux disease) 4/6/2010    GI bleed, duodenitis 2007 4/6/2010    H/O Heavy Alcohol Use 9/7/2010    History of skin cancer 09/07/2010    melanoma back    HTN (hypertension) 4/6/2010    Hypercalcemia 4/25/2012    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/2010    Hypertriglyceridemia 9/7/2010    Left inguinal hernia 9/14/2015    Lipids blood increased 4/6/2010    Microalbuminuria 12/10/2015    Mild Allergic Rhinitis 9/7/2010    Multiple myeloma ([address]     Pre-diabetes 9/7/2010 2005;  Optho Dr Marely Ayala Right inguinal hernia 11/2/2015    S/P AAA repair 2/9/2012 2/22/2012    Sinus tachycardia 9/7/2010    Vitamin B12 deficiency 9/7/2010       Past Surgical History:   Procedure Laterality Date    COLONOSCOPY  8/2007    Normal; Dr Yadira Noe    EGD  8/2007    small hiatal hernia, mild duodenitis; Dr Jules Her HX AAA REPAIR  2/9/2012    Dr Hills No    HX CATARACT REMOVAL      both eyes    HX CHOLECYSTECTOMY 1-29-14    [facility] Dr. Monroe Su    HX HERNIA REPAIR Left 9/24/15    left indirect inguinal by Dr. Maggi Sumner Right 11/2/15    inguinal w/ mesh by Dr. Maggi Sumner Bilateral 10/2015 And 2015    Inguinal    BLUE DOPPLER  3/26/2012    BLUE Echo; + clot         Family History:   Problem Relation Age of Onset    Heart Disease Mother          age 80    [de-identified] Father          brain tumor age 80    Heart Disease Father     Substance Abuse Daughter          cocaine OD age 43 in 200    Other Son          PE/DVT age 43   [address] Other Daughter         alive and well    Anesth Problems Neg Hx        Social History     Socioeconomic History    Marital status:      Spouse name: Not on file    Number of children: 3    Years of education: Not on file    Highest education level: Not on file   Occupational History    Occupation: Retired  from [employer]    Smoking status: Former Smoker     Packs/day: 1.50     Years: 55.00     Pack years: 82.50     Quit date: 2001     Years since quittin.2    Smokeless tobacco: Never Used    Tobacco comment: used to smoke 1 1/2 ppd x ~ 40 yrs   Vaping Use    Vaping Use: Never used   Substance and Sexual Activity    Alcohol use:  Yes     Alcohol/week: 14.0 standard drinks     Types: 14 Glasses of wine per week     Comment: 14    Drug use: No    Sexual activity: Not on file   Other Topics Concern     Service Not Asked    Blood Transfusions Not Asked    Caffeine Concern No     Comment: none    Occupational Exposure Not Asked    Hobby Hazards Not Asked    Sleep Concern Not Asked    Stress Concern Not Asked    Weight Concern No     Comment: down some at present; usually pretty stable in the 180's; his personal goal is 175    Special Diet Yes Comment: very low sugar, low carb, tries to pay attention to sugar grams    Back Care Not Asked    Exercise Yes     Comment: stays active and walks on treadmill 15 minutes 1-2 x a week and is very active in the yard   [employer] Helmet Not Asked   [address] Not Asked    Self-Exams Not Asked   Social History Narrative    Lives with wife, who is developing dementia    Daughter is in the area     Social Determinants of Health     Financial Resource Strain:     Difficulty of Paying Living Expenses: Not on file   Food Insecurity:     Worried About [address] in the Last Year: Not on file    Jenni of Food in the Last Year: Not on file   Transportation Needs:     Lack of Transportation (Medical): Not on file    Lack of Transportation (Non-Medical): Not on file   Physical Activity:     Days of Exercise per Week: Not on file    Minutes of Exercise per Session: Not on file   Stress:     Feeling of Stress : Not on file   Social Connections:     Frequency of Communication with Friends and Family: Not on file    Frequency of Social Gatherings with Friends and Family: Not on file    Attends Zoroastrian Services: Not on file    Active Member of [address] [place] [address] or Organizations: Not on file    Attends Club or Organization Meetings: Not on file    Marital Status: Not on file   Intimate Partner Violence:     Fear of Current or Ex-Partner: Not on file    Emotionally Abused: Not on file    Physically Abused: Not on file    Sexually Abused: Not on file   Housing Stability:     Unable to Pay for Housing in the Last Year: Not on file    Number of [place] in the Last Year: Not on file    Unstable Housing in the Last Year: Not on file         ALLERGIES: Codeine, Contrast agent [iodine], Dexamethasone, Fish oil, Glipizide, Lovenox [enoxaparin], Metformin, Niacin, Norvasc [amlodipine], and Optiray 350 [ioversol]    Review of Systems   Constitutional: Negative for activity change, chills and fever.    HENT: Negative for nosebleeds, sore throat, trouble swallowing and voice change. Eyes: Negative for visual disturbance. Respiratory: Negative for shortness of breath. Cardiovascular: Negative for chest pain and palpitations. Gastrointestinal: Negative for abdominal pain, constipation, diarrhea and nausea. Genitourinary: Negative for difficulty urinating, dysuria, hematuria and urgency. Musculoskeletal: Negative for back pain, neck pain and neck stiffness. Skin: Negative for color change. Allergic/Immunologic: Negative for immunocompromised state. Neurological: Negative for dizziness, seizures, syncope, weakness, light-headedness, numbness and headaches. Psychiatric/Behavioral: Negative for behavioral problems, confusion, hallucinations, self-injury and suicidal ideas. Vitals:    03/25/22 1921   BP: (!) 161/80   Pulse: 87   Resp: 16   Temp: 98.7 °F (37.1 °C)   SpO2: 96%   Weight: 62.6 kg (138 lb)   Height: 6' 1\" (1.854 m)            Physical Exam  Vitals and nursing note reviewed. Constitutional:       General: He is not in acute distress. Appearance: He is well-developed. He is not diaphoretic. HENT:      Head: Atraumatic. Neck:      Trachea: No tracheal deviation. Cardiovascular:      Comments: Warm and well perfused  Pulmonary:      Effort: Pulmonary effort is normal. No respiratory distress. Musculoskeletal:         General: Normal range of motion. Skin:     General: Skin is warm and dry. Neurological:      Mental Status: He is alert. Coordination: Coordination normal.   Psychiatric:         Behavior: Behavior normal.         Thought Content: Thought content normal.         Judgment: Judgment normal.          MDM     This is an 19-year-old male with past medical history, review of systems, physical exam as above, presenting with complaints of abnormal lab results.   Patient states he was informed by his rehab facility he was being transferred to the emergency department for abnormally low potassium. Patient endorses a history of low potassium, states a history of chronic diarrhea, chart review indicates he was recently hospitalized for colon cancer, status post colectomy, as well as popliteal bypass and removal of second digit of the right foot. Patient denies complaints. Lab work indicates mild hypokalemia as well as NEFTALY. Patient states poor fluid intake without rationalization. Discussed with patient will provide p.o. potassium, IV hydration, repeat chemistry as he is reluctant to remain in the hospital for NEFTALY. We will reassess, and make a disposition. Procedures    SIGN OUT:  11:00 PM  Discussed pt's hx, disposition, and available diagnostic and imaging results with Dr. David Nath. Reviewed care plans. Both providers and patient are in agreement with care plan. Dr. Chacorta Taylor is transferring care of the pt to Dr. David Nath at this time.

## 2022-03-26 NOTE — ED NOTES
Perfect Serve Consult for Admission  11:51 PM    ED Room Number: ER27/27  Patient Name and age:  Inocencia Villafuerte 80 y.o.  male  Working Diagnosis:   1. Acute renal insufficiency    2. Hypokalemia    3.  Diarrhea, unspecified type        COVID-19 Suspicion:  no  Sepsis present:  no  Reassessment needed: no  Code Status:  Do Not Resuscitate  Readmission: yes  Isolation Requirements:  no  Recommended Level of Care:  telemetry  Department:[facility] Adult ED - 21

## 2022-03-26 NOTE — ED NOTES
Dressing on right leg soiled. No odor or warmth noted. Dressing removed. Wound cleaned with Skintegrity. Nonstick dressing applied. Covered with ABD pad and tape. Patient tolerated well.

## 2022-03-26 NOTE — H&P
[address] [facility] Adult  [place]  History and Physical    Date of Service:  3/26/2022  Primary Care Provider: Syl Edmonds NP  Source of information: The patient and Chart review    Chief Complaint: Abnormal Lab Results (Low K+) and Diarrhea      History of Presenting Illness:   Karin Ta is a 80 y.o. male medical history of AAA s/p repair in 2012, colon adenocarcinoma, multiple myeloma, atrial fibrillation, GERD, dyslipidemia, and history of melanoma who presents with NEFTALY, and diarrhea. He was recently hospitalized from 2/21-3/18 for SBO 2/2 obstructive colon adenocarcinoma. He is s/p R hemicolectomy. Post-operative course was complicated by wound infection requiring course of IV to oral abx. He was pending OP f/u with oncology. He also has PAD, and is s/p R popliteal bypass. He was dishcarged on lasix  Due to LE edema that was causing drainage. In the ED, he was hemodynamically stable. Labs were significant for potassium of 3.0, creatinine 1.79 (b/l 0.6), ? Hypocalcemia. EKG with first-degree AV block, nonspecific ST changes, and prolonged QT. In the ED, he received 40 mEq p.o. potassium, with an additional 20 mEq ordered, and 1 L normal saline ordered. He is receiving rehab at [facility], and is eager to return. REVIEW OF SYSTEMS:  A comprehensive review of systems was negative except for that written in the History of Present Illness.      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    GERD (gastroesophageal reflux disease) 4/6/2010    GI bleed, duodenitis 2007 4/6/2010    H/O Heavy Alcohol Use 9/7/2010    History of skin cancer 09/07/2010    melanoma back    HTN (hypertension) 4/6/2010    Hypercalcemia 4/25/2012    Hyperhomocysteinemia ([facility] [address] 9/7/2010    Hypertriglyceridemia 9/7/2010    Left inguinal hernia 9/14/2015    Lipids blood increased 4/6/2010    Microalbuminuria 12/10/2015    Mild Allergic Rhinitis 9/7/2010    Multiple myeloma ([facility] [address]     Pre-diabetes 9/7/2010 2005; Optho Dr Marely Ayala Right inguinal hernia 11/2/2015    S/P AAA repair 2/9/2012 2/22/2012    Sinus tachycardia 9/7/2010    Vitamin B12 deficiency 9/7/2010      Past Surgical History:   Procedure Laterality Date    COLONOSCOPY  8/2007    Normal; Dr Jules Her EGD  8/2007    small hiatal hernia, mild duodenitis; Dr Jules Her HX AAA REPAIR  2/9/2012    Dr Hills No    HX CATARACT REMOVAL      both eyes    HX CHOLECYSTECTOMY  1-29-14    [facility]- [facility]- Dr. Buster Riggs Left 9/24/15    left indirect inguinal by Dr. Clayton Palm Right 11/2/15    inguinal w/ mesh by Dr. Clayton Palm Bilateral 10/2015 And 11/2015    Inguinal    BLUE DOPPLER  3/26/2012    BLUE Echo; + clot     Prior to Admission medications    Medication Sig Start Date End Date Taking? Authorizing Provider   furosemide (LASIX) 20 mg tablet Take 2 Tablets by mouth daily. Hold for low blood pressure 3/18/22   Sandra Lord MD   fluconazole (Diflucan) 150 mg tablet Take 1 Tablet by mouth daily for 8 days. FDA advises cautious prescribing of oral fluconazole in pregnancy. 3/18/22 3/26/22  Sandra Lord MD   linezolid (Zyvox) 600 mg tablet Take 1 Tablet by mouth two (2) times a day for 8 days. 3/18/22 3/26/22  Sandra Lord MD   colestipoL (COLESTID) 1 gram tablet Take 2 Tablets by mouth two (2) times a day.  3/18/22   Sandra Lord MD finasteride (PROSCAR) 5 mg tablet Take 1 Tablet by mouth daily. 3/19/22   Dianelys Dawson MD   traZODone (DESYREL) 50 mg tablet Take 1 Tablet by mouth nightly as needed for Sleep. 3/18/22   Dianelys Dawson MD   zinc oxide-cod liver oil (DESITIN) 40 % paste Apply  to affected area as needed for Skin Irritation. 3/18/22   Dianelys Dawson MD   tamsulosin (FLOMAX) 0.4 mg capsule Take 1 Capsule by mouth daily. 3/19/22   Dianelys Dawson MD   pantoprazole (PROTONIX) 40 mg tablet Take 1 Tablet by mouth Daily (before breakfast). 3/19/22   Dianelys Dawson MD   mupirocin Jagruti Revering) 2 % ointment Apply to open wound on toe daily as instructed 2/16/22   Therese Raymond MD   ferrous sulfate 325 mg (65 mg iron) tablet Take 1 Tablet by mouth Daily (before breakfast). Indications: anemia from inadequate iron 2/16/22   Therese Raymond MD   calcium carbonate (TUMS) 200 mg calcium (500 mg) chew Take 1 Tablet by mouth as needed. Other, MD Makayla   potassium chloride (KLOR-CON M10) 10 mEq tablet TAKE 1 TABLET BY MOUTH DAILY WITH LASIX 11/29/21   Cinthia Edmonds NP   folic acid-vit T5-PLG I29 (Folbic) 2.5-25-2 mg tablet Take 1 Tablet by mouth daily. 6/1/21   Cinthia Edmonds NP   pregabalin (LYRICA) 50 mg capsule Take 50 mg by mouth two (2) times a day. 9/16/20   Provider, Historical   aspirin delayed-release 81 mg tablet Take 81 mg by mouth nightly. Provider, Historical   hydroxypropyl methylcellulose (GENTEAL MILD) 0.2 % ophthalmic solution Administer 2 Drops to both eyes as needed. Provider, Historical   multivitamin (ONE A DAY) tablet Take 1 Tab by mouth daily.     Provider, Historical     Allergies   Allergen Reactions    Codeine Nausea and Vomiting     Blurred vision, felt faint    Contrast Agent [Iodine] Hives     Needs premedication w/ Benadryl prn (since 1/2012 reaction)    Dexamethasone Other (comments)     \"Deathly ill\"    Fish Oil Nausea Only    Glipizide Other (comments)     DRASTIC DROP IN BLOOD GLUCOSE, fasting    Lovenox [Enoxaparin] Hives     Patient states blisters all over the body.  Metformin Diarrhea    Niacin Other (comments)     Flushing    Norvasc [Amlodipine] Other (comments)     Leg edema    Optiray 350 [Ioversol] Hives     He does not know what this is      Family History   Problem Relation Age of Onset    Heart Disease Mother          age 80    [de-identified] Father          brain tumor age 80    Heart Disease Father     Substance Abuse Daughter          cocaine OD age 43 in 200    Other Son          PE/DVT age 43    Heart Disease Brother    [address] Other Daughter         alive and well    Anesth Problems Neg Hx       Social History:  reports that he quit smoking about 21 years ago. He has a 82.50 pack-year smoking history. He has never used smokeless tobacco. He reports current alcohol use of about 14.0 standard drinks of alcohol per week. He reports that he does not use drugs. Family and social history were personally reviewed, all pertinent and relevant details are outlined as above. Objective:     Visit Vitals  BP (!) 158/62   Pulse 84   Temp 98.7 °F (37.1 °C)   Resp 16   Ht 6' 1\" (1.854 m)   Wt 62.6 kg (138 lb)   SpO2 97%   BMI 18.21 kg/m²      O2 Device: None (Room air)    PHYSICAL EXAM:   General: Alert x oriented x 3, awake, no acute distress, resting in bed, pleasant male, appears to be stated age  HEENT: PEERL, EOMI, moist mucus membranes  Neck: Supple, no JVD, no meningeal signs  Chest: Clear to auscultation bilaterally   CVS: RRR, S1 S2 heard, no murmurs/rubs/gallops  Abd: Soft, non-tender, non-distended, +bowel sounds. Midline abdominal wound with dressing that is c/d/i. Ext: No clubbing, no cyanosis, no edema. RLE incision c/d/i at lower end which is visible.   Neuro/Psych: Pleasant mood and affect, CN 2-12 grossly intact, sensory grossly within normal limit, Strength 5/5 in all extremities  Cap refill: Brisk, less than 3 seconds  Pulses: 2+rdial pulses  Skin: Warm, dry, without rashes or lesions    Data Review: All diagnostic labs and studies have been reviewed. Abnormal Labs Reviewed   METABOLIC PANEL, COMPREHENSIVE - Abnormal; Notable for the following components:       Result Value    Potassium 3.0 (*)     Glucose 163 (*)     Creatinine 1.79 (*)     BUN/Creatinine ratio 10 (*)     GFR est AA 44 (*)     GFR est non-AA 36 (*)     Calcium 7.1 (*)     Protein, total 5.9 (*)     Albumin 2.6 (*)     A-G Ratio 0.8 (*)     All other components within normal limits   CBC WITH AUTOMATED DIFF - Abnormal; Notable for the following components:    WBC 11.3 (*)     RBC 3.06 (*)     HGB 9.5 (*)     HCT 29.4 (*)     RDW 17.0 (*)     NEUTROPHILS 85 (*)     LYMPHOCYTES 5 (*)     ABS. NEUTROPHILS 9.6 (*)     ABS.  LYMPHOCYTES 0.6 (*)     All other components within normal limits   METABOLIC PANEL, COMPREHENSIVE - Abnormal; Notable for the following components:    Potassium 2.5 (*)     Glucose 141 (*)     Creatinine 1.61 (*)     BUN/Creatinine ratio 11 (*)     GFR est AA 49 (*)     GFR est non-AA 41 (*)     Calcium 7.0 (*)     Protein, total 5.8 (*)     Albumin 2.4 (*)     A-G Ratio 0.7 (*)     All other components within normal limits       All Micro Results     Procedure Component Value Units Date/Time    URINE CULTURE HOLD SAMPLE [[id number]]     Order Status: Sent Specimen: Urine           IMAGING:   No orders to display        ECG/ECHO:    Results for orders placed or performed during the hospital encounter of 03/25/22   EKG, 12 LEAD, INITIAL   Result Value Ref Range    Ventricular Rate 88 BPM    Atrial Rate 88 BPM    P-R Interval 232 ms    QRS Duration 90 ms    Q-T Interval 428 ms    QTC Calculation (Bezet) 517 ms    Calculated P Axis 76 degrees    Calculated R Axis 22 degrees    Calculated T Axis 74 degrees    Diagnosis       Sinus rhythm with 1st degree AV block  Nonspecific ST abnormality  Prolonged QT  Abnormal ECG  When compared with ECG of 14-MAR-2022 14:31,  Sinus rhythm is no longer with 2nd degree AV block (Mobitz I)  QT has lengthened     Results for orders placed or performed in visit on 03/07/12   AMB POC EKG ROUTINE W/ 12 LEADS, INTER & REP    Impression    Atrial fibrillation, rates 90-93        Assessment:   Given the patient's current clinical presentation, there is a high level of concern for decompensation if discharged from the emergency department. Complex decision making was performed, which includes reviewing the patient's available past medical records, laboratory results, and imaging studies. Active Problems:    * No active hospital problems. *    Plan:     #NEFTALY on CKD secondary to likely dehydration  Creatinine 1.79 (b/l 0.6), potassium 3  IV hydration, and hold home Lasix for now  Replete potassium    #Chronic diarrhea  Patient states that he has had diarrhea since 2012. He currently reports 7 soft bowel movements daily. He had C. difficile checked which was negative at last hospitalization, but has been on antibiotics since this time. Check stool culture, and C. difficile. If negative will start Imodium because he says this has worked for him in the past    #Multiple myeloma  # Colon Adenocarcinoma  Was pending follow-up with Dr. Deedee Quezada as an outpatient. Multiple myeloma treatment was on hold due to declining health.  -consult heme-onc for management/goals of care    #PVD  Status post popliteal bypass of the right lower extremity, and toe irritation    DIET: No diet orders on file   ISOLATION PRECAUTIONS: There are currently no Active Isolations  CODE STATUS: Prior   DVT PROPHYLAXIS: Heparin  FUNCTIONAL STATUS PRIOR TO HOSPITALIZATION: Completely disabled. Cannot carry on any self-care. Totally confined to bed or chair. EARLY MOBILITY ASSESSMENT: Recommend out of bed to chair only with fall precautions  ANTICIPATED DISCHARGE: 24-48 hours.   EMERGENCY CONTACT/SURROGATE DECISION MAKER: Abi Vázquez (daughter)    Signed By: Elzie [employer] Rosa M Kumar MD     March 26, 2022         Please note that this dictation may have been completed with Daniela Reed, the computer voice recognition software. Quite often unanticipated grammatical, syntax, homophones, and other interpretive errors are inadvertently transcribed by the computer software. Please disregard these errors. Please excuse any errors that have escaped final proofreading.

## 2022-03-27 NOTE — PROGRESS NOTES
FUNCTIONAL STATUS PRIOR TO ADMISSION: Patient ADLs modified independent with RW and rollator. Chronic diarrhea in which wears briefs. 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 Enjoys gardening, now will hire someone. Did give up driving 2* right eye difficulty (wears an eye patch PRN). 3/7/2022 right fem-pop, right second toe amputation in which then WBAT and shoe. 3/1/2022 OT evaluation during 2/21/2022  - 3/18/2022 [facility] hospitalization, in which then DC to SNF. Since then cameras in every room, except bathroom, to ensure all is well. HOME SUPPORT: The patient lived with wife but did not require assist. Children live near by and assist with any and all needs. One child building addition to their home for patient and wife to live. Initiated 3/27/2022  1. Patient will perform shower transfer with modified independence within 7 day(s). 2.  Patient will perform showering with supervision within 7 day(s). 3.  Patient will perform making bed with RW with moderate assistance within 7 day(s). 4.  Patient will perform medication management task with 100% accuracy within 7 day(s). 5.  Patient will participate in upper extremity therapeutic exercise/activities with light resistance modified independence for 5 minutes within  day(s). 6.  Patient will utilize energy conservation techniques during functional activities with verbal cues within 7 day(s). OCCUPATIONAL THERAPY EVALUATION  Patient: Maye Whittington (24 y.o. male)  Date: 3/27/2022  Primary Diagnosis: Hypokalemia [E87.6]  Diarrhea [R19.7]       Precautions:   Fall    ASSESSMENT  Based on the objective data described below, the patient presents with ADLs limited by hypotension, strength, dynamic standing tolerance and balance, wound management (R foot, R thigh at incision leaking), and potentially cognition (complex processing and STM loss); anxiety.       Current Level of Function Impacting Discharge (ADLs/self-care): supervision ADLs, standing 5.14 mins    Functional Outcome Measure: The patient scored Total: 85/100 on the Barthel Index outcome measure which is indicative of being independence in basic self-care. Other factors to consider for discharge: family     Patient will benefit from skilled therapy intervention to address the above noted impairments. PLAN :  Recommendations and Planned Interventions: self care training, functional mobility training, therapeutic exercise, balance training, therapeutic activities, endurance activities, patient education, home safety training and family training/education    Frequency/Duration: Patient will be followed by occupational therapy 3 times a week to address goals. Recommendation for discharge: (in order for the patient to meet his/her long term goals)  Therapy up to 5 days/week in SNF setting    This discharge recommendation:  Has not yet been discussed the attending provider and/or case management    IF patient discharges home will need the following DME: none       SUBJECTIVE:   Patient stated I was only a little short of breath and not tired (why sat after standing exercises).     OBJECTIVE DATA SUMMARY:   HISTORY:   Past Medical History:   Diagnosis Date    AAA (abdominal aortic aneurysm) ([facility] 01/06/2012    repair in 2012    Abnormal serum protein electrophoresis 4/25/2012    Anemia 4/6/2010    Anemia due to GI bleed 2007 4/6/2010    Arthritis     Atrial fibrillation 1/2012 02/22/2012    Pt states doesn't have    Bilateral Carotid Bruits, L>R 9/7/2010    [facility]     Carotid stenosis 4/6/2010    Chronic pain     Diabetes mellitus, type 2 ([facility] 9/7/2010    NO MEDS    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    GERD (gastroesophageal reflux disease) 4/6/2010    GI bleed, duodenitis 2007 4/6/2010    H/O Heavy Alcohol Use 9/7/2010    History of skin cancer 09/07/2010    melanoma back    HTN (hypertension) 4/6/2010    Hypercalcemia 4/25/2012    [facility]homocysteinemia ([facility] 9/7/2010    Hypertriglyceridemia 9/7/2010    Left inguinal hernia 9/14/2015    Lipids blood increased 4/6/2010    Microalbuminuria 12/10/2015    Mild Allergic Rhinitis 9/7/2010    Multiple myeloma ([facility]     Pre-diabetes 9/7/2010    2005; Optho Dr Eron Conteh Right inguinal hernia 11/2/2015    S/P AAA repair 2/9/2012 2/22/2012    Sinus tachycardia 9/7/2010    Vitamin B12 deficiency 9/7/2010     Past Surgical History:   Procedure Laterality Date    COLONOSCOPY  8/2007    Normal; Dr Linda Lucio EGD  8/2007    small hiatal hernia, mild duodenitis; Dr Linda Lucio HX AAA REPAIR  2/9/2012    Dr Lindo Lot    HX CATARACT REMOVAL      both eyes    HX CHOLECYSTECTOMY  1-29-14    lap sandi- [facility]- Dr. Shaq Leslie Left 9/24/15    left indirect inguinal by Dr. Meeta Espino Right 11/2/15    inguinal w/ mesh by Dr. Meeta Espino Bilateral 10/2015 And 11/2015    Inguinal    BLUE DOPPLER  3/26/2012    BLUE Echo; + clot       Expanded or extensive additional review of patient history:     Home Situation  Home Environment: Private residence  # Steps to Enter: 6  Rails to Enter: Yes  Hand Rails : Right  Wheelchair Ramp: No  One/Two Story Residence: Two story, live on 1st floor  # of Interior Steps: 15 (6 landing, 3 landing, 6)  Interior Rails: Right  Living Alone: No  Support Systems: Child(manuela),Spouse/Significant Other  Patient Expects to be Discharged to[de-identified] Skilled nursing facility ([address])  Current DME Used/Available at Home: Grab bars,Shower chair,Walker, rolling,Cane, straight,Walker, rollator  Tub or Shower Type: Tub/Shower combination    Hand dominance: Right    EXAMINATION OF PERFORMANCE DEFICITS:  Cognitive/Behavioral Status:  Neurologic State: Alert  Orientation Level: Oriented X4  Cognition: Appropriate decision making; Appropriate for age attention/concentration; Appropriate safety awareness  Perception: Appears intact  Perseveration: No perseveration noted       Skin: intact R medial thigh bandage and leg staples, sensitive skin    Edema: intact    Hearing:       Vision/Perceptual:                           Acuity: Impaired near vision; Impaired far vision         Range of Motion:    AROM: Generally decreased, functional  PROM: Generally decreased, functional                      Strength:    Strength: Generally decreased, functional                Coordination:  Coordination: Within functional limits            Tone & Sensation:    Tone: Normal  Sensation: Intact                      Balance:  Sitting: Intact  Standing: Impaired; Without support  Standing - Static: Good  Standing - Dynamic : Good;Constant support    Functional Mobility and Transfers for ADLs:  Bed Mobility:  Rolling: Modified independent  Supine to Sit: Supervision  Sit to Supine: Supervision  Scooting: Supervision    Transfers:  Sit to Stand: Supervision  Stand to Sit: Supervision  Bathroom Mobility: Supervision/set up  Toilet Transfer : Supervision  Tub Transfer: Total assistance   BSC by bed modifed independence 2* diarrhea      ADL Assessment:  Feeding: Independent per report    Oral Facial Hygiene/Grooming: Supervision per report sitting EOB, setup on tray    Bathing: Supervision received just finishing bathing, sitting EOB, no assistance    Upper Body Dressing: Supervision gown     Lower Body Dressing: Supervisionsode young and jocelynn; donned velcro slippers    Toileting: Supervision brief, RW; has been doing on own 2* diarrhea      nurse agreed to above. Cleaned all items and himself with baby wipes while in between BP readings and education.            ADL Intervention and task modifications:     Patient instructed and indicated understanding the benefits of maintaining activity tolerance, functional mobility, and independence with self care tasks during acute stay  to ensure safe return home and to baseline. Encouraged patient to increase frequency and duration OOB, be out of bed for all meals, perform daily ADLs (as approved by RN/MD regarding bathing etc), and performing functional mobility to/from bathroom. Therapeutic Exercise:  Patient instructed and demonstrated standing shoulder flexion, horizontal abduction, retraction 3 sets 10 reps light weight ~0.5lbs 5.14 mins during moderate assistance initially physical A for LOB RW and then supervision cues PLB. Stated just needed to sit. Noted orthostatic hypotension. Functional Measure:    Barthel Index:  Bathin  Bladder: 10  Bowels: 10  Groomin  Dressing: 10  Feeding: 10  Mobility: 10  Stairs: 5  Toilet Use: 10  Transfer (Bed to Chair and Back): 15  Total: 85/100      The Barthel ADL Index: Guidelines  1. The index should be used as a record of what a patient does, not as a record of what a patient could do. 2. The main aim is to establish degree of independence from any help, physical or verbal, however minor and for whatever reason. 3. The need for supervision renders the patient not independent. 4. A patient's performance should be established using the best available evidence. Asking the patient, friends/relatives and nurses are the usual sources, but direct observation and common sense are also important. However direct testing is not needed. 5. Usually the patient's performance over the preceding 24-48 hours is important, but occasionally longer periods will be relevant. 6. Middle categories imply that the patient supplies over 50 per cent of the effort. 7. Use of aids to be independent is allowed. Score Interpretation (from [address] [facility])    Independent   60-79 Minimally independent   40-59 Partially dependent   20-39 Very dependent   <20 Totally dependent     -Stephani Knapp., Barthel, D.W. (1965). Functional evaluation: the Barthel Index.  [address] [facility] ([address] [facility]., Tony 60 (1997). The Barthel activities of daily living index: self-reporting versus actual performance in the old (> or = 75 years). Journal of [address] [place] [address] 457), 14 [facility], NARENF, Christian Bartholomew., Prasanna Garay. (1999). Measuring the change in disability after inpatient rehabilitation; comparison of the responsiveness of the Barthel Index and Functional Turner Measure. Journal of Neurology, Neurosurgery, and Psychiatry, 66(4), 441-158. RENEE Lopes, FRANCI Brooks, & Moni James M.A. (2004) Assessment of post-stroke quality of life in cost-effectiveness studies: The usefulness of the Barthel Index and the EuroQoL-5D. Quality of Life Research, 13, 508-83     Pain Rating:      Activity Tolerance:   Good     Max cues to lie down 2* BP still dropping. Patient stating chronically low. Vitals:    03/27/22 1404 03/27/22 1411 03/27/22 1422 03/27/22 1425   BP: 101/82 (!) 87/78 (!) 73/57 (!) 96/43   BP 1 Location:       BP Patient Position: Standing Sitting Sitting Supine   Pulse: (!) 101 90 90 75   Temp:       Resp: 30 24 27 19   Height:       Weight:       SpO2:             After treatment patient left in no apparent distress:    Call bell within reach and sitting EOB    COMMUNICATION/EDUCATION:   The patients plan of care was discussed with: Registered nurse. Home safety education was provided and the patient/caregiver indicated understanding., Patient/family have participated as able in goal setting and plan of care. and Patient/family agree to work toward stated goals and plan of care. This patients plan of care is appropriate for delegation to [facility].     Thank you for this referral.  Johnny Nash   total time 43 mins

## 2022-03-27 NOTE — PROGRESS NOTES
Problem: Mobility Impaired (Adult and Pediatric)  Goal: *Acute Goals and Plan of Care (Insert Text)  Description: FUNCTIONAL STATUS PRIOR TO ADMISSION: Patient was modified independent using a rolling walker for functional mobility. HOME SUPPORT PRIOR TO ADMISSION: The patient lived with wife but did not require assist.    Physical Therapy Goals  Initiated 3/27/2022  1. Patient will move from supine to sit and sit to supine , scoot up and down, and roll side to side in bed with modified independence within 7 day(s). 2.  Patient will transfer from bed to chair and chair to bed with modified independence using the least restrictive device within 7 day(s). 3.  Patient will perform sit to stand with modified independence within 7 day(s). 4.  Patient will ambulate with modified independence for 300 feet with the least restrictive device within 7 day(s). 5.  Patient will ascend/descend 6 stairs with *cane and one handrail(s) with modified independence within 7 day(s). Outcome: Progressing Towards Goal   PHYSICAL THERAPY EVALUATION  Patient: Karin Ta (23 y.o. male)  Date: 3/27/2022  Primary Diagnosis: Hypokalemia [E87.6]  Diarrhea [R19.7]        Precautions:   Fall    ASSESSMENT  Based on the objective data described below, the patient presents with * impairment in functional mobility, activity tolerance and balance s/p episode of hypokalemia. Patient has been in rehab at [facility]. Prior to rehab episode, patient lived with (and was primary caregiver for) wife, who has dementia. Daughter & son-in law are taking care of patient's wife at this time and will soon be building a handicapped accessible one story apartment onto their residence for patient & wife. Patient's home at this time has 6 steps to enter, 15 steps (broken up by 2 landings) to second floor bedroom and bathroom. All of the steps have one rail. Patient moved very well today.  He performed bed mobility, transfers, and gait with RW (150 ft). Safe for discharge from PT standpoint when medically stable. Current Level of Function Impacting Discharge (mobility/balance): Supervision for bed mobility/transfers; Contact guard assistance with RW and gait belt for 150 ft. Gait steady, patient slightly flexed forward with slight decreased stance on RLE. Functional Outcome Measure: The patient scored 85/100 on the Barthel outcome measure which is indicative of minimal impaired ability to care for basic self-needs/dependency on others. Other factors to consider for discharge: Motivated/A & O x 4/Supportive Family/Modified independent PLOF      Patient will benefit from skilled therapy intervention to address the above noted impairments. PLAN :  Recommendations and Planned Interventions: bed mobility training, transfer training, gait training, therapeutic exercises, patient and family training/education, and therapeutic activities      Frequency/Duration: Patient will be followed by physical therapy:  5 times a week to address goals. Recommendation for discharge: (in order for the patient to meet his/her long term goals)  Therapy up to 5 days/week in SNF setting (back to [facility] for rehab), then Home health 2x/week    This discharge recommendation:  Has been made in collaboration with the attending provider and/or case management    IF patient discharges home will need the following DME: patient owns DME required for discharge         SUBJECTIVE:   Patient stated I am eager to get back to rehab at [facility].     OBJECTIVE DATA SUMMARY:   HISTORY:    Past Medical History:   Diagnosis Date    AAA [facility]abdominal aortic aneurysm) ([facility] 01/06/2012    repair in 2012    Abnormal serum protein electrophoresis 4/25/2012    Anemia 4/6/2010    Anemia due to GI bleed 2007 4/6/2010    Arthritis     Atrial fibrillation 1/2012 02/22/2012    Pt states doesn't have    Bilateral Carotid Bruits, L>R 9/7/2010    Bone cancer ([facility]     Carotid stenosis 4/6/2010    Chronic pain     Diabetes mellitus, type 2 ([facility] 9/7/2010    NO MEDS    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    GERD (gastroesophageal reflux disease) 4/6/2010    GI bleed, duodenitis 2007 4/6/2010    H/O Heavy Alcohol Use 9/7/2010    History of skin cancer 09/07/2010    melanoma back    HTN (hypertension) 4/6/2010    Hypercalcemia 4/25/2012    Hyperhomocysteinemia ([facility] 9/7/2010    Hypertriglyceridemia 9/7/2010    Left inguinal hernia 9/14/2015    Lipids blood increased 4/6/2010    Microalbuminuria 12/10/2015    Mild Allergic Rhinitis 9/7/2010    Multiple myeloma ([facility]     Pre-diabetes 9/7/2010 2005; Optho Dr Monique Stewart Right inguinal hernia 11/2/2015    S/P AAA repair 2/9/2012 2/22/2012    Sinus tachycardia 9/7/2010    Vitamin B12 deficiency 9/7/2010     Past Surgical History:   Procedure Laterality Date    COLONOSCOPY  8/2007    Normal; Dr Nunez Erp EGD  8/2007    small hiatal hernia, mild duodenitis; Dr Nunez Erp HX AAA REPAIR  2/9/2012    Dr Arian Daniels    HX CATARACT REMOVAL      both eyes    HX CHOLECYSTECTOMY  1-29-14    [facility]- Dr. Prabhjot Lira Left 9/24/15    left indirect inguinal by Dr. Hannah Chun Right 11/2/15    inguinal w/ mesh by Dr. Hannah Chun Bilateral 10/2015 And 11/2015    Inguinal    BLUE DOPPLER  3/26/2012    BLUE Echo; + clot       Personal factors and/or comorbidities impacting plan of care:  Motivated/A & O x 4/Supportive Family/Modified Independent PLOF     Home Situation  Home Environment: Private residence  # Steps to Enter: 6  Rails to Enter: Yes  Hand Rails : Right  Wheelchair Ramp: No  One/Two Story Residence: Two story, live on 1st floor  # of Interior Steps: 15 (6 landing, 3 landing, 6)  Interior Rails: Right  Living Alone: No  Support Systems: Child(manuela),Spouse/Significant Other  Patient Expects to be Discharged to[de-identified] Skilled nursing facility [facility] [address]  Current DME Used/Available at Home: Grab bars,Shower chair,Walker, rolling,Cane, straight,Walker, rollator  Tub or Shower Type: Tub/Shower combination    EXAMINATION/PRESENTATION/DECISION MAKING:   Critical Behavior:   A & O x 4  Appropriate Safety Awareness and Decision Making           Hearing:       Range Of Motion:  AROM: Generally decreased, functional           PROM: Generally decreased, functional           Strength:    Strength: Generally decreased, functional                    Tone & Sensation:   Tone: Normal              Sensation: Impaired               Coordination:  Coordination: Within functional limits  Vision:      Functional Mobility:  Bed Mobility:  Rolling: Modified independent  Supine to Sit: Supervision  Sit to Supine: Supervision  Scooting: Supervision  Transfers:  Sit to Stand: Supervision  Stand to Sit: Supervision                       Balance:   Sitting: Intact  Standing: Impaired; Without support  Standing - Static: Good  Standing - Dynamic : Good;Constant support  Ambulation/Gait Training:  Distance (ft): 150 Feet (ft)  Assistive Device: Walker, rolling;Gait belt  Ambulation - Level of Assistance: Contact guard assistance        Gait Abnormalities:  (forward flexed trunk)        Base of Support: Widened  Stance: Right decreased  Speed/Monica: Slow  Step Length: Left shortened  Swing Pattern: Right asymmetrical     Interventions: Safety awareness training;Verbal cues            Functional Measure:  Barthel Index:    Bathin  Bladder: 10  Bowels: 10  Groomin  Dressing: 10  Feeding: 10  Mobility: 10  Stairs: 5  Toilet Use: 10  Transfer (Bed to Chair and Back): 15  Total: 85/100       The Barthel ADL Index: Guidelines  1. The index should be used as a record of what a patient does, not as a record of what a patient could do.   2. The main aim is to establish degree of independence from any help, physical or verbal, however minor and for whatever reason. 3. The need for supervision renders the patient not independent. 4. A patient's performance should be established using the best available evidence. Asking the patient, friends/relatives and nurses are the usual sources, but direct observation and common sense are also important. However direct testing is not needed. 5. Usually the patient's performance over the preceding 24-48 hours is important, but occasionally longer periods will be relevant. 6. Middle categories imply that the patient supplies over 50 per cent of the effort. 7. Use of aids to be independent is allowed. Score Interpretation (from [address] [address])    Independent   60-79 Minimally independent   40-59 Partially dependent   20-39 Very dependent   <20 Totally dependent     -Stephani Knapp., Barthel, D.W. (1965). Functional evaluation: the Barthel Index. [address] [facility] ([address] [facility] [address] [place] (1997). The Barthel activities of daily living index: self-reporting versus actual performance in the old (> or = 75 years). Journal [address] 45(7), 14 [facility], J.J.M.F, Don Segura., Anders Luis. (1999). Measuring the change in disability after inpatient rehabilitation; comparison of the responsiveness of the Barthel Index and Functional [place] Measure. Journal of Neurology, Neurosurgery, and Psychiatry, 66(4), 385-175. Mis Clemente NJARROD.HECTOR, FRANCI Brooks, & Vero Villalobos, M.A. (2004) Assessment of post-stroke quality of life in cost-effectiveness studies: The usefulness of the Barthel Index and the EuroQoL-5D.  Quality of Life Research, 15, 080-37           Physical Therapy Evaluation Charge Determination   History Examination Presentation Decision-Making   HIGH Complexity :3+ comorbidities / personal factors will impact the outcome/ POC  LOW Complexity : 1-2 Standardized tests and measures addressing body structure, function, activity limitation and / or participation in recreation  LOW Complexity : Stable, uncomplicated  LOW Complexity : FOTO score of       Based on the above components, the patient evaluation is determined to be of the following complexity level: LOW     Pain Rating:  None reported or observed    Activity Tolerance:   Good    After treatment patient left in no apparent distress:   Sitting in chair, Call bell within reach, and nurse notified and patient instructed NOT to get up by himself. COMMUNICATION/EDUCATION:   The patients plan of care was discussed with: Registered nurse and Physician. Fall prevention education was provided and the patient/caregiver indicated understanding., Patient/family have participated as able in goal setting and plan of care. , and Patient/family agree to work toward stated goals and plan of care.     Thank you for this referral.  Favian James   Time Calculation: 25 mins

## 2022-03-27 NOTE — PROGRESS NOTES
Bedside and Verbal shift change report given to Priscilla Christine RN (oncoming nurse) by Lulu Bansal RN (offgoing nurse). Report included the following information SBAR, Kardex, Intake/Output, MAR and Recent Results.

## 2022-03-27 NOTE — PROGRESS NOTES
Problem: Risk for Spread of Infection  Goal: Prevent transmission of infectious organism to others  Description: Prevent the transmission of infectious organisms to other patients, staff members, and visitors. Outcome: Progressing Towards Goal     Problem: Patient Education:  Go to Education Activity  Goal: Patient/Family Education  Outcome: Progressing Towards Goal     Problem: Falls - Risk of  Goal: *Absence of Falls  Description: Document Cindia Neigh Fall Risk and appropriate interventions in the flowsheet. Outcome: Progressing Towards Goal  Note: Fall Risk Interventions:  Mobility Interventions: Communicate number of staff needed for ambulation/transfer,Patient to call before getting OOB,PT Consult for mobility concerns,PT Consult for assist device competence         Medication Interventions: Assess postural VS orthostatic hypotension,Evaluate medications/consider consulting pharmacy,Patient to call before getting OOB,Teach patient to arise slowly    Elimination Interventions: Call light in reach,Patient to call for help with toileting needs,Toileting schedule/hourly rounds,Toilet paper/wipes in reach              Problem: Patient Education: Go to Patient Education Activity  Goal: Patient/Family Education  Outcome: Progressing Towards Goal     Problem: Pressure Injury - Risk of  Goal: *Prevention of pressure injury  Description: Document Christiano Scale and appropriate interventions in the flowsheet.   Outcome: Progressing Towards Goal  Note: Pressure Injury Interventions:       Moisture Interventions: Absorbent underpads,Limit adult briefs,Minimize layers    Activity Interventions: Increase time out of bed,Pressure redistribution bed/mattress(bed type),PT/OT evaluation    Mobility Interventions: Pressure redistribution bed/mattress (bed type),HOB 30 degrees or less,PT/OT evaluation         Friction and Shear Interventions: HOB 30 degrees or less,Minimize layers                Problem: Patient Education: Go to Patient Education Activity  Goal: Patient/Family Education  Outcome: Progressing Towards Goal

## 2022-03-27 NOTE — CONSULTS
Hematology Oncology Consultation    Reason for consult: MM, CRC    Admitting Diagnosis: Hypokalemia [E87.6]  Diarrhea [R19.7]    Impression:   *) Newly diagnosed Colon Adenocarcinoma:  - diagnosed Feb 2022 after presenting with obstruction  - s/p rt hemicolectomy 2/24/22, 7/14 LN +  - doesn't appear to be in shape for aggressive tx at this time but will defer decisions to Dr. Renee Dacosta his Oncologist. Discussed with patient he has to be out of rehab prior to starting therapy. He is going back to [facility] after DC per pt. *) MM:  - Follows with Dr. Renee Dacosta since 2012 and off therapy since 12/2021 due to covid pna and not resumed due to ecog. *) Anemia:  - Hgb stable at 9  -will check iron panel, ferritin    Thank you for this kind referral, we will follow along with you. Discussion: Nam Mcduffie is a  80y.o. year old seen in consultation at the request of Dr. Dewey Ogden for MM and CRC. He is an 59-year-old man who has been followed closely by Dr. Renee Dacosta with multiple myeloma since 2012. He has been treated with multiple regimens including Revlimid, bortezomib, dexamethasone with a prolonged response in 2020. Alemtuzumab, Pomalyst , dexamethasone; this was held in 12/2021 due to COVID pneumonia. He is the sole caregiver for his wife with advanced dementia. He has had some other difficulties in the past including abdominal aortic aneurysm, status post open repair; carotid artery stenosis, status post endarterectomy; peripheral neuropathy; atrial fibrillation; and hypertension. He presented to this hospital on 02/21/2022 with abdominal pain, and noncontrast CT of the abdomen showed a high-grade distal small bowel obstruction. On 2/24/22 He was taken to the OR and was noted to have distension of the cecum. He underwent a right hemicolectomly.   This tissue was submitted to pathology and unfortunately was noted to have contained a moder 7/14LN ately differentiated adenocarcinoma, positive lymphovascular invasion, 7/14 lymph nodes affected, margins negative, therefore, making it pT3 N2b. On 3/7/22 he had right popliteal to perineal bypass and rt second toe amputation and was dc'd on 3/18/22 to rehab at [facility]. He was admitted 3/26 for NEFTALY likely due to dehydration with cr 1.79, K 3.0, and hypocalcemia. We have been consulted to assist with GOC.         Imaging:        Labs:    Recent Results (from the past 24 hour(s))   POTASSIUM    Collection Time: 03/26/22  8:00 PM   Result Value Ref Range    Potassium 2.9 (L) 3.5 - 5.1 mmol/L   METABOLIC PANEL, BASIC    Collection Time: 03/27/22  3:20 AM   Result Value Ref Range    Sodium 140 136 - 145 mmol/L    Potassium 3.1 (L) 3.5 - 5.1 mmol/L    Chloride 109 (H) 97 - 108 mmol/L    CO2 28 21 - 32 mmol/L    Anion gap 3 (L) 5 - 15 mmol/L    Glucose 88 65 - 100 mg/dL    BUN 14 6 - 20 MG/DL    Creatinine 1.06 0.70 - 1.30 MG/DL    BUN/Creatinine ratio 13 12 - 20      GFR est AA >60 >60 ml/min/1.73m2    GFR est non-AA >60 >60 ml/min/1.73m2    Calcium 6.8 (L) 8.5 - 10.1 MG/DL   MAGNESIUM    Collection Time: 03/27/22  3:20 AM   Result Value Ref Range    Magnesium 1.8 1.6 - 2.4 mg/dL   CBC WITH AUTOMATED DIFF    Collection Time: 03/27/22  3:20 AM   Result Value Ref Range    WBC 6.5 4.1 - 11.1 K/uL    RBC 2.94 (L) 4.10 - 5.70 M/uL    HGB 9.0 (L) 12.1 - 17.0 g/dL    HCT 28.4 (L) 36.6 - 50.3 %    MCV 96.6 80.0 - 99.0 FL    MCH 30.6 26.0 - 34.0 PG    MCHC 31.7 30.0 - 36.5 g/dL    RDW 17.0 (H) 11.5 - 14.5 %    PLATELET 971 805 - 121 K/uL    MPV 9.9 8.9 - 12.9 FL    NRBC 0.0 0  WBC    ABSOLUTE NRBC 0.00 0.00 - 0.01 K/uL    NEUTROPHILS 72 32 - 75 %    LYMPHOCYTES 9 (L) 12 - 49 %    MONOCYTES 12 5 - 13 %    EOSINOPHILS 5 0 - 7 %    BASOPHILS 1 0 - 1 %    IMMATURE GRANULOCYTES 1 (H) 0.0 - 0.5 %    ABS. NEUTROPHILS 4.6 1.8 - 8.0 K/UL    ABS. LYMPHOCYTES 0.6 (L) 0.8 - 3.5 K/UL    ABS. MONOCYTES 0.8 0.0 - 1.0 K/UL    ABS. EOSINOPHILS 0.3 0.0 - 0.4 K/UL    ABS.  BASOPHILS 0.1 0.0 - 0.1 K/UL ABS. IMM. GRANS. 0.1 (H) 0.00 - 0.04 K/UL    DF SMEAR SCANNED      RBC COMMENTS ANISOCYTOSIS  1+        RBC COMMENTS MACROCYTOSIS  1+        RBC COMMENTS OVALOCYTES  1+        RBC COMMENTS SCHISTOCYTES  PRESENT       SED RATE (ESR)    Collection Time: 03/27/22  3:20 AM   Result Value Ref Range    Sed rate, automated 43 (H) 0 - 20 mm/hr   C REACTIVE PROTEIN, QT    Collection Time: 03/27/22  3:20 AM   Result Value Ref Range    C-Reactive protein 4.69 (H) 0.00 - 0.60 mg/dL       History:  Past Medical History:   Diagnosis Date    AAA (abdominal aortic aneurysm) (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    GERD (gastroesophageal reflux disease) 4/6/2010    GI bleed, duodenitis 2007 4/6/2010    H/O Heavy Alcohol Use 9/7/2010    History of skin cancer 09/07/2010    melanoma back    HTN (hypertension) 4/6/2010    Hypercalcemia 4/25/2012    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/2010    Hypertriglyceridemia 9/7/2010    Left inguinal hernia 9/14/2015    Lipids blood increased 4/6/2010    Microalbuminuria 12/10/2015    Mild Allergic Rhinitis 9/7/2010    Multiple myeloma ([address]     Pre-diabetes 9/7/2010    2005;  [facility]tho Dr Te[facility] [place] Right inguinal hernia 11/2/2015    S/P AAA repair 2/9/2012 2/22/2012    Sinus tachycardia 9/7/2010    Vitamin B12 deficiency 9/7/2010      Past Surgical History:   Procedure Laterality Date    COLONOSCOPY  8/2007    Normal; Dr Batool Plunkett EGD  8/2007    small hiatal hernia, mild duodenitis; Dr Batool Plunkett HX AAA REPAIR  2/9/2012    Dr Janel Lopez    [id number] both eyes    HX CHOLECYSTECTOMY  1-29-14    lap sandi- [facility]- Dr. Kailyn Hillman Left 9/24/15    left indirect inguinal by Dr. Fili Leos Right 11/2/15    inguinal w/ mesh by Dr. Fili Leos Bilateral 10/2015 And 11/2015    Inguinal    BLUE DOPPLER  3/26/2012    BLUE Echo; + clot      Prior to Admission medications    Medication Sig Start Date End Date Taking? Authorizing Provider   furosemide (LASIX) 20 mg tablet Take 2 Tablets by mouth daily. Hold for low blood pressure 3/18/22   Dilan Feng MD   fluconazole (Diflucan) 150 mg tablet Take 1 Tablet by mouth daily for 8 days. FDA advises cautious prescribing of oral fluconazole in pregnancy. 3/18/22 3/26/22  Dilan Feng MD   linezolid (Zyvox) 600 mg tablet Take 1 Tablet by mouth two (2) times a day for 8 days. 3/18/22 3/26/22  Dilan Feng MD   colestipoL (COLESTID) 1 gram tablet Take 2 Tablets by mouth two (2) times a day. 3/18/22   Dilan Feng MD   finasteride (PROSCAR) 5 mg tablet Take 1 Tablet by mouth daily. 3/19/22   Dilan Feng MD   traZODone (DESYREL) 50 mg tablet Take 1 Tablet by mouth nightly as needed for Sleep. 3/18/22   Dilan Feng MD   zinc oxide-cod liver oil (DESITIN) 40 % paste Apply  to affected area as needed for Skin Irritation. 3/18/22   Dilan Feng MD   tamsulosin (FLOMAX) 0.4 mg capsule Take 1 Capsule by mouth daily. 3/19/22   Dlian Feng MD   pantoprazole (PROTONIX) 40 mg tablet Take 1 Tablet by mouth Daily (before breakfast). 3/19/22   Dilan Feng MD   mupirocin Glorious Gulling) 2 % ointment Apply to open wound on toe daily as instructed 2/16/22   Gianni Pang MD   ferrous sulfate 325 mg (65 mg iron) tablet Take 1 Tablet by mouth Daily (before breakfast). Indications: anemia from inadequate iron 2/16/22   Gianni Pang MD   calcium carbonate (TUMS) 200 mg calcium (500 mg) chew Take 1 Tablet by mouth as needed.     Other, Phys, MD   potassium chloride (KLOR-CON M10) 10 mEq tablet TAKE 1 TABLET BY MOUTH DAILY WITH LASIX 21   Cinthia Edmonds NP   folic acid-vit K9-OZM T80 (Folbic) 2.5-25-2 mg tablet Take 1 Tablet by mouth daily. 21   Cinthia Edmonds NP   pregabalin (LYRICA) 50 mg capsule Take 50 mg by mouth two (2) times a day. 20   Provider, Historical   aspirin delayed-release 81 mg tablet Take 81 mg by mouth nightly. Provider, Historical   hydroxypropyl methylcellulose (GENTEAL MILD) 0.2 % ophthalmic solution Administer 2 Drops to both eyes as needed. Provider, Historical   multivitamin (ONE A DAY) tablet Take 1 Tab by mouth daily. Provider, Historical     Allergies   Allergen Reactions    Codeine Nausea and Vomiting     Blurred vision, felt faint    Contrast Agent [Iodine] Hives     Needs premedication w/ Benadryl prn (since 2012 reaction)    Dexamethasone Other (comments)     \"Deathly ill\"    Fish Oil Nausea Only    Glipizide Other (comments)     DRASTIC DROP IN BLOOD GLUCOSE, fasting    Lovenox [Enoxaparin] Hives     Patient states blisters all over the body.  Metformin Diarrhea    Niacin Other (comments)     Flushing    Norvasc [Amlodipine] Other (comments)     Leg edema    Optiray 350 [Ioversol] Hives     He does not know what this is      Social History     Tobacco Use    Smoking status: Former Smoker     Packs/day: 1.50     Years: 55.00     Pack years: 82.50     Quit date: 2001     Years since quittin.2    Smokeless tobacco: Never Used    Tobacco comment: used to smoke 1 1/2 ppd x ~ 40 yrs   Substance Use Topics    Alcohol use:  Yes     Alcohol/week: 14.0 standard drinks     Types: 14 Glasses of wine per week     Comment: 15      Family History   Problem Relation Age of Onset    Heart Disease Mother          age 80    Cancer Father          brain tumor age 80    Heart Disease Father     Substance Abuse Daughter          cocaine OD age 43 in 200   [place] Other Son  PE/DVT age 36    Heart Disease Brother     Cancer Brother         BLADDER    Other Daughter         alive and well    Anesth Problems Neg Hx         Current Medications:  Current Facility-Administered Medications   Medication Dose Route Frequency    potassium, sodium phosphates (NEUTRA-PHOS) packet 1 Packet  1 Packet Oral QID    aspirin chewable tablet 81 mg  81 mg Oral DAILY    finasteride (PROSCAR) tablet 5 mg  5 mg Oral DAILY    0.9% sodium chloride infusion 250 mL  250 mL IntraVENous PRN    sodium chloride (NS) flush 5-40 mL  5-40 mL IntraVENous Q8H    sodium chloride (NS) flush 5-40 mL  5-40 mL IntraVENous PRN    acetaminophen (TYLENOL) tablet 650 mg  650 mg Oral Q6H PRN    Or    acetaminophen (TYLENOL) suppository 650 mg  650 mg Rectal Q6H PRN    ondansetron (ZOFRAN ODT) tablet 4 mg  4 mg Oral Q8H PRN    Or    ondansetron (ZOFRAN) injection 4 mg  4 mg IntraVENous Q6H PRN    pantoprazole (PROTONIX) tablet 40 mg  40 mg Oral ACB    tamsulosin (FLOMAX) capsule 0.4 mg  0.4 mg Oral QHS    pregabalin (LYRICA) capsule 50 mg  50 mg Oral Q12H    cefTRIAXone (ROCEPHIN) 1 g in 0.9% sodium chloride 10 mL IV syringe  1 g IntraVENous Q24H    colestipoL (COLESTID) tablet 2 g  2 g Oral BID         Review of Systems:  Constitutional No fevers, chills, night sweats, excessive fatigue or weight loss. Allergic/Immunologic No recent allergic reactions   Eyes No significant visual difficulties. No diplopia. ENMT No problems with hearing, no sore throat, no sinus drainage. Endocrine No hot flashes or night sweats. No cold intolerance, polyuria, or polydipsia   Hematologic/Lymphatic No easy bruising or bleeding. The patient denies any tender or palpable lymph nodes   Breasts No abnormal masses of breast, nipple discharge or pain. Respiratory No dyspnea on exertion, orthopnea, chest pain, cough or hemoptysis.    Cardiovascular No anginal chest pain, irregular heart beat, tachycardia, palpitations or orthopnea. Gastrointestinal No nausea, vomiting, diarrhea, constipation, cramping, dysphagia, reflux, heartburn, GI bleeding, or early satiety. No change in bowel habits. Genitourinary (M) No hematuria, dysuria, increased frequency, urgency, hesitancy or incontinence. Musculoskeletal No joint pain, swelling or redness. No decreased range of motion. Integumentary No chronic rashes, inflammation, ulcerations, pruritus, petechiae, purpura, ecchymoses, or skin changes. Neurologic No headache, blurred vision, and no areas of focal weakness or numbness. Normal gait. No sensory problems. Psychiatric No insomnia, depression, betzaida or mood swings. No psychotropic drugs. Physical Exam:  Constitutional Alert, cooperative, oriented. Mood and affect appropriate. Thin and frail appearing   Head Normocephalic; no scars   Eyes Wearing right eye patch           Hematologic/Lymphatic No petechiae or purpura. No tender or palpable lymph nodes in the cervical, supraclavicular, axillary or inguinal area. Respiratory Lungs are clear to auscultation without rhonchi or wheezing. Cardiovascular Regular rate and rhythm of heart without murmurs, gallops or rubs. Chest / Line Site Chest is symmetric with no chest wall deformities. Abdomen Non-tender, non-distended, no masses, ascites or hepatosplenomegaly. Good bowel sounds. No guarding or rebound tenderness. No pulsatile masses. Skin No rashes, scars, or lesions suggestive of malignancy. No petechiae, purpura, or ecchymoses. No excoriations.

## 2022-03-27 NOTE — PROGRESS NOTES
Problem: Falls - Risk of  Goal: *Absence of Falls  Description: Document [place] Fall Risk and appropriate interventions in the flowsheet.   Outcome: Progressing Towards Goal  Note: Fall Risk Interventions:  Mobility Interventions: Assess mobility with egress test,Communicate number of staff needed for ambulation/transfer,OT consult for ADLs,Patient to call before getting OOB,PT Consult for mobility concerns,PT Consult for assist device competence,Strengthening exercises (ROM-active/passive)         Medication Interventions: Assess postural VS orthostatic hypotension,Patient to call before getting OOB,Teach patient to arise slowly    Elimination Interventions: Bed/chair exit alarm,Call light in reach,Patient to call for help with toileting needs,Stay With Me (per policy),Urinal in reach

## 2022-03-28 NOTE — PROGRESS NOTES
Problem: Self Care Deficits Care Plan (Adult)  Goal: *Acute Goals and Plan of Care (Insert Text)  Description: FUNCTIONAL STATUS PRIOR TO ADMISSION: Patient ADLs modified independent with RW and rollator. Chronic diarrhea in which wears briefs. 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 Enjoys gardening, now will hire someone. Did give up driving 2* right eye difficulty (wears an eye patch PRN). 3/7/2022 right fem-pop, right second toe amputation in which then WBAT and shoe. 3/1/2022 OT evaluation during 2/21/2022  - 3/18/2022 [facility] hospitalization, in which then DC to SNF. Since then cameras in every room, except bathroom, to ensure all is well. HOME SUPPORT: The patient lived with wife but did not require assist. Children live near by and assist with any and all needs. One child building addition to their home for patient and wife to live. Occupational Therapy Goals  Initiated 3/27/2022  1. Patient will perform shower transfer with modified independence within 7 day(s). 2.  Patient will perform showering with supervision within 7 day(s). 3.  Patient will perform making bed with RW with moderate assistance within 7 day(s). 4.  Patient will perform medication management task with 100% accuracy within 7 day(s). 5.  Patient will participate in upper extremity therapeutic exercise/activities with light resistance modified independence for 5 minutes within  day(s). 6.  Patient will utilize energy conservation techniques during functional activities with verbal cues within 7 day(s). Outcome: Progressing Towards Goal     OCCUPATIONAL THERAPY TREATMENT  Patient: Lisha Sandoval (51 y.o. male)  Date: 3/28/2022  Diagnosis: Hypokalemia [E87.6]  Diarrhea [R19.7] <principal problem not specified>       Precautions: Fall  Chart, occupational therapy assessment, plan of care, and goals were reviewed. ASSESSMENT  Patient continues with skilled OT services and is progressing towards goals.   Patient continues to present with decreased activity tolerance but offers excellent efforts for all presented tasks. He is received sitting on BSC at EOB and manages toileting tasks with overall SBA. He then progresses mobility to bathroom for standing ADLs at sink for hygiene with overall SBA. He requires one rest break after prolonged standing and is agreeable to sit up in chair at end of session. Current Level of Function Impacting Discharge (ADLs): set up to SBA/CGA for ADLs    Other factors to consider for discharge: medical complexity/history         PLAN :  Patient continues to benefit from skilled intervention to address the above impairments. Continue treatment per established plan of care to address goals. Recommend with staff: OOB to chair for meals; mobility to bathroom for toileting with A x 1; ADLs with SBA    Recommend next OT session: continue per POC    Recommendation for discharge: (in order for the patient to meet his/her long term goals)  Return to SNF    This discharge recommendation:  Has been made in collaboration with the attending provider and/or case management    IF patient discharges home will need the following DME: rolling walker       SUBJECTIVE:   Patient stated I have a lot of appointments coming up.     OBJECTIVE DATA SUMMARY:   Cognitive/Behavioral Status:  Neurologic State: Alert  Orientation Level: Oriented X4  Cognition: Appropriate decision making; Appropriate for age attention/concentration; Appropriate safety awareness; Follows commands  Perception: Appears intact (wears patch on R eye PRN)  Perseveration: No perseveration noted  Safety/Judgement: Awareness of environment; Fall prevention    Functional Mobility and Transfers for ADLs:  Bed Mobility:  Scooting: Modified independent    Transfers:  Sit to Stand: Supervision  Functional Transfers  Bathroom Mobility: Supervision/set up;Stand-by assistance  Toilet Transfer : Supervision    Balance:  Sitting: Intact  Standing: Impaired  Standing - Static: Good  Standing - Dynamic : Fair;Constant support    ADL Intervention:  Grooming  Grooming Assistance: Stand-by assistance  Position Performed: Standing (at sink)  Washing Face: Stand-by assistance  Washing Hands: Stand-by assistance  Brushing Teeth: Stand-by assistance  Cues: Verbal cues provided    Toileting  Bladder Hygiene: Supervision (seated)  Bowel Hygiene: Stand-by assistance  Clothing Management: Stand-by assistance    Cognitive Retraining  Safety/Judgement: Awareness of environment; Fall prevention    Pain:  Pt reporting minimal pain    Activity Tolerance:   Fair    After treatment patient left in no apparent distress:   Sitting in chair, Call bell within reach and Bed / chair alarm activated    COMMUNICATION/COLLABORATION:   The patients plan of care was discussed with: Physical therapist and Registered nurse.      Akshat Sahu OT  Time Calculation: 26 mins

## 2022-03-28 NOTE — PROGRESS NOTES
Hematology-Oncology Progress Note    Kyler Cleveland  1934  [id number]  3/28/2022    Follow-up for: myeloma/colon cancer     [x]        Chart notes since last visit reviewed   [x]        Medications reviewed for allergies and interactions       Case discussed with the following:         []                            []        Nursing Staff                                                                         []        Pathologist                                                                        []        FAMILY      Subjective:     Spoke with patient who complains of: feeling stronger, decreased diarrhea    Objective:   Patient Vitals for the past 24 hrs:   BP Temp Pulse Resp SpO2   03/28/22 1235   80     03/28/22 1009   80     03/28/22 1000 (!) 114/95 97.8 °F (36.6 °C) 79 18 95 %   03/28/22 0555 106/82 97.5 °F (36.4 °C) 81 20 95 %   03/28/22 0550   75     03/28/22 0353   72     03/28/22 0215 (!) 115/45 98 °F (36.7 °C) 75 22 94 %   03/28/22 0151   75     03/27/22 2217 (!) 141/53 98.9 °F (37.2 °C) 81 23 93 %   03/27/22 2151   71     03/27/22 1951   71     03/27/22 1801   85     03/27/22 1800 (!) 123/52 97.9 °F (36.6 °C) 83 16 97 %   03/27/22 1425 (!) 96/43  75 19    03/27/22 1422 (!) 73/57  90 27    03/27/22 1411 (!) 87/78  90 24    03/27/22 1404 101/82  (!) 101 30    03/27/22 1400 (!) 123/46 97.9 °F (36.6 °C) 95 18 98 %       REVIEW OF SYSTEMS:    Constitutional: negative fever, negative chills, negative weight loss  Eyes:   negative visual changes  ENT:   negative sore throat, tongue or lip swelling  Respiratory:  negative cough, negative dyspnea  Cards:  negative for chest pain, palpitations, lower extremity edema  GI:   negative for nausea, vomiting, diarrhea, and abdominal pain  Neuro:  negative for headaches, dizziness, vertigo  []                        Full ROS o/w normal/non contributor    Constitutional:  Patient looks  []        Sick  [x] Frail  []        Better                                                 []        Depressed    HEENT:  [x]   NC                         []   AT               []    ALOPECIA           Eyes: [x]   Normal               []    Icteric  Oropharynx: []    Normal                  []  Thrush               []   Dry  Mucositis: []    None                 Grade: []        I  []        II  []        III  []        IV  Neck:   [x]   Supple                  []  Rigid               JVD:    [x]   ABSENT       []   PRESENT  Lymphadenopathy:   []   None Noted            []   PRESENT    Chest:  [x]   Clear               []    Rhonchi                      Dec'd @     []  Right Base           []   Left Base    CV:             [x]   Regular              [x]  Irregular               []   Tachy                []   Murmur  Abdominal:   [x]    Soft              [x]   NON-tender               []   Tender      BS:    []   ABSENT                   []   PRESENT  Liver:     []  NON-palp                  []   EDGE- palp  Spleen: [x]   NON-palp                   []  EDGE - palp  Mass:   []   ABSENT                          []  PRESENT  Extr:    []  Lymphedema             []   Cyanosis      []  Clubbing  Edema:     [x]   NONE       []   PRESENT  Skin:  Intact [x]           Purpura []        Rash: [x]   ABSENT       []  PRESENT  Neuro:  []        Normal  []        Confused      Available labs reviewed:  Labs:    Recent Results (from the past 24 hour(s))   MAGNESIUM    Collection Time: 03/28/22  2:33 AM   Result Value Ref Range    Magnesium 1.5 (L) 1.6 - 2.4 mg/dL   CBC WITH AUTOMATED DIFF    Collection Time: 03/28/22  2:33 AM   Result Value Ref Range    WBC 5.0 4.1 - 11.1 K/uL    RBC 2.50 (L) 4.10 - 5.70 M/uL    HGB 7.8 (L) 12.1 - 17.0 g/dL    HCT 24.7 (L) 36.6 - 50.3 %    MCV 98.8 80.0 - 99.0 FL    MCH 31.2 26.0 - 34.0 PG    MCHC 31.6 30.0 - 36.5 g/dL    RDW 17.2 (H) 11.5 - 14.5 %    PLATELET 866 (L) 655 - 400 K/uL    MPV 9.8 8.9 - 12.9 FL    NRBC 0.0 0  WBC    ABSOLUTE NRBC 0.00 0.00 - 0.01 K/uL    NEUTROPHILS 61 32 - 75 %    LYMPHOCYTES 14 12 - 49 %    MONOCYTES 15 (H) 5 - 13 %    EOSINOPHILS 9 (H) 0 - 7 %    BASOPHILS 1 0 - 1 %    IMMATURE GRANULOCYTES 0 0.0 - 0.5 %    ABS. NEUTROPHILS 2.9 1.8 - 8.0 K/UL    ABS. LYMPHOCYTES 0.7 (L) 0.8 - 3.5 K/UL    ABS. MONOCYTES 0.8 0.0 - 1.0 K/UL    ABS. EOSINOPHILS 0.5 (H) 0.0 - 0.4 K/UL    ABS. BASOPHILS 0.1 0.0 - 0.1 K/UL    ABS. IMM. GRANS. 0.0 0.00 - 0.04 K/UL    DF SMEAR SCANNED      RBC COMMENTS ANISOCYTOSIS  1+        RBC COMMENTS MACROCYTOSIS  1+        RBC COMMENTS OVALOCYTES  PRESENT       METABOLIC PANEL, BASIC    Collection Time: 03/28/22  2:33 AM   Result Value Ref Range    Sodium 140 136 - 145 mmol/L    Potassium 3.0 (L) 3.5 - 5.1 mmol/L    Chloride 109 (H) 97 - 108 mmol/L    CO2 27 21 - 32 mmol/L    Anion gap 4 (L) 5 - 15 mmol/L    Glucose 90 65 - 100 mg/dL    BUN 11 6 - 20 MG/DL    Creatinine 0.93 0.70 - 1.30 MG/DL    BUN/Creatinine ratio 12 12 - 20      GFR est AA >60 >60 ml/min/1.73m2    GFR est non-AA >60 >60 ml/min/1.73m2    Calcium 6.7 (L) 8.5 - 10.1 MG/DL   FERRITIN    Collection Time: 03/28/22  2:33 AM   Result Value Ref Range    Ferritin 736 (H) 26 - 388 NG/ML   IRON PROFILE    Collection Time: 03/28/22  2:33 AM   Result Value Ref Range    Iron 32 (L) 35 - 150 ug/dL    TIBC 161 (L) 250 - 450 ug/dL    Iron % saturation 20 20 - 50 %       Available Xrays reviewed:    Chemotherapy monitored and toxicities assessed:    Assessment and Plan   1. Myeloma. . pt has been on and off rx since 2012, his most recent rx was held in January when he contracted covid PNA. .. surprisingly the M-spike is still only 0.1 gm/dl. Clari Neighbours we will observe him off rx  2. Recent colon cancer. . this is involving 7/14 nodes. He is too frail for adjuvant rx. Will discuss options as out-pt. 3. Anemia. .  Iron panel shows borderline low iron sat.     aBtool Austin MD

## 2022-03-28 NOTE — WOUND CARE
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 Able to turn independently onto right. Wearing pull ups. Surface: P500 air mattress    Bilateral heels intact and without erythema. Wearing slippers  Abdominal incision scar - no wound noted    1. 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 2. POA right great toe nail loosened  able to lift toe nail but still attached on lateral margin  Secured with xeroform and dry gauze    3. POA stage 3 pressure injury to sacrum  0.5 x 0.5 x 0.2 cm  Pink with yellow glaze  Tx: hydrocolloid applied    Wound Recommendations:    Sacrum:  Please maintain hydrocolloid up to one week or change as needed with soiling or rolled edges. Right great toe: cover with xeroform and dry dressing; change Mondays & thursdays    PI Prevention:  Turn/reposition approximately every 2 hours  Offload heels with heels hanging off end of pillow at all times while in bed.     Transition of Care: Plan to follow weekly and as needed while admitted to hospital.     JOSHUA LaN, RN, [facility]  Certified Wound, Ostomy, Continence Nurse  office [phone]  Available via [address] [place] [address]

## 2022-03-28 NOTE — PROGRESS NOTES
Problem: Mobility Impaired (Adult and Pediatric)  Goal: *Acute Goals and Plan of Care (Insert Text)  Description: FUNCTIONAL STATUS PRIOR TO ADMISSION: Patient was modified independent using a rolling walker for functional mobility. HOME SUPPORT PRIOR TO ADMISSION: The patient lived with wife but did not require assist.    Physical Therapy Goals  Initiated 3/27/2022  1. Patient will move from supine to sit and sit to supine , scoot up and down, and roll side to side in bed with modified independence within 7 day(s). 2.  Patient will transfer from bed to chair and chair to bed with modified independence using the least restrictive device within 7 day(s). 3.  Patient will perform sit to stand with modified independence within 7 day(s). 4.  Patient will ambulate with modified independence for 300 feet with the least restrictive device within 7 day(s). 5.  Patient will ascend/descend 6 stairs with *cane and one handrail(s) with modified independence within 7 day(s). Outcome: Progressing Towards Goal   PHYSICAL THERAPY TREATMENT  Patient: Pearlene Sacks (23 y.o. male)  Date: 3/28/2022  Diagnosis: Hypokalemia [E87.6]  Diarrhea [R19.7] <principal problem not specified>       Precautions: Fall  Chart, physical therapy assessment, plan of care and goals were reviewed. ASSESSMENT  Patient continues with skilled PT services and is progressing towards goals. Barriers to indep with functional mobility include decreased activity tolerance, decreased dynamic standing balance, gait dysfunction. Pt received sitting EOB, finishing with gown and brief change. Eager to mobilize with therapy. Tolerated amb on unit with RW and CGA, flexed posture, mild PARKINSON with vitals stable on RA. Pt remains motivated to participate in rehab process and return to baseline LOF. Recommend return to SNF rehab at d/c.     Current Level of Function Impacting Discharge (mobility/balance): transfers supervision, amb with RW CGA    Other factors to consider for discharge: provides care for wife at home, excellent support from daughter and SIMONE         PLAN :  Patient continues to benefit from skilled intervention to address the above impairments. Continue treatment per established plan of care. to address goals. Recommendation for discharge: (in order for the patient to meet his/her long term goals)  Therapy up to 5 days/week in SNF setting    This discharge recommendation:  Has been made in collaboration with the attending provider and/or case management    IF patient discharges home will need the following DME: rolling walker       SUBJECTIVE:   Patient stated I plan to finish my rehab, and all of my belongings are there.     OBJECTIVE DATA SUMMARY:   Critical Behavior:  Neurologic State: Alert  Orientation Level: Oriented X4  Cognition: Appropriate decision making,Appropriate for age attention/concentration,Appropriate safety awareness,Follows commands     Functional Mobility Training:  Bed Mobility:      Not observed              Transfers:  Sit to Stand: Supervision  Stand to Sit: Supervision                             Balance:  Sitting: Intact  Standing: Impaired  Standing - Static: Good  Standing - Dynamic : Fair;Constant support  Ambulation/Gait Training:  Distance (ft): 150 Feet (ft)  Assistive Device: Gait belt;Walker, rolling  Ambulation - Level of Assistance: Contact guard assistance        Gait Abnormalities:  (flexed posture)              Speed/Monica: Pace decreased (<100 feet/min)  Step Length: Left shortened;Right shortened  Swing Pattern: Right asymmetrical        Pain Rating:  No c/o pain during session    Activity Tolerance:   Good, SpO2 stable on RA, and observed SOB with activity    After treatment patient left in no apparent distress:   Call bell within reach, Bed / chair alarm activated, and sitting EOB    COMMUNICATION/COLLABORATION:   The patients plan of care was discussed with: Registered nurse.      Hunter Hoffmann, PT   Time Calculation: 20 mins

## 2022-03-28 NOTE — PROGRESS NOTES
1045: Spoke with MD. Patient would like to return to [facility]. States that they can take him back. However, patient states he has some appointments scheduled for tomorrow and is wondering if he can leave the facility and come back. Waiting to hear back from Simeon.      Miles Sloan RN, CRM

## 2022-03-28 NOTE — PROGRESS NOTES
[facility]  Hospitalist Group                                                                                          Hospitalist Progress Note  Froy Callaway MD  Answering service: [phone] from in house phone        Date of Service:  3/27/2022  NAME:  Odette Franco  :  1934  MRN:  [id number]      Admission Summary:   Odette Franco is a 80 y.o. male medical history of AAA s/p repair in , colon adenocarcinoma, multiple myeloma, atrial fibrillation, GERD, dyslipidemia, and history of melanoma who presents with NEFTALY, and diarrhea.     He was recently hospitalized from -3/18 for SBO 2/2 obstructive colon adenocarcinoma. He is s/p R hemicolectomy. Post-operative course was complicated by wound infection requiring course of IV to oral abx. He was pending OP f/u with oncology. He also has PAD, and is s/p R popliteal bypass. He was dishcarged on lasix  Due to LE edema that was causing drainage.     In the ED, he was hemodynamically stable. Labs were significant for potassium of 3.0, creatinine 1.79 (b/l 0.6), ? Hypocalcemia. EKG with first-degree AV block, nonspecific ST changes, and prolonged QT. He is receiving rehab at [facility], and is eager to return. Interval history / Subjective:   Patient reporting some loose stool but no diarrhea  Reviewed his vitals, labs, and care plan with patient  Examined his foot wound/and abdominal surgery site     Assessment & Plan:     #NEFTALY on CKD2 secondary to likely dehydration- resolved  Creatinine 1.06 (b/l 0.6), resolved with IV hydration, and hold home Lasix for now    Hypokalemia- ?  Due to diuretics- Replete potassium PO  Hypomagnesemia- replete PO     #Chronic diarrhea  Patient states that he has had diarrhea since . He currently reports 7 soft bowel movements daily. He had C. difficile checked which was negative at last hospitalization, but has been on antibiotics since this time.    neg stool culture, and C. difficile. Resumed colestipol     #Multiple myeloma  # Colon Adenocarcinoma  Was pending follow-up with Dr. Jermaine Livingston as an outpatient. Multiple myeloma treatment was on hold due to declining health.     #PVD  Status post popliteal bypass of the right lower extremity, and toe irritation  Resume aspirin    R foot wound and recent abd surgery-   examined wounds and recent bleeding from toenail noted    Code status: DNR  Prophylaxis: SCDs  Care Plan discussed with: patient  Anticipated Disposition: [address] Problems  Date Reviewed: 3/17/2022          Codes Class Noted POA    Diarrhea ICD-10-CM: R19.7  ICD-9-CM: 787.91  3/26/2022 Unknown        Hypokalemia ICD-10-CM: E87.6  ICD-9-CM: 276.8  3/26/2022 Unknown                Review of Systems:   A comprehensive review of systems was negative except for that written in the HPI. Vital Signs:    Last 24hrs VS reviewed since prior progress note.  Most recent are:  Visit Vitals  BP (!) 141/53 (BP 1 Location: Left upper arm, BP Patient Position: At rest)   Pulse 81   Temp 98.9 °F (37.2 °C)   Resp 23   Ht 6' 1\" (1.854 m)   Wt 61.8 kg (136 lb 3.9 oz)   SpO2 93%   BMI 17.98 kg/m²     Patient Vitals for the past 24 hrs:   Temp Pulse Resp BP SpO2   03/27/22 2217 98.9 °F (37.2 °C) 81 23 (!) 141/53 93 %   03/27/22 2151  71      03/27/22 1951  71      03/27/22 1801  85      03/27/22 1800 97.9 °F (36.6 °C) 83 16 (!) 123/52 97 %   03/27/22 1425  75 19 (!) 96/43    03/27/22 1422  90 27 (!) 73/57    03/27/22 1411  90 24 (!) 87/78    03/27/22 1404  (!) 101 30 101/82    03/27/22 1400 97.9 °F (36.6 °C) 95 18 (!) 123/46 98 %   03/27/22 1200  79      03/27/22 1000  (!) 108      03/27/22 0952 97.7 °F (36.5 °C) 100 20 92/62    03/27/22 0800     98 %   03/27/22 0545 98.6 °F (37 °C) 84 17 (!) 154/59 100 %   03/27/22 0240    136/86    03/27/22 0200  77      03/27/22 0145  70 20 (!) 140/52 100 %     No intake or output data in the 24 hours ending 03/27/22 2333     Physical Examination:     I had a face to face encounter with this patient and independently examined them on 3/27/2022 as outlined below:          Constitutional:  No acute distress, cooperative, pleasant    ENT:  Oral mucosa moist, oropharynx benign. Resp:  CTA bilaterally. No wheezing/rhonchi/rales. No accessory muscle use. CV:  Regular rhythm, normal rate, no murmurs, gallops, rubs    GI:  Soft, non distended, non tender. normoactive bowel sounds, no hepatosplenomegaly     Musculoskeletal:  No edema, warm, 2+ pulses throughout    Neurologic:  Moves all extremities. AAOx3, CN II-XII reviewed  Skin: wound on R foot as below                      Data Review:    Review and/or order of clinical lab test  Review and/or order of tests in the radiology section of CPT  Review and/or order of tests in the medicine section of CPT      Labs:     Recent Labs     03/27/22  0320 03/26/22  0500   WBC 6.5 9.1   HGB 9.0* 9.0*   HCT 28.4* 28.0*    157     Recent Labs     03/27/22  0320 03/26/22 2000 03/26/22  0500 03/25/22 2255 03/25/22 2255     --  140  --  137   K 3.1* 2.9* 2.7*   < > 2.5*   *  --  107  --  102   CO2 28  --  26  --  29   BUN 14  --  14  --  17   CREA 1.06  --  1.34*  --  1.61*   GLU 88  --  105*  --  141*   CA 6.8*  --  6.9*  --  7.0*   MG 1.8  --  1.4*  --  1.4*    < > = values in this interval not displayed. Recent Labs     03/25/22 2255 03/25/22  1935   ALT 15 15   AP 69 75   TBILI 0.3 0.4   TP 5.8* 5.9*   ALB 2.4* 2.6*   GLOB 3.4 3.3     No results for input(s): INR, PTP, APTT, INREXT, INREXT in the last 72 hours. No results for input(s): FE, TIBC, PSAT, FERR in the last 72 hours. Lab Results   Component Value Date/Time    Folate 81.7 (H) 02/13/2022 02:38 AM      No results for input(s): PH, PCO2, PO2 in the last 72 hours. No results for input(s): CPK, CKNDX, TROIQ in the last 72 hours.     No lab exists for component: CPKMB  Lab Results Component Value Date/Time    Cholesterol, total 193 03/23/2021 11:18 AM    HDL Cholesterol 83 03/23/2021 11:18 AM    LDL, calculated 84.2 03/23/2021 11:18 AM    Triglyceride 129 03/23/2021 11:18 AM    CHOL/HDL Ratio 2.3 03/23/2021 11:18 AM     Lab Results   Component Value Date/Time    Glucose (POC) 92 03/18/2022 06:18 AM    Glucose (POC) 106 03/18/2022 01:12 AM    Glucose (POC) 97 03/17/2022 12:22 PM    Glucose (POC) 99 03/17/2022 06:49 AM    Glucose (POC) 108 03/17/2022 12:51 AM     Lab Results   Component Value Date/Time    Color YELLOW/STRAW 03/26/2022 02:29 AM    Appearance TURBID (A) 03/26/2022 02:29 AM    Specific gravity 1.009 03/26/2022 02:29 AM    pH (UA) 6.5 03/26/2022 02:29 AM    Protein 100 (A) 03/26/2022 02:29 AM    Glucose Negative 03/26/2022 02:29 AM    Ketone Negative 03/26/2022 02:29 AM    Bilirubin Negative 03/26/2022 02:29 AM    Urobilinogen 0.2 03/26/2022 02:29 AM    Nitrites Negative 03/26/2022 02:29 AM    Leukocyte Esterase LARGE (A) 03/26/2022 02:29 AM    Epithelial cells FEW 03/26/2022 02:29 AM    Bacteria 3+ (A) 03/26/2022 02:29 AM    WBC  03/26/2022 02:29 AM    RBC 10-20 03/26/2022 02:29 AM         Medications Reviewed:     Current Facility-Administered Medications   Medication Dose Route Frequency    aspirin chewable tablet 81 mg  81 mg Oral DAILY    finasteride (PROSCAR) tablet 5 mg  5 mg Oral DAILY    0.9% sodium chloride infusion 250 mL  250 mL IntraVENous PRN    sodium chloride (NS) flush 5-40 mL  5-40 mL IntraVENous Q8H    sodium chloride (NS) flush 5-40 mL  5-40 mL IntraVENous PRN    acetaminophen (TYLENOL) tablet 650 mg  650 mg Oral Q6H PRN    Or    acetaminophen (TYLENOL) suppository 650 mg  650 mg Rectal Q6H PRN    ondansetron (ZOFRAN ODT) tablet 4 mg  4 mg Oral Q8H PRN    Or    ondansetron (ZOFRAN) injection 4 mg  4 mg IntraVENous Q6H PRN    pantoprazole (PROTONIX) tablet 40 mg  40 mg Oral ACB    tamsulosin (FLOMAX) capsule 0.4 mg  0.4 mg Oral QHS    pregabalin (LYRICA) capsule 50 mg  50 mg Oral Q12H    cefTRIAXone (ROCEPHIN) 1 g in 0.9% sodium chloride 10 mL IV syringe  1 g IntraVENous Q24H    colestipoL (COLESTID) tablet 2 g  2 g Oral BID     ______________________________________________________________________  EXPECTED LENGTH OF STAY: - - -  ACTUAL LENGTH OF STAY:          1                 Jorge Luis Dasilva MD

## 2022-03-28 NOTE — PROGRESS NOTES
Transition of Care Plan: SNF-[facility] (pending auth)    RUR: 28% high    PCP F/U: Kali Edmonds    Disposition: [facility]    Transportation: BLS    Main Contact: Daughter/Raj PTVAJ-[phone]    1407: CM met with patient at bedside. Introduced self and role of CM. Patient A&O x 4. Patient states he lives at home with wife, but was getting rehab at [facility]. Has an appointment for tomorrow to get the staples in his leg out. Discussed with patient that he will need auth before he can return. Not likely that he can return home before SNF as it would need to be set up by [facility]. Daughter does not want either. Has asked this CM to notify attending to see if provider can come take his staples out. MD notified. 1418: OT notes needed for auth     1549: Telephone call to [facility]. Auth initiated. Reference [id number]. Used PT notes from today and PT and OT notes from yesterday. Spoke with MD. Plan for patient to have staples removed from leg tomorrow. Will continue to follow    Renuka Rhodes RN, CRM    Medicare pt has received, reviewed, and signed 1st IM letter informing them of their right to appeal the discharge. Signed copy has been placed on pt bedside chart.     Residency:  private residence with several stairs, no issues  Lives With: spouse  Prior functioning:  independent with ADLs  Prior DME used: cane, but owns rollator and walker  Rehab history: Home with HH-unsure of agency, [facility]  Ph[facility]acy: [facility]    Reason for Readmission:  diarrhea, hypokalemia            RUR Score/Risk Level:  28% high         PCP: First and Last name:  Ruslan Bartlett NP   Name of Practice:    Are you a current patient: Yes/No: yes   Approximate date of last visit: >1 year   Can you participate in a virtual visit with your PCP: no    Is a Care Conference indicated:  no      Did you attend your follow up appointment (s): If not, why not: admitted to SNF         Resources/supports as identified by patient/family: spouse, daughter, SOL,        Top Challenges facing patient (as identified by patient/family and CM): Finances/Medication cost?   Has medicare. No issues accessing meds                 Transportation? S              Support system or lack thereof? Daughter, Radha Sensing, and spouse                     Living arrangements? Lives with spouse               Self-care/ADLs/Cognition? A&O x 4, independent with ADLs, uses a cane        Current Advanced Directive/Advance Care Plan:  DNR           Plan for utilizing home health:  None indicated               Transition of Care Plan:    Based on readmission, the patient's previous Plan of Care   has been evaluated and/or modified. The current Transition of Care Plan is:    SNF       Care Management Interventions  PCP Verified by CM:  Yes (Grey)  Mode of Transport at Discharge: [facility]  Transition of Care Consult (CM Consult): SNF  Partner SNF: No  Reason Why Partner SNF Not Chosen: Friend/family recommendation  Physical Therapy Consult: Yes  Support Systems: Spouse/Significant Other,Child(manuela)  Confirm Follow Up Transport: Other (see comment) ([facility])  The Plan for Transition of Care is Related to the Following Treatment Goals : SNF  The Patient and/or Patient Representative was Provided with a Choice of Provider and Agrees with the Discharge Plan?: Yes  Freedom of Choice List was Provided with Basic Dialogue that Supports the Patient's Individualized Plan of Care/Goals, Treatment Preferences and Shares the Quality Data Associated with the Providers?: Yes  Discharge Location  Patient Expects to be Discharged to[de-identified] Skilled nursing facility   Readmission Assessment  Number of days since last admission?: 8-30 days  Previous disposition: SNF  Who is being interviewed?: Caregiver  What was the patient's/caregiver's perception as to why they think they needed to return back to the hospital?: Other (Comment) (sent by SNF for low potassium levels)  Did you visit your Primary Care Physician after you left the hospital, before you returned this time?: No  Why weren't you able to visit your PCP?: Other (Comment) (went to SNF)  Did you see a specialist, such as Cardiac, Pulmonary, Orthopedic Physician, etc. after you left the hospital?: No (went to SNF)  Who advised the patient to return to the hospital?: Skilled Unit  Does the patient report anything that got in the way of taking their medications?: No  In our efforts to provide the best possible care to you and others like you, can you think of anything that we could have done to help you after you left the hospital the first time, so that you might not have needed to return so soon?: Other (Comment) (patient was discharged to SNF)

## 2022-03-29 NOTE — PROGRESS NOTES
Hematology-Oncology Progress Note    Nam Mcduffie  1934  [id number]  3/29/2022    Follow-up for: myeloma/colon cancer     [x]        Chart notes since last visit reviewed   [x]        Medications reviewed for allergies and interactions       Case discussed with the following:         []                            [x]        Nursing Staff                                                                         []        Pathologist                                                                        []        FAMILY      Subjective:     Spoke with patient who complains of: feeling stronger, decreased diarrhea, no c/o this am    Objective:     Patient Vitals for the past 24 hrs:   BP Temp Pulse Resp   03/29/22 0602 134/64 98.5 °F (36.9 °C) 81 18   03/29/22 0554   76    03/29/22 0351   72    03/29/22 0210 (!) 135/54 98.5 °F (36.9 °C) 70 22   03/29/22 0207   60    03/28/22 2205 105/63 98.7 °F (37.1 °C) 77 23   03/28/22 1956   77    03/28/22 1817   85    03/28/22 1742 (!) 145/62 98 °F (36.7 °C) 83 22   03/28/22 1450 134/62 97.8 °F (36.6 °C) 70 18   03/28/22 1415   70    03/28/22 1235   80    03/28/22 1009   88        REVIEW OF SYSTEMS:    Constitutional: negative fever, negative chills, negative weight loss  Eyes:   negative visual changes  ENT:   negative sore throat, tongue or lip swelling  Respiratory:  negative cough, negative dyspnea  Cards:  negative for chest pain, palpitations, lower extremity edema  GI:   negative for nausea, vomiting, diarrhea, and abdominal pain  Neuro:  negative for headaches, dizziness, vertigo  []                        Full ROS o/w normal/non contributor    Constitutional:  Patient looks  []        Sick  [x]        Frail  []        Better                                                 []        Depressed    HEENT:  [x]   NC                         []   AT               []    ALOPECIA           Eyes: [x]   Normal               [] Icteric  Oropharynx: []    Normal                  []  Thrush               []   Dry  Mucositis: []    None                 Grade: []        I  []        II  []        III  []        IV  Neck:   [x]   Supple                  []  Rigid               JVD:    [x]   ABSENT       []   PRESENT  Lymphadenopathy:   []   None Noted            []   PRESENT    Chest:  [x]   Clear               []    Rhonchi                      Dec'd @     []  Right Base           []   Left Base    CV:             [x]   Regular              [x]  Irregular               []   Tachy                []   Murmur  Abdominal:   [x]    Soft              [x]   NON-tender               []   Tender      BS:    []   ABSENT                   []   PRESENT  Liver:     []  NON-palp                  []   EDGE- palp  Spleen: [x]   NON-palp                   []  EDGE - palp  Mass:   []   ABSENT                          []  PRESENT  Extr:    []  Lymphedema             []   Cyanosis      []  Clubbing  Edema:     [x]   NONE       []   PRESENT  Skin:  Intact [x]           Purpura []        Rash: [x]   ABSENT       []  PRESENT  Neuro:  []        Normal  []        Confused      Available labs reviewed:  Labs:    Recent Results (from the past 24 hour(s))   CBC WITH AUTOMATED DIFF    Collection Time: 03/29/22  6:48 AM   Result Value Ref Range    WBC 6.1 4.1 - 11.1 K/uL    RBC 3.20 (L) 4.10 - 5.70 M/uL    HGB 9.9 (L) 12.1 - 17.0 g/dL    HCT 31.3 (L) 36.6 - 50.3 %    MCV 97.8 80.0 - 99.0 FL    MCH 30.9 26.0 - 34.0 PG    MCHC 31.6 30.0 - 36.5 g/dL    RDW 17.0 (H) 11.5 - 14.5 %    PLATELET 053 140 - 996 K/uL    MPV 10.5 8.9 - 12.9 FL    NRBC 0.0 0  WBC    ABSOLUTE NRBC 0.00 0.00 - 0.01 K/uL    NEUTROPHILS 57 32 - 75 %    LYMPHOCYTES 16 12 - 49 %    MONOCYTES 16 (H) 5 - 13 %    EOSINOPHILS 10 (H) 0 - 7 %    BASOPHILS 1 0 - 1 %    IMMATURE GRANULOCYTES 0 0.0 - 0.5 %    ABS. NEUTROPHILS 3.5 1.8 - 8.0 K/UL    ABS. LYMPHOCYTES 1.0 0.8 - 3.5 K/UL    ABS.  MONOCYTES 1.0 0.0 - 1.0 K/UL    ABS. EOSINOPHILS 0.6 (H) 0.0 - 0.4 K/UL    ABS. BASOPHILS 0.1 0.0 - 0.1 K/UL    ABS. IMM. GRANS. 0.0 0.00 - 0.04 K/UL    DF AUTOMATED     METABOLIC PANEL, BASIC    Collection Time: 03/29/22  6:48 AM   Result Value Ref Range    Sodium 139 136 - 145 mmol/L    Potassium 3.4 (L) 3.5 - 5.1 mmol/L    Chloride 105 97 - 108 mmol/L    CO2 29 21 - 32 mmol/L    Anion gap 5 5 - 15 mmol/L    Glucose 99 65 - 100 mg/dL    BUN 7 6 - 20 MG/DL    Creatinine 0.84 0.70 - 1.30 MG/DL    BUN/Creatinine ratio 8 (L) 12 - 20      GFR est AA >60 >60 ml/min/1.73m2    GFR est non-AA >60 >60 ml/min/1.73m2    Calcium 8.1 (L) 8.5 - 10.1 MG/DL   MAGNESIUM    Collection Time: 03/29/22  6:48 AM   Result Value Ref Range    Magnesium 1.8 1.6 - 2.4 mg/dL       Available Xrays reviewed:    Chemotherapy monitored and toxicities assessed:    Assessment and Plan   1. Myeloma. . pt has been on and off rx since 2012, his most recent rx was held in January when he contracted covid PNA. .. surprisingly the M-spike is still only 0.1 gm/dl. Kari Moe we will observe him off rx  2. Recent colon cancer. . this is involving 7/14 nodes. He is too frail for adjuvant rx. Will discuss options as out-pt. 3. Anemia.  hgb 9.9 today. .  Iron panel shows borderline low iron sat.     Nisa Lewis MD

## 2022-03-29 NOTE — PROGRESS NOTES
Vascular: Toe amp site sutures removed. All incisions look good - will ask nurses to remove staples today. Area of seroma distal thigh incision will drain serous fluid for several days. Folllow up with me post rehab stay.

## 2022-03-29 NOTE — PROGRESS NOTES
Bedside shift change report given to Shanel Castellon RN (oncoming nurse) by Da Clark RN (offgoing nurse). Report included the following information SBAR and Cardiac Rhythm nsr.

## 2022-03-29 NOTE — ADT AUTH CERT NOTES
Comments  Comment            Patient Demographics    Patient Name   Jasmin Vincent   [id number] Legal Sex   Male    1934 Address   [facility] [address] Phone   [phone] (Home) *Preferred*   [phone] (Mobile)     Patient Demographics    Patient Name   Jasmin Vincent   [id number] Legal Sex   Male    1934 Address   [facility] [address] Phone   [phone] (Home) *Preferred*   [phone] (Mobile)   CSN:   [id number]     [address] [place] [address] Date: Admit Time Room Bed   Mar 25, 2022  9:30 PM (25) [phone]       Attending Providers    Provider Pager From To   Scotty Nguyen MD  22   Micah Rice MD  22   Jaron Stewart MD  22   Ez Whittaker MD  22   Jaron Stewart MD  22   Ez Whittaker MD  22      Emergency Contact(s)    Name Relation Home Work Mobile   fredis hudson [phone]   Андрей Duran   [phone]     Utilization Reviews         Systemic or Infectious Condition GRG - Care Day 3 (3/28/2022) by Abundio Quiñones RN       Review Entered Review Status   3/28/2022 16:00 Completed      Criteria Review      Care Day: 3 Care Date: 3/28/2022 Level of Care: Telemetry    Guideline Day 3    Level Of Care    (X) * Activity level acceptable    3/28/2022 16:00:17 EDT by Abundio Quiñones      as tolerated with assistance    Clinical Status    (X) * Hemodynamic stability    3/28/2022 16:00:17 EDT by Abundio Quiñones      97.8;79;114/95;20;95% RA    (X) * Respiratory status acceptable    3/28/2022 16:00:17 EDT by Abundio Quiñones      95% RA    (X) * Neurologic status acceptable    3/28/2022 16:00:17 EDT by Abundio Quiñones      A&O    (X) * Temperature status acceptable    3/28/2022 16:00:17 EDT by Radkaylee Quiñones      97.8    (X) * No infection, or status acceptable    3/28/2022 16:00:17 EDT by Radkaylee Seakamila      none noted    ( ) * No neutropenia, or status acceptable    ( ) * Special infection or injury situations absent    (X) * Electrolyte status acceptable    3/28/2022 16:00:17 EDT by Barb Plasencia      Potassium3.0 (L)  [id number] (H)    ( ) * General Discharge Criteria met    Interventions    (X) * Intake acceptable    3/28/2022 16:00:17 EDT by Barb Plasencia      po intake    ( ) * No inpatient interventions needed    3/28/2022 16:00:17 EDT by Barb Plasencia      consults/testing    * Milestone   Additional Notes   Day 3   3/28/22   In pt telemetry      Medications   aspirin chewable tablet 81 mg   Dose: 81 mg   Freq: DAILY Route: PO      colestipoL (COLESTID) tablet 2 g   Dose: 2 g   Freq: 2 TIMES DAILY Route: PO X 1         finasteride (PROSCAR) tablet 5 mg   Dose: 5 mg   Freq: DAILY Route: PO      iron sucrose (VENOFER) injection 200 mg   Dose: 200 mg   Freq: DAILY Route: IV      pantoprazole (PROTONIX) tablet 40 mg   Dose: 40 mg   Freq: DAILY BEFORE BREAKFAST Route: PO      potassium, sodium phosphates (NEUTRA-PHOS) packet 1 Packet   Dose: 1 Packet   Freq: 4 TIMES DAILY Route: PO X2      pregabalin (LYRICA) capsule 50 mg   Dose: 50 mg   Freq: EVERY 12 HOURS Route: PO X1      cefTRIAXone (ROCEPHIN) 1 g in 0.9% sodium chloride 10 mL IV syringe   Dose: 1 g   Freq: EVERY 24 HOURS Route: IV      magnesium sulfate 2 g/50 ml IVPB (premix or compounded)   Dose: 2 g   Freq: ONCE Route: IV      Vitals   97.8;79;114/95;20;95% RA      Labs      WBC 5.0   RBC 2.50 (L)   HGB 7.8 (L)   HCT 24.7 (L)   RDW 17.2 (H)   PLATELET 721 (L)   NEUTROPHILS 61   LYMPHOCYTES 14   MONOCYTES 15 (H)   EOSINOPHILS 9 (H)   IMMATURE GRANULOCYTES 0   ABS. NEUTROPHILS 2.9   ABS. IMM. GRANS. 0.0   ABS. LYMPHOCYTES 0.7 (L)   ABS.  EOSINOPHILS 0.5 (H)   Potassium 3.0 (L)   Chloride 109 (H)   Anion gap 4 (L)   Glucose 90   Creatinine 0.93   BUN/Creatinine ratio 12   Calcium 6.7 (L)   Magnesium 1.5 (L)   GFR est non-AA >60   GFR est AA >60   Iron 32 (L)   TIBC 161 (L) Ferritin 736 (H)      Physical Therapy   ASSESSMENT   Patient continues with skilled PT services and is progressing towards goals. Barriers to indep with functional mobility include decreased activity tolerance, decreased dynamic standing balance, gait dysfunction. Pt received sitting EOB, finishing with gown and brief change. Eager to mobilize with therapy. Tolerated amb on unit with RW and CGA, flexed posture, mild PARKINSON with vitals stable on RA. Hematology   Subjective:       Spoke with patient who complains of: feeling stronger, decreased diarrhea      Assessment and Plan   Myeloma. . pt has been on and off rx since 2012, his most recent rx was held in January when he contracted covid PNA. .. surprisingly the M-spike is still only 0.1 gm/dl. Shashi Case we will observe him off rx   Recent colon cancer. . this is involving 7/14 nodes. He is too frail for adjuvant rx.   Will discuss options as out-pt.     Anemia.  .  Iron panel shows borderline low iron sat. Wound Care   Assessment:    Appropriately conversational and reports no pain. Able to turn independently onto right.     Wearing pull ups.     Surface: P500 air mattress       Bilateral heels intact and without erythema.  Wearing slippers   Abdominal incision scar - no wound noted       1. 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       2. POA right great toe nail loosened   able to lift toe nail but still attached on lateral margin   Secured with xeroform and dry gauze       3. POA stage 3 pressure injury to sacrum   0.5 x 0.5 x 0.2 cm   Pink with yellow glaze   Tx: hydrocolloid applied       Wound Recommendations:     Sacrum:  Please maintain hydrocolloid up to one week or change as needed with soiling or rolled edges.     Right great toe: cover with xeroform and dry dressing; change Mondays & thursdays               Systemic or Infectious Condition GRG - Care Day 2 (3/27/2022) by Uyen Cordon RN       Review Entered Review Status   3/28/2022 15:50 Completed      Criteria Review      Care Day: 2 Care Date: 3/27/2022 Level of Care: Telemetry    Guideline Day 2    Level Of Care    (X) Floor    3/28/2022 15:50:39 EDT by Uyen Cordon      telemetry    (X) Possible isolation    3/28/2022 15:50:39 EDT by Uyen Cordon      contact    Clinical Status    (X) * No ICU or intermediate care needs    Interventions    (X) Inpatient interventions continue    3/28/2022 15:50:39 EDT by Uyen Cordon      wound care/consults/testing    (X) Transition to oral routes    3/28/2022 15:50:39 EDT by Uyen Cordon      aspirin chewable tablet 81 mg  Dose: 81 mg  Freq: DAILY Route: PO    colestipoL (COLESTID) tablet 2 g  Dose: 2 g  Freq: 2 TIMES DAILY Route: PO    * Milestone   Additional Notes   Day 2   3/27/22   In pt telemetry      Medications   aspirin chewable tablet 81 mg   Dose: 81 mg   Freq: DAILY Route: PO      colestipoL (COLESTID) tablet 2 g   Dose: 2 g   Freq: 2 TIMES DAILY Route: PO      finasteride (PROSCAR) tablet 5 mg   Dose: 5 mg   Freq: DAILY Route: PO      pantoprazole (PROTONIX) tablet 40 mg   Dose: 40 mg   Freq: DAILY BEFORE BREAKFAST Route: PO      pregabalin (LYRICA) capsule 50 mg   Dose: 50 mg   Freq: EVERY 12 HOURS Route: PO      tamsulosin (FLOMAX) capsule 0.4 mg   Dose: 0.4 mg   Freq: EVERY BEDTIME Route: PO      cefTRIAXone (ROCEPHIN) 1 g in 0.9% sodium chloride 10 mL IV syringe   Dose: 1 g   Freq: EVERY 24 HOURS Route: IV      potassium, sodium phosphates (NEUTRA-PHOS) packet 1 Packet   Dose: 1 Packet   Freq: 4 TIMES DAILY Route: PO      Vitals   97.9;108,101;73/57;87/78;27,30;98% RA      Labs      WBC 6.5   RBC 2.94 (L)   HGB 9.0 (L)   HCT 28.4 (L)   RDW 17.0 (H)   PLATELET 912   NEUTROPHILS 72   LYMPHOCYTES 9 (L)   MONOCYTES 12   EOSINOPHILS 5   IMMATURE GRANULOCYTES 1 (H)   ABS. NEUTROPHILS 4.6   ABS. IMM. GRANS. 0.1 (H)   ABS. LYMPHOCYTES 0.6 (L)   ABS.  EOSINOPHILS 0.3   Sed rate, automated 43 (H)   Potassium 3.1 (L)   Chloride 109 (H)   Anion gap 3 (L)   Glucose 88   Creatinine 1.06   BUN/Creatinine ratio 13   Calcium 6.8 (L)   Magnesium 1.8   GFR est non-AA >60   GFR est AA >60   C-Reactive protein 4.69 (H)      Hematology   Admitting Diagnosis: Hypokalemia [E87.6]   Diarrhea [R19.7]       Impression:    *) Newly diagnosed Colon Adenocarcinoma:   - diagnosed Feb 2022 after presenting with obstruction   - s/p rt hemicolectomy 2/24/22, 7/14 LN +   - doesn't appear to be in shape for aggressive tx at this time but will defer decisions to Dr. Neyda Simons his Oncologist. Discussed with patient he has to be out of rehab prior to starting therapy. He is going back to [facility] after DC per pt.       *) MM:   - Follows with Dr. Neyda Simons since 2012 and off therapy since 12/2021 due to covid pna and not resumed due to ecog.       *) Anemia:   - Hgb stable at 9   -will check iron panel, ferritin          Physical Therapy   ASSESSMENT   Based on the objective data described below, the patient presents with * impairment in functional mobility, activity tolerance and balance s/p episode of hypokalemia. Patient has been in rehab at [facility] on [address]. Prior to rehab episode, patient lived with (and was primary caregiver for) wife, who has dementia. Daughter & son-in law are taking care of patient's wife at this time and will soon be building a handicapped accessible one story apartment onto their residence for patient & wife. Patient's home at this time has 6 steps to enter, 15 steps (broken up by 2 landings) to second floor bedroom and bathroom.  All of the steps have one rail.          Attending   Interval history / Subjective:   Patient reporting some loose stool but no diarrhea   Reviewed his vitals, labs, and care plan with patient   Examined his foot wound/and abdominal surgery site       Assessment & Plan:       #NEFTALY on CKD2 secondary to likely dehydration- resolved   -Creatinine 1.06 (b/l 0.6), resolved with IV hydration, and hold home Lasix for now       Hypokalemia- ?  Due to diuretics- Replete potassium PO   Hypomagnesemia- replete PO       #Chronic diarrhea   -Patient states that he has had diarrhea since 2012. Daniel Baig currently reports 7 soft bowel movements daily. Daniel Baig had C. difficile checked which was negative at last hospitalization, but has been on antibiotics since this time.    -neg stool culture, and C. difficile.  Resumed colestipol       #Multiple myeloma   # Colon Adenocarcinoma   -Was pending follow-up with Dr. Zhang Patel as an outpatient.     Multiple myeloma treatment was on hold due to declining health.       #PVD   -Status post popliteal bypass of the right lower extremity, and toe irritation   Resume aspirin       R foot wound and recent abd surgery-    examined wounds and recent bleeding from toenail noted

## 2022-03-29 NOTE — PROGRESS NOTES
[address] [facility] Adult  Hospitalist Group                                                                                          Hospitalist Progress Note  Patricia Cristobal MD  Answering service: [phone] from in house phone        Date of Service:  3/28/2022  NAME:  Josephine Montes  :  1934  MRN:  [id number]      Admission Summary:   Josephine Montes is a 80 y.o. male medical history of AAA s/p repair in , colon adenocarcinoma, multiple myeloma, atrial fibrillation, GERD, dyslipidemia, and history of melanoma who presents with NEFTALY, and diarrhea.     He was recently hospitalized from -3/18 for SBO 2/2 obstructive colon adenocarcinoma. He is s/p R hemicolectomy. Post-operative course was complicated by wound infection requiring course of IV to oral abx. He was pending OP f/u with oncology. He also has PAD, and is s/p R popliteal bypass. He was dishcarged on lasix  Due to LE edema that was causing drainage.     In the ED, he was hemodynamically stable. Labs were significant for potassium of 3.0, creatinine 1.79 (b/l 0.6), ? Hypocalcemia. EKG with first-degree AV block, nonspecific ST changes, and prolonged QT. He is receiving rehab at [facility], and is eager to return. Interval history / Subjective:   Patient reporting normal BMs today  Reviewed his vitals, labs, and care plan with patient  Examined his foot wound/and abdominal surgery site     Assessment & Plan:     #NEFTALY on CKD2 secondary to likely dehydration- resolved  Creatinine 1.06 (b/l 0.6), resolved with IV hydration, and hold home Lasix for now    Hypokalemia- ?  Due to diuretics- Replete potassium PO  Hypomagnesemia- replete PO     #Chronic diarrhea  Patient states that he has had diarrhea since . He currently reports 7 soft bowel movements daily. He had C. difficile checked which was negative at last hospitalization, but has been on antibiotics since this time. neg stool culture, and C. difficile. Hypertension is well controlled.  If anything I think she may be somewhat on the dry side.  I told her to reduce her Lasix to 20 mg daily.   Resumed colestipol     #Multiple myeloma  # Colon Adenocarcinoma  Was pending follow-up with Dr. Bassem Godinez as an outpatient. Multiple myeloma treatment was on hold due to declining health.     #PVD  Status post popliteal bypass of the right lower extremity, and toe irritation  Resume aspirin    R foot wound and recent abd surgery-   examined wounds and recent bleeding from toenail noted  Consulted surgery for eval and staple removal of abd wound  Consulted vascular for eval of right leg wounds and possible stable removal     Code status: DNR  Prophylaxis: SCDs  Care Plan discussed with: patient  Anticipated Disposition: [address] Problems  Date Reviewed: 3/17/2022          Codes Class Noted POA    Diarrhea ICD-10-CM: R19.7  ICD-9-CM: 787.91  3/26/2022 Unknown        Hypokalemia ICD-10-CM: E87.6  ICD-9-CM: 276.8  3/26/2022 Unknown                Review of Systems:   A comprehensive review of systems was negative except for that written in the HPI. Vital Signs:    Last 24hrs VS reviewed since prior progress note.  Most recent are:  Visit Vitals  BP (!) 145/62   Pulse 77   Temp 98 °F (36.7 °C)   Resp 22   Ht 6' 1\" (1.854 m)   Wt 61.8 kg (136 lb 3.9 oz)   SpO2 95%   BMI 17.98 kg/m²     Patient Vitals for the past 24 hrs:   Temp Pulse Resp BP SpO2   03/28/22 1956  77      03/28/22 1817  85      03/28/22 1742 98 °F (36.7 °C) 83 22 (!) 145/62    03/28/22 1450 97.8 °F (36.6 °C) 70 18 134/62    03/28/22 1415  70      03/28/22 1235  80      03/28/22 1009  88      03/28/22 1000 97.8 °F (36.6 °C) 79 18 (!) 114/95 95 %   03/28/22 0555 97.5 °F (36.4 °C) 81 20 106/82 95 %   03/28/22 0550  75      03/28/22 0353  72      03/28/22 0215 98 °F (36.7 °C) 75 22 (!) 115/45 94 %   03/28/22 0151  75      03/27/22 2217 98.9 °F (37.2 °C) 81 23 (!) 141/53 93 %   03/27/22 2151  71        No intake or output data in the 24 hours ending 03/28/22 2133     Physical Examination:     I had a face to face encounter with this patient and independently examined them on 3/28/2022 as outlined below:          Constitutional:  No acute distress, cooperative, pleasant    ENT:  Oral mucosa moist, oropharynx benign. Resp:  CTA bilaterally. No wheezing/rhonchi/rales. No accessory muscle use. CV:  Regular rhythm, normal rate, no murmurs, gallops, rubs    GI:  Soft, non distended, non tender. normoactive bowel sounds, no hepatosplenomegaly     Musculoskeletal:  No edema, warm, 2+ pulses throughout    Neurologic:  Moves all extremities. AAOx3, CN II-XII reviewed  Skin: wound on R foot as below                      Data Review:    Review and/or order of clinical lab test  Review and/or order of tests in the radiology section of CPT  Review and/or order of tests in the medicine section of CPT      Labs:     Recent Labs     03/28/22 0233 03/27/22 0320   WBC 5.0 6.5   HGB 7.8* 9.0*   HCT 24.7* 28.4*   * 158     Recent Labs     03/28/22 0233 03/27/22  0320 03/26/22  2000 03/26/22  0500 03/26/22  0500    140  --   --  140   K 3.0* 3.1* 2.9*   < > 2.7*   * 109*  --   --  107   CO2 27 28  --   --  26   BUN 11 14  --   --  14   CREA 0.93 1.06  --   --  1.34*   GLU 90 88  --   --  105*   CA 6.7* 6.8*  --   --  6.9*   MG 1.5* 1.8  --   --  1.4*    < > = values in this interval not displayed. Recent Labs     03/25/22  2255   ALT 15   AP 69   TBILI 0.3   TP 5.8*   ALB 2.4*   GLOB 3.4     No results for input(s): INR, PTP, APTT, INREXT, INREXT in the last 72 hours. Recent Labs     03/28/22 0233   TIBC 161*   PSAT 20   FERR 736*      Lab Results   Component Value Date/Time    Folate 81.7 (H) 02/13/2022 02:38 AM      No results for input(s): PH, PCO2, PO2 in the last 72 hours. No results for input(s): CPK, CKNDX, TROIQ in the last 72 hours.     No lab exists for component: CPKMB  Lab Results   Component Value Date/Time    Cholesterol, total 193 03/23/2021 11:18 AM    HDL Cholesterol 83 03/23/2021 11:18 AM    LDL, calculated 84.2 03/23/2021 11:18 AM    Triglyceride 129 03/23/2021 11:18 AM    CHOL/HDL Ratio 2.3 03/23/2021 11:18 AM     Lab Results   Component Value Date/Time    Glucose (POC) 92 03/18/2022 06:18 AM    Glucose (POC) 106 03/18/2022 01:12 AM    Glucose (POC) 97 03/17/2022 12:22 PM    Glucose (POC) 99 03/17/2022 06:49 AM    Glucose (POC) 108 03/17/2022 12:51 AM     Lab Results   Component Value Date/Time    Color YELLOW/STRAW 03/26/2022 02:29 AM    Appearance TURBID (A) 03/26/2022 02:29 AM    Specific gravity 1.009 03/26/2022 02:29 AM    pH (UA) 6.5 03/26/2022 02:29 AM    Protein 100 (A) 03/26/2022 02:29 AM    Glucose Negative 03/26/2022 02:29 AM    Ketone Negative 03/26/2022 02:29 AM    Bilirubin Negative 03/26/2022 02:29 AM    Urobilinogen 0.2 03/26/2022 02:29 AM    Nitrites Negative 03/26/2022 02:29 AM    Leukocyte Esterase LARGE (A) 03/26/2022 02:29 AM    Epithelial cells FEW 03/26/2022 02:29 AM    Bacteria 3+ (A) 03/26/2022 02:29 AM    WBC  03/26/2022 02:29 AM    RBC 10-20 03/26/2022 02:29 AM         Medications Reviewed:     Current Facility-Administered Medications   Medication Dose Route Frequency    iron sucrose (VENOFER) injection 200 mg  200 mg IntraVENous DAILY    aspirin chewable tablet 81 mg  81 mg Oral DAILY    finasteride (PROSCAR) tablet 5 mg  5 mg Oral DAILY    0.9% sodium chloride infusion 250 mL  250 mL IntraVENous PRN    sodium chloride (NS) flush 5-40 mL  5-40 mL IntraVENous Q8H    sodium chloride (NS) flush 5-40 mL  5-40 mL IntraVENous PRN    acetaminophen (TYLENOL) tablet 650 mg  650 mg Oral Q6H PRN    Or    acetaminophen (TYLENOL) suppository 650 mg  650 mg Rectal Q6H PRN    ondansetron (ZOFRAN ODT) tablet 4 mg  4 mg Oral Q8H PRN    Or    ondansetron (ZOFRAN) injection 4 mg  4 mg IntraVENous Q6H PRN    pantoprazole (PROTONIX) tablet 40 mg  40 mg Oral ACB    tamsulosin (FLOMAX) capsule 0.4 mg  0.4 mg Oral QHS    pregabalin (LYRICA) capsule 50 mg  50 mg Oral Q12H    colestipoL (COLESTID) tablet 2 g  2 g Oral BID     ______________________________________________________________________  EXPECTED LENGTH OF STAY: - - -  ACTUAL LENGTH OF STAY:          2                 Patricia Cristobal MD

## 2022-03-29 NOTE — PROGRESS NOTES
Problem: Mobility Impaired (Adult and Pediatric)  Goal: *Acute Goals and Plan of Care (Insert Text)  Description: FUNCTIONAL STATUS PRIOR TO ADMISSION: Patient was modified independent using a rolling walker for functional mobility. HOME SUPPORT PRIOR TO ADMISSION: The patient lived with wife but did not require assist.    Physical Therapy Goals  Initiated 3/27/2022  1. Patient will move from supine to sit and sit to supine , scoot up and down, and roll side to side in bed with modified independence within 7 day(s). 2.  Patient will transfer from bed to chair and chair to bed with modified independence using the least restrictive device within 7 day(s). 3.  Patient will perform sit to stand with modified independence within 7 day(s). 4.  Patient will ambulate with modified independence for 300 feet with the least restrictive device within 7 day(s). 5.  Patient will ascend/descend 6 stairs with *cane and one handrail(s) with modified independence within 7 day(s). Outcome: Progressing Towards Goal     PHYSICAL THERAPY TREATMENT  Patient: Dalene Collet (30 y.o. male)  Date: 3/29/2022  Diagnosis: Hypokalemia [E87.6]  Diarrhea [R19.7] <principal problem not specified>       Precautions: Fall  Chart, physical therapy assessment, plan of care and goals were reviewed. ASSESSMENT  Patient continues with skilled PT services and is progressing towards goals however remains most limited by generalized weakness, impaired balance, impaired gait, and decreased insight to deficits/safety leading to increased falls risk and dependency from baseline level. Received pt supine in bed, motivated to participate in session. Gait training progressed in hallway with RW - demos weaving path drifts and occasional R LE near scissor step; frequent cues for AD proximity and management, especially when approaching obstacles or chair. Tends to push RW off to the side - educated on increased falls/trip risk with this. Completed 6x sit<>stands from chair with large target multi level reaching for strengthening/functional balance challenge. Remained up in chair at end of session, NAD. Recommend discharge back to SNF rehab once medically cleared. Will follow. Current Level of Function Impacting Discharge (mobility/balance): up to min A with RW 2* impaired balance    Other factors to consider for discharge: below baseline; wants to return to rehab          PLAN :  Patient continues to benefit from skilled intervention to address the above impairments. Continue treatment per established plan of care. to address goals. Recommendation for discharge: (in order for the patient to meet his/her long term goals)  Therapy up to 5 days/week in SNF setting    This discharge recommendation:  A follow-up discussion with the attending provider and/or case management is planned    IF patient discharges home will need the following DME: to be determined (TBD)       SUBJECTIVE:   Patient stated I can't want to go back and finish my rehab so I can go home.     OBJECTIVE DATA SUMMARY:   Critical Behavior:  Neurologic State: Alert  Orientation Level: Oriented X4  Cognition: Appropriate decision making,Appropriate safety awareness  Safety/Judgement: Awareness of environment,Fall prevention  Functional Mobility Training:  Bed Mobility:     Supine to Sit: Supervision    Transfers:  Sit to Stand: Contact guard assistance  Stand to Sit: Contact guard assistance     Balance:  Sitting: Intact  Standing: Impaired  Standing - Static: Good  Standing - Dynamic : Fair;Constant support  Ambulation/Gait Training:  Distance (ft): 150 Feet (ft) (x2 reps)  Assistive Device: Gait belt;Walker, rolling  Ambulation - Level of Assistance: Contact guard assistance;Minimal assistance  Base of Support: Narrowed  Speed/Monica: Slow;Shuffled  Step Length: Right shortened;Left shortened  Swing Pattern: Right asymmetrical    Activity Tolerance:   Good    After treatment patient left in no apparent distress:   Sitting in chair and Call bell within reach    COMMUNICATION/COLLABORATION:   The patients plan of care was discussed with: Registered nurse.      Earl Roper PT, DPT   Time Calculation: 11 mins

## 2022-03-29 NOTE — PROGRESS NOTES
Transition of Care Plan: [facility] (pending auth)     RUR: 27% high     PCP F/U: Donis Rascon     Disposition: [facility]     Transportation: [facility] [place], [place] requesting a P2P tomorrow by 11:00 am. Attending to call [phone] [phone]. Please have patient's name and  ready. CM notified Dr. Sandra Granados of the above       Awaiting auth from [place] [id number]. Main Contact: Daughter/Raj MALDONADO-[phone]    CM called [facility] this am to check the status of auth, they said they were still reviewing clinicals. CM will continue to follow.  Doug Garcia MSA, RN, CM    3:52 PM . CM called  Carlie Mcwilliams regarding auth status,, was informed it was still pending.

## 2022-03-29 NOTE — PROGRESS NOTES
Comprehensive Nutrition Assessment    Type and Reason for Visit: Initial    Nutrition Recommendations/Plan:    1. Continue PO diet, encourage PO intakes. RD added ONS BID and daily snack. 2. Continue to monitor/replete electrolytes as needed. Nutrition Assessment:    PMH includes AAA repair in 2012, anemia, arthritis, bone cancer, DM, GERD, GI bleed, h/o alcohol abuse, HTN, hypertriglyceridemia, multiple myeloma, and Vitamin B12 deficiency. Pt with recent admission for SBO 2/2 obstructive colon adenocarcinoma and s/p hemicolectomy and hx PAD with R popliteal bypass and amputation of second R toe during recent admission. Pt admitted with NEFTALY, diarrhea; had been prescribed lasix on d/c for LE edema. Nutrition review completed for pt with low body wt/BMI of 17.98. Upon visit with pt this morning, he is in great spirits and confirms that he is eating 100% of his meal trays. He reports using Ensure in the past but was told to stop and dtr has requested for him to be on a lactose-free diet with concern of causing diarrhea. Pt reports diarrhea not influenced by intake of Ensure product or lactose. Agreeable to Ensure while admitted and snacks. Reviewed importance of protein intakes and wt maintenance/gain. He does report decrease in muscle mass and reports a 40# wt loss over the past few months. Suspect some wt loss r/t to fluid status but pt also demonstrating mild-mod fat/muscle wasting. Wt hx below. Also protein intake encouraged for POA Stage III sacral pressure injury. AM labs reviewed, has required K+ and Mg repletion throughout admission. Lasix has been discontinued. Malnutrition Assessment:  Malnutrition Status:   Moderate malnutrition    Context:  Chronic illness     Findings of the 6 clinical characteristics of malnutrition:   Energy Intake:  Mild decrease in energy intake (specify)  Weight Loss:  Unable to assess     Body Fat Loss:  1 - Mild body fat loss, Orbital,Buccal region   Muscle Mass Loss:  7 - Severe muscle mass loss, Temples (temporalis),Clavicles (pectoralis &deltoids),Hand (interosseous)  Fluid Accumulation:  1 - Mild, Generalized   Strength:  Not performed       Nutritionally Significant Medications: Protonix, Mg Sulfate (x 1 dose)    Estimated Daily Nutrient Needs:  Energy (kcal): 3928-0402 (MSJ); Weight Used for Energy Requirements: Current  Protein (g): 95 (1.5 g/kg); Weight Used for Protein Requirements: Current  Fluid (ml/day): ~1650; Method Used for Fluid Requirements: 1 ml/kcal    Nutrition Related Findings:       BM: +BM 3/28  Edema: 1+ RLE  Wounds:  Stage III (POA toe amputation; POA stage III sacrum)       Current Nutrition Therapies:   Diet: Lactose Free, Easy to Chew  Supplements: None presently ordered  Additional Caloric Sources: None     Meal intake: No data found. Supplement Intake: No data found.     Anthropometric Measures:  · Height:  6' 1\" (185.4 cm)  · Current Body Wt:  61.8 kg (136 lb 3.9 oz)   · Admission Body Wt:       · Usual Body Wt:        · Ideal Body Wt:  184:  74 %  · BMI Categories:  Underweight (BMI less than 22) age over 72     Wt Readings from Last 10 Encounters:   03/27/22 61.8 kg (136 lb 3.9 oz)   02/28/22 76.6 kg (168 lb 14 oz)   02/12/22 72.6 kg (160 lb)   12/29/21 78.9 kg (173 lb 15.1 oz)   03/23/21 78.9 kg (174 lb)   09/29/20 78 kg (172 lb)   09/14/20 78.9 kg (174 lb)   05/30/20 78 kg (171 lb 15.3 oz)   05/15/20 78.9 kg (174 lb)   03/05/20 78.5 kg (173 lb)       Nutrition Diagnosis:   · Inadequate protein-energy intake related to increased demand for energy/nutrients as evidenced by weight loss      Nutrition Interventions:   Food and/or Nutrient Delivery: Continue current diet,Start oral nutrition supplement  Nutrition Education and Counseling: No recommendations at this time  Coordination of Nutrition Care: Continue to monitor while inpatient    Goals:  PO intakes >75% daily ONS, snacks, and meals over next 5-7 days       Nutrition Monitoring and Evaluation:   Behavioral-Environmental Outcomes: None identified  Food/Nutrient Intake Outcomes: Supplement intake,Food and nutrient intake  Physical Signs/Symptoms Outcomes: Biochemical data,GI status,Meal time behavior    Discharge Planning:    Continue oral nutrition supplement     Beverly Raines RD     Contact via [employer]

## 2022-03-30 NOTE — PROGRESS NOTES
Transition of Care Plan: SNF-[facility] (pending auth)     RUR: 28% high     PCP F/U: Kathrin Leyden     Disposition: SNF-[facility]     Transportation: BLS     Main Contact: Daughter/Raj ABDI-[phone]    1110: Peer to peer approved. Left message for [facility] to call this CM to see if they can accept today. 1216: Received call from Ely at [facility]. States she is checking with the their team to see if he can come today. 1427: [facility] unable to accept today. Have asked MD for covid rapid test. Will plan for discharge tomorrow.      Melanie Dominguez RN, CRM

## 2022-03-30 NOTE — PROGRESS NOTES
Problem: Risk for Spread of Infection  Goal: Prevent transmission of infectious organism to others  Description: Prevent the transmission of infectious organisms to other patients, staff members, and visitors. Outcome: Progressing Towards Goal     Problem: Patient Education:  Go to Education Activity  Goal: Patient/Family Education  Outcome: Progressing Towards Goal     Problem: Falls - Risk of  Goal: *Absence of Falls  Description: Document Rosa Locket Fall Risk and appropriate interventions in the flowsheet. Outcome: Progressing Towards Goal  Note: Fall Risk Interventions:  Mobility Interventions: Assess mobility with egress test,Patient to call before getting OOB,PT Consult for mobility concerns         Medication Interventions: Assess postural VS orthostatic hypotension,Patient to call before getting OOB,Teach patient to arise slowly    Elimination Interventions: Call light in reach,Patient to call for help with toileting needs,Urinal in reach              Problem: Patient Education: Go to Patient Education Activity  Goal: Patient/Family Education  Outcome: Progressing Towards Goal     Problem: Pressure Injury - Risk of  Goal: *Prevention of pressure injury  Description: Document Christiano Scale and appropriate interventions in the flowsheet.   Outcome: Progressing Towards Goal  Note: Pressure Injury Interventions:       Moisture Interventions: Absorbent underpads,Limit adult briefs    Activity Interventions: PT/OT evaluation,Pressure redistribution bed/mattress(bed type),Increase time out of bed    Mobility Interventions: PT/OT evaluation,Float heels    Nutrition Interventions: Document food/fluid/supplement intake    Friction and Shear Interventions: Lift sheet,Minimize layers                Problem: Patient Education: Go to Patient Education Activity  Goal: Patient/Family Education  Outcome: Progressing Towards Goal

## 2022-03-30 NOTE — PROGRESS NOTES
Bedside shift change report given to Colby (oncoming nurse) by Hima (offgoing nurse). Report included the following information SBAR, MAR and Cardiac Rhythm (SB, NSR).

## 2022-03-30 NOTE — PROGRESS NOTES
Problem: Risk for Spread of Infection  Goal: Prevent transmission of infectious organism to others  Description: Prevent the transmission of infectious organisms to other patients, staff members, and visitors. Outcome: Progressing Towards Goal     Problem: Patient Education:  Go to Education Activity  Goal: Patient/Family Education  Outcome: Progressing Towards Goal     Problem: Falls - Risk of  Goal: *Absence of Falls  Description: Document Gerson Connolly Fall Risk and appropriate interventions in the flowsheet. Outcome: Progressing Towards Goal  Note: Fall Risk Interventions:  Mobility Interventions: Assess mobility with egress test,Bed/chair exit alarm,PT Consult for assist device competence         Medication Interventions: Assess postural VS orthostatic hypotension,Bed/chair exit alarm,Patient to call before getting OOB    Elimination Interventions: Call light in reach,Patient to call for help with toileting needs              Problem: Patient Education: Go to Patient Education Activity  Goal: Patient/Family Education  Outcome: Progressing Towards Goal     Problem: Pressure Injury - Risk of  Goal: *Prevention of pressure injury  Description: Document Christiano Scale and appropriate interventions in the flowsheet.   Outcome: Progressing Towards Goal  Note: Pressure Injury Interventions:       Moisture Interventions: Minimize layers    Activity Interventions: Assess need for specialty bed,Increase time out of bed,PT/OT evaluation    Mobility Interventions: Assess need for specialty bed,PT/OT evaluation    Nutrition Interventions: Offer support with meals,snacks and hydration,Document food/fluid/supplement intake    Friction and Shear Interventions: HOB 30 degrees or less                Problem: Patient Education: Go to Patient Education Activity  Goal: Patient/Family Education  Outcome: Progressing Towards Goal     Problem: Patient Education: Go to Patient Education Activity  Goal: Patient/Family Education  Outcome: Progressing Towards Goal     Problem: Patient Education: Go to Patient Education Activity  Goal: Patient/Family Education  Outcome: Progressing Towards Goal     Problem: Discharge Planning  Goal: *Discharge to safe environment  Outcome: Progressing Towards Goal  Goal: *Knowledge of medication management  Outcome: Progressing Towards Goal  Goal: *Knowledge of discharge instructions  Outcome: Progressing Towards Goal

## 2022-03-30 NOTE — PROGRESS NOTES
[facility] Adult  Hospitalist Group                                                                                          Hospitalist Progress Note  Davide Delacruz MD  Answering service: [phone] from in house phone        Date of Service:  3/29/2022  NAME:  Emma Neumann  :  1934  MRN:  [id number]      Admission Summary:   Emma Neumann is a 80 y.o. male medical history of AAA s/p repair in , colon adenocarcinoma, multiple myeloma, atrial fibrillation, GERD, dyslipidemia, and history of melanoma who presents with NEFTALY, and diarrhea.     He was recently hospitalized from -3/18 for SBO 2/2 obstructive colon adenocarcinoma. He is s/p R hemicolectomy. Post-operative course was complicated by wound infection requiring course of IV to oral abx. He was pending OP f/u with oncology. He also has PAD, and is s/p R popliteal bypass. He was dishcarged on lasix  Due to LE edema that was causing drainage.     In the ED, he was hemodynamically stable. Labs were significant for potassium of 3.0, creatinine 1.79 (b/l 0.6), ? Hypocalcemia. EKG with first-degree AV block, nonspecific ST changes, and prolonged QT. He is receiving rehab at [facility], and is eager to return. Interval history / Subjective:   Patient reporting normal BMs today  Reviewed his vitals, labs, and care plan with patient  Examined his foot wound/and abdominal surgery site     Assessment & Plan:     #NEFTALY on CKD2 secondary to likely dehydration- resolved  Creatinine 1.06 (b/l 0.6), resolved with IV hydration, and hold home Lasix for now    Hypokalemia- ?  Due to diuretics- Replete potassium PO  Hypomagnesemia- replete PO     #Chronic diarrhea  Patient states that he has had diarrhea since . He currently reports 7 soft bowel movements daily. He had C. difficile checked which was negative at last hospitalization, but has been on antibiotics since this time. neg stool culture, and C. difficile. Resumed colestipol     #Multiple myeloma  # Colon Adenocarcinoma  Was pending follow-up with Dr. Jermaine Livingston as an outpatient. Multiple myeloma treatment was on hold due to declining health.     #PVD  Status post popliteal bypass of the right lower extremity, and toe irritation  Resume aspirin    R foot wound and recent abd surgery-   examined wounds and recent bleeding from toenail noted  Consulted surgery for eval and staple removal of abd wound  Consulted vascular for eval of right leg wounds and possible stable removal     Code status: DNR  Prophylaxis: SCDs  Care Plan discussed with: patient  Anticipated Disposition: [address] Problems  Date Reviewed: 3/17/2022          Codes Class Noted POA    Diarrhea ICD-10-CM: R19.7  ICD-9-CM: 787.91  3/26/2022 Unknown        Hypokalemia ICD-10-CM: E87.6  ICD-9-CM: 276.8  3/26/2022 Unknown                Review of Systems:   A comprehensive review of systems was negative except for that written in the HPI. Vital Signs:    Last 24hrs VS reviewed since prior progress note.  Most recent are:  Visit Vitals  BP (!) 130/43 (BP 1 Location: Left upper arm)   Pulse 77   Temp 98.4 °F (36.9 °C)   Resp 21   Ht 6' 1\" (1.854 m)   Wt 61.8 kg (136 lb 3.9 oz)   SpO2 95%   BMI 17.98 kg/m²     Patient Vitals for the past 24 hrs:   Temp Pulse Resp BP   03/29/22 2152  77     03/29/22 1755  82     03/29/22 1731 98.4 °F (36.9 °C) 70 21 (!) 130/43   03/29/22 1452 97.8 °F (36.6 °C) 73 12 118/88   03/29/22 1400  66     03/29/22 1200  62     03/29/22 1142  94  (!) 131/56   03/29/22 1141  72  127/64   03/29/22 1140  81  (!) 126/51   03/29/22 1022 97.5 °F (36.4 °C) 68 25 (!) 133/47   03/29/22 1013  78     03/29/22 0755  77     03/29/22 0602 98.5 °F (36.9 °C) 81 18 134/64   03/29/22 0554  76     03/29/22 0351  72     03/29/22 0210 98.5 °F (36.9 °C) 70 22 (!) 135/54   03/29/22 0207  60     03/28/22 2205 98.7 °F (37.1 °C) 77 23 105/63     No intake or output data in the 24 hours ending 03/29/22 2153     Physical Examination:     I had a face to face encounter with this patient and independently examined them on 3/29/2022 as outlined below:          Constitutional:  No acute distress, cooperative, pleasant    ENT:  Oral mucosa moist, oropharynx benign. Resp:  CTA bilaterally. No wheezing/rhonchi/rales. No accessory muscle use. CV:  Regular rhythm, normal rate, no murmurs, gallops, rubs    GI:  Soft, non distended, non tender. normoactive bowel sounds, no hepatosplenomegaly     Musculoskeletal:  No edema, warm, 2+ pulses throughout    Neurologic:  Moves all extremities. AAOx3, CN II-XII reviewed  Skin: wound on R foot as below                      Data Review:    Review and/or order of clinical lab test  Review and/or order of tests in the radiology section of CPT  Review and/or order of tests in the medicine section of CPT      Labs:     Recent Labs     03/29/22  0648 03/28/22 0233   WBC 6.1 5.0   HGB 9.9* 7.8*   HCT 31.3* 24.7*    110*     Recent Labs     03/29/22  0648 03/28/22  0233 03/27/22  0320    140 140   K 3.4* 3.0* 3.1*    109* 109*   CO2 29 27 28   BUN 7 11 14   CREA 0.84 0.93 1.06   GLU 99 90 88   CA 8.1* 6.7* 6.8*   MG 1.8 1.5* 1.8     No results for input(s): ALT, AP, TBIL, TBILI, TP, ALB, GLOB, GGT, AML, LPSE in the last 72 hours. No lab exists for component: SGOT, GPT, AMYP, HLPSE  No results for input(s): INR, PTP, APTT, INREXT, INREXT in the last 72 hours. Recent Labs     03/28/22 0233   TIBC 161*   PSAT 20   FERR 736*      Lab Results   Component Value Date/Time    Folate 81.7 (H) 02/13/2022 02:38 AM      No results for input(s): PH, PCO2, PO2 in the last 72 hours. No results for input(s): CPK, CKNDX, TROIQ in the last 72 hours.     No lab exists for component: CPKMB  Lab Results   Component Value Date/Time    Cholesterol, total 193 03/23/2021 11:18 AM    HDL Cholesterol 83 03/23/2021 11:18 AM    LDL, calculated 84.2 03/23/2021 11:18 AM    Triglyceride 129 03/23/2021 11:18 AM    CHOL/HDL Ratio 2.3 03/23/2021 11:18 AM     Lab Results   Component Value Date/Time    Glucose (POC) 92 03/18/2022 06:18 AM    Glucose (POC) 106 03/18/2022 01:12 AM    Glucose (POC) 97 03/17/2022 12:22 PM    Glucose (POC) 99 03/17/2022 06:49 AM    Glucose (POC) 108 03/17/2022 12:51 AM     Lab Results   Component Value Date/Time    Color YELLOW/STRAW 03/26/2022 02:29 AM    Appearance TURBID (A) 03/26/2022 02:29 AM    Specific gravity 1.009 03/26/2022 02:29 AM    pH (UA) 6.5 03/26/2022 02:29 AM    Protein 100 (A) 03/26/2022 02:29 AM    Glucose Negative 03/26/2022 02:29 AM    Ketone Negative 03/26/2022 02:29 AM    Bilirubin Negative 03/26/2022 02:29 AM    Urobilinogen 0.2 03/26/2022 02:29 AM    Nitrites Negative 03/26/2022 02:29 AM    Leukocyte Esterase LARGE (A) 03/26/2022 02:29 AM    Epithelial cells FEW 03/26/2022 02:29 AM    Bacteria 3+ (A) 03/26/2022 02:29 AM    WBC  03/26/2022 02:29 AM    RBC 10-20 03/26/2022 02:29 AM         Medications Reviewed:     Current Facility-Administered Medications   Medication Dose Route Frequency    iron sucrose (VENOFER) injection 200 mg  200 mg IntraVENous DAILY    aspirin chewable tablet 81 mg  81 mg Oral DAILY    finasteride (PROSCAR) tablet 5 mg  5 mg Oral DAILY    0.9% sodium chloride infusion 250 mL  250 mL IntraVENous PRN    sodium chloride (NS) flush 5-40 mL  5-40 mL IntraVENous Q8H    sodium chloride (NS) flush 5-40 mL  5-40 mL IntraVENous PRN    acetaminophen (TYLENOL) tablet 650 mg  650 mg Oral Q6H PRN    Or    acetaminophen (TYLENOL) suppository 650 mg  650 mg Rectal Q6H PRN    ondansetron (ZOFRAN ODT) tablet 4 mg  4 mg Oral Q8H PRN    Or    ondansetron (ZOFRAN) injection 4 mg  4 mg IntraVENous Q6H PRN    pantoprazole (PROTONIX) tablet 40 mg  40 mg Oral ACB    tamsulosin (FLOMAX) capsule 0.4 mg  0.4 mg Oral QHS    pregabalin (LYRICA) capsule 50 mg  50 mg Oral Q12H    colestipoL (COLESTID) tablet 2 g  2 g Oral BID     ______________________________________________________________________  EXPECTED LENGTH OF STAY: 3d 2h  ACTUAL LENGTH OF STAY:          3                 Ivon Clements MD

## 2022-03-30 NOTE — PROGRESS NOTES
Hematology-Oncology Progress Note    Inocencia Villafuerte  1934  [id number]  3/30/2022    Follow-up for: myeloma/colon cancer     [x]        Chart notes since last visit reviewed   [x]        Medications reviewed for allergies and interactions       Case discussed with the following:         []                            []        Nursing Staff                                                                         []        Pathologist                                                                        []        FAMILY      Subjective:     Spoke with patient who complains of: feeling stronger,no diarrhea, no c/o this am    Objective:     Patient Vitals for the past 24 hrs:   BP Temp Pulse Resp SpO2 Height   03/30/22 0551 (!) 106/59 97.5 °F (36.4 °C) 71 25 95 %    03/30/22 0357   70      03/30/22 0200 135/72 97.9 °F (36.6 °C) 71 16 94 %    03/30/22 0152   68      03/29/22 2152 (!) 158/57 97.7 °F (36.5 °C) 77 19 98 %    03/29/22 1755   80      03/29/22 1731 (!) 130/43 98.4 °F (36.9 °C) 70 21     03/29/22 1452 118/88 97.8 °F (36.6 °C) 73 12     03/29/22 1400   66      03/29/22 1203      6' 1\" (1.854 m)   03/29/22 1200   58      03/29/22 1142 (!) 131/56  94      03/29/22 1141 127/64  72      03/29/22 1140 (!) 126/51  81          REVIEW OF SYSTEMS:    Constitutional: negative fever, negative chills, negative weight loss  Eyes:   negative visual changes  ENT:   negative sore throat, tongue or lip swelling  Respiratory:  negative cough, negative dyspnea  Cards:  negative for chest pain, palpitations, lower extremity edema  GI:   negative for nausea, vomiting, diarrhea, and abdominal pain  Neuro:  negative for headaches, dizziness, vertigo  []                        Full ROS o/w normal/non contributor    Constitutional:  Patient looks  []        Sick  [x]        Frail  []        Better                                                 []        Depressed    HEENT: [x]   NC                         []   AT               []    ALOPECIA           Eyes: [x]   Normal               []    Icteric  Oropharynx: []    Normal                  []  Thrush               []   Dry  Mucositis: []    None                 Grade: []        I  []        II  []        III  []        IV  Neck:   [x]   Supple                  []  Rigid               JVD:    [x]   ABSENT       []   PRESENT  Lymphadenopathy:   []   None Noted            []   PRESENT    Chest:  [x]   Clear               []    Rhonchi                      Dec'd @     []  Right Base           []   Left Base    CV:             [x]   Regular              [x]  Irregular               []   Tachy                []   Murmur  Abdominal:   [x]    Soft              [x]   NON-tender               []   Tender      BS:    []   ABSENT                   []   PRESENT  Liver:     []  NON-palp                  []   EDGE- palp  Spleen: [x]   NON-palp                   []  EDGE - palp  Mass:   []   ABSENT                          []  PRESENT  Extr:    []  Lymphedema             []   Cyanosis      []  Clubbing  Edema:     [x]   NONE       []   PRESENT  Skin:  Intact [x]           Purpura []        Rash: [x]   ABSENT       []  PRESENT  Neuro:  []        Normal  []        Confused      Available labs reviewed:  Labs:    Recent Results (from the past 24 hour(s))   MAGNESIUM    Collection Time: 03/30/22  2:59 AM   Result Value Ref Range    Magnesium 1.6 1.6 - 2.4 mg/dL   CBC WITH AUTOMATED DIFF    Collection Time: 03/30/22  2:59 AM   Result Value Ref Range    WBC 5.6 4.1 - 11.1 K/uL    RBC 2.59 (L) 4.10 - 5.70 M/uL    HGB 8.0 (L) 12.1 - 17.0 g/dL    HCT 25.4 (L) 36.6 - 50.3 %    MCV 98.1 80.0 - 99.0 FL    MCH 30.9 26.0 - 34.0 PG    MCHC 31.5 30.0 - 36.5 g/dL    RDW 16.7 (H) 11.5 - 14.5 %    PLATELET 645 (L) 777 - 400 K/uL    MPV 10.5 8.9 - 12.9 FL    NRBC 0.0 0  WBC    ABSOLUTE NRBC 0.00 0.00 - 0.01 K/uL    NEUTROPHILS 59 32 - 75 %    LYMPHOCYTES 14 12 - 49 %    MONOCYTES 17 (H) 5 - 13 %    EOSINOPHILS 8 (H) 0 - 7 %    BASOPHILS 1 0 - 1 %    IMMATURE GRANULOCYTES 1 (H) 0.0 - 0.5 %    ABS. NEUTROPHILS 3.4 1.8 - 8.0 K/UL    ABS. LYMPHOCYTES 0.8 0.8 - 3.5 K/UL    ABS. MONOCYTES 0.9 0.0 - 1.0 K/UL    ABS. EOSINOPHILS 0.5 (H) 0.0 - 0.4 K/UL    ABS. BASOPHILS 0.1 0.0 - 0.1 K/UL    ABS. IMM. GRANS. 0.0 0.00 - 0.04 K/UL    DF AUTOMATED     METABOLIC PANEL, BASIC    Collection Time: 03/30/22  2:59 AM   Result Value Ref Range    Sodium 141 136 - 145 mmol/L    Potassium 3.4 (L) 3.5 - 5.1 mmol/L    Chloride 107 97 - 108 mmol/L    CO2 29 21 - 32 mmol/L    Anion gap 5 5 - 15 mmol/L    Glucose 122 (H) 65 - 100 mg/dL    BUN 7 6 - 20 MG/DL    Creatinine 0.92 0.70 - 1.30 MG/DL    BUN/Creatinine ratio 8 (L) 12 - 20      GFR est AA >60 >60 ml/min/1.73m2    GFR est non-AA >60 >60 ml/min/1.73m2    Calcium 7.9 (L) 8.5 - 10.1 MG/DL       Available Xrays reviewed:    Chemotherapy monitored and toxicities assessed:    Assessment and Plan   1. Myeloma. . pt has been on and off rx since 2012, his most recent rx was held in January when he contracted covid PNA. .. surprisingly the M-spike is still only 0.1 gm/dl. Philomena Mayfield we will observe him off rx    2. Recent colon cancer. . this is involving 7/14 nodes. He is too frail for adjuvant rx. Will discuss options as out-pt. 3. Anemia.  hgb 8 today. .  Iron panel shows borderline low iron sat. I will give IV iron today and procrit for chemo assoc.  anemia    Radha Hanley MD

## 2022-03-30 NOTE — PROGRESS NOTES
Problem: Mobility Impaired (Adult and Pediatric)  Goal: *Acute Goals and Plan of Care (Insert Text)  Description: FUNCTIONAL STATUS PRIOR TO ADMISSION: Patient was modified independent using a rolling walker for functional mobility. HOME SUPPORT PRIOR TO ADMISSION: The patient lived with wife but did not require assist.    Physical Therapy Goals  Initiated 3/27/2022  1. Patient will move from supine to sit and sit to supine , scoot up and down, and roll side to side in bed with modified independence within 7 day(s). 2.  Patient will transfer from bed to chair and chair to bed with modified independence using the least restrictive device within 7 day(s). 3.  Patient will perform sit to stand with modified independence within 7 day(s). 4.  Patient will ambulate with modified independence for 300 feet with the least restrictive device within 7 day(s). 5.  Patient will ascend/descend 6 stairs with *cane and one handrail(s) with modified independence within 7 day(s). Outcome: Progressing Towards Goal     physical Therapy TREATMENT  Patient: Soraya Mathis (4 y.o. male)  Date: 3/30/2022  Diagnosis: Hypokalemia [E87.6]  Diarrhea [R19.7] <principal problem not specified>       Precautions: Fall  Chart, physical therapy assessment, plan of care and goals were reviewed. ASSESSMENT:  Patient continues with skilled PT services and is progressing towards goals however remains most limited by generalized weakness, impaired balance, impaired gait, and decreased insight to deficits/safety leading to increased falls risk and dependency from baseline level. Received pt supine in bed, motivated to participate in session. Gait training progressed in hallway, initially with RW, fading to trial of HHA (simulating SPC per baseline). Continues to demo weaving path drifts, mild to moderate instabilities, and softening of B TKE in stance as fatigued.  Required min A to steady from several LOBs when challenged without AD. Initiated stair  training in prep for home access - again required min A for safety with cues for step to pattern and use of B rails. Remained up in chair at end of session, NAD. He remains below his baseline level and a high falls risk - continue to recommend discharge back to SNF level rehab to maximize indep and safety prior to returning home. Progression toward goals:  [x]    Improving appropriately and progressing toward goals  []    Improving slowly and progressing toward goals  []    Not making progress toward goals and plan of care will be adjusted     PLAN:  Patient continues to benefit from skilled intervention to address the above impairments. Continue treatment per established plan of care. Discharge Recommendations:  Skilled Nursing Facility  Further Equipment Recommendations for Discharge:  TBD     SUBJECTIVE:   Patient stated I just want to get back to rehab, I'm still so weak and off balance.     OBJECTIVE DATA SUMMARY:   Critical Behavior:  Neurologic State: Alert  Orientation Level: Oriented X4  Cognition: Appropriate decision making,Follows commands  Safety/Judgement: Awareness of environment,Fall prevention  Functional Mobility Training:  Bed Mobility:     Transfers:  Sit to Stand: Contact guard assistance  Stand to Sit: Contact guard assistance           Balance:  Sitting: Intact  Standing: Impaired  Standing - Static: Good  Standing - Dynamic : Fair;Constant support  Ambulation/Gait Training:  Distance (ft): 150 Feet (ft) (x 2 reps; plus trial x80ft with HHA x1)  Assistive Device: Gait belt;Walker, rolling (and HHA trial)  Ambulation - Level of Assistance: Contact guard assistance;Minimal assistance  Base of Support: Narrowed  Speed/Monica: Shuffled  Step Length: Right shortened;Left shortened  Stairs:  Number of Stairs Trained: 8  Stairs - Level of Assistance: Minimum assistance   Rail Use: Both    Pain:  Pain Scale 1: Numeric (0 - 10)  Pain Intensity 1: 0     Activity Tolerance:   Good     Please refer to the flowsheet for vital signs taken during this treatment.   After treatment:   [x]    Patient left in no apparent distress sitting up in chair  []    Patient left in no apparent distress in bed  [x]    Call bell left within reach  [x]    Nursing notified  []    Caregiver present  []    Bed alarm activated    COMMUNICATION/COLLABORATION:   The patients plan of care was discussed with: Registered Nurse    Val So, PT, DPT   Time Calculation: 21 mins

## 2022-03-31 NOTE — PROGRESS NOTES
Problem: Risk for Spread of Infection  Goal: Prevent transmission of infectious organism to others  Description: Prevent the transmission of infectious organisms to other patients, staff members, and visitors. Outcome: Resolved/Met     Problem: Patient Education:  Go to Education Activity  Goal: Patient/Family Education  Outcome: Resolved/Met     Problem: Falls - Risk of  Goal: *Absence of Falls  Description: Document Al Fall Risk and appropriate interventions in the flowsheet. Outcome: Resolved/Met     Problem: Patient Education: Go to Patient Education Activity  Goal: Patient/Family Education  Outcome: Resolved/Met     Problem: Pressure Injury - Risk of  Goal: *Prevention of pressure injury  Description: Document Christiano Scale and appropriate interventions in the flowsheet.   Outcome: Resolved/Met     Problem: Patient Education: Go to Patient Education Activity  Goal: Patient/Family Education  Outcome: Resolved/Met     Problem: Patient Education: Go to Patient Education Activity  Goal: Patient/Family Education  Outcome: Resolved/Met     Problem: Patient Education: Go to Patient Education Activity  Goal: Patient/Family Education  Outcome: Resolved/Met     Problem: Discharge Planning  Goal: *Discharge to safe environment  Outcome: Resolved/Met  Goal: *Knowledge of medication management  Outcome: Resolved/Met  Goal: *Knowledge of discharge instructions  Outcome: Resolved/Met     Problem: Patient Education: Go to Patient Education Activity  Goal: Patient/Family Education  Outcome: Resolved/Met

## 2022-03-31 NOTE — PROGRESS NOTES
Problem: Risk for Spread of Infection  Goal: Prevent transmission of infectious organism to others  Description: Prevent the transmission of infectious organisms to other patients, staff members, and visitors. Outcome: Progressing Towards Goal     Problem: Patient Education:  Go to Education Activity  Goal: Patient/Family Education  Outcome: Progressing Towards Goal     Problem: Falls - Risk of  Goal: *Absence of Falls  Description: Document [place] Fall Risk and appropriate interventions in the flowsheet. Outcome: Progressing Towards Goal  Note: Fall Risk Interventions:  Mobility Interventions: Assess mobility with egress test,Patient to call before getting OOB,PT Consult for mobility concerns         Medication Interventions: Assess postural VS orthostatic hypotension,Patient to call before getting OOB,Teach patient to arise slowly    Elimination Interventions: Call light in reach,Patient to call for help with toileting needs,Urinal in reach              Problem: Patient Education: Go to Patient Education Activity  Goal: Patient/Family Education  Outcome: Progressing Towards Goal     Problem: Pressure Injury - Risk of  Goal: *Prevention of pressure injury  Description: Document Christiano Scale and appropriate interventions in the flowsheet.   Outcome: Progressing Towards Goal  Note: Pressure Injury Interventions:       Moisture Interventions: Absorbent underpads,Limit adult briefs    Activity Interventions: Assess need for specialty bed    Mobility Interventions: Assess need for specialty bed    Nutrition Interventions: Document food/fluid/supplement intake    Friction and Shear Interventions: Lift sheet,Minimize layers                Problem: Patient Education: Go to Patient Education Activity  Goal: Patient/Family Education  Outcome: Progressing Towards Goal     Problem: Patient Education: Go to Patient Education Activity  Goal: Patient/Family Education  Outcome: Progressing Towards Goal     Problem: Patient Education: Go to Patient Education Activity  Goal: Patient/Family Education  Outcome: Progressing Towards Goal     Problem: Patient Education: Go to Patient Education Activity  Goal: Patient/Family Education  Outcome: Progressing Towards Goal     Problem: Discharge Planning  Goal: *Discharge to safe environment  Outcome: Progressing Towards Goal  Goal: *Knowledge of medication management  Outcome: Progressing Towards Goal  Goal: *Knowledge of discharge instructions  Outcome: Progressing Towards Goal

## 2022-03-31 NOTE — PROGRESS NOTES
[address] [facility] Adult  Hospitalist Group                                                                                          Hospitalist Progress Note  Carmelo Nieves MD  Answering service: [phone] from in house phone        Date of Service:  3/30/2022  NAME:  Kyler Cleveland  :  1934  MRN:  [id number]      Admission Summary:   Kyler Cleveland is a 80 y.o. male medical history of AAA s/p repair in , colon adenocarcinoma, multiple myeloma, atrial fibrillation, GERD, dyslipidemia, and history of melanoma who presents with NEFTALY, and diarrhea.     He was recently hospitalized from -3/18 for SBO 2/2 obstructive colon adenocarcinoma. He is s/p R hemicolectomy. Post-operative course was complicated by wound infection requiring course of IV to oral abx. He was pending OP f/u with oncology. He also has PAD, and is s/p R popliteal bypass. He was dishcarged on lasix  Due to LE edema that was causing drainage.     In the ED, he was hemodynamically stable. Labs were significant for potassium of 3.0, creatinine 1.79 (b/l 0.6), ? Hypocalcemia. EKG with first-degree AV block, nonspecific ST changes, and prolonged QT. He is receiving rehab at [facility], and is eager to return. Interval history / Subjective:   Patient reporting normal BMs today  Reviewed his vitals, labs, and care plan with patient  Examined his foot wound/and abdominal surgery site  Discussed potassium and magnesium supplements     Assessment & Plan:     #NEFTALY on CKD2 secondary to likely dehydration- resolved  Creatinine 1.06 (b/l 0.6), resolved with IV hydration, and hold home Lasix for now    Hypokalemia- ?  Due to diuretics- Replete potassium PO  Patient requesting rx for KCL packets on DC    Hypomagnesemia- replete PO     #Chronic diarrhea- improved and back to his normal pattern  Patient states that he has had diarrhea since . He currently reports 7 soft bowel movements daily.   He had C. difficile checked which was negative at last hospitalization, but has been on antibiotics since this time. neg stool culture, and C. difficile. Resumed colestipol     #Multiple myeloma  # Colon Adenocarcinoma  seen inpatient by Dr. Gayle Hennessy with recs noted    Multiple myeloma treatment was on hold due to declining health.     #PVD  Status post popliteal bypass of the right lower extremity, and toe amputation  Resume aspirin    R foot wound and recent abd surgery-   examined wounds and recent bleeding from toenail noted  Consulted surgery for eval and staple removal of abd wound  Consulted vascular for eval of right leg wounds and possible stable removal     Code status: DNR  Prophylaxis: SCDs  Care Plan discussed with: patient  Anticipated Disposition: tomorrow     Hospital Problems  Date Reviewed: 3/17/2022          Codes Class Noted POA    Diarrhea ICD-10-CM: R19.7  ICD-9-CM: 787.91  3/26/2022 Unknown        Hypokalemia ICD-10-CM: E87.6  ICD-9-CM: 276.8  3/26/2022 Unknown                Review of Systems:   A comprehensive review of systems was negative except for that written in the HPI. Vital Signs:    Last 24hrs VS reviewed since prior progress note.  Most recent are:  Visit Vitals  BP (!) 157/55 (BP 1 Location: Left upper arm, BP Patient Position: At rest)   Pulse 77   Temp 98.1 °F (36.7 °C)   Resp 13   Ht 6' 1\" (1.854 m)   Wt 61.8 kg (136 lb 3.9 oz)   SpO2 94%   BMI 17.98 kg/m²     Patient Vitals for the past 24 hrs:   Temp Pulse Resp BP SpO2   03/30/22 2131 98.1 °F (36.7 °C) 77 13 (!) 157/55 94 %   03/30/22 1821 98.2 °F (36.8 °C) 83 19 (!) 151/76    03/30/22 1409 97.9 °F (36.6 °C) 77 19 (!) 153/59 94 %   03/30/22 1400  74      03/30/22 1200  76      03/30/22 1026 97.5 °F (36.4 °C) 89 16 (!) 110/50 90 %   03/30/22 0551 97.5 °F (36.4 °C) 71 25 (!) 106/59 95 %   03/30/22 0357  70      03/30/22 0200 97.9 °F (36.6 °C) 71 16 135/72 94 %   03/30/22 0152  76        No intake or output data in the 24 hours ending 03/30/22 2206     Physical Examination:     I had a face to face encounter with this patient and independently examined them on 3/30/2022 as outlined below:          Constitutional:  No acute distress, cooperative, pleasant    ENT:  Oral mucosa moist, oropharynx benign. Resp:  CTA bilaterally. No wheezing/rhonchi/rales. No accessory muscle use. CV:  Regular rhythm, normal rate, no murmurs, gallops, rubs    GI:  Soft, non distended, non tender. normoactive bowel sounds, no hepatosplenomegaly     Musculoskeletal:  No edema, warm, 2+ pulses throughout    Neurologic:  Moves all extremities. AAOx3, CN II-XII reviewed              Data Review:    Review and/or order of clinical lab test  Review and/or order of tests in the radiology section of CPT  Review and/or order of tests in the medicine section of CPT      Labs:     Recent Labs     03/30/22 0259 03/29/22  0648   WBC 5.6 6.1   HGB 8.0* 9.9*   HCT 25.4* 31.3*   * 172     Recent Labs     03/30/22 0259 03/29/22  0648 03/28/22  0233    139 140   K 3.4* 3.4* 3.0*    105 109*   CO2 29 29 27   BUN 7 7 11   CREA 0.92 0.84 0.93   * 99 90   CA 7.9* 8.1* 6.7*   MG 1.6 1.8 1.5*     No results for input(s): ALT, AP, TBIL, TBILI, TP, ALB, GLOB, GGT, AML, LPSE in the last 72 hours. No lab exists for component: SGOT, GPT, AMYP, HLPSE  No results for input(s): INR, PTP, APTT, INREXT, INREXT in the last 72 hours. Recent Labs     03/28/22  0233   TIBC 161*   PSAT 20   FERR 736*      Lab Results   Component Value Date/Time    Folate 81.7 (H) 02/13/2022 02:38 AM      No results for input(s): PH, PCO2, PO2 in the last 72 hours. No results for input(s): CPK, CKNDX, TROIQ in the last 72 hours.     No lab exists for component: CPKMB  Lab Results   Component Value Date/Time    Cholesterol, total 193 03/23/2021 11:18 AM    HDL Cholesterol 83 03/23/2021 11:18 AM    LDL, calculated 84.2 03/23/2021 11:18 AM    Triglyceride 129 03/23/2021 11:18 AM    CHOL/HDL Ratio 2.3 03/23/2021 11:18 AM     Lab Results   Component Value Date/Time    Glucose (POC) 92 03/18/2022 06:18 AM    Glucose (POC) 106 03/18/2022 01:12 AM    Glucose (POC) 97 03/17/2022 12:22 PM    Glucose (POC) 99 03/17/2022 06:49 AM    Glucose (POC) 108 03/17/2022 12:51 AM     Lab Results   Component Value Date/Time    Color YELLOW/STRAW 03/26/2022 02:29 AM    Appearance TURBID (A) 03/26/2022 02:29 AM    Specific gravity 1.009 03/26/2022 02:29 AM    pH (UA) 6.5 03/26/2022 02:29 AM    Protein 100 (A) 03/26/2022 02:29 AM    Glucose Negative 03/26/2022 02:29 AM    Ketone Negative 03/26/2022 02:29 AM    Bilirubin Negative 03/26/2022 02:29 AM    Urobilinogen 0.2 03/26/2022 02:29 AM    Nitrites Negative 03/26/2022 02:29 AM    Leukocyte Esterase LARGE (A) 03/26/2022 02:29 AM    Epithelial cells FEW 03/26/2022 02:29 AM    Bacteria 3+ (A) 03/26/2022 02:29 AM    WBC  03/26/2022 02:29 AM    RBC 10-20 03/26/2022 02:29 AM         Medications Reviewed:     Current Facility-Administered Medications   Medication Dose Route Frequency    epoetin loren-epbx (RETACRIT) injection 20,000 Units  20,000 Units SubCUTAneous EVERY WED & SAT    iron sucrose (VENOFER) injection 200 mg  200 mg IntraVENous DAILY    aspirin chewable tablet 81 mg  81 mg Oral DAILY    finasteride (PROSCAR) tablet 5 mg  5 mg Oral DAILY    0.9% sodium chloride infusion 250 mL  250 mL IntraVENous PRN    sodium chloride (NS) flush 5-40 mL  5-40 mL IntraVENous Q8H    sodium chloride (NS) flush 5-40 mL  5-40 mL IntraVENous PRN    acetaminophen (TYLENOL) tablet 650 mg  650 mg Oral Q6H PRN    Or    acetaminophen (TYLENOL) suppository 650 mg  650 mg Rectal Q6H PRN    ondansetron (ZOFRAN ODT) tablet 4 mg  4 mg Oral Q8H PRN    Or    ondansetron (ZOFRAN) injection 4 mg  4 mg IntraVENous Q6H PRN    pantoprazole (PROTONIX) tablet 40 mg  40 mg Oral ACB    tamsulosin (FLOMAX) capsule 0.4 mg  0.4 mg Oral QHS    pregabalin (LYRICA) capsule 50 mg 50 mg Oral Q12H    colestipoL (COLESTID) tablet 2 g  2 g Oral BID     ______________________________________________________________________  EXPECTED LENGTH OF STAY: 3d 2h  ACTUAL LENGTH OF STAY:          4                 Neha Flores MD

## 2022-03-31 NOTE — PROGRESS NOTES
Physician Progress Note      PATIENT:               Roman Boeck  CSN #:                  [id number]  :                       1934  ADMIT DATE:       3/25/2022 9:30 PM  [address] [place] DATE:        3/31/2022 12:13 PM  RESPONDING  PROVIDER #:        Landon Curry MD          QUERY TEXT:    Patient admitted with NEFTALY and diarrhea. 3/28 Garfield Greenfield MD Attending Notes R foot wound, abd wound 2ry to recent surgery. Wound Care Nurse was consulted. Per 3/28 Hany RN WOCN Note noted to also have pressure injury to sacrum. If possible, please document in progress notes and discharge summary the location, present on admission status and stage of the pressure injury as well as all other wounds    The medical record reflects the following:    Risk Factors: 87M s/p R hemicolectomy, s/p R popliteal bypass, s/p toe amputation  Clinical Indicators:  [facility] RN 3/28  1. 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 2. POA right great toe nail loosened  able to lift toe nail but still attached on lateral margin  Secured with xeroform and dry gauze    3. POA stage 3 pressure injury to sacrum  0.5 x 0.5 x 0.2 cm  Pink with yellow glaze  Tx: hydrocolloid applied    Nola Last NP 3/28  Midline abdominal staples removed. Incision left open to air    Kevin ANN 3/29  Toe amp site sutures removed. All incisions look good - will ask nurses to remove staples today.  Area of seroma distal thigh incision will drain serous fluid for several days    Garfield Greenfield MD 3/28  PVD  Status post popliteal bypass of the right lower extremity, and toe irritation  R foot wound and recent abd surgery-  examined wounds and recent bleeding from toenail noted  Consulted surgery for eval and staple removal of abd wound  Consulted vascular for eval of right leg wounds and possible stable removal    Treatment: Vascular Consult, Surgical Consult, Wound Consult, RD Consult, P500 Air Mattress, Sacrum, Please maintain hydrocolloid up to one week or change as needed with soiling or rolled edges. Right great toe, cover with xeroform and dry dressing; change Mondays & thursdays PI Prevention Turn/reposition approximately every 2 hours Offload heels with heels hanging off end of pillow at all times while in bed    Thank you    Charles rGaves BSN RN [facility]  Clinical Documentation Improvement Specialist  [phone]  Options provided:  -- POA stage 3 pressure injury to sacrum POA right great toe nail loosened  POA amputation site of right 2nd toe POA distal thigh incision POA midline abd surgical incision  -- Other - I will add my own diagnosis  -- Disagree - Not applicable / Not valid  -- Disagree - Clinically unable to determine / Unknown  -- Refer to Clinical Documentation Reviewer    PROVIDER RESPONSE TEXT:    This patient has POA stage 3 pressure injury to sacrum POA right great toe nail loosened POA amputation site of right 2nd toe POA distal thigh incision POA midline abd surgical incision which were present on admission. Query created by: Sergio Cruz on 3/29/2022 4:21 PM      QUERY TEXT:    Patient admitted with NEFTALY and diarrhea. BMI 96.85 and recent complicated hospitalization, post Covid 19 infection, with myeloma and colon cancer. RD was consulted. If possible, please document in progress notes and discharge summary if you are evaluating and /or treating any of the following: The medical record reflects the following:    Risk Factors: 87M anemia, arthritis, bone cancer, DM, GERD, GI bleed, h/o alcohol abuse, HTN, hypertriglyceridemia, multiple myeloma, and Vitamin B12 deficiency. Pt with recent admission for SBO 2/2 obstructive colon adenocarcinoma and s/p hemicolectomy and hx PAD with R popliteal bypass and amputation of second R toe during recent admission.  Pt admitted with NEFTALY, diarrhea, Stage III sacral wound, s/p Covid 19 infection    Clinical Indicators:    BMI 17.98 6'1\" 61.8kg  RD Consult 3/29 Snid[address] RD  Malnutrition Assessment:  Malnutrition Status: Moderate malnutrition  Context:  Chronic illness  Findings of the 6 clinical characteristics of malnutrition:  Energy Intake:  Mild decrease in energy intake (specify)  Weight Loss:  Unable to assess  Body Fat Loss:  1 - Mild body fat loss, Orbital,Buccal region  Muscle Mass Loss:  7 - Severe muscle mass loss, Temples (temporalis),Clavicles (pectoralis &deltoids),Hand (interosseous)  Fluid Accumulation:  1 - Mild, Generalized   Strength:  Not performed  Inadequate protein-energy intake related to increased demand for energy/nutrients as evidenced by weight loss    Treatment: RD Consult, Wound Consult, Lab Monitoring,  Continue PO diet, encourage PO intakes. RD added ONS BID and daily snack. Continue to monitor/replete electrolytes as needed, colestrid 2g PO daily,  Adult Diet Easy to Chew Lactose Controlled, LR IV 50ml/hr 3/26 0314-3/27 0838, venofer 200mg IV daily,  ODT Zofran 4 mg PO q 8hr prn nausea IV route as well, NS IV 1L NOW, neutra phos 1 packet PO 4x daily, IV electrolyte repletion    ASPEN Criteria:  https://aspenjournals. onlinelibrary. orozco. com/doi/full/10.1177/8259444840950327    Thank you    Isael LEWISN RN CRCR  Clinical Documentation Improvement Specialist  [phone]  Options provided:  -- Protein calorie malnutrition moderate  -- Underweight with BMI 17.98 no malnutrition  -- Other - I will add my own diagnosis  -- Disagree - Not applicable / Not valid  -- Disagree - Clinically unable to determine / Unknown  -- Refer to Clinical Documentation Reviewer    PROVIDER RESPONSE TEXT:    This patient has moderate protein calorie malnutrition.     Query created by: Mariela Reece on 3/29/2022 4:28 PM      Electronically signed by:  Bard Lola Bustillos MD 3/31/2022 1:25 PM

## 2022-03-31 NOTE — PROGRESS NOTES
Hematology-Oncology Progress Note    Ness Giron  1934  [id number]  3/31/2022    Follow-up for: myeloma/colon cancer     [x]        Chart notes since last visit reviewed   [x]        Medications reviewed for allergies and interactions       Case discussed with the following:         []                            []        Nursing Staff                                                                         []        Pathologist                                                                        []        FAMILY      Subjective:     Spoke with patient who complains of: feeling stronger,still with frequent more formed stools    Objective:     Patient Vitals for the past 24 hrs:   BP Temp Pulse Resp SpO2   03/31/22 0859 (!) 146/60  81     03/31/22 0640 137/73 97.7 °F (36.5 °C) 79 13 96 %   03/31/22 0600   78     03/31/22 0400   76     03/31/22 0214 (!) 140/55 97.7 °F (36.5 °C) 72 11 92 %   03/31/22 0200   69     03/30/22 2200   76     03/30/22 2131 (!) 157/55 98.1 °F (36.7 °C) 77 13 94 %   03/30/22 1821 (!) 151/76 98.2 °F (36.8 °C) 83 19    03/30/22 1409 (!) 153/59 97.9 °F (36.6 °C) 77 19 94 %   03/30/22 1400   74     03/30/22 1200   76     03/30/22 1026 (!) 110/50 97.5 °F (36.4 °C) 89 16 90 %       REVIEW OF SYSTEMS:    Constitutional: negative fever, negative chills, negative weight loss  Eyes:   negative visual changes  ENT:   negative sore throat, tongue or lip swelling  Respiratory:  negative cough, negative dyspnea  Cards:  negative for chest pain, palpitations, lower extremity edema  GI:   negative for nausea, vomiting, diarrhea, and abdominal pain  Neuro:  negative for headaches, dizziness, vertigo  []                        Full ROS o/w normal/non contributor    Constitutional:  Patient looks  []        Sick  [x]        Frail  []        Better                                                 []        Depressed    HEENT:  [x]   NC                         []   AT []    ALOPECIA           Eyes: [x]   Normal               []    Icteric  Oropharynx: []    Normal                  []  Thrush               []   Dry  Mucositis: []    None                 Grade: []        I  []        II  []        III  []        IV  Neck:   [x]   Supple                  []  Rigid               JVD:    [x]   ABSENT       []   PRESENT  Lymphadenopathy:   []   None Noted            []   PRESENT    Chest:  [x]   Clear               []    Rhonchi                      Dec'd @     []  Right Base           []   Left Base    CV:             [x]   Regular              [x]  Irregular               []   Tachy                []   Murmur  Abdominal:   [x]    Soft              [x]   NON-tender               []   Tender      BS:    []   ABSENT                   []   PRESENT  Liver:     []  NON-palp                  []   EDGE- palp  Spleen: [x]   NON-palp                   []  EDGE - palp  Mass:   []   ABSENT                          []  PRESENT  Extr:    []  Lymphedema             []   Cyanosis      []  Clubbing  Edema:     [x]   NONE       []   PRESENT  Skin:  Intact [x]           Purpura []        Rash: [x]   ABSENT       []  PRESENT  Neuro:  []        Normal  []        Confused      Available labs reviewed:  Labs:    Recent Results (from the past 24 hour(s))   MAGNESIUM    Collection Time: 03/31/22  2:13 AM   Result Value Ref Range    Magnesium 1.6 1.6 - 2.4 mg/dL       Available Xrays reviewed:    Chemotherapy monitored and toxicities assessed:    Assessment and Plan   1. Myeloma. . pt has been on and off rx since 2012, his most recent rx was held in January when he contracted covid PNA. .. surprisingly the M-spike is still only 0.1 gm/dl. Rach Yoo we will observe him off rx    2. Recent colon cancer. . this is involving 7/14 nodes. He is too frail for adjuvant rx. Will discuss options as out-pt. 3. Anemia. No labs done today. .  Iron panel shows borderline low iron sat.  I will gave IV iron yesterday and procrit for chemo assoc.  anemia    Joyce Ambrose MD

## 2022-03-31 NOTE — PROGRESS NOTES
Called report to [facility], spoke to Priyanka Lai. Went over pt hx, medications, and most recent vitals. [facility] ETA 1200. Pt. Will be traveling with pt. Belongings, copy of chart and Kardex.

## 2022-03-31 NOTE — PROGRESS NOTES
Transition of Care Plan: [facility] (P2P was approved. Auth obtained 03/30)  RN to call report to [phone]    Auth info: Auth number: [id number]  Auth approved for 3 days with next review date-04/01. Facility can adjust to 04/02  Care Coordinator-Scarlett Lezama  Fax clinicals to [phone] with continued stay on facesheet.      RUR: 27% high     PCP F/U: Lala Edmonds     Disposition: SNF-[facility]-209     Transportation: [facility]-12pm     Main Contact: Daughter/Raj UZSCE-[phone]    0857: Left message for Jj Markham at [facility] to see if they can accept today. COVID rapid completed yesterday. [facility] can accept today. [facility] set up for 12pm. RN and MD notified. 1102: Patient and daughter notified of discharge. Hue Gutierrez RN, CRM    Medicare pt has received, reviewed, and signed 2nd IM letter informing them of their right to appeal the discharge. Signed copy has been placed on pt bedside chart. Transition of Care Plan to SNF/Rehab    SNF/Rehab Transition:  Patient has been accepted to [facility] and meets criteria for admission. Patient will transported by [facility]  and expected to leave at 12pm    Communication to Patient/Family:  Met with patient and daughter-Tricia and they are agreeable to the transition plan. Communication to SNF/Rehab:  Bedside RN, Elisabet Jay, has been notified to update the transition plan to the facility and call report (phone number [phone]). Discharge information has been updated on the AVS.     Discharge instructions to be sent to facility    Nursing Please include all hard scripts for controlled substances, med rec and dc summary, and AVS in packet.      Reviewed and confirmed with facility, [facility], can manage the patient care needs for the following:     [facility] SYSTEM with (X) only those applicable:    Medication:  [x]  Medications will be available at the facility  []  IV Antibiotics   []  Controlled Substance - hard copy to be sent with patient   [x]  Weekly Labs   Documents:  [] Hard RX  [x] MAR  [x] Kardex  [x] AVS  []Transfer Summary  [x]Discharge   Equipment:  []  CPAP/BiPAP  []  Wound Vacuum  []  Draper or Urinary Device  []  PICC/Central Line  []  Nebulizer  []  Ventilator   Treatment:  [x]Isolation (VRE.)  []Surgical Drain Management  []Tracheostomy Care  []Dressing Changes  []Dialysis with transportation and chair time. []PEG Care  []Oxygen  []Daily Weights for Heart Failure   Dietary:  []Any diet limitations  []Tube Feedings   []Total Parenteral Management (TPN)   Eligible for Medicaid Long Term Services and Supports  Yes:  [] Eligible for medical assistance or will become eligible within 180 days and UAI completed. [] Provider/Patient and/or support system has requested screening. [] UAI copy provided to patient or responsible party  [] UAI unavailable at discharge will send once processed to SNF provider. [] UAI unavailable at discharged mailed to patient  No:   [x] Private pay and is not financially eligible for Medicaid within the next 180 days. [] Reside out-of-state.   [] A residents of a state owned/operated facility that is licensed  by Department [facility] or [facility]  [] Enrollment in [facility] hospice services  [] [address]  [] Patient /Family declines to have screening completed or provide financial information for screening     Financial Resources:  Medicaid    [] Initiated and application pending   [] Full coverage     Advanced Care Plan:  []Surrogate Decision Maker of Care  []POA  [x]Communicated Code [employer]   Other

## 2022-03-31 NOTE — DISCHARGE SUMMARY
Discharge Summary       PATIENT ID: Leslee Celeste  MRN: [id number]   YOB: 1934    DATE OF ADMISSION: 3/25/2022  9:30 PM    DATE OF DISCHARGE: 3/31/22   PRIMARY CARE PROVIDER: Claudia Parr NP     ATTENDING PHYSICIAN: Adebayo Varma  DISCHARGING PROVIDER: Hans Moy MD    To contact this individual call [phone] and ask the  to page. If unavailable ask to be transferred the Adult Hospitalist Department. CONSULTATIONS: IP CONSULT TO HEMATOLOGY    PROCEDURES/SURGERIES: * No surgery found *    DISCHARGE DIAGNOSES: NEFTALY due to diarrhea    Christian Howe is a 80 y. o. male medical history of AAA s/p repair in 2012, colon adenocarcinoma, multiple myeloma, atrial fibrillation, GERD, dyslipidemia, and history of melanoma who presents with NEFTALY, and diarrhea.     He was recently hospitalized from 2/21-3/18 for SBO 2/2 obstructive colon adenocarcinoma.  He is s/p R hemicolectomy. Post-operative course was complicated by wound infection requiring course of IV to oral abx.  He was pending OP f/u with oncology. He also has PAD, and is s/p R popliteal bypass. He was dishcarged on lasix  Due to LE edema that was causing drainage. [address] [facility],[address] COURSE:   Patient was admitted for NEFTALY on CKD 2 due to dehydration caused by diarrhea with conservative management patient improved electrolytes repleted chronic diarrhea since 2012 C. difficile was negative resumed colestipol he has history of multiple myeloma and colonic adenocarcinoma , Recent colon cancer. . this is involving 7/14 nodes. He is too frail for adjuvant rx. Onc will discuss options as out-pt.  multiple myeloma treatment was held due to declining health patient also had right foot wound and recent abdominal surgery    DISCHARGE DIAGNOSES / PLAN:      Discharge to SNF    BMI: Body mass index is 17.98 kg/m². . This patient: Meets criteria for underweight given BMI </= 18.5.  The patient should be followed closely for risk of malnutrition. PENDING TEST RESULTS:   At the time of discharge the following test results are still pending:      ADDITIONAL CARE RECOMMENDATIONS:        NOTIFY YOUR PHYSICIAN FOR ANY OF THE FOLLOWING:   Fever over 101 degrees for 24 hours. Chest pain, shortness of breath, fever, chills, nausea, vomiting, diarrhea, change in mentation, falling, weakness, bleeding. Severe pain or pain not relieved by medications, as well as any other signs or symptoms that you may have questions about. FOLLOW UP APPOINTMENTS:    Follow-up Information     Follow up With Specialties Details Why Contact Info    Roland Jose MD Hematology and Oncology, Hematology, Oncology Schedule an appointment as soon as possible for a visit in 2 weeks for follow up regarding cancer treatments and anemia [address] [facility] [id number] [phone]      Abimbola Barber NP Nurse Practitioner   [address] [id number]               DIET: Regular Diet    ACTIVITY: Activity as tolerated    EQUIPMENT needed:     DISCHARGE MEDICATIONS:  Discharge Medication List as of 3/31/2022 12:13 PM      START taking these medications    Details   magnesium oxide (MAG-OX) 400 mg tablet Take 1 Tablet by mouth daily. , Normal, Disp-30 Tablet, R-3      potassium, sodium phosphates (NEUTRA-PHOS) 280-160-250 mg packet Take 1 Packet by mouth daily for 31 days. , Normal, Disp-30 Packet, R-1         CONTINUE these medications which have CHANGED    Details   furosemide (LASIX) 20 mg tablet Take 1 Tablet by mouth daily. Hold for low blood pressure, Normal, Disp-30 Tablet, R-2      colestipoL (COLESTID) 1 gram tablet Take 2 Tablets by mouth two (2) times a day for 30 days. , Normal, Disp-120 Tablet, R-3         CONTINUE these medications which have NOT CHANGED    Details   finasteride (PROSCAR) 5 mg tablet Take 1 Tablet by mouth daily. , Print, Disp-30 Tablet, R-0      traZODone (DESYREL) 50 mg tablet Take 1 Tablet by mouth nightly as needed for Sleep., Print, Disp-30 Tablet, R-0      tamsulosin (FLOMAX) 0.4 mg capsule Take 1 Capsule by mouth daily. , Print, Disp-30 Capsule, R-1      pantoprazole (PROTONIX) 40 mg tablet Take 1 Tablet by mouth Daily (before breakfast). , Print, Disp-30 Tablet, R-0      mupirocin (BACTROBAN) 2 % ointment Apply to open wound on toe daily as instructed, Print, Disp-22 g, R-2      ferrous sulfate 325 mg (65 mg iron) tablet Take 1 Tablet by mouth Daily (before breakfast). Indications: anemia from inadequate iron, Print, Disp-30 Tablet, R-3      calcium carbonate (TUMS) 200 mg calcium (500 mg) chew Take 1 Tablet by mouth as needed., Historical Med      folic acid-vit E5-NKF Q86 (Folbic) 2.5-25-2 mg tablet Take 1 Tablet by mouth daily. , Normal, Disp-90 Tablet, R-3      pregabalin (LYRICA) 50 mg capsule Take 50 mg by mouth two (2) times a day., Historical Med      aspirin delayed-release 81 mg tablet Take 81 mg by mouth nightly., Historical Med      hydroxypropyl methylcellulose (GENTEAL MILD) 0.2 % ophthalmic solution Administer 2 Drops to both eyes as needed., Historical Med      multivitamin (ONE A DAY) tablet Take 1 Tab by mouth daily. , Historical Med         STOP taking these medications       fluconazole (Diflucan) 150 mg tablet Comments:   Reason for Stopping:         linezolid (Zyvox) 600 mg tablet Comments:   Reason for Stopping:         zinc oxide-cod liver oil (DESITIN) 40 % paste Comments:   Reason for Stopping:         potassium chloride (KLOR-CON M10) 10 mEq tablet Comments:   Reason for Stopping:               DISPOSITION:    Home With:   OT  PT  HH  RN      x Long term SNF/Inpatient Rehab    Independent/assisted living    Hospice    Other:       PATIENT CONDITION AT DISCHARGE:     Functional status   x Poor     Deconditioned     Independent      Cognition    x Lucid    x Forgetful     Dementia      Catheters/lines (plus indication)    Draper     PICC     PEG    x None      Code status     Full code    x DNR      PHYSICAL EXAMINATION AT DISCHARGE:  General:          Alert, cooperative, no distress, appears stated age. HEENT:           Atraumatic, anicteric sclerae, pink conjunctivae                          No oral ulcers, mucosa moist, throat clear, dentition fair  Neck:               Supple, symmetrical  Lungs:             Clear to auscultation bilaterally. No Wheezing or Rhonchi. No rales. Chest wall:      No tenderness  No Accessory muscle use. Heart:              Regular  rhythm,  No  murmur   No edema  Abdomen:        Soft, non-tender. Not distended. Bowel sounds normal  Extremities:     No cyanosis. No clubbing,                            Skin turgor normal, Capillary refill normal  Skin:                Not pale. Not Jaundiced  No rashes   Psych:             Not anxious or agitated.   Neurologic:      Alert, moves all extremities, answers questions appropriately and responds to commands       CHRONIC MEDICAL DIAGNOSES:  Problem List as of 3/31/2022 Date Reviewed: 3/17/2022          Codes Class Noted - Resolved    Diarrhea ICD-10-CM: R19.7  ICD-9-CM: 787.91  3/26/2022 - Present        Hypokalemia ICD-10-CM: E87.6  ICD-9-CM: 276.8  3/26/2022 - Present        Malignant neoplasm of ascending colon ([facility] ICD-10-CM: C18.2  ICD-9-CM: 153.6  2/28/2022 - Present        Failure to thrive (child) ICD-10-CM: R62.51  ICD-9-CM: 783.41  2/21/2022 - Present        Falls frequently ICD-10-CM: R29.6  ICD-9-CM: V15.88  2/21/2022 - Present        Moderate protein-calorie malnutrition ([facility] ICD-10-CM: E44.0  ICD-9-CM: 263.0  2/21/2022 - Present        Acute renal injury ([facility] ICD-10-CM: N17.9  ICD-9-CM: 584.9  2/21/2022 - Present        Cellulitis of right leg ICD-10-CM: L03.115  ICD-9-CM: 682.6  2/12/2022 - Present        Personal history of atrial fibrillation ICD-10-CM: Z86.79  ICD-9-CM: V12.59  11/9/2020 - Present        Carotid stenosis, asymptomatic, right ICD-10-CM: I65.21  ICD-9-CM: 433.10  5/29/2020 - Present        Hyperlipemia, mixed ICD-10-CM: E78.2  ICD-9-CM: 272.2  9/27/2018 - Present        Aortic stenosis, moderate ICD-10-CM: I35.0  ICD-9-CM: 424.1  4/10/2018 - Present        Mild concentric left ventricular hypertrophy (LVH) ICD-10-CM: I51.7  ICD-9-CM: 429.3  4/10/2018 - Present        Chronic diarrhea ICD-10-CM: K52.9  ICD-9-CM: 787.91  9/29/2017 - Present        Microalbuminuria ICD-10-CM: R80.9  ICD-9-CM: 791.0  12/10/2015 - Present        Right inguinal hernia ICD-10-CM: K40.90  ICD-9-CM: 550.90  11/2/2015 - Present        Left inguinal hernia ICD-10-CM: K40.90  ICD-9-CM: 550.90  9/14/2015 - Present        Multiple myeloma ([facility] [address] ICD-10-CM: C90.00  ICD-9-CM: 203.00  12/27/2012 - Present        Hypercalcemia ICD-10-CM: A26.45  ICD-9-CM: 275.42  4/25/2012 - Present    Overview Signed 4/25/2012  5:49 PM by Rosario Mon MD     2012, PTH normal, abnormal protein electrophoresis, referred to Hem-Onc             S/P AAA repair 2/9/2012 ICD-10-CM: E47.590, Z86.79  ICD-9-CM: V45.89  2/22/2012 - Present    Overview Signed 2/22/2012  1:38 PM by MD Dr Arian Batres             Pre-diabetes ICD-10-CM: R73.03  ICD-9-CM: 790.29  9/7/2010 - Present    Overview Addendum 11/1/2011  9:29 AM by Rosario Mon MD     2005;  Optho Dr Urbina Milder             Sinus tachycardia ICD-10-CM: R00.0  ICD-9-CM: 427.89  9/7/2010 - Present        H/O Heavy Alcohol Use ICD-9-CM: Dayanara Alvarado  9/7/2010 - Present        Vitamin B12 deficiency ICD-10-CM: E53.8  ICD-9-CM: 266.2  9/7/2010 - Present        Hyperhomocysteinemia ([facility] [address] ICD-10-CM: E72.11  ICD-9-CM: 270.4  9/7/2010 - Present        Mild Allergic Rhinitis ICD-10-CM: J30.9  ICD-9-CM: 477.9  9/7/2010 - Present        History of skin cancer ICD-10-CM: P36.932  ICD-9-CM: V10.83  9/7/2010 - Present    Overview Signed 9/7/2010  9:03 AM by Rosario Mon MD     Followed by Drm Dr Kenneth Tay             HTN (hypertension) ICD-10-CM: I10  ICD-9-CM: 401.9  4/6/2010 - Present    Overview Signed 9/7/2010  8:55 AM by Ramila Riley MD     1990             H/O Carotid Stenosis ICD-10-CM: I65.29  ICD-9-CM: 433.10  4/6/2010 - Present    Overview Addendum 9/7/2010  8:58 AM by Ramila Riley MD     Oct 2003; 50%left subclavian and 50-70% SHEILA              GERD (gastroesophageal reflux disease) ICD-10-CM: K21.9  ICD-9-CM: 530.81  4/6/2010 - Present        RESOLVED: Intestinal adhesions with complete obstruction ([facility] [address] ICD-10-CM: K56.52  ICD-9-CM: 560.81  2/21/2022 - 2/28/2022        RESOLVED: Intestinal obstruction ([address] ICD-10-CM: H65.136  ICD-9-CM: 560.9  2/21/2022 - 2/24/2022        RESOLVED: Myeloma ([address] ICD-10-CM: C90.00  ICD-9-CM: 203.00  1/6/2015 - 6/10/2016        RESOLVED: Gastrointestinal bleeding ICD-10-CM: K92.2  ICD-9-CM: 578.9  12/26/2014 - 2/28/2018        RESOLVED: Tresia Brooking stone pancreatitis ICD-10-CM: K85.10  ICD-9-CM: 577.0, 574.20  1/23/2014 - 2/28/2018        RESOLVED: Acute cholecystitis ICD-10-CM: K81.0  ICD-9-CM: 575.0  1/23/2014 - 2/28/2018        RESOLVED: Abnormal serum protein electrophoresis ICD-10-CM: R77.8  ICD-9-CM: 790.99  4/25/2012 - 2/28/2018    Overview Signed 4/25/2012  5:48 PM by Ramila Riley MD     4/2012, referred to Hem-Onc             RESOLVED: Atrial fibrillation 1/2012 ICD-10-CM: I48.91  ICD-9-CM: 427.31  2/22/2012 - 11/9/2020    Overview Signed 2/22/2012  1:33 PM by Ramila Riley MD     Cardio Dr Samantha Saravia: AAA (abdominal aortic aneurysm) ([facility] ICD-10-CM: I71.4  ICD-9-CM: 441.4  1/6/2012 - 9/26/2018    Overview Signed 1/6/2012  2:07 PM by Ramila Riley MD     Detected on plain films back xray 1/2012; Ct scheduled             RESOLVED: Anemia ICD-10-CM: D64.9  ICD-9-CM: 285.9  1/5/2012 - 2/28/2018        RESOLVED: Hypertriglyceridemia ICD-10-CM: E78.1  ICD-9-CM: 272.1  9/7/2010 - 9/27/2018    Overview Signed 9/7/2010  8:55 AM by Mere Hinds MD     2003             RESOLVED: Bilateral Carotid Bruits, L>R ICD-10-CM: R09.89  ICD-9-CM: 785.9  9/7/2010 - 2/28/2018    Overview Signed 9/7/2010  8:57 AM by Mere Hinds MD     10/03             RESOLVED: Anemia due to GI bleed 2007 ICD-10-CM: D64.9  ICD-9-CM: 285.9  4/6/2010 - 2/28/2018        RESOLVED: GI bleed, duodenitis 2007 ICD-10-CM: K92.2  ICD-9-CM: 578.9  4/6/2010 - 2/28/2018    Overview Addendum 9/7/2010  9:01 AM by Mere Hinds MD     8/07 Duodenitis                       Greater than 30  minutes were spent with the patient on counseling and coordination of care    Signed:   Luz Elena Cruz MD  3/31/2022  1:25 PM

## 2022-03-31 NOTE — DISCHARGE INSTRUCTIONS
1) follow up with Dr Zhang Patel- regarding further evaluation and treatment for anemia, myeloma, and colon cancer  2) continue colestipol for diarrhea and potassium and magnesium supplements for electrolyte losses due to diarrhea  - rx's were electronically transmitted to your pharmacy  3) continue current wound care on surgery site on right leg- and see Dr Pranav Rothman- vascular surgery as instructed  4) continue OT/PT as tolerated  5) surgery removed your abdominal wound staples- monitor abdominal wound and follow up with surgery as instructed  6) monitor blood pressures daily and avoid excessive use of furosemide diuretics to prevent dehydration and orthostatic hypotension  - if your morning systolic blood pressures are low or normal- (less than 140) hold furosemide (lasix)  7) planned discharge back to [facility] and rehab on Thursday to complete therapy prior to returning home w family. 8) Diet- easy to chew and lactose free    Wound care orders=   Sacrum:  Please maintain hydrocolloid up to one week or change as needed with soiling or rolled edges.   Right great toe: cover with xeroform and dry dressing; change Mondays & thursdays  Right leg incisions-  cover any draining areas with dry gauze and tape and change PRN

## 2022-04-01 NOTE — TELEPHONE ENCOUNTER
----- Message from Eloy Alex sent at 4/1/2022  1:05 PM EDT -----  Subject: Message to Provider    QUESTIONS  Information for Provider? Gokul Larson CALLING   TO VERIFY THAT PCP WILL SIGN ORDER FOR HOME HEALTH FOR PT. WILL SEND   PAPERWORK WHEN THEY KNOW THAT PCP IS AGREEABLE TO HAVE HOME HEALTHCARE FOR   PT. PLEASE CALL ASAP  ---------------------------------------------------------------------------  --------------  CALL BACK INFO  What is the best way for the office to contact you? Do not leave any   message, patient will call back for answer  Preferred Call Back Phone Number? [phone]  ---------------------------------------------------------------------------  --------------  SCRIPT ANSWERS  Relationship to Patient? Third Party  Third Party Type? Home Health Care? Representative Name?  Kari Barrett

## 2022-04-05 NOTE — TELEPHONE ENCOUNTER
Spoke with Quiana Ahuja for home health and informed her that [facility] will sign orders for patient [facility] [place].

## 2022-04-28 PROBLEM — G63 POLYNEUROPATHY ASSOCIATED WITH UNDERLYING DISEASE (HCC): Status: ACTIVE | Noted: 2022-01-01

## 2022-04-28 NOTE — PATIENT INSTRUCTIONS
1. Covid vaccine  2. See Dr. Bernadette Hurtado  3. Call insurance about in home care benefits  4.  See retinal specialist

## 2022-04-28 NOTE — PROGRESS NOTES
Chief Complaint   Patient presents with   [facility] Follow Up       1. Have you been to the ER, urgent care clinic since your last visit? Hospitalized since your last visit? Yes When: 2/21/22 Where: [facility]  Reason for visit: Diarrhea    2. Have you seen or consulted any other health care providers outside of the [address] [place] [address] since your last visit? Include any pap smears or colon screening. No    3. For patients over 45: Has the patient had a colonoscopy? Yes - no Care Gap present         3 most recent PHQ Screens 4/28/2022   PHQ Not Done -   Little interest or pleasure in doing things Not at all   Feeling down, depressed, irritable, or hopeless Not at all   Total Score PHQ 2 0   Trouble falling or staying asleep, or sleeping too much Not at all   Feeling tired or having little energy Not at all   Poor appetite, weight loss, or overeating Not at all   Feeling bad about yourself - or that you are a failure or have let yourself or your family down Not at all   Trouble concentrating on things such as school, work, reading, or watching TV Not at all   Moving or speaking so slowly that other people could have noticed; or the opposite being so fidgety that others notice Not at all   Thoughts of being better off dead, or hurting yourself in some way Not at all   PHQ 9 Score 0   How difficult have these problems made it for you to do your work, take care of your home and get along with others Not difficult at all     Abuse Screening Questionnaire 4/28/2022   Do you ever feel afraid of your partner? N   Are you in a relationship with someone who physically or mentally threatens you? N   Is it safe for you to go home? Y       Fall Risk Assessment, last 12 mths 4/28/2022   Able to walk? Yes   Fall in past 12 months? 1   Do you feel unsteady? 1   Are you worried about falling 1   Is TUG test greater than 12 seconds? 1   Is the gait abnormal? 1   Number of falls in past 12 months 2   Fall with injury?  1 ADL Assessment 4/28/2022   Feeding yourself No Help Needed   Getting from bed to chair No Help Needed   Getting dressed No Help Needed   Bathing or showering No Help Needed   Walk across the room (includes cane/walker) Help Needed   Using the telphone No Help Needed   Taking your medications No Help Needed   Preparing meals No Help Needed   Managing money (expenses/bills) Help Needed   Moderately strenuous housework (laundry) No Help Needed   Shopping for personal items (toiletries/medicines) No Help Needed   Shopping for groceries No Help Needed   Driving Help Needed   Climbing a flight of stairs Help Needed   Getting to places beyond walking distances Help Needed     Health Maintenance Due   Topic Date Due    Medicare Yearly Exam  03/06/2021    COVID-19 Vaccine (3 - Moderna risk 4-dose series) 03/30/2021    Depression Screen  06/01/2021

## 2022-04-28 NOTE — PROGRESS NOTES
Assessment/Plan:     Diagnoses and all orders for this visit:    1. Chronic diarrhea  -     REFERRAL TO COLON AND RECTAL SURGERY  -     potassium, sodium phosphates (NEUTRA-PHOS) 280-160-250 mg packet; Take 1 Packet by mouth daily for 31 days.  -     METABOLIC PANEL, COMPREHENSIVE; Future    2. Polyneuropathy associated with underlying disease ([facility]  Assessment & Plan:   monitored by specialist. No acute findings meriting change in the plan      3. Hyperhomocysteinemia ([facility]  Assessment & Plan:   monitored by specialist. No acute findings meriting change in the plan      4. Malignant neoplasm of ascending colon ([facility]  About to initiate treatment, although it appears palliative at this time due to his staging. 5. Hospital discharge follow-up  -     KS DISCHARGE MEDS RECONCILED W/ CURRENT OUTPATIENT MED LIST    6. Insomnia, unspecified type  -     traZODone (DESYREL) 50 mg tablet; Take 1 Tablet by mouth nightly as needed for Sleep. Stable. Refilled today. Continue current therapy. Follow-up and Dispositions    · Return in about 4 weeks (around 5/26/2022) for Follow Up. Discussed expected course/resolution/complications of diagnosis in detail with patient. Medication risks/benefits/costs/interactions/alternatives discussed with patient. Pt was given after visit summary which includes diagnoses, current medications & vitals. Pt expressed understanding with the diagnosis and plan          Subjective:      Odette Franco is a 80 y.o. male who presents for had concerns including Hospital Follow Up. He is accompanied by his daughter today. Hospital Follow Up  Odette Franco is seen for follow up from recent admission to [facility] on 3/25/2022. We reviewed the lab results, imaging, the notes, the records. He presented with NEFTALY due to diarrhea. He is taking his potassium supplement as directed & without any side effects. He reports symptoms are improved.   Medication Reconciliation reviewed today. History of chronic diarrhea since 2012. Currently taking Immodium and Colestipol with some benefit. 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 Daiana Echevarria. He was previously seeing Dr. Reginaldo Leos gastro, for history of diarrhea. He plans to undergo chemotherapy and potential radiation. No surgery is scheduled due to his age and other underlying health issues at this time. He reports a good appetite. He is currently living with his wife, who has dementia. He is the primary caregiver and has been able to keep up with the household chores and her care. They have long term plans to relocate to live with his daughter. They are building an addition to the home.          Patient Active Problem List   Diagnosis Code    HTN (hypertension) I5    H/O Carotid Stenosis I65.29    GERD (gastroesophageal reflux disease) K21.9    Pre-diabetes R73.03    Sinus tachycardia R00.0    H/O Heavy Alcohol Use     Vitamin B12 deficiency E53.8    Hyperhomocysteinemia (HCC) E72.11    Mild Allergic Rhinitis J30.9    History of skin cancer Z85.828    S/P AAA repair 2/9/2012 Z98.890, Z86.79    Hypercalcemia E83.52    Multiple myeloma ([address] C90.00    Left inguinal hernia K40.90    Right inguinal hernia K40.90    Microalbuminuria R80.9    Chronic diarrhea K52.9    Aortic stenosis, moderate I35.0    Mild concentric left ventricular hypertrophy (LVH) I51.7    Hyperlipemia, mixed E78.2    Carotid stenosis, asymptomatic, right I65.21    Personal history of atrial fibrillation Z86.79    Cellulitis of right leg L03.115    Failure to thrive (child) R62.51    Falls frequently R29.6    Moderate protein-calorie malnutrition (HCC) E44.0    Acute renal injury ([address] N17.9    Malignant neoplasm of ascending colon (HCC) C18.2    Diarrhea R19.7    Hypokalemia E87.6    Polyneuropathy associated with underlying disease ([address] [place]       Current Outpatient Medications   Medication Sig Dispense Refill    traZODone (DESYREL) 50 mg tablet Take 1 Tablet by mouth nightly as needed for Sleep. 90 Tablet 1    potassium, sodium phosphates (NEUTRA-PHOS) 280-160-250 mg packet Take 1 Packet by mouth daily for 31 days. 30 Packet 2    furosemide (LASIX) 20 mg tablet Take 1 Tablet by mouth daily. Hold for low blood pressure 30 Tablet 2    magnesium oxide (MAG-OX) 400 mg tablet Take 1 Tablet by mouth daily. 30 Tablet 3    colestipoL (COLESTID) 1 gram tablet Take 2 Tablets by mouth two (2) times a day for 30 days. 341 Tablet 3    folic acid-vit G2-SCN A97 (Folbic) 2.5-25-2 mg tablet Take 1 Tablet by mouth daily. 90 Tablet 3    pregabalin (LYRICA) 50 mg capsule Take 50 mg by mouth two (2) times a day.  multivitamin (ONE A DAY) tablet Take 1 Tab by mouth daily.  mupirocin (BACTROBAN) 2 % ointment Apply to open wound on toe daily as instructed (Patient not taking: Reported on 4/28/2022) 22 g 2    aspirin delayed-release 81 mg tablet Take 81 mg by mouth nightly. (Patient not taking: Reported on 4/28/2022)      hydroxypropyl methylcellulose (GENTEAL MILD) 0.2 % ophthalmic solution Administer 2 Drops to both eyes as needed. (Patient not taking: Reported on 4/28/2022)         Allergies   Allergen Reactions    Codeine Nausea and Vomiting     Blurred vision, felt faint    Contrast Agent [Iodine] Hives     Needs premedication w/ Benadryl prn (since 1/2012 reaction)    Dexamethasone Other (comments)     \"Deathly ill\"    Fish Oil Nausea Only    Glipizide Other (comments)     DRASTIC DROP IN BLOOD GLUCOSE, fasting    Lovenox [Enoxaparin] Hives     Patient states blisters all over the body.     Metformin Diarrhea    Niacin Other (comments)     Flushing    Norvasc [Amlodipine] Other (comments)     Leg edema    Optiray 350 [Ioversol] Hives     He does not know what this is       ROS:   Review of Systems   Constitutional: Negative for malaise/fatigue. Eyes: Negative for blurred vision. Respiratory: Negative for shortness of breath. Cardiovascular: Negative for chest pain. Gastrointestinal: Positive for diarrhea. Neurological: Positive for weakness. Objective:     Visit Vitals  BP (!) 156/69 (BP 1 Location: Left upper arm, BP Patient Position: Sitting, BP Cuff Size: Large adult long)   Pulse 69   Temp 98.1 °F (36.7 °C) (Temporal)   Resp 16   Ht 6' 1\" (1.854 m)   Wt 157 lb 3.2 oz (71.3 kg)   SpO2 100%   BMI 20.74 kg/m²       Vitals and Nurse Documentation reviewed. Physical Exam  Constitutional:       General: He is not in acute distress. Comments: Generalized Weakness. Ambulating with walker in office today     Cardiovascular:      Heart sounds: S1 normal and S2 normal. Murmur heard. No friction rub. No gallop. Pulmonary:      Effort: No respiratory distress. Breath sounds: Normal breath sounds. Skin:     General: Skin is warm and dry.    Psychiatric:         Mood and Affect: Mood and affect normal.

## 2022-04-29 NOTE — PROGRESS NOTES
Social Work Note  4/29/2022    Referral received for assistance with transportation to medical treatments. Chart reviewed. Attempted to reach patient. Woman who answered phone stated that patient was not home - \"He is in the hospital\". Will attempt contact at later time.      JAIMEE Vargas, ACSW, [facility]    Ambulatory Care Management   [phone]

## 2022-05-03 NOTE — TELEPHONE ENCOUNTER
Patient calling asking about transportation. Gave patient number to Sheryle Hammersmith [facility]  to follow up with transportation.

## 2022-05-03 NOTE — PROGRESS NOTES
Social Work Note  5/3/2022    Received call from patient re: transportation. Verified patient with two identifiers. Introduced self, social work care management services. Discussed transportation need - dates, times. SW advised of [facility]. [address] [place] [address] ([phone]). Trip scheduled for 5/17/22. [place] requested that SW call back on 5/6/22 to schedule trip for 5/20/22 - they only schedule 2 weeks ahead. Transportation arrangements:    5/17/22, appointment time 2pm  Dr. Swain Knee  [address], [facility] [address] [address]  [phone]     time (home):  1:07 pm  Return trip (will call):  Patient to call [employer] at [phone] to schedule. Once scheduled, ride will arrive within 60 minutes. Trip Confirmation number:  [id number]    Advised patient of transportation arrangements for 5/17/22. Patient communicated that his 5/20/22 appointment is now dependent on lab work on 5/1722. SW to assist with scheduling transportation for 5/20 appointment on 5/18/22. Patient stated that he has one additional appointment on 5/26. SW to arrange transportation 2 weeks prior to appointment.      Joselito Hill, MSW, ACSW, [facility]    Ambulatory Care Management   [phone]

## 2022-05-12 NOTE — PROGRESS NOTES
Social Work Note  5/12/2022      Follow-up call to patient. He stated that he is not feeling well today. Confirmed transportation for appointment on 5/17/22. Patient stated that his daughter will take him to dental appointment on 5/20/22. Transportation still needed for appointment on 5/26/22. [address] [address]  [phone]  Arrangements made:    Date:  5/26/22  Appointment:  2:00 pm  Location:  [facility]   time:  1:01 - 1:31 pm  Return trip:  Will call. Patient to call [employer] ([phone]) to schedule. They will  within 1 hour of call. Reference number: [id number]    Contacted patient with transportation arrangement information.        JAIMEE Baird, ACSW, [facility]    Ambulatory Care Management   [phone]

## 2022-05-23 NOTE — PROGRESS NOTES
Social Work Note  5/23/2022    Received message from patient. Returned call. Per patient, he canceled transportation for appointment on 5/17/22 because they did not arrive by 1:20 pm.  Explained  time window of 15 minutes before scheduled pick-up time and 15 minutes after. Patient voiced concern that he/insurance will be charged. SW advised that since he notified [employer] that transportation had not arrived and canceled directly with them, then insurance should be aware of situation. Confirmed transportation for 5/26/22 appointment with patient. Advised that if transportation has not arrived by 1:31pm on Thursday 5/26/22, then he should contact [address] [place] [address] for assistance. Patient voiced understanding. He also stated that he may have future appointments. Advised patient that SW can teach patient or family to schedule appointments. He stated that it would be best to work with daughter. SW Plan:  Follow-up with patient after 5/26/22 appointment. Teach family transportation scheduling process.      JAIMEE Figueroa, ACSW, [place]    Ambulatory Care Management   [phone]

## 2022-05-27 PROBLEM — H33.21 DETACHED RETINA, RIGHT: Status: ACTIVE | Noted: 2022-01-01

## 2022-05-27 NOTE — PROGRESS NOTES
Chief Complaint   Patient presents with    Follow-up       1. Have you been to the ER, urgent care clinic since your last visit? Hospitalized since your last visit? No    2. Have you seen or consulted any other health care providers outside of the [address] [place] [address] since your last visit? Include any pap smears or colon screening. No    3. For patients over 45: Has the patient had a colonoscopy? Yes - no Care Gap present       3 most recent PHQ Screens 5/27/2022   PHQ Not Done -   Little interest or pleasure in doing things Not at all   Feeling down, depressed, irritable, or hopeless Not at all   Total Score PHQ 2 0   Trouble falling or staying asleep, or sleeping too much -   Feeling tired or having little energy -   Poor appetite, weight loss, or overeating -   Feeling bad about yourself - or that you are a failure or have let yourself or your family down -   Trouble concentrating on things such as school, work, reading, or watching TV -   Moving or speaking so slowly that other people could have noticed; or the opposite being so fidgety that others notice -   Thoughts of being better off dead, or hurting yourself in some way -   PHQ 9 Score -   How difficult have these problems made it for you to do your work, take care of your home and get along with others -     Abuse Screening Questionnaire 5/27/2022   Do you ever feel afraid of your partner? N   Are you in a relationship with someone who physically or mentally threatens you? N   Is it safe for you to go home? Y       Fall Risk Assessment, last 12 mths 5/27/2022   Able to walk? Yes   Fall in past 12 months? 0   Do you feel unsteady? 0   Are you worried about falling 0   Is TUG test greater than 12 seconds?  -   Is the gait abnormal? -   Number of falls in past 12 months -   Fall with injury? -       ADL Assessment 5/27/2022   Feeding yourself No Help Needed   Getting from bed to chair No Help Needed   Getting dressed No Help Needed   Bathing or showering No Help Needed   Walk across the room (includes cane/walker) No Help Needed   Using the telphone No Help Needed   Taking your medications No Help Needed   Preparing meals No Help Needed   Managing money (expenses/bills) No Help Needed   Moderately strenuous housework (laundry) No Help Needed   Shopping for personal items (toiletries/medicines) No Help Needed   Shopping for groceries No Help Needed   Driving Help Needed   Climbing a flight of stairs Help Needed   Getting to places beyond walking distances Help Needed     Health Maintenance Due   Topic Date Due    Medicare Yearly Exam  03/06/2021    COVID-19 Vaccine (3 - Moderna risk 4-dose series) 03/30/2021

## 2022-05-27 NOTE — PROGRESS NOTES
Assessment/Plan   Education and counseling provided:  Are appropriate based on today's review and evaluation    Diagnoses and all orders for this visit:    1. Medicare annual wellness visit, subsequent    2. Detached retina, right  He is followed closely by ophthalmology. 3. Essential hypertension  Home pressures are normal.       Health Maintenance Due   Topic Date Due    COVID-19 Vaccine (3 - Moderna risk 4-dose series) 03/30/2021           SUBSEQUENT MEDICARE WELLNESS  This is the Subsequent Medicare Annual Wellness Exam, performed 12 months or more after the Initial AWV or the last Subsequent AWV    I have reviewed the patient's medical history in detail and updated the computerized patient record. Currently undergoing evaluation for right detached retina. A corrective surgery is pending. He reports a good appetite. Strength is improving at home. He is looking into assistance in the home, however has been unsuccessful.      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    GERD (gastroesophageal reflux disease) 4/6/2010    GI bleed, duodenitis 2007 4/6/2010    H/O Heavy Alcohol Use 9/7/2010    History of skin cancer 09/07/2010    melanoma back    HTN (hypertension) 4/6/2010    Hypercalcemia 4/25/2012    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/2010    Hypertriglyceridemia 9/7/2010    Left inguinal hernia 9/14/2015    Lipids blood increased 4/6/2010    Microalbuminuria 12/10/2015    Mild Allergic Rhinitis 9/7/2010    Multiple myeloma ([facility]     Pre-diabetes 9/7/2010    2005; Optho Dr Chucho Davis Right inguinal hernia 11/2/2015    S/P AAA repair 2/9/2012 2/22/2012    Sinus tachycardia 9/7/2010    Vitamin B12 deficiency 9/7/2010      Past Surgical History:   Procedure Laterality Date    COLONOSCOPY  8/2007    Normal; Dr Bria Varghese EGD  8/2007    small hiatal hernia, mild duodenitis; Dr Bria Varghese HX AAA REPAIR  2/9/2012    Dr Ismael Levy    HX CATARACT REMOVAL      both eyes    HX CHOLECYSTECTOMY  1-29-14    lap sandi- [address] [place] [address]- Dr. Soumya Hair Left 9/24/15    left indirect inguinal by Dr. Enoch Saleh Right 11/2/15    inguinal w/ mesh by Dr. Enoch Saleh Bilateral 10/2015 And 11/2015    Inguinal    BLUE DOPPLER  3/26/2012    BLUE Echo; + clot     Current Outpatient Medications   Medication Sig Dispense Refill    Cholestyramine Light 4 gram powder       traZODone (DESYREL) 50 mg tablet Take 1 Tablet by mouth nightly as needed for Sleep. 90 Tablet 1    potassium, sodium phosphates (NEUTRA-PHOS) 280-160-250 mg packet Take 1 Packet by mouth daily for 31 days. 30 Packet 2    furosemide (LASIX) 20 mg tablet Take 1 Tablet by mouth daily. Hold for low blood pressure 30 Tablet 2    magnesium oxide (MAG-OX) 400 mg tablet Take 1 Tablet by mouth daily. 30 Tablet 3    folic acid-vit B0-JOSE K80 (Folbic) 2.5-25-2 mg tablet Take 1 Tablet by mouth daily. 90 Tablet 3    pregabalin (LYRICA) 50 mg capsule Take 50 mg by mouth two (2) times a day.  aspirin delayed-release 81 mg tablet Take 81 mg by mouth nightly.  hydroxypropyl methylcellulose (GENTEAL MILD) 0.2 % ophthalmic solution Administer 2 Drops to both eyes as needed.  multivitamin (ONE A DAY) tablet Take 1 Tab by mouth daily.       mupirocin (BACTROBAN) 2 % ointment Apply to open wound on toe daily as instructed (Patient not taking: Reported on 2022) 22 g 2     Allergies   Allergen Reactions    Codeine Nausea and Vomiting     Blurred vision, felt faint    Contrast Agent [Iodine] Hives     Needs premedication w/ Benadryl prn (since 2012 reaction)    Dexamethasone Other (comments)     \"Deathly ill\"    Fish Oil Nausea Only    Glipizide Other (comments)     DRASTIC DROP IN BLOOD GLUCOSE, fasting    Lovenox [Enoxaparin] Hives     Patient states blisters all over the body.  Metformin Diarrhea    Niacin Other (comments)     Flushing    Norvasc [Amlodipine] Other (comments)     Leg edema    Optiray 350 [Ioversol] Hives     He does not know what this is     Family History   Problem Relation Age of Onset    Heart Disease Mother          age 80    [de-identified] Father          brain tumor age 80    Heart Disease Father     Substance Abuse Daughter          cocaine OD age 43 in [address] Other Son          PE/DVT age 43    Heart Disease Brother     Cancer Brother         [address] Other Daughter         alive and well    Anesth Problems Neg Hx      Social History     Tobacco Use    Smoking status: Former Smoker     Packs/day: 1.50     Years: 55.00     Pack years: 82.50     Quit date: 2001     Years since quittin.4    Smokeless tobacco: Never Used    Tobacco comment: used to smoke 1 1/2 ppd x ~ 40 yrs   Substance Use Topics    Alcohol use:  Yes     Alcohol/week: 14.0 standard drinks     Types: 14 Glasses of wine per week     Comment: 14     Patient Active Problem List   Diagnosis Code    HTN (hypertension) I10    H/O Carotid Stenosis I65.29    GERD (gastroesophageal reflux disease) K21.9    Pre-diabetes R73.03    Sinus tachycardia R00.0    H/O Heavy Alcohol Use     Vitamin B12 deficiency E53.8    Hyperhomocysteinemia (HCC) E72.11    Mild Allergic Rhinitis J30.9    History of skin cancer Z85.828    S/P AAA repair 2012 Z98.890, Z86.79    Hypercalcemia E83.52    Multiple myeloma ([facility] C90.00    Left inguinal hernia K40.90    Right inguinal hernia K40.90    Microalbuminuria R80.9    Chronic diarrhea K52.9    Aortic stenosis, moderate I35.0    Mild concentric left ventricular hypertrophy (LVH) I51.7    Hyperlipemia, mixed E78.2    Carotid stenosis, asymptomatic, right I65.21    Personal history of atrial fibrillation Z86.79    Cellulitis of right leg L03.115    Failure to thrive (child) R62.51    Falls frequently R29.6    Moderate protein-calorie malnutrition (HCC) E44.0    Acute renal injury ([facility] N17.9    Malignant neoplasm of ascending colon (HCC) C18.2    Diarrhea R19.7    Hypokalemia E87.6    Polyneuropathy associated with underlying disease ([facility] [facility] G63    Detached retina, right H33.21       Depression Risk Factor Screening:     3 most recent PHQ Screens 5/27/2022   PHQ Not Done -   Little interest or pleasure in doing things Not at all   Feeling down, depressed, irritable, or hopeless Not at all   Total Score PHQ 2 0   Trouble falling or staying asleep, or sleeping too much -   Feeling tired or having little energy -   Poor appetite, weight loss, or overeating -   Feeling bad about yourself - or that you are a failure or have let yourself or your family down -   Trouble concentrating on things such as school, work, reading, or watching TV -   Moving or speaking so slowly that other people could have noticed; or the opposite being so fidgety that others notice -   Thoughts of being better off dead, or hurting yourself in some way -   PHQ 9 Score -   How difficult have these problems made it for you to do your work, take care of your home and get along with others -     Alcohol Risk Factor Screening: You do not drink alcohol or very rarely. Functional Ability and Level of Safety:   Hearing Loss  Hearing is good.     Activities of Daily Living  The home contains: handrails, grab bars and uses walker  Patient needs help with:  transportation    Fall Risk  Fall Risk Assessment, last 12 [facility] 5/27/2022   Able to walk? Yes   Fall in past 12 months? 0   Do you feel unsteady? 0   Are you worried about falling 0   Is TUG test greater than 12 seconds? -   Is the gait abnormal? -   Number of falls in past 12 months -   Fall with injury?  -       Abuse Screen  Patient is not abused    Cognitive Screening   Evaluation of Cognitive Function:  Has your family/caregiver stated any concerns about your memory: no  Normal    Patient Care Team   Patient Care Team:  Adriano Edmonds NP as PCP - General (Nurse Practitioner)  Adriano Edmonds NP as PCP - [address] [place] [address] Provider  Johny Daniels RN as Nurse Navigator (Family Medicine)  Malcom Keita MD (Hematology and Oncology)  Dion Riojas MD (Colon and Rectal Surgery)  Zachary Lisa MD (Gastroenterology)  Barb Ansari, [address] (Optometry)  Cele Anderson MD (Cardiovascular Disease Physician)  JAIMEE Polanco as

## 2022-05-27 NOTE — PROGRESS NOTES
Assessment/Plan:     {There are no diagnoses linked to this encounter. (Refresh or delete this SmartLink)}         Discussed expected course/resolution/complications of diagnosis in detail with patient. Medication risks/benefits/costs/interactions/alternatives discussed with patient. Pt was given after visit summary which includes diagnoses, current medications & vitals. Pt expressed understanding with the diagnosis and plan          Subjective:      Jordy Connolly is a 80 y.o. male who presents for had concerns including Follow-up. He was seen by ophthalmology and has a right detached retina.        Patient Active Problem List   Diagnosis Code    HTN (hypertension) I5    H/O Carotid Stenosis I65.29    GERD (gastroesophageal reflux disease) K21.9    Pre-diabetes R73.03    Sinus tachycardia R00.0    H/O Heavy Alcohol Use     Vitamin B12 deficiency E53.8    Hyperhomocysteinemia (HCC) E72.11    Mild Allergic Rhinitis J30.9    History of skin cancer Z85.828    S/P AAA repair 2/9/2012 Z98.890, Z86.79    Hypercalcemia E83.52    Multiple myeloma ([facility] [address] C90.00    Left inguinal hernia K40.90    Right inguinal hernia K40.90    Microalbuminuria R80.9    Chronic diarrhea K52.9    Aortic stenosis, moderate I35.0    Mild concentric left ventricular hypertrophy (LVH) I51.7    Hyperlipemia, mixed E78.2    Carotid stenosis, asymptomatic, right I65.21    Personal history of atrial fibrillation Z86.79    Cellulitis of right leg L03.115    Failure to thrive (child) R62.51    Falls frequently R29.6    Moderate protein-calorie malnutrition (HCC) E44.0    Acute renal injury ([address] N17.9    Malignant neoplasm of ascending colon (HCC) C18.2    Diarrhea R19.7    Hypokalemia E87.6    Polyneuropathy associated with underlying disease (HCC) G63       Current Outpatient Medications   Medication Sig Dispense Refill    Cholestyramine Light 4 gram powder       traZODone (DESYREL) 50 mg tablet Take 1 Tablet by mouth nightly as needed for Sleep. 90 Tablet 1    potassium, sodium phosphates (NEUTRA-PHOS) 280-160-250 mg packet Take 1 Packet by mouth daily for 31 days. 30 Packet 2    furosemide (LASIX) 20 mg tablet Take 1 Tablet by mouth daily. Hold for low blood pressure 30 Tablet 2    magnesium oxide (MAG-OX) 400 mg tablet Take 1 Tablet by mouth daily. 30 Tablet 3    folic acid-vit V9-HQL X80 (Folbic) 2.5-25-2 mg tablet Take 1 Tablet by mouth daily. 90 Tablet 3    pregabalin (LYRICA) 50 mg capsule Take 50 mg by mouth two (2) times a day.  aspirin delayed-release 81 mg tablet Take 81 mg by mouth nightly.  hydroxypropyl methylcellulose (GENTEAL MILD) 0.2 % ophthalmic solution Administer 2 Drops to both eyes as needed.  multivitamin (ONE A DAY) tablet Take 1 Tab by mouth daily.  mupirocin (BACTROBAN) 2 % ointment Apply to open wound on toe daily as instructed (Patient not taking: Reported on 4/28/2022) 22 g 2       Allergies   Allergen Reactions    Codeine Nausea and Vomiting     Blurred vision, felt faint    Contrast Agent [Iodine] Hives     Needs premedication w/ Benadryl prn (since 1/2012 reaction)    Dexamethasone Other (comments)     \"Deathly ill\"    Fish Oil Nausea Only    Glipizide Other (comments)     DRASTIC DROP IN BLOOD GLUCOSE, fasting    Lovenox [Enoxaparin] Hives     Patient states blisters all over the body.  Metformin Diarrhea    Niacin Other (comments)     Flushing    Norvasc [Amlodipine] Other (comments)     Leg edema    Optiray 350 [Ioversol] Hives     He does not know what this is       ROS:   ROS    Objective:     Visit Vitals  BP (!) 145/58 (BP 1 Location: Left upper arm, BP Patient Position: Sitting, BP Cuff Size: Large adult long)   Pulse 68   Temp 97.3 °F (36.3 °C) (Temporal)   Resp 16   Ht 6' 1\" (1.854 m)   Wt 163 lb 6.4 oz (74.1 kg)   SpO2 98%   BMI 21.56 kg/m²       Vitals and Nurse Documentation reviewed.      Physical Exam

## 2022-07-01 PROBLEM — K56.609 LARGE BOWEL OBSTRUCTION (HCC): Status: ACTIVE | Noted: 2022-01-01

## 2022-07-01 NOTE — ED TRIAGE NOTES
Triage: Pt arrives ambulatory from home with his walker with CC of vomiting, diarrhea, abdominal distention, fatigue, malaise, and general weakness. Pt has hx of metastatic bone cancer. He was diagnosed with a bowel obstruction related to this cancer in February and had a prolonged hospital admission. He reports this episode feels different than his bowel obstruction but the symptoms are similar. Hx of C-diff.

## 2022-07-02 NOTE — PROGRESS NOTES
[address] [facility]    HD #2    Subjective     NPO, NGT, no BM or flatus, minimal pain, feeling a little better    Objective     Patient Vitals for the past 24 hrs:   Temp Pulse Resp BP SpO2   07/02/22 0310 97.9 °F (36.6 °C) 90 18 (!) 137/45 94 %   07/02/22 0141 98.4 °F (36.9 °C) 90 16 (!) 125/51 93 %   07/02/22 0100 -- 90 22 (!) 118/52 95 %   07/01/22 2330 -- 82 21 (!) 111/58 95 %   07/01/22 2328 -- -- -- -- 93 %   07/01/22 2300 -- -- -- -- (!) 87 %   07/01/22 2015 98.9 °F (37.2 °C) -- -- (!) 114/58 91 %   07/01/22 2009 -- -- -- -- 91 %   07/01/22 1937 98.3 °F (36.8 °C) (!) 112 16 119/67 92 %       Date 07/01/22 0700 - 07/02/22 0659 07/02/22 0700 - 07/03/22 0659   Shift 2396-7713 5963-8595 24 Hour Total 2265-4589 5702-6975 24 Hour Total   INTAKE   Shift Total(mL/kg)         OUTPUT   Emesis/NG output  850 850        Output (ml) (Nasogastric Tube 07/01/22)  850 850      Shift Total(mL/kg)  850(11.4) 850(11.4)      NET  -850 -850      Weight (kg)  74.4 74.4 74.4 74.4 74.4       PE  Pulm - CTAB  CV - RRR  Abd - soft, distended, BS hypo, mild TTP    Labs  Recent Results (from the past 12 hour(s))   SAMPLES BEING HELD    Collection Time: 07/01/22  7:47 PM   Result Value Ref Range    SAMPLES BEING HELD 1RED,1BLU     COMMENT        Add-on orders for these samples will be processed based on acceptable specimen integrity and analyte stability, which may vary by analyte.    CBC WITH AUTOMATED DIFF    Collection Time: 07/01/22  7:47 PM   Result Value Ref Range    WBC 15.0 (H) 4.1 - 11.1 K/uL    RBC 3.83 (L) 4.10 - 5.70 M/uL    HGB 12.5 12.1 - 17.0 g/dL    HCT 38.6 36.6 - 50.3 %    .8 (H) 80.0 - 99.0 FL    MCH 32.6 26.0 - 34.0 PG    MCHC 32.4 30.0 - 36.5 g/dL    RDW 14.4 11.5 - 14.5 %    PLATELET 467 330 - 055 K/uL    MPV 10.4 8.9 - 12.9 FL    NRBC 0.0 0  WBC    ABSOLUTE NRBC 0.00 0.00 - 0.01 K/uL    NEUTROPHILS 94 (H) 32 - 75 % LYMPHOCYTES 1 (L) 12 - 49 %    MONOCYTES 5 5 - 13 %    EOSINOPHILS 0 0 - 7 %    BASOPHILS 0 0 - 1 %    IMMATURE GRANULOCYTES 0 0.0 - 0.5 %    ABS. NEUTROPHILS 14.0 (H) 1.8 - 8.0 K/UL    ABS. LYMPHOCYTES 0.2 (L) 0.8 - 3.5 K/UL    ABS. MONOCYTES 0.8 0.0 - 1.0 K/UL    ABS. EOSINOPHILS 0.0 0.0 - 0.4 K/UL    ABS. BASOPHILS 0.0 0.0 - 0.1 K/UL    ABS. IMM. GRANS. 0.0 0.00 - 0.04 K/UL    DF SMEAR SCANNED      RBC COMMENTS MACROCYTOSIS  1+       METABOLIC PANEL, COMPREHENSIVE    Collection Time: 07/01/22  7:47 PM   Result Value Ref Range    Sodium 140 136 - 145 mmol/L    Potassium 4.1 3.5 - 5.1 mmol/L    Chloride 109 (H) 97 - 108 mmol/L    CO2 23 21 - 32 mmol/L    Anion gap 8 5 - 15 mmol/L    Glucose 139 (H) 65 - 100 mg/dL    BUN 25 (H) 6 - 20 MG/DL    Creatinine 1.34 (H) 0.70 - 1.30 MG/DL    BUN/Creatinine ratio 19 12 - 20      GFR est AA >60 >60 ml/min/1.73m2    GFR est non-AA 50 (L) >60 ml/min/1.73m2    Calcium 9.1 8.5 - 10.1 MG/DL    Bilirubin, total 0.6 0.2 - 1.0 MG/DL    ALT (SGPT) 32 12 - 78 U/L    AST (SGOT) 59 (H) 15 - 37 U/L    Alk.  phosphatase 241 (H) 45 - 117 U/L    Protein, total 7.6 6.4 - 8.2 g/dL    Albumin 3.5 3.5 - 5.0 g/dL    Globulin 4.1 (H) 2.0 - 4.0 g/dL    A-G Ratio 0.9 (L) 1.1 - 2.2     CULTURE, BLOOD, PAIRED    Collection Time: 07/01/22  7:47 PM    Specimen: Blood   Result Value Ref Range    Special Requests: NO SPECIAL REQUESTS      Culture result: NO GROWTH AFTER 8 HOURS     LACTIC ACID    Collection Time: 07/01/22  7:47 PM   Result Value Ref Range    Lactic acid 1.4 0.4 - 2.0 MMOL/L   LIPASE    Collection Time: 07/01/22  7:47 PM   Result Value Ref Range    Lipase 61 (L) 73 - 475 U/L   METABOLIC PANEL, BASIC    Collection Time: 07/02/22  3:24 AM   Result Value Ref Range    Sodium 142 136 - 145 mmol/L    Potassium 3.9 3.5 - 5.1 mmol/L    Chloride 112 (H) 97 - 108 mmol/L    CO2 23 21 - 32 mmol/L    Anion gap 7 5 - 15 mmol/L    Glucose 122 (H) 65 - 100 mg/dL    BUN 28 (H) 6 - 20 MG/DL    Creatinine 1. 24 0.70 - 1.30 MG/DL    BUN/Creatinine ratio 23 (H) 12 - 20      GFR est AA >60 >60 ml/min/1.73m2    GFR est non-AA 55 (L) >60 ml/min/1.73m2    Calcium 7.9 (L) 8.5 - 10.1 MG/DL   MAGNESIUM    Collection Time: 07/02/22  3:24 AM   Result Value Ref Range    Magnesium 2.3 1.6 - 2.4 mg/dL   CBC WITH AUTOMATED DIFF    Collection Time: 07/02/22  3:24 AM   Result Value Ref Range    WBC 17.1 (H) 4.1 - 11.1 K/uL    RBC 3.38 (L) 4.10 - 5.70 M/uL    HGB 10.9 (L) 12.1 - 17.0 g/dL    HCT 33.7 (L) 36.6 - 50.3 %    MCV 99.7 (H) 80.0 - 99.0 FL    MCH 32.2 26.0 - 34.0 PG    MCHC 32.3 30.0 - 36.5 g/dL    RDW 14.5 11.5 - 14.5 %    PLATELET 715 248 - 603 K/uL    MPV 10.8 8.9 - 12.9 FL    NRBC 0.0 0  WBC    ABSOLUTE NRBC 0.00 0.00 - 0.01 K/uL    NEUTROPHILS 90 (H) 32 - 75 %    LYMPHOCYTES 4 (L) 12 - 49 %    MONOCYTES 6 5 - 13 %    EOSINOPHILS 0 0 - 7 %    BASOPHILS 0 0 - 1 %    IMMATURE GRANULOCYTES 0 0.0 - 0.5 %    ABS. NEUTROPHILS 15.4 (H) 1.8 - 8.0 K/UL    ABS. LYMPHOCYTES 0.7 (L) 0.8 - 3.5 K/UL    ABS. MONOCYTES 1.0 0.0 - 1.0 K/UL    ABS. EOSINOPHILS 0.0 0.0 - 0.4 K/UL    ABS. BASOPHILS 0.0 0.0 - 0.1 K/UL    ABS. IMM. GRANS. 0.0 0.00 - 0.04 K/UL    DF SMEAR SCANNED      RBC COMMENTS ANISOCYTOSIS  1+        RBC COMMENTS MACROCYTOSIS  1+             Assessment     Lucy Garcia is a 80 y. o.yr old male with large bowel obstruction, SBO, metastatic colon cancer with peritoneal implants    Plan     He appears to have another colonic mass of the sigmoid colon on CT which may be the cause of his large bowel/SBO  It would seem reasonable to do a flex sig and possibly a stent if there is a mass causing obstruction  GI consulted last night and to see him today  Keeping him NPO with NGT to decompress  Palliative care to see the patient some point soon  Also recommend oncology if possible  No surgical plans for this, I am not srue how he would tolerate another very difficult surgery since his cancer has progressed quickly  He is likely nearing end of life and family will need to discuss plans with him and palliative care  Surgery will be following    Chaz Lester MD

## 2022-07-02 NOTE — ED NOTES
Has a final transfer review been performed? Yes    Reason for Admission: Liver mets/ Peritoneal Carcinometosis/Large Bowel Obstruction/ PNA  Patient comes from: Home  Mental Status: Alert and oriented  ADL:self care or partial assistance  Ambulation:mild difficulty  Pertinent Info/Safety Concerns: Fall risk d/t age/ Neuropathy/ Metastatic Ca/ NGT LIWS  COVID Status: Not Tested  MEWS Score: 01  Vitals:    07/01/22 2328 07/01/22 2330 07/02/22 0100 07/02/22 0141   BP:  (!) 111/58 (!) 118/52 (!) 125/51   Pulse:  82 90 90   Resp:  21 22 16   Temp:    98.4 °F (36.9 °C)   SpO2: 93% 95% 95% 93%   Weight:       Height:         Transport mode:ED stretcher    TRANSFER - OUT REPORT:    Cody Hsu  being transferred to [facility](unit) for routine progression of care     \"Notification of etransfer note given to (Name) Roland Burkitt (Title) RN    Report consisted of patient's Situation, Background, Assessment and   Recommendations(SBAR). Lines:   Peripheral IV 07/01/22 Left Antecubital (Active)   Site Assessment Clean, dry, & intact 07/01/22 1949   Phlebitis Assessment 0 07/01/22 1949   Infiltration Assessment 0 07/01/22 1949   Dressing Status Clean, dry, & intact 07/01/22 1949   Dressing Type Transparent 07/01/22 1949   Hub Color/Line Status Pink;Flushed;Patent 07/01/22 1949   Action Taken Blood drawn 07/01/22 1949       Peripheral IV 07/01/22 Left Forearm (Active)   Site Assessment Clean, dry, & intact 07/01/22 1949   Phlebitis Assessment 0 07/01/22 1949   Infiltration Assessment 0 07/01/22 1949   Dressing Status Clean, dry, & intact 07/01/22 1949   Dressing Type Transparent 07/01/22 1949   Hub Color/Line Status Pink;Flushed;Patent 07/01/22 1949   Action Taken Blood drawn 07/01/22 1949        Opportunity for questions and clarification was provided.

## 2022-07-02 NOTE — PROGRESS NOTES
Day #1 of Zosyn  Indication:  CAP  Current regimen:  2.25 gm q8h  Abx regimen: Monotherapy   Recent Labs     22  0324 22  1947   WBC 17.1* 15.0*   CREA 1.24 1.34*   BUN 28* 25*     Est CrCl: >20 ml/min;    Temp (24hrs), Av.2 °F (36.8 °C), Min:97.7 °F (36.5 °C), Max:98.9 °F (37.2 °C)    Cultures: Blood-NGTD    Plan: Change to 4.5 gm x 1 then 3.375 gm q8h     Jo Ernst, PharmD

## 2022-07-02 NOTE — PROGRESS NOTES
Bedside and Verbal shift change report given to Karolyn Moncada  (oncoming nurse) by Danny Back and August Barrera (offgoing nurse). Report included the following information SBAR.

## 2022-07-02 NOTE — CONSULTS
[facility] at [facility] Surgery Consultation    History of Present Illness:      Scott Rincon is a 80 y.o. male who has a recent past surgical history of open right hemicolectomy due to obstructing colon cancer causing small bowel obstruction done by Dr. Fidencio donis in February of this year. 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 He has been seeing Dr. Caleb Bey of oncology for this. He was not felt to be a good candidate for chemotherapy and is just being observed at this time. He comes into the emergency room today complaining of abdominal pain nausea and vomiting. This has been going on a few days but has been increasing. He said he had been having some loose bowel movements and diarrhea over the last few days but has decreased some recently. He has been having some pain which is a 3-4 out of 10. 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    GERD (gastroesophageal reflux disease) 4/6/2010    GI bleed, duodenitis 2007 4/6/2010    H/O Heavy Alcohol Use 9/7/2010    History of skin cancer 09/07/2010    melanoma back    HTN (hypertension) 4/6/2010    Hypercalcemia 4/25/2012    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/2010    Hypertriglyceridemia 9/7/2010    Left inguinal hernia 9/14/2015    Lipids blood increased 4/6/2010    Microalbuminuria 12/10/2015    Mild Allergic Rhinitis 9/7/2010    Multiple myeloma ([address]     Pre-diabetes 9/7/2010    2005;  Optho Dr Leila Quispe Right inguinal hernia 11/2/2015    S/P AAA repair 2/9/2012 2/22/2012    Sinus tachycardia 9/7/2010    Vitamin B12 deficiency 9/7/2010       Past Surgical History:   Procedure Laterality Date    COLONOSCOPY  8/2007    Normal; Dr Suyapa Fields    EGD  8/2007    small hiatal hernia, mild duodenitis; Dr Matti Tovar HX AAA REPAIR  2/9/2012    Dr Jorge Enciso    HX CATARACT REMOVAL      both eyes    HX CHOLECYSTECTOMY  1-29-14    lap sandi- [facility]- Dr. Yadira Montano Left 9/24/15    left indirect inguinal by Dr. Rhonda Panda Right 11/2/15    inguinal w/ mesh by Dr. Rhonda Panda Bilateral 10/2015 And 11/2015    Inguinal    BLUE DOPPLER  3/26/2012    BLUE Echo; + clot       No current facility-administered medications for this encounter. Current Outpatient Medications:     Cholestyramine Light 4 gram powder, , Disp: , Rfl:     traZODone (DESYREL) 50 mg tablet, Take 1 Tablet by mouth nightly as needed for Sleep., Disp: 90 Tablet, Rfl: 1    furosemide (LASIX) 20 mg tablet, Take 1 Tablet by mouth daily. Hold for low blood pressure, Disp: 30 Tablet, Rfl: 2    magnesium oxide (MAG-OX) 400 mg tablet, Take 1 Tablet by mouth daily. , Disp: 30 Tablet, Rfl: 3    mupirocin (BACTROBAN) 2 % ointment, Apply to open wound on toe daily as instructed (Patient not taking: Reported on 4/28/2022), Disp: 22 g, Rfl: 2    folic acid-vit A8-HBU P93 (Folbic) 2.5-25-2 mg tablet, Take 1 Tablet by mouth daily. , Disp: 90 Tablet, Rfl: 3    pregabalin (LYRICA) 50 mg capsule, Take 50 mg by mouth two (2) times a day., Disp: , Rfl:     aspirin delayed-release 81 mg tablet, Take 81 mg by mouth nightly., Disp: , Rfl:     hydroxypropyl methylcellulose (GENTEAL MILD) 0.2 % ophthalmic solution, Administer 2 Drops to both eyes as needed. , Disp: , Rfl:     multivitamin (ONE A DAY) tablet, Take 1 Tab by mouth daily. , Disp: , Rfl:     Allergies   Allergen Reactions    Codeine Nausea and Vomiting     Blurred vision, felt faint    Contrast Agent [Iodine] Hives     Needs premedication w/ Benadryl prn (since 2012 reaction)    Dexamethasone Other (comments)     \"Deathly ill\"    Fish Oil Nausea Only    Glipizide Other (comments)     DRASTIC DROP IN BLOOD GLUCOSE, fasting    Lovenox [Enoxaparin] Hives     Patient states blisters all over the body.  Metformin Diarrhea    Niacin Other (comments)     Flushing    Norvasc [Amlodipine] Other (comments)     Leg edema    Optiray 350 [Ioversol] Hives     He does not know what this is       Social History     Socioeconomic History    Marital status:      Spouse name: Not on file    Number of children: 3    Years of education: Not on file    Highest education level: Not on file   Occupational History    Occupation: Retired  from [employer]    Smoking status: Former Smoker     Packs/day: 1.50     Years: 55.00     Pack years: 82.50     Quit date: 2001     Years since quittin.5    Smokeless tobacco: Never Used    Tobacco comment: used to smoke 1 1/2 ppd x ~ 40 yrs   Vaping Use    Vaping Use: Never used   Substance and Sexual Activity    Alcohol use:  Yes     Alcohol/week: 14.0 standard drinks     Types: 14 Glasses of wine per week     Comment: 14    Drug use: No    Sexual activity: Not on file   Other Topics Concern     Service Not Asked    Blood Transfusions Not Asked    Caffeine Concern No     Comment: none    Occupational Exposure Not Asked    Hobby Hazards Not Asked    Sleep Concern Not Asked    Stress Concern Not Asked    Weight Concern No     Comment: down some at present; usually pretty stable in the 180's; his personal goal is 175    Special Diet Yes     Comment: very low sugar, low carb, tries to pay attention to sugar grams    Back Care Not Asked    Exercise Yes     Comment: stays active and walks on treadmill 15 minutes 1-2 x a week and is very active in the yard   [employer] Not Asked    [address] Not Asked    Self-Exams Not Asked   Social History Narrative    Lives with wife, who is developing dementia    Daughter is in the area     Social Determinants of Health     Financial Resource Strain:     Difficulty of Paying Living Expenses: Not on file   Food Insecurity:     Worried About Running Out of Food in the Last Year: Not on file    Jenni of Food in the Last Year: Not on file   Transportation Needs:     Lack of Transportation (Medical): Not on file    Lack of Transportation (Non-Medical):  Not on file   Physical Activity:     Days of Exercise per Week: Not on file    Minutes of Exercise per Session: Not on file   Stress:     Feeling of Stress : Not on file   Social Connections:     Frequency of Communication with Friends and Family: Not on file    Frequency of Social Gatherings with Friends and Family: Not on file    Attends Jew Services: Not on file    Active Member of Clubs or Organizations: Not on file    Attends Club or Organization Meetings: Not on file    Marital Status: Not on file   Intimate Partner Violence:     Fear of Current or Ex-Partner: Not on file    Emotionally Abused: Not on file    Physically Abused: Not on file    Sexually Abused: Not on file   Housing Stability:     Unable to Pay for Housing in the Last Year: Not on file    Number of [place] in the Last Year: Not on file    Unstable Housing in the Last Year: Not on file       Family History   Problem Relation Age of Onset    Heart Disease Mother          age 80    Cancer Father          brain tumor age 80    Heart Disease Father     Substance Abuse Daughter          cocaine OD age 43 in 200    Other Son          PE/DVT age 43   [address] Other Daughter         alive and well    Anesth Problems Neg Hx        ROS   Constitutional: negative  Ears, Nose, Mouth, Throat, and Face: negative  Respiratory: negative  Cardiovascular: negative  Gastrointestinal: positive for nausea, vomiting and abdominal pain  Genitourinary:negative  Integument/Breast: negative  Hematologic/Lymphatic: negative  Behavioral/Psychiatric: negative  Allergic/Immunologic: negative      Physical Exam:     Visit Vitals  BP (!) 114/58 (BP 1 Location: Right arm, BP Patient Position: At rest;Supine)   Pulse (!) 112   Temp 98.9 °F (37.2 °C)   Resp 16   Ht 6' 1\" (1.854 m)   Wt 164 lb (74.4 kg)   SpO2 91%   BMI 21.64 kg/m²       General - alert and oriented, no apparent distress  HEENT - no jaundice, no hearing imparement  Pulm - CTAB, no C/W/R  CV - RRR, no M/R/G  Abd -soft, distended, bowel sounds hypoactive, minimal tenderness to palpation, well-healed scars  Ext - pulses intact in UE and LE bilaterally, no edema  Skin - supple, no rashes  Psychiatric - normal affect, good mood    Labs  Recent Results (from the past 12 hour(s))   SAMPLES BEING HELD    Collection Time: 07/01/22  7:47 PM   Result Value Ref Range    SAMPLES BEING HELD 1RED,1BLU     COMMENT        Add-on orders for these samples will be processed based on acceptable specimen integrity and analyte stability, which may vary by analyte. CBC WITH AUTOMATED DIFF    Collection Time: 07/01/22  7:47 PM   Result Value Ref Range    WBC 15.0 (H) 4.1 - 11.1 K/uL    RBC 3.83 (L) 4.10 - 5.70 M/uL    HGB 12.5 12.1 - 17.0 g/dL    HCT 38.6 36.6 - 50.3 %    .8 (H) 80.0 - 99.0 FL    MCH 32.6 26.0 - 34.0 PG    MCHC 32.4 30.0 - 36.5 g/dL    RDW 14.4 11.5 - 14.5 %    PLATELET 522 308 - 516 K/uL    MPV 10.4 8.9 - 12.9 FL    NRBC 0.0 0  WBC    ABSOLUTE NRBC 0.00 0.00 - 0.01 K/uL    NEUTROPHILS 94 (H) 32 - 75 %    LYMPHOCYTES 1 (L) 12 - 49 %    MONOCYTES 5 5 - 13 %    EOSINOPHILS 0 0 - 7 %    BASOPHILS 0 0 - 1 %    IMMATURE GRANULOCYTES 0 0.0 - 0.5 %    ABS. NEUTROPHILS 14.0 (H) 1.8 - 8.0 K/UL    ABS. LYMPHOCYTES 0.2 (L) 0.8 - 3.5 K/UL    ABS.  MONOCYTES 0.8 0.0 - 1.0 K/UL    ABS. EOSINOPHILS 0.0 0.0 - 0.4 K/UL    ABS. BASOPHILS 0.0 0.0 - 0.1 K/UL    ABS. IMM. GRANS. 0.0 0.00 - 0.04 K/UL    DF SMEAR SCANNED      RBC COMMENTS MACROCYTOSIS  1+       METABOLIC PANEL, COMPREHENSIVE    Collection Time: 07/01/22  7:47 PM   Result Value Ref Range    Sodium 140 136 - 145 mmol/L    Potassium 4.1 3.5 - 5.1 mmol/L    Chloride 109 (H) 97 - 108 mmol/L    CO2 23 21 - 32 mmol/L    Anion gap 8 5 - 15 mmol/L    Glucose 139 (H) 65 - 100 mg/dL    BUN 25 (H) 6 - 20 MG/DL    Creatinine 1.34 (H) 0.70 - 1.30 MG/DL    BUN/Creatinine ratio 19 12 - 20      GFR est AA >60 >60 ml/min/1.73m2    GFR est non-AA 50 (L) >60 ml/min/1.73m2    Calcium 9.1 8.5 - 10.1 MG/DL    Bilirubin, total 0.6 0.2 - 1.0 MG/DL    ALT (SGPT) 32 12 - 78 U/L    AST (SGOT) 59 (H) 15 - 37 U/L    Alk. phosphatase 241 (H) 45 - 117 U/L    Protein, total 7.6 6.4 - 8.2 g/dL    Albumin 3.5 3.5 - 5.0 g/dL    Globulin 4.1 (H) 2.0 - 4.0 g/dL    A-G Ratio 0.9 (L) 1.1 - 2.2     LACTIC ACID    Collection Time: 07/01/22  7:47 PM   Result Value Ref Range    Lactic acid 1.4 0.4 - 2.0 MMOL/L   LIPASE    Collection Time: 07/01/22  7:47 PM   Result Value Ref Range    Lipase 61 (L) 73 - 393 U/L         Imaging  CT abdomen pelvis-FINDINGS:   Lower Thorax:  Lung Bases: There are patchy consolidative and groundglass opacities in the  lungs, worst in the lingula. No pleural effusion.     Heart: The heart is normal in size. No pericardial effusion. Coronary artery  stents.     Abdomen/Pelvis:  Evaluation of the solid organs is markedly limited without the use of IV  contrast.     Liver: There are multiple hypodense lesions in the liver, for example measuring  2.8 x 2.2 cm in the left hepatic lobe and measuring 2.3 cm and 2.1 cm in the  right hepatic lobe.     Biliary system: Gallbladder is surgically absent.     Spleen: Normal.     Pancreas: Normal.     Kidneys/Ureters/Bladder: Simple cyst in the lower pole the right kidney  measuring 6.4 cm. Few other hypodensities in the right kidney, statistically  benign cysts. No renal or ureteral calculi. No hydronephrosis or hydroureter. The bladder is normal.     Adrenals: Normal.     Stomach/bowel: There is diffuse dilation of the small bowel with air-fluid  levels, measuring up to 3.8 cm in diameter. There is also diffuse distention of  the colon containing a large volume of stool, to the level of the mid sigmoid  colon, with there is an abrupt transition associated with nodular wall  thickening (series 3 image 80). No free intraperitoneal air noted.     Reproductive Organs: Prostate is mildly enlarged.     Vasculature: Stable 3.7 cm infrarenal abdominal aortic aneurysm. Severe calcific  atherosclerosis of the abdominal aorta and iliac vasculature.     Nodes: There is extensive peritoneal carcinomatosis, most evident in the left  upper quadrant where there are innumerable peritoneal nodules. This is new from  prior exam.     Fluid: Small volume ascites.     Bones/Soft Tissue: No acute fractures or aggressive osseous lesions are seen. Multilevel degenerative disc disease and facet arthropathy throughout the lumbar  spine. Osteopenia.     IMPRESSION     1. New extensive peritoneal carcinomatosis, most evident in the left upper  quadrant with innumerable peritoneal nodules. 2. Diffuse distention of the small and large bowel, with a large volume of stool  within the large bowel, to the level of the mid sigmoid colon where there is an  abrupt transition with associated nodular wall thickening. Findings are  suspicious for large bowel obstruction due to colonic malignancy. 3. Multiple hypodense lesions in the liver, favored to represent metastases. 4. Small volume ascites. 5. Patchy consolidative and groundglass opacities in the lungs, worst in the  lingula, favored to represent multifocal pneumonia.   6. Stable 3.7 cm infrarenal abdominal aortic aneurysm.     I have reviewed and agree with all of the pertinent images    Assessment:     Winston Kussmaul is a 80 y.o. male large bowel obstruction, carcinomatosis, diffuse metastatic colon cancer    Recommendations:     1. He appears to have a large bowel obstruction as a cause of small bowel obstruction and small bowel distention. He currently has an NG tube and is starting to decompress his stomach some. Unfortunately he has diffuse carcinomatosis and diffuse metastatic disease. He appears to have some sort of a mass in the sigmoid colon that may be the causing factor in his current obstruction. Started stated whether this is compression from surrounding tumor or this is a separate synchronous tumor. To potentially decompress his colon it would be a good idea to get a colonoscopy to take a look and see if this is a tumor and also potentially decompress his bowel with a colonic stent. GIs been consulted for this. For now no surgical role in his treatment. 2.  His other big issue is the metastatic process that he has ongoing. He is likely to continue declining given his present circumstances. We need to engage palliative care and he may end up needing hospice. I would also recommend oncology consultation. He was not felt to be strong enough to undergo chemotherapy. He appears to be getting towards end-of-life with this metastatic disease and carcinomatosis. 3.  For now we will be following no plans for surgical bowel resection at this point and would prefer a colonic stent rather than extensive surgery. We will be on board for now following with his care. Continue n.p.o. NG tube and decompression until further decisions have been made about end-of-life care and GI consultation. Hilda Burrell MD    Greater than half of the time: 55 minutes was used in counciling the patient about diagnosis and treatment plan    Mr. Christiano Weiner has a reminder for a \"due or due soon\" health maintenance.  I have asked that he contact his primary care provider for follow-up on this health maintenance.

## 2022-07-02 NOTE — ED PROVIDER NOTES
Patient is an 49-year-old male presented emergency department for abdominal distention, abdominal pain, nausea and vomiting. Patient has a history of adenocarcinoma involving the small bowel had surgery in February of this year presents today with worsening abdominal distention, abdominal pain, nausea and vomiting patient states that he has not had a bowel movement in over 5 days has not eaten anything or drink anything for the last 2 days. Patient has a history of metastatic bone cancer. 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    GERD (gastroesophageal reflux disease) 4/6/2010    GI bleed, duodenitis 2007 4/6/2010    H/O Heavy Alcohol Use 9/7/2010    History of skin cancer 09/07/2010    melanoma back    HTN (hypertension) 4/6/2010    Hypercalcemia 4/25/2012    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/2010    Hypertriglyceridemia 9/7/2010    Left inguinal hernia 9/14/2015    Lipids blood increased 4/6/2010    Microalbuminuria 12/10/2015    Mild Allergic Rhinitis 9/7/2010    Multiple myeloma ([address]     Pre-diabetes 9/7/2010 2005;  Optho Dr Isela Chase Right inguinal hernia 11/2/2015    S/P AAA repair 2/9/2012 2/22/2012    Sinus tachycardia 9/7/2010    Vitamin B12 deficiency 9/7/2010       Past Surgical History:   Procedure Laterality Date    COLONOSCOPY  8/2007    Normal; Dr Jaky Kidd EGD  8/2007    small hiatal hernia, mild duodenitis; Dr Jaky Kidd HX AAA REPA[facility]  [facility]      both eyes    HX CHOLECYSTECTOMY  14    lap sandi- [facility]- Dr. Mt Grajeda    HX HERNIA REPAIR Left 9/24/15    left indirect inguinal by Dr. Rea Gutierrez Right 11/2/15    inguinal w/ mesh by Dr. Rea Gutierrez Bilateral 10/2015 And 2015    Inguinal    BLUE DOPPLER  3/26/2012    BLUE Echo; + clot         Family History:   Problem Relation Age of Onset    Heart Disease Mother          age 80    [de-identified] Father          brain tumor age 80    Heart Disease Father     Substance Abuse Daughter          cocaine OD age 43 in 200    Other Son          PE/DVT age 43   [address] Other Daughter         alive and well    Anesth Problems Neg Hx        Social History     Socioeconomic History    Marital status:      Spouse name: Not on file    Number of children: 3    Years of education: Not on file    Highest education level: Not on file   Occupational History    Occupation: Retired  from [address] Use    Smoking status: Former Smoker     Packs/day: 1.50     Years: 55.00     Pack years: 82.50     Quit date: 2001     Years since quittin.5    Smokeless tobacco: Never Used    Tobacco comment: used to smoke 1 1/2 ppd x ~ 40 yrs   Vaping Use    Vaping Use: Never used   Substance and Sexual Activity    Alcohol use:  Yes     Alcohol/week: 14.0 standard drinks     Types: 14 Glasses of wine per week     Comment: 14    Drug use: No    Sexual activity: Not on file   Other Topics Concern     Service Not Asked    Blood Transfusions Not Asked    Caffeine Concern No     Comment: none    Occupational Exposure Not Asked    Hobby Hazards Not Asked    Sleep Concern Not Asked    Stress Concern Not Asked    Weight Concern No     Comment: down some at present; usually pretty stable in the 180's; his personal goal is 80    Special Diet Yes     Comment: very low sugar, low carb, tries to pay attention to sugar grams    Back Care Not Asked    Exercise Yes     Comment: stays active and walks on treadmill 15 minutes 1-2 x a week and is very active in the yard   [employer] Helmet Not Asked   [address] Not Asked    Self-Exams Not Asked   Social History Narrative    Lives with wife, who is developing dementia    Daughter is in the area     Social Determinants of Health     Financial Resource Strain:     Difficulty of Paying Living Expenses: Not on file   Food Insecurity:     Worried About [address] in the Last Year: Not on file    Jenni of Food in the Last Year: Not on file   Transportation Needs:     Lack of Transportation (Medical): Not on file    Lack of Transportation (Non-Medical):  Not on file   Physical Activity:     Days of Exercise per Week: Not on file    Minutes of Exercise per Session: Not on file   Stress:     Feeling of Stress : Not on file   Social Connections:     Frequency of Communication with Friends and Family: Not on file    Frequency of Social Gatherings with Friends and Family: Not on file    Attends Hoahaoism Services: Not on file    Active Member of [address] [place] [address] or Organizations: Not on file    Attends Club or Organization Meetings: Not on file    Marital Status: Not on file   Intimate Partner Violence:     Fear of Current or Ex-Partner: Not on file    Emotionally Abused: Not on file    Physically Abused: Not on file    Sexually Abused: Not on file   Housing Stability:     Unable to Pay for Housing in the Last Year: Not on file    Number of [place] in the Last Year: Not on file    Unstable Housing in the Last Year: Not on file         ALLERGIES: Codeine, Contrast agent [iodine], Dexamethasone, Fish oil, Glipizide, Lovenox [enoxaparin], Metformin, Niacin, Norvasc [amlodipine], and Optiray 350 [ioversol]    Review of Systems   Constitutional: Positive for activity change, appetite change and fatigue. Gastrointestinal: Positive for abdominal distention, abdominal pain, constipation, nausea and vomiting. Neurological: Positive for weakness. All other systems reviewed and are negative. Vitals:    07/01/22 1937 07/01/22 2009 07/01/22 2014 07/01/22 2015   BP: 119/67   (!) 114/58   Pulse: (!) 112      Resp: 16      Temp: 98.3 °F (36.8 °C)   98.9 °F (37.2 °C)   SpO2: 92% 91%  91%   Weight:   74.4 kg (164 lb)    Height:   6' 1\" (1.854 m)             Physical Exam  Vitals and nursing note reviewed. Constitutional:       General: He is in acute distress. Appearance: He is ill-appearing and toxic-appearing. HENT:      Head: Normocephalic and atraumatic. Eyes:      Extraocular Movements: Extraocular movements intact. Pupils: Pupils are equal, round, and reactive to light. Cardiovascular:      Rate and Rhythm: Regular rhythm. Tachycardia present. Pulses: Normal pulses. Heart sounds: Murmur heard. Systolic murmur is present with a grade of 4/6. Pulmonary:      Effort: Pulmonary effort is normal.      Breath sounds: Normal breath sounds. Abdominal:      General: Abdomen is protuberant. Bowel sounds are absent. There is distension. Tenderness: There is generalized abdominal tenderness. Musculoskeletal:      Right lower leg: 3+ Edema present. Left lower leg: 3+ Edema present. Skin:     General: Skin is warm and dry. Coloration: Skin is pale. Neurological:      General: No focal deficit present. Mental Status: He is oriented to person, place, and time. Psychiatric:         Mood and Affect: Mood is anxious. MDM  Number of Diagnoses or Management Options  Large bowel obstruction ([address]  Multifocal pneumonia  Peritoneal carcinomatosis ([address]  Diagnosis management comments: Small bowel obstruction, large bowel obstruction, metastatic disease.   40-year-old male presented emergency department with worsening abdominal distention, abdominal pain, fatigue, nausea and vomiting concern for small bowel versus large bowel obstruction. CT abdomen pelvis shows peritoneal carcinomatosis, large bowel obstruction likely secondary to colonic malignancy as well as multifocal pneumonia. We will consult general surgery, GI patient require admission to the hospitalist service. Procedures      Perfect Serve Consult for Admission  9:38 PM    ED Room Number: GA66/68  Patient Name and age:  Cody Hsu 80 y.o.  male  Working Diagnosis:   1. Peritoneal carcinomatosis ([address]    2. Large bowel obstruction ([facility]    3. Multifocal pneumonia        COVID-19 Suspicion:  no  Sepsis present:  no  Reassessment needed: yes  Code Status:  Full Code  Readmission: no  Isolation Requirements:  no  Recommended Level of Care:  telemetry  Department:[facility] ED - 21   Other: Total critical care time spent exclusive of procedures:  52 min.

## 2022-07-02 NOTE — CONSULTS
Gastroenterology Consult     Referring Physician: LEVI Cervantes    Consult Date: 7/2/2022     Subjective:     Chief Complaint: N/V and change inbowel habits    History of Present Illness: Roni Ramesh is a 80 y.o. male who is seen in consultation for Bowel obstruction. Pt follows with [facility], Dr. Conchita Morgan.    79 y/o M h/o Colon cancer s/p Rt hemicolectomy c/b wound dehiscence and fungal infection, AAA s/p repair, A. Fib, Multilple Myeloma who p/w no BMs in the last 5 days, worsening abdominal distension and nausea/vomiting. CT  A/P wit obstruction from likely mass in the sigmoid colon, extensive peritoneal carcinomatosis, liver mets. Pt is currently without abdominal pain but denies any further vomiting. He States he has a h/o chronic diarrhea for years and having no Bms in the last few days was new. He usu. Takes colestid and imodium prn but had to stop this over the last week. I called his daughter Karina Yoon, and son in law and discussed the findings and the options for treatment with all of them. After discussing with them that he is not a surgical candidate to relieve the bowelobstruction, I discussed both options of Endoscopic therapy with attempts at colon stent placement for decompression in addition to hospice. I discussed the benefits and risk of endoscopic stenting. After a full discussion, pt and family decided he would like to go home with hospice and not wanting to go through any invasive procedures including colonoscopy.      Past Medical History:   Diagnosis Date    AAA (abdominal aortic aneurysm) ([address] 01/06/2012    repair in 2012    Abnormal serum protein electrophoresis 4/25/2012    Anemia 4/6/2010    Anemia due to GI bleed 2007 4/6/2010    Arthritis     Atrial fibrillation 1/2012 02/22/2012    Pt states doesn't have    Bilateral Carotid Bruits, L>R 9/7/2010    Bone cancer ([address]     Carotid stenosis 4/6/2010    Chronic pain     Diabetes mellitus, type 2 ([address] 9/7/2010    NO MEDS  Disturbances of sulphur-bearing amino-acid metabolism 2010    GERD (gastroesophageal reflux disease) 2010    GI bleed, duodenitis 2007 2010    H/O Heavy Alcohol Use 2010    History of skin cancer 2010    melanoma back    HTN (hypertension) 2010    Hypercalcemia 2012    Hyperhomocysteinemia ([facility] 2010    Hypertriglyceridemia 2010    Left inguinal hernia 2015    Lipids blood increased 2010    Microalbuminuria 12/10/2015    Mild Allergic Rhinitis 2010    Multiple myeloma ([facility]     Pre-diabetes 2010;  Optho Dr Hailey Collazo Right inguinal hernia 2015    S/P AAA repair 2012    Sinus tachycardia 2010    Vitamin B12 deficiency 2010     Past Surgical History:   Procedure Laterality Date    COLONOSCOPY  2007    Normal; Dr Pooja Raphael EGD  2007    small hiatal hernia, mild duodenitis; Dr Pooja Raphael HX AAA REPAIR  2012    Dr Tomasz Kwan    HX CATARACT REMOVAL      both eyes    HX CHOLECYSTECTOMY  14    [facility] Dr. Aylin Paul HX HERNIA REPAIR Left 9/24/15    left indirect inguinal by Dr. Jeffrey Banks Right 11/2/15    inguinal w/ mesh by Dr. Jeffrey Banks Bilateral 10/2015 And 2015    Inguinal    BLUE DOPPLER  3/26/2012    BLUE Echo; + clot      Family History   Problem Relation Age of Onset    Heart Disease Mother          age 80    Cancer Father          brain tumor age 80    Heart Disease Father     Substance Abuse Daughter          cocaine OD age 43 in 200    Other Son          PE/DVT age 43    Heart Disease Brother     Cancer Brother         [address] Other Daughter         alive and well    Anesth Problems Neg Hx      Social History     Tobacco Use    Smoking status: Former Smoker     Packs/day: 1.50     Years: 55.00     Pack years: 82.50     Quit date: 2001     Years since quittin.5    Smokeless tobacco: Never Used    Tobacco comment: used to smoke 1 1/2 ppd x ~ 40 yrs   Substance Use Topics    Alcohol use: Yes     Alcohol/week: 14.0 standard drinks     Types: 14 Glasses of wine per week     Comment: 14      Allergies   Allergen Reactions    Codeine Nausea and Vomiting     Blurred vision, felt faint    Contrast Agent [Iodine] Hives     Needs premedication w/ Benadryl prn (since 2012 reaction)    Dexamethasone Other (comments)     \"Deathly ill\"    Fish Oil Nausea Only    Glipizide Other (comments)     DRASTIC DROP IN BLOOD GLUCOSE, fasting    Lovenox [Enoxaparin] Hives     Patient states blisters all over the body.  Metformin Diarrhea    Niacin Other (comments)     Flushing    Norvasc [Amlodipine] Other (comments)     Leg edema    Optiray 350 [Ioversol] Hives     He does not know what this is     Current Facility-Administered Medications   Medication Dose Route Frequency    traZODone (DESYREL) tablet 50 mg  50 mg Oral QHS PRN    0.9% sodium chloride infusion  75 mL/hr IntraVENous CONTINUOUS    pantoprazole (PROTONIX) 40 mg in 0.9% sodium chloride 10 mL injection  40 mg IntraVENous Q12H    piperacillin-tazobactam (ZOSYN) 3.375 g in 0.9% sodium chloride (MBP/ADV) 100 mL MBP  3.375 g IntraVENous Q8H    sodium chloride (NS) flush 5-40 mL  5-40 mL IntraVENous Q8H    sodium chloride (NS) flush 5-40 mL  5-40 mL IntraVENous PRN    acetaminophen (TYLENOL) tablet 650 mg  650 mg Oral Q6H PRN    Or    acetaminophen (TYLENOL) suppository 650 mg  650 mg Rectal Q6H PRN    ondansetron (ZOFRAN ODT) tablet 4 mg  4 mg Oral Q8H PRN    Or    ondansetron (ZOFRAN) injection 4 mg  4 mg IntraVENous Q6H PRN        Review of Systems:  A detailed 10 organ review of systems is obtained with pertinent positives as listed in the History of Present Illness and Past Medical History. All others are negative.     Objective:     Physical Exam:  Visit Vitals  BP (!) 118/55   Pulse 76 Temp 97.7 °F (36.5 °C)   Resp 18   Ht 6' 1\" (1.854 m)   Wt 74.4 kg (164 lb)   SpO2 97%   BMI 21.64 kg/m²        Skin:  Extremities and face reveal no rashes. No caldwell erythema. No telangiectasias on the chest wall. HEENT: Sclerae anicteric. Extra-occular muscles are intact. No oral ulcers. No abnormal pigmentation of the lips. The neck is supple. Cardiovascular: Regular rate and rhythm. No murmurs, gallops, or rubs. PMI nondisplaced. Carotids without bruits. Respiratory:  Comfortable breathing with no accessory muscle use. Clear breath sounds with no wheezes, rales, or rhonchi. GI:  Abdomen nondistended, soft, and nontender. Normal active bowel sounds. No enlargement of the liver or spleen. No masses palpable. Rectal:  Deferred  Musculoskeletal:  No pitting edema of the lower legs. Extremities have good range of motion. No costovertebral tenderness. Neurological:  Gross memory appears intact. Patient is alert and oriented. Psychiatric:  Mood appears appropriate with judgement intact. Lymphatic:  No cervical or supraclavicular adenopathy. Lab/Data Review: All lab results for the last 24 hours reviewed. CT Results  (Last 48 hours)               07/01/22 2044  CT ABD PELV WO CONT Final result    Impression:      1. New extensive peritoneal carcinomatosis, most evident in the left upper   quadrant with innumerable peritoneal nodules. 2. Diffuse distention of the small and large bowel, with a large volume of stool   within the large bowel, to the level of the mid sigmoid colon where there is an   abrupt transition with associated nodular wall thickening. Findings are   suspicious for large bowel obstruction due to colonic malignancy. 3. Multiple hypodense lesions in the liver, favored to represent metastases. 4. Small volume ascites. 5. Patchy consolidative and groundglass opacities in the lungs, worst in the   lingula, favored to represent multifocal pneumonia.    6. Stable 3.7 cm infrarenal abdominal aortic aneurysm. Narrative:  EXAM:  CT ABD PELV WO CONT       INDICATION: concern for SBO       COMPARISON: CT abdomen pelvis 3/9/2022. CONTRAST:  None. TECHNIQUE:    Thin axial images were obtained through the abdomen and pelvis. Coronal and   sagittal reconstructions were generated. Oral contrast was not administered. CT   dose reduction was achieved through use of a standardized protocol tailored for   this examination and automatic exposure control for dose modulation. FINDINGS:    Lower Thorax:   Lung Bases: There are patchy consolidative and groundglass opacities in the   lungs, worst in the lingula. No pleural effusion. Heart: The heart is normal in size. No pericardial effusion. Coronary artery   stents. Abdomen/Pelvis:   Evaluation of the solid organs is markedly limited without the use of IV   contrast.       Liver: There are multiple hypodense lesions in the liver, for example measuring   2.8 x 2.2 cm in the left hepatic lobe and measuring 2.3 cm and 2.1 cm in the   right hepatic lobe. Biliary system: Gallbladder is surgically absent. Spleen: Normal.       Pancreas: Normal.       Kidneys/Ureters/Bladder: Simple cyst in the lower pole the right kidney   measuring 6.4 cm. Few other hypodensities in the right kidney, statistically   benign cysts. No renal or ureteral calculi. No hydronephrosis or hydroureter. The bladder is normal.       Adrenals: Normal.       Stomach/bowel: There is diffuse dilation of the small bowel with air-fluid   levels, measuring up to 3.8 cm in diameter. There is also diffuse distention of   the colon containing a large volume of stool, to the level of the mid sigmoid   colon, with there is an abrupt transition associated with nodular wall   thickening (series 3 image 80). No free intraperitoneal air noted. Reproductive Organs: Prostate is mildly enlarged.        Vasculature: Stable 3.7 cm infrarenal abdominal aortic aneurysm. Severe calcific   atherosclerosis of the abdominal aorta and iliac vasculature. Nodes: There is extensive peritoneal carcinomatosis, most evident in the left   upper quadrant where there are innumerable peritoneal nodules. This is new from   prior exam.       Fluid: Small volume ascites. Bones/Soft Tissue: No acute fractures or aggressive osseous lesions are seen. Multilevel degenerative disc disease and facet arthropathy throughout the lumbar   spine. Osteopenia. Assessment/Plan:     Active Problems:    Large bowel obstruction ([address] (7/1/2022)         Metastatic colon cancer- CT with diffuse peritoneal carcinomatosis and liver mets. Now with bowel obstruction in the sigmoid- suspect from synchronous tumor vs obstruction from carcinomatosis. -After having a detailed discussed with pt, his daughter and son in law they would prefer to go home with hospice. Palliative care has been consulted. He does not want any invasive procedures including colonoscopy. I have notified pt's nurse of the patient's wishes.  - Thank you for the consult, please call if any questions. Will see if needed.

## 2022-07-02 NOTE — PROGRESS NOTES
Attempted lower extremity duplex venous exam, patient stated he does not want this exam as he had it done recently at his vascular office and it was negative. Nurse on duty notified.      [address] [facility]  Vascular Lab

## 2022-07-02 NOTE — H&P
[address] [facility] Adult  Hospitalist Group  History and Physical    Date of Service:  7/1/2022  Primary Care Provider: Sherice Edmonds NP  Source of information: The patient and Chart review    Chief Complaint: Vomiting, Diarrhea, and Bloated      History of Presenting Illness:   Bro Streeter is a 80 y.o. male past medical history of AAA status postrepair in 2012, GI bleed with acute blood loss anemia, atrial fibrillation, M2, GERD, EtOH abuse with duodenitis, who presents with vomiting, diarrhea,     In the ED, he was cardiac to 112, and hypoxic to 87% on room air with improvement to 93% on 3 L nasal cannula. Other vital signs stable. Labs were significant for WBC 15, macrocytosis with .8, glucose 139, BUN 25, creatinine 1.34 (baseline 0.8-0.9), and alk phos 241. Abdomen pelvis shows no extensive peritoneal carcinomatosis, diffuse colonic distention with large volume stool to the level of the sigmoid colon with an abrupt transition point associated with nodular wall thickening suspicious for large bowel obstruction due to colon malignancy, multiple hypodense lesions in the liver suggestive of metastasis, small volume ascites, patchy consolidative groundglass opacities in the lung suggestive of multifocal pneumonia, and stable infrarenal abdominal aortic aneurysm. In the ED, he received Zofran, and 1 L normal saline. Surgery was consulted, and concluded that there was no indication for any surgical treatment, but recommended GI consultation for colonoscopy and possible colonic stent placements       REVIEW OF SYSTEMS:  A comprehensive review of systems was negative except for that written in the History of Present Illness.      Past Medical History:   Diagnosis Date    AAA (abdominal aortic aneurysm) ([facility] [address] 01/06/2012    repair in 2012    Abnormal serum protein electrophoresis 4/25/2012    Anemia 4/6/2010    Anemia due to GI bleed 2007 4/6/2010    Arthritis     Atrial fibrillation 1/2012 02/22/2012    Pt states doesn't have    Bilateral Carotid Bruits, L>R 9/7/2010    Bone cancer ([facility] [address]     Carotid stenosis 4/6/2010    Chronic pain     Diabetes mellitus, type 2 ([address] 9/7/2010    NO MEDS    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    GERD (gastroesophageal reflux disease) 4/6/2010    GI bleed, duodenitis 2007 4/6/2010    H/O Heavy Alcohol Use 9/7/2010    History of skin cancer 09/07/2010    melanoma back    HTN (hypertension) 4/6/2010    Hypercalcemia 4/25/2012    Hyperhomocysteinemia ([facility] [address] 9/7/2010    Hypertriglyceridemia 9/7/2010    Left inguinal hernia 9/14/2015    Lipids blood increased 4/6/2010    Microalbuminuria 12/10/2015    Mild Allergic Rhinitis 9/7/2010    Multiple myeloma ([facility] [address]     Pre-diabetes 9/7/2010 2005; Optho Dr Oziel Hernández Right inguinal hernia 11/2/2015    S/P AAA repair 2/9/2012 2/22/2012    Sinus tachycardia 9/7/2010    Vitamin B12 deficiency 9/7/2010      Past Surgical History:   Procedure Laterality Date    COLONOSCOPY  8/2007    Normal; Dr Laura Acuña EGD  8/2007    small hiatal hernia, mild duodenitis; Dr Laura Acuña HX AAA REPAIR  2/9/2012    Dr Gato Borges    HX CATARACT REMOVAL      both eyes    HX CHOLECYSTECTOMY  1-29-14    [facility]- Dr. Aranza Smith Left 9/24/15    left indirect inguinal by Dr. Tay De León Right 11/2/15    inguinal w/ mesh by Dr. Tay De León Bilateral 10/2015 And 11/2015    Inguinal    BLUE DOPPLER  3/26/2012    BLUE Echo; + clot     Prior to Admission medications    Medication Sig Start Date End Date Taking? Authorizing Provider   Cholestyramine Light 4 gram powder  5/18/22   Provider, Historical   traZODone (DESYREL) 50 mg tablet Take 1 Tablet by mouth nightly as needed for Sleep.  4/28/22   Willow Edmonds, NP   furosemide (LASIX) 20 mg tablet Take 1 Tablet by mouth daily. Hold for low blood pressure 3/31/22   Juan Herrera MD   magnesium oxide (MAG-OX) 400 mg tablet Take 1 Tablet by mouth daily. 3/31/22   Juan Herrera MD   mupirocin Tejada Saran) 2 % ointment Apply to open wound on toe daily as instructed  Patient not taking: Reported on 2022   Juan Herrera MD   folic acid-vit S2-QOG E61 (Folbic) 2.5-25-2 mg tablet Take 1 Tablet by mouth daily. 21   Alex Edmonds, KENNETH   pregabalin (LYRICA) 50 mg capsule Take 50 mg by mouth two (2) times a day. 20   Provider, Historical   aspirin delayed-release 81 mg tablet Take 81 mg by mouth nightly. Provider, Historical   hydroxypropyl methylcellulose (GENTEAL MILD) 0.2 % ophthalmic solution Administer 2 Drops to both eyes as needed. Provider, Historical   multivitamin (ONE A DAY) tablet Take 1 Tab by mouth daily. Provider, Historical     Allergies   Allergen Reactions    Codeine Nausea and Vomiting     Blurred vision, felt faint    Contrast Agent [Iodine] Hives     Needs premedication w/ Benadryl prn (since 2012 reaction)    Dexamethasone Other (comments)     \"Deathly ill\"    Fish Oil Nausea Only    Glipizide Other (comments)     DRASTIC DROP IN BLOOD GLUCOSE, fasting    Lovenox [Enoxaparin] Hives     Patient states blisters all over the body.     Metformin Diarrhea    Niacin Other (comments)     Flushing    Norvasc [Amlodipine] Other (comments)     Leg edema    Optiray 350 [Ioversol] Hives     He does not know what this is      Family History   Problem Relation Age of Onset    Heart Disease Mother          age 80    [de-identified] Father          brain tumor age 80    Heart Disease Father     Substance Abuse Daughter          cocaine OD age 43 in 200    Other Son          PE/DVT age 43    Heart Disease Brother    Danis [address] Other Daughter         alive and well    Anesth Problems Neg Hx       Social History:  reports that he quit smoking about 21 years ago. He has a 82.50 pack-year smoking history. He has never used smokeless tobacco. He reports current alcohol use of about 14.0 standard drinks of alcohol per week. He reports that he does not use drugs. Family and social history were personally reviewed, all pertinent and relevant details are outlined as above. Objective:     Visit Vitals  BP (!) 114/58 (BP 1 Location: Right arm, BP Patient Position: At rest;Supine)   Pulse (!) 112   Temp 98.9 °F (37.2 °C)   Resp 16   Ht 6' 1\" (1.854 m)   Wt 74.4 kg (164 lb)   SpO2 93%   BMI 21.64 kg/m²    O2 Flow Rate (L/min): 3 l/min O2 Device: Nasal cannula    PHYSICAL EXAM:   General: Alert x oriented x 3, awake, no acute distress, resting in bed, pleasant male, appears to be stated age  HEENT: PEERL, EOMI, moist mucus membranes, NG tube in place draining bilious fluid  Neck: Supple, no JVD, no meningeal signs  Chest: Clear to auscultation bilaterally, tachypnea  CVS: tachy-irregularly-irregular, S1 S2 heard, late harsh systolic murmurrubs/gallops  Abd: Soft, non-tender, non-distended, +bowel sounds   Ext: No clubbing, no cyanosis, bilateral LE pitting edema  Neuro/Psych: Pleasant mood and affect, CN 2-12 grossly intact, sensory grossly within normal limit, Strength 5/5 in all extremities  Cap refill: Brisk, less than 3 seconds  Pulses: 2+radial pulses  Skin: Warm, dry, without rashes or lesions    Data Review: All diagnostic labs and studies have been reviewed. Abnormal Labs Reviewed   CBC WITH AUTOMATED DIFF - Abnormal; Notable for the following components:       Result Value    WBC 15.0 (*)     RBC 3.83 (*)     .8 (*)     NEUTROPHILS 94 (*)     LYMPHOCYTES 1 (*)     ABS. NEUTROPHILS 14.0 (*)     ABS.  LYMPHOCYTES 0.2 (*)     All other components within normal limits   METABOLIC PANEL, COMPREHENSIVE - Abnormal; Notable for the following components:    Chloride 109 (*)     Glucose 139 (*)     BUN 25 (*)     Creatinine 1.34 (*)     GFR est non-AA 50 (*)     AST (SGOT) 59 (*)     Alk. phosphatase 241 (*)     Globulin 4.1 (*)     A-G Ratio 0.9 (*)     All other components within normal limits   LIPASE - Abnormal; Notable for the following components:    Lipase 61 (*)     All other components within normal limits       All Micro Results     Procedure Component Value Units Date/Time    CULTURE, BLOOD, PAIRED [[id number]] Collected: 07/01/22 1947    Order Status: Completed Specimen: Blood Updated: 07/01/22 2128          IMAGING:   XR ABD PORT  1 V   Final Result   Enteric tube terminates in the distal esophagus. Recommend advancement. CT ABD PELV WO CONT   Final Result      1. New extensive peritoneal carcinomatosis, most evident in the left upper   quadrant with innumerable peritoneal nodules. 2. Diffuse distention of the small and large bowel, with a large volume of stool   within the large bowel, to the level of the mid sigmoid colon where there is an   abrupt transition with associated nodular wall thickening. Findings are   suspicious for large bowel obstruction due to colonic malignancy. 3. Multiple hypodense lesions in the liver, favored to represent metastases. 4. Small volume ascites. 5. Patchy consolidative and groundglass opacities in the lungs, worst in the   lingula, favored to represent multifocal pneumonia. 6. Stable 3.7 cm infrarenal abdominal aortic aneurysm.               ECG/ECHO:    Results for orders placed or performed during the hospital encounter of 03/25/22   EKG, 12 LEAD, INITIAL   Result Value Ref Range    Ventricular Rate 88 BPM    Atrial Rate 88 BPM    P-R Interval 232 ms    QRS Duration 90 ms    Q-T Interval 428 ms    QTC Calculation (Bezet) 517 ms    Calculated P Axis 76 degrees    Calculated R Axis 22 degrees    Calculated T Axis 74 degrees    Diagnosis       Sinus rhythm with 1st degree AV block  Nonspecific ST abnormality  Prolonged QT  Abnormal ECG  When compared with ECG of 14-MAR-2022 14:31,  Sinus rhythm is no longer with 2nd degree AV block (Mobitz I)  QT has lengthened  Confirmed by Morenita Nolasco MD. ([id number]) on 3/27/2022 10:04:05 PM     Results for orders placed or performed in visit on 03/07/12   AMB POC EKG ROUTINE W/ 12 LEADS, INTER & REP    Impression    Atrial fibrillation, rates 90-93        Assessment:   Given the patient's current clinical presentation, there is a high level of concern for decompensation if discharged from the emergency department. Complex decision making was performed, which includes reviewing the patient's available past medical records, laboratory results, and imaging studies. Active Problems:    * No active hospital problems. *    Plan:     #metastatic GI CA with carinomatosis  #large bowel obstruction  -NPO  -NGT  -maintenance IVF  -GI consulted to assess for palliative fix  -palliative care consulted  -pt/ot    #BL LE  edema  -venous duplex to eval for VTE,    #atrial Fibrillation  -not one anticoagulation, and would not like to be      DIET: No diet orders on file   ISOLATION PRECAUTIONS: There are currently no Active Isolations  CODE STATUS: Prior   DVT PROPHYLAXIS: SCDs  ANTICIPATED DISCHARGE: 24-48 hours. EMERGENCY CONTACT/SURROGATE DECISION MAKER: fredis hudson (daughter)  Signed By: Leonel Goff MD     July 1, 2022         Please note that this dictation may have been completed with Patrizia Baldwin, the computer voice recognition software. Quite often unanticipated grammatical, syntax, homophones, and other interpretive errors are inadvertently transcribed by the computer software. Please disregard these errors. Please excuse any errors that have escaped final proofreading.

## 2022-07-03 NOTE — PROGRESS NOTES
Bedside and Verbal shift change report given to Bren Stewart (oncoming nurse) by Melody Bentley (offgoing nurse). Report included the following information SBAR, Kardex, Intake/Output, MAR and Recent Results.

## 2022-07-03 NOTE — HOSPICE
[facility] Help to Those in Need  [phone]     Patient Name: Bro Streeter  YOB: 1934  Age: 80 y.o. [facility] RN Note:  Hospice consult received, reviewing chart. Will follow up with Unit Nurse and Care Manager to discuss plan of care, patient status and discharge disposition within the hour. Met with patient and family . They would like to try and get him home. He needs a venting tube placed before discharge. Possible Wednesday d/c to home. Thank you for the opportunity to be of service to this patient.   [address] Liaison  [phone] c  [phone] o

## 2022-07-03 NOTE — PROGRESS NOTES
Hospice medical director    Chart reviewed and [facility] liaison/nurse and I met with patient, daughter, son-in-law. Patient with very difficult situation to include large bowel obstruction secondary to extensive peritoneal carcinomatosis/sigmoid colonic mass. Continues to have NG tube in place. According to the patient, plan on venting G-tube on Tuesday. He remains on antibiotics for suspected multifocal pneumonia. He shows evidence of abdominal distention, poor bowel sounds. Discussed hospice and what we can do to help. Sometimes it is difficult to manage symptoms with oral/sublingual medication in patients with bowel obstructions secondary to poor absorption. He really wants to attempt to go home but then I did talk with his son-in-law outside the room- patient's wife has dementia and certainly the family plans on rallying care for him but it may be difficult. My suspicion is patient will have a fairly rapid decline once IV fluids are stopped as he will not be able to absorb p.o. nutrition or fluids. Explained this to son-in-law and quite frankly patient may be a candidate for transition directly to the hospice house secondary to the symptom burden and I anticipate with his bowel obstruction. We will work through these issues over the next 48 hours to see how he tolerates the venting G-tube and then have ongoing discussions with family. It appears he really wants to try to go home to start but we also want a make sure that he does not end up in a crisis in the home setting. We really appreciate the consult and our team will continue to follow closely. In the meantime, I have placed some comfort meds that are sublingual in case he needs something over the next couple days and this will also see if these are effective.

## 2022-07-03 NOTE — PROGRESS NOTES
Surgery Progress Note    7/3/2022    Admit Date: 7/1/2022    CC: Nausea      Subjective:     Seen by GI yesterday and has elected for nonoperative therapy. His bloating remains. Pain is stable. No flatus. Constitutional: No fever or chills  Neurologic: No headache  Eyes: No scleral icterus or irritated eyes  Nose: No nasal pain or drainage  Mouth: No oral lesions or sore throat  Cardiac: No palpations or chest pain  Pulmonary: No cough or shortness of breath  Gastrointestinal: G-tube discomfort, bloating, no nausea, emesis, diarrhea, or constipation  Genitourinary: No dysuria  Musculoskeletal: No muscle or joint tenderness  Skin: No rashes or lesions  Psychiatric: No anxiety or depressed mood    Objective:     Visit Vitals  BP (!) 138/48   Pulse 71   Temp 97.5 °F (36.4 °C)   Resp 18   Ht 6' 1\" (1.854 m)   Wt 164 lb (74.4 kg)   SpO2 93%   BMI 21.64 kg/m²       General: No acute distress, conversant  Eyes: PERRLA, no scleral icterus  HENT: Normocephalic without oral lesions  Neck: Trachea midline without LAD  Cardiac: Normal pulse rate and rhythm  Pulmonary: Symmetric chest rise with normal effort  GI: Soft, nontender, bloated, NG tube in place, no hernia, no splenomegaly  Skin: Warm without rash  Extremities: No edema or joint stiffness  Psych: Appropriate mood and affect    Labs, vital signs, and I/O reviewed. Assessment:     80-year-old male with recurrent metastatic colon cancer who is opting for palliative intervention and hospice    Plan:     I spoke with the patient regarding possibly a palliative PEG to allow him to go to hospice and not have a tube in his nose. This may give him a more comfortable quality of life going forward. Patient is interested in this. We will have to talk to GI tomorrow to see if they can fit him on for Tuesday. Hospice following and plan a quick transition when patient is ready.     Destiney Luna MD  Bariatric and General Surgeon  [facility]

## 2022-07-03 NOTE — PROGRESS NOTES
[address] [facility] Adult  Hospitalist Group                                                                                          Hospitalist Progress Note  Sakshi Cunningham MD  Answering service: [phone] from in house phone        Date of Service:  7/3/2022  NAME:  Tia Fulton  :  1934  MRN:  [id number]      Admission Summary:     Tia Fulton is a 80 y.o. male past medical history of AAA status postrepair in , GI bleed with acute blood loss anemia, atrial fibrillation, M2, GERD, EtOH abuse with duodenitis, who presents with vomiting, diarrhea,      In the ED, he was tachycardic 112 bpm, and hypoxic to 87% on room air with improvement to 93% on 3 L nasal cannula. Other vital signs stable. Labs were significant for WBC 15, macrocytosis with .8, glucose 139, BUN 25, creatinine 1.34 (baseline 0.8-0.9), and alk phos 241. Abdomen pelvis shows no extensive peritoneal carcinomatosis, diffuse colonic distention with large volume stool to the level of the sigmoid colon with an abrupt transition point associated with nodular wall thickening suspicious for large bowel obstruction due to colon malignancy, multiple hypodense lesions in the liver suggestive of metastasis, small volume ascites, patchy consolidative groundglass opacities in the lung suggestive of multifocal pneumonia, and stable infrarenal abdominal aortic aneurysm.     In the ED, he received Zofran, and 1 L normal saline. Surgery was consulted, and concluded that there was no indication for any surgical treatment, but recommended GI consultation for colonoscopy and possible colonic stent placements     Interval history / Subjective:     He said he feels better, no abdominal pain but has not passed gas, last bowel movement was 6 days ago.  He said he doesn't want invasive procedure, he wants hospice care     Assessment & Plan:     Large bowel obstruction due to sigmoid colon mass   -NGT in place  -add iv protonix, continue normal saline, prn zofran and tylenol  -CT abdomen pelvis showed diffuse distention of the small and large bowel, with a large volume of stool within the large bowel, to the level of the mid sigmoid colon where there is an abrupt transition with associated nodular wall thickening.   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 Multiple hypodense lesions in the liver, favored to represent metastases. Small volume ascites   -Oncology consulted    Multifocal Pneumonia likely bacterial  -add iv zosyn, continue normal saline   -CT showed patchy consolidative and groundglass opacities in the lungs, worst in the  lingula, favored to represent multifocal pneumonia.  -has Leukocytosis   -elevated procalcitonin, afebrile   -follow up on blood cx  -had episode of hypoxia 87% on RA, and improved SpO2 93-97% on 3  l/m    Paroxysmal A Fib, rate controlled  -EKG  -not on BB/CCB or anticoagulation     Bilateral lower extremities edema   -home lasix on hold for now  -doppler study pending    Hx of AAA s/p repair in 2012  -CT showed stable 3.7 cm infrarenal abdominal aortic aneurysm.     HTN  -BP normal, continue BP monitoring     Hx of multiple myeloma   -patient has been observed off treatment as an outpatient      Long term prognosis poor, appropriate for hospice care     Code status: DNR  Prophylaxis: SCD  Care Plan discussed with: Patient and Nurse   Anticipated Disposition: Home with hospice care       Hospital Problems  Date Reviewed: 5/27/2022          Codes Class Noted POA    Large bowel obstruction ([facility] [address] ICD-10-CM: B93.063  ICD-9-CM: 560.9  7/1/2022 Unknown               Vital Signs:    Last 24hrs VS reviewed since prior progress note. Most recent are:  Visit Vitals  BP (!) 146/62   Pulse 78   Temp 97.4 °F (36.3 °C)   Resp 18   Ht 6' 1\" (1.854 m)   Wt 74.4 kg (164 lb)   SpO2 98%   BMI 21.64 kg/m²         Intake/Output Summary (Last 24 hours) at 7/3/2022 1215  Last data filed at 7/3/2022 1128  Gross per 24 hour   Intake --   Output 1600 ml   Net -1600 ml        Physical Examination:     I had a face to face encounter with this patient and independently examined them on 7/3/2022 as outlined below:          Constitutional:  No acute distress, cooperative, pleasant    ENT:  NGT in place, oral mucosa moist, oropharynx benign. Resp:  Decrease bronchial breath sound bilaterally. No wheezing/rhonchi/rales. No accessory muscle use. CV:  Regular rhythm, normal rate, no murmurs, gallops, rubs    GI:  Distended, positive for ascites,  non tender. normoactive bowel sounds, no hepatosplenomegaly     Musculoskeletal:  No edema,     Neurologic:  Moves all extremities. AAOx3, CN II-XII reviewed            Data Review:    Review and/or order of clinical lab test  Review and/or order of tests in the radiology section of CPT  Review and/or order of tests in the medicine section of CPT      Labs:     Recent Labs     07/02/22  0324 07/01/22 1947   WBC 17.1* 15.0*   HGB 10.9* 12.5   HCT 33.7* 38.6    249     Recent Labs     07/02/22  0324 07/01/22 1947    140   K 3.9 4.1   * 109*   CO2 23 23   BUN 28* 25*   CREA 1.24 1.34*   * 139*   CA 7.9* 9.1   MG 2.3  --      Recent Labs     07/01/22 1947   ALT 32   *   TBILI 0.6   TP 7.6   ALB 3.5   GLOB 4.1*   LPSE 61*     No results for input(s): INR, PTP, APTT, INREXT, INREXT in the last 72 hours. No results for input(s): FE, TIBC, PSAT, FERR in the last 72 hours. Lab Results   Component Value Date/Time    Folate 81.7 (H) 02/13/2022 02:38 AM      No results for input(s): PH, PCO2, PO2 in the last 72 hours.   No results for input(s): CPK, CKNDX, TROIQ in the last 72 hours.     No lab exists for component: CPKMB  Lab Results   Component Value Date/Time    Cholesterol, total 193 03/23/2021 11:18 AM    HDL Cholesterol 83 03/23/2021 11:18 AM    LDL, calculated 84.2 03/23/2021 11:18 AM    Triglyceride 129 03/23/2021 11:18 AM    CHOL/HDL Ratio 2.3 03/23/2021 11:18 AM     Lab Results   Component Value Date/Time    Glucose (POC) 92 03/18/2022 06:18 AM    Glucose (POC) 106 03/18/2022 01:12 AM    Glucose (POC) 97 03/17/2022 12:22 PM    Glucose (POC) 99 03/17/2022 06:49 AM    Glucose (POC) 108 03/17/2022 12:51 AM     Lab Results   Component Value Date/Time    Color YELLOW/STRAW 03/26/2022 02:29 AM    Appearance TURBID (A) 03/26/2022 02:29 AM    Specific gravity 1.009 03/26/2022 02:29 AM    pH (UA) 6.5 03/26/2022 02:29 AM    Protein 100 (A) 03/26/2022 02:29 AM    Glucose Negative 03/26/2022 02:29 AM    Ketone Negative 03/26/2022 02:29 AM    Bilirubin Negative 03/26/2022 02:29 AM    Urobilinogen 0.2 03/26/2022 02:29 AM    Nitrites Negative 03/26/2022 02:29 AM    Leukocyte Esterase LARGE (A) 03/26/2022 02:29 AM    Epithelial cells FEW 03/26/2022 02:29 AM    Bacteria 3+ (A) 03/26/2022 02:29 AM    WBC  03/26/2022 02:29 AM    RBC 10-20 03/26/2022 02:29 AM         Medications Reviewed:     Current Facility-Administered Medications   Medication Dose Route Frequency    LORazepam (INTENSOL) 2 mg/mL oral concentrate 1 mg  1 mg Oral Q1H PRN    HYDROmorphone (DILAUDID) 1 mg/mL oral solution 1 mg  1 mg SubLINGual Q1H PRN    traZODone (DESYREL) tablet 50 mg  50 mg Oral QHS PRN    pantoprazole (PROTONIX) 40 mg in 0.9% sodium chloride 10 mL injection  40 mg IntraVENous Q12H    piperacillin-tazobactam (ZOSYN) 3.375 g in 0.9% sodium chloride (MBP/ADV) 100 mL MBP  3.375 g IntraVENous Q8H    sodium chloride (NS) flush 5-40 mL  5-40 mL IntraVENous Q8H    sodium chloride (NS) flush 5-40 mL  5-40 mL IntraVENous PRN    acetaminophen (TYLENOL) tablet 650 mg  650 mg Oral Q6H PRN    Or    acetaminophen (TYLENOL) suppository 650 mg  650 mg Rectal Q6H PRN    ondansetron (ZOFRAN ODT) tablet 4 mg  4 mg Oral Q8H PRN    Or    ondansetron (ZOFRAN) injection 4 mg  4 mg IntraVENous Q6H PRN     ______________________________________________________________________  EXPECTED LENGTH OF STAY: - - -  ACTUAL LENGTH OF STAY:          2                 Powell Opitz, MD

## 2022-07-04 NOTE — PROGRESS NOTES
Progress Note    Patient: Brenda Mcdaniel MRN: [id number]  SSN: [id number]    YOB: 1934  Age: 80 y.o. Sex: male      Admit Date: 2022    Subjective:     Patient denies abdominal pain; still feels bloated; no bowel movement or gas. Objective:     Visit Vitals  BP (!) 131/57   Pulse 76   Temp 97.8 °F (36.6 °C)   Resp 18   Ht 6' 1\" (1.854 m)   Wt 164 lb (74.4 kg)   SpO2 98%   BMI 21.64 kg/m²       Temp (24hrs), Av.7 °F (36.5 °C), Min:97.4 °F (36.3 °C), Max:98.2 °F (36.8 °C)    Date 22 - 22 0659 22 - 22 0659   Shift 2582-5241 2572-5425 24 Hour Total 2364-0584 0334-7444 24 Hour Total   INTAKE   Shift Total(mL/kg)         OUTPUT   Urine(mL/kg/hr) 200(0.2)  200(0.1)        Urine Voided 200  200      Emesis/NG output 500 300 800 550  550     Output (ml) (Nasogastric Tube 22) 500 300 800 550  550   Shift Total(mL/kg) 700(9.4) 300(4) 1000(13.4) 550(7.4)  550(7.4)   NET -700 -300 -1000 -550  -550   Weight (kg) 74.4 74.4 74.4 74.4 74.4 74.4       Physical Exam:    ABDOMEN: Distended, soft, nontender. Data Review: VS, I/O's    Assessment:     Hospital Problems  Date Reviewed: 2022          Codes Class Noted POA    Large bowel obstruction [facility]) ICD-10-CM: L72.612  ICD-9-CM: 560.9  2022 Unknown            Patient, family have met with hospice. They are no longer considering colonoscopic stenting of large bowel obstruction; they are interested in discussing venting gastrostomy tube which would allow him to have nasogastric tube removed. Plan/Recommendations/Medical Decision Making:     Continue nasogastric tube. Analgesics, antiemetics as needed. Await gastroenterology recommendations regarding venting gastrostomy.

## 2022-07-04 NOTE — HOSPICE
[facility]  [phone]    Patient Name: Johanna Ling  YOB: 1934  Age: 80 y.o. [address] [facility] RN Note:  Hospice consult noted. Chart reviewed. Plan of care discussed with patients nurse & care manager. In to meet with patient and daughter. Discussed Hospice philosophy, general plan of care, levels of care, services and on call procedures. Family information packet provided & consents completed. Patient needs G tube placed, hopefully tomorrow. Plan to have him discharge on Wednesday. Reviewed levels of care. He is concerned about being at home and being too much for his family. Is interested in [facility] or [facility]. I explained we would reeval tomorrow to determine where would work for care. Thank you for the opportunity to be of service to this patient.   [id number]  [address]ayo Liaison  [phone] c  [phone] o

## 2022-07-05 NOTE — PROGRESS NOTES
Hematology-Oncology Progress Note    Nory Soni  1934  [id number]  7/5/2022    Follow-up for: met.  Colon cancer     [x]        Chart notes since last visit reviewed   []        Medications reviewed for allergies and interactions       Case discussed with the following:         []                            []        Nursing Staff                                                                         []        Pathologist                                                                        []        FAMILY      Subjective:     Spoke with patient who complains of: pt is weak, more comfortable     Objective:   Patient Vitals for the past 24 hrs:   BP Temp Pulse Resp SpO2   07/05/22 0739 (!) 142/61 97.2 °F (36.2 °C) 72 18 96 %   07/05/22 0022 (!) 137/57 98.5 °F (36.9 °C) 80 20 95 %   07/04/22 2031 -- -- -- -- 96 %   07/04/22 1532 (!) 150/57 98.3 °F (36.8 °C) 75 20 95 %       REVIEW OF SYSTEMS:    Constitutional: negative fever, negative chills, negative weight loss  Eyes:   negative visual changes  ENT:   negative sore throat, tongue or lip swelling  Respiratory:  negative cough, negative dyspnea  Cards:  negative for chest pain, palpitations, lower extremity edema  GI:   negative for nausea, vomiting, diarrhea, and abdominal pain  Neuro:  negative for headaches, dizziness, vertigo  []                        Full ROS o/w normal/non contributor    Constitutional:  Patient looks  []        Sick  [x]        Frail  []        Better                                                 []        Depressed    HEENT:  [x]   NC        R eye patch,  ngt in place           Neck:   [x]   Supple                  []  Rigid               JVD:    []   ABSENT       []   PRESENT  Lymphadenopathy:   []   None Noted            []   PRESENT    Chest:  []   Clear               []    Rhonchi                      Dec'd @     []  Right Base           []   Left Base    CV:             []   Regular              []  Irregular []   Tachy                []   Murmur  Abdominal:   [x]    Soft              []   NON-tender               []   Tender      BS:    []   ABSENT                   []   PRESENT  Liver:     []  NON-palp                  []   EDGE- palp  Spleen: []   NON-palp                   []  EDGE - palp  Mass:   []   ABSENT                          []  PRESENT  Extr:    [x]  Lymphedema             []   Cyanosis      []  Clubbing  Edema:     []   NONE       []   PRESENT  Skin:  Intact []           Purpura []        Rash: [x]   ABSENT       []  PRESENT  Neuro:  [x]        Normal  []        Confused      Available labs reviewed:  Labs:    Recent Results (from the past 24 hour(s))   CBC W/O DIFF    Collection Time: 07/05/22 12:28 AM   Result Value Ref Range    WBC 8.1 4.1 - 11.1 K/uL    RBC 3.32 (L) 4.10 - 5.70 M/uL    HGB 10.7 (L) 12.1 - 17.0 g/dL    HCT 35.2 (L) 36.6 - 50.3 %    .0 (H) 80.0 - 99.0 FL    MCH 32.2 26.0 - 34.0 PG    MCHC 30.4 30.0 - 36.5 g/dL    RDW 14.6 (H) 11.5 - 14.5 %    PLATELET 255 189 - 715 K/uL    MPV 10.3 8.9 - 12.9 FL    NRBC 0.0 0  WBC    ABSOLUTE NRBC 0.00 0.00 - 7.68 K/uL   METABOLIC PANEL, BASIC    Collection Time: 07/05/22 12:28 AM   Result Value Ref Range    Sodium 146 (H) 136 - 145 mmol/L    Potassium 3.7 3.5 - 5.1 mmol/L    Chloride 117 (H) 97 - 108 mmol/L    CO2 22 21 - 32 mmol/L    Anion gap 7 5 - 15 mmol/L    Glucose 82 65 - 100 mg/dL    BUN 19 6 - 20 MG/DL    Creatinine 0.88 0.70 - 1.30 MG/DL    BUN/Creatinine ratio 22 (H) 12 - 20      GFR est AA >60 >60 ml/min/1.73m2    GFR est non-AA >60 >60 ml/min/1.73m2    Calcium 8.2 (L) 8.5 - 10.1 MG/DL       Available Xrays reviewed:    Chemotherapy monitored and toxicities assessed:    Assessment and Plan   1. Met colon cancer. . presenting with carcinomatosis and large bowel obstruction. . pt has decided to pursue comfort measures. Will move forward with palliative PEG placement for decompression.   Pt. Will need to go to a facility from here, his wife can not care for him. He wants to have hospice help going forward.     Joceline Thorne MD

## 2022-07-05 NOTE — PROCEDURES
[facility]  [address] [facility], [address]            PEG ENDOSCOPY    NAME: Rodrick Caldwell   :  1934   MRN:  [id number]       Procedure Type:   EGD and PEG tube    Indications:  Venting G tube for stage 4 cancer, hospice plan    Pre-operative Diagnosis: see indication above    Post-operative Diagnosis:  See findings below    : Yanelis Vincent MD    Referring Provider: Li Franco NP    Staff: Endoscopy Elida Ruff: Luda Salvage  Endoscopy RN-1: Dino Kapoor RN    Prosthetic devices, grafts, tissues, transplant, or devices implanted: none    Sedation:  General anesthesia; Patient on standing Zosyn     Findings:     Esophagus: LA Grade B reflux esophagitis  Stomach: normal     On slow withdrawal of the scope, the stomach was transilluminated into the abdominal wall. Under sterile conditions and 1% Xylocaine anesthesia, a small incision was made in the abdominal wall. A needle was passed through the incision, and under direct vision into the stomach. A wire was passed through the needle, snared and brought out the mouth. The PEG tube was passed over the wire and brought out the abdominal wall without difficulty. The scope was then reinserted and the positioning of the PEG tube was excellent.      Duodenum: few non bleeding diminutive AVMs    Interventions:    G tube in the stomach    Specimens:   * No specimens in log *     EBL: None          Complications:     No immediate complications      Impression:    See post-procedure diagnoses   Externally removable 20 F PEG tube placed with external bumper at 2 cm    Recommendations:   -Can use for venting now  -Discussed with daughter      Signed By: Yanelis Vincent MD     2022  2:24 PM

## 2022-07-05 NOTE — HOSPICE
Hospice Liaison Note:    Chart reviewed for updates in plan of care. Patient is currently in Endo for procedure    Plan: Available to continue with hospice support/education    Please call Hospice team to offer support for patient, family or staff. Thank you for your coordination with the hospice plan of care. 13:30: Reviewed Hospice plan of care with Dr. Kenneth Gee. Per Dr. Kenneth Gee, patient's family agreeable to patient being transferred to the [facility], if he is stable. Plan to visit with patient once he returns from procedure this afternoon. Plan to review hospice goals of Care with patient and family, to ensure all are in agreement with hospice philosophy and plan of care. If patient and family are in agreement with hospice plan of care, [facility] is holding a bed for patient Wednesday. 14:30: Patient remains in Endoscopy post-op. Call made to patient's daughter, Sydnee Godoy, to offer continued hospice support and education. Reviewed plan for hospice care. Deisi Tinsley confirms patient and family are in agreement with hospice philosophy and plan of care to transfer patient to the [facility] in the am of 7/6/2022. Plan to review with [facility] home team for time to send patient to [facility]. Dr. Kenneth Gee has provided verbal CTI for the hospice diagnosis of Colon Cancer    [facility] has bed on hold for patient.  Request CM arrange transportation to the [facility] for 10:00am 7/6/2022    Luke Castellano RN, [place] [address] Nurse Liaison  [phone] [place]  [phone] Office

## 2022-07-05 NOTE — ANESTHESIA POSTPROCEDURE EVALUATION
Procedure(s):  PERCUTANEOUS ENDOSCOPIC GASTROSTOMY TUBE INSERTION  ESOPHAGOGASTRODUODENOSCOPY (EGD). general    Anesthesia Post Evaluation      Multimodal analgesia: multimodal analgesia not used between 6 hours prior to anesthesia start to PACU discharge  Patient location during evaluation: PACU  Patient participation: complete - patient participated  Level of consciousness: awake and awake and alert  Pain score: 0  Pain management: adequate  Airway patency: patent  Anesthetic complications: no  Cardiovascular status: acceptable  Respiratory status: acceptable  Hydration status: acceptable    Final Post Anesthesia Temperature Assessment:  Normothermia (36.0-37.5 degrees C)      INITIAL Post-op Vital signs:   Vitals Value Taken Time   /60 07/05/22 1450   Temp     Pulse 71 07/05/22 1453   Resp 21 07/05/22 1453   SpO2 96 % 07/05/22 1453   Vitals shown include unvalidated device data.

## 2022-07-05 NOTE — ANESTHESIA PREPROCEDURE EVALUATION
Relevant Problems   CARDIOVASCULAR   (+) Aortic stenosis, moderate   (+) HTN (hypertension)      GASTROINTESTINAL   (+) GERD (gastroesophageal reflux disease)      RENAL FAILURE   (+) Acute renal injury ([address]      PERSONAL HX & FAMILY HX OF CANCER   (+) Malignant neoplasm of ascending colon (HCC)   (+) Multiple myeloma (HCC)       Anesthetic History               Review of Systems / Medical History  Patient summary reviewed, nursing notes reviewed and pertinent labs reviewed    Pulmonary                   Neuro/Psych              Cardiovascular    Hypertension              Exercise tolerance: <4 METS  Comments: Echo 2022    Left Ventricle: Normal wall motion. Normal left ventricular systolic function. Normal diastolic function. Left ventricle size is normal. Normal wall thickness. Findings consistent with mild concentric hypertrophy.   Aortic Valve: Valve structure is normal. Thickened cusp. Moderately calcified cusp. Mild transvalvular regurgitation. Moderate stenosis of the aortic valve. AV mean gradient is 30 mmHg. AV peak gradient is 50 mmHg. AV area by continuity VTI is 0.7 cm2. Abdominal CT:    1. New extensive peritoneal carcinomatosis, most evident in the left upper  quadrant with innumerable peritoneal nodules. 2. Diffuse distention of the small and large bowel, with a large volume of stool  within the large bowel, to the level of the mid sigmoid colon where there is an  abrupt transition with associated nodular wall thickening. Findings are  suspicious for large bowel obstruction due to colonic malignancy. 3. Multiple hypodense lesions in the liver, favored to represent metastases.   4. Small volume ascites   GI/Hepatic/Renal     GERD          Comments: Pt has bowel obstruction Endo/Other    Diabetes    Arthritis and anemia     Other Findings   Comments: Problem list    HTN (hypertension)  H/O Carotid Stenosis  GERD (gastroesophageal reflux disease)  Pre-diabetes  Sinus tachycardia  H/O Heavy Alcohol Use  Vitamin B12 deficiency  Hyperhomocysteinemia (HCC)  Mild Allergic Rhinitis  History of skin cancer  S/P AAA repair 2/9/2012  Hypercalcemia  Multiple myeloma (HCC)  Left inguinal hernia  Right inguinal hernia  Microalbuminuria  Chronic diarrhea  Aortic stenosis, moderate  Mild concentric left ventricular hypertrophy (LVH)  Hyperlipemia, mixed  Carotid stenosis, asymptomatic, right  Personal history of atrial fibrillation  Cellulitis of right leg  Failure to thrive (child)  Falls frequently  Moderate protein-calorie malnutrition (HCC)  Acute renal injury ([address]  Malignant neoplasm of ascending colon (HCC)  Diarrhea  Hypokalemia  Polyneuropathy associated with underlying disease ([address]  Detached retina, right  Large bowel obstruction ([address]    Labs:  Results for Patric Shipman (MRN [id number]) as of 7/5/2022 13:00    7/5/2022 00:28  HGB: 10.7 (L)  HCT: 35.2 (L)  MCV: 106.0 (H)  MCH: 32.2  MCHC: 30.4  RDW: 14.6 (H)  PLATELET: 850    Results for Patric Shipman (MRN [id number]) as of 7/5/2022 13:00    7/5/2022 00:28  Sodium: 146 (H)  Potassium: 3.7  Chloride: 117 (H)  CO2: 22  Anion gap: 7  Glucose: 82  BUN: 19  Creatinine: 0.88           Physical Exam    Airway  Mallampati: II  TM Distance: 4 - 6 cm    Mouth opening: Normal     Cardiovascular  Regular rate and rhythm,  S1 and S2 normal,  no murmur, click, rub, or gallop  Rhythm: regular  Rate: normal         Dental      Comments: Missing all bottom teeth   Pulmonary  Breath sounds clear to auscultation              Comments: Decreased at bases Abdominal         Other Findings            Anesthetic Plan    ASA: 3  Anesthesia type: general          Induction: Intravenous  Anesthetic plan and risks discussed with: Patient

## 2022-07-05 NOTE — PROGRESS NOTES
[address] [facility] Adult  Hospitalist Group                                                                                          Hospitalist Progress Note  Joan Schwartz MD  Answering service: [phone] from in house phone        Date of Service:  2022  NAME:  Stephen Saldana  :  1934  MRN:  [id number]      Admission Summary:     Stephen Saldana is a 80 y.o. male past medical history of AAA status postrepair in , GI bleed with acute blood loss anemia, atrial fibrillation, M2, GERD, EtOH abuse with duodenitis, who presents with vomiting, diarrhea,      In the ED, he was tachycardic 112 bpm, and hypoxic to 87% on room air with improvement to 93% on 3 L nasal cannula. Other vital signs stable. Labs were significant for WBC 15, macrocytosis with .8, glucose 139, BUN 25, creatinine 1.34 (baseline 0.8-0.9), and alk phos 241. Abdomen pelvis shows no extensive peritoneal carcinomatosis, diffuse colonic distention with large volume stool to the level of the sigmoid colon with an abrupt transition point associated with nodular wall thickening suspicious for large bowel obstruction due to colon malignancy, multiple hypodense lesions in the liver suggestive of metastasis, small volume ascites, patchy consolidative groundglass opacities in the lung suggestive of multifocal pneumonia, and stable infrarenal abdominal aortic aneurysm.     In the ED, he received Zofran, and 1 L normal saline.   Surgery was consulted, and concluded that there was no indication for any surgical treatment, but recommended GI consultation for colonoscopy and possible colonic stent placements     Interval history / Subjective:     He said he feels better, has abdominal distention, no pain or vomiting, NGT in place  Awaiting for G tube placement for home hospice care      Assessment & Plan:     Large bowel obstruction due to sigmoid colon mass   -NGT in place  -on iv protonix, continue normal saline, prn zofran and tylenol  -CT abdomen pelvis showed diffuse distention of the small and large bowel, with a large volume of stool within the large bowel, to the level of the mid sigmoid colon where there is an abrupt transition with associated nodular wall thickening.  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 Multiple hypodense lesions in the liver, favored to represent metastases. Small volume ascites   -Oncology consulted    Multifocal Pneumonia likely bacterial  -add iv zosyn, continue normal saline   -CT showed patchy consolidative and groundglass opacities in the lungs, worst in the  lingula, favored to represent multifocal pneumonia.  -has Leukocytosis   -elevated procalcitonin, afebrile   -follow up on blood cx  -had episode of hypoxia 87% on RA, and improved SpO2 93-97% on 3  l/m    Paroxysmal A Fib, rate controlled  -EKG  -not on BB/CCB or anticoagulation     Bilateral lower extremities edema   -home lasix on hold for now  -doppler study pending    Hx of AAA s/p repair in 2012  -CT showed stable 3.7 cm infrarenal abdominal aortic aneurysm.     HTN  -BP normal, continue BP monitoring     Hx of multiple myeloma   -patient has been observed off treatment as an outpatient      Long term prognosis poor, appropriate for hospice care     Code status: DNR  Prophylaxis: SCD  Care Plan discussed with: Patient and Nurse   Anticipated Disposition: Home with hospice care  Possible 7/5    Daughter and hospice care nurse in the room     Hospital Problems  Date Reviewed: 5/27/2022          Codes Class Noted POA    Large bowel obstruction ([facility] [address] ICD-10-CM: F74.789  ICD-9-CM: 560.9  7/1/2022 Unknown               Vital Signs:    Last 24hrs VS reviewed since prior progress note. Most recent are:  Visit Vitals  BP (!) 142/61   Pulse 72   Temp 97.2 °F (36.2 °C)   Resp 18   Ht 6' 1\" (1.854 m)   Wt 74.4 kg (164 lb)   SpO2 96%   BMI 21.64 kg/m²         Intake/Output Summary (Last 24 hours) at 7/5/2022 0911  Last data filed at 7/5/2022 0830  Gross per 24 hour   Intake --   Output 1800 ml   Net -1800 ml        Physical Examination:     I had a face to face encounter with this patient and independently examined them on 7/5/2022 as outlined below:          Constitutional:  No acute distress, cooperative, pleasant    ENT:  NGT in place, oral mucosa moist, oropharynx benign. Resp:  Decrease bronchial breath sound bilaterally. No wheezing/rhonchi/rales. No accessory muscle use. CV:  Regular rhythm, normal rate, no murmurs, gallops, rubs    GI:  Distended, positive for ascites,  non tender. normoactive bowel sounds, no hepatosplenomegaly     Musculoskeletal:  No edema,     Neurologic:  Moves all extremities. AAOx3, CN II-XII reviewed            Data Review:    Review and/or order of clinical lab test  Review and/or order of tests in the radiology section of CPT  Review and/or order of tests in the medicine section of CPT      Labs:     Recent Labs     07/05/22  0028   WBC 8.1   HGB 10.7*   HCT 35.2*        Recent Labs     07/05/22  0028   *   K 3.7   *   CO2 22   BUN 19   CREA 0.88   GLU 82   CA 8.2*     No results for input(s): ALT, AP, TBIL, TBILI, TP, ALB, GLOB, GGT, AML, LPSE in the last 72 hours. No lab exists for component: SGOT, GPT, AMYP, HLPSE  No results for input(s): INR, PTP, APTT, INREXT, INREXT in the last 72 hours. No results for input(s): FE, TIBC, PSAT, FERR in the last 72 hours. Lab Results   Component Value Date/Time    Folate 81.7 (H) 02/13/2022 02:38 AM      No results for input(s): PH, PCO2, PO2 in the last 72 hours.   No results for input(s): CPK, CKNDX, TROIQ in the last 72 hours.     No lab exists for component: CPKMB  Lab Results   Component Value Date/Time    Cholesterol, total 193 03/23/2021 11:18 AM    HDL Cholesterol 83 03/23/2021 11:18 AM    LDL, calculated 84.2 03/23/2021 11:18 AM    Triglyceride 129 03/23/2021 11:18 AM    CHOL/HDL Ratio 2.3 03/23/2021 11:18 AM     Lab Results   Component Value Date/Time    Glucose (POC) 92 03/18/2022 06:18 AM    Glucose (POC) 106 03/18/2022 01:12 AM    Glucose (POC) 97 03/17/2022 12:22 PM    Glucose (POC) 99 03/17/2022 06:49 AM    Glucose (POC) 108 03/17/2022 12:51 AM     Lab Results   Component Value Date/Time    Color YELLOW/STRAW 03/26/2022 02:29 AM    Appearance TURBID (A) 03/26/2022 02:29 AM    Specific gravity 1.009 03/26/2022 02:29 AM    pH (UA) 6.5 03/26/2022 02:29 AM    Protein 100 (A) 03/26/2022 02:29 AM    Glucose Negative 03/26/2022 02:29 AM    Ketone Negative 03/26/2022 02:29 AM    Bilirubin Negative 03/26/2022 02:29 AM    Urobilinogen 0.2 03/26/2022 02:29 AM    Nitrites Negative 03/26/2022 02:29 AM    Leukocyte Esterase LARGE (A) 03/26/2022 02:29 AM    Epithelial cells FEW 03/26/2022 02:29 AM    Bacteria 3+ (A) 03/26/2022 02:29 AM    WBC  03/26/2022 02:29 AM    RBC 10-20 03/26/2022 02:29 AM         Medications Reviewed:     Current Facility-Administered Medications   Medication Dose Route Frequency    LORazepam (INTENSOL) 2 mg/mL oral concentrate 1 mg  1 mg Oral Q1H PRN    HYDROmorphone (DILAUDID) 1 mg/mL oral solution 1 mg  1 mg SubLINGual Q1H PRN    traZODone (DESYREL) tablet 50 mg  50 mg Oral QHS PRN    pantoprazole (PROTONIX) 40 mg in 0.9% sodium chloride 10 mL injection  40 mg IntraVENous Q12H    piperacillin-tazobactam (ZOSYN) 3.375 g in 0.9% sodium chloride (MBP/ADV) 100 mL MBP  3.375 g IntraVENous Q8H    sodium chloride (NS) flush 5-40 mL  5-40 mL IntraVENous Q8H    sodium chloride (NS) flush 5-40 mL  5-40 mL IntraVENous PRN    acetaminophen (TYLENOL) tablet 650 mg  650 mg Oral Q6H PRN    Or    acetaminophen (TYLENOL) suppository 650 mg  650 mg Rectal Q6H PRN    ondansetron (ZOFRAN ODT) tablet 4 mg  4 mg Oral Q8H PRN    Or    ondansetron (ZOFRAN) injection 4 mg  4 mg IntraVENous Q6H PRN     ______________________________________________________________________  EXPECTED LENGTH OF STAY: - - -  ACTUAL LENGTH OF STAY:          4                 Konstantin Durham MD

## 2022-07-05 NOTE — PROGRESS NOTES
General Surgery Daily Progress Note    Admit Date: 2022  Post-Operative Day: * No surgery found * from * No surgery found *     Subjective:   CC:   Chief Complaint   Patient presents with    Vomiting    Diarrhea    Bloated        Last 24 hrs: patient up in chair; denies abdominal pain. He states his NGT just came out again and feels like his \"bloating is getting worse with it out\"; no bowel movement or gas. Has been NPO since yesterday evening. Objective:     Blood pressure (!) 142/61, pulse 72, temperature 97.2 °F (36.2 °C), resp. rate 18, height 6' 1\" (1.854 m), weight 164 lb (74.4 kg), SpO2 96 %. Temp (24hrs), Av °F (36.7 °C), Min:97.2 °F (36.2 °C), Max:98.5 °F (36.9 °C)      _____________________  Physical Exam:     Alert and Oriented x 3 , in no acute distress. Cardiovascular: RRR  Lungs:CTAB; unlabored  Abdomen: Distended, soft, non tender     Data Review:    Recent Labs     22  0028   WBC 8.1   HGB 10.7*   HCT 35.2*        Recent Labs     22  0028   *   K 3.7   *   CO2 22   GLU 82   BUN 19   CREA 0.88   CA 8.2*     No results for input(s): AML, LPSE in the last 72 hours.         ______________________  Medications:    Current Facility-Administered Medications   Medication Dose Route Frequency    LORazepam (INTENSOL) 2 mg/mL oral concentrate 1 mg  1 mg Oral Q1H PRN    HYDROmorphone (DILAUDID) 1 mg/mL oral solution 1 mg  1 mg SubLINGual Q1H PRN    traZODone (DESYREL) tablet 50 mg  50 mg Oral QHS PRN    pantoprazole (PROTONIX) 40 mg in 0.9% sodium chloride 10 mL injection  40 mg IntraVENous Q12H    piperacillin-tazobactam (ZOSYN) 3.375 g in 0.9% sodium chloride (MBP/ADV) 100 mL MBP  3.375 g IntraVENous Q8H    sodium chloride (NS) flush 5-40 mL  5-40 mL IntraVENous Q8H    sodium chloride (NS) flush 5-40 mL  5-40 mL IntraVENous PRN    acetaminophen (TYLENOL) tablet 650 mg  650 mg Oral Q6H PRN    Or    acetaminophen (TYLENOL) suppository 650 mg  650 mg Rectal Q6H PRN    ondansetron (ZOFRAN ODT) tablet 4 mg  4 mg Oral Q8H PRN    Or    ondansetron (ZOFRAN) injection 4 mg  4 mg IntraVENous Q6H PRN               Assessment:     72-year-old male with recurrent metastatic colon cancer w/ obstruction who is opting for palliative intervention and hospice    Plan:     Keep NPO   Analgesics and antiemetics PRN   Plans for venting PEG placement today by Dr. Val Matthew following and pt will transition when ready          Darren Shelley NP    7/5/2022     The patient is getting gastrostomy tube for decompression today. Pt to be going to hospice at some point once things are set up. No surgical plans. We will sign off, please call if any issues or questions.     Mikel Sandhu

## 2022-07-05 NOTE — PROGRESS NOTES
[address]  [address] [facility], [address]       GI PROGRESS NOTE  Arik Torres, [facility] office  [phone] NP in-hospital cell phone M-F until 4:30  After 5pm or on weekends, please call  for physician on call      NAME: Brenda Mcdaniel   :  1934   MRN:  [id number]       Subjective:   NG tube almost out at 30cm, replaced to 65cm. No BM. Having nausea. Says he's ready to die. He would like venting PEG and to get rid of NG tube. Objective:     VITALS:   Last 24hrs VS reviewed since prior progress note. Most recent are:  Visit Vitals  BP (!) 142/61   Pulse 72   Temp 97.2 °F (36.2 °C)   Resp 18   Ht 6' 1\" (1.854 m)   Wt 74.4 kg (164 lb)   SpO2 96%   BMI 21.64 kg/m²       PHYSICAL EXAM:  General: Cooperative, no acute distress    Neurologic:  Alert and oriented X 3. HEENT: EOMI, no scleral icterus   Lungs:  No increased WOB  Heart:  regular rate  Abdomen: Soft, +distended, minimal tenderness. Extremities: warm  Psych:   Good insight. Not anxious or agitated.     Lab Data Reviewed:     Recent Results (from the past 24 hour(s))   CBC W/O DIFF    Collection Time: 22 12:28 AM   Result Value Ref Range    WBC 8.1 4.1 - 11.1 K/uL    RBC 3.32 (L) 4.10 - 5.70 M/uL    HGB 10.7 (L) 12.1 - 17.0 g/dL    HCT 35.2 (L) 36.6 - 50.3 %    .0 (H) 80.0 - 99.0 FL    MCH 32.2 26.0 - 34.0 PG    MCHC 30.4 30.0 - 36.5 g/dL    RDW 14.6 (H) 11.5 - 14.5 %    PLATELET 664 993 - 948 K/uL    MPV 10.3 8.9 - 12.9 FL    NRBC 0.0 0  WBC    ABSOLUTE NRBC 0.00 0.00 - 3.07 K/uL   METABOLIC PANEL, BASIC    Collection Time: 22 12:28 AM   Result Value Ref Range    Sodium 146 (H) 136 - 145 mmol/L    Potassium 3.7 3.5 - 5.1 mmol/L    Chloride 117 (H) 97 - 108 mmol/L    CO2 22 21 - 32 mmol/L    Anion gap 7 5 - 15 mmol/L    Glucose 82 65 - 100 mg/dL    BUN 19 6 - 20 MG/DL    Creatinine 0.88 0.70 - 1.30 MG/DL    BUN/Creatinine ratio 22 (H) 12 - 20      GFR est AA >60 >60 ml/min/1.73m2    GFR est non-AA >60 >60 ml/min/1.73m2    Calcium 8.2 (L) 8.5 - 10.1 MG/DL            Assessment:     · Metastatic colon cancer with obstruction  · Pneumonia  · Atrial fibrillation     Patient Active Problem List   Diagnosis Code    HTN (hypertension) I10    H/O Carotid Stenosis I65.29    GERD (gastroesophageal reflux disease) K21.9    Pre-diabetes R73.03    Sinus tachycardia R00.0    H/O Heavy Alcohol Use     Vitamin B12 deficiency E53.8    Hyperhomocysteinemia (HCC) E72.11    Mild Allergic Rhinitis J30.9    History of skin cancer Z85.828    S/P AAA repair 2/9/2012 Z98.890, Z86.79    Hypercalcemia E83.52    Multiple myeloma ([address] C90.00    Left inguinal hernia K40.90    Right inguinal hernia K40.90    Microalbuminuria R80.9    Chronic diarrhea K52.9    Aortic stenosis, moderate I35.0    Mild concentric left ventricular hypertrophy (LVH) I51.7    Hyperlipemia, mixed E78.2    Carotid stenosis, asymptomatic, right I65.21    Personal history of atrial fibrillation Z86.79    Cellulitis of right leg L03.115    Failure to thrive (child) R62.51    Falls frequently R29.6    Moderate protein-calorie malnutrition (HCC) E44.0    Acute renal injury ([address] N17.9    Malignant neoplasm of ascending colon (HCC) C18.2    Diarrhea R19.7    Hypokalemia E87.6    Polyneuropathy associated with underlying disease ([address] G63    Detached retina, right H33.21    Large bowel obstruction ([address] K56.609     Plan:     · NPO  · Plans for hospice  · Pending abdominal x-ray, plan for venting PEG placement today with Dr. Mallory Raymundo, risks discussed with patient and daughter at bedside     Signed By: Bard Toño NP     7/5/2022  9:49 AM

## 2022-07-05 NOTE — ADDENDUM NOTE
Addendum  created 07/05/22 1542 by Caleb Jean DO    Attestation recorded in [address] [place] [address], [facility] [employer] filed

## 2022-07-05 NOTE — PROGRESS NOTES
Transition of Care: [facility], operated [facility] on Wednesday 7/6/22    Transport Plan: [facility] is scheduled for 10am pickup on 7/6/22     RUR: 17%    DX: large bowel obstruction    Main contact is Janice Vaughn- [phone]    Discharge pending:  -pending vent PEG tube (to be done today 7/5/22)      Reason for Admission:   Large bowel obstruction                    RUR Score:     17%             PCP: First and Last name:   Verónica Galicia NP     Name of Practice:    Are you a current patient: Yes/No: yes   Approximate date of last visit:    Can you participate in a virtual visit if needed: unknown    Do you (patient/family) have any concerns for transition/discharge? Deciding on hospice management either at home or at [address] for utilizing home health:     None needed; patient is now on hospice care  Current Advanced Directive/Advance Care Plan:  DNR      Healthcare Decision Maker: daughter  Click here to [address] including selection of the Healthcare Decision Maker Relationship (ie \"Primary\")            Primary Decision Maker: fredis hudson - Daughter - [phone]    Secondary Decision Maker: Nicole Cooperkrissy - [phone]    Transition of Care Plan:   Hospice care; either home with hospice or hospice house; [employer] hospice is now following    Care Management Interventions  Mode of Transport at Discharge:  Other (see comment) (TBD)  Transition of Care Consult (CM Consult): [place]: Yes  Discharge Durable Medical Equipment: No  Physical Therapy Consult: No  Occupational Therapy Consult: No  Speech Therapy Consult: No  Support Systems: Child(manuela)  Discharge Location  Patient Expects to be Discharged to[de-identified] Other: (TBD; hospice is following now; possibly hospice house or home with hospice)       1500: this CM informed by hospice team to set up transport ride for patient to go to the [facility] on Wednesday 7/6; this CM sent referral to [facility] via [employer] [employer]      CM following  Regla Tsai RN, CRM    Per previous CM notes: Patient normally lives at stated address, was normally independent in ADLs and 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

## 2022-07-05 NOTE — PROGRESS NOTES
Patient removed his NG Tube by mistake. Endoscopy was notified because patient was scheduled for a Peg tub placement. Orders give to replace NG Tube before Endo procedure. BG Tube size 16F was placed. no...

## 2022-07-05 NOTE — HOSPICE
Plan: Patient to be transferred to the [facility] on 7/6/2022. Transportation has been verified scheduled at 10:00am. Nurse staff to call [facility] with report prior to discharge. [address] [facility] Help to Those in Need  [phone]    Hospice Nursing PRE-Admission   Discharge Summary  Patient Name: Sharron Doe  YOB: 1934  Age: 80 y.o. Date of PLANNED Hospice Admission: 7/6/2022  Hospice Attending: Tj Virk  Primary Care Physician: Patricia Livingston NP     Home Hospice Address:  Pt to discharge from [facility] 7/6/2022 and be admitted at [facility]    Primary Contact and Katie Diaz- daughter: [phone]      ADVANCE CARE PLANNING    Code Status: DNR  Durable DNR: [x]  Yes  []  No  Advance Care Planning 5/29/2020   Patient's [address] is: -   Confirm Advance Directive Yes, on file   Does the patient have other document types -       [employer]: [address]: [employer] [address] [facility]     Verbal CTI of terminal diagnosis with life expectancy of 6 months or less received from: Dr. Chucho Bo    For the Hospice Diagnosis of: Colon Cancer    NCD: Declined      CLINICAL INFORMATION   Allergies: Allergies   Allergen Reactions    Codeine Nausea and Vomiting     Blurred vision, felt faint    Contrast Agent [Iodine] Hives     Needs premedication w/ Benadryl prn (since 1/2012 reaction)    Dexamethasone Other (comments)     \"Deathly ill\"    Fish Oil Nausea Only    Glipizide Other (comments)     DRASTIC DROP IN BLOOD GLUCOSE, fasting    Lovenox [Enoxaparin] Hives     Patient states blisters all over the body.     Metformin Diarrhea    Niacin Other (comments)     Flushing    Norvasc [Amlodipine] Other (comments)     Leg edema    Optiray 350 [Ioversol] Hives     He does not know what this is         Currently this patient has:  [] Supplemental O2 [x] Peripheral IV  [] PICC    [] PORT   [] Draper Catheter [] NG Tube   [x] PEG Tube [] Ostomy    [] AICD: Has ICD been deactivated? [] Yes [x] Wounds Post-op PEG      COVID Screening: RAPID COVID ordered 7/5/2022  Positive   Negative       ASSESSMENT & PLAN     1. Symptom Issues Identified: Bowel Obstruction. Wounds. 2. Spiritual Issues  Identified: TBD     3. Psych/ Social/ Emotional Issues Identified: TBD          CARE COORDINATION     1. Hospice Consents: Pt signed with daughter, Richard Blanc bedside 7/4/2022. Scanned with complete Start of Care Date with DDNR. 2.   Unique home needs for safety: Bariatric patient  NO        3. Transportation by: [facility]   Scheduled for 10:00       4.     Verbal Report/Handoff given to: [facility] Team [phone]    Yossi Nicholson RN, Freya  Nurse Liaison  [phone] Mobile  [phone] Office  Available on [employer]

## 2022-07-05 NOTE — PROGRESS NOTES
TRANSFER - IN REPORT:    Verbal report received from [facility] on Winston Kussmaul  being received from 5E for ordered procedure      Report consisted of patients Situation, Background, Assessment and   Recommendations(SBAR). Information from the following report(s) SBAR, Kardex, Procedure Summary, Intake/Output and MAR was reviewed with the receiving nurse. Opportunity for questions and clarification was provided. Assessment completed upon patients arrival to unit and care assumed. Pt to endo holding for scheduled PEG tube placement. Pt wishes to remain a limited DNR for procedure; with no chest compressions or advanced cardiac life support. Pt agreeable to intubation for general anesthesia. Addendum to surgical consent signed with pt and MD.     Initial RN admission and assessment performed and documented in Endoscopy navigator. Patient evaluated by anesthesia in pre-procedure holding. All procedural vital signs, airway assessment, and level of consciousness information monitored and recorded by anesthesia staff on the anesthesia record. Report received from CRNA post procedure. Patient transported to recovery area by RN. Endoscope was pre-cleaned at bedside immediately following procedure by Yaron Veloz. Recovery room report given to [address] [place] [address] Road - OUT REPORT:    Verbal report given to Winnie Mehta RN on Winston Kussmaul  being transferred to [facility] for routine progression of care       Report consisted of patients Situation, Background, Assessment and   Recommendations(SBAR). Information from the following report(s) SBAR, Kardex, Procedure Summary, Intake/Output and MAR was reviewed with the receiving nurse.     Lines:   Peripheral IV 07/01/22 Left Forearm (Active)   Site Assessment Clean, dry, & intact 07/05/22 0932   Phlebitis Assessment 0 07/05/22 0932   Infiltration Assessment 0 07/05/22 0932   Dressing Status Clean, dry, & intact 07/05/22 0932   Dressing Type Transparent 07/05/22 0932   Hub Color/Line Status Pink 07/05/22 0932   Action Taken Open ports on tubing capped 07/05/22 0932   Alcohol Cap Used Yes 07/05/22 0932       Peripheral IV 07/05/22 Right Antecubital (Active)        Opportunity for questions and clarification was provided.       Patient transported with:   Huaat

## 2022-07-06 NOTE — DISCHARGE INSTRUCTIONS
Discharge Instructions       PATIENT ID: Haris Kahn  MRN: [id number]   YOB: 1934    DATE OF ADMISSION: 7/1/2022  7:41 PM    DATE OF DISCHARGE: 7/6/2022    PRIMARY CARE PROVIDER: Verónica Galicia NP     ATTENDING PHYSICIAN: Zach Acosta MD  DISCHARGING PROVIDER: Farhat Evans MD    To contact this individual call [phone] and ask the  to page. If unavailable ask to be transferred the Adult Hospitalist Department. 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  New extensive peritoneal carcinomatosis and multiple hypodense lesions in the liver  Multifocal Pneumonia likely bacterial  Paroxysmal A Fib, rate controlled  Bilateral lower extremities edema   Hx of AAA s/p repair in 2012  HTN  Hx of multiple myeloma     CONSULTATIONS: IP CONSULT TO GENERAL SURGERY  IP CONSULT TO GASTROENTEROLOGY  IP CONSULT TO GASTROENTEROLOGY  IP CONSULT TO ONCOLOGY    PROCEDURES/SURGERIES: Procedure(s):  PERCUTANEOUS ENDOSCOPIC GASTROSTOMY TUBE INSERTION  ESOPHAGOGASTRODUODENOSCOPY (EGD)    PENDING TEST RESULTS:   At the time of discharge the following test results are still pending: None    FOLLOW UP APPOINTMENTS:   Follow-up Information     Follow up With Specialties Details Why Contact [facility]  Go to hospice care [place] [address].. [address]  [phone]    Ashley Edmonds NP Nurse Practitioner   [address]  [phone]             ADDITIONAL CARE RECOMMENDATIONS:      DIET: Comfort feeding       ACTIVITY: Activity as tolerated    WOUND CARE: None    EQUIPMENT needed: None      DISCHARGE MEDICATIONS:   See Medication Reconciliation Form    · It is important that you take the medication exactly as they are prescribed.    · Keep your medication in the bottles provided by the pharmacist and keep a list of the medication names, dosages, and times to be taken in your wallet. · Do not take other medications without consulting your doctor. NOTIFY YOUR PHYSICIAN FOR ANY OF THE FOLLOWING:   Fever over 101 degrees for 24 hours. Chest pain, shortness of breath, fever, chills, nausea, vomiting, diarrhea, change in mentation, falling, weakness, bleeding. Severe pain or pain not relieved by medications. Or, any other signs or symptoms that you may have questions about.       DISPOSITION:    Home With:   OT  PT  HH  RN       SNF/Inpatient Rehab/LTAC    Independent/assisted living   x Hospice    Other:     CDMP Checked:   Yes x     PROBLEM LIST Updated:  Yes x       Signed:   Annamarie Santa MD  7/6/2022  7:19 AM

## 2022-07-06 NOTE — HOSPICE
1035 Patient arrived at [facility], report was given by [facility]. Patient is alert and oriented to 4. Patient states that he is having nausea from ride to [facility] and reports pain of 7 on pain scale 1-10. Patient has eye patch on right eye for detached retina. 1100 Gave prn dose 2 mg Versed,to relief anxiety, 0.5 mg of Dilaudid for Pain and compazine 10 mg for nausea. 1200 Patient is snoring with relaxed face. 200 Patient's wife and daughter arrived , patient is visiting with family, has relaxed face. 1 Patient's family is leaving, patient is resting quietly in bed with no complaints of pain, face is relaxed. 1445 Patient woke up uncomfortable needing to be repositioned in bed. Patient has furrowed brow. RN asked if patient if patient was in pain, he stated yes but could not give a number on the pain scale. He stated it was just uncomfortable. He agreed to medications of Dilaudid 0.5 mg, Versed 2 mg.  1545 Patient is sleeping with a relaxed face. 1740 Patient is sleeping with relaxed face. [phone] Patient is sleeping with no facial grimacing. 1900 Report given. NAME OF PATIENT:  Brenda Mcdaniel    LEVEL OF CARE:  GIP    REASON FOR GIP:   Pain, despite numerous changes in medications and Nausea and vomiting, despite changes to medications    *PATIENT REMAINS ELIGIBLE FOR GIP LEVEL OF CARE AS EVIDENCED BY: (MUST BE ADDRESSED OF PATIENT GIP) Patient is receiving IV medications and needs frequent nursing assessments.       REASON FOR RESPITE:  NA    O2 SAFETY:  Tanks stored in roblero  and No petroleum based products on face while oxygen in use    FALL INTERVENTIONS PROVIDED:   Implemented/recommended use of non-skid footwear, Implemented/recommended use of fall risk identification flag to all team members, Implemented/recommended assistive devices and encouraged their use, Implemented/recommended resources for alarm system (personal alarm, bed alarm, call bell, etc.) , Implemented/recommended environmental changes (remove hazards, lower bed, improve lighting, etc.) and Implemented/recommended increased supervision/assistance    INTERDISPLINARY COMMUNICATION/COLLABORATION:  Physician, MSW and RN, CNA    NEW MEDICATION INITIATION DOCUMENTATION:  NA    Reason medication is being initiated:  NA    MD / Provider name consulted re: change in status / initiation of new medication:  NA    New Symptom(s):  NA    New Order(s):  NA    Name of the person notified of the changes:  NA    Name of person being taught:  NA    Instructions given:  NA    Side Effects taught:  NA    Response to teaching:  NA      COMFORTABLE DYING MEASURE:  Is Patient/family satisfied with symptom level?  yes    DISCHARGE PLAN:  End of life vs home.

## 2022-07-06 NOTE — PROGRESS NOTES
Problem: Pressure Injury - Risk of  Goal: *Prevention of pressure injury  Description: Document Christiano Scale and appropriate interventions in the flowsheet. 7/6/2022 1958 by Arelis De León RN  Outcome: Progressing Towards Goal  Note: Pressure Injury Interventions:  Sensory Interventions: Assess changes in LOC,Minimize linen layers,Float heels,Turn and reposition approx. every two hours (pillows and wedges if needed),Check visual cues for pain,Keep linens dry and wrinkle-free    Moisture Interventions: Absorbent underpads,Minimize layers,Limit adult briefs,Moisture barrier    Activity Interventions: Assess need for specialty bed    Mobility Interventions: Assess need for specialty bed,Float heels,Turn and reposition approx. every two hours(pillow and wedges)    Nutrition Interventions: Document food/fluid/supplement intake    Friction and Shear Interventions: Apply protective barrier, creams and emollients,Minimize layers,Lift sheet             7/6/2022 1957 by Arelis De León RN  Note: Pressure Injury Interventions:  Sensory Interventions: Assess changes in LOC,Minimize linen layers,Float heels,Turn and reposition approx. every two hours (pillows and wedges if needed),Check visual cues for pain,Keep linens dry and wrinkle-free    Moisture Interventions: Absorbent underpads,Minimize layers,Limit adult briefs,Moisture barrier    Activity Interventions: Assess need for specialty bed    Mobility Interventions: Assess need for specialty bed,Float heels,Turn and reposition approx.  every two hours(pillow and wedges)    Nutrition Interventions: Document food/fluid/supplement intake    Friction and Shear Interventions: Apply protective barrier, creams and emollients,Minimize layers,Lift sheet                Problem: Patient Education: Go to Patient Education Activity  Goal: Patient/Family Education  Outcome: Progressing Towards Goal     Problem: Falls - Risk of  Goal: *Absence of Falls  Description: Document Jose Antonio Stover Risk and appropriate interventions in the flowsheet. Outcome: Progressing Towards Goal  Note: Fall Risk Interventions:  Mobility Interventions: Bed/chair exit alarm    Mentation Interventions: Adequate sleep, hydration, pain control,Bed/chair exit alarm,Door open when patient unattended    Medication Interventions: Bed/chair exit alarm    Elimination Interventions: Call light in reach,Bed/chair exit alarm              Problem: Patient Education: Go to Patient Education Activity  Goal: Patient/Family Education  Outcome: Progressing Towards Goal     Problem: Emotional Support Needs  Goal: Patient/family is receiving emotional support  Description: Pt and pt's family will receive emotional support related to EOL care within two weeks.   Outcome: Progressing Towards Goal

## 2022-07-06 NOTE — PROGRESS NOTES
Ambulatory Care Management Social Work Note  7/6/2022      Chart reviewed. Hospice care noted. Ambulatory SW Care Management Services discontinued at this time.      Patrizia Chapman MSW, ACSW, [facility]    Ambulatory Care Management   [phone]

## 2022-07-06 NOTE — PROGRESS NOTES
x3 unsuccessful attempts to get labs, Pt stated \" can you please tell them you couldn't get any blood today, I don't want to be stuck anymore if I'm leaving\". Patient refusing AM labs at this time.

## 2022-07-06 NOTE — PROGRESS NOTES
Problem: Pressure Injury - Risk of  Goal: *Prevention of pressure injury  Description: Document Christiano Scale and appropriate interventions in the flowsheet. Outcome: Progressing Towards Goal  Note: Pressure Injury Interventions:  Sensory Interventions: Assess changes in LOC,Minimize linen layers,Float heels,Turn and reposition approx. every two hours (pillows and wedges if needed),Check visual cues for pain,Keep linens dry and wrinkle-free    Moisture Interventions: Absorbent underpads,Minimize layers,Limit adult briefs,Moisture barrier    Activity Interventions: Assess need for specialty bed    Mobility Interventions: Assess need for specialty bed,Float heels,Turn and reposition approx. every two hours(pillow and wedges)    Nutrition Interventions: Document food/fluid/supplement intake    Friction and Shear Interventions: Apply protective barrier, creams and emollients,Minimize layers,Lift sheet                Problem: Patient Education: Go to Patient Education Activity  Goal: Patient/Family Education  Outcome: Progressing Towards Goal     Problem: Falls - Risk of  Goal: *Absence of Falls  Description: Document Al Fall Risk and appropriate interventions in the flowsheet. Outcome: Progressing Towards Goal  Note: Fall Risk Interventions:  Mobility Interventions: Bed/chair exit alarm    Mentation Interventions: Adequate sleep, hydration, pain control,Bed/chair exit alarm,Door open when patient unattended    Medication Interventions: Bed/chair exit alarm    Elimination Interventions: Call light in reach,Bed/chair exit alarm              Problem: Patient Education: Go to Patient Education Activity  Goal: Patient/Family Education  Outcome: Progressing Towards Goal     Problem: Emotional Support Needs  Goal: Patient/family is receiving emotional support  Description: Pt and pt's family will receive emotional support related to EOL care within two weeks.   Outcome: Progressing Towards Goal [facility] EMERGENCY DEPT  eMERGENCY dEPARTMENT eNCOUnter      Pt Name: Fiona Tapia  MRN: [id number]  [place] 1941  Date of evaluation: 10/11/2021  Provider: Lynda Soliman MD    [address] [place] [address]       Chief Complaint   Patient presents with    Chest Pain     x 2 weeks; started one hour ago this instance; c/o pressure         HISTORY OF PRESENT ILLNESS   (Location/Symptom, Timing/Onset,Context/Setting, Quality, Duration, Modifying Factors, Severity)  Note limiting factors. Fiona Tapia is a [de-identified] y.o. male who presents to the emergency department due to chest pain. Patient has history of coronary disease. Over last few days has noticed intermittent chest discomfort worse with exertion and better with rest.  Had similar pain this evening while he was watching TV that has since improved with nitro. Describes pressure in his chest radiates upward. Denies dyspnea. No unilateral leg swelling or pain. Does have a history of DVT previously but is on Xarelto so do not see indication for CTA of the chest.  Denies any new or worsening dyspnea. No hemoptysis. No unilateral leg swelling or pain. Chest pain has since improved and currently resting comfortably with no complaints other than mild chest discomfort. HPI    NursingNotes were reviewed. REVIEW OF SYSTEMS    (2-9 systems for level 4, 10 or more for level 5)     Review of Systems   Constitutional: Negative for fever. Eyes: Negative for pain. Respiratory: Negative for shortness of breath. Cardiovascular: Positive for chest pain. Negative for palpitations and leg swelling. Gastrointestinal: Negative for abdominal pain, diarrhea and vomiting. Genitourinary: Negative for dysuria. Skin: Negative for rash. Neurological: Negative for weakness and headaches. All other systems reviewed and are negative. A complete review of systems was performed and is negative except as noted above in the HPI.        PAST MEDICAL HISTORY     Past Medical History:   Diagnosis Date    CAD (coronary artery disease)     Diabetes mellitus ([address]     D[facility] venous thrombosis) [facility])     Hyperlipidemia     Hypertension          SURGICAL HISTORY       Past Surgical History:   Procedure Laterality Date    APPENDECTOMY      CARDIAC SURGERY      CORONARY ARTERY BYPASS GRAFT  1997    4 vessel bypass    CORONARY ARTERY BYPASS GRAFT REDO  1997    5 months after 1st bypass         CURRENT MEDICATIONS       Previous Medications    CHLORTHALIDONE (HYGROTON) 25 MG TABLET    Take 25 mg by mouth daily    INSULIN GLARGINE (LANTUS) 100 UNIT/ML INJECTION VIAL    Inject 70 Units into the skin nightly     INSULIN GLARGINE (LANTUS) 100 UNIT/ML INJECTION VIAL    Inject 55 Units into the skin daily (with breakfast)    ISOSORBIDE MONONITRATE (IMDUR) 30 MG EXTENDED RELEASE TABLET    Take 30 mg by mouth daily    LISINOPRIL (PRINIVIL;ZESTRIL) 40 MG TABLET    Take 40 mg by mouth daily    METFORMIN HCL PO    Take 100 mg by mouth 2 times daily    RIVAROXABAN (XARELTO) 10 MG TABS TABLET    Take 10 mg by mouth daily (with breakfast)     ROSUVASTATIN (CRESTOR) 20 MG TABLET    Take 20 mg by mouth daily       ALLERGIES     Iv dye [iodides] and Versed [midazolam]    FAMILY HISTORY     History reviewed. No pertinent family history.        SOCIAL HISTORY       Social History     Socioeconomic History    Marital status:      Spouse name: None    Number of children: None    Years of education: None    Highest education level: None   Occupational History    None   Tobacco Use    Smoking status: Current Every Day Smoker     Packs/day: 1.00     Years: 60.00     Pack years: 60.00     Types: Cigarettes    Smokeless tobacco: Never Used   Vaping Use    Vaping Use: Never used   Substance and Sexual Activity    Alcohol use: No    Drug use: No    Sexual activity: None   Other Topics Concern    None   Social History Narrative    None     Social Determinants of Health     Financial Resource Strain:     Difficulty of Paying Living Expenses:    Food Insecurity:     Worried About Running Out of Food in the Last Year:     [address] in the Last Year:    Transportation Needs:     Lack of Transportation (Medical):  Lack of Transportation (Non-Medical):    Physical Activity:     Days of Exercise per Week:     Minutes of Exercise per Session:    Stress:     Feeling of Stress :    Social Connections:     Frequency of Communication with Friends and Family:     Frequency of Social Gatherings with Friends and Family:     Attends [employer] Services:     Active Member of Clubs or Organizations:     Attends Club or Organization Meetings:     Marital Status:    Intimate Partner Violence:     Fear of Current or Ex-Partner:     Emotionally Abused:     Physically Abused:     Sexually Abused:        SCREENINGS    Chris Coma Scale  Eye Opening: Spontaneous  Best Verbal Response: Oriented  Best Motor Response: Obeys commands  [place] Coma Scale Score: 15        PHYSICAL EXAM    (up to 7 for level 4, 8 or more for level 5)     ED Triage Vitals [10/11/21 0135]   BP Temp Temp src Pulse Resp SpO2 Height Weight   (!) 150/65 98.3 °F (36.8 °C) -- 73 20 95 % 5' 7\" (1.702 m) 157 lb (71.2 kg)       Physical Exam  Vitals reviewed. Constitutional:       General: He is not in acute distress. Appearance: He is well-developed. HENT:      Head: Normocephalic and atraumatic. Mouth/Throat:      Mouth: Mucous membranes are moist.   Eyes:      General: No scleral icterus. Pupils: Pupils are equal, round, and reactive to light. Neck:      Vascular: No JVD. Cardiovascular:      Rate and Rhythm: Normal rate and regular rhythm. Pulses: Normal pulses. Heart sounds: Normal heart sounds. No murmur heard. Pulmonary:      Effort: Pulmonary effort is normal. No respiratory distress. Breath sounds: Normal breath sounds.       Comments: Scars on chest from prior CABG  Abdominal: General: There is no distension. Palpations: Abdomen is soft. Tenderness: There is no abdominal tenderness. Musculoskeletal:         General: No tenderness. Cervical back: Normal range of motion and neck supple. Right lower leg: No edema. Left lower leg: No edema. Skin:     General: Skin is warm and dry. Capillary Refill: Capillary refill takes less than 2 seconds. Neurological:      General: No focal deficit present. Mental Status: He is alert and oriented to person, place, and time. Psychiatric:         Mood and Affect: Mood normal.         Behavior: Behavior normal.         DIAGNOSTIC RESULTS     EKG: All EKG's are interpreted by the Emergency Department Physician who either signs or Co-signs this chart in the absence of a cardiologist.    Normal sinus rhythm. PVCs. Normal QT. Borderline ST depression inferiorly with borderline T wave changes inferiorly. Borderline ST elevation anteriorly. No prior EKG for comparison. RADIOLOGY:   Non-plain film images such as CT, Ultrasound and MRI are read by the radiologist. Plainradiographic images are visualized and preliminarily interpreted by the emergency physician with the below findings:      Chest: Chronic changes.   No acute disease    Interpretation per the Radiologist below, if available at the time of this note:    XR CHEST PORTABLE    (Results Pending)         ED BEDSIDE ULTRASOUND:   Performed by ED Physician - none    LABS:  Labs Reviewed   CBC - Abnormal; Notable for the following components:       Result Value    RBC 4.03 (*)     Hemoglobin 11.2 (*)     Hematocrit 36.8 (*)     MCHC 30.4 (*)     MPV 9.0 (*)     All other components within normal limits   COMPREHENSIVE METABOLIC PANEL W/ REFLEX TO MG FOR LOW K - Abnormal; Notable for the following components:    Glucose 361 (*)     BUN 24 (*)     CREATININE 1.3 (*)     GFR Non- 53 (*)     All other components within normal limits   COVID-19, RAPID PROTIME-INR   TROPONIN   TROPONIN   TROPONIN   POCT GLUCOSE   POCT GLUCOSE       All other labs were within normal range or not returned as of this dictation. EMERGENCY DEPARTMENT COURSE and DIFFERENTIALDIAGNOSIS/MDM:   Vitals:    Vitals:    10/11/21 0135 10/11/21 0153   BP: (!) 150/65 113/64   Pulse: 73 70   Resp: 20 16   Temp: 98.3 °F (36.8 °C)    SpO2: 95% 97%   Weight: 157 lb (71.2 kg)    Height: 5' 7\" (1.702 m)        MDM  Patient resting comfortably no distress. Patient's case discussed with hospitalist, Dr. Kurt Hackett, agreeable plan of care and admission. CONSULTS:  IP CONSULT TO CARDIOLOGY    PROCEDURES:  Unless otherwise notedbelow, none     Procedures    FINAL IMPRESSION     1. Chest pain, unspecified type    2.  Elevated glucose          DISPOSITION/PLAN   DISPOSITION Admitted 10/11/2021 04:27:19 AM      PATIENT REFERRED TO:  @FUP@    DISCHARGE MEDICATIONS:  New Prescriptions    No medications on file          (Please note that portions of this note were completed with a voice recognition program.  Efforts were made to edit the dictations butoccasionally words are mis-transcribed.)    Sofia Falcon MD (electronically signed)  AttendingEmergency Physician          Sofia Falcon MD  10/11/21 1565

## 2022-07-06 NOTE — PROGRESS NOTES
Patient alert and oriented x4 at time of discharge. Patient and daughter  received verbal and written discharge teaching on medications and next steps. Patient discharged with Gtube and IV. Patient discharged [address] via [facility] at [address].

## 2022-07-06 NOTE — HOSPICE
Hospice Liaison Note:    Chart reviewed for updates in plan of care. Bedside assessment; patient appears stable to transport. Patient's daughter and son-in-law both bedside. Patient has signed [address] visitation policy. Rapid COVID is negative. Reviewed discharge plan with bedside nurse, Mere Boothe RN and Dr. Ajay Warren. Plan: Transport to [facility] for [facility] inpatient admission to Hospice services. Please call Hospice team to offer support for patient, family or staff.     Thank you for your coordination with the hospice plan of care      Haven Holter, RN, [place] [address] Nurse Liaison  [phone] [place]  [phone] Office

## 2022-07-06 NOTE — PROGRESS NOTES
[address] [facility] Help to Those in Need  [phone]    Social Work Admission Note  Patient Name: Tomasz Scanlon  YOB: 1934  Age: 80    Date of Visit: 7/6/2022  Facility of Care: [facility]  Patient Room: Room 15     Hospice Attending: Dr. Kaci Pillai Diagnosis: Metastatic colon cancer    Level of Care:    [x]  GIP    []  Respite   []  Routine      NARRATIVE   Social work met with pt at [facility] at bedside. Pt was alert and oriented x4. Pt was clear that pt's goal is to receive EOL care from [facility]. Pt has been  with his wife, Davey Beck, for 63 years. Pt has a daughter, Danis Morris, and son-in-law, Dino Mandujano, who are active in providing care. Pt's wife has a diagnosis of dementia. 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 Pt enjoys gardening. Pt has eight grandchildren. Pt has experienced the loss of a son and daughter. Pt was accepting and realistic and his prognosis. Pt's daughter, Danis Morris, was at [facility] to visit. Danis Morris reported that while the pt was settling in and meeting with the hospice nurse, that she would go  pt's wife to bring her to visit. No other needs expressed at this time. Social work to provide supportive counseling, reflective listening, and socialization to pt and will provide emotional support to pt's family related to pt's EOL experience.      ADVANCE CARE PLANNING    Advance Directive:  [x]  Yes  []  No   []  Would like to complete  Primary MPOA: Linda Caldwell  Secondary MPOA: Roland Aguirre, after Margoth Ortiz:   Code Status: DNR  Durable DNR: _X_ Yes  _ No    Relationship Status:  []  Single     [x]        []      []  Domestic Partner     []  /  []  Common Law  []    []  Unknown    If in a relationship, name of partner/spouse: Linda Caldwell, spouse  Duration of relationship: 61 years     Jew/Spirituality:   []  Active In Jew/Spirituality   []  Not Active   []  N/A  Notes: Did not specify during visit.      Home: [employer] [facility] Provided:  []  [facility]  []  Information on applying for disability  []  FMLA Paperwork  []  Letters Requested by [facility]   []  Tabby Mejia  []  Final Arrangements Resources   []  Outside counseling services (individual or group therapy)  []  Grief resources   []  Aditya Brunner  []  [place]   []  [address]   []  [facility] [employer] [facility]   []  Referral Sent to [employer]   []  Referral Sent to Music Therapy   []  Referral Sent to Pet Therapy  []  Other  Social Work Initial Assessment     Sex: Male    Pronoun Preference:   [x]  He/Him/His   []  She/Her/Hers   []  They/Them/Theirs   []   Patient Name   []   Decline to Answer  []   Unknown  []   Other   Notes:     Race/Ethnicity: (sarabjit all that apply)  []  [address] or [place] Native  []    []  Black or [place] American  []   or   []  [address] [facility] or [facility]  [x]  White  []  Unknown  []  Other     Inpatient Financial Agreement Completed:   [x]  Yes  []  No    Insurance:   [x]  Medicare   []  Medicaid     []  [employer]    []  Self-Pay/Uninsured   Notes:      Has pt applied for FAP? []  Needs to Apply  []  Application Completed and Submitted   []  Approved/ Expiration Date:   [x]  N/A  Notes:     Has pt applied for Medicaid? []  Needs to Apply  []  Application Completed and Submitted/Application Number:   [x]  N/A  Notes:     Has a long term care screening ([facility]) been requested? []  Requested   []  Not Requested, Needs follow up  []  Completed  [x]  N/A  Notes:     Does the pt have a long term care insurance policy? []  Yes  [x]  No  []  Yes, Needing assistance with paperwork  Notes:      Service:    []  Yes   []  No       [x]  Unknown    Appropriate for Pinning Ceremony:   []  Yes      [x]  No    Is patient using VA benefits? []  Yes      [x]  No  []  Needs assistance with accessing benefits  Notes:       Work History: []  Full-Time/Part-Time  [x]  Retired   []  Other  Notes:     Primary Language: English  []   Needed  []   utilized during visit  []   Waived/ form completed    Ability to express thoughts/needs/feelings  [x]  Expressed thoughts/feelings/needs without difficulty  []  Requires extra time and cuing  []  Speech limited single words  []  Uses only gestures (eye, blinking eye or head movement/pointing)  []  Unable to express thoughts/feelings/needs (speech unintelligible or inappropriate)  []  Unresponsive  Notes:      Mental Status:  [x]  Alert-oriented to:     [x]  Person     [x]  Place     [x]  Time  []  Comatose-responds to:    []   Verbal stimuli    []  Tactile stimuli    []  Painful stimuli  []  Forgetful  []  Disoriented/Confused  []  Lethargic  []  Agitated  []  Unresponsive  []  Other (specify):    Notes:      Patients description of Illness/Current Health Status:   []  Patient unable to discuss  []  Patient unwilling to discuss  [x]  (Specify)  Pt was open to to discussion, pt is accepting of his prognosis. Knowledge/Understanding of Disease Process  Patient:    [x]  Demonstrates knowledge/understanding of disease process   [x]  Demonstrates knowledge/understanding of treatment plan   [x]  Demonstrates knowledge/understanding of prognosis   [x]  Demonstrates acceptance of prognosis   [x]  Demonstrates knowledge/understanding of resuscitation status   []  Other (specify)  Caregiver:   [x]  Demonstrates knowledge/understanding of disease process   [x]  Demonstrates knowledge/understanding of treatment plan   [x]  Demonstrates knowledge/understanding of prognosis   [x]  Demonstrates acceptance of prognosis   [x]  Demonstrates knowledge/understanding of resuscitation status   []  Other (specify)  Notes: Pt was accepting and realistic about his prognosis.       Patients living arrangement/care setting:  Use the PRIOR COLUMN when the PATIENTS current health status necessitated a change in his/her primary residence. Prior Current Response              []             []    Patients own home/residence              []             []    Home of family member/friend              []             []    Boarding home/Group Home              []             []    Assisted living facility/residential center              [x]             []    Hospital/Acute care facility              []             []    Skilled nursing facility              []             []    Long term care facility/Nursing home              []             [x]    Hospice in Patient      Primary Caregiver:  []  No Primary Caregiver  Name of Primary Caregiver: Viky Nichole  Primary Caregiver Phone Number: [phone]  Relationship or Primary Caregiver:    []  Spouse/Significant other       [x]  Child        []  Step child       []  Sibling   []  Parent   []  Friend/Neighbor   []  Community/Adventist Volunteer   []  Paid help   []  Other (specify):___________  Notes:       Family members/Significant others:  Name: Alex Dickinson  Relationship: Spouse  Phone Number:   Actively involved in care? []  Yes  [x]  No - Spouse has dementia dx. Name: Viky Nichole  Relationship: Daugther  Phone Number: [phone]  Actively involved in care?   [x]  Yes  []  No    Social support systems: (select ONE best description)  []  Excellent social support system which includes three or more family members or friends  [x]  Good social support system which includes two or less members or friends  []  [address] support which includes one family member or friend  []  Poor social support; no family members or friends; basically ALONE  Notes:      Emotional Status: (sarabjit all that apply)    Patient Caregiver Response                 [x]                [x]    Mood/Affect stable and appropriate                   []                []    Angry                 []                []    Anxious                 []                []    Apprehensive []                []    Avoidant                 []                []    Clinging                 []                []    Depressed                 []                []    Distraught                 []                []    Fearful                 []                []    Flat Affect                 []                []    Helpless                 []                []    Hostile                 []                []    Impulsive                 []                []    Irritable                 []                []    Labile                 []                []    Manic                 []                []    Restlessness                 []                []    Sad                 []                []    Strain/Stress                 []                []    Suspicious                 []                []     Tearful                 []                 []     Withdrawn          Notes: Pt was alert and oriented during the visit, pt was accepting of his prognosis. Pt's daughter, Chen Sorensen, did not express any concerns during the initial visit. Coping Skills (strengths/weakness):    Patient: Coping Skills (strength/weakness): Pt is accepting of prognosis, good family support, pt has lost a daughter and a son, pt's wife has a dementia dx. Family/caregiver (strength/weakness): Pt's wife has a dementia dx, pt's family is supportive.       [place] of care upon discharge (sarabjit all that apply):     [x]  No burden evident   []  Family must administer medications   []  Illness causing financial strain   []  Family/Support feels overwhelmed   []  Family/Support sleep disturbed with patients care   []  Patients care causes extra physical stress  of death  []  Illness causes changes in family lifestyle  []  Illness impacting family/support employment  []  Family experiencing increased time demands  []  Patients behavior endangers family  []  Denial of patients illness  []  Concern over outcome of illness/fear  []  Patients behavior embarrassing to family   Notes:      Risk Factors: (sarabjit all that apply):    [x]  No burden evident   []  Alcohol abuse  []  Financial resources inadequate to meet basic needs (food/house/etc)  []  Financial resources inadequate to meet health care needs (supplies/equipment/medications)  []  Food/nutrition resources inadequate  []  Home environment unsafe/inadequate for home care  []  Homicidal risk  []  Lives alone or without concerned relatives  []  Multiple medications/complex schedule  []  Physical limitations increase likelihood of falls  []  Plan of care/treatments complicated  []  Substance use/abuse  []  Suicidal risk  []  Visual impairment threatens safety/ability to perform self-care  []  Other (specify):     Abuse/Neglect (actual/potential risks):  [x]  No signs of abuse/neglect  []  History of abuse/neglect                 []  Physical          []  Sexual  []  History of domestic violence  []  Lacks adequate physical care  []  Lacks emotional nurturing/support  []  Lacks appropriate stimulation/cognitive experiences  []  Left alone inappropriately  []  Lacks necessary supervision  []  Inadequate or delayed medical care  []  Unsafe environment (i.e guns/drug use/history of violence in the home/etc.)  []  Bruising or other physical signs of injury present  []  Refer to child/adult protective services  []  Other (specify):   Notes:      Current Sources of Stress (in Addition to Current Illness):   [x]  None reported  []  Bills/Debt    []  Career/Job change    []   (short term)    []   (long term)    []  Death of a child (recent)    []  Death of a parent (recent)   []  Death of a spouse (recent)   []  Employment status changed   []  Family discord    []  Financial loss/Inadequate inther (specify):come  []  Job loss  []  Legal issues unresolved  []  Lifestyle change  []  Marital discord  []  Marriage within the last year  []  Paperwork (insurance/legal/etc) overwhelming  [] Separation/Divorce  []  Other (specify):  Notes:       Interventions/Plan of Care   []  MSW will assess social and emotional factors related to coping with end of life issues  []  MSW will provide community resource planning/referral   []  MSW to assist with relocation to different care setting if/when symptoms stabilize  [x]  Pt/Pts family will receive emotional support. []  Pt/Pts family will share the details surrounding the pts disease progression  []  Pt/Pts Family will show expression of grief by participating in life review. []  Pt/Pts Family will be educated and able to verbalize understanding of mental, emotional, and physical symptoms of grief. []  Pt/Pts Family will be educated and able to identify skills and social support to help cope with grief. []  Pts family will receive support for grief with emphasis on developmentally appropriate language. [] Other:   Notes:       Discharge Planning   [x]  Home with family member    []  Return back to nursing home/facility  []  Needs assistance with placement/paid caregivers  []  Short Stay under routine level of care at [facility]   []  Other  Notes: If pt were to d/c, pt would likely return to live with family; however, pt's wife has dementia and pt would likely require care from his daughter or other family member to remain at home.      MSW Assessment Completed by: Jeanelle Goodell, BSW  7/6/2022    Time In: 11:00a     Time Out: 11:30a

## 2022-07-06 NOTE — H&P
[facility]   Good Help to Those in Need  [phone]    Patient Name: Teresa Barry  YOB: 1934    Date of Provider Hospice Visit: 07/06/22    Level of Care:   [x] General Inpatient (GIP)    [] Routine   [] Respite    Current Location of Care:  [] [facility] [] [facility] [] [facility] [] [facility] [x] Hospice House THE [facility])    IF [facility], patient referred from:  [x] [facility] [] [facility] [] [facility] [] [facility] [] Home [] Other:     Date of Original Hospice Admission: 7/6/22  Hospice Medical Director at time of admission: [employer] Diagnosis: Metastatic colon cancer  Diagnoses RELATED to the terminal prognosis: Large bowel obstruction secondary to cancer  Other Diagnoses:      HOSPICE SUMMARY     Teresa Barry is a 80y.o. year old who was admitted to [facility]. Patient presents to hospital with nausea and vomiting. Found on CT imaging to have large bowel obstruction related to sigmoid colonic mass. Patient with CT imaging showing extensive peritoneal carcinomatosis. Patient had NGT placed for decompression and not a surgical candidate. Ultimately, he had venting G-tube placed while in the hospital and then transitioned to [facility] for GIP LOC secondary to symptom burden and bowel obstruction    The patient's principle diagnosis has resulted in bowel obstruction   Refer to LCD     Functionally, the patient's Karnofsky and/or Palliative Performance Scale has declined over a period of days-weeks and is estimated at 20 The patient is dependent on the following ADLs:all    Objective information that support this patients limited prognosis includes:   CT scan     IMPRESSION     1. New extensive peritoneal carcinomatosis, most evident in the left upper  quadrant with innumerable peritoneal nodules.   2. Diffuse distention of the small and large bowel, with a large volume of stool  within the large bowel, to the level of the mid sigmoid colon where there is an  abrupt transition with associated nodular wall thickening. Findings are  suspicious for large bowel obstruction due to colonic malignancy. 3. Multiple hypodense lesions in the liver, favored to represent metastases. 4. Small volume ascites. 5. Patchy consolidative and groundglass opacities in the lungs, worst in the  lingula, favored to represent multifocal pneumonia. 6. Stable 3.7 cm infrarenal abdominal aortic aneurysm.     The patient/family chose comfort measures with the support of Hospice. HOSPICE DIAGNOSES   Active Symptoms:  1. Cancer associated pain  2. Met colon cancer  3. Bowel obstruction  4. Hospice care     PLAN   1. Patient admitted GIP LOC secondary to frequent nursing assessments, IV meds required for symptoms secondary to bowel obstruction as SL/oral meds not effective and high risk for rapid decline  2. Comfort meds for all symptoms on a prn basis for now but anticipate scheduled meds will be needed  3. Plan reviewed with bedside nursing team  4. Plan reviewed with patient and daughter at the bedside. Wife did come to the bedside and he had a huge smile upon her arrival. She has underlying dementia    5.  and SW to support family needs  6. Disposition: anticipate death at [facility]  7. Hospice Plan of care was reviewed in detail and agree with current plan of care    Prognosis estimated based on 07/06/22 clinical assessment is:   [x] Hours to Days    [] Days to Weeks    [] Other:    Communicated plan of care with: Hospice Case Manager; Hospice IDT; Care Team     GOALS OF CARE     Patient/Medical POA stated Goal of Care: hospice care    [x] I have reviewed and/or updated ACP information in the Advance Care Planning Navigator. This information is available in the [address] link in the patient's chart header.     Primary Decision [facility] Agent):   Primary Decision Maker: fredis hudson - Daughter - [phone]    Secondary Decision Maker: Georgiann Coast - Son-in-Law - [phone]    Resuscitation Status: DNR  If DNR is there a Durable DNR on file? : [x] Yes [] No (If no, complete Durable DNR)    HISTORY     History obtained from: chart, patient, daughter, SIMONE    CHIEF COMPLAINT: abdominal pain  The patient is:   [x] Verbal  [] Nonverbal  [] Unresponsive    HPI/SUBJECTIVE:  Patient with increased abdominal discomfort       REVIEW OF SYSTEMS     The following systems were: [x] reviewed  [] unable to be reviewed    Positive ROS include:  Constitutional: fatigue, weakness, in pain, short of breath  Ears/nose/mouth/throat: increased airway secretions  Respiratory:shortness of breath, wheezing  Gastrointestinal:poor appetite, nausea, vomiting, abdominal pain, constipation, diarrhea  Musculoskeletal:pain, deformities, swelling legs  Neurologic:confusion, hallucinations, weakness  Psychiatric:anxiety, feeling depressed, poor sleep  Endocrine:     Says pain as high as 9/10 after ambulance ride    Adult Non-Verbal Pain Assessment Score:     Face  [] 0   No particular expression or smile  [] 1   Occasional grimace, tearing, frowning, wrinkled forehead  [] 2   Frequent grimace, tearing, frowning, wrinkled forehead    Activity (movement)  [] 0   Lying quietly, normal position  [] 1   Seeking attention through movement or slow, cautious movement  [] 2   Restless, excessive activity and/or withdrawal reflexes    Guarding  [] 0   Lying quietly, no positioning of hands over areas of body  [] 1   Splinting areas of the body, tense  [] 2   Rigid, stiff    Physiology (vital signs)  [] 0   Stable vital signs  [] 1   Change in any of the following: SBP > 20mm Hg; HR > 20/minute  [] 2   Change in any of the following: SBP > 30mm Hg; HR > 25/minute    Respiratory  [] 0   Baseline RR/SpO2, compliant with ventilator  [] 1   RR > 10 above baseline, or 5% drop SpO2, mild asynchrony with ventilator  [] 2   RR > 20 above baseline, or 10% drop SpO2, asynchrony with ventilator     FUNCTIONAL ASSESSMENT     Palliative Performance Scale (PPS):20 PSYCHOSOCIAL/SPIRITUAL ASSESSMENT     Active Problems:    * No active hospital problems. 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    GERD (gastroesophageal reflux disease) 4/6/2010    GI bleed, duodenitis 2007 4/6/2010    H/O Heavy Alcohol Use 9/7/2010    History of skin cancer 09/07/2010    melanoma back    HTN (hypertension) 4/6/2010    Hypercalcemia 4/25/2012    Hyperhomocysteinemia ([facility] [address] 9/7/2010    Hypertriglyceridemia 9/7/2010    Left inguinal hernia 9/14/2015    Lipids blood increased 4/6/2010    Microalbuminuria 12/10/2015    Mild Allergic Rhinitis 9/7/2010    Multiple myeloma ([address]     Pre-diabetes 9/7/2010 2005;  Optho Dr Merrill Schaffer Right inguinal hernia 11/2/2015    S/P AAA repair 2/9/2012 2/22/2012    Sinus tachycardia 9/7/2010    Vitamin B12 deficiency 9/7/2010      Past Surgical History:   Procedure Laterality Date    COLONOSCOPY  8/2007    Normal; Dr Buck Fail EGD  8/2007    small hiatal hernia, mild duodenitis; Dr Buck Fail HX AAA REPAIR  2/9/2012    Dr Katrin Suarez    HX CATARACT REMOVAL      both eyes    HX CHOLECYSTECTOMY  1-29-14    lap sandi- [facility]- Dr. Elena Giles Left 9/24/15    left indirect inguinal by Dr. Deloris Barry Right 11/2/15    inguinal w/ mesh by Dr. Deloris Barry Bilateral 10/2015 And 11/2015    Inguinal    BLUE DOPPLER  3/26/2012    BLUE Echo; + clot Social History     Tobacco Use    Smoking status: Former Smoker     Packs/day: 1.50     Years: 55.00     Pack years: 82.50     Quit date: 2001     Years since quittin.5    Smokeless tobacco: Never Used    Tobacco comment: used to smoke 1 1/2 ppd x ~ 40 yrs   Substance Use Topics    Alcohol use: Yes     Alcohol/week: 14.0 standard drinks     Types: 14 Glasses of wine per week     Comment: 15     Family History   Problem Relation Age of Onset    Heart Disease Mother          age 80    Cancer Father          brain tumor age 80    Heart Disease Father     Substance Abuse Daughter          cocaine OD age 43 in [address] [place] [address] Other Son          PE/DVT age 43    Heart Disease Brother     Cancer Brother         BLADDER    Other Daughter         alive and well    Anesth Problems Neg Hx       Allergies   Allergen Reactions    Codeine Nausea and Vomiting     Blurred vision, felt faint    Contrast Agent [Iodine] Hives     Needs premedication w/ Benadryl prn (since 2012 reaction)    Dexamethasone Other (comments)     \"Deathly ill\"    Fish Oil Nausea Only    Glipizide Other (comments)     DRASTIC DROP IN BLOOD GLUCOSE, fasting    Lovenox [Enoxaparin] Hives     Patient states blisters all over the body.     Metformin Diarrhea    Niacin Other (comments)     Flushing    Norvasc [Amlodipine] Other (comments)     Leg edema    Optiray 350 [Ioversol] Hives     He does not know what this is      Current Facility-Administered Medications   Medication Dose Route Frequency    bisacodyL (DULCOLAX) suppository 10 mg  10 mg Rectal DAILY PRN    HYDROmorphone (DILAUDID) injection 0.5 mg  0.5 mg IntraVENous Q15MIN PRN    midazolam (VERSED) injection 2 mg  2 mg IntraVENous Q15MIN PRN    prochlorperazine (COMPAZINE) injection 10 mg  10 mg IntraVENous Q6H PRN    acetaminophen (TYLENOL) suppository 650 mg  650 mg Rectal Q4H PRN    ketorolac (TORADOL) injection 30 mg  30 mg IntraVENous Q6H PRN    glycopyrrolate (ROBINUL) injection 0.2 mg  0.2 mg IntraVENous ONCE        PHYSICAL EXAM     Wt Readings from Last 3 Encounters:   07/01/22 74.4 kg (164 lb)   05/27/22 74.1 kg (163 lb 6.4 oz)   04/28/22 71.3 kg (157 lb 3.2 oz)       Visit Vitals  BP (!) 156/72   Pulse 73   Temp 97.8 °F (36.6 °C)   Resp 16       Supplemental O2  [] Yes  [x] NO  Last bowel movement:     Currently this patient has:  [x] Peripheral IV [] PICC  [] PORT [] ICD    [] Draper Catheter [] NG Tube   [x] PEG Tube    [] Rectal Tube [] Drain  [] Other:     Constitutional:thin, tired appearing, alert and oriented  Eyes: reactive, patch over right eye  ENMT: slightly dry  Cardiovascular: rrr  Respiratory: no WOB or respiratory distress  Gastrointestinal: distended, diffusely TTP, minimal to high pitched bowel sounds  Musculoskeletal:muscle wasting  Skin:unremarkable  Neurologic:nonfocal  Psychiatric: tearful at time, slightly anxious  Other:       Pertinent Lab and or Imaging Tests:  Lab Results   Component Value Date/Time    Sodium 146 (H) 07/05/2022 12:28 AM    Potassium 3.7 07/05/2022 12:28 AM    Chloride 117 (H) 07/05/2022 12:28 AM    CO2 22 07/05/2022 12:28 AM    Anion gap 7 07/05/2022 12:28 AM    Glucose 82 07/05/2022 12:28 AM    BUN 19 07/05/2022 12:28 AM    Creatinine 0.88 07/05/2022 12:28 AM    BUN/Creatinine ratio 22 (H) 07/05/2022 12:28 AM    GFR est AA >60 07/05/2022 12:28 AM    GFR est non-AA >60 07/05/2022 12:28 AM    Calcium 8.2 (L) 07/05/2022 12:28 AM     Lab Results   Component Value Date/Time    Protein, total 7.6 07/01/2022 07:47 PM    Albumin 3.5 07/01/2022 07:47 PM           Total time:   Counseling / coordination time:   > 50% counseling / coordination?:

## 2022-07-06 NOTE — DISCHARGE SUMMARY
Discharge Summary       PATIENT ID: Rodrick Caldwell  MRN: [id number]   YOB: 1934    DATE OF ADMISSION: 7/1/2022  7:41 PM    DATE OF DISCHARGE: 7/6/2022   PRIMARY CARE PROVIDER: Li Franco NP     ATTENDING PHYSICIAN: Josefa Rae MD   DISCHARGING PROVIDER: Jerald Forrest MD    To contact this individual call [phone] and ask the  to page. If unavailable ask to be transferred the Adult Hospitalist Department. CONSULTATIONS: IP CONSULT TO GENERAL SURGERY  IP CONSULT TO GASTROENTEROLOGY  IP CONSULT TO GASTROENTEROLOGY    PROCEDURES/SURGERIES: Procedure(s):  PERCUTANEOUS ENDOSCOPIC GASTROSTOMY TUBE INSERTION  ESOPHAGOGASTRODUODENOSCOPY (EGD)    ADMISSION SUMMARY AND HOSPITAL COURSE:        Tamica Aleman a 80 y. o. male past medical history of AAA status postrepair in 2012, GI bleed with acute blood loss anemia, atrial fibrillation, M2, GERD, EtOH abuse with duodenitis, who presents with vomiting, diarrhea,      In the ED, he was tachycardic 112 bpm, and hypoxic to 87% on room air with improvement to 93% on 3 L nasal cannula.  Other vital signs stable.  Labs were significant for WBC 15, macrocytosis with .8, glucose 139, BUN 25, creatinine 1.34 (baseline 0.8-0.9), and alk phos 241.  Abdomen pelvis shows no extensive peritoneal carcinomatosis, diffuse colonic distention with large volume stool to the level of the sigmoid colon with an abrupt transition point associated with nodular wall thickening suspicious for large bowel obstruction due to colon malignancy, multiple hypodense lesions in the liver suggestive of metastasis, small volume ascites, patchy consolidative groundglass opacities in the lung suggestive of multifocal pneumonia, and stable infrarenal abdominal aortic aneurysm.     In the ED, he received Zofran, and 1 L normal saline.  Surgery was consulted, and concluded that there was no indication for any surgical treatment, but recommended GI consultation for colonoscopy and possible colonic stent placements    Large bowel obstruction due to sigmoid colon mass   -NGT is out  -on iv protonix, continue normal saline, prn zofran and tylenol  -CT abdomen pelvis showed diffuse distention of the small and large bowel, with a large volume of stool within the large bowel, to the level of the mid sigmoid colon where there is an abrupt transition with associated nodular wall thickening.  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 Multiple hypodense lesions in the liver, favored to represent metastases. Small volume ascites   -Oncology on nboard  Multifocal Pneumonia likely bacterial  -add iv zosyn, continue normal saline   -CT showed patchy consolidative and groundglass opacities in the lungs, worst in the  lingula, favored to represent multifocal pneumonia.  -has Leukocytosis   -elevated procalcitonin, afebrile   -blood cx no growth  -had episode of hypoxia 87% on RA, and improved SpO2 93-97% on 3  l/m  Paroxysmal A Fib, rate controlled  -stable  -not on BB/CCB or anticoagulation   Bilateral lower extremities edema   -home lasix on hold for now  -doppler study cancelled  Hx of AAA s/p repair in 2012  -CT showed stable 3.7 cm infrarenal abdominal aortic aneurysm.   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 [place], [address]  [phone]    Marce Edmonds, NP Nurse Practitioner   [address] [id number]         DIET: Comfort feeding    ACTIVITY: Activity as tolerated    EQUIPMENT needed: None    DISCHARGE MEDICATIONS:  Discharge Medication List as of 7/6/2022  9:48 AM      CONTINUE these medications which have NOT CHANGED    Details   Cholestyramine Light 4 gram powder Historical Med, CANDICE      traZODone (DESYREL) 50 mg tablet Take 1 Tablet by mouth nightly as needed for Sleep., Normal, Disp-90 Tablet, R-1      furosemide (LASIX) 20 mg tablet Take 1 Tablet by mouth daily. Hold for low blood pressure, Normal, Disp-30 Tablet, R-2      magnesium oxide (MAG-OX) 400 mg tablet Take 1 Tablet by mouth daily. , Normal, Disp-30 Tablet, R-3      mupirocin (BACTROBAN) 2 % ointment Apply to open wound on toe daily as instructed, Print, QDWY-62 g, R-2      folic acid-vit Z7-IRY A80 (Folbic) 2.5-25-2 mg tablet Take 1 Tablet by mouth daily. , Normal, Disp-90 Tablet, R-3      pregabalin (LYRICA) 50 mg capsule Take 50 mg by mouth two (2) times a day., Historical Med      aspirin delayed-release 81 mg tablet Take 81 mg by mouth nightly., Historical Med      hydroxypropyl methylcellulose (GENTEAL MILD) 0.2 % ophthalmic solution Administer 2 Drops to both eyes as needed., Historical Med      multivitamin (ONE A DAY) tablet Take 1 Tab by mouth daily. , Historical Med             DISPOSITION:    Home With:   OT  PT  HH  RN       Long term SNF/Inpatient Rehab    Independent/assisted living   x Hospice    Other:       PATIENT CONDITION AT DISCHARGE:     Functional status   x Poor     Deconditioned     Independent      Cognition   x  Lucid     Forgetful     Dementia      Catheters/lines (plus indication)    Draper     PICC    x PEG     None      Code status     Full code    x DNR      PHYSICAL EXAMINATION AT DISCHARGE:  Patient Vitals for the past 24 hrs:   Temp Pulse Resp BP SpO2   07/06/22 0757 97.3 °F (36.3 °C) 70 16 (!) 112/53 95 %   07/05/22 2314 97.6 °F (36.4 °C) 67 16 114/63 99 %     General:          Alert, cooperative, no distress, appears stated age. HEENT:           Atraumatic, anicteric sclerae, pink conjunctivae                          No oral ulcers, mucosa moist, throat clear, dentition fair  Neck:               Supple, symmetrical  Lungs:             Clear to auscultation bilaterally. No Wheezing or Rhonchi. No rales. Chest wall:      No tenderness  No Accessory muscle use. Heart:              Regular  rhythm,  No  murmur   No edema  Abdomen:        PEG tube in place, abdomen distended, Bowel sounds normal  Extremities:     No cyanosis. No clubbing,                            Skin turgor normal, Capillary refill normal  Skin:                Not pale. Not Jaundiced  No rashes   Psych:             Not anxious or agitated.   Neurologic:      Alert, moves all extremities, answers questions appropriately and responds to commands       CHRONIC MEDICAL DIAGNOSES:  Problem List as of 7/6/2022 Date Reviewed: 5/27/2022          Codes Class Noted - Resolved    Large bowel obstruction ([facility] ICD-10-CM: Q40.256  ICD-9-CM: 560.9  7/1/2022 - Present        Detached retina, right ICD-10-CM: H33.21  ICD-9-CM: 361.9  5/27/2022 - Present        Polyneuropathy associated with underlying disease ([facility] ICD-10-CM: G63  ICD-9-CM: 357.4  4/28/2022 - Present Diarrhea ICD-10-CM: R19.7  ICD-9-CM: 787.91  3/26/2022 - Present        Hypokalemia ICD-10-CM: E87.6  ICD-9-CM: 276.8  3/26/2022 - Present        Malignant neoplasm of ascending colon ([facility] ICD-10-CM: C18.2  ICD-9-CM: 153.6  2/28/2022 - Present        Failure to thrive (child) ICD-10-CM: R62.51  ICD-9-CM: 783.41  2/21/2022 - Present        Falls frequently ICD-10-CM: R29.6  ICD-9-CM: V15.88  2/21/2022 - Present        Moderate protein-calorie malnutrition ([facility] ICD-10-CM: E44.0  ICD-9-CM: 263.0  2/21/2022 - Present        Acute renal injury ([facility] ICD-10-CM: N17.9  ICD-9-CM: 584.9  2/21/2022 - Present        Cellulitis of right leg ICD-10-CM: L03.115  ICD-9-CM: 682.6  2/12/2022 - Present        Personal history of atrial fibrillation ICD-10-CM: Z86.79  ICD-9-CM: V12.59  11/9/2020 - Present        Carotid stenosis, asymptomatic, right ICD-10-CM: I65.21  ICD-9-CM: 433.10  5/29/2020 - Present        Hyperlipemia, mixed ICD-10-CM: E78.2  ICD-9-CM: 272.2  9/27/2018 - Present        Aortic stenosis, moderate ICD-10-CM: I35.0  ICD-9-CM: 424.1  4/10/2018 - Present        Mild concentric left ventricular hypertrophy (LVH) ICD-10-CM: I51.7  ICD-9-CM: 429.3  4/10/2018 - Present        Chronic diarrhea ICD-10-CM: K52.9  ICD-9-CM: 787.91  9/29/2017 - Present        Microalbuminuria ICD-10-CM: R80.9  ICD-9-CM: 791.0  12/10/2015 - Present        Right inguinal hernia ICD-10-CM: K40.90  ICD-9-CM: 550.90  11/2/2015 - Present        Left inguinal hernia ICD-10-CM: K40.90  ICD-9-CM: 550.90  9/14/2015 - Present        Multiple myeloma ([facility] ICD-10-CM: C90.00  ICD-9-CM: 203.00  12/27/2012 - Present        Hypercalcemia ICD-10-CM: H38.58  ICD-9-CM: 275.42  4/25/2012 - Present    Overview Signed 4/25/2012  5:49 PM by Zachary Snowden MD     2012, PTH normal, abnormal protein electrophoresis, referred to Hem-Onc             S/P AAA repair 2/9/2012 ICD-10-CM: V58.836, Z86.79  ICD-9-CM: V45.89  2/22/2012 - Present    Overview Signed 2/22/2012  1:38 PM by MD Dr Aj Hancock             Pre-diabetes ICD-10-CM: R73.03  ICD-9-CM: 790.29  9/7/2010 - Present    Overview Addendum 11/1/2011  9:29 AM by Heidi Mak MD     2005;  Optho Dr Jordi Wild             Sinus tachycardia ICD-10-CM: R00.0  ICD-9-CM: 427.89  9/7/2010 - Present        H/O Heavy Alcohol Use ICD-9-CM: Erica Hymen  9/7/2010 - Present        Vitamin B12 deficiency ICD-10-CM: E53.8  ICD-9-CM: 266.2  9/7/2010 - Present        Hyperhomocysteinemia ([facility] ICD-10-CM: E72.11  ICD-9-CM: 270.4  9/7/2010 - Present        Mild Allergic Rhinitis ICD-10-CM: J30.9  ICD-9-CM: 477.9  9/7/2010 - Present        History of skin cancer ICD-10-CM: Z85.828  ICD-9-CM: V10.83  9/7/2010 - Present    Overview Signed 9/7/2010  9:03 AM by Heidi Mak MD     Followed by Valerio Ambrose             HTN (hypertension) ICD-10-CM: I10  ICD-9-CM: 401.9  4/6/2010 - Present    Overview Signed 9/7/2010  8:55 AM by Heidi Mak MD     1990             H/O Carotid Stenosis ICD-10-CM: I65.29  ICD-9-CM: 433.10  4/6/2010 - Present    Overview Addendum 9/7/2010  8:58 AM by Heidi Mak MD     Oct 2003; 50%left subclavian and 50-70% SHEILA              GERD (gastroesophageal reflux disease) ICD-10-CM: K21.9  ICD-9-CM: 530.81  4/6/2010 - Present        RESOLVED: Intestinal adhesions with complete obstruction ([facility] ICD-10-CM: K56.52  ICD-9-CM: 560.81  2/21/2022 - 2/28/2022        RESOLVED: Intestinal obstruction ([address] ICD-10-CM: D85.844  ICD-9-CM: 560.9  2/21/2022 - 2/24/2022        RESOLVED: Myeloma ([facility] ICD-10-CM: C90.00  ICD-9-CM: 203.00  1/6/2015 - 6/10/2016        RESOLVED: Gastrointestinal bleeding ICD-10-CM: K92.2  ICD-9-CM: 578.9  12/26/2014 - 2/28/2018        RESOLVED: Gall stone pancreatitis ICD-10-CM: K85.10  ICD-9-CM: 577.0, 574.20  1/23/2014 - 2/28/2018        RESOLVED: Acute cholecystitis ICD-10-CM: K81.0  ICD-9-CM: 575.0  1/23/2014 - 2/28/2018 RESOLVED: Abnormal serum protein electrophoresis ICD-10-CM: R77.8  ICD-9-CM: 790.99  4/25/2012 - 2/28/2018    Overview Signed 4/25/2012  5:48 PM by Patric Quijano MD     4/2012, referred to Hem-Onc             RESOLVED: Atrial fibrillation 1/2012 ICD-10-CM: I48.91  ICD-9-CM: 427.31  2/22/2012 - 11/9/2020    Overview Signed 2/22/2012  1:33 PM by Patric Quijano MD     Cardio Dr Fern[address] Days: AAA (abdominal aortic aneurysm) ([facility] ICD-10-CM: I71.4  ICD-9-CM: 441.4  1/6/2012 - 9/26/2018    Overview Signed 1/6/2012  2:07 PM by Patric Quijano MD     Detected on plain films back xray 1/2012; Ct scheduled             RESOLVED: Anemia ICD-10-CM: D64.9  ICD-9-CM: 285.9  1/5/2012 - 2/28/2018        RESOLVED: Hypertriglyceridemia ICD-10-CM: E78.1  ICD-9-CM: 272.1  9/7/2010 - 9/27/2018    Overview Signed 9/7/2010  8:55 AM by Patric Quijano MD     2003             RESOLVED: Bilateral Carotid Bruits, L>R ICD-10-CM: R09.89  ICD-9-CM: 785.9  9/7/2010 - 2/28/2018    Overview Signed 9/7/2010  8:57 AM by Patric Quijano MD     10/03             RESOLVED: Anemia due to GI bleed 2007 ICD-10-CM: D64.9  ICD-9-CM: 285.9  4/6/2010 - 2/28/2018        RESOLVED: GI bleed, duodenitis 2007 ICD-10-CM: K92.2  ICD-9-CM: 578.9  4/6/2010 - 2/28/2018    Overview Addendum 9/7/2010  9:01 AM by Patric Quijano MD     8/07 Duodenitis                       Greater than 45 minutes were spent with the patient on counseling and coordination of care    Signed:   Amee Morton MD  7/6/2022  9:50 PM

## 2022-07-06 NOTE — PROGRESS NOTES
[address] [facility] Adult  Hospitalist Group                                                                                          Hospitalist Progress Note  Amber Harris MD  Answering service: [phone] from in house phone        Date of Service:  2022  NAME:  Barbara Celaya  :  1934  MRN:  [id number]      Admission Summary:     Barbara Celaya is a 80 y.o. male past medical history of AAA status postrepair in 2012, GI bleed with acute blood loss anemia, atrial fibrillation, M2, GERD, EtOH abuse with duodenitis, who presents with vomiting, diarrhea,      In the ED, he was tachycardic 112 bpm, and hypoxic to 87% on room air with improvement to 93% on 3 L nasal cannula. Other vital signs stable. Labs were significant for WBC 15, macrocytosis with .8, glucose 139, BUN 25, creatinine 1.34 (baseline 0.8-0.9), and alk phos 241. Abdomen pelvis shows no extensive peritoneal carcinomatosis, diffuse colonic distention with large volume stool to the level of the sigmoid colon with an abrupt transition point associated with nodular wall thickening suspicious for large bowel obstruction due to colon malignancy, multiple hypodense lesions in the liver suggestive of metastasis, small volume ascites, patchy consolidative groundglass opacities in the lung suggestive of multifocal pneumonia, and stable infrarenal abdominal aortic aneurysm.     In the ED, he received Zofran, and 1 L normal saline.   Surgery was consulted, and concluded that there was no indication for any surgical treatment, but recommended GI consultation for colonoscopy and possible colonic stent placements     Interval history / Subjective:     He said he feels better, had the venting G tube in place and draining, no pain or vomiting,   Updated the clinical condition, care plan and including discharge planning to her daughter at bedside and answered patient and her questions      Assessment & Plan:     Large bowel obstruction due to sigmoid colon mass   -NGT is out  -on iv protonix, continue normal saline, prn zofran and tylenol  -CT abdomen pelvis showed diffuse distention of the small and large bowel, with a large volume of stool within the large bowel, to the level of the mid sigmoid colon where there is an abrupt transition with associated nodular wall thickening.  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 Multiple hypodense lesions in the liver, favored to represent metastases. Small volume ascites   -Oncology on nboard    Multifocal Pneumonia likely bacterial  -add iv zosyn, continue normal saline   -CT showed patchy consolidative and groundglass opacities in the lungs, worst in the  lingula, favored to represent multifocal pneumonia.  -has Leukocytosis   -elevated procalcitonin, afebrile   -blood cx no growth  -had episode of hypoxia 87% on RA, and improved SpO2 93-97% on 3  l/m    Paroxysmal A Fib, rate controlled  -stable  -not on BB/CCB or anticoagulation     Bilateral lower extremities edema   -home lasix on hold for now  -doppler study cancelled    Hx of AAA s/p repair in 2012  -CT showed stable 3.7 cm infrarenal abdominal aortic aneurysm.     HTN  -BP normal, continue BP monitoring     Hx of multiple myeloma   -patient has been observed off treatment as an outpatient      Long term prognosis poor, appropriate for hospice care     Code status: DNR  Prophylaxis: SCD  Care Plan discussed with: Patient and Nurse   Anticipated Disposition: Home with hospice care  Possible 7/6    Daughter and hospice care nurse in the room     Hospital Problems  Date Reviewed: 5/27/2022          Codes Class Noted POA    Large bowel obstruction [facility]) ICD-10-CM: J68.089  ICD-9-CM: 560.9  7/1/2022 Unknown               Vital Signs:    Last 24hrs VS reviewed since prior progress note. Most recent are:  Visit Vitals  /63   Pulse 67   Temp 97.6 °F (36.4 °C)   Resp 16   Ht 6' 1\" (1.854 m)   Wt 74.4 kg (164 lb)   SpO2 99%   BMI 21.64 kg/m²         Intake/Output Summary (Last 24 hours) at 7/6/2022 1778  Last data filed at 7/6/2022 2820  Gross per 24 hour   Intake 400 ml   Output 1100 ml   Net -700 ml        Physical Examination:     I had a face to face encounter with this patient and independently examined them on 7/6/2022 as outlined below:          Constitutional:  No acute distress, cooperative, pleasant    ENT:  NGT in place, oral mucosa moist, oropharynx benign. Resp:  Decrease bronchial breath sound bilaterally. No wheezing/rhonchi/rales. No accessory muscle use. CV:  Regular rhythm, normal rate, no murmurs, gallops, rubs    GI:  Distended, positive for ascites,  non tender. normoactive bowel sounds, no hepatosplenomegaly     Musculoskeletal:  No edema,     Neurologic:  Moves all extremities. AAOx3, CN II-XII reviewed            Data Review:    Review and/or order of clinical lab test  Review and/or order of tests in the radiology section of CPT  Review and/or order of tests in the medicine section of CPT      Labs:     Recent Labs     07/05/22  0028   WBC 8.1   HGB 10.7*   HCT 35.2*        Recent Labs     07/05/22  0028   *   K 3.7   *   CO2 22   BUN 19   CREA 0.88   GLU 82   CA 8.2*     No results for input(s): ALT, AP, TBIL, TBILI, TP, ALB, GLOB, GGT, AML, LPSE in the last 72 hours. No lab exists for component: SGOT, GPT, AMYP, HLPSE  No results for input(s): INR, PTP, APTT, INREXT, INREXT in the last 72 hours. No results for input(s): FE, TIBC, PSAT, FERR in the last 72 hours.    Lab Results   Component Value Date/Time    Folate 81.7 (H) 02/13/2022 02:38 AM      No results for input(s): PH, PCO2, PO2 in the last 72 hours. No results for input(s): CPK, CKNDX, TROIQ in the last 72 hours.     No lab exists for component: CPKMB  Lab Results   Component Value Date/Time    Cholesterol, total 193 03/23/2021 11:18 AM    HDL Cholesterol 83 03/23/2021 11:18 AM    LDL, calculated 84.2 03/23/2021 11:18 AM    Triglyceride 129 03/23/2021 11:18 AM    CHOL/HDL Ratio 2.3 03/23/2021 11:18 AM     Lab Results   Component Value Date/Time    Glucose (POC) 92 03/18/2022 06:18 AM    Glucose (POC) 106 03/18/2022 01:12 AM    Glucose (POC) 97 03/17/2022 12:22 PM    Glucose (POC) 99 03/17/2022 06:49 AM    Glucose (POC) 108 03/17/2022 12:51 AM     Lab Results   Component Value Date/Time    Color YELLOW/STRAW 03/26/2022 02:29 AM    Appearance TURBID (A) 03/26/2022 02:29 AM    Specific gravity 1.009 03/26/2022 02:29 AM    pH (UA) 6.5 03/26/2022 02:29 AM    Protein 100 (A) 03/26/2022 02:29 AM    Glucose Negative 03/26/2022 02:29 AM    Ketone Negative 03/26/2022 02:29 AM    Bilirubin Negative 03/26/2022 02:29 AM    Urobilinogen 0.2 03/26/2022 02:29 AM    Nitrites Negative 03/26/2022 02:29 AM    Leukocyte Esterase LARGE (A) 03/26/2022 02:29 AM    Epithelial cells FEW 03/26/2022 02:29 AM    Bacteria 3+ (A) 03/26/2022 02:29 AM    WBC  03/26/2022 02:29 AM    RBC 10-20 03/26/2022 02:29 AM         Medications Reviewed:     Current Facility-Administered Medications   Medication Dose Route Frequency    sodium chloride (NS) flush 5-40 mL  5-40 mL IntraVENous Q8H    sodium chloride (NS) flush 5-40 mL  5-40 mL IntraVENous PRN    LORazepam (INTENSOL) 2 mg/mL oral concentrate 1 mg  1 mg Oral Q1H PRN    HYDROmorphone (DILAUDID) 1 mg/mL oral solution 1 mg  1 mg SubLINGual Q1H PRN    traZODone (DESYREL) tablet 50 mg  50 mg Oral QHS PRN    pantoprazole (PROTONIX) 40 mg in 0.9% sodium chloride 10 mL injection  40 mg IntraVENous Q12H    piperacillin-tazobactam (ZOSYN) 3.375 g in 0.9% sodium chloride (MBP/ADV) 100 mL MBP  3.375 g IntraVENous Q8H    sodium chloride (NS) flush 5-40 mL  5-40 mL IntraVENous Q8H    sodium chloride (NS) flush 5-40 mL  5-40 mL IntraVENous PRN    acetaminophen (TYLENOL) tablet 650 mg  650 mg Oral Q6H PRN    Or    acetaminophen (TYLENOL) suppository 650 mg  650 mg Rectal Q6H PRN    ondansetron (ZOFRAN ODT) tablet 4 mg  4 mg Oral Q8H PRN    Or    ondansetron (ZOFRAN) injection 4 mg  4 mg IntraVENous Q6H PRN     ______________________________________________________________________  EXPECTED LENGTH OF STAY: 5d 12h  ACTUAL LENGTH OF STAY:          5                 Lia Ferrer MD

## 2022-07-06 NOTE — PROGRESS NOTES
[address] [place] [address]       Pharmacist monitoring provided for this patient at [address] [place] [address] Way List      Admitting dianosis treated appropriately : YES     Nausea/Vomiting controlled: YES     Pain Controlled: YES     Agitation/Anxiety/ Delirium controlled: YES     Depression controlled: YES     Secretions controlled : YES     Constipation/Bowel routine controlled: YES     SOB/ Dyspnea controlled: YES     Insomnia: YES     Thank you,        Jignesh Angelo, MadanD, BCPS

## 2022-07-06 NOTE — HOSPICE
[facility]  [phone]    Inpatient Nursing Admission   Patient Name: Winston Kussmaul  YOB: 1934  Age: 80 y.o. Date of Hospice Admission: 7/6/2022  Hospice Attending Elected by Patient: Alirio Dallas MD  Primary Care Physician: Bess Navarrete NP  Admitting RN: Mackenzie Roman    Level of Care (GIP/Routine/Respite): GIP  Facility of Care: [facility]  Patient Room: 15/01     [facility] SUMMARY   ER Visits/ Hospitalizations in past year: 6 ED visits, four inpatient admissions   Hospice Diagnosis: liver cancer   Onset Date of Hospice Diagnosis: July 2022  Summary of Disease Progression Leading to Hospice Diagnosis: Per Dr. Roselia Fuller \"   Patient presents to hospital with nausea and vomiting. Found on CT imaging to have large bowel obstruction related to sigmoid colonic mass. Patient with CT imaging showing extensive peritoneal carcinomatosis. Patient had NGT placed for decompression and not a surgical candidate. Ultimately, he had venting G-tube placed while in the hospital and then transitioned to [facility] for GIP LOC secondary to symptom burden and bowel obstruction. \"    Diagnoses RELATED to the terminal prognosis: liver cancer   Other Diagnoses: see below   Patient Active Problem List   Diagnosis Code    HTN (hypertension) I10    H/O Carotid Stenosis I65.29    GERD (gastroesophageal reflux disease) K21.9    Pre-diabetes R73.03    Sinus tachycardia R00.0    H/O Heavy Alcohol Use     Vitamin B12 deficiency E53.8    Hyperhomocysteinemia (HCC) E72.11    Mild Allergic Rhinitis J30.9    History of skin cancer Z85.828    S/P AAA repair 2/9/2012 Z98.890, Z86.79    Hypercalcemia E83.52    Multiple myeloma ([facility] C90.00    Left inguinal hernia K40.90    Right inguinal hernia K40.90    Microalbuminuria R80.9    Chronic diarrhea K52.9    Aortic stenosis, moderate I35.0    Mild concentric left ventricular hypertrophy (LVH) I51.7    Hyperlipemia, mixed E78.2  Carotid stenosis, asymptomatic, right I65.21    Personal history of atrial fibrillation Z86.79    Cellulitis of right leg L03.115    Failure to thrive (child) R62.51    Falls frequently R29.6    Moderate protein-calorie malnutrition (HCC) E44.0    Acute renal injury ([address] N17.9    Malignant neoplasm of ascending colon (HCC) C18.2    Diarrhea R19.7    Hypokalemia E87.6    Polyneuropathy associated with underlying disease ([address] G63    Detached retina, right H33.21    Large bowel obstruction ([facility] K56.609       Rationale for a prognosis of life expectancy of 6 months or less if the disease follows its normal course (Disease Specific History):     Bro Streeter is a 80 y.o. who was admitted to [address] [place] [address]. The patient's principle diagnosis of liver cancer has resulted in bowel obstruction, pain, and nausea/vomitting. Functionally, the patient's Palliative Performance Scale has declined over a period of weeks and is estimated at 30. Objective information that support this patients limited prognosis includes: CT scan, CBC, chemistry labs. The patient/family chose comfort measures with the support of Hospice. Patient meets for GIP LOC as evidenced by need for IV medications for symptom management due to bowel obstruction   Prognosis estimated based on 07/06/22 clinical assessment is:   [] Few to Many Hours  [] Hours to Days   [x] Few to Many Days   [] Days to Weeks   [] Few to Many Weeks   [] Weeks to Months   [] Few to Many Months    ASSESSMENT    Patient self-reports:  []  Yes    [x] No    SYMPTOMS: pain, nausea and vomiting ? SIGNS/PHYSICAL FINDINGS:    FAST for all dementia:      Learning Assessment:    Is patient willing/able to learn? Yes  What is the highest level of education completed? Unknown   Learning preference (written material, demonstration, visual)? No preference   Learning barriers (ESOL, [place], poor vision)?  None noted     Caregiver  Is caregiver willing to learn care for patient? Yes  What is the highest level of education completed? Unknown   Learning preference (written material, demonstration, visual)? No preference   Learning barriers (ESOL, [place], poor vision)? None noted     CLINICAL INFORMATION     Wt Readings from Last 3 Encounters:   07/01/22 74.4 kg (164 lb)   05/27/22 74.1 kg (163 lb 6.4 oz)   04/28/22 71.3 kg (157 lb 3.2 oz)     Ht Readings from Last 3 Encounters:   07/01/22 6' 1\" (1.854 m)   05/27/22 6' 1\" (1.854 m)   04/28/22 6' 1\" (1.854 m)     There is no height or weight on file to calculate BMI. Visit Vitals  BP (!) 156/72   Pulse 73   Temp 97.8 °F (36.6 °C)   Resp 16       LAB VALUES  No results found for this visit on 07/06/22 (from the past 12 hour(s)). No results found for this visit on 07/06/22 (from the past 6 hour(s)). Lab Results   Component Value Date/Time    Protein, total 7.6 07/01/2022 07:47 PM    Albumin 3.5 07/01/2022 07:47 PM       Currently this patient has:  [] Supplemental O2 [x] Peripheral IV  [] PICC    [] PORT   [] Draper Catheter [] NG Tube   [x] PEG Tube [] Ostomy    [] AICD: Has ICD been deactivated? [] Yes [] No:______    PLAN     Admit to [facility] level of care for liver cancer  2 Start PRN dilaudid 0.5 mg every fifteen minutes as needed for pain. 3. Start PRN compazine 10 mg every six hours as needed for nausea/vomitting  4. PRN medications in place for breakthrough symptom management  5. Infection control and prevention as needed   6. Provide support/education to caregiver/family  7. Monitor closely for changes in symptoms  8. Provide support and frequent rounds for patient comfort and safety   9. Maintain skin integrity as tolerated for hospice patient, turning and repositioning for comfort  10. Provide  and  for patient and family support  6. Continue to discuss discharge plan for any changes    Hospice Team Frequency Orders:  Skilled Nurse - Daily x 14 days with 5 PRN visits for symptom control.  MSW  1 x weekly with 5 visits PRN family support and need for volunteer services.   1 x weekly with 5 visits PRN spiritual support. CNA  daily x 14 days    ADVANCE CARE PLANNING (Complete in ACP Flow Sheet)   Code Status: DNR  Durable DNR: [x]  Yes  []  No  Code Status Discussed/Confirmed: Yes  Preference for Other Life Sustaining Treatment Discussed/Confirmed: Yes  Hospitalization Preference:  No preference   Advance Care Planning 5/29/2020   Patient's Healthcare Decision Maker is: -   Confirm Advance Directive Yes, on file   Does the patient have other document types -        Service: [] Yes  [x]  No      [] Unknown  Appropriate for Pinning Ceremony:  [] Yes     [x] No  Voodoo: [address] [facility]: [employer] of [facility]     1. Discharge Plan: Patient most likely to pass away at [facility], if patient stabilizes placement will be found for patient. 2. Patient/Family teaching: End of life care, medications   3. Response to patient/family teaching: receptive     SOCIAL/EMOTIONAL/SPIRITUAL NEEDS     Spiritual Issues Identified: None noted at this time, will continue to monitor    Psych/ Social/ Emotional Issues Identified: Patient's wife has dementia, extra support needed for wife and other family    Caregiver Support:  [x] Provided information on End of Life Care   [x] Material Provided: [facility] contacted, agrees to serve as attending provider for hospice and provided verbal certification of terminal illness with life expectancy of 6 months or less. Orders for hospice admission, medications and plan of treatment received. Medication reconciliation completed. MEDS: See medication list below  DME: Per [facility]  Supplies: Per [facility]  IDT communication to include MD, SN, SW, CH and support team    ALLERGIES AND MEDICATIONS     Allergies:    Allergies   Allergen Reactions    Codeine Nausea and Vomiting     Blurred vision, felt faint  Contrast Agent [Iodine] Hives     Needs premedication w/ Benadryl prn (since 1/2012 reaction)    Dexamethasone Other (comments)     \"Deathly ill\"    Fish Oil Nausea Only    Glipizide Other (comments)     DRASTIC DROP IN BLOOD GLUCOSE, fasting    Lovenox [Enoxaparin] Hives     Patient states blisters all over the body.     Metformin Diarrhea    Niacin Other (comments)     Flushing    Norvasc [Amlodipine] Other (comments)     Leg edema    Optiray 350 [Ioversol] Hives     He does not know what this is     Current Facility-Administered Medications   Medication Dose Route Frequency    bisacodyL (DULCOLAX) suppository 10 mg  10 mg Rectal DAILY PRN    HYDROmorphone (DILAUDID) injection 0.5 mg  0.5 mg IntraVENous Q15MIN PRN    midazolam (VERSED) injection 2 mg  2 mg IntraVENous Q15MIN PRN    prochlorperazine (COMPAZINE) injection 10 mg  10 mg IntraVENous Q6H PRN    acetaminophen (TYLENOL) suppository 650 mg  650 mg Rectal Q4H PRN    ketorolac (TORADOL) injection 30 mg  30 mg IntraVENous Q6H PRN    glycopyrrolate (ROBINUL) injection 0.2 mg  0.2 mg IntraVENous Q4H PRN

## 2022-07-06 NOTE — PROGRESS NOTES
Transition of Care: Hospice: [facility] on Wednesday 7/6/22     Transport Plan: [facility] is scheduled for 10am pickup on 7/6/22      RUR: 17%     DX: large bowel obstruction     Main contact is Mayank Ribeiro- [phone]: this CM met with patient and his daughter at bedside to update on discharge plan to Hospice house and transport time; they verbalized understanding    Medicare pt has received, reviewed, and signed 2nd IM letter informing them of their right to appeal the discharge.   Signed copy has been placed on pt bedside chart.                   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

## 2022-07-07 NOTE — HOSPICE
MSW completed a quick check in on pt. Pt resting comfortably. MSW made phone contact with daughter/MPOA, Wiley Aparicio who was driving to [facility] to visit pt. Wiley Aparicio to follow up with [facility] team with any questions or needs. Pt is a DNR.   Final Arrangements: [employer] [facility]

## 2022-07-07 NOTE — HOSPICE
History     Chief Complaint   Patient presents with     Back Pain     HPI  Barbie Portillo is a 64 year old male who presents for evaluation of low back pain for the past 4 days.  Initial injury was 5 days ago when he tripped and landed on his right buttocks and then fell onto the right side of his body.  He felt fine immediately that evening, but woke up the next morning and had some significant right low back discomfort from the area of the sacrum all the way up to the level of the scapula.  States that he is unable to lay flat due to the symptoms.  He has attempted ibuprofen without improvement.  Pain is rated 6-7 on a scale of 10.  Describes his is a burning and constant pain.  Denies any dysuria, frequency, urgency, or gross hematuria.  No recent blood in the stool.  Denies any skin rashes to the area.  Does report having excessive arthritis throughout his spine, but has never had a fracture.  Denies any lower extremity numbness, tingling, or weakness.  No loss of bowel/bladder function.  Denies any difficulty with deep breathing.  Denies any rib discomfort.        Allergies:  Allergies   Allergen Reactions     No Known Drug Allergies        Problem List:    Patient Active Problem List    Diagnosis Date Noted     Primary osteoarthritis of left hip 01/30/2020     Priority: Medium     Counseling regarding advanced directives 07/01/2019     Priority: Medium     Obesity (BMI 30-39.9) 07/01/2019     Priority: Medium     Type 2 diabetes mellitus without complication, without long-term current use of insulin (H) 07/14/2015     Priority: Medium     SEVERE NASRIN (obstructive sleep apnea) 06/04/2013     Priority: Medium     Beena  5/29/2013  Severe AHI 97.5  Oxygen Andrzej 84%  CPAP 8cmH2O       Advanced directives, counseling/discussion 08/27/2012     Priority: Medium     Discussed advance care planning with patient; information given to patient to review. 8/27/2012 [place]         Essential hypertension with goal blood  Initial spiritual care visits with the patient. The patient was lying in bed watching TV when the  entered his room. The patient was tired but said he would like the  to visit. The patient talked about his family and his spiritual experience that he had when he was really sick in the hospital. The patient said he is at peace with dying knowing that his family is cared for. His prayer is that he will go quickly. The  validated his emotions and his experience. Patient would like the  to continue to visit for spiritual encouragement as he reaches the end of his life. pressure less than 140/90 04/18/2011     Priority: Medium        Past Medical History:    Past Medical History:   Diagnosis Date     Essential hypertension, benign 2006     Obesity, unspecified      Old disruption of anterior cruciate ligament      Other and unspecified disc disorder of unspecified region        Past Surgical History:    Past Surgical History:   Procedure Laterality Date     ANKLE SURGERY  2011    right      COLONOSCOPY  10/22/2007     COLONOSCOPY N/A 1/15/2019    Procedure: COLONOSCOPY;  Surgeon: Yusef Garcia MD;  Location:  GI     SURGICAL HISTORY OF -   [birth year]    Bilateral testicular biopsy       Family History:    Family History   Problem Relation Age of Onset     C.A.D. Father         snores     Diabetes Brother         x2     Hypertension Mother      Arthritis Mother      Prostate Cancer Maternal Grandfather        Social History:  Marital Status:   [2]  Social History     Tobacco Use     Smoking status: Never Smoker     Smokeless tobacco: Never Used   Substance Use Topics     Alcohol use: No     Drug use: No        Medications:    cyclobenzaprine (FLEXERIL) 10 MG tablet  HYDROcodone-acetaminophen (NORCO) 5-325 MG tablet  aspirin 81 MG tablet  blood glucose (NO BRAND SPECIFIED) test strip  blood glucose monitoring (CHIKI MICROLET) lancets  blood glucose monitoring (NO BRAND SPECIFIED) test strip  hydrochlorothiazide (HYDRODIURIL) 25 MG tablet  hydrOXYzine (ATARAX) 25 MG tablet  Lactobacillus (PROBIOTIC ACIDOPHILUS PO)  lisinopril (PRINIVIL/ZESTRIL) 40 MG tablet  meloxicam (MOBIC) 15 MG tablet  metoprolol succinate ER (TOPROL-XL) 200 MG 24 hr tablet  Multiple Vitamin (DAILY MULTIVITAMIN PO)  STATIN NOT PRESCRIBED, INTENTIONAL,  thin (NO BRAND SPECIFIED) lancets          Review of Systems   All other systems reviewed and are negative.      Physical Exam   BP: (!) 170/95  Pulse: 57  Heart Rate: 55  Temp: 98.8  F (37.1  C)  Resp: 18  Weight: 110.7 kg (244 lb)  SpO2: 98  %      Physical Exam  Vitals signs and nursing note reviewed.   Constitutional:       General: He is not in acute distress.     Appearance: He is not diaphoretic.   HENT:      Head: Normocephalic and atraumatic.      Right Ear: External ear normal.      Left Ear: External ear normal.      Nose: Nose normal.      Mouth/Throat:      Pharynx: No oropharyngeal exudate.   Eyes:      General: No scleral icterus.        Right eye: No discharge.         Left eye: No discharge.      Conjunctiva/sclera: Conjunctivae normal.      Pupils: Pupils are equal, round, and reactive to light.   Neck:      Musculoskeletal: Normal range of motion and neck supple.      Thyroid: No thyromegaly.   Cardiovascular:      Rate and Rhythm: Normal rate and regular rhythm.      Heart sounds: Normal heart sounds. No murmur.   Pulmonary:      Effort: Pulmonary effort is normal. No respiratory distress.      Breath sounds: Normal breath sounds. No wheezing, rhonchi or rales.      Comments: No sign of bruising or erythema on the chest wall.  No crepitus.  Chest:      Chest wall: No tenderness.   Abdominal:      General: Bowel sounds are normal. There is no distension.      Palpations: Abdomen is soft. There is no mass.      Tenderness: There is no abdominal tenderness. There is no right CVA tenderness, left CVA tenderness, guarding or rebound.   Musculoskeletal: Normal range of motion.         General: No tenderness or deformity.      Comments: Lumbosacral spine area reveals no mass but there is tenderness of the paravertebral muscles on the right with muscle spasm noted. Limited and painful lumbosacral range of motion is noted. Straight leg raise is negative at 45 degrees on both sides. DTR's, motor strength and sensation normal, including heel and toe gait.  Peripheral pulses are palpable. Hips and knees have full range of motion without pain.   Thoracic spine without spinal process tenderness.  He does have some right paravertebral muscular  tenderness and muscle spasm noted all the way up to about the level of T5.  No scapular discomfort with palpation.  No other sign of major trauma.   Lymphadenopathy:      Cervical: No cervical adenopathy.   Skin:     General: Skin is warm and dry.      Capillary Refill: Capillary refill takes less than 2 seconds.      Findings: No erythema or rash.   Neurological:      Mental Status: He is alert and oriented to person, place, and time.      Cranial Nerves: No cranial nerve deficit.   Psychiatric:         Behavior: Behavior normal.         Thought Content: Thought content normal.         ED Course        Procedures               Critical Care time:  none               Results for orders placed or performed during the hospital encounter of 07/07/20 (from the past 24 hour(s))   XR Thoracic Spine 3 Views    Narrative    THORACIC SPINE THREE VIEWS 7/7/2020 2:42 PM     HISTORY: Fall, T6-L3 pain    COMPARISON: None.    FINDINGS: There is normal osseous alignment.  No fractures are  identified.  There is loss of disc space height throughout the  thoracic spine. Small anterior osteophytes are seen at multiple  levels.      Impression    IMPRESSION:   1. No fractures are identified.  2. Multilevel degenerative changes.    JEFFREY TERRELL MD   XR Lumbar Spine 2/3 Views    Narrative    LUMBAR SPINE TWO-THREE  VIEWS  7/7/2020 2:42 PM     HISTORY: Fall, T6-L3 pain    COMPARISON: None.    FINDINGS: No fractures are identified. There is curvature of the  lumbar spine convex towards the right. There is loss of disc space  height throughout the lumbar spine. Prominent anterior osteophytes are  seen at multiple levels. Degenerative change in the facet joints..      Impression    IMPRESSION:   1. No fractures are identified.  2. Multilevel degenerative changes.    JEFFREY TERRELL MD       Medications   Lidocaine (LIDOCARE) 4 % Patch 2 patch (2 patches Transdermal Patch/Med Applied 7/7/20 [id number])       Assessments & Plan (with Medical Decision  Making)     Spasm of muscle of lower back  Fall     64 year old male presents for evaluation of right low back pain and muscle spasm for the past 4 days after a fall that occurred 5 days ago.  Did not have any initial pain in the back that night.  Pain developed the following morning and he has muscle spasm and tightness of the paravertebral musculature of the right side.  No red flag symptoms as noted above.  Treating with ibuprofen with limited improvement.  Denies any other symptoms on review of systems.  On exam blood pressure 181/101, temperature 98.8, pulse 57, respiration 18, oxygen saturation 98% on room air.  Patient with no tenderness over the spinous processes of the lumbar and thoracic spine.  He does have paravertebral muscular tenderness and muscle spasm noted from about T6 down to L3.  No skin rashes.  No flank percussive tenderness.  No hepatosplenomegaly and no right upper quadrant tenderness.  X-rays performed of the thoracic and lumbar spine without acute fracture.  He does have prominent osteophytes at multiple levels, but no acute changes.  Lidoderm patches were applied to the paravertebral musculature of the thoracolumbar spine on the right.  I offered medication management here in the ED, but the patient was not interested in meds here, rather just wanted something for at home to help relieve his discomfort and hopefully so that he could sleep.  I gave him cyclobenzaprine to take 10 mg 3 times daily as needed for muscle spasm.  Also a small amount of hydrocodone to use for breakthrough pain so that he is able to sleep.  Possible sedation side effects of medication discussed.  No driving for 8 hours after taking the hydrocodone given the chance of impaired judgment.  Heat to be applied as needed for discomfort.  Home physical therapy exercises demonstrated and discussed.  Okay to reapply Lidoderm patches if they are working well.  Follow-up with PCP in 1 week if not improving.  Patient and his  wife are in agreement with this plan and he was suitable for discharge.      I have reviewed the nursing notes.    I have reviewed the findings, diagnosis, plan and need for follow up with the patient.       Discharge Medication List as of 7/7/2020  4:26 PM      START taking these medications    Details   cyclobenzaprine (FLEXERIL) 10 MG tablet Take 1 tablet (10 mg) by mouth 3 times daily as needed for muscle spasms, Disp-12 tablet,R-0, E-Prescribe      HYDROcodone-acetaminophen (NORCO) 5-325 MG tablet Take 1 tablet by mouth every 6 hours as needed for severe pain, Disp-10 tablet,R-0, Local Print             Final diagnoses:   Spasm of muscle of lower back   Fall       Disclaimer: This note consists of symbols derived from keyboarding, dictation and/or voice recognition software. As a result, there may be errors in the script that have gone undetected. Please consider this when interpreting information found in this chart.      7/7/2020   Aamir Bingham PA-C   [facility] EMERGENCY DEPARTMENT     Aamir Bingham PA-C  07/07/20 [id number]

## 2022-07-07 NOTE — PROGRESS NOTES
[facility]  [facility]-pt alert and watching t.v. Pt requested ice water and expressed abd pain 9/10. Resp. Clear and unlabored. Administered scheduled dilaudid and versed. 2057-pt resting comfortably with unlabored resp. 2130-pt sleeping w/mouth agape. Resp. Clear and even. 2245-pt repositioned self in bed and sleeping. No visible signs of pain/discomfort. 2350-administered scheduled medications. Pt awakened w/stimuli, but drowsy. Turned pt to left side. Emptied 250mL from urinal.  0055-pt attempt to self turn on rt side. Grunting w/activity. 0145-pt sleeping comfortably on rt side. No visible signs of pain/discomfort. 0241-pt asleep. No facial grimacing. Appears comfortable. 0350-pt called out for help saying he was wet. Gave pt a bath. Pt pleasant and tolerated activity well. Administered scheduled medications. 0450-pt asleep. Clear even resp. Appears comfortable. 0555-pt awake and looking at tv. No needs expressed at this time. 0620-pt sat up to dangle legs over bed. Pt denies pain.   0705-report given to Dain hodge RN                NAME OF PATIENT:  Roni Ramesh    LEVEL OF CARE:  [facility]    REASON FOR GIP:   Pain, despite numerous changes in medications, Medication adjustment that must be monitored 24/7 and Stabilizing treatment that cannot take place at home    *PATIENT REMAINS ELIGIBLE FOR [facility] LEVEL OF CARE AS EVIDENCED BY: (MUST BE ADDRESSED OF PATIENT GIP) frequent nursing assessments, and frequent IV medications, dyspnea      REASON FOR RESPITE:  N/A    O2 SAFETY:  No petroleum based products on face while oxygen in use and Oxygen sign on the door    FALL INTERVENTIONS PROVIDED:   Implemented/recommended use of non-skid footwear, Implemented/recommended resources for alarm system (personal alarm, bed alarm, call bell, etc.) , Implemented/recommended environmental changes (remove hazards, lower bed, improve lighting, etc.) and Implemented/recommended increased supervision/assistance    INTERDISPLINARY COMMUNICATION/COLLABORATION:  Physician, MSW, Nhi and RN, CNA    NEW MEDICATION INITIATION DOCUMENTATION:  N/A    Reason medication is being initiated:  N/A    MD / Provider name consulted re: change in status / initiation of new medication:  N/A    New Symptom(s):  N/A    New Order(s):  N/A    Name of the person notified of the changes:  N/A    Name of person being taught:  N/A    Instructions given:  N/A    Side Effects taught:  N/A    Response to teaching:  N/A      COMFORTABLE DYING MEASURE:  Is Patient/family satisfied with symptom level?  yes    DISCHARGE PLAN:  End of life vs. home

## 2022-07-07 NOTE — PROGRESS NOTES
Problem: Pressure Injury - Risk of  Goal: *Prevention of pressure injury  Description: Document Christiano Scale and appropriate interventions in the flowsheet. Outcome: Progressing Towards Goal  Note: Pressure Injury Interventions:  Sensory Interventions: Assess changes in LOC,Check visual cues for pain    Moisture Interventions:  (urinal)    Activity Interventions: Pressure redistribution bed/mattress(bed type)    Mobility Interventions: Float heels,HOB 30 degrees or less,Pressure redistribution bed/mattress (bed type)    Nutrition Interventions: Offer support with meals,snacks and hydration    Friction and Shear Interventions: Lift sheet,HOB 30 degrees or less                Problem: Patient Education: Go to Patient Education Activity  Goal: Patient/Family Education  Outcome: Progressing Towards Goal     Problem: Falls - Risk of  Goal: *Absence of Falls  Description: Document Al Fall Risk and appropriate interventions in the flowsheet. Outcome: Progressing Towards Goal  Note: Fall Risk Interventions:  Mobility Interventions: Bed/chair exit alarm    Mentation Interventions: Bed/chair exit alarm    Medication Interventions: Bed/chair exit alarm    Elimination Interventions: Bed/chair exit alarm,Call light in reach              Problem: Patient Education: Go to Patient Education Activity  Goal: Patient/Family Education  Outcome: Progressing Towards Goal     Problem: Emotional Support Needs  Goal: Patient/family is receiving emotional support  Description: Pt and pt's family will receive emotional support related to EOL care within two weeks. Outcome: Progressing Towards Goal     Problem: Spiritual Evaluation  Goal: Identify beliefs/practices that support hospice experience  Description: Patient/family identify their beliefs/practices that impair Hospice experience. Patient/family identify their beliefs/practices that support Hospice experience. Patient coping identified.   Spiritual distress identified and decreased with visit.   Outcome: Progressing Towards Goal

## 2022-07-07 NOTE — PROGRESS NOTES
[address] [place] [address]   [facility]  [phone]    Patient Name: Yadira Mtz  YOB: 1934    Date of Provider Hospice Visit: 07/07/22    Level of Care:   [x] General Inpatient (GIP)    [] Routine   [] Respite    Current Location of Care:  [] [facility] [] [facility] [] [facility] [] [facility] [x] Hospice House [facility])    IF [facility], patient referred from:  [x] [facility] [] [facility] [] [facility] [] [facility] [] Home [] Other:     Date of Original Hospice Admission: 7/6/22  Hospice Medical Director at time of admission: [employer]Et[employer] Diagnosis: Metastatic colon cancer  Diagnoses RELATED to the terminal prognosis: Large bowel obstruction secondary to cancer  Other Diagnoses:      HOSPICE SUMMARY     Yadira Mtz is a 80y.o. year old who was admitted to [address] [place] [address]. Patient presents to hospital with nausea and vomiting. Found on CT imaging to have large bowel obstruction related to sigmoid colonic mass. Patient with CT imaging showing extensive peritoneal carcinomatosis. Patient had NGT placed for decompression and not a surgical candidate. Ultimately, he had venting G-tube placed while in the hospital and then transitioned to [facility] for GIP LOC secondary to symptom burden and bowel obstruction    The patient's principle diagnosis has resulted in bowel obstruction   Refer to LCD     Functionally, the patient's Karnofsky and/or Palliative Performance Scale has declined over a period of days-weeks and is estimated at 20 The patient is dependent on the following ADLs:all    Objective information that support this patients limited prognosis includes:   CT scan     IMPRESSION     1. New extensive peritoneal carcinomatosis, most evident in the left upper  quadrant with innumerable peritoneal nodules.   2. Diffuse distention of the small and large bowel, with a large volume of stool  within the large bowel, to the level of the mid sigmoid colon where there is an  abrupt transition with associated nodular wall thickening. Findings are  suspicious for large bowel obstruction due to colonic malignancy. 3. Multiple hypodense lesions in the liver, favored to represent metastases. 4. Small volume ascites. 5. Patchy consolidative and groundglass opacities in the lungs, worst in the  lingula, favored to represent multifocal pneumonia. 6. Stable 3.7 cm infrarenal abdominal aortic aneurysm.     The patient/family chose comfort measures with the support of Hospice. HOSPICE DIAGNOSES   Active Symptoms:  1. Cancer associated pain  2. Met colon cancer  3. Bowel obstruction  4. Hospice care     PLAN   1. Patient will continue [facility] level care as he needs frequent nursing assessment, bowel obstruction precludes the use of oral/sublingual medication for effectiveness, as patient also has a venting G-tube. We initiated scheduled medication late yesterday afternoon. 2. Continue Dilaudid 1 mg IV every 4 hours scheduled every 15 minutes as needed for pain or shortness of breath  3. Continue Versed 2 mg IV every 4 hours scheduledwe may need to adjust the frequency if he has increased restlessness as this is a very short acting medication. 4. Comfort meds for all symptoms   5. Plan reviewed with bedside nursing team  6. Patient was able to meet with his wife yesterday afternoon at the hospice house. Daughter was present as well. I was able to talk with patient's daughter and we updated her on the plan of care. We will talk with them today. Met with daughter in the afternoon and updated her on plan of care. She definitely can see some changes and I agree that he is decliningnot surprising now that no longer receiving IV fluids, IV antibiotics, etc. that he was receiving at the hospital.  She just wants to make sure he is comfortable. She is tearful but also understands and very thankful that he is at the hospice house. Patient's wife also at the bedside but given her dementia does not understand the gravity of the situation. Reassured patient's daughter that we would update her if we were seeing any other changes. 7.  and SW to support family needs  8. Disposition: anticipate death at [facility]  9. Hospice Plan of care was reviewed in detail and agree with current plan of care    Prognosis estimated based on 07/07/22 clinical assessment is:   [x] Hours to Days    [] Days to Weeks    [] Other:    Communicated plan of care with: Hospice Case Manager; Hospice IDT; Care Team     GOALS OF CARE     Patient/Medical POA stated Goal of Care: hospice care    [x] I have reviewed and/or updated ACP information in the Advance Care Planning Navigator. This information is available in the [address] link in the patient's chart header. Primary Decision [facility] Agent):   Primary Decision Maker: fredis hudson - Daughter - [phone]    Secondary Decision Maker: Susan Thurman - Son-in-Law - [phone]    Resuscitation Status: DNR  If DNR is there a Durable DNR on file? : [x] Yes [] No (If no, complete Durable DNR)    HISTORY     History obtained from: chart, patient, daughter, SIMONE    CHIEF COMPLAINT: abdominal pain  The patient is:   [x] Verbal  [] Nonverbal  [] Unresponsive    HPI/SUBJECTIVE:  Patient with increased abdominal discomfort    7/7-patient does appear comfortable right now. Schedule medication has been helpful. REVIEW OF SYSTEMS     The following systems were: [x] reviewed  [] unable to be reviewed    Positive ROS include:  Constitutional: fatigue, weakness, in pain, short of breath  Ears/nose/mouth/throat: increased airway secretions  Respiratory:shortness of breath, wheezing  Gastrointestinal:poor appetite, nausea, vomiting, abdominal pain, constipation, diarrhea  Musculoskeletal:pain, deformities, swelling legs  Neurologic:confusion, hallucinations, weakness  Psychiatric:anxiety, feeling depressed, poor sleep  Endocrine:     Pain has been as low as 2 out of 10 and no evidence of nonverbal signs of discomfort. Patient does tend to downplay his symptoms    Adult Non-Verbal Pain Assessment Score: Face  [] 0   No particular expression or smile  [] 1   Occasional grimace, tearing, frowning, wrinkled forehead  [] 2   Frequent grimace, tearing, frowning, wrinkled forehead    Activity (movement)  [] 0   Lying quietly, normal position  [] 1   Seeking attention through movement or slow, cautious movement  [] 2   Restless, excessive activity and/or withdrawal reflexes    Guarding  [] 0   Lying quietly, no positioning of hands over areas of body  [] 1   Splinting areas of the body, tense  [] 2   Rigid, stiff    Physiology (vital signs)  [] 0   Stable vital signs  [] 1   Change in any of the following: SBP > 20mm Hg; HR > 20/minute  [] 2   Change in any of the following: SBP > 30mm Hg; HR > 25/minute    Respiratory  [] 0   Baseline RR/SpO2, compliant with ventilator  [] 1   RR > 10 above baseline, or 5% drop SpO2, mild asynchrony with ventilator  [] 2   RR > 20 above baseline, or 10% drop SpO2, asynchrony with ventilator     FUNCTIONAL ASSESSMENT     Palliative Performance Scale (PPS):20       PSYCHOSOCIAL/SPIRITUAL ASSESSMENT     Active Problems:    * No active hospital problems.  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    GERD (gastroesophageal reflux disease) 4/6/2010    GI bleed, duodenitis 2007 4/6/2010    H/O Heavy Alcohol Use 9/7/2010    History of skin cancer 09/07/2010    melanoma back    HTN (hypertension) 4/6/2010  Hypercalcemia 2012    Hyperhomocysteinemia ([facility] 2010    Hypertriglyceridemia 2010    Left inguinal hernia 2015    Lipids blood increased 2010    Microalbuminuria 12/10/2015    Mild Allergic Rhinitis 2010    Multiple myeloma ([facility]     Pre-diabetes 2010    2005; Optho Dr Nithya Cheema Right inguinal hernia 2015    S/P AAA repair 2012    Sinus tachycardia 2010    Vitamin B12 deficiency 2010      Past Surgical History:   Procedure Laterality Date    COLONOSCOPY  2007    Normal; Dr Sophy Solomon EGD  2007    small hiatal hernia, mild duodenitis; Dr Sophy Solomon HX AAA REPAIR  2012    Dr Odalys Armijo    HX CATARACT REMOVAL      both eyes    HX CHOLECYSTECTOMY  14    lap sandi- [address] [place] [address] Dr. Asad Haro Left 9/24/15    left indirect inguinal by Dr. Kimmie Lindquist Right 11/2/15    inguinal w/ mesh by Dr. Kimmie Lindquist Bilateral 10/2015 And 2015    Inguinal    BLUE DOPPLER  3/26/2012    BLUE Echo; + clot      Social History     Tobacco Use    Smoking status: Former Smoker     Packs/day: 1.50     Years: 55.00     Pack years: 82.50     Quit date: 2001     Years since quittin.5    Smokeless tobacco: Never Used    Tobacco comment: used to smoke 1 1/2 ppd x ~ 40 yrs   Substance Use Topics    Alcohol use:  Yes     Alcohol/week: 14.0 standard drinks     Types: 14 Glasses of wine per week     Comment: 15     Family History   Problem Relation Age of Onset    Heart Disease Mother          age 80    Cancer Father          brain tumor age 80    Heart Disease Father     Substance Abuse Daughter          cocaine OD age 43 in 200    Other Son          PE/DVT age 43    Heart Disease Brother     Cancer Brother         BLADDER    Other Daughter         alive and well    Anesth Problems Neg Hx       Allergies   Allergen Reactions    Codeine Nausea and Vomiting     Blurred vision, felt faint    Contrast Agent [Iodine] Hives     Needs premedication w/ Benadryl prn (since 1/2012 reaction)    Dexamethasone Other (comments)     \"Deathly ill\"    Fish Oil Nausea Only    Glipizide Other (comments)     DRASTIC DROP IN BLOOD GLUCOSE, fasting    Lovenox [Enoxaparin] Hives     Patient states blisters all over the body.     Metformin Diarrhea    Niacin Other (comments)     Flushing    Norvasc [Amlodipine] Other (comments)     Leg edema    Optiray 350 [Ioversol] Hives     He does not know what this is      Current Facility-Administered Medications   Medication Dose Route Frequency    bisacodyL (DULCOLAX) suppository 10 mg  10 mg Rectal DAILY PRN    midazolam (VERSED) injection 2 mg  2 mg IntraVENous Q15MIN PRN    prochlorperazine (COMPAZINE) injection 10 mg  10 mg IntraVENous Q6H PRN    acetaminophen (TYLENOL) suppository 650 mg  650 mg Rectal Q4H PRN    ketorolac (TORADOL) injection 30 mg  30 mg IntraVENous Q6H PRN    glycopyrrolate (ROBINUL) injection 0.2 mg  0.2 mg IntraVENous Q4H PRN    HYDROmorphone (DILAUDID) injection 1 mg  1 mg IntraVENous Q4H    midazolam (VERSED) injection 2 mg  2 mg IntraVENous Q4H    HYDROmorphone (DILAUDID) injection 1 mg  1 mg IntraVENous Q15MIN PRN        PHYSICAL EXAM     Wt Readings from Last 3 Encounters:   07/01/22 74.4 kg (164 lb)   05/27/22 74.1 kg (163 lb 6.4 oz)   04/28/22 71.3 kg (157 lb 3.2 oz)       Visit Vitals  BP (!) 105/48   Pulse 77   Temp 97.6 °F (36.4 °C)   Resp 20       Supplemental O2  [] Yes  [x] NO  Last bowel movement:     Currently this patient has:  [x] Peripheral IV [] PICC  [] PORT [] ICD    [] Draper Catheter [] NG Tube   [x] PEG Tube    [] Rectal Tube [] Drain  [] Other:     Constitutional:thin, tired appearing, alert and oriented, drifts off to sleep very easily  Eyes: reactive, patch over right eye  ENMT: slightly dry  Cardiovascular: rrr  Respiratory: no WOB or respiratory distress  Gastrointestinal: distended, diffusely TTP, minimal to high pitched bowel sounds, venting G-tube clean dry and intact with greenish-brownish drainage  Musculoskeletal:muscle wasting  Skin:unremarkable  Neurologic:nonfocal  Psychiatric: Calm  Other:       Pertinent Lab and or Imaging Tests:  Lab Results   Component Value Date/Time    Sodium 146 (H) 07/05/2022 12:28 AM    Potassium 3.7 07/05/2022 12:28 AM    Chloride 117 (H) 07/05/2022 12:28 AM    CO2 22 07/05/2022 12:28 AM    Anion gap 7 07/05/2022 12:28 AM    Glucose 82 07/05/2022 12:28 AM    BUN 19 07/05/2022 12:28 AM    Creatinine 0.88 07/05/2022 12:28 AM    BUN/Creatinine ratio 22 (H) 07/05/2022 12:28 AM    GFR est AA >60 07/05/2022 12:28 AM    GFR est non-AA >60 07/05/2022 12:28 AM    Calcium 8.2 (L) 07/05/2022 12:28 AM     Lab Results   Component Value Date/Time    Protein, total 7.6 07/01/2022 07:47 PM    Albumin 3.5 07/01/2022 07:47 PM           Total time:   Counseling / coordination time:   > 50% counseling / coordination?:

## 2022-07-07 NOTE — PROGRESS NOTES
0700  Report received. 0730  Pt lying in bed, asleep. Pt aroused to verbal stimuli. No co pain at this time. Lungs are clear. Pt is on O2 at 2lpm.  No cough noted. Abd is distended, slightly tender. No bowel sounds present. Last reported BM was 6-27. Pt has a PEG to drain. Pt is draining a small amt of greenish bile. Intake is poor. Pt is only taking in sips of water. Pt voids in a urinal.+2 edema in R lower leg. Skin is intact. Pt has bruising on his L elbow. Pt has an IV in his R AC. Dressing is clear and intact. 0800  Pt sleeping. No grimacing. 9236  Dr Audra Hickey rounding. Pt medicated with scheduled meds. Pt reports the meds are keeping him comfortable. 0930  Pt asleep. 56  Pt's Son-in-law in to visit. Rn updated him on pt's status. [facility] in to visit. Pt very appreciative of the visit. 1215  Dressing changed on IV site. Dressing is clear and intact. Pt medicated with scheduled meds. 1330  Pt relaxed. 1430  Pt turned and repositioned. Pt comfortable. 1450  Pt co difficulty breathing. HOB elevated. Pt medicated with Dilaudid and Versed. Family at the bedside. Pt stated you aren't giving me anything to keep me here. Rn visited with pt and educated him on comfort and not prolonging the disease process. 1500  Pt snoring. Brows not furrowed. RR regular. 1530  Pt sleeping. 1604  Pt medicated with scheduled IV meds. No complaints at this time. 1715  Pt snoring. No grimacing. RR regular. 1820  Pt continues to sleep. 1850  Bed alarm going off. Pt sitting up on the side of the bed,. Pt needing to void. Draper placed with 1000 cc out. 1859  Pt medicated with dilaudid. Report given.           NAME OF PATIENT:  Rodrick Caldwell    LEVEL OF CARE:  GIP    REASON FOR GIP:   Pain, despite numerous changes in medications, Nausea and vomiting, despite changes to medications, Medication adjustment that must be monitored 24/7 and Stabilizing treatment that cannot take place at home    *PATIENT REMAINS ELIGIBLE FOR GIP LEVEL OF CARE AS EVIDENCED BY: (MUST BE ADDRESSED OF PATIENT GIP)  Pt is receiving scheduled and PRN medications for comfort. Pt's wife is unable to care for Mr Carol Morales. She has Dementia. REASON FOR RESPITE:  n/a    O2 SAFETY:  O2 at 2lpm  Oxygen sign on the door    FALL INTERVENTIONS PROVIDED:   Implemented/recommended use of non-skid footwear, Implemented/recommended use of fall risk identification flag to all team members, Implemented/recommended assistive devices and encouraged their use, Implemented/recommended resources for alarm system (personal alarm, bed alarm, call bell, etc.) , Implemented/recommended environmental changes (remove hazards, lower bed, improve lighting, etc.) and Implemented/recommended increased supervision/assistance    INTERDISPLINARY COMMUNICATION/COLLABORATION:  Physician, MSW, Nhi and RN, CNA    NEW MEDICATION INITIATION DOCUMENTATION:  No new medications initiated.       Reason medication is being initiated:  n/a    MD / Provider name consulted re: change in status / initiation of new medication:  n/a    New Symptom(s):  n/a    New Order(s):  n/a    Name of the person notified of the changes:  n/a    Name of person being taught:  n/a    Instructions given:  n/a    Side Effects taught:  n/a    Response to teaching:  n/a      COMFORTABLE DYING MEASURE:  Is Patient/family satisfied with symptom level?  yes    DISCHARGE PLAN:  Pt will remain at the [facility] until SW and family can make alternative living arrangements

## 2022-07-08 NOTE — PROGRESS NOTES
Problem: Pressure Injury - Risk of  Goal: *Prevention of pressure injury  Description: Document Christiano Scale and appropriate interventions in the flowsheet. Outcome: Progressing Towards Goal  Note: Pressure Injury Interventions:  Sensory Interventions: Assess changes in LOC,Check visual cues for pain,Float heels,Keep linens dry and wrinkle-free,Minimize linen layers    Moisture Interventions: Absorbent underpads,Apply protective barrier, creams and emollients,Internal/External urinary devices,Maintain skin hydration (lotion/cream)    Activity Interventions: Pressure redistribution bed/mattress(bed type)    Mobility Interventions: Float heels,HOB 30 degrees or less,Pressure redistribution bed/mattress (bed type)    Nutrition Interventions: Document food/fluid/supplement intake    Friction and Shear Interventions: Apply protective barrier, creams and emollients,HOB 30 degrees or less,Lift sheet,Minimize layers                Problem: Falls - Risk of  Goal: *Absence of Falls  Description: Document Al Fall Risk and appropriate interventions in the flowsheet.   Outcome: Progressing Towards Goal  Note: Fall Risk Interventions:  Mobility Interventions: Bed/chair exit alarm    Mentation Interventions: Adequate sleep, hydration, pain control,Bed/chair exit alarm,Door open when patient unattended,More frequent rounding    Medication Interventions: Bed/chair exit alarm    Elimination Interventions: Bed/chair exit alarm,Call light in reach,Toileting schedule/hourly rounds              Problem: Pain  Goal: Assess satisfaction of level of comfort and symptom control  Outcome: Progressing Towards Goal     Problem: Breathing Pattern - Ineffective  Goal: *Use of effective breathing techniques  Outcome: Progressing Towards Goal     Problem: Infection - Risk of, Urinary Catheter-Associated Urinary Tract Infection  Goal: *Absence of infection signs and symptoms  Outcome: Progressing Towards Goal     Problem: Dyspnea Due to End of Life  Goal: Demonstrate understanding of and ability to manage respiratory symptoms at end of life  Outcome: Progressing Towards Goal

## 2022-07-08 NOTE — PROGRESS NOTES
[address] [place] [address]       Pharmacist monitoring provided for this patient at [address] [place] [address] Way List      Admitting dianosis treated appropriately : YES     Nausea/Vomiting controlled: YES     Pain Controlled: YES     Agitation/Anxiety/ Delirium controlled: YES     Depression controlled: YES     Secretions controlled : YES     Constipation/Bowel routine controlled: YES     SOB/ Dyspnea controlled: YES     Insomnia: YES     Thank you,        Jignesh Angelo, MadanD, BCPS

## 2022-07-08 NOTE — HOSPICE
1900 Report received from [facility], [address] Patient resting in bed, denies pain. RN assessment and vitals completed. Lungs are clear. Abdomen is distended. PEG draining green bile. Patient asked for ice chips but was sleeping when nurse returned. Administered scheduled IV dilaudid and versed. 2045 Patient resting quietly on his back. Appears to be sleeping. 2130 Patient resting with eyes closed, respirations even and unlabored. 2225 Patient in bed, eyes are open. States he's doing okay. 2345 Patient yelling out \"help me\". Patient stated his pants are falling off. Patient not wearing any pants. Patient stated he can't breath. Instructed him to breath in through his nose slowly. Administered scheduled IV versed and dilaudid. 0005 New IV placed by Ema Fragoso RN Patient resting with eyes closed, lightly snoring. 0100 Patient resting with eyes closed, respirations unlabored. 7985 Patient given ice chips by Ema Fragoso RN. Patient restless, fidgety. Respirations labored. Administered prn IV dilaudid and versed. 0320 Patient given bath. Falling asleep throughout bath. Repositioned onto right side. 0430 Administered scheduled IV dilaudid and versed. 0510 Patient resting quietly with eyes closed, no grimacing, lightly snoring. 1799 Patient resting quietly with eye closed.  400ml emptied from rosas, 100ml emptied from PEG.  0700 Report given to Dain hodge RN        NAME OF PATIENT:  Nighat Tirado    LEVEL OF CARE:  GIP    REASON FOR GIP:   Medication adjustment that must be monitored 24/7 and Stabilizing treatment that cannot take place at home    *PATIENT REMAINS ELIGIBLE FOR [facility] LEVEL OF CARE AS EVIDENCED BY: (MUST BE ADDRESSED OF PATIENT GIP)      REASON FOR RESPITE:  n/a    O2 SAFETY:  Concentrator positioning (6\" from furniture/drapes), No petroleum based products on face while oxygen in use and Oxygen sign on the door    FALL INTERVENTIONS PROVIDED:   Implemented/recommended use of fall risk identification flag to all team members, Implemented/recommended resources for alarm system (personal alarm, bed alarm, call bell, etc.) , Implemented/recommended environmental changes (remove hazards, lower bed, improve lighting, etc.) and Implemented/recommended increased supervision/assistance    INTERDISPLINARY COMMUNICATION/COLLABORATION:  Physician, MSW, Nhi and RN, CNA    NEW MEDICATION INITIATION DOCUMENTATION:  n/a    Reason medication is being initiated:  n/a    MD / Provider name consulted re: change in status / initiation of new medication:  n/a    New Symptom(s):  n/a    New Order(s):  n/a    Name of the person notified of the changes:  n/a    Name of person being taught:  n/a    Instructions given:  n/a    Side Effects taught:  n/a    Response to teaching:  n/a      COMFORTABLE DYING MEASURE:  Is Patient/family satisfied with symptom level?  yes    DISCHARGE PLAN:  Patient nearing end of life and likely to pass at [facility]. Should he stabilize will return home with family.

## 2022-07-08 NOTE — PROGRESS NOTES
0700  Report received  6768 Pt climbing out of the bed,  Unable to redirect. Bed alarm going off. Brows furrowed. Pt medicated with PRN Dilaudid and Versed. Pt alert to self. Able to answer yes/no questions appropriately. Lungs are clear, diminished at the bases. No cough noted. Pt took off his O2  No dyspnea noted. Bowel sounds very distant. Abd is distended, tender to touch. PEG continues to drain a scant amt of very dark green bile. Pt has +2 edema in R leg. Skin is intact. Pt has and IV in his R arm. Dressing is clear and intact. 0745  Pt snoring. 0827  Pt medicated with scheduled IV meds. Pt opens eyes to verbal stimuli. No grimacing.    3714  Dr Jairo Chen rounding. Med adjustments made. Pt resting. 1000  Dtr at the bedside. Rn provided and update on pt's status and med changes. . 1020  Pt having slight tremors in his arms. Raising them over his head,legs over the rail. 1034  Pt medicated with Versed. Rn educated on signs and symptoms of EOL. Dtr tearful but appropriate. GFMS given to Dtr. 1045  Pt snoring. 1130 Pt medicated with scheduled dilaudid and Phenobarb. Pt turned and repositioned. No grimacing. 1210  Pt resting comfortably. No grimacing. 1310 Pt resting. 1330  Pt picking at his O2 tubing and pulling at his clothes. Dtr feels pt is agitated. Pt with legs over the rail. Phone call to Dr Jairo Chen. Medication changes made. 1343  Pt medicated with Versed and dilaudid. Pt tolerated a couple ice chips. 1410  Pt asleep  1530  Pt continues to sleep. No facial grimacing. Pt snoring. Dtr at the bedside. 1650 Pt medicated with scheduled meds by DAVID AMADOR. Pt continues to rest.    1745  Pt resting. 1815  Pt medicated with Phenobarbital.  No grimacing. No moaning. Pt resting. 1900  Report given.         NAME OF PATIENT:  Brenda Mcdaniel     LEVEL OF CARE:  GIP     REASON FOR GIP:   Pain, despite numerous changes in medications, Nausea and vomiting, despite changes to medications, Medication adjustment that must be monitored 24/7 and Stabilizing treatment that cannot take place at home     *PATIENT REMAINS ELIGIBLE FOR GIP LEVEL OF CARE AS EVIDENCED BY: (MUST BE ADDRESSED OF PATIENT GIP)  Pt is receiving scheduled and PRN medications for comfort. Pt's wife is unable to care for Mr Stephanie Cordova.   She has Dementia.          REASON FOR RESPITE:  n/a     O2 SAFETY:  O2 at 2lpm  Oxygen sign on the door     FALL INTERVENTIONS PROVIDED:   Implemented/recommended use of non-skid footwear, Implemented/recommended use of fall risk identification flag to all team members, Implemented/recommended assistive devices and encouraged their use, Implemented/recommended resources for alarm system (personal alarm, bed alarm, call bell, etc.) , Implemented/recommended environmental changes (remove hazards, lower bed, improve lighting, etc.) and Implemented/recommended increased supervision/assistance     INTERDISPLINARY COMMUNICATION/COLLABORATION:  Physician, MSW, Nhi and RN, CNA     NEW MEDICATION INITIATION DOCUMENTATION:  No new medications initiated.       Reason medication is being initiated:  n/a     MD / Provider name consulted re: change in status / initiation of new medication:  n/a     New Symptom(s):  n/a     New Order(s):  n/a     Name of the person notified of the changes:  n/a     Name of person being taught:  n/a     Instructions given:  n/a     Side Effects taught:  n/a     Response to teaching:  n/a        COMFORTABLE DYING MEASURE:  Is Patient/family satisfied with symptom level?  yes     DISCHARGE PLAN:  Pt will remain at the [facility] until SW and family can make alternative living arrangements

## 2022-07-08 NOTE — PROGRESS NOTES
[facility]   Good Help to Those in Need  [phone]    Patient Name: Haris Kahn  YOB: 1934    Date of Provider Hospice Visit: 07/08/22    Level of Care:   [x] General Inpatient (GIP)    [] Routine   [] Respite    Current Location of Care:  [] [facility] [] [facility] [] [facility] [] [address] [x] Hospice House [facility])    IF [facility], patient referred from:  [x] [facility] [] [facility] [] [facility] [] [address] [] Home [] Other:     Date of Original Hospice Admission: 7/6/22  Hospice Medical Director at time of admission: Sutus Diagnosis: Metastatic colon cancer  Diagnoses RELATED to the terminal prognosis: Large bowel obstruction secondary to cancer  Other Diagnoses:      HOSPICE SUMMARY     Haris Kahn is a 80y.o. year old who was admitted to [facility]. Patient presents to hospital with nausea and vomiting. Found on CT imaging to have large bowel obstruction related to sigmoid colonic mass. Patient with CT imaging showing extensive peritoneal carcinomatosis. Patient had NGT placed for decompression and not a surgical candidate. Ultimately, he had venting G-tube placed while in the hospital and then transitioned to [facility] for GIP LOC secondary to symptom burden and bowel obstruction    The patient's principle diagnosis has resulted in bowel obstruction   Refer to LCD     Functionally, the patient's Karnofsky and/or Palliative Performance Scale has declined over a period of days-weeks and is estimated at 20 The patient is dependent on the following ADLs:all    Objective information that support this patients limited prognosis includes:   CT scan     IMPRESSION     1. New extensive peritoneal carcinomatosis, most evident in the left upper  quadrant with innumerable peritoneal nodules.   2. Diffuse distention of the small and large bowel, with a large volume of stool  within the large bowel, to the level of the mid sigmoid colon where there is an  abrupt transition with associated nodular wall thickening. Findings are  suspicious for large bowel obstruction due to colonic malignancy. 3. Multiple hypodense lesions in the liver, favored to represent metastases. 4. Small volume ascites. 5. Patchy consolidative and groundglass opacities in the lungs, worst in the  lingula, favored to represent multifocal pneumonia. 6. Stable 3.7 cm infrarenal abdominal aortic aneurysm.     The patient/family chose comfort measures with the support of Hospice. HOSPICE DIAGNOSES   Active Symptoms:  1. Cancer associated pain  2. Met colon cancer  3. Bowel obstruction  4. Hospice care     PLAN   1. Patient will continue GIP level care as he needs frequent nursing assessment, bowel obstruction precludes the use of oral/sublingual medication for effectiveness, as patient also has a venting G-tube. Patient with evidence of restlessness and hallucinations last night. Also having issues with pain. 2. Continue Dilaudid 1 mg IV every 4 hours scheduled every 15 minutes as needed for pain or shortness of breath  3. For restlessness, change medication to phenobarbital 65 mg IV every 8 scheduled. Continue to use Versed 2 mg IV every 15 minutes as needed for breakthrough type restlessness. Hopefully the phenobarbital will provide better longer acting comfort. .  4. Comfort meds for all symptoms   5. Plan reviewed with bedside nursing team  6. Met with patient's daughter yesterday afternoon and updated her on the plan of care. Clearly patient appears to be transitioning and even we could see changes yesterday. More changes noted today. She is thankful for the hospice house and appreciative of the care. Patient's wife did come visit yesterday again and I was able to talk with her little bit. Given her dementia, she does not clearly understand the gravity of the situation. 7. Met with patient's daughter and son-in-law in the afternoon. Patient definitely appears more restless and uncomfortable this afternoon and this morning. Patient's daughter agrees. We reviewed plan of care and we will make adjustments in Dilaudid to 2 mg IV every 4 hours scheduled and adjust phenobarbital to every 6 hours, and adjust Versed to 4 mg every 15 minutes as needed for restlessness. Patient did arouse a little bit and attempted to talk some but definitely showing evidence of a little bit of tremor on the right and nonverbal signs of pain. 8.  and SW to support family needs  9. Disposition: anticipate death at [facility]  10. Hospice Plan of care was reviewed in detail and agree with current plan of care    Prognosis estimated based on 07/08/22 clinical assessment is:   [x] Hours to Days    [] Days to Weeks    [] Other:    Communicated plan of care with: Hospice Case Manager; Hospice IDT; Care Team     GOALS OF CARE     Patient/Medical POA stated Goal of Care: hospice care    [x] I have reviewed and/or updated ACP information in the Advance Care Planning Navigator. This information is available in the [facility] [address] link in the patient's chart header. Primary Decision [facility] Agent):   Primary Decision Maker: fredis hudson - Daughter - [phone]    Secondary Decision Maker: Najma Stout - Son-in-Law - [phone]    Resuscitation Status: DNR  If DNR is there a Durable DNR on file? : [x] Yes [] No (If no, complete Durable DNR)    HISTORY     History obtained from: chart, patient, daughter, SIMONE    CHIEF COMPLAINT: abdominal pain  The patient is:   [x] Verbal  [] Nonverbal  [] Unresponsive    HPI/SUBJECTIVE:  Patient with increased abdominal discomfort    7/7-patient does appear comfortable right now. Schedule medication has been helpful. 7/8-patient with some increased restlessness last night. Still little restless this morning.   Minimal output from G-tube or Draper       REVIEW OF SYSTEMS     The following systems were: [x] reviewed  [] unable to be reviewed    Positive ROS include:  Constitutional: fatigue, weakness, in pain, short of breath  Ears/nose/mouth/throat: increased airway secretions  Respiratory:shortness of breath, wheezing  Gastrointestinal:poor appetite, nausea, vomiting, abdominal pain, constipation, diarrhea  Musculoskeletal:pain, deformities, swelling legs  Neurologic:confusion, hallucinations, weakness  Psychiatric:anxiety, feeling depressed, poor sleep  Endocrine:       Adult Non-Verbal Pain Assessment Score:6    Face  [] 0   No particular expression or smile  [] 1   Occasional grimace, tearing, frowning, wrinkled forehead  [x] 2   Frequent grimace, tearing, frowning, wrinkled forehead    Activity (movement)  [] 0   Lying quietly, normal position  [] 1   Seeking attention through movement or slow, cautious movement  [x] 2   Restless, excessive activity and/or withdrawal reflexes    Guarding  [] 0   Lying quietly, no positioning of hands over areas of body  [x] 1   Splinting areas of the body, tense  [] 2   Rigid, stiff    Physiology (vital signs)  [x] 0   Stable vital signs  [] 1   Change in any of the following: SBP > 20mm Hg; HR > 20/minute  [] 2   Change in any of the following: SBP > 30mm Hg; HR > 25/minute    Respiratory  [] 0   Baseline RR/SpO2, compliant with ventilator  [x] 1   RR > 10 above baseline, or 5% drop SpO2, mild asynchrony with ventilator  [] 2   RR > 20 above baseline, or 10% drop SpO2, asynchrony with ventilator     FUNCTIONAL ASSESSMENT     Palliative Performance Scale (PPS):10       PSYCHOSOCIAL/SPIRITUAL ASSESSMENT     Active Problems:    * No active hospital problems.  *    Past Medical History:   Diagnosis Date    AAA (abdominal aortic aneurysm) ([facility] [address] 01/06/2012    repair in 2012    Abnormal serum protein electrophoresis 4/25/2012    Anemia 4/6/2010    Anemia due to GI bleed 2007 4/6/2010    Arthritis     Atrial fibrillation 1/2012 02/22/2012    Pt states doesn't have    Bilateral Carotid Bruits, L>R 9/7/2010    Bone cancer ([address]     Carotid stenosis 4/6/2010    Chronic pain     Diabetes mellitus, type 2 ([facility] [address] 2010    NO MEDS    Disturbances of sulphur-bearing amino-acid metabolism 2010    GERD (gastroesophageal reflux disease) 2010    GI bleed, duodenitis 2010    H/O Heavy Alcohol Use 2010    History of skin cancer 2010    melanoma back    HTN (hypertension) 2010    Hypercalcemia 2012    Hyperhomocysteinemia ([facility] [address] 2010    Hypertriglyceridemia 2010    Left inguinal hernia 2015    Lipids blood increased 2010    Microalbuminuria 12/10/2015    Mild Allergic Rhinitis 2010    Multiple myeloma ([facility] [address]     Pre-diabetes 2010; Optho Dr Tee Moon Right inguinal hernia 2015    S/P AAA repair 2012    Sinus tachycardia 2010    Vitamin B12 deficiency 2010      Past Surgical History:   Procedure Laterality Date    COLONOSCOPY  2007    Normal; Dr Margy Marques EGD  2007    small hiatal hernia, mild duodenitis; Dr Margy Marques HX AAA REPAIR  2012    Dr Ninoska Weaver    HX CATARACT REMOVAL      both eyes    HX CHOLECYSTECTOMY  14    [facility]- Dr. Oxana Segal Left 9/24/15    left indirect inguinal by Dr. Christen Caba Right 11/2/15    inguinal w/ mesh by Dr. Christen Caba Bilateral 10/2015 And 2015    Inguinal    BLUE DOPPLER  3/26/2012    BLUE Echo; + clot      Social History     Tobacco Use    Smoking status: Former Smoker     Packs/day: 1.50     Years: 55.00     Pack years: 82.50     Quit date: 2001     Years since quittin.5    Smokeless tobacco: Never Used    Tobacco comment: used to smoke 1 1/2 ppd x ~ 40 yrs   Substance Use Topics    Alcohol use:  Yes     Alcohol/week: 14.0 standard drinks     Types: 14 Glasses of wine per week     Comment: 14     Family History   Problem Relation Age of Onset    Heart Disease Mother          age 80    Cancer Father  brain tumor age 80    Heart Disease Father     Substance Abuse Daughter          cocaine OD age 43 in 200    Other Son          PE/DVT age 43    Heart Disease Brother     Cancer Brother         [address] Other Daughter         alive and well    Anesth Problems Neg Hx       Allergies   Allergen Reactions    Codeine Nausea and Vomiting     Blurred vision, felt faint    Contrast Agent [Iodine] Hives     Needs premedication w/ Benadryl prn (since 2012 reaction)    Dexamethasone Other (comments)     \"Deathly ill\"    Fish Oil Nausea Only    Glipizide Other (comments)     DRASTIC DROP IN BLOOD GLUCOSE, fasting    Lovenox [Enoxaparin] Hives     Patient states blisters all over the body.     Metformin Diarrhea    Niacin Other (comments)     Flushing    Norvasc [Amlodipine] Other (comments)     Leg edema    Optiray 350 [Ioversol] Hives     He does not know what this is      Current Facility-Administered Medications   Medication Dose Route Frequency    PHENobarbital (LUMINAL) injection 65 mg  65 mg IntraVENous TID    bisacodyL (DULCOLAX) suppository 10 mg  10 mg Rectal DAILY PRN    midazolam (VERSED) injection 2 mg  2 mg IntraVENous Q15MIN PRN    prochlorperazine (COMPAZINE) injection 10 mg  10 mg IntraVENous Q6H PRN    acetaminophen (TYLENOL) suppository 650 mg  650 mg Rectal Q4H PRN    ketorolac (TORADOL) injection 30 mg  30 mg IntraVENous Q6H PRN    glycopyrrolate (ROBINUL) injection 0.2 mg  0.2 mg IntraVENous Q4H PRN    HYDROmorphone (DILAUDID) injection 1 mg  1 mg IntraVENous Q4H    HYDROmorphone (DILAUDID) injection 1 mg  1 mg IntraVENous Q15MIN PRN        PHYSICAL EXAM     Wt Readings from Last 3 Encounters:   22 74.4 kg (164 lb)   22 74.1 kg (163 lb 6.4 oz)   22 71.3 kg (157 lb 3.2 oz)       Visit Vitals  BP (!) 110/44 (BP 1 Location: Left upper arm, BP Patient Position: Supine)   Pulse (!) 108   Temp 97.7 °F (36.5 °C)   Resp 20   SpO2 92%       Supplemental O2  [] Yes  [x] NO  Last bowel movement:     Currently this patient has:  [x] Peripheral IV [] PICC  [] PORT [] ICD    [] Draper Catheter [] NG Tube   [x] PEG Tube    [] Rectal Tube [] Drain  [] Other:     Constitutional: Ill-appearing, ashen, minimally responsive  Eyes: reactive,  ENMT: dry  Cardiovascular: rrr  Respiratory: Slight increased work of breathing, minimal accessory muscle use, respirations mid teens  Gastrointestinal: distended, diffusely TTP, minimal to high pitched bowel sounds, venting G-tube clean dry and intact with greenish-brownish drainage-minimal  Musculoskeletal:muscle wasting  Skin:unremarkable  Neurologic:nonfocal  Psychiatric: Restless at times  Other:       Pertinent Lab and or Imaging Tests:  Lab Results   Component Value Date/Time    Sodium 146 (H) 07/05/2022 12:28 AM    Potassium 3.7 07/05/2022 12:28 AM    Chloride 117 (H) 07/05/2022 12:28 AM    CO2 22 07/05/2022 12:28 AM    Anion gap 7 07/05/2022 12:28 AM    Glucose 82 07/05/2022 12:28 AM    BUN 19 07/05/2022 12:28 AM    Creatinine 0.88 07/05/2022 12:28 AM    BUN/Creatinine ratio 22 (H) 07/05/2022 12:28 AM    GFR est AA >60 07/05/2022 12:28 AM    GFR est non-AA >60 07/05/2022 12:28 AM    Calcium 8.2 (L) 07/05/2022 12:28 AM     Lab Results   Component Value Date/Time    Protein, total 7.6 07/01/2022 07:47 PM    Albumin 3.5 07/01/2022 07:47 PM           Total time:   Counseling / coordination time:   > 50% counseling / coordination?:

## 2022-07-08 NOTE — PROGRESS NOTES
Problem: Pressure Injury - Risk of  Goal: *Prevention of pressure injury  Description: Document Christiano Scale and appropriate interventions in the flowsheet. Outcome: Progressing Towards Goal  Note: Pressure Injury Interventions:  Sensory Interventions: Assess changes in LOC,Check visual cues for pain    Moisture Interventions: Absorbent underpads,Apply protective barrier, creams and emollients    Activity Interventions: Pressure redistribution bed/mattress(bed type)    Mobility Interventions: Float heels,HOB 30 degrees or less    Nutrition Interventions: Offer support with meals,snacks and hydration,Document food/fluid/supplement intake    Friction and Shear Interventions: Apply protective barrier, creams and emollients,HOB 30 degrees or less,Lift sheet                Problem: Patient Education: Go to Patient Education Activity  Goal: Patient/Family Education  Outcome: Progressing Towards Goal     Problem: Falls - Risk of  Goal: *Absence of Falls  Description: Document Al Fall Risk and appropriate interventions in the flowsheet. Outcome: Progressing Towards Goal  Note: Fall Risk Interventions:  Mobility Interventions: Bed/chair exit alarm    Mentation Interventions: Adequate sleep, hydration, pain control,Bed/chair exit alarm,Door open when patient unattended    Medication Interventions: Bed/chair exit alarm    Elimination Interventions: Bed/chair exit alarm,Call light in reach              Problem: Patient Education: Go to Patient Education Activity  Goal: Patient/Family Education  Outcome: Progressing Towards Goal     Problem: Emotional Support Needs  Goal: Patient/family is receiving emotional support  Description: Pt and pt's family will receive emotional support related to EOL care within two weeks.   Outcome: Progressing Towards Goal     Problem: Spiritual Evaluation  Goal: Identify beliefs/practices that support hospice experience  Description: Patient/family identify their beliefs/practices that impair Hospice experience. Patient/family identify their beliefs/practices that support Hospice experience. Patient coping identified. Spiritual distress identified and decreased with visit.   Outcome: Progressing Towards Goal     Problem: Pain  Goal: Assess satisfaction of level of comfort and symptom control  Outcome: Progressing Towards Goal  Goal: *Control of acute pain  Outcome: Progressing Towards Goal     Problem: Breathing Pattern - Ineffective  Goal: *Use of effective breathing techniques  Outcome: Progressing Towards Goal     Problem: Infection - Risk of, Urinary Catheter-Associated Urinary Tract Infection  Goal: *Absence of infection signs and symptoms  Outcome: Progressing Towards Goal     Problem: Dyspnea Due to End of Life  Goal: Demonstrate understanding of and ability to manage respiratory symptoms at end of life  Outcome: Progressing Towards Goal

## 2022-07-08 NOTE — PROGRESS NOTES
visited pt's room at [facility] on this date. Pt was awake, but appeared restless as indicated by reaching his arms out. Pt's daughter, Daja Izquierdo, and son-in-law, Hemalatha Crespo, at bedside. Daja Izquierdo reported that she was able to bring pt's wife to visit on Wednesday. Daja Izquierdo reported that pt was restless and that she was planning to let the nurse know. SW alerted pt's nurse, who checked on the pt to provide comfort care. No concerns expressed at this time. Social work will continue to provide emotional support to the pt and pt's family.      [employer], BSW  [place], [place]

## 2022-07-09 NOTE — PROGRESS NOTES
Problem: Pressure Injury - Risk of  Goal: *Prevention of pressure injury  Description: Document Christiano Scale and appropriate interventions in the flowsheet. Outcome: Progressing Towards Goal  Note: Pressure Injury Interventions:  Sensory Interventions: Assess changes in LOC,Check visual cues for pain,Float heels,Keep linens dry and wrinkle-free,Minimize linen layers    Moisture Interventions: Absorbent underpads,Internal/External urinary devices,Maintain skin hydration (lotion/cream)    Activity Interventions: Pressure redistribution bed/mattress(bed type)    Mobility Interventions: Float heels,HOB 30 degrees or less,Pressure redistribution bed/mattress (bed type)    Nutrition Interventions: Document food/fluid/supplement intake,Offer support with meals,snacks and hydration    Friction and Shear Interventions: Apply protective barrier, creams and emollients,HOB 30 degrees or less,Lift sheet,Minimize layers                Problem: Falls - Risk of  Goal: *Absence of Falls  Description: Document Al Fall Risk and appropriate interventions in the flowsheet.   Outcome: Progressing Towards Goal  Note: Fall Risk Interventions:  Mobility Interventions: Bed/chair exit alarm    Mentation Interventions: Adequate sleep, hydration, pain control,Bed/chair exit alarm,Door open when patient unattended,More frequent rounding    Medication Interventions: Bed/chair exit alarm    Elimination Interventions: Bed/chair exit alarm,Call light in reach,Toileting schedule/hourly rounds              Problem: Pain  Goal: Assess satisfaction of level of comfort and symptom control  Outcome: Progressing Towards Goal     Problem: Breathing Pattern - Ineffective  Goal: *Use of effective breathing techniques  Outcome: Progressing Towards Goal     Problem: Infection - Risk of, Urinary Catheter-Associated Urinary Tract Infection  Goal: *Absence of infection signs and symptoms  Outcome: Progressing Towards Goal     Problem: Dyspnea Due to End of Life  Goal: Demonstrate understanding of and ability to manage respiratory symptoms at end of life  Outcome: Progressing Towards Goal

## 2022-07-09 NOTE — HOSPICE
1900 Report received from Eddi Paris 83 Patient resting in bed with eyes closed, daughter at the bedside. Responds to verbal stimuli. No signs of pain or discomfort. Respirations unlabored. Administered scheduled IV dilaudid. 2045 Patient resting with eyes closed, appears to be sleeping. 2125 Patient receiving bed bath by CNA's.  2145 Patient resting quietly with eyes closed. Administered scheduled IV phenobarbital. Reinforced IV dressing. 2245 Patient resting quietly in bed, lightly snoring. 2355 Administered scheduled IV dilaudid. 0050 Patient resting with eyes closed, body relaxed, respirations unlabored. 0145 Patient resting quietly, no facial grimacing.  0245 Patient resting quietly, eyes are closed. Respirations unlabored, RR 14.  0325 Patient becoming restless. Right arm tremerous. Administered prn IV versed. 0345 Patient resting with eyes closed, lightly snoring. No longer restless. 0430 Administered scheduled IV dilaudid. 9550 Patient resting quietly with eyes closed, neutral facial expression observed. 0630 Patient resting with eyes closed, daughter remains at the bedside.    0700 Report given to MARJORIE Fowler      NAME OF PATIENT:  Jesus Manuel Ward    LEVEL OF CARE:  GIP    REASON FOR GIP:   Pain, despite numerous changes in medications, Terminal agitation, despite changes to medications, Medication adjustment that must be monitored 24/7 and Stabilizing treatment that cannot take place at home    *PATIENT REMAINS ELIGIBLE FOR GIP LEVEL OF CARE AS EVIDENCED BY: (MUST BE ADDRESSED OF PATIENT GIP)      REASON FOR RESPITE:  n/a    O2 SAFETY:  Concentrator positioning (6\" from furniture/drapes), No petroleum based products on face while oxygen in use and Oxygen sign on the door    FALL INTERVENTIONS PROVIDED:   Implemented/recommended use of fall risk identification flag to all team members, Implemented/recommended resources for alarm system (personal alarm, bed alarm, call bell, etc.) , Implemented/recommended environmental changes (remove hazards, lower bed, improve lighting, etc.) and Implemented/recommended increased supervision/assistance    INTERDISPLINARY COMMUNICATION/COLLABORATION:  Physician, MSW, [place] and RN, CNA    NEW MEDICATION INITIATION DOCUMENTATION:  n/a    Reason medication is being initiated:  n/a    MD / Provider name consulted re: change in status / initiation of new medication:  n/a    New Symptom(s):  n/a    New Order(s):  n/a    Name of the person notified of the changes:  n/a    Name of person being taught:  n/a    Instructions given:  n/a    Side Effects taught:  n/a    Response to teaching:  n/a      COMFORTABLE DYING MEASURE:  Is Patient/family satisfied with symptom level?  yes    DISCHARGE PLAN:  Patient nearing end of life and likely to pass at [facility]. Should he stabilize will return home with family.

## 2022-07-09 NOTE — PROGRESS NOTES
1130-pts daughter came to the nurse's station to report that her father has passed; pt found laying still and peaceful with eyes closed and fixed pupils; pt without heart sounds or respirations for 2 mins of auscultation; TOD 1132; pts daughter was present at TO and is grieving appropriately-decline  support; MARJORIE Fowler notified of the above; no family coming to visit; daughter left [facility]

## 2022-07-09 NOTE — HOSPICE
NAME OF PATIENT:  Winston Kussmaul    LEVEL OF CARE:  GIP    REASON FOR GIP:   Pain, despite numerous changes in medications, Terminal agitation, despite changes to medications, Medication adjustment that must be monitored 24/7 and Stabilizing treatment that cannot take place at home    *PATIENT REMAINS ELIGIBLE FOR [facility] LEVEL OF CARE AS EVIDENCED BY: (MUST BE ADDRESSED OF PATIENT GIP)      O2 SAFETY:  Concentrator positioning (6\" from furniture/drapes), Tanks stored in roblero , No petroleum based products on face while oxygen in use and Oxygen sign on the door    FALL INTERVENTIONS PROVIDED:   Implemented/recommended resources for alarm system (personal alarm, bed alarm, call bell, etc.)  and Implemented/recommended environmental changes (remove hazards, lower bed, improve lighting, etc.)    INTERDISPLINARY COMMUNICATION/COLLABORATION:  JARAD AMADOR    NEW MEDICATION INITIATION DOCUMENTATION:     n/A    Reason medication is being initiated:    n/a    MD / Provider name consulted re: change in status / initiation of new medication:   N/a    New Symptom(s):   n/a    New Order(s):   n/a    Name of the person notified of the changes:   n/a    Name of person being taught:   n/a    Instructions given:   n/a    Side Effects taught:    n/a    Response to teaching:    n/a      COMFORTABLE DYING MEASURE:  Is Patient/family satisfied with symptom level?  no    DISCHARGE PLAN:  Patient may pass at [facility], should patient stabilize will return home with family,    0700  Report received from [address] [place] [address],[address]. 0730  Patient resting quietly, opens eyes to verbal stimuli. Respirations slightly labored. Reduced O2 to 1lpm via NC. Daughter at bedside, update provided. 3+ edema noted in his right leg, absent BS. Peg tube to gravity drain bag. Greenish liquid noted. 0967  Medicated with scheduled dilaudid and prn versed for increased grimaces and upper extremity spasm.   8129  Patient appears to be resting more comfortably, daughter Gordon Magallanes remains at bedside. 0940  Medicated with scheduled phenobarbital and prn dilaudid prior to turning. [address] [facility] [address] patient onto his left side, positioned with pillows for comfort. Tolerated well, oral care provided. Daughter at bedside. 1050  Resting quietly, respirations becoming irregular and more shallow. Skin appears slightly more dusky. Daughter remains at bedside. 1132  TOLUCIANO, pronounced by [facility] RN. Daughter Aleyda Gonsalez at bedside grieving appropriately. [facility] care provided. Cody Dias 68 with patient's son in law via phone regarding [facility]. will contact them either later today or tomorrow.

## 2022-07-09 NOTE — PROGRESS NOTES
Problem: Pressure Injury - Risk of  Goal: *Prevention of pressure injury  Description: Document Christiano Scale and appropriate interventions in the flowsheet. 7/9/2022 1152 by Kenyatta Castaneda RN  Outcome: Resolved/Met  Note: Pressure Injury Interventions:  Sensory Interventions: Float heels,Minimize linen layers,Pressure redistribution bed/mattress (bed type),Turn and reposition approx. every two hours (pillows and wedges if needed),Check visual cues for pain,Keep linens dry and wrinkle-free    Moisture Interventions: Minimize layers,Apply protective barrier, creams and emollients,Internal/External urinary devices    Activity Interventions: Pressure redistribution bed/mattress(bed type)    Mobility Interventions: Float heels    Nutrition Interventions: Document food/fluid/supplement intake,Offer support with meals,snacks and hydration    Friction and Shear Interventions: Apply protective barrier, creams and emollients,Minimize layers             7/9/2022 0829 by Kenyatta Castaneda RN  Outcome: Progressing Towards Goal  Note: Pressure Injury Interventions:  Sensory Interventions: Float heels,Minimize linen layers,Pressure redistribution bed/mattress (bed type),Turn and reposition approx.  every two hours (pillows and wedges if needed),Check visual cues for pain,Keep linens dry and wrinkle-free    Moisture Interventions: Minimize layers,Apply protective barrier, creams and emollients,Internal/External urinary devices    Activity Interventions: Pressure redistribution bed/mattress(bed type)    Mobility Interventions: Float heels    Nutrition Interventions: Document food/fluid/supplement intake,Offer support with meals,snacks and hydration    Friction and Shear Interventions: Apply protective barrier, creams and emollients,Minimize layers             7/9/2022 0825 by Kenyatta Castaneda RN  Outcome: Progressing Towards Goal  Note: Pressure Injury Interventions:  Sensory Interventions: Float heels,Minimize linen layers,Pressure redistribution bed/mattress (bed type),Turn and reposition approx. every two hours (pillows and wedges if needed),Check visual cues for pain,Keep linens dry and wrinkle-free    Moisture Interventions: Minimize layers,Apply protective barrier, creams and emollients,Internal/External urinary devices    Activity Interventions: Pressure redistribution bed/mattress(bed type)    Mobility Interventions: Float heels    Nutrition Interventions: Document food/fluid/supplement intake,Offer support with meals,snacks and hydration    Friction and Shear Interventions: Apply protective barrier, creams and emollients,Minimize layers                Problem: Patient Education: Go to Patient Education Activity  Goal: Patient/Family Education  7/9/2022 1152 by Scarlett Paz RN  Outcome: Resolved/Met  7/9/2022 0829 by Scarlett Paz RN  Outcome: Progressing Towards Goal     Problem: Falls - Risk of  Goal: *Absence of Falls  Description: Document Moe Ayala Fall Risk and appropriate interventions in the flowsheet.   7/9/2022 1152 by Scarlett Paz RN  Outcome: Resolved/Met  Note: Fall Risk Interventions:  Mobility Interventions: Bed/chair exit alarm    Mentation Interventions: Adequate sleep, hydration, pain control,Bed/chair exit alarm,Door open when patient unattended,More frequent rounding    Medication Interventions: Bed/chair exit alarm    Elimination Interventions: Bed/chair exit alarm,Call light in reach,Toileting schedule/hourly rounds           7/9/2022 0829 by Scarlett Paz RN  Outcome: Progressing Towards Goal  Note: Fall Risk Interventions:  Mobility Interventions: Bed/chair exit alarm    Mentation Interventions: Adequate sleep, hydration, pain control,Bed/chair exit alarm,Door open when patient unattended,More frequent rounding    Medication Interventions: Bed/chair exit alarm    Elimination Interventions: Bed/chair exit alarm,Call light in reach,Toileting schedule/hourly rounds              Problem: Patient Education: Go to Patient Education Activity  Goal: Patient/Family Education  7/9/2022 1152 by Moises Mcguire RN  Outcome: Resolved/Met  7/9/2022 0829 by Moises Mcguire RN  Outcome: Progressing Towards Goal     Problem: Emotional Support Needs  Goal: Patient/family is receiving emotional support  Description: Pt and pt's family will receive emotional support related to EOL care within two weeks. Outcome: Resolved/Met     Problem: Spiritual Evaluation  Goal: Identify beliefs/practices that support hospice experience  Description: Patient/family identify their beliefs/practices that impair Hospice experience. Patient/family identify their beliefs/practices that support Hospice experience. Patient coping identified. Spiritual distress identified and decreased with visit.   Outcome: Resolved/Met     Problem: Pain  Goal: Assess satisfaction of level of comfort and symptom control  7/9/2022 1152 by Moises Mcguire RN  Outcome: Resolved/Met  7/9/2022 0829 by Moises Mcguire RN  Outcome: Progressing Towards Goal  Goal: *Control of acute pain  7/9/2022 1152 by Moises Mcguire RN  Outcome: Resolved/Met  7/9/2022 0829 by Moises Mcguire RN  Outcome: Progressing Towards Goal     Problem: Breathing Pattern - Ineffective  Goal: *Use of effective breathing techniques  7/9/2022 1152 by Moises Mcguire RN  Outcome: Resolved/Met  7/9/2022 0829 by Moises Mcguire RN  Outcome: Progressing Towards Goal     Problem: Infection - Risk of, Urinary Catheter-Associated Urinary Tract Infection  Goal: *Absence of infection signs and symptoms  7/9/2022 1152 by Moises Mcguire RN  Outcome: Resolved/Met  7/9/2022 0829 by Moises Mcguire RN  Outcome: Progressing Towards Goal     Problem: Dyspnea Due to End of Life  Goal: Demonstrate understanding of and ability to manage respiratory symptoms at end of life  7/9/2022 1152 by Moises Mcguire RN  Outcome: Resolved/Met  7/9/2022 0829 by Moises Mcguire RN  Outcome: Progressing Towards Goal

## 2022-07-09 NOTE — PROGRESS NOTES
Problem: Pressure Injury - Risk of  Goal: *Prevention of pressure injury  Description: Document Christiano Scale and appropriate interventions in the flowsheet. 7/9/2022 0829 by Jason Goldstein RN  Outcome: Progressing Towards Goal  Note: Pressure Injury Interventions:  Sensory Interventions: Float heels,Minimize linen layers,Pressure redistribution bed/mattress (bed type),Turn and reposition approx. every two hours (pillows and wedges if needed),Check visual cues for pain,Keep linens dry and wrinkle-free    Moisture Interventions: Minimize layers,Apply protective barrier, creams and emollients,Internal/External urinary devices    Activity Interventions: Pressure redistribution bed/mattress(bed type)    Mobility Interventions: Float heels    Nutrition Interventions: Document food/fluid/supplement intake,Offer support with meals,snacks and hydration    Friction and Shear Interventions: Apply protective barrier, creams and emollients,Minimize layers             7/9/2022 0825 by Jason Goldstein RN  Outcome: Progressing Towards Goal  Note: Pressure Injury Interventions:  Sensory Interventions: Float heels,Minimize linen layers,Pressure redistribution bed/mattress (bed type),Turn and reposition approx.  every two hours (pillows and wedges if needed),Check visual cues for pain,Keep linens dry and wrinkle-free    Moisture Interventions: Minimize layers,Apply protective barrier, creams and emollients,Internal/External urinary devices    Activity Interventions: Pressure redistribution bed/mattress(bed type)    Mobility Interventions: Float heels    Nutrition Interventions: Document food/fluid/supplement intake,Offer support with meals,snacks and hydration    Friction and Shear Interventions: Apply protective barrier, creams and emollients,Minimize layers                Problem: Patient Education: Go to Patient Education Activity  Goal: Patient/Family Education  Outcome: Progressing Towards Goal     Problem: Falls - Risk of  Goal: *Absence of Falls  Description: Document Amy Mcnally Fall Risk and appropriate interventions in the flowsheet.   Outcome: Progressing Towards Goal  Note: Fall Risk Interventions:  Mobility Interventions: Bed/chair exit alarm    Mentation Interventions: Adequate sleep, hydration, pain control,Bed/chair exit alarm,Door open when patient unattended,More frequent rounding    Medication Interventions: Bed/chair exit alarm    Elimination Interventions: Bed/chair exit alarm,Call light in reach,Toileting schedule/hourly rounds              Problem: Patient Education: Go to Patient Education Activity  Goal: Patient/Family Education  Outcome: Progressing Towards Goal     Problem: Pain  Goal: Assess satisfaction of level of comfort and symptom control  Outcome: Progressing Towards Goal  Goal: *Control of acute pain  Outcome: Progressing Towards Goal     Problem: Breathing Pattern - Ineffective  Goal: *Use of effective breathing techniques  Outcome: Progressing Towards Goal     Problem: Infection - Risk of, Urinary Catheter-Associated Urinary Tract Infection  Goal: *Absence of infection signs and symptoms  Outcome: Progressing Towards Goal     Problem: Dyspnea Due to End of Life  Goal: Demonstrate understanding of and ability to manage respiratory symptoms at end of life  Outcome: Progressing Towards Goal

## 2022-07-10 ENCOUNTER — HOME CARE VISIT (OUTPATIENT)
Dept: HOSPICE | Facility: HOSPICE | Age: 87
End: 2022-07-10
Payer: MEDICARE

## 2024-05-01 NOTE — ROUTINE PROCESS
Patient inquired about the referral to allergy TRANSFER - OUT REPORT:    Verbal report given to geneva rn(name) on Kay Delay  being transferred to 5E(unit) for routine progression of care       Report consisted of patients Situation, Background, Assessment and   Recommendations(SBAR). Information from the following report(s) SBAR, Kardex, ED Summary and MAR was reviewed with the receiving nurse. Lines:   Peripheral IV 77/04/14 Right Basilic (Active)        Opportunity for questions and clarification was provided.       Patient transported with:   [employer]

## (undated) DEVICE — DECANTER BAG 9": Brand: MEDLINE INDUSTRIES, INC.

## (undated) DEVICE — SUT SLK 0 30IN SH BLK --

## (undated) DEVICE — BAG ISOL TRNSPRT 18X18.5IN LF --

## (undated) DEVICE — SUTURE PDS + SZ 0 L27IN ABSRB VLT L36MM CT 1 1 2 CIR PDP340H

## (undated) DEVICE — STRAP,POSITIONING,KNEE/BODY,FOAM,4X60": Brand: MEDLINE

## (undated) DEVICE — TOTAL TRAY, 16FR 10ML SIL FOLEY, URN: Brand: MEDLINE

## (undated) DEVICE — SOL INJ SOD CL 0.9% 500ML BG --

## (undated) DEVICE — SUT PROL 6-0 24IN BV1 DA BLU --

## (undated) DEVICE — SUTURE ABSORBABLE ANTIBACT 1-0 CT-1 24 IN STRATAFIX PDS + SXPP1A443

## (undated) DEVICE — MINOR BASIN -SMH: Brand: MEDLINE INDUSTRIES, INC.

## (undated) DEVICE — TRAY PREP DRY W/ PREM GLV 2 APPL 6 SPNG 2 UNDPD 1 OVERWRAP

## (undated) DEVICE — REM POLYHESIVE ADULT PATIENT RETURN ELECTRODE: Brand: VALLEYLAB

## (undated) DEVICE — STERILE POLYISOPRENE POWDER-FREE SURGICAL GLOVES WITH EMOLLIENT COATING: Brand: PROTEXIS

## (undated) DEVICE — SOLUTION IV 500ML 0.9% SOD CHL FLX CONT

## (undated) DEVICE — HYPODERMIC SAFETY NEEDLE: Brand: MONOJECT

## (undated) DEVICE — PENCIL SMK EVAC 10 FT BLADE ELECTRD ROCKER FOR TELSCP

## (undated) DEVICE — DRAPE,EXTREMITY,89X128,STERILE: Brand: MEDLINE

## (undated) DEVICE — TOTAL TRAY, DB, 100% SILI FOLEY, 16FR 10: Brand: MEDLINE

## (undated) DEVICE — HANDLE LT SNAP ON ULT DURABLE LENS FOR TRUMPF ALC DISPOSABLE

## (undated) DEVICE — RADIFOCUS GLIDEWIRE: Brand: GLIDEWIRE

## (undated) DEVICE — DRAPE,C-SECTION,FEN,POUCH,WIRE: Brand: MEDLINE

## (undated) DEVICE — TUBING HYDR IRR --

## (undated) DEVICE — GLOVE ORANGE PI 7 1/2   MSG9075

## (undated) DEVICE — Device

## (undated) DEVICE — VASCULAR-SMH: Brand: MEDLINE INDUSTRIES, INC.

## (undated) DEVICE — YANKAUER,POOLE TIP,STERILE,50/CS: Brand: MEDLINE

## (undated) DEVICE — INTENDED FOR TISSUE SEPARATION, AND OTHER PROCEDURES THAT REQUIRE A SHARP SURGICAL BLADE TO PUNCTURE OR CUT.: Brand: BARD-PARKER ® CARBON RIB-BACK BLADES

## (undated) DEVICE — DRAPE FLD WRM W44XL66IN C6L FOR INTRATEMP SYS THERMABASIN

## (undated) DEVICE — INFECTION CONTROL KIT SYS

## (undated) DEVICE — SUTURE VCRL SZ 2-0 L36IN ABSRB UD L40MM CT 1/2 CIR J957H

## (undated) DEVICE — CLIP INT SM WIDE RED TI TRNSVRS GRV CHEVRON SHP W PRECIS

## (undated) DEVICE — CLIP LIG M BLU TI HRT SHP WIRE HORZ 600 PER BX

## (undated) DEVICE — STAPLER INT L60MM REG TISS BLU B FRM 8 FIRING 2 ROW AUTO

## (undated) DEVICE — BINDER ABD H12IN FOR 30-45IN WAIST UNIV 4 PNL PREM DSGN E

## (undated) DEVICE — GUIDEWIRE VASC L180CM DIA0.035IN L10CM DIA3MM J TIP PTFE S

## (undated) DEVICE — PAD,ABDOMINAL,5"X9",ST,LF,25/BX: Brand: MEDLINE INDUSTRIES, INC.

## (undated) DEVICE — HANDPIECE LAP L23MM DIA5MM VES SEAL CUT CRV JAW PSTL GRP

## (undated) DEVICE — CATH KT GASTMY PEG PSH 20FR --

## (undated) DEVICE — CATH SOFT-VU RIM 5F X 65 CM

## (undated) DEVICE — SUTURE PROL SZ 5-0 L30IN NONABSORBABLE BLU L13MM RB-2 1/2 8710H

## (undated) DEVICE — SPONGE GZ W4XL4IN COT 12 PLY TYP VII WVN C FLD DSGN

## (undated) DEVICE — DRAPE,REIN 53X77,STERILE: Brand: MEDLINE

## (undated) DEVICE — PREP SKN CHLRAPRP APL 26ML STR --

## (undated) DEVICE — SUTURE PERMAHAND SZ 3-0 L18IN NONABSORBABLE BLK L26MM SH C013D

## (undated) DEVICE — DESTINATION RENAL GUIDING SHEATH: Brand: DESTINATION

## (undated) DEVICE — BANDAGE,GAUZE,BULKEE II,4.5"X4.1YD,STRL: Brand: MEDLINE

## (undated) DEVICE — BASIN ST MAJOR-NO CAUTERY: Brand: MEDLINE INDUSTRIES, INC.

## (undated) DEVICE — GLOVE SURG SZ 75 L12IN FNGR THK79MIL GRN LTX FREE

## (undated) DEVICE — WIPE 400300 MEROCEL 20PK INSTRUMENT: Brand: MEROCEL®

## (undated) DEVICE — SUTURE VCRL SZ 3-0 L18IN ABSRB UD L26MM SH 1/2 CIR J864D

## (undated) DEVICE — SUT ETHLN 3-0 18IN PS2 BLK --

## (undated) DEVICE — ANGIO-SEAL VIP VASCULAR CLOSURE DEVICE: Brand: ANGIO-SEAL

## (undated) DEVICE — PAD,NON-ADHERENT,3X8,STERILE,LF,1/PK: Brand: MEDLINE

## (undated) DEVICE — Z DISCONTINUED USE 2272114 DRAPE SURG UTIL 26X15 IN W/ TAPE N INVASIVE MULTLYR DISP

## (undated) DEVICE — DRAIN SURG FLAT W7MMXL20CM FULL PERF

## (undated) DEVICE — RESERVOIR,SUCTION,100CC,SILICONE: Brand: MEDLINE

## (undated) DEVICE — TOWEL SURG W17XL27IN STD BLU COT NONFENESTRATED PREWASHED

## (undated) DEVICE — NEEDLE HYPO 22GA L1.5IN BLK S STL HUB POLYPR SHLD REG BVL

## (undated) DEVICE — DRESSING,GAUZE,XEROFORM,CURAD,1"X8",ST: Brand: CURAD

## (undated) DEVICE — SPONGE LAP 18X18IN STRL -- 5/PK

## (undated) DEVICE — CATHETER ANGIO 5FR L100CM GWIRE 0.038IN BERN NONBRAIDED HI

## (undated) DEVICE — CATH SUPP SNGL 0.035INX135CM -- TRAILBLAZER - ORDER BY PK/5

## (undated) DEVICE — DRAPE SURG W41XL74IN CLR FULL SZ C ARM 3 ADH POLY STRP E

## (undated) DEVICE — SUTURE VCRL SZ 3-0 L27IN ABSRB UD FS-2 L19MM 1/2 CIR J423H

## (undated) DEVICE — SOLUTION IRRIG 1000ML H2O STRL BLT

## (undated) DEVICE — AMC/4 ARTERIAL NEEDLE – 18GA X 2.75” (7CM): Brand: ARTERIAL NEEDLE

## (undated) DEVICE — SET BLOOD COLL SFTY NDLE 23GA L0.75N TBNG L12IN LIGHT BLUE W

## (undated) DEVICE — GLOVE ORANGE PI 7   MSG9070

## (undated) DEVICE — GARMENT,MEDLINE,DVT,INT,CALF,MED, GEN2: Brand: MEDLINE

## (undated) DEVICE — SPONGE GZ W4XL4IN COT RADPQ HIGHLY ABSRB

## (undated) DEVICE — PACK PROCEDURE SURG HRT CATH

## (undated) DEVICE — STAPLER INT L28CM 60MM 12 FIRING B FRM PWD + ECHELON FLX

## (undated) DEVICE — SUT ETHLN 3-0 18IN PS1 BLK --

## (undated) DEVICE — DRSG GZ OIL EMUL CURAD 3X3 --

## (undated) DEVICE — GRADUATED BOWL: Brand: DEVON

## (undated) DEVICE — Z DISCONTINUED USE 2425483 (LOW STOCK PER MEDLINE) TAPE UMB L18IN DIA1/8IN WHT COT NONABSORBABLE W/O NDL FOR

## (undated) DEVICE — VASCULAR-RICHMOND-LF: Brand: MEDLINE INDUSTRIES, INC.

## (undated) DEVICE — DRSG POSTOP PRMSL AG 3.5X14IN

## (undated) DEVICE — SYR 20ML LL STRL LF --

## (undated) DEVICE — BLADE ASSEMB CLP HAIR FINE --

## (undated) DEVICE — BARD® JAVID™ CAROTID BYPASS SHUNT, 17F - 10F X 27.5CM: Brand: BARD® JAVID